# Patient Record
Sex: FEMALE | Employment: UNEMPLOYED | URBAN - METROPOLITAN AREA
[De-identification: names, ages, dates, MRNs, and addresses within clinical notes are randomized per-mention and may not be internally consistent; named-entity substitution may affect disease eponyms.]

---

## 2018-11-30 ENCOUNTER — APPOINTMENT (OUTPATIENT)
Dept: GENERAL RADIOLOGY | Age: 63
DRG: 871 | End: 2018-11-30
Attending: INTERNAL MEDICINE
Payer: COMMERCIAL

## 2018-11-30 ENCOUNTER — APPOINTMENT (OUTPATIENT)
Dept: CT IMAGING | Age: 63
DRG: 871 | End: 2018-11-30
Attending: STUDENT IN AN ORGANIZED HEALTH CARE EDUCATION/TRAINING PROGRAM
Payer: COMMERCIAL

## 2018-11-30 ENCOUNTER — HOSPITAL ENCOUNTER (INPATIENT)
Age: 63
LOS: 17 days | Discharge: NURSING, REHAB OR LONG-TERM CARE | DRG: 871 | End: 2018-12-17
Attending: STUDENT IN AN ORGANIZED HEALTH CARE EDUCATION/TRAINING PROGRAM | Admitting: INTERNAL MEDICINE
Payer: COMMERCIAL

## 2018-11-30 ENCOUNTER — APPOINTMENT (OUTPATIENT)
Dept: GENERAL RADIOLOGY | Age: 63
DRG: 871 | End: 2018-11-30
Attending: STUDENT IN AN ORGANIZED HEALTH CARE EDUCATION/TRAINING PROGRAM
Payer: COMMERCIAL

## 2018-11-30 DIAGNOSIS — R41.82 ALTERED MENTAL STATUS, UNSPECIFIED ALTERED MENTAL STATUS TYPE: ICD-10-CM

## 2018-11-30 DIAGNOSIS — E87.29 HIGH ANION GAP METABOLIC ACIDOSIS: Primary | ICD-10-CM

## 2018-11-30 DIAGNOSIS — N17.9 AKI (ACUTE KIDNEY INJURY) (HCC): ICD-10-CM

## 2018-11-30 DIAGNOSIS — M25.521 RIGHT ELBOW PAIN: ICD-10-CM

## 2018-11-30 DIAGNOSIS — M21.372 FOOT DROP, BILATERAL: ICD-10-CM

## 2018-11-30 DIAGNOSIS — R29.898 UPPER LIMB WEAKNESS: ICD-10-CM

## 2018-11-30 DIAGNOSIS — G93.40 ENCEPHALOPATHY: ICD-10-CM

## 2018-11-30 DIAGNOSIS — M21.371 FOOT DROP, BILATERAL: ICD-10-CM

## 2018-11-30 PROBLEM — E87.20 METABOLIC ACIDOSIS: Status: ACTIVE | Noted: 2018-11-30

## 2018-11-30 PROBLEM — D72.829 LEUKOCYTOSIS: Status: ACTIVE | Noted: 2018-11-30

## 2018-11-30 PROBLEM — I95.9 HYPOTENSION: Status: ACTIVE | Noted: 2018-11-30

## 2018-11-30 LAB
ALBUMIN SERPL-MCNC: 1 G/DL (ref 3.5–5)
ALBUMIN SERPL-MCNC: 1.2 G/DL (ref 3.5–5)
ALBUMIN/GLOB SERPL: 0.2 {RATIO} (ref 1.1–2.2)
ALBUMIN/GLOB SERPL: 0.3 {RATIO} (ref 1.1–2.2)
ALP SERPL-CCNC: 110 U/L (ref 45–117)
ALP SERPL-CCNC: 92 U/L (ref 45–117)
ALT SERPL-CCNC: 23 U/L (ref 12–78)
ALT SERPL-CCNC: 24 U/L (ref 12–78)
AMPHET UR QL SCN: NEGATIVE
ANION GAP SERPL CALC-SCNC: 21 MMOL/L (ref 5–15)
ANION GAP SERPL CALC-SCNC: 22 MMOL/L (ref 5–15)
APPEARANCE UR: ABNORMAL
ARTERIAL PATENCY WRIST A: YES
AST SERPL-CCNC: 53 U/L (ref 15–37)
AST SERPL-CCNC: 64 U/L (ref 15–37)
ATRIAL RATE: 88 BPM
B PERT DNA SPEC QL NAA+PROBE: NOT DETECTED
BACTERIA URNS QL MICRO: NEGATIVE /HPF
BARBITURATES UR QL SCN: NEGATIVE
BASE DEFICIT BLDA-SCNC: 9.8 MMOL/L
BASOPHILS # BLD: 0 K/UL (ref 0–0.1)
BASOPHILS NFR BLD: 0 % (ref 0–1)
BDY SITE: ABNORMAL
BENZODIAZ UR QL: NEGATIVE
BILIRUB SERPL-MCNC: 0.4 MG/DL (ref 0.2–1)
BILIRUB SERPL-MCNC: 0.4 MG/DL (ref 0.2–1)
BILIRUB UR QL CFM: NEGATIVE
BUN SERPL-MCNC: 63 MG/DL (ref 6–20)
BUN SERPL-MCNC: 72 MG/DL (ref 6–20)
BUN/CREAT SERPL: 22 (ref 12–20)
BUN/CREAT SERPL: 23 (ref 12–20)
C PNEUM DNA SPEC QL NAA+PROBE: NOT DETECTED
CALCIUM SERPL-MCNC: 6.6 MG/DL (ref 8.5–10.1)
CALCIUM SERPL-MCNC: 8.8 MG/DL (ref 8.5–10.1)
CALCULATED P AXIS, ECG09: 48 DEGREES
CALCULATED R AXIS, ECG10: 28 DEGREES
CALCULATED T AXIS, ECG11: 34 DEGREES
CANNABINOIDS UR QL SCN: NEGATIVE
CHLORIDE SERPL-SCNC: 103 MMOL/L (ref 97–108)
CHLORIDE SERPL-SCNC: 106 MMOL/L (ref 97–108)
CK SERPL-CCNC: 617 U/L (ref 26–192)
CO2 SERPL-SCNC: 18 MMOL/L (ref 21–32)
CO2 SERPL-SCNC: 21 MMOL/L (ref 21–32)
COCAINE UR QL SCN: NEGATIVE
COLOR UR: ABNORMAL
COMMENT, HOLDF: NORMAL
CREAT SERPL-MCNC: 2.85 MG/DL (ref 0.55–1.02)
CREAT SERPL-MCNC: 3.13 MG/DL (ref 0.55–1.02)
CREAT UR-MCNC: 187.11 MG/DL
DIAGNOSIS, 93000: NORMAL
DIFFERENTIAL METHOD BLD: ABNORMAL
DRUG SCRN COMMENT,DRGCM: ABNORMAL
EOSINOPHIL # BLD: 0 K/UL (ref 0–0.4)
EOSINOPHIL #/AREA URNS HPF: NEGATIVE /[HPF]
EOSINOPHIL NFR BLD: 0 % (ref 0–7)
EPITH CASTS URNS QL MICRO: ABNORMAL /LPF
ERYTHROCYTE [DISTWIDTH] IN BLOOD BY AUTOMATED COUNT: 14.6 % (ref 11.5–14.5)
EST. AVERAGE GLUCOSE BLD GHB EST-MCNC: 160 MG/DL
FLUAV H1 2009 PAND RNA SPEC QL NAA+PROBE: NOT DETECTED
FLUAV H1 RNA SPEC QL NAA+PROBE: NOT DETECTED
FLUAV H3 RNA SPEC QL NAA+PROBE: NOT DETECTED
FLUAV SUBTYP SPEC NAA+PROBE: NOT DETECTED
FLUBV RNA SPEC QL NAA+PROBE: NOT DETECTED
GAS FLOW.O2 O2 DELIVERY SYS: 2 L/MIN
GLOBULIN SER CALC-MCNC: 3.5 G/DL (ref 2–4)
GLOBULIN SER CALC-MCNC: 5.7 G/DL (ref 2–4)
GLUCOSE BLD STRIP.AUTO-MCNC: 117 MG/DL (ref 65–100)
GLUCOSE BLD STRIP.AUTO-MCNC: 142 MG/DL (ref 65–100)
GLUCOSE BLD STRIP.AUTO-MCNC: 177 MG/DL (ref 65–100)
GLUCOSE BLD STRIP.AUTO-MCNC: 66 MG/DL (ref 65–100)
GLUCOSE BLD STRIP.AUTO-MCNC: 92 MG/DL (ref 65–100)
GLUCOSE SERPL-MCNC: 136 MG/DL (ref 65–100)
GLUCOSE SERPL-MCNC: 60 MG/DL (ref 65–100)
GLUCOSE UR STRIP.AUTO-MCNC: NEGATIVE MG/DL
HADV DNA SPEC QL NAA+PROBE: NOT DETECTED
HBA1C MFR BLD: 7.2 % (ref 4.2–6.3)
HCO3 BLDA-SCNC: 16 MMOL/L (ref 22–26)
HCOV 229E RNA SPEC QL NAA+PROBE: NOT DETECTED
HCOV HKU1 RNA SPEC QL NAA+PROBE: NOT DETECTED
HCOV NL63 RNA SPEC QL NAA+PROBE: NOT DETECTED
HCOV OC43 RNA SPEC QL NAA+PROBE: NOT DETECTED
HCT VFR BLD AUTO: 32.6 % (ref 35–47)
HGB BLD-MCNC: 10 G/DL (ref 11.5–16)
HGB UR QL STRIP: NEGATIVE
HMPV RNA SPEC QL NAA+PROBE: NOT DETECTED
HPIV1 RNA SPEC QL NAA+PROBE: NOT DETECTED
HPIV2 RNA SPEC QL NAA+PROBE: NOT DETECTED
HPIV3 RNA SPEC QL NAA+PROBE: NOT DETECTED
HPIV4 RNA SPEC QL NAA+PROBE: NOT DETECTED
IMM GRANULOCYTES # BLD: 0.6 K/UL (ref 0–0.04)
IMM GRANULOCYTES NFR BLD AUTO: 2 % (ref 0–0.5)
KETONES UR QL STRIP.AUTO: NEGATIVE MG/DL
LACTATE BLD-SCNC: 1.33 MMOL/L (ref 0.4–2)
LEUKOCYTE ESTERASE UR QL STRIP.AUTO: NEGATIVE
LYMPHOCYTES # BLD: 0.6 K/UL (ref 0.8–3.5)
LYMPHOCYTES NFR BLD: 2 % (ref 12–49)
M PNEUMO DNA SPEC QL NAA+PROBE: NOT DETECTED
MCH RBC QN AUTO: 28.4 PG (ref 26–34)
MCHC RBC AUTO-ENTMCNC: 30.7 G/DL (ref 30–36.5)
MCV RBC AUTO: 92.6 FL (ref 80–99)
METHADONE UR QL: NEGATIVE
MONOCYTES # BLD: 0.6 K/UL (ref 0–1)
MONOCYTES NFR BLD: 2 % (ref 5–13)
NEUTS SEG # BLD: 25.8 K/UL (ref 1.8–8)
NEUTS SEG NFR BLD: 94 % (ref 32–75)
NITRITE UR QL STRIP.AUTO: NEGATIVE
NRBC # BLD: 0 K/UL (ref 0–0.01)
NRBC BLD-RTO: 0 PER 100 WBC
OPIATES UR QL: POSITIVE
OSMOLALITY SERPL: 312 MOSM/KG H2O
P-R INTERVAL, ECG05: 158 MS
PCO2 BLDA: 35 MMHG (ref 35–45)
PCP UR QL: NEGATIVE
PH BLDA: 7.28 [PH] (ref 7.35–7.45)
PH UR STRIP: 5 [PH] (ref 5–8)
PHOSPHATE SERPL-MCNC: 7.1 MG/DL (ref 2.6–4.7)
PLATELET # BLD AUTO: 502 K/UL (ref 150–400)
PMV BLD AUTO: 9.9 FL (ref 8.9–12.9)
PO2 BLDA: 69 MMHG (ref 80–100)
POTASSIUM SERPL-SCNC: 3.6 MMOL/L (ref 3.5–5.1)
POTASSIUM SERPL-SCNC: 4.5 MMOL/L (ref 3.5–5.1)
PROT SERPL-MCNC: 4.5 G/DL (ref 6.4–8.2)
PROT SERPL-MCNC: 6.9 G/DL (ref 6.4–8.2)
PROT UR STRIP-MCNC: 100 MG/DL
Q-T INTERVAL, ECG07: 382 MS
QRS DURATION, ECG06: 98 MS
QTC CALCULATION (BEZET), ECG08: 462 MS
RBC # BLD AUTO: 3.52 M/UL (ref 3.8–5.2)
RBC #/AREA URNS HPF: ABNORMAL /HPF (ref 0–5)
RBC MORPH BLD: ABNORMAL
RSV RNA SPEC QL NAA+PROBE: NOT DETECTED
RV+EV RNA SPEC QL NAA+PROBE: NOT DETECTED
SAMPLES BEING HELD,HOLD: NORMAL
SAO2 % BLD: 92 % (ref 92–97)
SAO2% DEVICE SAO2% SENSOR NAME: ABNORMAL
SERVICE CMNT-IMP: ABNORMAL
SERVICE CMNT-IMP: NORMAL
SERVICE CMNT-IMP: NORMAL
SODIUM SERPL-SCNC: 143 MMOL/L (ref 136–145)
SODIUM SERPL-SCNC: 148 MMOL/L (ref 136–145)
SODIUM UR-SCNC: 29 MMOL/L
SP GR UR REFRACTOMETRY: 1.02 (ref 1–1.03)
SPECIMEN SITE: ABNORMAL
UR CULT HOLD, URHOLD: NORMAL
UROBILINOGEN UR QL STRIP.AUTO: 1 EU/DL (ref 0.2–1)
VENTRICULAR RATE, ECG03: 88 BPM
WBC # BLD AUTO: 27.6 K/UL (ref 3.6–11)
WBC URNS QL MICRO: ABNORMAL /HPF (ref 0–4)

## 2018-11-30 PROCEDURE — 77030005514 HC CATH URETH FOL14 BARD -A

## 2018-11-30 PROCEDURE — 71045 X-RAY EXAM CHEST 1 VIEW: CPT

## 2018-11-30 PROCEDURE — 82803 BLOOD GASES ANY COMBINATION: CPT

## 2018-11-30 PROCEDURE — 83036 HEMOGLOBIN GLYCOSYLATED A1C: CPT

## 2018-11-30 PROCEDURE — 82962 GLUCOSE BLOOD TEST: CPT

## 2018-11-30 PROCEDURE — 74011250637 HC RX REV CODE- 250/637: Performed by: INTERNAL MEDICINE

## 2018-11-30 PROCEDURE — 74011000258 HC RX REV CODE- 258: Performed by: INTERNAL MEDICINE

## 2018-11-30 PROCEDURE — 77030011943

## 2018-11-30 PROCEDURE — 87205 SMEAR GRAM STAIN: CPT

## 2018-11-30 PROCEDURE — 87147 CULTURE TYPE IMMUNOLOGIC: CPT

## 2018-11-30 PROCEDURE — 74011250636 HC RX REV CODE- 250/636: Performed by: INTERNAL MEDICINE

## 2018-11-30 PROCEDURE — 87798 DETECT AGENT NOS DNA AMP: CPT

## 2018-11-30 PROCEDURE — 36600 WITHDRAWAL OF ARTERIAL BLOOD: CPT

## 2018-11-30 PROCEDURE — 36415 COLL VENOUS BLD VENIPUNCTURE: CPT

## 2018-11-30 PROCEDURE — 96360 HYDRATION IV INFUSION INIT: CPT

## 2018-11-30 PROCEDURE — 99285 EMERGENCY DEPT VISIT HI MDM: CPT

## 2018-11-30 PROCEDURE — 84100 ASSAY OF PHOSPHORUS: CPT

## 2018-11-30 PROCEDURE — 74011000250 HC RX REV CODE- 250: Performed by: INTERNAL MEDICINE

## 2018-11-30 PROCEDURE — 80320 DRUG SCREEN QUANTALCOHOLS: CPT

## 2018-11-30 PROCEDURE — 51702 INSERT TEMP BLADDER CATH: CPT

## 2018-11-30 PROCEDURE — 83930 ASSAY OF BLOOD OSMOLALITY: CPT

## 2018-11-30 PROCEDURE — 96361 HYDRATE IV INFUSION ADD-ON: CPT

## 2018-11-30 PROCEDURE — 87040 BLOOD CULTURE FOR BACTERIA: CPT

## 2018-11-30 PROCEDURE — 84300 ASSAY OF URINE SODIUM: CPT

## 2018-11-30 PROCEDURE — 80053 COMPREHEN METABOLIC PANEL: CPT

## 2018-11-30 PROCEDURE — 83605 ASSAY OF LACTIC ACID: CPT

## 2018-11-30 PROCEDURE — 74176 CT ABD & PELVIS W/O CONTRAST: CPT

## 2018-11-30 PROCEDURE — 85025 COMPLETE CBC W/AUTO DIFF WBC: CPT

## 2018-11-30 PROCEDURE — 80307 DRUG TEST PRSMV CHEM ANLYZR: CPT

## 2018-11-30 PROCEDURE — 93005 ELECTROCARDIOGRAM TRACING: CPT

## 2018-11-30 PROCEDURE — 65660000000 HC RM CCU STEPDOWN

## 2018-11-30 PROCEDURE — 87186 SC STD MICRODIL/AGAR DIL: CPT

## 2018-11-30 PROCEDURE — 82550 ASSAY OF CK (CPK): CPT

## 2018-11-30 PROCEDURE — 73080 X-RAY EXAM OF ELBOW: CPT

## 2018-11-30 PROCEDURE — 81001 URINALYSIS AUTO W/SCOPE: CPT

## 2018-11-30 PROCEDURE — 82570 ASSAY OF URINE CREATININE: CPT

## 2018-11-30 PROCEDURE — 74011250636 HC RX REV CODE- 250/636: Performed by: STUDENT IN AN ORGANIZED HEALTH CARE EDUCATION/TRAINING PROGRAM

## 2018-11-30 RX ORDER — OXYCODONE HYDROCHLORIDE 5 MG/1
5 TABLET ORAL
Status: ON HOLD | COMMUNITY
End: 2018-12-17 | Stop reason: SDUPTHER

## 2018-11-30 RX ORDER — TRIAMCINOLONE ACETONIDE 1 MG/G
CREAM TOPICAL 2 TIMES DAILY
COMMUNITY
End: 2018-12-17

## 2018-11-30 RX ORDER — NIACIN 500 MG/1
500 TABLET, EXTENDED RELEASE ORAL
COMMUNITY
End: 2018-12-17

## 2018-11-30 RX ORDER — HEPARIN SODIUM 5000 [USP'U]/ML
5000 INJECTION, SOLUTION INTRAVENOUS; SUBCUTANEOUS EVERY 8 HOURS
Status: DISCONTINUED | OUTPATIENT
Start: 2018-11-30 | End: 2018-12-07

## 2018-11-30 RX ORDER — PREDNISONE 20 MG/1
20 TABLET ORAL
COMMUNITY
Start: 2018-11-27 | End: 2018-12-17

## 2018-11-30 RX ORDER — ASPIRIN 81 MG/1
81 TABLET ORAL 2 TIMES DAILY
Status: DISCONTINUED | OUTPATIENT
Start: 2018-11-30 | End: 2018-12-07

## 2018-11-30 RX ORDER — NALOXONE HYDROCHLORIDE 1 MG/ML
1 INJECTION INTRAMUSCULAR; INTRAVENOUS; SUBCUTANEOUS
Status: ACTIVE | OUTPATIENT
Start: 2018-11-30 | End: 2018-12-01

## 2018-11-30 RX ORDER — SIMVASTATIN 40 MG/1
40 TABLET, FILM COATED ORAL
COMMUNITY
End: 2019-01-10

## 2018-11-30 RX ORDER — SODIUM CHLORIDE 0.9 % (FLUSH) 0.9 %
5-10 SYRINGE (ML) INJECTION EVERY 8 HOURS
Status: DISCONTINUED | OUTPATIENT
Start: 2018-11-30 | End: 2018-12-17 | Stop reason: HOSPADM

## 2018-11-30 RX ORDER — SERTRALINE HYDROCHLORIDE 50 MG/1
150 TABLET, FILM COATED ORAL
Status: DISCONTINUED | OUTPATIENT
Start: 2018-11-30 | End: 2018-12-10

## 2018-11-30 RX ORDER — SODIUM CHLORIDE 0.9 % (FLUSH) 0.9 %
5-10 SYRINGE (ML) INJECTION AS NEEDED
Status: DISCONTINUED | OUTPATIENT
Start: 2018-11-30 | End: 2018-12-17 | Stop reason: HOSPADM

## 2018-11-30 RX ORDER — DEXTROSE 50 % IN WATER (D50W) INTRAVENOUS SYRINGE
25-50 AS NEEDED
Status: DISCONTINUED | OUTPATIENT
Start: 2018-11-30 | End: 2018-12-17 | Stop reason: HOSPADM

## 2018-11-30 RX ORDER — METFORMIN HYDROCHLORIDE 500 MG/1
500 TABLET ORAL 2 TIMES DAILY WITH MEALS
COMMUNITY
End: 2018-12-17

## 2018-11-30 RX ORDER — ACETAMINOPHEN 325 MG/1
650 TABLET ORAL
Status: DISCONTINUED | OUTPATIENT
Start: 2018-11-30 | End: 2018-12-17 | Stop reason: HOSPADM

## 2018-11-30 RX ORDER — CYCLOBENZAPRINE HCL 10 MG
10 TABLET ORAL
COMMUNITY
End: 2018-12-17

## 2018-11-30 RX ORDER — INSULIN GLARGINE 100 [IU]/ML
75 INJECTION, SOLUTION SUBCUTANEOUS
COMMUNITY
End: 2018-12-17

## 2018-11-30 RX ORDER — MAGNESIUM SULFATE 100 %
4 CRYSTALS MISCELLANEOUS AS NEEDED
Status: DISCONTINUED | OUTPATIENT
Start: 2018-11-30 | End: 2018-12-17 | Stop reason: HOSPADM

## 2018-11-30 RX ORDER — FENOFIBRATE 160 MG/1
160 TABLET ORAL
COMMUNITY
End: 2018-12-17

## 2018-11-30 RX ORDER — GLUCOSAM/CHONDRO/HERB 149/HYAL 750-100 MG
2 TABLET ORAL 2 TIMES DAILY
COMMUNITY
End: 2018-12-17

## 2018-11-30 RX ORDER — SODIUM CHLORIDE 0.9 % (FLUSH) 0.9 %
5-10 SYRINGE (ML) INJECTION AS NEEDED
Status: DISCONTINUED | OUTPATIENT
Start: 2018-11-30 | End: 2018-12-12

## 2018-11-30 RX ORDER — LISINOPRIL AND HYDROCHLOROTHIAZIDE 12.5; 2 MG/1; MG/1
2 TABLET ORAL DAILY
COMMUNITY
End: 2018-12-17

## 2018-11-30 RX ORDER — ONDANSETRON 2 MG/ML
4 INJECTION INTRAMUSCULAR; INTRAVENOUS
Status: DISCONTINUED | OUTPATIENT
Start: 2018-11-30 | End: 2018-12-17 | Stop reason: HOSPADM

## 2018-11-30 RX ORDER — GABAPENTIN 300 MG/1
300 CAPSULE ORAL 3 TIMES DAILY
COMMUNITY
End: 2018-12-17

## 2018-11-30 RX ORDER — ATENOLOL 25 MG/1
25 TABLET ORAL DAILY
COMMUNITY
End: 2018-12-17

## 2018-11-30 RX ORDER — LEVOTHYROXINE SODIUM 125 UG/1
125 TABLET ORAL
Status: DISCONTINUED | OUTPATIENT
Start: 2018-12-01 | End: 2018-12-10

## 2018-11-30 RX ORDER — LEVOTHYROXINE SODIUM 125 UG/1
125 TABLET ORAL
COMMUNITY

## 2018-11-30 RX ORDER — IBUPROFEN 200 MG
400 TABLET ORAL 2 TIMES DAILY
COMMUNITY
End: 2018-12-17

## 2018-11-30 RX ORDER — ASPIRIN 81 MG/1
81 TABLET ORAL 2 TIMES DAILY
COMMUNITY
End: 2019-04-29

## 2018-11-30 RX ORDER — SERTRALINE HYDROCHLORIDE 100 MG/1
100 TABLET, FILM COATED ORAL DAILY
Status: ON HOLD | COMMUNITY
End: 2019-05-29 | Stop reason: SDUPTHER

## 2018-11-30 RX ORDER — SODIUM CHLORIDE 9 MG/ML
150 INJECTION, SOLUTION INTRAVENOUS CONTINUOUS
Status: DISCONTINUED | OUTPATIENT
Start: 2018-11-30 | End: 2018-11-30

## 2018-11-30 RX ORDER — INSULIN LISPRO 100 [IU]/ML
INJECTION, SOLUTION INTRAVENOUS; SUBCUTANEOUS
Status: DISCONTINUED | OUTPATIENT
Start: 2018-11-30 | End: 2018-12-07

## 2018-11-30 RX ADMIN — SODIUM CHLORIDE 1000 ML: 900 INJECTION, SOLUTION INTRAVENOUS at 12:18

## 2018-11-30 RX ADMIN — SODIUM CHLORIDE 3 G: 900 INJECTION, SOLUTION INTRAVENOUS at 16:16

## 2018-11-30 RX ADMIN — VANCOMYCIN HYDROCHLORIDE 1500 MG: 10 INJECTION, POWDER, LYOPHILIZED, FOR SOLUTION INTRAVENOUS at 16:29

## 2018-11-30 RX ADMIN — SERTRALINE HYDROCHLORIDE 150 MG: 50 TABLET ORAL at 21:31

## 2018-11-30 RX ADMIN — Medication 10 ML: at 14:29

## 2018-11-30 RX ADMIN — ASPIRIN 81 MG: 81 TABLET, COATED ORAL at 20:19

## 2018-11-30 RX ADMIN — SODIUM CHLORIDE 448 ML: 900 INJECTION, SOLUTION INTRAVENOUS at 13:01

## 2018-11-30 RX ADMIN — SODIUM CHLORIDE: 450 INJECTION, SOLUTION INTRAVENOUS at 18:28

## 2018-11-30 RX ADMIN — Medication 10 ML: at 21:32

## 2018-11-30 RX ADMIN — HEPARIN SODIUM 5000 UNITS: 5000 INJECTION INTRAVENOUS; SUBCUTANEOUS at 21:31

## 2018-11-30 RX ADMIN — WATER: 1000 INJECTION, SOLUTION INTRAVENOUS at 16:23

## 2018-11-30 RX ADMIN — HEPARIN SODIUM 5000 UNITS: 5000 INJECTION INTRAVENOUS; SUBCUTANEOUS at 16:41

## 2018-11-30 RX ADMIN — DEXTROSE MONOHYDRATE 25 G: 500 INJECTION PARENTERAL at 20:16

## 2018-11-30 RX ADMIN — SODIUM CHLORIDE 1000 ML: 900 INJECTION, SOLUTION INTRAVENOUS at 14:00

## 2018-11-30 NOTE — PROGRESS NOTES
[facility]: MED RECONCILIATION Comments/Recommendations:  
? Interview conducted with the patient and family member at the bedside. ? The patient reports she has taken aspirin 81 mg twice daily for the last 10 years at the direction of her PCP ? The patient admits to not checking her blood glucose at home for a few months ? The patient has not eaten  in the past three days due to not feeling hungry. Last night she took a half dose of her insulin glargine ? The patient reports a rash which started  one week ago on her back and progressed up over her shoulders, The patient was seen at [facility] and given prednisone for 5 days and a steroid cream. The patient reports no new medications correlate with the onset of the rash. ? Pharmacy does not stock [place] [facility] ? Drug Drug interaction simvastatin and niacin - Myopathy and rhabdomyolysis (muscle aches, tenderness, and weakness) have been associated with concomitant administration of HMG-CoA reductase inhibitors and niacin ? Patient's crcl is 17.8 ml/min ? Gabapentin is renally cleared consider reducing the patient's dose to a total dose of 700 mg per day or less Allergies: Patient has no known allergies. Prior to Admission Medications:  
Prior to Admission Medications Prescriptions Last Dose Informant Patient Reported? Taking? OTHER,NON-FORMULARY, 11/30/2018 at am Self Yes Yes Sig: Take 1 Tab by mouth two (2) times a day. Osteo Biflex  
aspirin delayed-release 81 mg tablet 11/30/2018 at am Self Yes Yes Sig: Take 81 mg by mouth two (2) times a day. atenolol (TENORMIN) 25 mg tablet 11/30/2018 at Unknown time Self Yes Yes Sig: Take 25 mg by mouth daily. cyclobenzaprine (FLEXERIL) 10 mg tablet  Self Yes Yes Sig: Take 10 mg by mouth daily as needed for Muscle Spasm(s). fenofibrate (LOFIBRA) 160 mg tablet 11/29/2018 at Unknown time Self Yes Yes Sig: Take 160 mg by mouth nightly.  
gabapentin (NEURONTIN) 300 mg capsule 11/30/2018 at am Self Yes Yes Sig: Take 300 mg by mouth three (3) times daily. ibuprofen (MOTRIN) 200 mg tablet 2018 at am Self Yes Yes Sig: Take 400 mg by mouth two (2) times a day. insulin glargine (LANTUS,BASAGLAR) 100 unit/mL (3 mL) inpn 2018 at Unknown time Self Yes Yes Si Units by SubCUTAneous route nightly. levothyroxine (SYNTHROID) 125 mcg tablet 2018 at Unknown time Self Yes Yes Sig: Take 125 mcg by mouth Daily (before breakfast). lisinopril-hydroCHLOROthiazide (PRINZIDE, ZESTORETIC) 20-12.5 mg per tablet 2018 at Unknown time Self Yes Yes Sig: Take 2 Tabs by mouth daily. metFORMIN (GLUCOPHAGE) 500 mg tablet 2018 at am Self Yes Yes Sig: Take 500 mg by mouth two (2) times daily (with meals). multivit-min-FA-lycopen-lutein (CENTRUM SILVER) 0.4-300-250 mg-mcg-mcg tab 2018 at am Self Yes Yes Sig: Take 1 Tab by mouth daily. niacin ER (NIASPAN) 500 mg tablet 2018 at Unknown time Self Yes Yes Sig: Take 500 mg by mouth nightly. omega 3-DHA-EPA-fish oil 1,000 mg (120 mg-180 mg) capsule 2018 at am Self Yes Yes Sig: Take 2 Caps by mouth two (2) times a day. oxyCODONE ER (XTAMPZA ER) 18 mg ER capsule 2018 at am Self Yes Yes Sig: Take 18 mg by mouth every twelve (12) hours. oxyCODONE IR (ROXICODONE) 5 mg immediate release tablet  Self Yes Yes Sig: Take 5 mg by mouth daily as needed (breakthrough pain). predniSONE (DELTASONE) 20 mg tablet 2018 at Unknown time Self Yes Yes Sig: Take 20 mg by mouth nightly. 5 day course started  for skin rash  
sertraline (ZOLOFT) 100 mg tablet 2018 at Unknown time Self Yes Yes Sig: Take 150 mg by mouth nightly. simvastatin (ZOCOR) 40 mg tablet 2018 at Unknown time Self Yes Yes Sig: Take 40 mg by mouth nightly. triamcinolone acetonide (KENALOG) 0.1 % topical cream 2018 at Unknown time Self Yes Yes Sig: Apply  to affected area two (2) times a day. use thin layer ubidecarenone (COENZYME Q10, BULK,) 11/30/2018 at am Self Yes Yes Sig: Take 1 Tab by mouth daily. Facility-Administered Medications: None Thank you, Alia Willingham, PharmD, BCPS

## 2018-11-30 NOTE — PROGRESS NOTES
[address] Dosing Services: Antimicrobial Stewardship Progress Note Consult for antibiotic dosing of Vancomycin plus Unasyn by Dr. Varsha Lott Pharmacist reviewed antibiotic appropriateness for 61year old , female  for indication of Possible septic elbow, leukocytosis Day of Therapy 1 Plan: 
Vancomycin therapy: 
Start Vancomycin therapy: Will give vancomycin 1500 mg x 1 dose. Patient in BAYLEE, will not order a maintenance dose at this time. Dose calculated to approximate a therapeutic trough of 15-20 mcg/mL. Last trough level / Plan for level: Vancomycin 24 hour random level is ordered for tomorrow afternoon. Pharmacy to follow in the AM and plan to empirically order a maintenance dose if renal function significantly improves overnight. Daily Scr ordered per protocol. Pharmacy to follow daily and will make changes to dose and/or frequency based on clinical status. Date Dose & Interval Measured (mcg/mL) Extrapolated (mcg/mL) ? ? ? ?  
? ? ? ?  
? ? ? ? Non-Kinetic Antimicrobial Dosing:  
Current Regimen: Unasyn- Pharmacy to dose Recommendation: Will order ampicillin/sulbactam 3 grams every 12 hours as appropriate for CrCl 15-30 ml/min. Starting at the higher initial dose of 3 grams due to significant leukocytosis and possible septic joint. Other Antimicrobial 
(not dosed by pharmacist) None Cultures 11/30 Blood: Prelim Serum Creatinine Lab Results Component Value Date/Time Creatinine 3.13 (H) 11/30/2018 12:10 PM  
   
Creatinine Clearance Estimated Creatinine Clearance: 17.8 mL/min (A) (based on SCr of 3.13 mg/dL (H)). Temp  
97.4 °F (36.3 °C) WBC Lab Results Component Value Date/Time WBC 27.6 (H) 11/30/2018 12:10 PM  
   
H/H Lab Results Component Value Date/Time HGB 10.0 (L) 11/30/2018 12:10 PM  
  
 
Platelets Lab Results Component Value Date/Time  PLATELET 674 (H) 71/08/1935 12:10 PM  
  
 
 Pharmacist: Signed Aroldo Abrams [place] Contact information: [phone]

## 2018-11-30 NOTE — ED PROVIDER NOTES
61 y.o. female with past medical history significant for DM who presents from EMS with chief complaint of generalized body aches. Pt reports generalized body aches today. Pt states \"everything hurts\". Per spouse, Pt also c/o back pain and decreased appetite. Pt's spouse reports increased fatigue and weakness. Pt's spouse states the Pt has been lying in bed for the past year. Pt reports she is unable to sit up. Per EMS, Pt's BG was 47. EMS notes Pt was given d10,which increased BG to 97. Pt is hypotensive. Per spouse, Pt c/o chest congestion 2 weeks ago but refused to be evaluated. Per spouse, Pt had an infected bursa of the left arm in the past. Pt is taking insulin. NKDA. There are no other acute medical concerns at this time. PCP: Rob Angelo MD 
 
Note written by Mulugeta Jha, as dictated by Willy Castillo MD 11:55 AM 
 
 
 
The history is provided by the spouse, the EMS personnel and the patient. History reviewed. No pertinent past medical history. History reviewed. No pertinent surgical history. History reviewed. No pertinent family history. Social History Socioeconomic History  Marital status:  Spouse name: Not on file  Number of children: Not on file  Years of education: Not on file  Highest education level: Not on file Social Needs  Financial resource strain: Not on file  Food insecurity - worry: Not on file  Food insecurity - inability: Not on file  Transportation needs - medical: Not on file  Transportation needs - non-medical: Not on file Occupational History  Not on file Tobacco Use  Smoking status: Not on file Substance and Sexual Activity  Alcohol use: Not on file  Drug use: Not on file  Sexual activity: Not on file Other Topics Concern  Not on file Social History Narrative  Not on file ALLERGIES: Patient has no known allergies. Review of Systems Constitutional: Positive for appetite change and fatigue. Negative for activity change, diaphoresis and fever. HENT: Negative for congestion and sore throat. Eyes: Negative for photophobia and visual disturbance. Respiratory: Negative for chest tightness and shortness of breath. Cardiovascular: Negative for chest pain, palpitations and leg swelling. Gastrointestinal: Negative for abdominal pain, blood in stool, constipation, diarrhea, nausea and vomiting. Genitourinary: Negative for difficulty urinating, dysuria, flank pain, frequency and hematuria. Musculoskeletal: Positive for back pain and myalgias. Neurological: Positive for weakness. Negative for dizziness, syncope, numbness and headaches. All other systems reviewed and are negative. Vitals:  
 11/30/18 1118 11/30/18 1200 11/30/18 1222 BP: 106/48 100/45 (!) 83/36 Pulse: 89 84 84 Resp: 17 Temp: 97.4 °F (36.3 °C) SpO2: 94% 95% 90% Weight: 81.6 kg (180 lb) Height: 5' 1\" (1.549 m) Physical Exam  
Constitutional: She is oriented to person, place, and time. She appears well-developed and well-nourished. She appears lethargic. She appears ill. No distress. Chronically ill appearing. Debilitated. Lethargic. HENT:  
Head: Normocephalic and atraumatic. Nose: Nose normal.  
Mouth/Throat: Oropharynx is clear and moist. Mucous membranes are dry. No oropharyngeal exudate. Dry mucous membranes. Eyes: Conjunctivae and EOM are normal. Right eye exhibits no discharge. Left eye exhibits no discharge. No scleral icterus. Neck: Normal range of motion. Neck supple. No JVD present. No tracheal deviation present. No thyromegaly present. Cardiovascular: Normal rate, regular rhythm, normal heart sounds and intact distal pulses. Exam reveals no gallop and no friction rub. No murmur heard. Pulmonary/Chest: Effort normal and breath sounds normal. No stridor.  No respiratory distress. She has no wheezes. She has no rales. She exhibits no tenderness. Abdominal: Bowel sounds are normal. She exhibits distension. She exhibits no mass. There is tenderness. There is no rebound. Globally tender throughout. Abdomen distended. Musculoskeletal: Normal range of motion. She exhibits no edema or tenderness. Lymphadenopathy:  
  She has no cervical adenopathy. Neurological: She is oriented to person, place, and time. She appears lethargic. No cranial nerve deficit. Coordination normal.  
Skin: Skin is warm and dry. No rash noted. She is not diaphoretic. No erythema. No pallor. Psychiatric: She has a normal mood and affect. Her behavior is normal. Judgment and thought content normal.  
Nursing note and vitals reviewed. Note written by Mulugeta Carvajal, as dictated by Hamilton Egan MD 11:55 AM 
 
 
MDM Number of Diagnoses or Management Options Diagnosis management comments: A/P:  Severe dehydration, sepsis, DKA, opioid abuse. 60 y/o female who appears severely dehydrated concern for opioid abuse per  as he states \"she likes her pain pills\" as well as concern for severe deconditioning as pt per  has not gotten out of bed for over 1 yr. Sepsis bundle, IV fluids for severe dehydration. Reassessment:  Pt with normal lactate afebrile, does not meet Sepsis criteria. Pt with leukocytosis unclear etiology as CXR negative, will obtain CT abd w/out contrast due to BAYLEE. Pt will require admisssion. Amount and/or Complexity of Data Reviewed Clinical lab tests: ordered and reviewed Tests in the radiology section of CPT®: reviewed and ordered Review and summarize past medical records: yes Discuss the patient with other providers: yes Independent visualization of images, tracings, or specimens: yes Risk of Complications, Morbidity, and/or Mortality Presenting problems: moderate Diagnostic procedures: moderate Management options: moderate Critical Care Total time providing critical care: 30-74 minutes (Total critical care time spent exclusive of procedures:  50 min.) Patient Progress Patient progress: improved Procedures ED EKG interpretation: 
Rhythm: normal sinus rhythm; and regular . Rate (approx.): 68 BPM; ST/T wave: No ST/T wave abnormality; Note written by Mulugeta Casper, as dictated by Vidya Peterson MD 11:32 AM 
 
CONSULT NOTE: 
1:57 PM Vidya Peterson MD spoke with Dr. Marco A Ordonez, Consult for Hospitalist.  Discussed available diagnostic tests and clinical findings. Dr. Marco A Ordonez will admit Pt.  
 
 
8:55 AM 
Patient is being admitted to the hospital.  The results of their tests and reasons for their admission have been discussed with them and/or available family. They convey agreement and understanding for the need to be admitted and for their admission diagnosis. Consultation has been made with the inpatient physician specialist for hospitalization. LABORATORY TESTS: 
Recent Results (from the past 12 hour(s)) CBC WITH AUTOMATED DIFF Collection Time: 12/01/18  2:24 AM  
Result Value Ref Range WBC 22.0 (H) 3.6 - 11.0 K/uL  
 RBC 3.33 (L) 3.80 - 5.20 M/uL HGB 9.4 (L) 11.5 - 16.0 g/dL HCT 30.2 (L) 35.0 - 47.0 % MCV 90.7 80.0 - 99.0 FL  
 MCH 28.2 26.0 - 34.0 PG  
 MCHC 31.1 30.0 - 36.5 g/dL  
 RDW 14.6 (H) 11.5 - 14.5 % PLATELET 298 (H) 741 - 400 K/uL MPV 9.4 8.9 - 12.9 FL  
 NRBC 0.0 0  WBC ABSOLUTE NRBC 0.00 0.00 - 0.01 K/uL NEUTROPHILS 93 (H) 32 - 75 % LYMPHOCYTES 3 (L) 12 - 49 % MONOCYTES 3 (L) 5 - 13 % EOSINOPHILS 0 0 - 7 % BASOPHILS 0 0 - 1 % IMMATURE GRANULOCYTES 1 (H) 0.0 - 0.5 % ABS. NEUTROPHILS 20.4 (H) 1.8 - 8.0 K/UL  
 ABS. LYMPHOCYTES 0.7 (L) 0.8 - 3.5 K/UL  
 ABS. MONOCYTES 0.7 0.0 - 1.0 K/UL  
 ABS. EOSINOPHILS 0.0 0.0 - 0.4 K/UL  
 ABS. BASOPHILS 0.0 0.0 - 0.1 K/UL  
 ABS. IMM.  GRANS. 0.2 (H) 0.00 - 0.04 K/UL  
 DF SMEAR SCANNED    
 RBC COMMENTS NORMOCYTIC, NORMOCHROMIC MAGNESIUM Collection Time: 12/01/18  2:24 AM  
Result Value Ref Range Magnesium 2.1 1.6 - 2.4 mg/dL METABOLIC PANEL, COMPREHENSIVE Collection Time: 12/01/18  2:24 AM  
Result Value Ref Range Sodium 143 136 - 145 mmol/L Potassium 3.5 3.5 - 5.1 mmol/L Chloride 105 97 - 108 mmol/L  
 CO2 20 (L) 21 - 32 mmol/L Anion gap 18 (H) 5 - 15 mmol/L Glucose 122 (H) 65 - 100 mg/dL BUN 72 (H) 6 - 20 MG/DL Creatinine 3.24 (H) 0.55 - 1.02 MG/DL  
 BUN/Creatinine ratio 22 (H) 12 - 20 GFR est AA 17 (L) >60 ml/min/1.73m2 GFR est non-AA 14 (L) >60 ml/min/1.73m2 Calcium 8.1 (L) 8.5 - 10.1 MG/DL Bilirubin, total 0.3 0.2 - 1.0 MG/DL  
 ALT (SGPT) 28 12 - 78 U/L  
 AST (SGOT) 66 (H) 15 - 37 U/L Alk. phosphatase 95 45 - 117 U/L Protein, total 6.3 (L) 6.4 - 8.2 g/dL Albumin 1.1 (L) 3.5 - 5.0 g/dL Globulin 5.2 (H) 2.0 - 4.0 g/dL A-G Ratio 0.2 (L) 1.1 - 2.2 TSH 3RD GENERATION Collection Time: 12/01/18  2:24 AM  
Result Value Ref Range TSH 0.94 0.36 - 3.74 uIU/mL LACTIC ACID Collection Time: 12/01/18  2:24 AM  
Result Value Ref Range Lactic acid 0.9 0.4 - 2.0 MMOL/L  
GLUCOSE, POC Collection Time: 12/01/18  7:21 AM  
Result Value Ref Range Glucose (POC) 107 (H) 65 - 100 mg/dL Performed by Leonel Martin IMAGING RESULTS: 
CT ABD PELV WO CONT Final Result XR ELBOW RT MIN 3 V Final Result XR CHEST PORT Final Result Xr Elbow Rt Min 3 V Result Date: 11/30/2018 EXAM:  XR ELBOW RT MIN 3 V INDICATION:   right elbow pain and edema. COMPARISON: None. FINDINGS: Three views of the right elbow. Lateral is rotated. Multiple attempt remain however this is difficult given patient's underlying confusion. Given positioning alignment is normal. There are degenerative changes. Evaluation for effusion is not possible. No acute fracture IMPRESSION: 1. Limited films with DJD. Fracture is not identified. 2. There is soft tissue swelling on the dorsal margin of the elbow. Ct Abd Pelv Wo Cont Result Date: 11/30/2018 EXAM:  CT ABD PELV WO CONT INDICATION: eval for cause of sepsis COMPARISON: None CONTRAST:  None. TECHNIQUE: Thin axial images were obtained through the abdomen and pelvis. Coronal and sagittal reconstructions were generated. Oral contrast was not administered. CT dose reduction was achieved through use of a standardized protocol tailored for this examination and automatic exposure control for dose modulation. The absence of intravenous contrast material reduces the sensitivity for evaluation of the solid parenchymal organs of the abdomen. FINDINGS: LUNG BASES: Clear. INCIDENTALLY IMAGED HEART AND MEDIASTINUM: Unremarkable. LIVER: No mass or biliary dilatation. GALLBLADDER: Bladder is distended but not inflamed. No calcified stones SPLEEN: No mass. PANCREAS: No mass or ductal dilatation. ADRENALS: Unremarkable. KIDNEYS/URETERS: Punctate nonobstructing bilateral renal stones STOMACH: Unremarkable. SMALL BOWEL: No dilatation or wall thickening. COLON: No dilatation or wall thickening. APPENDIX: Unremarkable. PERITONEUM: No ascites or pneumoperitoneum. RETROPERITONEUM: No lymphadenopathy or aortic aneurysm. REPRODUCTIVE ORGANS: Probable small fibroid left uterine fundus URINARY BLADDER: Decompressed BONES: No destructive bone lesion. ADDITIONAL COMMENTS: N/A IMPRESSION: 1. No acute abnormality of the abdomen and pelvis 2. Gallbladder is distended but no inflammatory stranding is noted 3. Nonobstructing renal stones Xr Chest [facility] Result Date: 11/30/2018 EXAM:  XR CHEST PORT INDICATION:  meets SIRS criteria, low blood sugar, hypotensive COMPARISON:  None. FINDINGS: A portable AP radiograph of the chest was obtained at 1255 hours. The patient is on a cardiac monitor.  The heart size is at the upper limits of normal. Lungs are clear of an acute process. There is elevation right hemidiaphragm compared the left. Etiology of this is uncertain. There are degenerative changes of both shoulders. IMPRESSION: 1. Lungs are clear acute processes 2. There is elevation right hemidiaphragm etiology is uncertain. This could be related to paralysis. MEDICATIONS GIVEN: 
Medications  
sodium chloride (NS) flush 5-10 mL (not administered)  
naloxone [facility]) injection 1 mg (0 mg IntraVENous Held 11/30/18 1218) sodium chloride (NS) flush 5-10 mL (10 mL IntraVENous Given 12/1/18 0601)  
sodium chloride (NS) flush 5-10 mL (not administered)  
acetaminophen (TYLENOL) tablet 650 mg (not administered)  
heparin (porcine) injection 5,000 Units (5,000 Units SubCUTAneous Given 12/1/18 0728) ondansetron (ZOFRAN) injection 4 mg (not administered)  
insulin lispro (HUMALOG) injection (0 Units SubCUTAneous Held 12/1/18 0730) glucose chewable tablet 16 g (not administered) dextrose (D50W) injection syrg 12.5-25 g (25 g IntraVENous Given 11/30/18 2016) glucagon (GLUCAGEN) injection 1 mg (not administered) Vancomycin level 12/1/18 @ 1600 (not administered) aspirin delayed-release tablet 81 mg (81 mg Oral Given 12/1/18 0728) levothyroxine (SYNTHROID) tablet 125 mcg (125 mcg Oral Given 12/1/18 0727)  
sertraline (ZOLOFT) tablet 150 mg (150 mg Oral Given 11/30/18 2131) Vancomycin- Pharmacy dosing by levels (not administered)  
sodium bicarbonate (8.4%) 50 mEq in 0.45% sodium chloride 1,000 mL infusion ( IntraVENous Rate Verify 12/1/18 0850) cefepime (MAXIPIME) 2 g in 0.9% sodium chloride (MBP/ADV) 100 mL (not administered)  
sodium chloride 0.9 % bolus infusion 1,000 mL (0 mL IntraVENous IV Completed 11/30/18 1330) Followed by  
sodium chloride 0.9 % bolus infusion 1,000 mL (0 mL IntraVENous IV Completed 11/30/18 1610)   Followed by  
 sodium chloride 0.9 % bolus infusion 448 mL (0 mL IntraVENous IV Completed 11/30/18 1524) vancomycin (VANCOCIN) 1,500 mg in 0.9% sodium chloride 500 mL IVPB (0 mg IntraVENous IV Completed 11/30/18 1843) sodium chloride 0.9 % bolus infusion 500 mL (0 mL IntraVENous IV Completed 12/1/18 0118) IMPRESSION: 
No diagnosis found. PLAN: 
1. Admit to Hospitalist 
 
Total critical care time spent exclusive of procedures:  50 minutes Vern Wheeler MD

## 2018-11-30 NOTE — ED TRIAGE NOTES
Pt was found with bg 52 given d10 and then was 97. EMS called by  for unresponsives. Pt was hypotensive.

## 2018-11-30 NOTE — PROGRESS NOTES
11/30/2018 
1:37 PM 
Case management note Met with patient and spouse to discuss discharge planning. Confirmed demographics. Patient lives in 2 story home with spouse with a ramp. Until recently patient had been able to care for herself Now she is unable to walk. She is followed by a spine MD Dr. Abbie López for pain management. Patient is very sleepy and unable to answer questions appropriately. [facility] for RX 
Dr. Serena Dunaway for medical management. No NN Patient does not have advance directive Care Management Interventions PCP Verified by CM: Yes(dr. trina kolb no nn) Mode of Transport at Discharge: Self Transition of Care Consult (CM Consult): Discharge Planning Current Support Network: Lives with Spouse Confirm Follow Up Transport: Family Discharge Location Discharge Placement: Unable to determine at this time Sallie Oliveros, Curt N Deric Arizmendi

## 2018-11-30 NOTE — CONSULTS
[facility] [place] [address]                                                       Renal Physician Consult Note     Patient: Phoebe Briseno MRN: [id number]  Fiona Cuevas MD   YOB: 1955  Age: 61 y.o. Sex: female      ADMITTED:  11/30/2018 to No att. providers found by Kimberly Escobar MD for Metabolic acidosis [P34.6]  ADMIT DATE:11/30/2018    CONSULTATION DATE:11/30/2018     REASON FOR CONSULTATION: I have been asked to see this patient   by Dr. Itz Dior for Advise/Opinion for Renal Failure. DISCUSSION:   Acute kidney injury due to sepsis, shock and CEI use. May have ATN but I am hopeful this is hemodynamic AKF  · CEI adds to insult by impairing renal compensation to reduced perfusion. · Source of sepsis unclear. · Needs urine diagnostic indices. · Responding poorly to volume thus far. · UO is appropriately low. ·   ASSESSMENT:   · Severe AKF  · AGMA  · Shock   · Sepsis  · Unclear source. · Concomitant CEI use. PLAN:   · Would give Cl- and HCO3- containing IV fluids. · Monitor labs closely. · Urine for Na, creat. · If UO low and urine Na high, would move to CRRT sooner. · If UO low and urine Na also low, would be patient with volume. · Abs  · Follow up [facility]  · CRRT for high K, refractory acidemia, volume overload or uremia. Subjective:   HPI: Phoebe Briseno is a 61 y.o. female with past medical history significant for DM who presents from EMS with chief complaint of generalized body aches. ·  Pt reports generalized body aches today,  back pain and decreased appetite. Also increased fatigue and weakness. ·  Pt had an infected bursa of the left arm in the past. Pt is taking insulin. · In ED she was found to have presumed AKF, met acidosis with increased AG, high lactate level. · She has remained hypotensive despite sepsis protocol. · She is currently on a HCO3 drip. · BP ~  mmHg systolic.   · For ICU admit for sepsis, shock and AKF.    Patient has no history of exposure to intravenous iodinated contrast.   Patient has no history of significant NSAID use. Patient indicates no previous knowledge of renal failure. She is followed closely by Dr. Ava Collins  The patient no a previous history of hematuria; there is no history of proteinuria. The patient has no history of nephrolithiasis and no knowledge of pyelonephritis. Patient has  no history of hepatitis, jaundice. Patient has  no history of phospho soda use. Patient has  no history of herbal medication usage. Review of Systems: Total of 11 systems reviewed they are negative except per HPI. No Known Allergies    PMHx:  has a past medical history of Chronic back pain, Diabetes mellitus, type 2 ([address] and HTN (hypertension). PSurgHx:  has no past surgical history on file. PSocHx:  reports that  has never smoked. She does not have any smokeless tobacco history on file. She reports that she does not drink alcohol. Family History   Problem Relation Age of Onset    Diabetes Father     Heart Disease Father           Objective:    Vitals:    Vitals:    11/30/18 1222 11/30/18 1230 11/30/18 1330 11/30/18 1520   BP: (!) 83/36 (!) 105/30 108/55    Pulse: 84 85 81    Resp:       Temp:    99.9 °F (37.7 °C)   SpO2: 90% 90% 91%    Weight:       Height:         I&O's:  No intake/output data recorded. Physical Exam:  General: Lethargic; moderate distress. Obese. Eyes:No scleral icterus, No conjunctival pallor  Neck:Supple,no mass palpable,no thyromegaly  Lungs:Clears to auscultation bilaterally, normal respiratory effort; no rhonchi or wheezing  CVS:RRR, S1 S2 normal,  No rub, no LE edema  Abdomen:Soft, Non tender, No hepatosplenomegaly  Extremities:No cyanosis, No clubbing, can't access gait at present. Olecranon bursa not tender. Skin: + rash; no lesions. Erythema over face. Psych: AAO X 3, clouded sensorium. : none  NEURO: CN II-XII intact, M/S intact.   DTRs 2+/=    Care Plan discussed with:  Dr. Jf Osorio,  and pt. Chart reviewed. Total time spent with patient:    ECG[de-identified] Rev:no  Xray/CT/US/MRI REV:yes  Lab Data Personally Reviewed: (see below)  Recent Labs     11/30/18  1210   WBC 27.6*   HGB 10.0*   HCT 32.6*   *     Recent Labs     11/30/18  1210      K 4.5      CO2 18*   BUN 72*   CREA 3.13*   *   CA 8.8     Lab Results   Component Value Date/Time    Color DARK YELLOW 11/30/2018 12:41 PM    Appearance CLOUDY (A) 11/30/2018 12:41 PM    Specific gravity 1.020 11/30/2018 12:41 PM    pH (UA) 5.0 11/30/2018 12:41 PM    Protein 100 (A) 11/30/2018 12:41 PM    Glucose NEGATIVE  11/30/2018 12:41 PM    Ketone NEGATIVE  11/30/2018 12:41 PM    Urobilinogen 1.0 11/30/2018 12:41 PM    Nitrites NEGATIVE  11/30/2018 12:41 PM    Leukocyte Esterase NEGATIVE  11/30/2018 12:41 PM    Epithelial cells FEW 11/30/2018 12:41 PM    Bacteria NEGATIVE  11/30/2018 12:41 PM    WBC 0-4 11/30/2018 12:41 PM    RBC 0-5 11/30/2018 12:41 PM     No results found for: SDES  No results found for: CULT  Prior to Admission Medications   Prescriptions Last Dose Informant Patient Reported? Taking? OTHER,NON-FORMULARY, 11/30/2018 at am Self Yes Yes   Sig: Take 1 Tab by mouth two (2) times a day. Osteo Biflex   aspirin delayed-release 81 mg tablet 11/30/2018 at am Self Yes Yes   Sig: Take 81 mg by mouth two (2) times a day. atenolol (TENORMIN) 25 mg tablet 11/30/2018 at Unknown time Self Yes Yes   Sig: Take 25 mg by mouth daily. cyclobenzaprine (FLEXERIL) 10 mg tablet  Self Yes Yes   Sig: Take 10 mg by mouth daily as needed for Muscle Spasm(s). fenofibrate (LOFIBRA) 160 mg tablet 11/29/2018 at Unknown time Self Yes Yes   Sig: Take 160 mg by mouth nightly.   gabapentin (NEURONTIN) 300 mg capsule 11/30/2018 at am Self Yes Yes   Sig: Take 300 mg by mouth three (3) times daily. ibuprofen (MOTRIN) 200 mg tablet 11/30/2018 at am Self Yes Yes   Sig: Take 400 mg by mouth two (2) times a day. insulin glargine (LANTUS,BASAGLAR) 100 unit/mL (3 mL) inpn 2018 at Unknown time Self Yes Yes   Si Units by SubCUTAneous route nightly. levothyroxine (SYNTHROID) 125 mcg tablet 2018 at Unknown time Self Yes Yes   Sig: Take 125 mcg by mouth Daily (before breakfast). lisinopril-hydroCHLOROthiazide (PRINZIDE, ZESTORETIC) 20-12.5 mg per tablet 2018 at Unknown time Self Yes Yes   Sig: Take 2 Tabs by mouth daily. metFORMIN (GLUCOPHAGE) 500 mg tablet 2018 at am Self Yes Yes   Sig: Take 500 mg by mouth two (2) times daily (with meals). multivit-min-FA-lycopen-lutein (CENTRUM SILVER) 0.4-300-250 mg-mcg-mcg tab 2018 at am Self Yes Yes   Sig: Take 1 Tab by mouth daily. niacin ER (NIASPAN) 500 mg tablet 2018 at Unknown time Self Yes Yes   Sig: Take 500 mg by mouth nightly. omega 3-DHA-EPA-fish oil 1,000 mg (120 mg-180 mg) capsule 2018 at am Self Yes Yes   Sig: Take 2 Caps by mouth two (2) times a day. oxyCODONE ER (XTAMPZA ER) 18 mg ER capsule 2018 at am Self Yes Yes   Sig: Take 18 mg by mouth every twelve (12) hours. oxyCODONE IR (ROXICODONE) 5 mg immediate release tablet  Self Yes Yes   Sig: Take 5 mg by mouth daily as needed (breakthrough pain). predniSONE (DELTASONE) 20 mg tablet 2018 at Unknown time Self Yes Yes   Sig: Take 20 mg by mouth nightly. 5 day course started  for skin rash   sertraline (ZOLOFT) 100 mg tablet 2018 at Unknown time Self Yes Yes   Sig: Take 150 mg by mouth nightly. simvastatin (ZOCOR) 40 mg tablet 2018 at Unknown time Self Yes Yes   Sig: Take 40 mg by mouth nightly. triamcinolone acetonide (KENALOG) 0.1 % topical cream 2018 at Unknown time Self Yes Yes   Sig: Apply  to affected area two (2) times a day. use thin layer   ubidecarenone (COENZYME Q10, BULK,) 2018 at am Self Yes Yes   Sig: Take 1 Tab by mouth daily.       Facility-Administered Medications: None     Current Medications list Personally Reviewed   [x]     Yes        []           No      Thank you for allowing us to participate in the care this patient. We will follow patient with you. Signed By: Yuliana Birch MD                         11/30/2018    [facility]  [address]  [place], [address] [place] [address]  Phone - [phone]  Fax - [phone]  [email or web address]

## 2018-11-30 NOTE — ED NOTES
Straight cath performed with [facility] PCT assisting. Pt tolerated well. Urine dark and concentrated.

## 2018-11-30 NOTE — CONSULTS
ORTHO CONSULT    Subjective:     Date of Consultation:  November 30, 2018    Referring Physician:  Dr. Monique Farah is a 61 y.o. female we are consulted to see for Leukocytosis, R elbow pain and swelling and lower back pain. Pt. Is chronic lower back pain pt., seen by Dr. Fortunato Luciano, pain management, denies injury or fall, denies change in typical back pain. Denies numbness or loss of bowel or bladder, no urinary retention per pt. Denies trauma to R elbow, states has pain with movement. States pain radiates down her arm. Patient Active Problem List    Diagnosis Date Noted    Metabolic acidosis 50/62/5840    [facility]E (acute [facility]) ([facility] [facility] 11/30/2018    Leukocytosis 11/30/2018    Right elbow pain 11/30/2018    Hypotension 11/30/2018    HTN (hypertension)     Chronic back pain     [facility]      Family History   Problem Relation Age of Onset    Diabetes Father     Heart Disease Father       Social History     Tobacco Use    Smoking status: Never Smoker   Substance Use Topics    Alcohol use: No     Frequency: Never     Past Medical History:   Diagnosis Date    Chronic back pain     [facility] mellitus, type 2 [facility]     HTN (hypertension)       History reviewed. No pertinent surgical history.    Prior to Admission medications    Not on File     Current Facility-Administered Medications   Medication Dose Route Frequency    sodium chloride (NS) flush 5-10 mL  5-10 mL IntraVENous PRN    sodium chloride 0.9 % bolus infusion 1,000 mL  1,000 mL IntraVENous ONCE    Followed by    sodium chloride 0.9 % bolus infusion 448 mL  448 mL IntraVENous ONCE    naloxone (NARCAN) injection 1 mg  1 mg IntraVENous NOW    sodium chloride (NS) flush 5-10 mL  5-10 mL IntraVENous Q8H    sodium chloride (NS) flush 5-10 mL  5-10 mL IntraVENous PRN    acetaminophen (TYLENOL) tablet 650 mg  650 mg Oral Q4H PRN    heparin (porcine) injection 5,000 Units  5,000 Units SubCUTAneous Q8H    ondansetron [facility]) injection 4 mg  4 mg IntraVENous Q4H PRN    insulin lispro (HUMALOG) injection   SubCUTAneous AC&HS    glucose chewable tablet 16 g  4 Tab Oral PRN    dextrose (D50W) injection syrg 12.5-25 g  25-50 mL IntraVENous PRN    glucagon (GLUCAGEN) injection 1 mg  1 mg IntraMUSCular PRN    sodium bicarbonate (8.4%) 150 mEq in sterile water 1,000 mL infusion   IntraVENous CONTINUOUS     No current outpatient medications on file. No Known Allergies     Review of Systems:  A comprehensive review of systems was negative except for that written in the HPI. Objective:     Visit Vitals  /55   Pulse 81   Temp 97.4 °F (36.3 °C)   Resp 17   Ht 5' 1\" (1.549 m)   Wt 81.6 kg (180 lb)   SpO2 91%   BMI 34.01 kg/m²       EXAM: I examined pt's R elbow, erythema noted to tricept region proximal to olecranon, no fluid noted over the olecranon bursa. Minimal pain with ROM of the elbow, crepitus noted. Radial pulses = BUE's. Cap refill <2secs all fingers. I examined pt's L spine. No Spinous process pain. Bilateral L2-6 paraspinous process pain on exam. Strength 5/5 BLE's, DTR 1+ BLE's, +Dorsi/plantar flexion BLE's. NVI distally BLE's. Cap. Refill <2secs all toes. No clonus or long track signs, neg. Hoffmans sign. Rash noted to L flank, L elbow and L knee without associated pain. XR Results (most recent):  Results from Hospital Encounter encounter on 11/30/18   XR ELBOW RT MIN 3 V    Narrative EXAM:  XR ELBOW RT MIN 3 V    INDICATION:   right elbow pain and edema. COMPARISON: None. FINDINGS: Three views of the right elbow. Lateral is rotated. Multiple attempt  remain however this is difficult given patient's underlying confusion. Given positioning alignment is normal. There are degenerative changes. Evaluation for effusion is not possible. No acute fracture      Impression IMPRESSION:   1. Limited films with DJD. Fracture is not identified.   2. There is soft tissue swelling on the dorsal margin of the elbow. Assessment/Plan:     Questionable cellulitis Right Upper Arm    No evidence of olecranon bursitis or epidural abscess/discitis on physical exam. If increased back pain, neuro deficits without source of infection then recommend MRI L spine w and wo contrast.    Cheese block to immobilize R elbow tonight. IV abx and eval in AM to see if cellulitis resolves. Dr. Leopoldo Douglas agrees with plan. Thank you for allowing us to take part in this patients care.       MARY Fisher-C    Orthopaedic Surgery PA

## 2018-11-30 NOTE — H&P
Admission History and Physical 
 
 
NAME:  Kadie Mathis :   1955 MRN:  [id number] PCP:  Jitendra Meyers MD  
 
Date/Time:  2018 Assessment/Plan:   
  
BA[facility]E (acute kidney injury) ([facility] [address] (2018): Suspect this is due to severe dehydration and IVVD. Clinically volume depleted. -- continue hydration with IVF 
-- check urine lytes -- image kidneys with CT ordered in ED 
-- hold ACE and NSAIDs 
-- further work up if not improved with IVF Metabolic acidosis (): May be due to BAYLEE. No hx of alcohol or other ingestion. Lactate WNL. Glucose not sig elevated. -- check ABG 
-- check serum osm, methanol, and ethylene glycol -- bicarb IVF Acute metabolic encephalopathy:  Suspect this is due to BAYLEE and polypharmacy affected by decreased renal clearance. -- hold opiates and other sedating medications -- neuro checks Diabetes mellitus, type 2 (HCC) (): 
-- hold metformin and insulin 
-- add SSI Leukocytosis (2018): Unclear etiology. Possibly from steroids. -- await blood cultures Right elbow pain (2018): Has edema around elbow with pain. Had rash that seems to be improved. With leukocytosis, concern for infection. -- orthopedic consult -- empiric abx with unasyn and vanc Hypotension (2018): Seems to be improving with IVF. -- hold antihypertensive Chronic back pain ():  Followed by pain, Dr. Amber Almanzar. 
-- hold opiates for now Obesity: 
-- eventual nutrition evaluation when more alert Hypothyroidism: 
-- check TSH 
-- continue LT4 Rash:  Per , this has improved. Was diffuse. No just has mild macular rash over left knee and some of back and patchy over right arm. Has improved with prednisone. -- monitor **6:36 PM 
Has slight increase in macular erythema over left arm, but unchanged over knee and right arm. Will continue to monitor. Subjective: CHIEF COMPLAINT:  Inability to get OOB HISTORY OF PRESENT ILLNESS:    
Ms. Gisselle Padilla is a 61 y.o.  female who is admitted with Metabolic acidosis. Ms. Gisselle Padilla presented to the Emergency Department with her  who provides most of the hx due to encephalopathy. For the past three weeks, patient has been less mobile. At baseline, able to get OOB to go to the store. Has been less mobile and over the past three days has not been able to get OOB at all. Patient has had decreased PO intake. No n/v. Has continued to take her medications. Developed right elbow pain and redness with rash from shoulders to arms. Seen at [facility] ED earlier this week and given \"something for the rash. \"  No blood tests or other work up done at that time. Since home, has continued to have poor po intake and inability to get OOB. Rash is better.  also notes patient to be increasingly lethargic and confused. Had low glucose this AM to 40's. Past Medical History:  
Diagnosis Date  Chronic back pain  Diabetes mellitus, type 2 ([address]  HTN (hypertension) History reviewed. No pertinent surgical history. Social History Tobacco Use  Smoking status: Never Smoker Substance Use Topics  Alcohol use: No  
  Frequency: Never Family History Problem Relation Age of Onset  Diabetes Father  Heart Disease Father No Known Allergies Prior to Admission medications Not on File Review of Systems: 
(bold if positive, if negative) Gen:  fatigueEyes:  ENT:  CVS:  edemaPulm:  GI:   
:   
MS:  PainswellingSkin:  erythemaEndo:   
Hem:  Renal:  Edema Neuro:    
 
 
  
Objective: VITALS:   
Vital signs reviewed; most recent are: 
 
Visit Vitals /55 Pulse 81 Temp 97.4 °F (36.3 °C) Resp 17 Ht 5' 1\" (1.549 m) Wt 81.6 kg (180 lb) SpO2 91% BMI 34.01 kg/m² SpO2 Readings from Last 6 Encounters:  
11/30/18 91% No intake or output data in the 24 hours ending 11/30/18 1416 Exam:  
 
Physical Exam: 
 
Gen:  obese, in no acute distress HEENT:  Pink conjunctivae, PERRL, hearing intact to voice, dry mucous membranes Neck:  Supple Resp:  No accessory muscle use, clear breath sounds without wheezes rales or rhonchi 
Card:  No murmurs, normal S1, S2 without thrills or peripheral edema, DP pulses 2+ b/l Abd:  Soft, non-tender, non-distended, normoactive bowel sounds are present Musc:  No cyanosis, cap refill < 2 sec, mild lumbar spinal ttp, has ttp over right elbow and pain with flexion Skin:  skin turgor is decreased, erythema of sacrum and perirectally, mild erythema to right elbow with edema Neuro:  Cranial nerves 3-12 are grossly intact,  strength is 5/5 bilaterally, dorsi / plantarflexion strength is 5/5 bilaterally, follows commands appropriately Psych:  Lethargic, poor insight. Labs: 
 
Recent Labs 11/30/18 200 WBC 27.6* HGB 10.0* HCT 32.6*  
* Recent Labs 11/30/18 200   
K 4.5  
 CO2 18* * BUN 72* CREA 3.13* CA 8.8 ALB 1.2* TBILI 0.4 SGOT 64* ALT 24 Lab Results Component Value Date/Time Glucose (POC) 142 (H) 11/30/2018 12:09 PM  
 Glucose (POC) 117 (H) 11/30/2018 11:22 AM  
 
No results for input(s): PH, PCO2, PO2, HCO3, FIO2 in the last 72 hours. No results for input(s): INR in the last 72 hours. No lab exists for component: INREXT Chest Xray:  Elevated right hemidiaphragm. EKG reviewed:   Initial EKG: No acute changes suggestive of ischemia Medical records reviewed in preparation for this admission: Old medical records. Surrogate decision maker:  spouse Total time spent in care of this patient: 72 Minutes Care Plan discussed with: Patient and Family Discussed:  Care Plan Prophylaxis:  Hep SQ Probable Disposition:  SNF/LTC 
        
___________________________________________________ Attending Physician: Sierra Haskins MD

## 2018-12-01 ENCOUNTER — APPOINTMENT (OUTPATIENT)
Dept: MRI IMAGING | Age: 63
DRG: 871 | End: 2018-12-01
Attending: PHYSICIAN ASSISTANT
Payer: COMMERCIAL

## 2018-12-01 LAB
ALBUMIN SERPL-MCNC: 1.1 G/DL (ref 3.5–5)
ALBUMIN SERPL-MCNC: 1.1 G/DL (ref 3.5–5)
ALBUMIN/GLOB SERPL: 0.2 {RATIO} (ref 1.1–2.2)
ALP SERPL-CCNC: 95 U/L (ref 45–117)
ALT SERPL-CCNC: 28 U/L (ref 12–78)
ANION GAP SERPL CALC-SCNC: 16 MMOL/L (ref 5–15)
ANION GAP SERPL CALC-SCNC: 18 MMOL/L (ref 5–15)
AST SERPL-CCNC: 66 U/L (ref 15–37)
BASOPHILS # BLD: 0 K/UL (ref 0–0.1)
BASOPHILS NFR BLD: 0 % (ref 0–1)
BILIRUB SERPL-MCNC: 0.3 MG/DL (ref 0.2–1)
BUN SERPL-MCNC: 71 MG/DL (ref 6–20)
BUN SERPL-MCNC: 72 MG/DL (ref 6–20)
BUN/CREAT SERPL: 22 (ref 12–20)
BUN/CREAT SERPL: 22 (ref 12–20)
CALCIUM SERPL-MCNC: 8.1 MG/DL (ref 8.5–10.1)
CALCIUM SERPL-MCNC: 8.2 MG/DL (ref 8.5–10.1)
CHLORIDE SERPL-SCNC: 105 MMOL/L (ref 97–108)
CHLORIDE SERPL-SCNC: 105 MMOL/L (ref 97–108)
CO2 SERPL-SCNC: 20 MMOL/L (ref 21–32)
CO2 SERPL-SCNC: 20 MMOL/L (ref 21–32)
COMMENT, HOLDF: NORMAL
CREAT SERPL-MCNC: 3.23 MG/DL (ref 0.55–1.02)
CREAT SERPL-MCNC: 3.24 MG/DL (ref 0.55–1.02)
DIFFERENTIAL METHOD BLD: ABNORMAL
EOSINOPHIL # BLD: 0 K/UL (ref 0–0.4)
EOSINOPHIL NFR BLD: 0 % (ref 0–7)
ERYTHROCYTE [DISTWIDTH] IN BLOOD BY AUTOMATED COUNT: 14.6 % (ref 11.5–14.5)
GLOBULIN SER CALC-MCNC: 5.2 G/DL (ref 2–4)
GLUCOSE BLD STRIP.AUTO-MCNC: 106 MG/DL (ref 65–100)
GLUCOSE BLD STRIP.AUTO-MCNC: 107 MG/DL (ref 65–100)
GLUCOSE BLD STRIP.AUTO-MCNC: 268 MG/DL (ref 65–100)
GLUCOSE BLD STRIP.AUTO-MCNC: 275 MG/DL (ref 65–100)
GLUCOSE BLD STRIP.AUTO-MCNC: 279 MG/DL (ref 65–100)
GLUCOSE SERPL-MCNC: 115 MG/DL (ref 65–100)
GLUCOSE SERPL-MCNC: 122 MG/DL (ref 65–100)
HCT VFR BLD AUTO: 30.2 % (ref 35–47)
HGB BLD-MCNC: 9.4 G/DL (ref 11.5–16)
IMM GRANULOCYTES # BLD: 0.2 K/UL (ref 0–0.04)
IMM GRANULOCYTES NFR BLD AUTO: 1 % (ref 0–0.5)
LACTATE SERPL-SCNC: 0.9 MMOL/L (ref 0.4–2)
LYMPHOCYTES # BLD: 0.7 K/UL (ref 0.8–3.5)
LYMPHOCYTES NFR BLD: 3 % (ref 12–49)
MAGNESIUM SERPL-MCNC: 2.1 MG/DL (ref 1.6–2.4)
MCH RBC QN AUTO: 28.2 PG (ref 26–34)
MCHC RBC AUTO-ENTMCNC: 31.1 G/DL (ref 30–36.5)
MCV RBC AUTO: 90.7 FL (ref 80–99)
MONOCYTES # BLD: 0.7 K/UL (ref 0–1)
MONOCYTES NFR BLD: 3 % (ref 5–13)
NEUTS SEG # BLD: 20.4 K/UL (ref 1.8–8)
NEUTS SEG NFR BLD: 93 % (ref 32–75)
NRBC # BLD: 0 K/UL (ref 0–0.01)
NRBC BLD-RTO: 0 PER 100 WBC
PHOSPHATE SERPL-MCNC: 6.6 MG/DL (ref 2.6–4.7)
PLATELET # BLD AUTO: 453 K/UL (ref 150–400)
PMV BLD AUTO: 9.4 FL (ref 8.9–12.9)
POTASSIUM SERPL-SCNC: 3.4 MMOL/L (ref 3.5–5.1)
POTASSIUM SERPL-SCNC: 3.5 MMOL/L (ref 3.5–5.1)
PROT SERPL-MCNC: 6.3 G/DL (ref 6.4–8.2)
RBC # BLD AUTO: 3.33 M/UL (ref 3.8–5.2)
RBC MORPH BLD: ABNORMAL
SAMPLES BEING HELD,HOLD: NORMAL
SERVICE CMNT-IMP: ABNORMAL
SODIUM SERPL-SCNC: 141 MMOL/L (ref 136–145)
SODIUM SERPL-SCNC: 143 MMOL/L (ref 136–145)
T4 FREE SERPL-MCNC: 0.9 NG/DL (ref 0.8–1.5)
TSH SERPL DL<=0.05 MIU/L-ACNC: 0.94 UIU/ML (ref 0.36–3.74)
WBC # BLD AUTO: 22 K/UL (ref 3.6–11)

## 2018-12-01 PROCEDURE — 74011636637 HC RX REV CODE- 636/637: Performed by: INTERNAL MEDICINE

## 2018-12-01 PROCEDURE — 83735 ASSAY OF MAGNESIUM: CPT

## 2018-12-01 PROCEDURE — 84443 ASSAY THYROID STIM HORMONE: CPT

## 2018-12-01 PROCEDURE — 80053 COMPREHEN METABOLIC PANEL: CPT

## 2018-12-01 PROCEDURE — 74011250636 HC RX REV CODE- 250/636: Performed by: INTERNAL MEDICINE

## 2018-12-01 PROCEDURE — 87040 BLOOD CULTURE FOR BACTERIA: CPT

## 2018-12-01 PROCEDURE — 74011000258 HC RX REV CODE- 258: Performed by: INTERNAL MEDICINE

## 2018-12-01 PROCEDURE — 51798 US URINE CAPACITY MEASURE: CPT

## 2018-12-01 PROCEDURE — 85025 COMPLETE CBC W/AUTO DIFF WBC: CPT

## 2018-12-01 PROCEDURE — 80069 RENAL FUNCTION PANEL: CPT

## 2018-12-01 PROCEDURE — 82962 GLUCOSE BLOOD TEST: CPT

## 2018-12-01 PROCEDURE — 36415 COLL VENOUS BLD VENIPUNCTURE: CPT

## 2018-12-01 PROCEDURE — 83605 ASSAY OF LACTIC ACID: CPT

## 2018-12-01 PROCEDURE — 74011250636 HC RX REV CODE- 250/636: Performed by: PHYSICIAN ASSISTANT

## 2018-12-01 PROCEDURE — 65660000000 HC RM CCU STEPDOWN

## 2018-12-01 PROCEDURE — 74011000250 HC RX REV CODE- 250: Performed by: INTERNAL MEDICINE

## 2018-12-01 PROCEDURE — 93306 TTE W/DOPPLER COMPLETE: CPT

## 2018-12-01 PROCEDURE — 84439 ASSAY OF FREE THYROXINE: CPT

## 2018-12-01 PROCEDURE — 74011250637 HC RX REV CODE- 250/637: Performed by: INTERNAL MEDICINE

## 2018-12-01 RX ORDER — HYDROMORPHONE HYDROCHLORIDE 2 MG/ML
1 INJECTION, SOLUTION INTRAMUSCULAR; INTRAVENOUS; SUBCUTANEOUS
Status: DISCONTINUED | OUTPATIENT
Start: 2018-12-01 | End: 2018-12-02

## 2018-12-01 RX ORDER — HYDROMORPHONE HYDROCHLORIDE 2 MG/ML
0.5 INJECTION, SOLUTION INTRAMUSCULAR; INTRAVENOUS; SUBCUTANEOUS
Status: DISCONTINUED | OUTPATIENT
Start: 2018-12-01 | End: 2018-12-01

## 2018-12-01 RX ORDER — LORAZEPAM 2 MG/ML
1 INJECTION INTRAMUSCULAR
Status: COMPLETED | OUTPATIENT
Start: 2018-12-01 | End: 2018-12-01

## 2018-12-01 RX ADMIN — HYDROMORPHONE HYDROCHLORIDE 1 MG: 2 INJECTION, SOLUTION INTRAMUSCULAR; INTRAVENOUS; SUBCUTANEOUS at 22:46

## 2018-12-01 RX ADMIN — LEVOTHYROXINE SODIUM 125 MCG: 25 TABLET ORAL at 07:27

## 2018-12-01 RX ADMIN — HYDROMORPHONE HYDROCHLORIDE 0.5 MG: 2 INJECTION, SOLUTION INTRAMUSCULAR; INTRAVENOUS; SUBCUTANEOUS at 19:45

## 2018-12-01 RX ADMIN — INSULIN LISPRO 2 UNITS: 100 INJECTION, SOLUTION INTRAVENOUS; SUBCUTANEOUS at 19:07

## 2018-12-01 RX ADMIN — CEFEPIME HYDROCHLORIDE 2 G: 2 INJECTION, POWDER, FOR SOLUTION INTRAVENOUS at 13:41

## 2018-12-01 RX ADMIN — Medication 10 ML: at 06:01

## 2018-12-01 RX ADMIN — DEXTROSE MONOHYDRATE AND SODIUM CHLORIDE: 5; .45 INJECTION, SOLUTION INTRAVENOUS at 13:14

## 2018-12-01 RX ADMIN — Medication 10 ML: at 19:09

## 2018-12-01 RX ADMIN — SODIUM CHLORIDE 500 ML: 900 INJECTION, SOLUTION INTRAVENOUS at 00:18

## 2018-12-01 RX ADMIN — ASPIRIN 81 MG: 81 TABLET, COATED ORAL at 19:08

## 2018-12-01 RX ADMIN — HEPARIN SODIUM 5000 UNITS: 5000 INJECTION INTRAVENOUS; SUBCUTANEOUS at 07:28

## 2018-12-01 RX ADMIN — ASPIRIN 81 MG: 81 TABLET, COATED ORAL at 07:28

## 2018-12-01 RX ADMIN — LORAZEPAM 1 MG: 2 INJECTION INTRAMUSCULAR; INTRAVENOUS at 19:08

## 2018-12-01 RX ADMIN — SODIUM CHLORIDE 3 G: 900 INJECTION, SOLUTION INTRAVENOUS at 03:55

## 2018-12-01 RX ADMIN — HYDROMORPHONE HYDROCHLORIDE 0.5 MG: 2 INJECTION, SOLUTION INTRAMUSCULAR; INTRAVENOUS; SUBCUTANEOUS at 13:41

## 2018-12-01 RX ADMIN — HEPARIN SODIUM 5000 UNITS: 5000 INJECTION INTRAVENOUS; SUBCUTANEOUS at 22:34

## 2018-12-01 RX ADMIN — Medication 10 ML: at 22:41

## 2018-12-01 NOTE — ED NOTES
Bedside shift change report given to [facility] (oncoming nurse) by Yves Noe RN (offgoing nurse). Report included the following information SBAR, Kardex, ED Summary, Procedure Summary, Intake/Output, MAR, Recent Results and Cardiac Rhythm NSR.

## 2018-12-01 NOTE — PROGRESS NOTES
Blanco Mcpherson [facility] [place] [address] [place]  YOB: 1955 Assessment & Plan:  
ARF due to IVVD/sepsis · Non oliguric · No acute indication HD · Continue IVF and supportive care UTO · S/p joyce, good output Acidosis · On IV bcb Sepsis/hypotension Subjective:  
CC: f/u ARF 
HPI: Creat stable. Good uop. Got joyce for urinary retention last night. On bcb for acidosis. On Cefepime for sepsis/cellulitis ROS: No sob/n/v Current Facility-Administered Medications Medication Dose Route Frequency  cefepime (MAXIPIME) 2 g in 0.9% sodium chloride (MBP/ADV) 100 mL  2 g IntraVENous Q24H  
 sodium chloride (NS) flush 5-10 mL  5-10 mL IntraVENous PRN  
 sodium chloride (NS) flush 5-10 mL  5-10 mL IntraVENous Q8H  
 sodium chloride (NS) flush 5-10 mL  5-10 mL IntraVENous PRN  
 acetaminophen (TYLENOL) tablet 650 mg  650 mg Oral Q4H PRN  
 heparin (porcine) injection 5,000 Units  5,000 Units SubCUTAneous Q8H  
 ondansetron (ZOFRAN) injection 4 mg  4 mg IntraVENous Q4H PRN  
 insulin lispro (HUMALOG) injection   SubCUTAneous AC&HS  
 glucose chewable tablet 16 g  4 Tab Oral PRN  
 dextrose (D50W) injection syrg 12.5-25 g  25-50 mL IntraVENous PRN  
 glucagon (GLUCAGEN) injection 1 mg  1 mg IntraMUSCular PRN  Vancomycin level 12/1/18 @ 1600   Other ONCE  
 aspirin delayed-release tablet 81 mg  81 mg Oral BID  levothyroxine (SYNTHROID) tablet 125 mcg  125 mcg Oral ACB  sertraline (ZOLOFT) tablet 150 mg  150 mg Oral QHS  Vancomycin- Pharmacy dosing by levels   Other Rx Dosing/Monitoring  sodium bicarbonate (8.4%) 50 mEq in 0.45% sodium chloride 1,000 mL infusion   IntraVENous CONTINUOUS Current Outpatient Medications Medication Sig  
 atenolol (TENORMIN) 25 mg tablet Take 25 mg by mouth daily.  cyclobenzaprine (FLEXERIL) 10 mg tablet Take 10 mg by mouth daily as needed for Muscle Spasm(s).  fenofibrate (LOFIBRA) 160 mg tablet Take 160 mg by mouth nightly.  gabapentin (NEURONTIN) 300 mg capsule Take 300 mg by mouth three (3) times daily.  insulin glargine (LANTUS,BASAGLAR) 100 unit/mL (3 mL) inpn 75 Units by SubCUTAneous route nightly.  levothyroxine (SYNTHROID) 125 mcg tablet Take 125 mcg by mouth Daily (before breakfast).  lisinopril-hydroCHLOROthiazide (PRINZIDE, ZESTORETIC) 20-12.5 mg per tablet Take 2 Tabs by mouth daily.  metFORMIN (GLUCOPHAGE) 500 mg tablet Take 500 mg by mouth two (2) times daily (with meals).  niacin ER (NIASPAN) 500 mg tablet Take 500 mg by mouth nightly.  omega 3-DHA-EPA-fish oil 1,000 mg (120 mg-180 mg) capsule Take 2 Caps by mouth two (2) times a day.  oxyCODONE IR (ROXICODONE) 5 mg immediate release tablet Take 5 mg by mouth daily as needed (breakthrough pain).  oxyCODONE ER (XTAMPZA ER) 18 mg ER capsule Take 18 mg by mouth every twelve (12) hours.  predniSONE (DELTASONE) 20 mg tablet Take 20 mg by mouth nightly. 5 day course started 11/27 for skin rash  sertraline (ZOLOFT) 100 mg tablet Take 150 mg by mouth nightly.  simvastatin (ZOCOR) 40 mg tablet Take 40 mg by mouth nightly.  triamcinolone acetonide (KENALOG) 0.1 % topical cream Apply  to affected area two (2) times a day. use thin layer  multivit-min-FA-lycopen-lutein (CENTRUM SILVER) 0.4-300-250 mg-mcg-mcg tab Take 1 Tab by mouth daily.  OTHER,NON-FORMULARY, Take 1 Tab by mouth two (2) times a day. Osteo Biflex  aspirin delayed-release 81 mg tablet Take 81 mg by mouth two (2) times a day.  ibuprofen (MOTRIN) 200 mg tablet Take 400 mg by mouth two (2) times a day.  ubidecarenone (COENZYME Q10, BULK,) Take 1 Tab by mouth daily. Objective:  
 
Vitals: 
Blood pressure 110/61, pulse 88, temperature 99.8 °F (37.7 °C), resp. rate 21, height 5' 1\" (1.549 m), weight 81.6 kg (180 lb), SpO2 91 %. Temp (24hrs), Av.9 °F (37.2 °C), Min:97.4 °F (36.3 °C), Max:99.9 °F (37.7 °C) Intake and Output: 
701 - 1900 In: 48 [P.O.:50] Out: 150 [Urine:150] 1901 -  0700 In: 1100 [I.V.:1100] Out: 450 [Urine:450] Physical Exam:              
GENERAL ASSESSMENT: NAD 
CHEST: CTA HEART: S1S2 ABDOMEN: Soft,NT 
: +joyce +urine EXTREMITY: no EDEMA 
 
 
   
ECG/rhythm: 
 
Data Review No results for input(s): TNIPOC in the last 72 hours. No lab exists for component: ITNL Recent Labs 18 [address] [place] [address] * Recent Labs 18 
0224 18 [phone] 18 [address] [place] [address]  148* 143  
K 3.5 3.6 4.5  
 106 103 CO2 20* 21 18* BUN 72* 63* 72* CREA 3.24* 2.85* 3.13* * 60* 136* PHOS  --  7.1*  --   
MG 2.1  --   --   
CA 8.1* 6.6* 8.8 ALB 1.1* 1.0* 1.2* WBC 22.0*  --  27.6* HGB 9.4*  --  10.0* HCT 30.2*  --  32.6* *  --  502* No results for input(s): INR, PTP, APTT in the last 72 hours. No lab exists for component: INREXT Needs: urine analysis, urine sodium, protein and creatinine Lab Results Component Value Date/Time Sodium,urine random 29 2018 06:10 PM  
 Creatinine, urine 187.11 2018 06:10 PM  
 
 
 
 
: Willard Newsoem MD 
2018 [facility]: 
[email or web address] [email or web address] Shah office: 
[address] [place] [address], [address] [place] [address] Phone: [phone] Fax :     [phone] [place] office: 
[address] [facility], [facility] [address] [facility] Phone - [phone] Fax - [phone]

## 2018-12-01 NOTE — PROGRESS NOTES
Spoke with [facility], prior to entering room. He does not wish any Rehab intervention at this time; remains severely septic. Will hold today & check back as appropriate. Thank you, Belle Pretty, OTR/L

## 2018-12-01 NOTE — PROGRESS NOTES
[address] Dosing Services: Antimicrobial Stewardship Progress Note Consult for antibiotic dosing of cefepime by Dr. Didier Avila Pharmacist reviewed antibiotic appropriateness for 61year old , female  for indication of possible septic elbow Day of Therapy 1 Plan: 
 
Non-Kinetic Antimicrobial Dosing:  
Recommendation: Cefepime 2g IV every 24 hours for CrCl 10-29mL/min Other Antimicrobial 
(not dosed by pharmacist) Vancomycin: dosing by levels Cultures 11/30 Blood: 2/2 bottles flagged positive by instrument (prelim) 
11/30 Resp panel: negative (final) Serum Creatinine Lab Results Component Value Date/Time Creatinine 3.24 (H) 12/01/2018 02:24 AM  
   
Creatinine Clearance Estimated Creatinine Clearance: 17.2 mL/min (A) (based on SCr of 3.24 mg/dL (H)). Temp 99.9 °F (37.7 °C) WBC Lab Results Component Value Date/Time WBC 22.0 (H) 12/01/2018 02:24 AM  
   
H/H Lab Results Component Value Date/Time HGB 9.4 (L) 12/01/2018 02:24 AM  
  
 
Platelets Lab Results Component Value Date/Time PLATELET 216 (H) 03/38/3236 02:24 AM  
  
 
 
Pharmacist: Signed Abdon Newton Contact information: [phone]

## 2018-12-01 NOTE — PROGRESS NOTES
Bedside and Verbal shift change report given to Aman Wiley (oncoming nurse) by Jermaine Koehler RN (offgoing nurse). Report included the following information SBAR, Kardex, ED Summary and Recent Results. 2010 - Notified Dr. Francisca Mary in regards to STAT labs and urine output. Also advise of Dr. Jamie Caruso note referring to patient being ICU status. Per Dr. Francisca Mary, Patient is to remain SD status until further notice. No other orders received. 2015 - Patient lethargic. Blood glucose 66. D50 given. 2030 - Blood glucose 177. HYPOGLYCEMIC EPISODE DOCUMENTATION Patient with hypoglycemic episode(s) at 2015(time) on 11/30/18(date). BG value(s) pre-treatment 66 Was patient symptomatic? [x] yes, [] no 
Patient was treated with the following rescue medications/treatments: [x] D50 [] Glucose tablets 
              [] Glucagon 
              [] 4oz juice 
              [] 6oz reg soda 
              [] 8oz low fat milk BG value post-treatment: 177 Once BG treated and value greater than 80mg/dl, pt was provided with the following: 
[] snack 
[] meal 
**Orange Juice given** Primary Nurse James Foreman, JOHNATHAN and Campbell Smith RN performed a dual skin assessment on this patient No impairment noted Nikolay score is 18 
 
0000 - Reassessment complete. No changes noted. 0008 - Spoke to Dr. Oskar Marie in regards to patient MAP consistently below 65. Patient asymptomatic. Orders for 500mL bolus. 1525 - Notified Dr. Oskar Marie of bladder scan 420mL. Patient has been unable to void on her own. Orders received for joyce catheter.

## 2018-12-01 NOTE — ED NOTES
Attempted to call MRI to enquire about when pt will be scheduled. Nurse Docs are complete. Ortho wanted tests to be done today prior to admit to floor if possible. No answer in dept.

## 2018-12-01 NOTE — PROGRESS NOTES
[address], [id number] [place] [address] 
[phone] Medical Progress Note NAME: Dori Matos :  1955 MRM:  [id number] Date/Time: 2018 Subjective: Chief Complaint:  Patient was seen and examined by me. Chart reviewed. Confusion slightly better. Still hypoglycemic Objective:  
 
 
Vitals:  
 
 
Last 24hrs VS reviewed since prior progress note. Most recent are: 
 
Visit Vitals /61 Pulse 88 Temp 99.8 °F (37.7 °C) Resp 20 Ht 5' 1\" (1.549 m) Wt 81.6 kg (180 lb) SpO2 92% BMI 34.01 kg/m² SpO2 Readings from Last 6 Encounters:  
18 92% O2 Flow Rate (L/min): 3 l/min Intake/Output Summary (Last 24 hours) at 2018 1210 Last data filed at 2018 3398 Gross per 24 hour Intake 1150 ml Output 600 ml Net 550 ml Exam:  
 
Physical Exam: 
 
Gen:  Elderly, disheveled, ill-appearing, morbidly obese, mod distress HEENT:  Pink conjunctivae, PERRL, hearing intact to voice, moist mucous membranes Neck:  Supple, without masses, thyroid non-tender Resp:  No accessory muscle use, poor BS at bases Card:  No murmurs, normal S1, S2 without thrills, bruits or peripheral edema Abd:  Soft, non-tender, non-distended, normoactive bowel sounds are present Musc:  No cyanosis or clubbing, pulses 2+, cap refills <2sec Skin:  Some erythema on both arms, around elbow Neuro: Moves all ext, follows commands Psych:  poor insight, oriented to person Medications Reviewed: (see below) Lab Data Reviewed: (see below) 
 
______________________________________________________________________ Medications:  
 
Current Facility-Administered Medications Medication Dose Route Frequency  cefepime (MAXIPIME) 2 g in 0.9% sodium chloride (MBP/ADV) 100 mL  2 g IntraVENous Q24H  
 sodium chloride (NS) flush 5-10 mL  5-10 mL IntraVENous PRN  
  sodium chloride (NS) flush 5-10 mL  5-10 mL IntraVENous Q8H  
 sodium chloride (NS) flush 5-10 mL  5-10 mL IntraVENous PRN  
 acetaminophen (TYLENOL) tablet 650 mg  650 mg Oral Q4H PRN  
 heparin (porcine) injection 5,000 Units  5,000 Units SubCUTAneous Q8H  
 ondansetron (ZOFRAN) injection 4 mg  4 mg IntraVENous Q4H PRN  
 insulin lispro (HUMALOG) injection   SubCUTAneous AC&HS  
 glucose chewable tablet 16 g  4 Tab Oral PRN  
 dextrose (D50W) injection syrg 12.5-25 g  25-50 mL IntraVENous PRN  
 glucagon (GLUCAGEN) injection 1 mg  1 mg IntraMUSCular PRN  Vancomycin level 12/1/18 @ 1600   Other ONCE  
 aspirin delayed-release tablet 81 mg  81 mg Oral BID  levothyroxine (SYNTHROID) tablet 125 mcg  125 mcg Oral ACB  sertraline (ZOLOFT) tablet 150 mg  150 mg Oral QHS  Vancomycin- Pharmacy dosing by levels   Other Rx Dosing/Monitoring  sodium bicarbonate (8.4%) 50 mEq in 0.45% sodium chloride 1,000 mL infusion   IntraVENous CONTINUOUS Current Outpatient Medications Medication Sig  
 atenolol (TENORMIN) 25 mg tablet Take 25 mg by mouth daily.  cyclobenzaprine (FLEXERIL) 10 mg tablet Take 10 mg by mouth daily as needed for Muscle Spasm(s).  fenofibrate (LOFIBRA) 160 mg tablet Take 160 mg by mouth nightly.  gabapentin (NEURONTIN) 300 mg capsule Take 300 mg by mouth three (3) times daily.  insulin glargine (LANTUS,BASAGLAR) 100 unit/mL (3 mL) inpn 75 Units by SubCUTAneous route nightly.  levothyroxine (SYNTHROID) 125 mcg tablet Take 125 mcg by mouth Daily (before breakfast).  lisinopril-hydroCHLOROthiazide (PRINZIDE, ZESTORETIC) 20-12.5 mg per tablet Take 2 Tabs by mouth daily.  metFORMIN (GLUCOPHAGE) 500 mg tablet Take 500 mg by mouth two (2) times daily (with meals).  niacin ER (NIASPAN) 500 mg tablet Take 500 mg by mouth nightly.  omega 3-DHA-EPA-fish oil 1,000 mg (120 mg-180 mg) capsule Take 2 Caps by mouth two (2) times a day.  oxyCODONE IR (ROXICODONE) 5 mg immediate release tablet Take 5 mg by mouth daily as needed (breakthrough pain).  oxyCODONE ER (XTAMPZA ER) 18 mg ER capsule Take 18 mg by mouth every twelve (12) hours.  predniSONE (DELTASONE) 20 mg tablet Take 20 mg by mouth nightly. 5 day course started 11/27 for skin rash  sertraline (ZOLOFT) 100 mg tablet Take 150 mg by mouth nightly.  simvastatin (ZOCOR) 40 mg tablet Take 40 mg by mouth nightly.  triamcinolone acetonide (KENALOG) 0.1 % topical cream Apply  to affected area two (2) times a day. use thin layer  multivit-min-FA-lycopen-lutein (CENTRUM SILVER) 0.4-300-250 mg-mcg-mcg tab Take 1 Tab by mouth daily.  OTHER,NON-FORMULARY, Take 1 Tab by mouth two (2) times a day. Osteo Biflex  aspirin delayed-release 81 mg tablet Take 81 mg by mouth two (2) times a day.  ibuprofen (MOTRIN) 200 mg tablet Take 400 mg by mouth two (2) times a day.  ubidecarenone (COENZYME Q10, BULK,) Take 1 Tab by mouth daily. Lab Review:  
 
Recent Labs 12/01/18 0224 11/30/18 [address] [place] [address] WBC 22.0* 27.6* HGB 9.4* 10.0* HCT 30.2* 32.6* * 502* Recent Labs 12/01/18 0224 11/30/18 [phone] 11/30/18 [address] [place] [address]  148* 143  
K 3.5 3.6 4.5  
 106 103 CO2 20* 21 18* * 60* 136* BUN 72* 63* 72* CREA 3.24* 2.85* 3.13* CA 8.1* 6.6* 8.8 MG 2.1  --   --   
PHOS  --  7.1*  --   
ALB 1.1* 1.0* 1.2* TBILI 0.3 0.4 0.4 SGOT 66* 53* 64* ALT 28 23 24 Lab Results Component Value Date/Time Glucose (POC) 106 (H) 12/01/2018 12:07 PM  
 Glucose (POC) 107 (H) 12/01/2018 07:21 AM  
 Glucose (POC) 177 (H) 11/30/2018 08:28 PM  
 Glucose (POC) 66 11/30/2018 08:13 PM  
 Glucose (POC) 92 11/30/2018 04:32 PM  
 
 
  
Assessment / Plan:  
 
Principal Problem: 
 
60 yo hx of HTN, DM, chronic pain, presented w/ AMS, severe sepsis, bacteremia, ARF, AG acidosis, hypoglycemia 1) Severe sepsis/bacteremia: unclear source.   Could be from arm cellulitis, but family stated that the patient had a rash that started on the back, then spread to arms. Blood Cx + 4/4 bottles with gram pos. Will repeat blood Cx, echo (to r/o endocarditis). Start IV Vanc, Cefepime. Consult ID 2) Acute metabolic encephalopathy: due to above. Consider lumbar puncture if worse. Monitor closely 3) ARF: likely from ATN/sepsis, hypovolemia. Hold NSAIDS, ACEi. Cont IVF, monitor BMP. Renal following 4) AG metabolic acidosis: due to sepsis. Lactate normal.  Cont bicarb gtt, monitor BMP. No need for dialysis at this time 5) DM type 2 w/ hypoglycemia: A1C 7.2%. Hypoglycemia from poor PO intake, sepsis. Hold metformin. Cont dextrose gtt. Cont SSI 6) HTN: hold BP meds due to hypotension 7) R elbow pain: could be from cellulitis. No evidence of septic arthritis. Ortho following Total time spent with patient: 45 Minutes (critical care) Care Plan discussed with: Patient, family, nursing Discussed:  Care Plan Prophylaxis:  Hep SQ Disposition:  SNF/LTC 
        
___________________________________________________ Attending Physician: Gabriella Duque MD

## 2018-12-01 NOTE — PROGRESS NOTES
Ortho Progress Note S:  WBC improved slightly. Pt. Answers questions slowly but accurately. Denies increased pain in back. C/o L elbow increased pain. O:  VSS. Septic. Rash on LUE, Back, L knee improved. ERythema on R elbow worsening, spreading from triceps area to forearm. R elbow pain moderate with motion. No effusion noted in the elbow. Radial pulses = BUE's. I examined pt's L spine. No Spinous process pain. No paraspinous process pain on exam. Strength 4/5 BLE's, DTR 1+ BLE's, +Dorsi/plantar flexion BLE's. NVI distally BLE's. Cap. Refill <2secs all toes. No long track signs. no clonus. A:  Cellulitis R elbow P:  Concern that bacteremia could seed in joint. MRI R elbow, L and T spine w/o contrast (Cr elevated) to eval for effusion. Dr. Cabrales Party agrees with plan. Thank you for allowing us to take part in this patients care. Evette Cogan, PA-C Orthopaedic Surgery PA

## 2018-12-01 NOTE — PROGRESS NOTES
Chart reviewed. After speaking to nursing, patient currently getting blood and in pain. Will follow up as able appropriate later this afternoon, otherwise will follow up tomorrow. Thank you, Dian Holcomb, OTR/L

## 2018-12-01 NOTE — PROGRESS NOTES
Physical Therapy Spoke with [facility], prior to entering room. He does not wish any Rehab intervention at this time; remains severely septic. Will hold today & check back as appropriate.    
Seth Palacios PT

## 2018-12-01 NOTE — PROGRESS NOTES
Shift Summary 0700 Bedside and Verbal shift change report given to Clement Conde RN (oncoming nurse) by Marty Garrett  (offgoing nurse). Report included the following information SBAR, Kardex, MAR, Recent Results and Cardiac Rhythm  . Patient in bed unable to complete sentences without falling asleep and very lethargic. BS WNL. VS WNL Morning medications given. Urine output 150. Kidney function worsening. Patient upgraded to ICU status. 5085 Verbal shift change report given to Samson Melendez RN (oncoming nurse) by Clement Conde RN (offgoing nurse). Report included the following information SBAR, Kardex, MAR, Recent Results and Cardiac Rhythm  .

## 2018-12-02 LAB
ALBUMIN SERPL-MCNC: 1 G/DL (ref 3.5–5)
ALBUMIN/GLOB SERPL: 0.2 {RATIO} (ref 1.1–2.2)
ALP SERPL-CCNC: 97 U/L (ref 45–117)
ALT SERPL-CCNC: 34 U/L (ref 12–78)
ANION GAP SERPL CALC-SCNC: 15 MMOL/L (ref 5–15)
AST SERPL-CCNC: 70 U/L (ref 15–37)
BILIRUB SERPL-MCNC: 0.3 MG/DL (ref 0.2–1)
BUN SERPL-MCNC: 69 MG/DL (ref 6–20)
BUN/CREAT SERPL: 25 (ref 12–20)
CALCIUM SERPL-MCNC: 8 MG/DL (ref 8.5–10.1)
CHLORIDE SERPL-SCNC: 106 MMOL/L (ref 97–108)
CO2 SERPL-SCNC: 21 MMOL/L (ref 21–32)
CREAT SERPL-MCNC: 2.71 MG/DL (ref 0.55–1.02)
ERYTHROCYTE [DISTWIDTH] IN BLOOD BY AUTOMATED COUNT: 14.7 % (ref 11.5–14.5)
GLOBULIN SER CALC-MCNC: 4.9 G/DL (ref 2–4)
GLUCOSE BLD STRIP.AUTO-MCNC: 184 MG/DL (ref 65–100)
GLUCOSE BLD STRIP.AUTO-MCNC: 206 MG/DL (ref 65–100)
GLUCOSE BLD STRIP.AUTO-MCNC: 217 MG/DL (ref 65–100)
GLUCOSE BLD STRIP.AUTO-MCNC: 222 MG/DL (ref 65–100)
GLUCOSE SERPL-MCNC: 278 MG/DL (ref 65–100)
HCT VFR BLD AUTO: 29.1 % (ref 35–47)
HGB BLD-MCNC: 9 G/DL (ref 11.5–16)
MAGNESIUM SERPL-MCNC: 2.2 MG/DL (ref 1.6–2.4)
MCH RBC QN AUTO: 28.5 PG (ref 26–34)
MCHC RBC AUTO-ENTMCNC: 30.9 G/DL (ref 30–36.5)
MCV RBC AUTO: 92.1 FL (ref 80–99)
NRBC # BLD: 0 K/UL (ref 0–0.01)
NRBC BLD-RTO: 0 PER 100 WBC
PHOSPHATE SERPL-MCNC: 6.3 MG/DL (ref 2.6–4.7)
PLATELET # BLD AUTO: 399 K/UL (ref 150–400)
PMV BLD AUTO: 9.5 FL (ref 8.9–12.9)
POTASSIUM SERPL-SCNC: 3.4 MMOL/L (ref 3.5–5.1)
PROT SERPL-MCNC: 5.9 G/DL (ref 6.4–8.2)
RBC # BLD AUTO: 3.16 M/UL (ref 3.8–5.2)
SERVICE CMNT-IMP: ABNORMAL
SODIUM SERPL-SCNC: 142 MMOL/L (ref 136–145)
VANCOMYCIN SERPL-MCNC: 10.9 UG/ML
WBC # BLD AUTO: 17.9 K/UL (ref 3.6–11)

## 2018-12-02 PROCEDURE — 83735 ASSAY OF MAGNESIUM: CPT

## 2018-12-02 PROCEDURE — 74011250637 HC RX REV CODE- 250/637: Performed by: INTERNAL MEDICINE

## 2018-12-02 PROCEDURE — 74011250636 HC RX REV CODE- 250/636: Performed by: INTERNAL MEDICINE

## 2018-12-02 PROCEDURE — 74011000258 HC RX REV CODE- 258: Performed by: INTERNAL MEDICINE

## 2018-12-02 PROCEDURE — 80202 ASSAY OF VANCOMYCIN: CPT

## 2018-12-02 PROCEDURE — 80053 COMPREHEN METABOLIC PANEL: CPT

## 2018-12-02 PROCEDURE — 65660000000 HC RM CCU STEPDOWN

## 2018-12-02 PROCEDURE — 77010033678 HC OXYGEN DAILY

## 2018-12-02 PROCEDURE — 74011000250 HC RX REV CODE- 250: Performed by: INTERNAL MEDICINE

## 2018-12-02 PROCEDURE — 74011636637 HC RX REV CODE- 636/637: Performed by: INTERNAL MEDICINE

## 2018-12-02 PROCEDURE — 85027 COMPLETE CBC AUTOMATED: CPT

## 2018-12-02 PROCEDURE — 36569 INSJ PICC 5 YR+ W/O IMAGING: CPT | Performed by: INTERNAL MEDICINE

## 2018-12-02 PROCEDURE — 84100 ASSAY OF PHOSPHORUS: CPT

## 2018-12-02 PROCEDURE — 36415 COLL VENOUS BLD VENIPUNCTURE: CPT

## 2018-12-02 PROCEDURE — 82962 GLUCOSE BLOOD TEST: CPT

## 2018-12-02 RX ORDER — ATENOLOL 25 MG/1
25 TABLET ORAL DAILY
Status: DISCONTINUED | OUTPATIENT
Start: 2018-12-02 | End: 2018-12-04

## 2018-12-02 RX ORDER — INSULIN GLARGINE 100 [IU]/ML
50 INJECTION, SOLUTION SUBCUTANEOUS DAILY
Status: DISCONTINUED | OUTPATIENT
Start: 2018-12-03 | End: 2018-12-02

## 2018-12-02 RX ORDER — INSULIN GLARGINE 100 [IU]/ML
75 INJECTION, SOLUTION SUBCUTANEOUS DAILY
Status: DISCONTINUED | OUTPATIENT
Start: 2018-12-02 | End: 2018-12-02

## 2018-12-02 RX ORDER — ATENOLOL 25 MG/1
25 TABLET ORAL DAILY
Status: DISCONTINUED | OUTPATIENT
Start: 2018-12-02 | End: 2018-12-02

## 2018-12-02 RX ORDER — INSULIN GLARGINE 100 [IU]/ML
50 INJECTION, SOLUTION SUBCUTANEOUS DAILY
Status: DISCONTINUED | OUTPATIENT
Start: 2018-12-02 | End: 2018-12-04

## 2018-12-02 RX ORDER — ATENOLOL 25 MG/1
25 TABLET ORAL DAILY
Status: DISCONTINUED | OUTPATIENT
Start: 2018-12-03 | End: 2018-12-02

## 2018-12-02 RX ORDER — HYDROMORPHONE HYDROCHLORIDE 2 MG/ML
2 INJECTION, SOLUTION INTRAMUSCULAR; INTRAVENOUS; SUBCUTANEOUS
Status: DISCONTINUED | OUTPATIENT
Start: 2018-12-02 | End: 2018-12-03

## 2018-12-02 RX ADMIN — HYDROMORPHONE HYDROCHLORIDE 1 MG: 2 INJECTION, SOLUTION INTRAMUSCULAR; INTRAVENOUS; SUBCUTANEOUS at 09:43

## 2018-12-02 RX ADMIN — VANCOMYCIN HYDROCHLORIDE 1500 MG: 10 INJECTION, POWDER, LYOPHILIZED, FOR SOLUTION INTRAVENOUS at 11:27

## 2018-12-02 RX ADMIN — INSULIN GLARGINE 50 UNITS: 100 INJECTION, SOLUTION SUBCUTANEOUS at 10:29

## 2018-12-02 RX ADMIN — SERTRALINE HYDROCHLORIDE 150 MG: 50 TABLET ORAL at 21:38

## 2018-12-02 RX ADMIN — INSULIN LISPRO 1 UNITS: 100 INJECTION, SOLUTION INTRAVENOUS; SUBCUTANEOUS at 23:21

## 2018-12-02 RX ADMIN — DEXTROSE MONOHYDRATE AND SODIUM CHLORIDE: 5; .45 INJECTION, SOLUTION INTRAVENOUS at 09:42

## 2018-12-02 RX ADMIN — ATENOLOL 25 MG: 25 TABLET ORAL at 15:46

## 2018-12-02 RX ADMIN — INSULIN LISPRO 2 UNITS: 100 INJECTION, SOLUTION INTRAVENOUS; SUBCUTANEOUS at 12:14

## 2018-12-02 RX ADMIN — LEVOTHYROXINE SODIUM 125 MCG: 25 TABLET ORAL at 08:08

## 2018-12-02 RX ADMIN — CEFTRIAXONE SODIUM 2 G: 2 INJECTION, POWDER, FOR SOLUTION INTRAMUSCULAR; INTRAVENOUS at 09:41

## 2018-12-02 RX ADMIN — DEXTROSE MONOHYDRATE AND SODIUM CHLORIDE: 5; .45 INJECTION, SOLUTION INTRAVENOUS at 02:10

## 2018-12-02 RX ADMIN — HEPARIN SODIUM 5000 UNITS: 5000 INJECTION INTRAVENOUS; SUBCUTANEOUS at 23:21

## 2018-12-02 RX ADMIN — HYDROMORPHONE HYDROCHLORIDE 2 MG: 2 INJECTION, SOLUTION INTRAMUSCULAR; INTRAVENOUS; SUBCUTANEOUS at 21:34

## 2018-12-02 RX ADMIN — HYDROMORPHONE HYDROCHLORIDE 1 MG: 2 INJECTION, SOLUTION INTRAMUSCULAR; INTRAVENOUS; SUBCUTANEOUS at 08:07

## 2018-12-02 RX ADMIN — ASPIRIN 81 MG: 81 TABLET, COATED ORAL at 21:38

## 2018-12-02 RX ADMIN — ASPIRIN 81 MG: 81 TABLET, COATED ORAL at 08:08

## 2018-12-02 RX ADMIN — HYDROMORPHONE HYDROCHLORIDE 1 MG: 2 INJECTION, SOLUTION INTRAMUSCULAR; INTRAVENOUS; SUBCUTANEOUS at 03:05

## 2018-12-02 RX ADMIN — HEPARIN SODIUM 5000 UNITS: 5000 INJECTION INTRAVENOUS; SUBCUTANEOUS at 08:13

## 2018-12-02 RX ADMIN — HEPARIN SODIUM 5000 UNITS: 5000 INJECTION INTRAVENOUS; SUBCUTANEOUS at 15:52

## 2018-12-02 RX ADMIN — Medication 10 ML: at 21:38

## 2018-12-02 RX ADMIN — HYDROMORPHONE HYDROCHLORIDE 2 MG: 2 INJECTION, SOLUTION INTRAMUSCULAR; INTRAVENOUS; SUBCUTANEOUS at 16:07

## 2018-12-02 RX ADMIN — Medication 10 ML: at 16:11

## 2018-12-02 NOTE — PROGRESS NOTES
Patient unable to tolerate any of her MRI's. Pt was medicated in ER prior to First attempt, the nurse also came over to dept to medicate again after failed attempt. After being medicated 2 separate times, pt would not cooperate and calm down enough to get diagnostic images. Er notified and Patient taken upstairs to their room.

## 2018-12-02 NOTE — PROGRESS NOTES
Problem: Falls - Risk of 
Goal: *Absence of Falls Document Sammie Soto Fall Risk and appropriate interventions in the flowsheet. Outcome: Progressing Towards Goal 
Fall Risk Interventions: 
  
 
Mentation Interventions: Adequate sleep, hydration, pain control, Bed/chair exit alarm, Door open when patient unattended, Evaluate medications/consider consulting pharmacy, More frequent rounding, Reorient patient, Room close to nurse's station, Toileting rounds, Update white board Medication Interventions: Bed/chair exit alarm, Evaluate medications/consider consulting pharmacy, Patient to call before getting OOB Elimination Interventions: Bed/chair exit alarm, Call light in reach, Patient to call for help with toileting needs, Toileting schedule/hourly rounds Problem: Pressure Injury - Risk of 
Goal: *Prevention of pressure injury Document Nikolay Scale and appropriate interventions in the flowsheet. Outcome: Progressing Towards Goal 
Pressure Injury Interventions: 
Sensory Interventions: Assess changes in LOC, Assess need for specialty bed, Check visual cues for pain, Discuss PT/OT consult with provider, Keep linens dry and wrinkle-free, Maintain/enhance activity level, Minimize linen layers, Monitor skin under medical devices, Pad between skin to skin, Pressure redistribution bed/mattress (bed type) Moisture Interventions: Absorbent underpads, Apply protective barrier, creams and emollients, Assess need for specialty bed, Check for incontinence Q2 hours and as needed, Internal/External urinary devices, Minimize layers, Moisture barrier, Offer toileting Q_hr Activity Interventions: Assess need for specialty bed, Pressure redistribution bed/mattress(bed type), PT/OT evaluation Mobility Interventions: Assess need for specialty bed, Float heels, HOB 30 degrees or less, Pressure redistribution bed/mattress (bed type), PT/OT evaluation Nutrition Interventions: Document food/fluid/supplement intake, Discuss nutritional consult with provider Friction and Shear Interventions: Apply protective barrier, creams and emollients, Feet elevated on foot rest, HOB 30 degrees or less, Lift sheet, Minimize layers, Transfer aides:transfer board/Jem lift/ceiling lift, Transferring/repositioning devices

## 2018-12-02 NOTE — ROUTINE PROCESS
TRANSFER - OUT REPORT: 
 
Verbal report given to Lan(name) on Afua Pearson  being transferred to [facility](unit) for routine progression of care Report consisted of patients Situation, Background, Assessment and  
Recommendations(SBAR). Information from the following report(s) SBAR, Kardex, ED Summary, Intake/Output, MAR and Recent Results was reviewed with the receiving nurse. Lines:  
Peripheral IV 11/30/18 Left Hand (Active) Site Assessment Clean, dry, & intact 12/1/2018  7:45 AM  
Phlebitis Assessment 0 12/1/2018  7:45 AM  
Infiltration Assessment 0 12/1/2018  7:45 AM  
Dressing Status Clean, dry, & intact 12/1/2018  7:45 AM  
Dressing Type Transparent 12/1/2018  7:45 AM  
Hub Color/Line Status Pink; Infusing 12/1/2018  7:45 AM  
Action Taken Open ports on tubing capped 12/1/2018  7:45 AM  
Alcohol Cap Used Yes 12/1/2018  7:45 AM  
  
 
Opportunity for questions and clarification was provided. Patient transported with: 
 O2 @ 4 liters Tech

## 2018-12-02 NOTE — PROGRESS NOTES
Dr. Sami Hassan placed new orders for Lantus (50 units) now and 1mg of additional Dilaudid now for pain control.  New order for Dilaudid has been entered for PRN Q4.

## 2018-12-02 NOTE — PROGRESS NOTES
TRANSFER - IN REPORT: 
 
Verbal report received from JOHNATHAN Fernandez on Willie Ho  being received from ED, via MRI for routine progression of care Report consisted of patients Situation, Background, Assessment and  
Recommendations(SBAR). Information from the following report(s) SBAR, Kardex, ED Summary, Procedure Summary, Intake/Output, MAR, Recent Results and Cardiac Rhythm Sinus Rythm was reviewed with the receiving nurse. Opportunity for questions and clarification was provided. Assessment completed upon patients arrival to unit and care assumed.

## 2018-12-02 NOTE — PROGRESS NOTES
Ortho Progress Note S:  Resting comfortably in bed. O:  ROM of the R elbow without pain. Erythema improved considerably today. WBC 17 
 
A:  Cellulitis RUE  
 
P:  Continue abx. Hold off on sedation MRI R elbow if continuing to improve. Dr. Gloria Fisher agrees with plan. Thank you for allowing us to take part in this patients care. Damon Calabrese PA-C Orthopaedic Surgery PA

## 2018-12-02 NOTE — PROGRESS NOTES
[address] [address] Pharmacy Dosing Services: Antimicrobial Stewardship Daily Doc Consult for antibiotic dosing of Vancomycin by Dr. Ana Slater Indication:bacteremia, cellulitis Day of Therapy 3 Vancomycin therapy: 
Current maintenance dose:by level. Goal trough of 15-20 mcg/mL. Last level 10.9 mcg/ml Plan :1500 mg x 1 dose - level in next 24-48 hours Other Antimicrobial  
(not dosed by pharmacist) Ceftriaxone Cultures 12/1: Blood x 2: NGsf 
11/30 Resp panel: neg 
11/30 Blood x 2: Strep prelim Serum Creatinine Lab Results Component Value Date/Time Creatinine 2.71 (H) 12/02/2018 12:19 AM  
  
  
Creatinine Clearance Estimated Creatinine Clearance: 21.4 mL/min (A) (based on SCr of 2.71 mg/dL (H)). Temp Temp: 97.6 °F (36.4 °C) WBC Lab Results Component Value Date/Time WBC 17.9 (H) 12/02/2018 12:19 AM  
 
  
H/H Lab Results Component Value Date/Time HGB 9.0 (L) 12/02/2018 12:19 AM  
 
  
Platelets Lab Results Component Value Date/Time PLATELET 039 69/18/5000 12:19 AM  
 
  
 
 
Pharmacist Moraima Connors Contact information: [phone]

## 2018-12-02 NOTE — PROGRESS NOTES
Physical Therapy: 10:08 Order received and acknowledged. Per chart review/OT note:  RN recommended to continue to hold therapy today as pt is in severe pain and remains medically complex. Will continue to follow and perform PT evaluation when appropriate. Thank you.  
 
Jacobo Balderas, PT, DPT, CLT

## 2018-12-02 NOTE — PROGRESS NOTES
Bedside and Verbal shift change report given to Shruti Kenney RN (oncoming nurse) by Autumn Patel RN (offgoing nurse). Report included the following information SBAR, Kardex, ED Summary, Intake/Output, Recent Results, Med Rec Status and Cardiac Rhythm NSR.  
 
2025: Pt arrived vie bed; accompanied by nurse. Pt lethargic, alert to self. Oriented pt to time, place and situation. 2100: repositioned pt in bed; pt c/o back pain, bilateral arm pain. Pt moaning, crying. Notified Dr. Lizeth Sena of pt status. Dr. Leward Burkitt to d/c 0.5 mg dilaudid prn; ordered 1 mg dilaudid every 4 hours prn. 
 
2124: Administered 1 mg dilaudid for pain. 2140: Pt sleeping; VS stable. Patient Vitals for the past 8 hrs: 
 Temp Pulse Resp BP SpO2  
12/02/18 0000 98.2 °F (36.8 °C) 97 16 132/74 94 % 12/01/18 2249  94     
12/01/18 2030  92 17 139/77 94 % 12/01/18 2025 98.3 °F (36.8 °C) 94 16 139/77 95 % 12/01/18 2018  89     
12/01/18 1945  88  133/78 95 % 0245: Pt receiving bedbath; pt turned and repositioned; tolerated poorly; pt crying, moaning in pain.  
 
0305: Administered 1 mg Dilaudid IV. 0325: Pt sleeping quietly; VS within normal limits. Patient Vitals for the past 4 hrs: 
 Temp Pulse Resp BP SpO2  
12/02/18 0454 98.9 °F (37.2 °C) 95 16 112/65 93 % Bedside and Verbal shift change report given to Autumn Patel RN (oncoming nurse) by Shruti Kenney RN (offgoing nurse). Report included the following information SBAR, Kardex, ED Summary, Intake/Output, Recent Results, Med Rec Status and Cardiac Rhythm NSR.

## 2018-12-02 NOTE — PROGRESS NOTES
[address], [id number] [place] [address] 
[phone] Medical Progress Note NAME: Anthony Le :  1955 MRM:  [id number] Date/Time: 2018 Subjective: Chief Complaint:  Patient was seen and examined by me. Chart reviewed. Still confused. C/o 9/10 right elbow pain Objective:  
 
 
Vitals:  
 
 
Last 24hrs VS reviewed since prior progress note. Most recent are: 
 
Visit Vitals BP (!) 176/92 (BP 1 Location: Left arm, BP Patient Position: At rest;Supine) Pulse 100 Temp 97.6 °F (36.4 °C) Resp 20 Ht 5' 1\" (1.549 m) Wt 88.1 kg (194 lb 3.2 oz) SpO2 92% BMI 36.69 kg/m² SpO2 Readings from Last 6 Encounters:  
18 92% O2 Flow Rate (L/min): 3 l/min Intake/Output Summary (Last 24 hours) at 2018 4685 Last data filed at 2018 2089 Gross per 24 hour Intake 446.67 ml Output 1250 ml Net -803.33 ml Exam:  
 
Physical Exam: 
 
Gen:  Elderly, disheveled, ill-appearing, morbidly obese, mod distress HEENT:  Pink conjunctivae, PERRL, hearing intact to voice, moist mucous membranes Neck:  Supple, without masses, thyroid non-tender Resp:  No accessory muscle use, poor BS at bases Card:  No murmurs, normal S1, S2 without thrills, bruits or peripheral edema Abd:  Soft, non-tender, non-distended, normoactive bowel sounds are present Musc:  No cyanosis or clubbing, pulses 2+, cap refills <2sec Skin:  Right elbow swelling, erythema Neuro: Moves all ext, follows commands Psych:  poor insight, oriented to person Medications Reviewed: (see below) Lab Data Reviewed: (see below) 
 
______________________________________________________________________ Medications:  
 
Current Facility-Administered Medications Medication Dose Route Frequency  insulin glargine (LANTUS) injection 75 Units  75 Units SubCUTAneous DAILY  cefTRIAXone (ROCEPHIN) 2 g in 0.9% sodium chloride (MBP/ADV) 50 mL  2 g IntraVENous Q24H  
 atenolol (TENORMIN) tablet 25 mg  25 mg Oral DAILY  sodium bicarbonate (8.4%) 50 mEq in dextrose 5 % - 0.45% NaCl 1,000 mL infusion   IntraVENous CONTINUOUS  
 HYDROmorphone (PF) (DILAUDID) injection 1 mg  1 mg IntraVENous Q4H PRN  
 sodium chloride (NS) flush 5-10 mL  5-10 mL IntraVENous PRN  
 sodium chloride (NS) flush 5-10 mL  5-10 mL IntraVENous Q8H  
 sodium chloride (NS) flush 5-10 mL  5-10 mL IntraVENous PRN  
 acetaminophen (TYLENOL) tablet 650 mg  650 mg Oral Q4H PRN  
 heparin (porcine) injection 5,000 Units  5,000 Units SubCUTAneous Q8H  
 ondansetron (ZOFRAN) injection 4 mg  4 mg IntraVENous Q4H PRN  
 insulin lispro (HUMALOG) injection   SubCUTAneous AC&HS  
 glucose chewable tablet 16 g  4 Tab Oral PRN  
 dextrose (D50W) injection syrg 12.5-25 g  25-50 mL IntraVENous PRN  
 glucagon (GLUCAGEN) injection 1 mg  1 mg IntraMUSCular PRN  
 aspirin delayed-release tablet 81 mg  81 mg Oral BID  levothyroxine (SYNTHROID) tablet 125 mcg  125 mcg Oral ACB  sertraline (ZOLOFT) tablet 150 mg  150 mg Oral QHS  Vancomycin- Pharmacy dosing by levels   Other Rx Dosing/Monitoring Lab Review:  
 
Recent Labs 12/02/18 
0019 12/01/18 0224 11/30/18 [address] [place] [address] WBC 17.9* 22.0* 27.6* HGB 9.0* 9.4* 10.0* HCT 29.1* 30.2* 32.6*  
 453* 502* Recent Labs 12/02/18 
0019 12/01/18 
1231 12/01/18 
0224 11/30/18 [facility]  141 143 148* K 3.4* 3.4* 3.5 3.6  105 105 106 CO2 21 20* 20* 21  
* 115* 122* 60* BUN 69* 71* 72* 63* CREA 2.71* 3.23* 3.24* 2.85* CA 8.0* 8.2* 8.1* 6.6*  
MG 2.2  --  2.1  --   
PHOS 6.3* 6.6*  --  7.1* ALB 1.0* 1.1* 1.1* 1.0*  
TBILI 0.3  --  0.3 0.4 SGOT 70*  --  66* 53* ALT 34  --  28 23 Lab Results Component Value Date/Time  Glucose (POC) 222 (H) 12/02/2018 06:30 AM  
 Glucose (POC) 279 (H) 12/01/2018 11:51 PM  
 Glucose (POC) 268 (H) 12/01/2018 11:49 PM  
 Glucose (POC) 275 (H) 12/01/2018 06:55 PM  
 Glucose (POC) 106 (H) 12/01/2018 12:07 PM  
 
 
  
Assessment / Plan:  
 
Principal Problem: 
 
62 yo hx of HTN, DM, chronic pain, presented w/ AMS, severe sepsis, bacteremia, ARF, AG acidosis, hypoglycemia 1) Severe sepsis/bacteremia: unclear source. Could be from R arm/elbow cellulitis, but family stated that the patient started with a rash on her back, then spread to arms. Blood Cx + 4/4 bottles with group B strep. Will repeat blood Cx, echo (to r/o endocarditis). Cont IV Vanc, change Cefepime to CTX. Consulted ID 2) R elbow cellulitis/bursitis/acute pain: likely source of bacteremia. Ortho is following. Cont IVF abx 3) Acute metabolic encephalopathy: due to above. Monitor closely 4) ARF: likely from ATN/sepsis, hypovolemia. Hold NSAIDS, ACEi. Cont IVF, monitor BMP. Renal following 5) AG metabolic acidosis: due to sepsis. Lactate normal.  Cont bicarb gtt, monitor BMP. No need for dialysis at this time 6) DM type 2 w/ hypoglycemia: A1C 7.2%. Hypoglycemia on admission from poor PO intake, sepsis but has resolved. Hold metformin. Cont dextrose gtt. Restart Lantus, cont SSI 7) HTN: restart atenolol 8) Anemia: check iron, ferritin, B12/folate Total time spent with patient: 45 Minutes Care Plan discussed with: Patient, nursing Discussed:  Care Plan Prophylaxis:  Hep SQ Disposition:  SNF/LTC 
        
___________________________________________________ Attending Physician: Samy Forbes MD

## 2018-12-02 NOTE — ED NOTES
Pt to MRI via hospital bed with 4L NC. Administered 1 mg ativan prior to procedure; pt lethargic but arousable. VSS prior to transport. Per dr. Cami Augustine, pt may go to MRI without an RN and not on monitor and may travel to floor after procedure without monitor. 1937: Abbreviated verbal report given to [facility] JERE CARNEY and [employer].  MRI to call ED when pt is finished with test.

## 2018-12-02 NOTE — PROGRESS NOTES
Blanco Coughlin [place] Reina Jones YOB: 1955 Assessment & Plan:  
ARF due to IVVD/sepsis · Cr better,making urine · No HD needed UTO · S/p joyce, good output Acidosis · On IV bcb,if cont to get better,may change to LR/NS Sepsis/hypotension Subjective:  
CC: f/u ARF 
HPI: Creat BETTER. Making good urine. . On bcb for acidosis. On Cefepime for sepsis/cellulitis ROS:ros not available due to AMS with pain med Current Facility-Administered Medications Medication Dose Route Frequency  cefTRIAXone (ROCEPHIN) 2 g in 0.9% sodium chloride (MBP/ADV) 50 mL  2 g IntraVENous Q24H  
 HYDROmorphone (PF) (DILAUDID) injection 2 mg  2 mg IntraVENous Q4H PRN  
 insulin glargine (LANTUS) injection 50 Units  50 Units SubCUTAneous DAILY  [START ON 12/3/2018] atenolol (TENORMIN) tablet 25 mg  25 mg Oral DAILY  sodium bicarbonate (8.4%) 50 mEq in dextrose 5 % - 0.45% NaCl 1,000 mL infusion   IntraVENous CONTINUOUS  
 sodium chloride (NS) flush 5-10 mL  5-10 mL IntraVENous PRN  
 sodium chloride (NS) flush 5-10 mL  5-10 mL IntraVENous Q8H  
 sodium chloride (NS) flush 5-10 mL  5-10 mL IntraVENous PRN  
 acetaminophen (TYLENOL) tablet 650 mg  650 mg Oral Q4H PRN  
 heparin (porcine) injection 5,000 Units  5,000 Units SubCUTAneous Q8H  
 ondansetron (ZOFRAN) injection 4 mg  4 mg IntraVENous Q4H PRN  
 insulin lispro (HUMALOG) injection   SubCUTAneous AC&HS  
 glucose chewable tablet 16 g  4 Tab Oral PRN  
 dextrose (D50W) injection syrg 12.5-25 g  25-50 mL IntraVENous PRN  
 glucagon (GLUCAGEN) injection 1 mg  1 mg IntraMUSCular PRN  
 aspirin delayed-release tablet 81 mg  81 mg Oral BID  levothyroxine (SYNTHROID) tablet 125 mcg  125 mcg Oral ACB  sertraline (ZOLOFT) tablet 150 mg  150 mg Oral QHS  Vancomycin- Pharmacy dosing by levels   Other Rx Dosing/Monitoring Objective:  
 
Vitals: Blood pressure 139/82, pulse 95, temperature 97.5 °F (36.4 °C), resp. rate 13, height 5' 1\" (1.549 m), weight 88.1 kg (194 lb 3.2 oz), SpO2 95 %. Temp (24hrs), Av.9 °F (36.6 °C), Min:97.5 °F (36.4 °C), Max:98.9 °F (37.2 °C) Intake and Output: 
701 - 1900 In: 340 [I.V.:340] Out: -  
1901 -  07 In: 496.7 [P.O.:50; I.V.:446.7] Out: 1850 [VRAQC:2508] Physical Exam:              
GENERAL ASSESSMENT: NAD 
CHEST: CTA HEART: S1S2 ABDOMEN: Soft,NT 
: +joyce +urine EXTREMITY: no EDEMA 
 
 
   
ECG/rhythm: 
 
Data Review No results for input(s): TNIPOC in the last 72 hours. No lab exists for component: ITNL Recent Labs 18 [address] [place] [address] * Recent Labs 18 
0019 18 
1231 18 
0224 18 [phone] 18 [address] [place] [address]  141 143 148* 143  
K 3.4* 3.4* 3.5 3.6 4.5  
 105 105 106 103 CO2 21 20* 20* 21 18* BUN 69* 71* 72* 63* 72* CREA 2.71* 3.23* 3.24* 2.85* 3.13* * 115* 122* 60* 136* PHOS 6.3* 6.6*  --  7.1*  --   
MG 2.2  --  2.1  --   --   
CA 8.0* 8.2* 8.1* 6.6* 8.8 ALB 1.0* 1.1* 1.1* 1.0* 1.2* WBC 17.9*  --  22.0*  --  27.6* HGB 9.0*  --  9.4*  --  10.0* HCT 29.1*  --  30.2*  --  32.6*  
  --  453*  --  502* No results for input(s): INR, PTP, APTT in the last 72 hours. No lab exists for component: INREXT, INREXT Needs: urine analysis, urine sodium, protein and creatinine Lab Results Component Value Date/Time Sodium,urine random 29 2018 06:10 PM  
 Creatinine, urine 187.11 2018 06:10 PM  
 
 
 
 
: Ai Cleveland MD 
2018 [facility]: 
[email or web address] [email or web address] [place] office: 
[address], [address] [place], [address] [place] [address] Phone: [phone] Fax :     [phone] [place] office: 
[address] [address] Jacey Carty Phone - [phone] Fax - [phone]

## 2018-12-02 NOTE — PROGRESS NOTES
Occupational therapy note:  Chart reviewed and spoke with RN. RN recommended to continue to hold therapy today as pt is in severe pain and remains medically complex. Will continue to follow as attempt OT eval as pt able to actively participate.  
Selene Lee OTR/L, CBIS

## 2018-12-03 ENCOUNTER — APPOINTMENT (OUTPATIENT)
Dept: GENERAL RADIOLOGY | Age: 63
DRG: 871 | End: 2018-12-03
Attending: INTERNAL MEDICINE
Payer: COMMERCIAL

## 2018-12-03 LAB
ACETONE,ACETX: NEGATIVE MG/L
ALBUMIN SERPL-MCNC: 1 G/DL (ref 3.5–5)
ALBUMIN/GLOB SERPL: 0.2 {RATIO} (ref 1.1–2.2)
ALP SERPL-CCNC: 98 U/L (ref 45–117)
ALT SERPL-CCNC: 29 U/L (ref 12–78)
ANION GAP SERPL CALC-SCNC: 12 MMOL/L (ref 5–15)
AST SERPL-CCNC: 59 U/L (ref 15–37)
BACTERIA SPEC CULT: ABNORMAL
BILIRUB SERPL-MCNC: 0.4 MG/DL (ref 0.2–1)
BUN SERPL-MCNC: 65 MG/DL (ref 6–20)
BUN/CREAT SERPL: 32 (ref 12–20)
CALCIUM SERPL-MCNC: 9 MG/DL (ref 8.5–10.1)
CHAIN OF CUSTODY,CHC: NO
CHLORIDE SERPL-SCNC: 108 MMOL/L (ref 97–108)
CO2 SERPL-SCNC: 26 MMOL/L (ref 21–32)
CREAT SERPL-MCNC: 2.06 MG/DL (ref 0.55–1.02)
ERYTHROCYTE [DISTWIDTH] IN BLOOD BY AUTOMATED COUNT: 14.8 % (ref 11.5–14.5)
ETHANOL,ETHX: NEGATIVE MG/L
ETHYLENE GLYCOL,XEGLYT: NORMAL MG/L
FERRITIN SERPL-MCNC: 397 NG/ML (ref 8–252)
FOLATE SERPL-MCNC: 26.1 NG/ML (ref 5–21)
GLOBULIN SER CALC-MCNC: 5.5 G/DL (ref 2–4)
GLUCOSE BLD STRIP.AUTO-MCNC: 172 MG/DL (ref 65–100)
GLUCOSE BLD STRIP.AUTO-MCNC: 172 MG/DL (ref 65–100)
GLUCOSE BLD STRIP.AUTO-MCNC: 186 MG/DL (ref 65–100)
GLUCOSE BLD STRIP.AUTO-MCNC: 237 MG/DL (ref 65–100)
GLUCOSE SERPL-MCNC: 227 MG/DL (ref 65–100)
HCT VFR BLD AUTO: 31.2 % (ref 35–47)
HGB BLD-MCNC: 9.4 G/DL (ref 11.5–16)
IRON SATN MFR SERPL: 17 % (ref 20–50)
IRON SERPL-MCNC: 32 UG/DL (ref 35–150)
ISOPROPANOL,ISOPX: NEGATIVE MG/L
MAGNESIUM SERPL-MCNC: 2.4 MG/DL (ref 1.6–2.4)
MCH RBC QN AUTO: 27.3 PG (ref 26–34)
MCHC RBC AUTO-ENTMCNC: 30.1 G/DL (ref 30–36.5)
MCV RBC AUTO: 90.7 FL (ref 80–99)
METHANOL,METHX: NEGATIVE MG/L
NRBC # BLD: 0 K/UL (ref 0–0.01)
NRBC BLD-RTO: 0 PER 100 WBC
PHOSPHATE SERPL-MCNC: 5.6 MG/DL (ref 2.6–4.7)
PLATELET # BLD AUTO: 481 K/UL (ref 150–400)
PMV BLD AUTO: 9.8 FL (ref 8.9–12.9)
POTASSIUM SERPL-SCNC: 3.8 MMOL/L (ref 3.5–5.1)
PROT SERPL-MCNC: 6.5 G/DL (ref 6.4–8.2)
RBC # BLD AUTO: 3.44 M/UL (ref 3.8–5.2)
REPORT STATUS,RSTSX: NORMAL
SERVICE CMNT-IMP: ABNORMAL
SODIUM SERPL-SCNC: 146 MMOL/L (ref 136–145)
SPECIMEN SOURCE: NORMAL
TIBC SERPL-MCNC: 189 UG/DL (ref 250–450)
VIT B12 SERPL-MCNC: >2000 PG/ML (ref 193–986)
WBC # BLD AUTO: 15.6 K/UL (ref 3.6–11)

## 2018-12-03 PROCEDURE — 74011250636 HC RX REV CODE- 250/636: Performed by: INTERNAL MEDICINE

## 2018-12-03 PROCEDURE — 74011000250 HC RX REV CODE- 250: Performed by: INTERNAL MEDICINE

## 2018-12-03 PROCEDURE — 83540 ASSAY OF IRON: CPT

## 2018-12-03 PROCEDURE — 77010033678 HC OXYGEN DAILY

## 2018-12-03 PROCEDURE — 74011636637 HC RX REV CODE- 636/637: Performed by: INTERNAL MEDICINE

## 2018-12-03 PROCEDURE — 74011250637 HC RX REV CODE- 250/637: Performed by: INTERNAL MEDICINE

## 2018-12-03 PROCEDURE — 85027 COMPLETE CBC AUTOMATED: CPT

## 2018-12-03 PROCEDURE — 77030018786 HC NDL GD F/USND BARD -B

## 2018-12-03 PROCEDURE — 74011000258 HC RX REV CODE- 258: Performed by: INTERNAL MEDICINE

## 2018-12-03 PROCEDURE — 82962 GLUCOSE BLOOD TEST: CPT

## 2018-12-03 PROCEDURE — 80053 COMPREHEN METABOLIC PANEL: CPT

## 2018-12-03 PROCEDURE — 82728 ASSAY OF FERRITIN: CPT

## 2018-12-03 PROCEDURE — 83735 ASSAY OF MAGNESIUM: CPT

## 2018-12-03 PROCEDURE — 82746 ASSAY OF FOLIC ACID SERUM: CPT

## 2018-12-03 PROCEDURE — 84100 ASSAY OF PHOSPHORUS: CPT

## 2018-12-03 PROCEDURE — 65660000000 HC RM CCU STEPDOWN

## 2018-12-03 PROCEDURE — 36415 COLL VENOUS BLD VENIPUNCTURE: CPT

## 2018-12-03 PROCEDURE — 77030018719 HC DRSG PTCH ANTIMIC J&J -A

## 2018-12-03 PROCEDURE — 76937 US GUIDE VASCULAR ACCESS: CPT

## 2018-12-03 PROCEDURE — C1751 CATH, INF, PER/CENT/MIDLINE: HCPCS

## 2018-12-03 PROCEDURE — 82607 VITAMIN B-12: CPT

## 2018-12-03 RX ORDER — SODIUM CHLORIDE 0.9 % (FLUSH) 0.9 %
10 SYRINGE (ML) INJECTION AS NEEDED
Status: DISCONTINUED | OUTPATIENT
Start: 2018-12-03 | End: 2018-12-12

## 2018-12-03 RX ORDER — SODIUM CHLORIDE 0.9 % (FLUSH) 0.9 %
10-30 SYRINGE (ML) INJECTION AS NEEDED
Status: DISCONTINUED | OUTPATIENT
Start: 2018-12-03 | End: 2018-12-12

## 2018-12-03 RX ORDER — HYDRALAZINE HYDROCHLORIDE 20 MG/ML
10 INJECTION INTRAMUSCULAR; INTRAVENOUS
Status: DISCONTINUED | OUTPATIENT
Start: 2018-12-03 | End: 2018-12-04

## 2018-12-03 RX ORDER — SODIUM CHLORIDE 0.9 % (FLUSH) 0.9 %
20 SYRINGE (ML) INJECTION EVERY 24 HOURS
Status: DISCONTINUED | OUTPATIENT
Start: 2018-12-03 | End: 2018-12-12

## 2018-12-03 RX ORDER — SODIUM CHLORIDE 0.9 % (FLUSH) 0.9 %
10 SYRINGE (ML) INJECTION EVERY 24 HOURS
Status: DISCONTINUED | OUTPATIENT
Start: 2018-12-03 | End: 2018-12-12

## 2018-12-03 RX ORDER — HYDROMORPHONE HYDROCHLORIDE 2 MG/ML
2 INJECTION, SOLUTION INTRAMUSCULAR; INTRAVENOUS; SUBCUTANEOUS
Status: DISCONTINUED | OUTPATIENT
Start: 2018-12-04 | End: 2018-12-10

## 2018-12-03 RX ORDER — SODIUM CHLORIDE 0.9 % (FLUSH) 0.9 %
10-40 SYRINGE (ML) INJECTION EVERY 8 HOURS
Status: DISCONTINUED | OUTPATIENT
Start: 2018-12-03 | End: 2018-12-12

## 2018-12-03 RX ADMIN — CEFTRIAXONE SODIUM 2 G: 2 INJECTION, POWDER, FOR SOLUTION INTRAMUSCULAR; INTRAVENOUS at 09:43

## 2018-12-03 RX ADMIN — Medication 10 ML: at 21:37

## 2018-12-03 RX ADMIN — SERTRALINE HYDROCHLORIDE 150 MG: 50 TABLET ORAL at 21:47

## 2018-12-03 RX ADMIN — INSULIN LISPRO 2 UNITS: 100 INJECTION, SOLUTION INTRAVENOUS; SUBCUTANEOUS at 07:54

## 2018-12-03 RX ADMIN — ASPIRIN 81 MG: 81 TABLET, COATED ORAL at 11:01

## 2018-12-03 RX ADMIN — CEFTRIAXONE SODIUM 2 G: 2 INJECTION, POWDER, FOR SOLUTION INTRAMUSCULAR; INTRAVENOUS at 21:35

## 2018-12-03 RX ADMIN — HEPARIN SODIUM 5000 UNITS: 5000 INJECTION INTRAVENOUS; SUBCUTANEOUS at 06:35

## 2018-12-03 RX ADMIN — ACETAMINOPHEN 650 MG: 325 TABLET, FILM COATED ORAL at 18:15

## 2018-12-03 RX ADMIN — INSULIN GLARGINE 50 UNITS: 100 INJECTION, SOLUTION SUBCUTANEOUS at 09:43

## 2018-12-03 RX ADMIN — HYDROMORPHONE HYDROCHLORIDE 2 MG: 2 INJECTION, SOLUTION INTRAMUSCULAR; INTRAVENOUS; SUBCUTANEOUS at 23:25

## 2018-12-03 RX ADMIN — Medication 10 ML: at 21:36

## 2018-12-03 RX ADMIN — LEVOTHYROXINE SODIUM 125 MCG: 25 TABLET ORAL at 06:35

## 2018-12-03 RX ADMIN — HYDROMORPHONE HYDROCHLORIDE 2 MG: 2 INJECTION, SOLUTION INTRAMUSCULAR; INTRAVENOUS; SUBCUTANEOUS at 06:35

## 2018-12-03 RX ADMIN — Medication 40 ML: at 15:36

## 2018-12-03 RX ADMIN — HYDROMORPHONE HYDROCHLORIDE 2 MG: 2 INJECTION, SOLUTION INTRAMUSCULAR; INTRAVENOUS; SUBCUTANEOUS at 19:20

## 2018-12-03 RX ADMIN — DEXTROSE MONOHYDRATE AND SODIUM CHLORIDE: 5; .45 INJECTION, SOLUTION INTRAVENOUS at 14:52

## 2018-12-03 RX ADMIN — HEPARIN SODIUM 5000 UNITS: 5000 INJECTION INTRAVENOUS; SUBCUTANEOUS at 21:36

## 2018-12-03 RX ADMIN — HEPARIN SODIUM 5000 UNITS: 5000 INJECTION INTRAVENOUS; SUBCUTANEOUS at 15:31

## 2018-12-03 RX ADMIN — HYDROMORPHONE HYDROCHLORIDE 2 MG: 2 INJECTION, SOLUTION INTRAMUSCULAR; INTRAVENOUS; SUBCUTANEOUS at 11:02

## 2018-12-03 RX ADMIN — HYDROMORPHONE HYDROCHLORIDE 2 MG: 2 INJECTION, SOLUTION INTRAMUSCULAR; INTRAVENOUS; SUBCUTANEOUS at 15:31

## 2018-12-03 RX ADMIN — ASPIRIN 81 MG: 81 TABLET, COATED ORAL at 18:15

## 2018-12-03 RX ADMIN — ATENOLOL 25 MG: 25 TABLET ORAL at 11:01

## 2018-12-03 RX ADMIN — DEXTROSE MONOHYDRATE AND SODIUM CHLORIDE: 5; .45 INJECTION, SOLUTION INTRAVENOUS at 01:52

## 2018-12-03 RX ADMIN — DEXTROSE MONOHYDRATE AND SODIUM CHLORIDE: 5; .45 INJECTION, SOLUTION INTRAVENOUS at 02:56

## 2018-12-03 RX ADMIN — HYDROMORPHONE HYDROCHLORIDE 2 MG: 2 INJECTION, SOLUTION INTRAMUSCULAR; INTRAVENOUS; SUBCUTANEOUS at 01:49

## 2018-12-03 NOTE — PROGRESS NOTES
Orthopaedic Progress Note December 3, 2018 10:23 AM  
 
Patient: Lisandra Flowers MRN: [id number]  SSN: [id number] YOB: 1955  Age: 61 y.o. Sex: female Admit date:  2018 Admitting Physician:  Phyllis Olguin MD  
Consulting Physician(s): Treatment Team: Attending Provider: Eddie Beasley MD; Care Manager: Vandana Molina; Consulting Provider: Melly Smith; Consulting Provider: Gissell Pérez MD; Consulting Provider: Dolan Pallas, DO; Utilization Review: Ruth Madison SUBJECTIVE: 
  
Lisandra Flowers is a 61 y.o. female is resting in bed. OBJECTIVE: 
 
  
Physical Exam: 
General: Patient is confused, but responds to painful stimuli. Respiratory: Respirations unlabored Musculoskeletal: Calves soft, supple, non-tender upon palpation. Right elbow in elevation pillow. Erythema improved around the elbow. Flexion to 110. Extension to -10 passively. No olecranon bursitis present. No lesion or rash. Difficult to assess effusion status of elbow. Vital Signs:  
  
 
Patient Vitals for the past 8 hrs: 
 BP Temp Pulse Resp SpO2 Weight 18 0700 136/69 97.8 °F (36.6 °C) 81 12 93 %   
18 0600      88.6 kg (195 lb 5.2 oz) 18 0500 145/76  88 14 96 %   
18 0400 146/73 97.7 °F (36.5 °C) 83 13 95 %   
18 0300 137/70  81 13 95 %  Temp (24hrs), Av °F (36.7 °C), Min:97.5 °F (36.4 °C), Max:98.6 °F (37 °C) Labs:  
  
 
Recent Labs 18 
0100 HCT 31.2* HGB 9.4* Lab Results Component Value Date/Time Sodium 146 (H) 2018 01:00 AM  
 Potassium 3.8 2018 01:00 AM  
 Chloride 108 2018 01:00 AM  
 CO2 26 2018 01:00 AM  
 Glucose 227 (H) 2018 01:00 AM  
 BUN 65 (H) 2018 01:00 AM  
 Creatinine 2.06 (H) 2018 01:00 AM  
 Calcium 9.0 2018 01:00 AM  
 
 
PT/OT:  
 
  
  
  
 
Patient mobility ASSESSMENT / PLAN:  
Principal Problem: 
  BAYLEE (acute kidney injury) ([facility] (11/30/2018) Active Problems: Metabolic acidosis (45/26/3427) Chronic back pain () Diabetes mellitus, type 2 ([facility] () Leukocytosis (11/30/2018) Right elbow pain (11/30/2018) Hypotension (11/30/2018) A: 1. Right arm cellulitis 2. Lumbar pain 3. Sepsis severe P: 1. Right forearm and arm cellulitis has improved with IV antibiotics. Patient remains confused. Source of sepsis is unknown, but likely from cellulitis. If worsening back pain or arm pain today/tonight would pursue MRI with/without contrast of lumbar/elbow. There are no surgical indications at this time and no definitive physical exam findings of septic arthritis. Continue IV antibiotics. Trend WBC and workup fevers as indicated. 2. Orthopedics to follow. Signed By: 
MARY Krishna [address] [facility]

## 2018-12-03 NOTE — PROGRESS NOTES
12-3-2018 CASE MANAGEMENT NOTE: 
I met with the pt's , Sarah Lam ([phone]), as the pt was non-conversant. He confirmed that he, his wife and their 2 adult sons live in a 2-story house with an entry ramp. He said the pt began having difficulty walking and speaking a little over a week ago but is normally alert and oriented, independent with her ADL's and drove. She has been on pain medications for approximately 2 years following back surgery. She has a wheelchair but only uses it for long distances. She does have prescription drug coverage and gets her medications from [facility] on [address] [place] [address]. CM will continue to follow and assist as indicated.  Elsa Menon BSW, CM

## 2018-12-03 NOTE — PROGRESS NOTES
Physical Therapy: 
Chart reviewed, and spoke with RN. Patient continues to have increased lethargy, not appropriate for PT at this time. We will continue to follow and re-attempt when medical stable. Thank you.  
Alex Hwang PT,DPT,NCS

## 2018-12-03 NOTE — PROGRESS NOTES
VAT on the floor and obtained phone consent from  Roselia Flowers for PICC line. Per Pt's  she has had a line before that was placed at [facility] and there was no problem with the line.

## 2018-12-03 NOTE — DIABETES MGMT
DTC Progress Note Recommendations/ Comments: Noted plan to continue with D5 IVF at this time. If appropriate, please consider increasing Lantus dose to 55 units daily. Current hospital DM medication:  
-Lantus 50 units daily  
-Humalog high sensitivity correction Chart reviewed on Camilla Connell. Patient is a 61 y.o. female with known history of Type 2 Diabetes on Lantus 75 units nightly, Metformin 500mg bid with meals at home. A1c:  
Lab Results Component Value Date/Time Hemoglobin A1c 7.2 (H) 11/30/2018 06:10 PM  
 
 
Recent Glucose Results:  
Lab Results Component Value Date/Time  (H) 12/03/2018 01:00 AM  
 GLUCPOC 186 (H) 12/03/2018 11:52 AM  
 GLUCPOC 237 (H) 12/03/2018 07:29 AM  
 GLUCPOC 206 (H) 12/02/2018 09:42 PM  
  
 
Lab Results Component Value Date/Time Creatinine 2.06 (H) 12/03/2018 01:00 AM  
 
Estimated Creatinine Clearance: 28.3 mL/min (A) (based on SCr of 2.06 mg/dL (H)). Active Orders Diet DIET NPO  
  
 
PO intake: No data found. Will continue to follow as needed. Thank you Teo Valladares RD, CDE [facility] Office: [phone] Pager: [phone] Time spent: 5

## 2018-12-03 NOTE — PROGRESS NOTES
Called re: access issues. Left wrist IV apparently infiltrated and infusing for unknown duration. Swelling throughout limb and cellulitis in right arm. Her case does not warrant the emergent placement of central access. She has received all of her antibiotics until 12/3 @ 9AM. She has bicarb infusion going but her CO2 is 21 and her creatinine is markedly improved. I have asked that attempts be made to obtain peripheral access above forearm in [facility] on left arm if possible. Otherwise, the patient is okay to remain without access until vascular access team can place midline

## 2018-12-03 NOTE — PROGRESS NOTES
PICC Placement Note PRE-PROCEDURE VERIFICATION Correct Procedure: yes Correct Site:  yes Temperature: Temp: 97.8 °F (36.6 °C), Temperature Source: Temp Source: Axillary Recent Labs 12/03/18 
0100 BUN 65* CREA 2.06* * WBC 15.6* Allergies: Patient has no known allergies. Education materials for PICC Care given: yes. See Patient Education activity for further details. PICC Booklet placed at bedside: yes Closed Ended PICC Catheters: 
Flush Lumens as Follows: 
Intermittent Medication:   Flush before and after each medication with 10 ml NS. Unused Ports:  Flush every 8 hours with 10 ml NS. 
TPN Ports:  Flush every 24 hours with 20 ml NS prior to hanging new bag. Blood Draws: Stop infusion, draw off and waste 10 ml of blood. Draw sample with 10cc syringe or greater. DO NOT USE VACUTAINER . Transfer with appropriate device to lab  tubes. Flush with 20 ml NS. Dressing Change:  Every 7 days, and PRN using sterile technique if integrity of dressing is compromised. Initial dressing change for central line 24-48 hours post insertion if gauze is used. Apply new dressing per policy. PROCEDURE DETAIL Consent was obtained and all questions were answered related to risks and benefits. A single lumen PICC line was inserted, as a sterile procedure using ultrasound and modified Seldinger technique for Home IV Therapy. The following documentation is in addition to the PICC properties in the lines/airways flowsheet : 
Lot #: [id number] Lidocaine 1% administered intradermally :yes Internal Catheter Total Length: 45 (cm) Vein Selection for PICC:left cephalic Central Line Bundle followed yes Complication Related to Insertion:no The placement was verified by EKG, MAX P WAVE @ 45 (cm). PER EKG PICC TIP @ C/A junction. Line is okay to use: yes Madan Up RN

## 2018-12-03 NOTE — PROGRESS NOTES
[address], [address] [place] [address] 
[phone] Medical Progress Note NAME: Lisandra Flowers :  1955 MRM:  [id number] Date/Time: 12/3/2018 Subjective: Chief Complaint:  Patient was seen and examined by me. Chart reviewed. Severe right elbow pain. Still confused Objective:  
 
 
Vitals:  
 
 
Last 24hrs VS reviewed since prior progress note. Most recent are: 
 
Visit Vitals BP (!) 165/91 (BP 1 Location: Left arm, BP Patient Position: At rest;Supine) Pulse 82 Temp 97.8 °F (36.6 °C) Resp 18 Ht 5' 1\" (1.549 m) Wt 88.6 kg (195 lb 5.2 oz) SpO2 94% BMI 36.91 kg/m² SpO2 Readings from Last 6 Encounters:  
18 94% O2 Flow Rate (L/min): 4 l/min Intake/Output Summary (Last 24 hours) at 12/3/2018 1327 Last data filed at 12/3/2018 6575 Gross per 24 hour Intake 2233.34 ml Output 1550 ml Net 683.34 ml Exam:  
 
Physical Exam: 
 
Gen:  Elderly, disheveled, ill-appearing, morbidly obese, mild distress HEENT:  Pink conjunctivae, PERRL, hearing intact to voice, moist mucous membranes Neck:  Supple, without masses, thyroid non-tender Resp:  No accessory muscle use, poor BS at bases Card:  No murmurs, normal S1, S2 without thrills, bruits or peripheral edema Abd:  Soft, non-tender, non-distended, normoactive bowel sounds are present Musc:  No cyanosis or clubbing Skin:  Right elbow swelling, erythema w/ slight improvement Neuro: Moves all ext, follows commands Psych:  poor insight, oriented to person Medications Reviewed: (see below) Lab Data Reviewed: (see below) 
 
______________________________________________________________________ Medications:  
 
Current Facility-Administered Medications Medication Dose Route Frequency  alteplase (CATHFLO) 1 mg in sterile water (preservative free) 1 mL injection  1 mg InterCATHeter PRN  
  sodium chloride (NS) flush 10-30 mL  10-30 mL InterCATHeter PRN  
 sodium chloride (NS) flush 10 mL  10 mL InterCATHeter Q24H  
 sodium chloride (NS) flush 10 mL  10 mL InterCATHeter PRN  
 sodium chloride (NS) flush 10-40 mL  10-40 mL InterCATHeter Q8H  
 sodium chloride (NS) flush 20 mL  20 mL InterCATHeter Q24H  cefTRIAXone (ROCEPHIN) 2 g in 0.9% sodium chloride (MBP/ADV) 50 mL  2 g IntraVENous Q12H  
 HYDROmorphone (PF) (DILAUDID) injection 2 mg  2 mg IntraVENous Q4H PRN  
 insulin glargine (LANTUS) injection 50 Units  50 Units SubCUTAneous DAILY  atenolol (TENORMIN) tablet 25 mg  25 mg Oral DAILY  sodium bicarbonate (8.4%) 50 mEq in dextrose 5 % - 0.45% NaCl 1,000 mL infusion   IntraVENous CONTINUOUS  
 sodium chloride (NS) flush 5-10 mL  5-10 mL IntraVENous PRN  
 sodium chloride (NS) flush 5-10 mL  5-10 mL IntraVENous Q8H  
 sodium chloride (NS) flush 5-10 mL  5-10 mL IntraVENous PRN  
 acetaminophen (TYLENOL) tablet 650 mg  650 mg Oral Q4H PRN  
 heparin (porcine) injection 5,000 Units  5,000 Units SubCUTAneous Q8H  
 ondansetron (ZOFRAN) injection 4 mg  4 mg IntraVENous Q4H PRN  
 insulin lispro (HUMALOG) injection   SubCUTAneous AC&HS  
 glucose chewable tablet 16 g  4 Tab Oral PRN  
 dextrose (D50W) injection syrg 12.5-25 g  25-50 mL IntraVENous PRN  
 glucagon (GLUCAGEN) injection 1 mg  1 mg IntraMUSCular PRN  
 aspirin delayed-release tablet 81 mg  81 mg Oral BID  levothyroxine (SYNTHROID) tablet 125 mcg  125 mcg Oral ACB  sertraline (ZOLOFT) tablet 150 mg  150 mg Oral QHS Lab Review:  
 
Recent Labs 12/03/18 
0100 12/02/18 
0019 12/01/18 
2719 WBC 15.6* 17.9* 22.0* HGB 9.4* 9.0* 9.4* HCT 31.2* 29.1* 30.2* * 399 453* Recent Labs 12/03/18 
0100 12/02/18 
0019 12/01/18 
1231 12/01/18 
6760 * 142 141 143  
K 3.8 3.4* 3.4* 3.5  106 105 105 CO2 26 21 20* 20* * 278* 115* 122* BUN 65* 69* 71* 72* CREA 2.06* 2.71* 3.23* 3.24* CA 9.0 8.0* 8.2* 8.1*  
MG 2.4 2.2  --  2.1 PHOS 5.6* 6.3* 6.6*  --   
ALB 1.0* 1.0* 1.1* 1.1* TBILI 0.4 0.3  --  0.3 SGOT 59* 70*  --  66* ALT 29 34  --  28 Lab Results Component Value Date/Time Glucose (POC) 186 (H) 12/03/2018 11:52 AM  
 Glucose (POC) 237 (H) 12/03/2018 07:29 AM  
 Glucose (POC) 206 (H) 12/02/2018 09:42 PM  
 Glucose (POC) 184 (H) 12/02/2018 04:18 PM  
 Glucose (POC) 217 (H) 12/02/2018 11:39 AM  
 
 
  
Assessment / Plan:  
 
Principal Problem: 
 
62 yo hx of HTN, DM, chronic pain, presented w/ AMS, severe sepsis, bacteremia, ARF, AG acidosis, hypoglycemia 1) Severe sepsis/bacteremia: Likely from R arm/elbow cellulitis. Blood Cx + 4/4 bottles with group B strep, repeat blood Cx neg. Echo pending. Stop IV vanc, cont IV CTX. ID and ortho following 2) R elbow cellulitis/bursitis/acute pain: likely source of bacteremia. Ortho is following. Will likely need MRI under sedation. Cont IVF abx 3) Acute metabolic encephalopathy: due to above. No improvement. Might need LP if not better 4) ARF: likely from ATN/sepsis, hypovolemia. Hold NSAIDS, ACEi. Cont IVF, monitor BMP. Renal following 5) AG metabolic acidosis: due to sepsis. Lactate normal.  Cont bicarb gtt, monitor BMP. No need for dialysis at this time 6) DM type 2 w/ hypoglycemia: A1C 7.2%. Hypoglycemia on admission from poor PO intake and sepsis but has resolved. Hold metformin. Cont dextrose gtt. Cont Lantus, cont SSI 7) HTN: cont atenolol 8) Anemia: Fe studies consistent with chronic dz Total time spent with patient: 45 Minutes Care Plan discussed with: Patient, nursing, ID Discussed:  Care Plan Prophylaxis:  Hep SQ Disposition:  SNF/LTC 
        
___________________________________________________ Attending Physician: Guliherme Murry MD

## 2018-12-03 NOTE — CONSULTS
[address]  MR#: [id number]  : 1955  ACCOUNT #: [de-identified]   DATE OF SERVICE: 2018    REQUESTING PHYSICIAN:  Dr. Peter Wheeler.    CHIEF COMPLAINT:  Weakness. HISTORY OF PRESENT ILLNESS:  Please note, the patient is a poor historian as she just received Dilaudid for pain. Patient was admitted to [place] [address] on  with complaints of weakness, inability to get out of bed and confusion. According to the HPI, the patient was less mobile over the last several days prior to admission, which progressed to the point where she was unable to get out of bed. There are also complaints of decreased oral intake. She developed right elbow pain and rash involving the shoulders and arm. She was seen at [facility] earlier on the week of admission, given an unknown medication for the rash. No blood work was reportedly done at that time. Since that time, confusion and weakness progressed and she was brought to [place] [address] for further evaluation and treatment. Workup has revealed severe right elbow cellulitis with group B streptococcal bacteremia. She remains confused, which is thought to be due to a combination of metabolic encephalopathy from infection and pain medications. Though she is currently on vancomycin and ceftriaxone and the infectious diseases service has been asked to assist with antibiotic management. PAST MEDICAL HISTORY:  Chronic back pain, diabetes mellitus and hypertension. PAST SURGICAL HISTORY:  None. SOCIAL HISTORY:  No alcohol, tobacco or illicit drug use. FAMILY HISTORY:  Heart disease, diabetes mellitus. ALLERGIES:  NO KNOWN DRUG ALLERGIES. OUTPATIENT MEDICATIONS:  Please see body of chart for details. She was not on antibiotics prior to admission. REVIEW OF SYSTEMS:  Unobtainable as the patient is a poor historian.     PHYSICAL EXAMINATION:  VITAL SIGNS:  Temperature 97.8 degrees Fahrenheit, maximum temperature is less than 100. Heart rate 82 beats per minute, blood pressure 165/91, respiratory rate 18, oxygen saturation 94% on 4 liters nasal cannula. GENERAL:   Lethargic, but arousable, does not follow commands. No acute distress. HEENT:  Normocephalic, atraumatic. Mucous membranes dry. NECK:  Supple. CARDIOVASCULAR:  Regular rate and rhythm. No murmurs, gallops or rubs. LUNGS:  Clear to auscultation anteriorly. ABDOMEN:  Obese, nontender, nondistended. EXTREMITIES:  There is erythema and edema involving the right wrist.  Exam is limited due to patient's lethargy and recent Dilaudid administration. No drainage or purulence noted. SKIN:  No rashes. NEUROLOGIC:  Unable to assess. PSYCHIATRIC:  Unable to assess. ASSESSMENT AND PLAN:  1. Right elbow cellulitis complicated by group B streptococcal bacteremia. 2.  Orthopedic surgery is following to rule out septic right elbow joint. Antibiotics will be narrowed to ceftriaxone. She will need further workup to rule out a septic joint. I am concerned about the patient's continued level of pain requiring high doses of Dilaudid despite several days of antibiotic therapy. The patient may need an MRI done under sedation. 3.  Coagulase negative Staphylococcus isolated from a single blood culture. This represents a contaminant. 4.  Acute kidney injury. This is improving. Vancomycin will be stopped. 5.  Encephalopathy. This is thought to be both due to infection and narcotics. Due to the patient's ongoing confusion, we will increase ceftriaxone dose to cover for possible meningitis, although the suspicion for this is low at this time. We will continue to monitor and consider lumbar puncture over the next several days depending on patient's course. 6.  Diabetes mellitus. Management per primary team.  7.  Reported rash prior to admission. This has resolved.     Thank you for allowing me to participate in the care of this patient.       DO MAG Imtiaz Law  D: 12/03/2018 12:11     T: 12/03/2018 12:29  JOB #: [id number]

## 2018-12-03 NOTE — PROGRESS NOTES
Occupational Therapy Note: 
Chart reviewed, and spoke with RN. Patient continues to have increased lethargy, not appropriate for OT at this time. We will continue to follow and re-attempt when medical stable. Thank you.  
Avila Muniz, OTR/L

## 2018-12-03 NOTE — PROGRESS NOTES
Bedside and Verbal shift change report given to Courtney Turner RN (oncoming nurse) by Kathy Cartwright RN (offgoing nurse). Report included the following information SBAR, Kardex, ED Summary, Intake/Output, Recent Results, Med Rec Status and Cardiac Rhythm NSR. 
 
2130: Pt alert, c/o severe back pain 10/10; pt crying out, wailing. Administered dilaudid 2 mg IV. IV infiltrated; noted left hand and forearm up to A/C swollen, pale, skin soft and warm to touch. Stopped IV fluids until new IV access can be established. 2215: Notified Dr. Rebeca Thompson of pt status; Dr. Lynch Area midline for tomorrow; ordered new IV placement in Left arm above wrist. Notified ED of need for IV; IV tech will come when she is able. 0130: ED nurse tech Inserted 22 G in lower anterior forearm; Morning scheduled labs drawn and sent. 0149: Pt c/o pain in back 10/10, administered 2 mg Dilaudid IV. 
 
0152:Scheduled IV fluids restarted. 9619: Transferred Pt to specialty bed; repositioned and turned to rt side. Bedside and Verbal shift change report given to, Kathy Cartwright, JOHNATHAN (oncoming nurse) by Courtney Turner RN (offgoing nurse). Report included the following information SBAR, Kardex, ED Summary, Intake/Output, Recent Results, Med Rec Status and Cardiac Rhythm NSR.

## 2018-12-04 ENCOUNTER — APPOINTMENT (OUTPATIENT)
Dept: GENERAL RADIOLOGY | Age: 63
DRG: 871 | End: 2018-12-04
Attending: NURSE PRACTITIONER
Payer: COMMERCIAL

## 2018-12-04 ENCOUNTER — APPOINTMENT (OUTPATIENT)
Dept: CT IMAGING | Age: 63
DRG: 871 | End: 2018-12-04
Attending: INTERNAL MEDICINE
Payer: COMMERCIAL

## 2018-12-04 LAB
ALBUMIN SERPL-MCNC: 1 G/DL (ref 3.5–5)
ALBUMIN/GLOB SERPL: 0.2 {RATIO} (ref 1.1–2.2)
ALP SERPL-CCNC: 88 U/L (ref 45–117)
ALT SERPL-CCNC: 24 U/L (ref 12–78)
ANION GAP SERPL CALC-SCNC: 10 MMOL/L (ref 5–15)
APPEARANCE CSF: ABNORMAL
APPEARANCE CSF: ABNORMAL
APPEARANCE CSF: COLORLESS
AST SERPL-CCNC: 32 U/L (ref 15–37)
BILIRUB SERPL-MCNC: 0.2 MG/DL (ref 0.2–1)
BUN SERPL-MCNC: 54 MG/DL (ref 6–20)
BUN/CREAT SERPL: 32 (ref 12–20)
CALCIUM SERPL-MCNC: 9.1 MG/DL (ref 8.5–10.1)
CHLORIDE SERPL-SCNC: 110 MMOL/L (ref 97–108)
CO2 SERPL-SCNC: 30 MMOL/L (ref 21–32)
COLOR CSF: ABNORMAL
COLOR SPUN CSF: ABNORMAL
COLOR SPUN CSF: COLORLESS
COLOR SPUN CSF: COLORLESS
CREAT SERPL-MCNC: 1.7 MG/DL (ref 0.55–1.02)
CRYPTOCOCCUS NEOFORMANS/GATTII, CRNEOG: NOT DETECTED
CYTOMEGALOVIRUS, CYMEG: NOT DETECTED
ENTEROVIRUS, ENTVIR: NOT DETECTED
EOSINOPHIL NFR CSF MANUAL: 1 %
EOSINOPHIL NFR CSF MANUAL: 3 %
EOSINOPHIL NFR CSF MANUAL: 3 %
ERYTHROCYTE [DISTWIDTH] IN BLOOD BY AUTOMATED COUNT: 14.8 % (ref 11.5–14.5)
ESCHERICHIA COLI K1, ECK1: NOT DETECTED
GLOBULIN SER CALC-MCNC: 5.1 G/DL (ref 2–4)
GLUCOSE BLD STRIP.AUTO-MCNC: 191 MG/DL (ref 65–100)
GLUCOSE BLD STRIP.AUTO-MCNC: 193 MG/DL (ref 65–100)
GLUCOSE BLD STRIP.AUTO-MCNC: 201 MG/DL (ref 65–100)
GLUCOSE BLD STRIP.AUTO-MCNC: 203 MG/DL (ref 65–100)
GLUCOSE BLD STRIP.AUTO-MCNC: 207 MG/DL (ref 65–100)
GLUCOSE CSF-MCNC: 122 MG/DL (ref 40–70)
GLUCOSE CSF-MCNC: 122 MG/DL (ref 40–70)
GLUCOSE SERPL-MCNC: 195 MG/DL (ref 65–100)
HAEMOPHILUS INFLUENZAE, HAEFLU: NOT DETECTED
HCT VFR BLD AUTO: 29.8 % (ref 35–47)
HERPES SIMPLEX VIRUS 2, HSIMV2: NOT DETECTED
HGB BLD-MCNC: 9.3 G/DL (ref 11.5–16)
HSV1 DNA CSF QL NAA+PROBE: NOT DETECTED
HUMAN HERPESVIRUS 6, HUHV6: NOT DETECTED
HUMAN PARECHOVIRUS, HUPARV: NOT DETECTED
LISTERIA MONOCYTOGENES, LISMON: NOT DETECTED
LYMPHOCYTES NFR CSF MANUAL: 12 % (ref 28–96)
LYMPHOCYTES NFR CSF MANUAL: 3 % (ref 28–96)
LYMPHOCYTES NFR CSF MANUAL: 9 % (ref 28–96)
MAGNESIUM SERPL-MCNC: 2.2 MG/DL (ref 1.6–2.4)
MCH RBC QN AUTO: 28.6 PG (ref 26–34)
MCHC RBC AUTO-ENTMCNC: 31.2 G/DL (ref 30–36.5)
MCV RBC AUTO: 91.7 FL (ref 80–99)
MONOCYTES NFR CSF MANUAL: 2 % (ref 16–56)
MONOCYTES NFR CSF MANUAL: 3 % (ref 16–56)
NEISSERIA MENINGITIDIS, NEIMEN: NOT DETECTED
NEUTROPHILS NFR CSF MANUAL: 85 % (ref 0–7)
NEUTROPHILS NFR CSF MANUAL: 87 % (ref 0–7)
NEUTROPHILS NFR CSF MANUAL: 92 % (ref 0–7)
NRBC # BLD: 0 K/UL (ref 0–0.01)
NRBC BLD-RTO: 0 PER 100 WBC
PHOSPHATE SERPL-MCNC: 4.2 MG/DL (ref 2.6–4.7)
PLATELET # BLD AUTO: 452 K/UL (ref 150–400)
PMV BLD AUTO: 9.1 FL (ref 8.9–12.9)
POTASSIUM SERPL-SCNC: 3.1 MMOL/L (ref 3.5–5.1)
PROT CSF-MCNC: 112 MG/DL (ref 15–45)
PROT SERPL-MCNC: 6.1 G/DL (ref 6.4–8.2)
RBC # BLD AUTO: 3.25 M/UL (ref 3.8–5.2)
RBC # CSF: ABNORMAL /CU MM
SERVICE CMNT-IMP: ABNORMAL
SODIUM SERPL-SCNC: 150 MMOL/L (ref 136–145)
STREPTOCOCCUS AGALACTIAE, SAGA: NOT DETECTED
STREPTOCOCCUS PNEUMONIAE, STRPNE: NOT DETECTED
TUBE # CSF: 1
TUBE # CSF: 1
TUBE # CSF: 2
TUBE # CSF: 3
TUBE # CSF: 3
TUBE # CSF: 4
VARICELLA ZOSTER VIRUS, VAZOVI: NOT DETECTED
WBC # BLD AUTO: 12.8 K/UL (ref 3.6–11)
WBC # CSF: 110 /CU MM (ref 0–5)
WBC # CSF: 43 /CU MM (ref 0–5)
WBC # CSF: 65 /CU MM (ref 0–5)

## 2018-12-04 PROCEDURE — 84100 ASSAY OF PHOSPHORUS: CPT

## 2018-12-04 PROCEDURE — 82962 GLUCOSE BLOOD TEST: CPT

## 2018-12-04 PROCEDURE — 74011000258 HC RX REV CODE- 258: Performed by: INTERNAL MEDICINE

## 2018-12-04 PROCEDURE — 73060999999 HC MISC LAB CHARGE

## 2018-12-04 PROCEDURE — 77003 FLUOROGUIDE FOR SPINE INJECT: CPT

## 2018-12-04 PROCEDURE — 36415 COLL VENOUS BLD VENIPUNCTURE: CPT

## 2018-12-04 PROCEDURE — 009U3ZX DRAINAGE OF SPINAL CANAL, PERCUTANEOUS APPROACH, DIAGNOSTIC: ICD-10-PCS | Performed by: PSYCHIATRY & NEUROLOGY

## 2018-12-04 PROCEDURE — 74011000250 HC RX REV CODE- 250: Performed by: INTERNAL MEDICINE

## 2018-12-04 PROCEDURE — 74011636637 HC RX REV CODE- 636/637: Performed by: INTERNAL MEDICINE

## 2018-12-04 PROCEDURE — 87483 CNS DNA AMP PROBE TYPE 12-25: CPT

## 2018-12-04 PROCEDURE — 70450 CT HEAD/BRAIN W/O DYE: CPT

## 2018-12-04 PROCEDURE — 86255 FLUORESCENT ANTIBODY SCREEN: CPT

## 2018-12-04 PROCEDURE — 80053 COMPREHEN METABOLIC PANEL: CPT

## 2018-12-04 PROCEDURE — 86695 HERPES SIMPLEX TYPE 1 TEST: CPT

## 2018-12-04 PROCEDURE — 87205 SMEAR GRAM STAIN: CPT

## 2018-12-04 PROCEDURE — 84157 ASSAY OF PROTEIN OTHER: CPT

## 2018-12-04 PROCEDURE — 74011250636 HC RX REV CODE- 250/636: Performed by: INTERNAL MEDICINE

## 2018-12-04 PROCEDURE — 77010033678 HC OXYGEN DAILY

## 2018-12-04 PROCEDURE — 86617 LYME DISEASE ANTIBODY: CPT

## 2018-12-04 PROCEDURE — 85027 COMPLETE CBC AUTOMATED: CPT

## 2018-12-04 PROCEDURE — 89050 BODY FLUID CELL COUNT: CPT

## 2018-12-04 PROCEDURE — 74011250637 HC RX REV CODE- 250/637: Performed by: INTERNAL MEDICINE

## 2018-12-04 PROCEDURE — 82945 GLUCOSE OTHER FLUID: CPT

## 2018-12-04 PROCEDURE — 02HV33Z INSERTION OF INFUSION DEVICE INTO SUPERIOR VENA CAVA, PERCUTANEOUS APPROACH: ICD-10-PCS | Performed by: INTERNAL MEDICINE

## 2018-12-04 PROCEDURE — 74011000258 HC RX REV CODE- 258: Performed by: NURSE PRACTITIONER

## 2018-12-04 PROCEDURE — 83735 ASSAY OF MAGNESIUM: CPT

## 2018-12-04 PROCEDURE — 65660000000 HC RM CCU STEPDOWN

## 2018-12-04 PROCEDURE — 74011250636 HC RX REV CODE- 250/636: Performed by: NURSE PRACTITIONER

## 2018-12-04 PROCEDURE — 77030019701 HC TY LUMBR PUNC SIMS -A

## 2018-12-04 PROCEDURE — 88108 CYTOPATH CONCENTRATE TECH: CPT

## 2018-12-04 RX ORDER — ACETAMINOPHEN 650 MG/1
650 SUPPOSITORY RECTAL
Status: DISCONTINUED | OUTPATIENT
Start: 2018-12-04 | End: 2018-12-12

## 2018-12-04 RX ORDER — LIDOCAINE HYDROCHLORIDE 10 MG/ML
10 INJECTION, SOLUTION EPIDURAL; INFILTRATION; INTRACAUDAL; PERINEURAL
Status: COMPLETED | OUTPATIENT
Start: 2018-12-04 | End: 2018-12-04

## 2018-12-04 RX ORDER — INSULIN GLARGINE 100 [IU]/ML
60 INJECTION, SOLUTION SUBCUTANEOUS DAILY
Status: DISCONTINUED | OUTPATIENT
Start: 2018-12-04 | End: 2018-12-07

## 2018-12-04 RX ORDER — HYDROMORPHONE HYDROCHLORIDE 2 MG/ML
3 INJECTION, SOLUTION INTRAMUSCULAR; INTRAVENOUS; SUBCUTANEOUS ONCE
Status: COMPLETED | OUTPATIENT
Start: 2018-12-04 | End: 2018-12-04

## 2018-12-04 RX ORDER — LIDOCAINE HYDROCHLORIDE 20 MG/ML
INJECTION, SOLUTION EPIDURAL; INFILTRATION; INTRACAUDAL; PERINEURAL
Status: DISPENSED
Start: 2018-12-04 | End: 2018-12-04

## 2018-12-04 RX ORDER — DEXTROSE AND POTASSIUM CHLORIDE 5; .15 G/100ML; G/100ML
SOLUTION INTRAVENOUS CONTINUOUS
Status: DISCONTINUED | OUTPATIENT
Start: 2018-12-04 | End: 2018-12-05

## 2018-12-04 RX ORDER — LABETALOL HYDROCHLORIDE 5 MG/ML
10 INJECTION, SOLUTION INTRAVENOUS ONCE
Status: COMPLETED | OUTPATIENT
Start: 2018-12-04 | End: 2018-12-04

## 2018-12-04 RX ORDER — HYDRALAZINE HYDROCHLORIDE 20 MG/ML
20 INJECTION INTRAMUSCULAR; INTRAVENOUS
Status: DISCONTINUED | OUTPATIENT
Start: 2018-12-04 | End: 2018-12-17 | Stop reason: HOSPADM

## 2018-12-04 RX ORDER — LABETALOL 200 MG/1
200 TABLET, FILM COATED ORAL EVERY 12 HOURS
Status: DISCONTINUED | OUTPATIENT
Start: 2018-12-04 | End: 2018-12-05

## 2018-12-04 RX ADMIN — LABETALOL HCL 200 MG: 200 TABLET, FILM COATED ORAL at 08:36

## 2018-12-04 RX ADMIN — HEPARIN SODIUM 5000 UNITS: 5000 INJECTION INTRAVENOUS; SUBCUTANEOUS at 05:58

## 2018-12-04 RX ADMIN — HYDRALAZINE HYDROCHLORIDE 20 MG: 20 INJECTION INTRAMUSCULAR; INTRAVENOUS at 17:14

## 2018-12-04 RX ADMIN — HYDROMORPHONE HYDROCHLORIDE 2 MG: 2 INJECTION, SOLUTION INTRAMUSCULAR; INTRAVENOUS; SUBCUTANEOUS at 20:13

## 2018-12-04 RX ADMIN — HYDRALAZINE HYDROCHLORIDE 10 MG: 20 INJECTION INTRAMUSCULAR; INTRAVENOUS at 06:02

## 2018-12-04 RX ADMIN — INSULIN LISPRO 3 UNITS: 100 INJECTION, SOLUTION INTRAVENOUS; SUBCUTANEOUS at 13:19

## 2018-12-04 RX ADMIN — ACYCLOVIR SODIUM 320 MG: 50 INJECTION, SOLUTION INTRAVENOUS at 13:17

## 2018-12-04 RX ADMIN — Medication 10 ML: at 15:26

## 2018-12-04 RX ADMIN — LIDOCAINE HYDROCHLORIDE 5 ML: 10 INJECTION, SOLUTION EPIDURAL; INFILTRATION; INTRACAUDAL; PERINEURAL at 14:40

## 2018-12-04 RX ADMIN — Medication 10 ML: at 15:25

## 2018-12-04 RX ADMIN — ASPIRIN 81 MG: 81 TABLET, COATED ORAL at 18:20

## 2018-12-04 RX ADMIN — INSULIN LISPRO 1 UNITS: 100 INJECTION, SOLUTION INTRAVENOUS; SUBCUTANEOUS at 21:46

## 2018-12-04 RX ADMIN — Medication 10 ML: at 21:46

## 2018-12-04 RX ADMIN — DEXTROSE MONOHYDRATE AND POTASSIUM CHLORIDE: 5; .149 INJECTION, SOLUTION INTRAVENOUS at 20:13

## 2018-12-04 RX ADMIN — Medication 10 ML: at 21:47

## 2018-12-04 RX ADMIN — INSULIN GLARGINE 60 UNITS: 100 INJECTION, SOLUTION SUBCUTANEOUS at 08:33

## 2018-12-04 RX ADMIN — CEFTRIAXONE SODIUM 2 G: 2 INJECTION, POWDER, FOR SOLUTION INTRAMUSCULAR; INTRAVENOUS at 09:27

## 2018-12-04 RX ADMIN — HYDROMORPHONE HYDROCHLORIDE 2 MG: 2 INJECTION, SOLUTION INTRAMUSCULAR; INTRAVENOUS; SUBCUTANEOUS at 06:42

## 2018-12-04 RX ADMIN — HYDROMORPHONE HYDROCHLORIDE 2 MG: 2 INJECTION, SOLUTION INTRAMUSCULAR; INTRAVENOUS; SUBCUTANEOUS at 03:13

## 2018-12-04 RX ADMIN — HYDROMORPHONE HYDROCHLORIDE 2 MG: 2 INJECTION, SOLUTION INTRAMUSCULAR; INTRAVENOUS; SUBCUTANEOUS at 15:21

## 2018-12-04 RX ADMIN — HEPARIN SODIUM 5000 UNITS: 5000 INJECTION INTRAVENOUS; SUBCUTANEOUS at 15:23

## 2018-12-04 RX ADMIN — HEPARIN SODIUM 5000 UNITS: 5000 INJECTION INTRAVENOUS; SUBCUTANEOUS at 21:38

## 2018-12-04 RX ADMIN — ASPIRIN 81 MG: 81 TABLET, COATED ORAL at 08:36

## 2018-12-04 RX ADMIN — CEFTRIAXONE SODIUM 2 G: 2 INJECTION, POWDER, FOR SOLUTION INTRAMUSCULAR; INTRAVENOUS at 21:38

## 2018-12-04 RX ADMIN — HYDRALAZINE HYDROCHLORIDE 20 MG: 20 INJECTION INTRAMUSCULAR; INTRAVENOUS at 22:52

## 2018-12-04 RX ADMIN — DEXTROSE MONOHYDRATE AND SODIUM CHLORIDE: 5; .45 INJECTION, SOLUTION INTRAVENOUS at 00:29

## 2018-12-04 RX ADMIN — DEXTROSE MONOHYDRATE AND POTASSIUM CHLORIDE: 5; .149 INJECTION, SOLUTION INTRAVENOUS at 08:33

## 2018-12-04 RX ADMIN — HYDROMORPHONE HYDROCHLORIDE 3 MG: 2 INJECTION, SOLUTION INTRAMUSCULAR; INTRAVENOUS; SUBCUTANEOUS at 11:46

## 2018-12-04 RX ADMIN — LABETALOL HYDROCHLORIDE 10 MG: 5 INJECTION INTRAVENOUS at 22:06

## 2018-12-04 RX ADMIN — LEVOTHYROXINE SODIUM 125 MCG: 25 TABLET ORAL at 08:36

## 2018-12-04 RX ADMIN — HYDROMORPHONE HYDROCHLORIDE 2 MG: 2 INJECTION, SOLUTION INTRAMUSCULAR; INTRAVENOUS; SUBCUTANEOUS at 09:27

## 2018-12-04 RX ADMIN — HYDROMORPHONE HYDROCHLORIDE 2 MG: 2 INJECTION, SOLUTION INTRAMUSCULAR; INTRAVENOUS; SUBCUTANEOUS at 23:16

## 2018-12-04 NOTE — PROGRESS NOTES
Orthopaedic Progress Note Post Op day: * No surgery found * December 4, 2018 12:11 PM  
 
Patient: Adriana Raymundo MRN: [id number]  SSN: [id number] YOB: 1955  Age: 61 y.o. Sex: female Admit date:  11/30/2018 Date of Surgery:  * No surgery found * Procedures:   
Admitting Physician:  Victorina Cordero MD  
Surgeon:  * Surgery not found * Consulting Physician(s): Treatment Team: Attending Provider: Mendez Ambrosio MD; Consulting Provider: Yolande Ontiveros; Consulting Provider: Ry Hess MD; Consulting Provider: Javid Michael DO; Utilization Review: Brigido Sargent; Utilization Review: Stalin Grey RN; Care Manager: Miguel Sifuentes; Consulting Provider: Crystal Boyd MD 
 
SUBJECTIVE: 
  
Adriana Raymundo is a 61 y.o. female resting in bed, moaning out in pain while lumbar puncture attempt In progress. Pt unable to communicate if elbow if better, worse or the same. OBJECTIVE: 
 
  
Physical Exam: 
General: Moaning out in discomfort while in side lying position. Respiratory: Respirations unlabored Neurological:  Neurovascular exam within normal limits. Motor: + DF/PF. Musculoskeletal: Right elbow with mild erythema. Still area of swelling/ no fluctuance. Some crepitus noted throughout skin. Able to passive extend elbow to zero, flex to 100. Dressing/Wound:  Clean, dry and intact. No significant erythema or swelling. Vital Signs:  
  
 
Patient Vitals for the past 8 hrs: 
 BP Temp Pulse Resp SpO2 Weight 12/04/18 1048     94 %   
12/04/18 1011  98.1 °F (36.7 °C)      
12/04/18 0834 181/81       
12/04/18 0803 185/84 (!) 100.7 °F (38.2 °C) 94 19 94 %   
12/04/18 0700   87     
12/04/18 0628 (!) 185/103  91 19 96 %   
12/04/18 0602 (!) 194/98  86     
12/04/18 0600 (!) 194/98  94 15 95 %   
12/04/18 0514      88.5 kg (195 lb 1.7 oz) Temp (24hrs), Av.6 °F (37.6 °C), Min:98.1 °F (36.7 °C), Max:100.7 °F (38.2 °C) Labs:  
  
 
Recent Labs 18 
0104 HCT 29.8* HGB 9.3* Lab Results Component Value Date/Time Sodium 150 (H) 2018 01:04 AM  
 Potassium 3.1 (L) 2018 01:04 AM  
 Chloride 110 (H) 2018 01:04 AM  
 CO2 30 2018 01:04 AM  
 Glucose 195 (H) 2018 01:04 AM  
 BUN 54 (H) 2018 01:04 AM  
 Creatinine 1.70 (H) 2018 01:04 AM  
 Calcium 9.1 2018 01:04 AM  
 
 
PT/OT:  
 
  
  
  
 
Patient mobility ASSESSMENT / PLAN:  
Principal Problem: 
  BAYLEE (acute kidney injury) ([facility] (2018) Active Problems: Metabolic acidosis () Chronic back pain () Diabetes mellitus, type 2 ([facility] () Leukocytosis (2018) Right elbow pain (2018) Hypotension (2018) Right forearm cellulitis: clinically improving with IV abx. WBC trending down. Pt still febrile last night. Attempt lumbar puncture at bedside, plan for IR to preform lumbar puncture. Continue to follow along. Signed By: 
Mayela Wu NP Orthopedic Trauma Nurse Practitioner [facility]

## 2018-12-04 NOTE — PROGRESS NOTES
Occupational Therapy Note: 
Chart reviewed and spoke with nursing. Patient is currently transporting off the floor testing (CT). Will continue to follow. Thank you.  
Sari Taylor OTR/L

## 2018-12-04 NOTE — PROGRESS NOTES
Problem: Falls - Risk of 
Goal: *Absence of Falls Document [facility] Fall Risk and appropriate interventions in the flowsheet. Outcome: Progressing Towards Goal 
Fall Risk Interventions: 
  
 
Mentation Interventions: Adequate sleep, hydration, pain control, Door open when patient unattended, Evaluate medications/consider consulting pharmacy, More frequent rounding Medication Interventions: Evaluate medications/consider consulting pharmacy, Bed/chair exit alarm Elimination Interventions: Bed/chair exit alarm, Call light in reach, Patient to call for help with toileting needs Problem: Pressure Injury - Risk of 
Goal: *Prevention of pressure injury Document Nikolay Scale and appropriate interventions in the flowsheet. Outcome: Progressing Towards Goal 
Pressure Injury Interventions: 
Sensory Interventions: Assess changes in LOC, Check visual cues for pain, Keep linens dry and wrinkle-free, Minimize linen layers Moisture Interventions: Absorbent underpads, Check for incontinence Q2 hours and as needed, Internal/External urinary devices Activity Interventions: Pressure redistribution bed/mattress(bed type) Mobility Interventions: Float heels, HOB 30 degrees or less, Pressure redistribution bed/mattress (bed type), Turn and reposition approx. every two hours(pillow and wedges) Nutrition Interventions: Document food/fluid/supplement intake, Offer support with meals,snacks and hydration Friction and Shear Interventions: HOB 30 degrees or less, Lift sheet, Lift team/patient mobility team

## 2018-12-04 NOTE — PROGRESS NOTES
[address] Dosing Services: Antimicrobial Stewardship Progress Note Acyclovir - automatic dose adjustment per P&T protocol Pharmacist reviewed appropriateness for 61year old , female  for indication of central nervous system infection. Day of Therapy: 1 Non-Kinetic Antimicrobial Dosing: 
Initial order by prescriber: Acyclovir 239 mg IV (5 mg/kg ideal body weight) every 8 hours Assessment/Plan: CrCl 34 mL/min. BMI 36.87; Adjusted body weight = 64.1 kg. Per protocol, adjusted body weight used for dosing in patient with BMI greater than 30. Frequency changed to every 12 hours per P&T protocol for patient with CrCl 25-50 mL/min. Begin Acyclovir 320 mg IV (5 mg/kg adjusted body weight) every 12 hours. Serum Creatinine Lab Results Component Value Date/Time Creatinine 1.70 (H) 12/04/2018 01:04 AM  
   
Creatinine Clearance Estimated Creatinine Clearance: 34.3 mL/min (A) (based on SCr of 1.7 mg/dL (H)). Temp 98.1 °F (36.7 °C) WBC Lab Results Component Value Date/Time WBC 12.8 (H) 12/04/2018 01:04 AM  
   
H/H Lab Results Component Value Date/Time HGB 9.3 (L) 12/04/2018 01:04 AM  
  
 
Platelets Lab Results Component Value Date/Time PLATELET 825 (H) 88/20/1681 01:04 AM  
  
 
Pharmacist: Adán Fox

## 2018-12-04 NOTE — PROGRESS NOTES
12/4/20184:19 PM 
PT unable to assess today due to pain and unable to perform commands. CM will continue to follow. Mark Vallejo  11:06 AM 
EMR reviewed, pt is continuing to require medical management for Rt elbow cellulitis w/ metabolic encephalopathy. Scheduled for PT/OT today CM will continue to follow for d/c needs. Mark Vallejo

## 2018-12-04 NOTE — PROGRESS NOTES
Blanco Mcpherson queenie [address] YOB: 1955 Assessment & Plan:  
ARF due to IVVD/sepsis · Creat improving · No need for rrt · Continue supportive care Hypernatremia · On D5W Hypokalemia · Replete Acidosis · Improved Sepsis/hypotension · On abx · Bacteremia likely from R arm source HTN 
· On labetalol Urinary retention · S/p joyce Subjective:  
CC: f/u ARF 
HPI: Renal function improving. ROS: Unable to obtain due to AMS Current Facility-Administered Medications Medication Dose Route Frequency  hydrALAZINE (APRESOLINE) 20 mg/mL injection 20 mg  20 mg IntraVENous Q6H PRN  
 labetalol (NORMODYNE) tablet 200 mg  200 mg Oral Q12H  
 dextrose 5% with KCl 20 mEq/L infusion   IntraVENous CONTINUOUS  
 insulin glargine (LANTUS) injection 60 Units  60 Units SubCUTAneous DAILY  alteplase (CATHFLO) 1 mg in sterile water (preservative free) 1 mL injection  1 mg InterCATHeter PRN  
 sodium chloride (NS) flush 10-30 mL  10-30 mL InterCATHeter PRN  
 sodium chloride (NS) flush 10 mL  10 mL InterCATHeter Q24H  
 sodium chloride (NS) flush 10 mL  10 mL InterCATHeter PRN  
 sodium chloride (NS) flush 10-40 mL  10-40 mL InterCATHeter Q8H  
 sodium chloride (NS) flush 20 mL  20 mL InterCATHeter Q24H  cefTRIAXone (ROCEPHIN) 2 g in 0.9% sodium chloride (MBP/ADV) 50 mL  2 g IntraVENous Q12H  
 HYDROmorphone (PF) (DILAUDID) injection 2 mg  2 mg IntraVENous Q3H PRN  
 sodium chloride (NS) flush 5-10 mL  5-10 mL IntraVENous PRN  
 sodium chloride (NS) flush 5-10 mL  5-10 mL IntraVENous Q8H  
 sodium chloride (NS) flush 5-10 mL  5-10 mL IntraVENous PRN  
 acetaminophen (TYLENOL) tablet 650 mg  650 mg Oral Q4H PRN  
 heparin (porcine) injection 5,000 Units  5,000 Units SubCUTAneous Q8H  
 ondansetron (ZOFRAN) injection 4 mg  4 mg IntraVENous Q4H PRN  
 insulin lispro (HUMALOG) injection   SubCUTAneous AC&HS  
  glucose chewable tablet 16 g  4 Tab Oral PRN  
 dextrose (D50W) injection syrg 12.5-25 g  25-50 mL IntraVENous PRN  
 glucagon (GLUCAGEN) injection 1 mg  1 mg IntraMUSCular PRN  
 aspirin delayed-release tablet 81 mg  81 mg Oral BID  levothyroxine (SYNTHROID) tablet 125 mcg  125 mcg Oral ACB  sertraline (ZOLOFT) tablet 150 mg  150 mg Oral QHS Objective:  
 
Vitals: 
Blood pressure 181/81, pulse 94, temperature 98.1 °F (36.7 °C), resp. rate 19, height 5' 1\" (1.549 m), weight 88.5 kg (195 lb 1.7 oz), SpO2 94 %. Temp (24hrs), Av.4 °F (37.4 °C), Min:97.8 °F (36.6 °C), Max:100.7 °F (38.2 °C) Intake and Output: 
No intake/output data recorded.  1901 -  0700 In: 2915 [P.O.:525; I.V.:1680] Out: 2750 [Urine:2750] Physical Exam:              
GENERAL ASSESSMENT: NAD 
CHEST: CTA HEART: S1S2 ABDOMEN: Soft,NT 
: +joyce +urine EXTREMITY: no EDEMA 
 
 
   
ECG/rhythm: 
 
Data Review No results for input(s): TNIPOC in the last 72 hours. No lab exists for component: ITNL No results for input(s): CPK, CKMB, TROIQ in the last 72 hours. Recent Labs 18 
0104 18 
0100 18 
0019 * 146* 142  
K 3.1* 3.8 3.4*  
* 108 106 CO2 30 26 21 BUN 54* 65* 69* CREA 1.70* 2.06* 2.71* * 227* 278* PHOS 4.2 5.6* 6.3*  
MG 2.2 2.4 2.2 CA 9.1 9.0 8.0* ALB 1.0* 1.0* 1.0* WBC 12.8* 15.6* 17.9* HGB 9.3* 9.4* 9.0*  
HCT 29.8* 31.2* 29.1*  
* 481* 399 No results for input(s): INR, PTP, APTT in the last 72 hours. No lab exists for component: INREXT, INREXT Needs: urine analysis, urine sodium, protein and creatinine Lab Results Component Value Date/Time Sodium,urine random 29 2018 06:10 PM  
 Creatinine, urine 187.11 2018 06:10 PM  
 
 
 
 
: Osiris Kohler MD 
2018 [facility]: 
[email or web address] Johnny Anosusy office: [address] [facility] [address] [place], [address] [facility] Phone: [phone] Fax :     [phone] [place] office: 
[address] [facility], [address] Phone - [phone] Fax - [phone]

## 2018-12-04 NOTE — PROGRESS NOTES
[address], [address] [place] [address] 
[phone] Medical Progress Note NAME: Afua Pearson :  1955 MRM:  [id number] Date/Time: 2018 Subjective: Chief Complaint:  Patient was seen and examined by me. Chart reviewed. Mentation seems worse this AM.  Still c/o pain in arm Objective:  
 
 
Vitals:  
 
 
Last 24hrs VS reviewed since prior progress note. Most recent are: 
 
Visit Vitals /81 Pulse 94 Temp (!) 100.7 °F (38.2 °C) Resp 19 Ht 5' 1\" (1.549 m) Wt 88.5 kg (195 lb 1.7 oz) SpO2 94% BMI 36.87 kg/m² SpO2 Readings from Last 6 Encounters:  
18 94% O2 Flow Rate (L/min): 3 l/min Intake/Output Summary (Last 24 hours) at 2018 1009 Last data filed at 2018 1928 Gross per 24 hour Intake 893.34 ml Output 1950 ml Net -1056.66 ml Exam:  
 
Physical Exam: 
 
Gen:  Elderly, disheveled, ill-appearing, morbidly obese, mild distress HEENT:  Pink conjunctivae, PERRL, hearing intact to voice, moist mucous membranes Neck:  Supple, without masses, thyroid non-tender Resp:  No accessory muscle use, poor BS at bases Card:  No murmurs, normal S1, S2 without thrills, bruits or peripheral edema Abd:  Soft, non-tender, non-distended, normoactive bowel sounds are present Musc:  No cyanosis or clubbing Skin:  Right elbow swelling, erythema w/ slight improvement Neuro: Moves all ext, did not follow commands Psych:  no insight, oriented to person Medications Reviewed: (see below) Lab Data Reviewed: (see below) 
 
______________________________________________________________________ Medications:  
 
Current Facility-Administered Medications Medication Dose Route Frequency  hydrALAZINE (APRESOLINE) 20 mg/mL injection 20 mg  20 mg IntraVENous Q6H PRN  
 labetalol (NORMODYNE) tablet 200 mg  200 mg Oral Q12H  dextrose 5% with KCl 20 mEq/L infusion   IntraVENous CONTINUOUS  
 insulin glargine (LANTUS) injection 60 Units  60 Units SubCUTAneous DAILY  alteplase (CATHFLO) 1 mg in sterile water (preservative free) 1 mL injection  1 mg InterCATHeter PRN  
 sodium chloride (NS) flush 10-30 mL  10-30 mL InterCATHeter PRN  
 sodium chloride (NS) flush 10 mL  10 mL InterCATHeter Q24H  
 sodium chloride (NS) flush 10 mL  10 mL InterCATHeter PRN  
 sodium chloride (NS) flush 10-40 mL  10-40 mL InterCATHeter Q8H  
 sodium chloride (NS) flush 20 mL  20 mL InterCATHeter Q24H  cefTRIAXone (ROCEPHIN) 2 g in 0.9% sodium chloride (MBP/ADV) 50 mL  2 g IntraVENous Q12H  
 HYDROmorphone (PF) (DILAUDID) injection 2 mg  2 mg IntraVENous Q3H PRN  
 sodium chloride (NS) flush 5-10 mL  5-10 mL IntraVENous PRN  
 sodium chloride (NS) flush 5-10 mL  5-10 mL IntraVENous Q8H  
 sodium chloride (NS) flush 5-10 mL  5-10 mL IntraVENous PRN  
 acetaminophen (TYLENOL) tablet 650 mg  650 mg Oral Q4H PRN  
 heparin (porcine) injection 5,000 Units  5,000 Units SubCUTAneous Q8H  
 ondansetron (ZOFRAN) injection 4 mg  4 mg IntraVENous Q4H PRN  
 insulin lispro (HUMALOG) injection   SubCUTAneous AC&HS  
 glucose chewable tablet 16 g  4 Tab Oral PRN  
 dextrose (D50W) injection syrg 12.5-25 g  25-50 mL IntraVENous PRN  
 glucagon (GLUCAGEN) injection 1 mg  1 mg IntraMUSCular PRN  
 aspirin delayed-release tablet 81 mg  81 mg Oral BID  levothyroxine (SYNTHROID) tablet 125 mcg  125 mcg Oral ACB  sertraline (ZOLOFT) tablet 150 mg  150 mg Oral QHS Lab Review:  
 
Recent Labs 12/04/18 
0104 12/03/18 
0100 12/02/18 
0019 WBC 12.8* 15.6* 17.9* HGB 9.3* 9.4* 9.0*  
HCT 29.8* 31.2* 29.1*  
* 481* 399 Recent Labs 12/04/18 
0104 12/03/18 
0100 12/02/18 
0019 * 146* 142  
K 3.1* 3.8 3.4*  
* 108 106 CO2 30 26 21 * 227* 278* BUN 54* 65* 69* CREA 1.70* 2.06* 2.71* CA 9.1 9.0 8.0*  
 MG 2.2 2.4 2.2 PHOS 4.2 5.6* 6.3* ALB 1.0* 1.0* 1.0*  
TBILI 0.2 0.4 0.3 SGOT 32 59* 70* ALT 24 29 34 Lab Results Component Value Date/Time Glucose (POC) 193 (H) 12/04/2018 08:18 AM  
 Glucose (POC) 207 (H) 12/04/2018 07:49 AM  
 Glucose (POC) 172 (H) 12/03/2018 08:40 PM  
 Glucose (POC) 172 (H) 12/03/2018 04:08 PM  
 Glucose (POC) 186 (H) 12/03/2018 11:52 AM  
 
 
  
Assessment / Plan:  
 
Principal Problem: 
 
60 yo hx of HTN, DM, chronic pain, presented w/ AMS, severe sepsis, bacteremia, ARF, AG acidosis, hypoglycemia 1) Severe sepsis/bacteremia: Likely from R arm/elbow cellulitis. Blood Cx + 4/4 bottles with group B strep on admission, repeat blood Cx neg. Echo pending. Cont IV CTX. ID and ortho following 2) R elbow cellulitis/bursitis/acute pain: likely source of bacteremia. Ortho is following. Defer imaging to Ortho and ID. Cont IVF abx 3) Acute metabolic encephalopathy: seems worse. Likely from sepsis. Given no improvement, will obtain head CT today. Consult Neurology. Might need lumbar puncture 4) ARF: slowly improving. Likely from ATN/sepsis, hypovolemia. Hold NSAIDS, ACEi. Cont IVF, monitor BMP. Renal following 5) AG metabolic acidosis: Improving. Due to sepsis. Lactate normal.  D/c bicarb 6) Hypernatremia: will change IVF to D5W, monitor BMP 7) DM type 2 w/ hypoglycemia: A1C 7.2%. Hypoglycemia on admission from poor PO intake and sepsis but has resolved. Hold metformin. Cont dextrose gtt. Cont Lantus, cont SSI 8) HTN: change atenolol to labetalol. Start IV hydralazine prn 
 
9) Anemia: Fe studies consistent with chronic dz Total time spent with patient: 45 Minutes Care Plan discussed with: Patient, nursing Discussed:  Care Plan Prophylaxis:  Hep SQ Disposition:  SNF/LTC 
        
___________________________________________________ Attending Physician: Katy Forrest MD

## 2018-12-04 NOTE — PROGRESS NOTES
[facility] Progress Note Phillip Varela DO 
  [phone] Office [phone]  Fax 
 
2018 Assessment & Plan: 1. Right elbow cellulitis complicated by GBS bacteremia. Repeat BC's NGSF. Orthopedic surgery is following to rule out septic right elbow joint. Continue ceftriaxone. The patient may need an MRI done under sedation. 2. CoNS isolated from a single blood culture. This represents a contaminant. 3. Acute kidney injury. This is improving. Vancomycin will be stopped. 4. Encephalopathy. This is thought to be both due to infection and narcotics. LP done today. Results pending. Ceftriaxone dose increased to cover for possible meningitis, although the suspicion for this is low at this time. 5. Diabetes mellitus. Management per primary team. 
6. Rash prior to admission. Resolved. Pictures reviewed with patients . Possible cellulitis? Rash not c/w HSV/VZV. Subjective:  
 
Lethargic Received sedation(?) for LP Objective:  
 
Vitals:  
Visit Vitals /88 Pulse (!) 101 Temp 100.1 °F (37.8 °C) Resp 15 Ht 5' 1\" (1.549 m) Wt 88.5 kg (195 lb 1.7 oz) SpO2 92% BMI 36.87 kg/m² Tmax:  Temp (24hrs), Av.5 °F (37.5 °C), Min:98.1 °F (36.7 °C), Max:100.7 °F (38.2 °C) Exam:  
Patient is intubated:  no 
 
Physical Examination:  
General:  Lethargic Head:  Normocephalic, atraumatic. Eyes:  Conjunctivae clear Neck: Supple Lungs:   No distress. Clear to auscultation bilaterally. Chest wall:    
Heart:  Regular rate and rhythm Abdomen:   Soft, non-tender, non-distended Extremities:   
Skin:  No rash Neurologic: Unable to assess Labs:     
 
No lab exists for component: ITNL No results for input(s): CPK, CKMB, TROIQ in the last 72 hours. Recent Labs 18 
0104 18 
0100 18 
0019 * 146* 142  
K 3.1* 3.8 3.4*  
* 108 106 CO2 30 26 21 BUN 54* 65* 69*  
 CREA 1.70* 2.06* 2.71* * 227* 278* PHOS 4.2 5.6* 6.3*  
MG 2.2 2.4 2.2 ALB 1.0* 1.0* 1.0* WBC 12.8* 15.6* 17.9* HGB 9.3* 9.4* 9.0*  
HCT 29.8* 31.2* 29.1*  
* 481* 399 No results for input(s): INR, PTP, APTT in the last 72 hours. No lab exists for component: INREXT Needs: urine analysis, urine sodium, protein and creatinine Lab Results Component Value Date/Time Sodium,urine random 29 11/30/2018 06:10 PM  
 Creatinine, urine 187.11 11/30/2018 06:10 PM  
 
 
 
Cultures: No results found for: SDES Lab Results Component Value Date/Time Culture result: NO GROWTH 3 DAYS 12/01/2018 12:31 PM  
 Culture result: NO GROWTH 3 DAYS 12/01/2018 12:31 PM  
 Culture result: (A) 11/30/2018 12:30 PM  
  STREPTOCOCCI, BETA HEMOLYTIC GROUP B GROWING IN BOTH BOTTLES DRAWN SITE=LT HAND PLEASE REFER TO [facility] [id number] FOR SENSITIVITIES Culture result: (A) 11/30/2018 12:30 PM  
  PRELIMINARY REPORT OF GRAM POSITIVE COCCI IN PAIRS AND CHAINS GROWING IN BOTH BOTTLES DRAWN CALLED TO AND READ BACK BY DR SUAZO ON 12/1/18 AT 1033. SH/AOW Radiology:  
 
Medications Current Facility-Administered Medications Medication Dose Route Frequency Last Dose  hydrALAZINE (APRESOLINE) 20 mg/mL injection 20 mg  20 mg IntraVENous Q6H PRN    
 labetalol (NORMODYNE) tablet 200 mg  200 mg Oral Q12H 200 mg at 12/04/18 0836  
 dextrose 5% with KCl 20 mEq/L infusion   IntraVENous CONTINUOUS    
 insulin glargine (LANTUS) injection 60 Units  60 Units SubCUTAneous DAILY 60 Units at 12/04/18 5900  lidocaine (PF) (XYLOCAINE) 20 mg/mL (2 %) injection  acyclovir (ZOVIRAX) 320 mg in 0.9% sodium chloride 100 mL IVPB  320 mg IntraVENous Q12H 320 mg at 12/04/18 1317  
 sodium bicarbonate (4%) (NEUT) injection 2 mL  2 mL SubCUTAneous RAD ONCE    
 alteplase (CATHFLO) 1 mg in sterile water (preservative free) 1 mL injection  1 mg InterCATHeter PRN    
  sodium chloride (NS) flush 10-30 mL  10-30 mL InterCATHeter PRN    
 sodium chloride (NS) flush 10 mL  10 mL InterCATHeter Q24H Stopped at 12/03/18 1100  
 sodium chloride (NS) flush 10 mL  10 mL InterCATHeter PRN    
 sodium chloride (NS) flush 10-40 mL  10-40 mL InterCATHeter Q8H 10 mL at 12/04/18 1525  sodium chloride (NS) flush 20 mL  20 mL InterCATHeter Q24H Stopped at 12/03/18 1100  cefTRIAXone (ROCEPHIN) 2 g in 0.9% sodium chloride (MBP/ADV) 50 mL  2 g IntraVENous Q12H 2 g at 12/04/18 0927  
 HYDROmorphone (PF) (DILAUDID) injection 2 mg  2 mg IntraVENous Q3H PRN 2 mg at 12/04/18 1521  
 sodium chloride (NS) flush 5-10 mL  5-10 mL IntraVENous PRN 10 mL at 12/01/18 1909  sodium chloride (NS) flush 5-10 mL  5-10 mL IntraVENous Q8H 10 mL at 12/04/18 1526  sodium chloride (NS) flush 5-10 mL  5-10 mL IntraVENous PRN    
 acetaminophen (TYLENOL) tablet 650 mg  650 mg Oral Q4H  mg at 12/03/18 1815  
 heparin (porcine) injection 5,000 Units  5,000 Units SubCUTAneous Q8H 5,000 Units at 12/04/18 1523  ondansetron (ZOFRAN) injection 4 mg  4 mg IntraVENous Q4H PRN    
 insulin lispro (HUMALOG) injection   SubCUTAneous AC&HS 3 Units at 12/04/18 1319  
 glucose chewable tablet 16 g  4 Tab Oral PRN    
 dextrose (D50W) injection syrg 12.5-25 g  25-50 mL IntraVENous PRN 25 g at 11/30/18 2016  
 glucagon (GLUCAGEN) injection 1 mg  1 mg IntraMUSCular PRN    
 aspirin delayed-release tablet 81 mg  81 mg Oral BID 81 mg at 12/04/18 0836  levothyroxine (SYNTHROID) tablet 125 mcg  125 mcg Oral  mcg at 12/04/18 1392  sertraline (ZOLOFT) tablet 150 mg  150 mg Oral  mg at 12/03/18 2143 Case discussed with: 
 
 
Seng Toscano,

## 2018-12-04 NOTE — PROGRESS NOTES
Physical Therapy - Orders noted and chart reviewed of this 62 yo female patient. Patient adm on 11/30 and our consult deferred 12/2 12/3 and again today due to her severity of her condition. Spoke with JOHNATHAN Manuel who stated patient was independent with ambulation just prior to admission. The comments under consult to mobility team indicating patient has been bed bound for the last year is not accurate information. However per chart patient continues with severe sepsis and medically complex and without improvement in pain or metabolic encephalopathy. RN reports patient in such severe pain throughout today and very confused,unable to follow commands. Patient has been tolerating turn team per JOHNATHAN Gary. Will defer today and follow up on patient's status and readiness to begin PT tomorrow.  
Thank you - José Ambrosio

## 2018-12-04 NOTE — CONSULTS
[facility]: [address] Neurology  [place] [address]  [phone]        Name:   Ivy Rendon   Medical record #: [id number]  Admission Date: 11/30/2018   Who Consulted:  Dr. Jayne Davidson  Reason for Consult:  Encephalopathy    HISTORY OF PRESENT ILLNESS:   This is a 61 y.o. female with  has a past medical history of Chronic back pain, Diabetes mellitus, type 2 ([address] History of vascular access device, and HTN (hypertension). who was admitted for generalized body aches and hypoglycemia pm 11/30/2018. The Neurology Service is asked to evaluate for AMS    Ms. Chuy Calhoun was admitted originally for body aches, back pain and decreased appetite. Upon arrival she was found to be hypoglycemic. Her  reported to the ED physician that \"she likes her pain pills\" and had not been out of bed for over a year. Once she was treated for her hypoglycemia she was oriented x 3 with poor insight. This morning she was found to have poor mentation. Over the last 24 hours she has been febrile to 100.7, and hypertensive to 194/100. Clinical Data:  Imaging review:     Current rhythm:  NSR    Assessment/Plan:   1. Altered Mental Status:    · Neurochecks:  Every 4 hours  · CMP shows NA of 150, up from 146 yesterday, Creatinine is improving from 2.06 to 1.7, her WBC's today are down to 12.8 from 15.6 yesterday  · Folate, B12, TSH all normal  · EEG to rule out subclinical seizures  · CT head in progress  · Consider LP    2. Mobility:   · Has been/not been OOB. · PT/OT to eval for rehab  4. Diet:    · Needs SLP     5. VTE Prophylaxes:   · Lovenox 40 mg, SQ daily     Thank you for allowing the Neurology Service the pleasure of participating in the care of your patient. This patient will be discussed with my collaborating care team physician Dr. Eneida Carter and he may have further recommendations regarding this patient's care.         Attending Attestation: ==============================================================    Attending Addendum    I have reviewed the documentation provided by the nurse practitioner, Marty Blake, discussed her findings, clinical impression, and the proposed management plans with regards to this patient's encounter. I have personally evaluated the patient, verify the history and confirm physical findings. Below are my additional findings:    HPI  This is a 60 y/o female who presented with AMS that has not improved despite antibiotic treatment. Neuro consulted to eval AMS and need for LP. She does have bacteremia but isn't improving as expected. She does have chronic pain and does also require pain medications. Spoke with  Nanci Bond via the phone for history. Patient was unable to provide meaningful history. CLINICAL DATA REVIEW  IMAGING: CT head: neg (I personally reviewed these images in PACS and this is my impression)      PHYSICAL EXAM (additional findings)  Patient Vitals for the past 8 hrs:   Temp Pulse Resp BP SpO2   12/04/18 1048     94 %   12/04/18 1011 98.1 °F (36.7 °C)       12/04/18 0834    181/81    12/04/18 0803 (!) 100.7 °F (38.2 °C) 94 19 185/84 94 %   12/04/18 0700  87      12/04/18 0628  91 19 (!) 185/103 96 %   12/04/18 0602  86  (!) 194/98    12/04/18 0600  94 15 (!) 194/98 95 %       Neurologic:  Gen: Attention alert             Language: moans in pain but otherwise non verbal             Memory: not tested  Cranial Nerves: PERRL, EOMI, face symmetric  Motor: normal bulk and tone, no tremor              Strength: moves all four ext equally  Sensory: withdraws to pain  Coordination/Gait: deferred         ADDITIONAL ASSESSMENT AND RECOMMENDATIONS:  This is a 60 y/o who presented with confusion and is on treatment for bacteremia. She has continued to have confusion. Will check EEG, LP, and do encephalopathy labs. Neuro exam is non focal.    1. CT head neg  2. cEEG ordered  3. Encephalopathy labs  4. LP attempted at bedside. Will send to IR. Studies ordered  5. Cont abx and acyclovir added    Will follow    Bharat Lubin MD  2018       Review of Systems: 10 point ROS was performed. Pertinent positives listed in HPI. Negative ROS is as follows. Pt denies: angina, palpitations, paresthesias, weakness, vision loss, slurred speech, aphasia, confusion, fever, chills, falls, headache, diplopia, back pain, neck pain, prior episodes of vertigo, hallucinations, new medications or dosage changes. PHYSICAL EXAM:     Visit Vitals  /81   Pulse 94   Temp 98.1 °F (36.7 °C)   Resp 19   Ht 5' 1\" (1.549 m)   Wt 88.5 kg (195 lb 1.7 oz)   SpO2 94%   BMI 36.87 kg/m²    O2 Flow Rate (L/min): 3 l/min O2 Device: Nasal cannula    Temp (24hrs), Av.4 °F (37.4 °C), Min:97.8 °F (36.6 °C), Max:100.7 °F (38.2 °C)    No intake/output data recorded.  1901 -  0700  In: 6791 [P.O.:525; I.V.:1680]  Out: 1 [Urine:2750]         Past Medical History:   Diagnosis Date    Chronic back pain     Diabetes mellitus, type 2 ([address]     History of vascular access device 2018    [facility] VAT - 4 FR single, L cephalic, 44 cm for LTA    HTN (hypertension)      History reviewed. No pertinent surgical history. Family History   Problem Relation Age of Onset    Diabetes Father     Heart Disease Father      Social History     Socioeconomic History    Marital status:      Spouse name: Not on file    Number of children: Not on file    Years of education: Not on file    Highest education level: Not on file   Social Needs    Financial resource strain: Not on file    Food insecurity - worry: Not on file    Food insecurity - inability: Not on file   [place] needs - medical: Not on file   [place] needs - non-medical: Not on file   Occupational History    Not on file   Tobacco Use    Smoking status: Never Smoker   Substance and Sexual Activity    Alcohol use:  No Frequency: Never    Drug use: Not on file    Sexual activity: Not on file   Other Topics Concern     Service Not Asked    Blood Transfusions Not Asked    Caffeine Concern Not Asked    Occupational Exposure Not Asked    Hobby Hazards Not Asked    Sleep Concern Not Asked    Stress Concern Not Asked    Weight Concern Not Asked    Special Diet Not Asked    Back Care Not Asked    Exercise Not Asked    Bike Helmet Not Asked   [address] Not Asked    Self-Exams Not Asked   Social History Narrative    Not on file       Allergies:   No Known Allergies    Outpatient Meds  No current facility-administered medications on file prior to encounter. Current Outpatient Medications on File Prior to Encounter   Medication Sig Dispense Refill    atenolol (TENORMIN) 25 mg tablet Take 25 mg by mouth daily.  cyclobenzaprine (FLEXERIL) 10 mg tablet Take 10 mg by mouth daily as needed for Muscle Spasm(s).  fenofibrate (LOFIBRA) 160 mg tablet Take 160 mg by mouth nightly.  gabapentin (NEURONTIN) 300 mg capsule Take 300 mg by mouth three (3) times daily.  insulin glargine (LANTUS,BASAGLAR) 100 unit/mL (3 mL) inpn 75 Units by SubCUTAneous route nightly.  levothyroxine (SYNTHROID) 125 mcg tablet Take 125 mcg by mouth Daily (before breakfast).  lisinopril-hydroCHLOROthiazide (PRINZIDE, ZESTORETIC) 20-12.5 mg per tablet Take 2 Tabs by mouth daily.  metFORMIN (GLUCOPHAGE) 500 mg tablet Take 500 mg by mouth two (2) times daily (with meals).  niacin ER (NIASPAN) 500 mg tablet Take 500 mg by mouth nightly.  omega 3-DHA-EPA-fish oil 1,000 mg (120 mg-180 mg) capsule Take 2 Caps by mouth two (2) times a day.  oxyCODONE IR (ROXICODONE) 5 mg immediate release tablet Take 5 mg by mouth daily as needed (breakthrough pain).  oxyCODONE ER (XTAMPZA ER) 18 mg ER capsule Take 18 mg by mouth every twelve (12) hours.       sertraline (ZOLOFT) 100 mg tablet Take 150 mg by mouth nightly.  simvastatin (ZOCOR) 40 mg tablet Take 40 mg by mouth nightly.  triamcinolone acetonide (KENALOG) 0.1 % topical cream Apply  to affected area two (2) times a day. use thin layer      multivit-min-FA-lycopen-lutein (CENTRUM SILVER) 0.4-300-250 mg-mcg-mcg tab Take 1 Tab by mouth daily.  OTHER,NON-FORMULARY, Take 1 Tab by mouth two (2) times a day. Osteo Biflex      aspirin delayed-release 81 mg tablet Take 81 mg by mouth two (2) times a day.  ibuprofen (MOTRIN) 200 mg tablet Take 400 mg by mouth two (2) times a day.  ubidecarenone (COENZYME Q10, BULK,) Take 1 Tab by mouth daily.          Inpatient Meds    Current Facility-Administered Medications:     hydrALAZINE (APRESOLINE) 20 mg/mL injection 20 mg, 20 mg, IntraVENous, Q6H PRN, Garland Zambrano MD    labetalol (NORMODYNE) tablet 200 mg, 200 mg, Oral, Q12H, Garland Zambrano MD, 200 mg at 12/04/18 0836    dextrose 5% with KCl 20 mEq/L infusion, , IntraVENous, CONTINUOUS, Garland Zambrano MD, Last Rate: 100 mL/hr at 12/04/18 0833    insulin glargine (LANTUS) injection 60 Units, 60 Units, SubCUTAneous, DAILY, Russell Zambrano MD, 60 Units at 12/04/18 0833    alteplase (CATHFLO) 1 mg in sterile water (preservative free) 1 mL injection, 1 mg, InterCATHeter, PRN, Garland Zambrano MD    sodium chloride (NS) flush 10-30 mL, 10-30 mL, InterCATHeter, PRN, Garland Zambrano MD    sodium chloride (NS) flush 10 mL, 10 mL, InterCATHeter, Q24H, Russell Zambrano MD, Stopped at 12/03/18 1100    sodium chloride (NS) flush 10 mL, 10 mL, InterCATHeter, PRN, Garland Zambrano MD    sodium chloride (NS) flush 10-40 mL, 10-40 mL, InterCATHeter, Q8H, Russell Zambrano MD, Stopped at 12/04/18 0600    sodium chloride (NS) flush 20 mL, 20 mL, InterCATHeter, Q24H, , Russell Fox MD, Stopped at 12/03/18 1100    cefTRIAXone (ROCEPHIN) 2 g in 0.9% sodium chloride (MBP/ADV) 50 mL, 2 g, IntraVENous, Q12H, Fernando Burnette DO, Last Rate: 100 mL/hr at 12/04/18 0927, 2 g at 12/04/18 0927    HYDROmorphone (PF) (DILAUDID) injection 2 mg, 2 mg, IntraVENous, Q3H PRN, Susy Argueta MD, 2 mg at 12/04/18 0176    sodium chloride (NS) flush 5-10 mL, 5-10 mL, IntraVENous, PRN, Harika Jon MD, 10 mL at 12/01/18 1909    sodium chloride (NS) flush 5-10 mL, 5-10 mL, IntraVENous, Q8H, Fannie Nava MD, Stopped at 12/04/18 0600    sodium chloride (NS) flush 5-10 mL, 5-10 mL, IntraVENous, PRN, Fannie Nava MD    acetaminophen (TYLENOL) tablet 650 mg, 650 mg, Oral, Q4H PRN, Fannie Nava MD, 650 mg at 12/03/18 1815    heparin (porcine) injection 5,000 Units, 5,000 Units, SubCUTAneous, Q8H, Fannie Nava MD, 5,000 Units at 12/04/18 0558    [facility]ron [facility]) injection 4 mg, 4 mg, IntraVENous, Q4H PRN, Fannie Nava MD    insulin lispro (HUMALOG) injection, , SubCUTAneous, AC&HS, Fannie Nava MD, Stopped at 12/03/18 1130    glucose chewable tablet 16 g, 4 Tab, Oral, PRN, Fannie Nava MD    dextrose (D50W) injection syrg 12.5-25 g, 25-50 mL, IntraVENous, PRN, Fannie Nava MD, 25 g at 11/30/18 2016    glucagon (GLUCAGEN) injection 1 mg, 1 mg, IntraMUSCular, PRN, Fannie Nava MD    aspirin delayed-release tablet 81 mg, 81 mg, Oral, BID, Fannie Nava MD, 81 mg at 12/04/18 1257    levothyroxine (SYNTHROID) tablet 125 mcg, 125 mcg, Oral, ACB, Fannie Nava MD, 125 mcg at 12/04/18 1281    sertraline (ZOLOFT) tablet 150 mg, 150 mg, Oral, QHS, Fannie Nava MD, 150 mg at 12/03/18 2147    Recent Results (from the past 24 hour(s))   GLUCOSE, POC    Collection Time: 12/03/18 11:52 AM   Result Value Ref Range    Glucose (POC) 186 (H) 65 - 100 mg/dL    Performed by Shan Dixon (PCT)    GLUCOSE, POC    Collection Time: 12/03/18  4:08 PM   Result Value Ref Range    Glucose (POC) 172 (H) 65 - 100 mg/dL    Performed by Carlos Manuel (PCT)    GLUCOSE, POC    Collection Time: 12/03/18  8:40 PM   Result Value Ref Range    Glucose (POC) 172 (H) 65 - 100 mg/dL    Performed by Carlos Manuel (PCT)    METABOLIC PANEL, COMPREHENSIVE    Collection Time: 12/04/18  1:04 AM   Result Value Ref Range    Sodium 150 (H) 136 - 145 mmol/L    Potassium 3.1 (L) 3.5 - 5.1 mmol/L    Chloride 110 (H) 97 - 108 mmol/L    CO2 30 21 - 32 mmol/L    Anion gap 10 5 - 15 mmol/L    Glucose 195 (H) 65 - 100 mg/dL    BUN 54 (H) 6 - 20 MG/DL    Creatinine 1.70 (H) 0.55 - 1.02 MG/DL    BUN/Creatinine ratio 32 (H) 12 - 20      GFR est AA 37 (L) >60 ml/min/1.73m2    GFR est non-AA 30 (L) >60 ml/min/1.73m2    Calcium 9.1 8.5 - 10.1 MG/DL    Bilirubin, total 0.2 0.2 - 1.0 MG/DL    ALT (SGPT) 24 12 - 78 U/L    AST (SGOT) 32 15 - 37 U/L    Alk. phosphatase 88 45 - 117 U/L    Protein, total 6.1 (L) 6.4 - 8.2 g/dL    Albumin 1.0 (L) 3.5 - 5.0 g/dL    Globulin 5.1 (H) 2.0 - 4.0 g/dL    A-G Ratio 0.2 (L) 1.1 - 2.2     PHOSPHORUS    Collection Time: 12/04/18  1:04 AM   Result Value Ref Range    Phosphorus 4.2 2.6 - 4.7 MG/DL   MAGNESIUM    Collection Time: 12/04/18  1:04 AM   Result Value Ref Range    Magnesium 2.2 1.6 - 2.4 mg/dL   CBC W/O DIFF    Collection Time: 12/04/18  1:04 AM   Result Value Ref Range    WBC 12.8 (H) 3.6 - 11.0 K/uL    RBC 3.25 (L) 3.80 - 5.20 M/uL    HGB 9.3 (L) 11.5 - 16.0 g/dL    HCT 29.8 (L) 35.0 - 47.0 %    MCV 91.7 80.0 - 99.0 FL    MCH 28.6 26.0 - 34.0 PG    MCHC 31.2 30.0 - 36.5 g/dL    RDW 14.8 (H) 11.5 - 14.5 %    PLATELET 304 (H) 582 - 400 K/uL    MPV 9.1 8.9 - 12.9 FL    NRBC 0.0 0  WBC    ABSOLUTE NRBC 0.00 0.00 - 0.01 K/uL   GLUCOSE, POC    Collection Time: 12/04/18  7:49 AM   Result Value Ref Range    Glucose (POC) 207 (H) 65 - 100 mg/dL    Performed by Washington Garcia, POC    Collection Time: 12/04/18  8:18 AM   Result Value Ref Range    Glucose (POC) 193 (H) 65 - 100 mg/dL    Performed by Valerie Landa (PCT)          Care Plan discussed with:  Patient x   Family    RN    Care Manager    Consultant/Specialist:         Sia Deras, LULYP-BC

## 2018-12-04 NOTE — DISCHARGE SUMMARY
This is an interim summary from 11/30 to 12/04    Admission date: 11/30  Admission diagnosis: sepsis, group B strep bacteremia, AMS, ARF  Consults: ID, Ortho, Renal, neuro    60 yo hx of HTN, DM, chronic pain, presented w/ AMS, severe sepsis, bacteremia, ARF, AG acidosis, hypoglycemia. Blood Cx grew group B strep, likely from right elbow cellulitis. Repeat Cx negative. Renal failure and metabolic acidosis improving. However, mentation has not improve. Might need to consider lumbar puncture.   Currently on IV CTX    Dispo: pending    Current meds:  Current Facility-Administered Medications   Medication Dose Route Frequency    hydrALAZINE (APRESOLINE) 20 mg/mL injection 20 mg  20 mg IntraVENous Q6H PRN    labetalol (NORMODYNE) tablet 200 mg  200 mg Oral Q12H    dextrose 5% with KCl 20 mEq/L infusion   IntraVENous CONTINUOUS    insulin glargine (LANTUS) injection 60 Units  60 Units SubCUTAneous DAILY    alteplase (CATHFLO) 1 mg in sterile water (preservative free) 1 mL injection  1 mg InterCATHeter PRN    sodium chloride (NS) flush 10-30 mL  10-30 mL InterCATHeter PRN    sodium chloride (NS) flush 10 mL  10 mL InterCATHeter Q24H    sodium chloride (NS) flush 10 mL  10 mL InterCATHeter PRN    sodium chloride (NS) flush 10-40 mL  10-40 mL InterCATHeter Q8H    sodium chloride (NS) flush 20 mL  20 mL InterCATHeter Q24H    cefTRIAXone (ROCEPHIN) 2 g in 0.9% sodium chloride (MBP/ADV) 50 mL  2 g IntraVENous Q12H    HYDROmorphone (PF) (DILAUDID) injection 2 mg  2 mg IntraVENous Q3H PRN    sodium chloride (NS) flush 5-10 mL  5-10 mL IntraVENous PRN    sodium chloride (NS) flush 5-10 mL  5-10 mL IntraVENous Q8H    sodium chloride (NS) flush 5-10 mL  5-10 mL IntraVENous PRN    acetaminophen (TYLENOL) tablet 650 mg  650 mg Oral Q4H PRN    heparin (porcine) injection 5,000 Units  5,000 Units SubCUTAneous Q8H    ondansetron (ZOFRAN) injection 4 mg  4 mg IntraVENous Q4H PRN    insulin lispro (HUMALOG) injection SubCUTAneous AC&HS    glucose chewable tablet 16 g  4 Tab Oral PRN    dextrose (D50W) injection syrg 12.5-25 g  25-50 mL IntraVENous PRN    glucagon (GLUCAGEN) injection 1 mg  1 mg IntraMUSCular PRN    aspirin delayed-release tablet 81 mg  81 mg Oral BID    levothyroxine (SYNTHROID) tablet 125 mcg  125 mcg Oral ACB    sertraline (ZOLOFT) tablet 150 mg  150 mg Oral QHS

## 2018-12-04 NOTE — PROGRESS NOTES
Late entry. S: Pt seen on rounds. Having pain and somewhat confused. Renal function improving. O: T 97.8 P 82 /72 Lungs clear : +joyce +urine Ext: no edema Labs: Cr 2.06, K 3.8 A/P: 
ARF on CKD Sepsis Hypokalemia UTO Will continue supportive care with joyce and IVF. Abx for sepsis. Watch serial labs. No need for RRT

## 2018-12-04 NOTE — PROGRESS NOTES
1900: Bedside and Verbal shift change report given to Guerda (oncoming nurse) by Sinan Heredia (offgoing nurse). Report included the following information SBAR, Intake/Output, MAR, Accordion and Cardiac Rhythm NSR.  
 
 
2335: Patient in constant pain, moaning and states pain is \"bad\" when asked about pain. BP elevated at 193/101 before Dilaudid administered, after administered /84. Called Dr. Yuliana Leger, who gave orders to change current Dilaudid order from Q4 prn to Q3 prn and 10 mg Hydralazine q6 prn if SBP >190. Added orders and will continue to monitor patient. 0600: Patients /98. Gave 10 mg Hydralazine. 0630: Reassessed patients /103. Patient loudly moaning and groaning, when asked if patient was in pain. Patient moaned and stated \"bad. \" Administered prn Dilaudid.   
 
0700: Bedside and Verbal shift change report given to Mar (oncoming nurse) by Guerda (offgoing nurse). Report included the following information SBAR, Intake/Output, MAR, Accordion and Cardiac Rhythm NSR.

## 2018-12-04 NOTE — PROCEDURES
Lumbar Puncture Procedure Note    Pre-operative Diagnosis:  AMS     Post-operative Diagnosis: same    Indications: Diagnostic    Procedure Details     Consent: Informed consent was obtained. Risks of the procedure were discussed including: infection, bleeding, pain and headache. Under sterile conditions the patient was positioned. Betadine solution and sterile drapes were utilized. A spinal needle 20 was inserted at the L4 - L5 interspace. Spinal fluid was obtained and sent to the laboratory.     Findings  Three attempts were made with no fluid obtained    Complications:  Unable to obtain fluid          Condition: stable    Plan  Send to IR for LP under fluro    Attending Attestation: I performed the procedure    Nish Adams MD  12/4/2018  12:02 PM

## 2018-12-04 NOTE — PROGRESS NOTES
Bedside and Verbal shift change report given to Mar (oncoming nurse) by Rhonda Diaz (offgoing nurse). Report included the following information SBAR, Kardex and Alarm Parameters  NSR, sinus tach. 0940 spoke to Dr. Bryant Perez about patient having temp of 100.7 and whether to give tylenol or see if fever spikes, at this time Dr. Bryant Perez would like to hold off on tylenol and see if fever spikes. If fever spikes, will most likely need new blood culture since last blood culture negative. 1010 temp rechecked orally, 98.1. 
 
1130 temp high again with axillary, rechecked with oral and 98.1. Asked PCT to please check all temps going forward orally as axillary seems to be false high. Spoke to Dr. Bryant Perez about this. 1150 patient off floor for CT of head scan, tele notified. 1210 patient on floor from CT scan, tele notified. 200 Dr. Krupa Hernández at bedside to do lumbar puncture, patient very difficult and Dr. Krupa Hernández unsuccessful at this time. 1330 RN aware of BP high, dilaudid given for pain but also to bring down BP as it seems to drop BP better than hydralazine. 1340 patient off floor for lumbar puncture procedure, tele notified. 1420 patient back on floor from lumbar puncture procedure, VSS, tele notified. 1615 BP high again, hydralazine given this time as Dilaudid not scheduled at this time. 1700 BP rechecked, systolic 365 now. 1800 Per Dr. Bryant Perez, OK to have patient on dysphagia diet. Dysphagia diet arrived and patient refuses to eat at this time. Bedside and Verbal shift change report given to Max Landis (oncoming nurse) by Cyril Biswas (offgoing nurse). Report included the following information SBAR, Kardex and Cardiac Rhythm sinus tach.

## 2018-12-05 ENCOUNTER — APPOINTMENT (OUTPATIENT)
Dept: MRI IMAGING | Age: 63
DRG: 871 | End: 2018-12-05
Attending: PHYSICIAN ASSISTANT
Payer: COMMERCIAL

## 2018-12-05 LAB
ALBUMIN SERPL-MCNC: 0.9 G/DL (ref 3.5–5)
ALBUMIN/GLOB SERPL: 0.2 {RATIO} (ref 1.1–2.2)
ALP SERPL-CCNC: 81 U/L (ref 45–117)
ALT SERPL-CCNC: 18 U/L (ref 12–78)
AMMONIA PLAS-SCNC: <10 UMOL/L
ANION GAP SERPL CALC-SCNC: 7 MMOL/L (ref 5–15)
AST SERPL-CCNC: 31 U/L (ref 15–37)
BILIRUB DIRECT SERPL-MCNC: 0.1 MG/DL (ref 0–0.2)
BILIRUB SERPL-MCNC: 0.2 MG/DL (ref 0.2–1)
BUN SERPL-MCNC: 43 MG/DL (ref 6–20)
BUN/CREAT SERPL: 30 (ref 12–20)
CALCIUM SERPL-MCNC: 9 MG/DL (ref 8.5–10.1)
CHLORIDE SERPL-SCNC: 108 MMOL/L (ref 97–108)
CO2 SERPL-SCNC: 30 MMOL/L (ref 21–32)
CREAT SERPL-MCNC: 1.42 MG/DL (ref 0.55–1.02)
ERYTHROCYTE [DISTWIDTH] IN BLOOD BY AUTOMATED COUNT: 14.8 % (ref 11.5–14.5)
GLOBULIN SER CALC-MCNC: 5.1 G/DL (ref 2–4)
GLUCOSE BLD STRIP.AUTO-MCNC: 111 MG/DL (ref 65–100)
GLUCOSE BLD STRIP.AUTO-MCNC: 118 MG/DL (ref 65–100)
GLUCOSE BLD STRIP.AUTO-MCNC: 132 MG/DL (ref 65–100)
GLUCOSE BLD STRIP.AUTO-MCNC: 202 MG/DL (ref 65–100)
GLUCOSE SERPL-MCNC: 208 MG/DL (ref 65–100)
HCT VFR BLD AUTO: 30.2 % (ref 35–47)
HERPES SIMPLEX VIRUS, CSF, UHSPT: NORMAL
HGB BLD-MCNC: 9.2 G/DL (ref 11.5–16)
MAGNESIUM SERPL-MCNC: 1.7 MG/DL (ref 1.6–2.4)
MCH RBC QN AUTO: 27.9 PG (ref 26–34)
MCHC RBC AUTO-ENTMCNC: 30.5 G/DL (ref 30–36.5)
MCV RBC AUTO: 91.5 FL (ref 80–99)
NRBC # BLD: 0 K/UL (ref 0–0.01)
NRBC BLD-RTO: 0 PER 100 WBC
PHOSPHATE SERPL-MCNC: 2.9 MG/DL (ref 2.6–4.7)
PLATELET # BLD AUTO: 447 K/UL (ref 150–400)
PMV BLD AUTO: 9.1 FL (ref 8.9–12.9)
POTASSIUM SERPL-SCNC: 3.1 MMOL/L (ref 3.5–5.1)
PROT SERPL-MCNC: 6 G/DL (ref 6.4–8.2)
RBC # BLD AUTO: 3.3 M/UL (ref 3.8–5.2)
SERVICE CMNT-IMP: ABNORMAL
SODIUM SERPL-SCNC: 145 MMOL/L (ref 136–145)
WBC # BLD AUTO: 11.8 K/UL (ref 3.6–11)

## 2018-12-05 PROCEDURE — 74011000258 HC RX REV CODE- 258: Performed by: INTERNAL MEDICINE

## 2018-12-05 PROCEDURE — 65660000000 HC RM CCU STEPDOWN

## 2018-12-05 PROCEDURE — 74011250636 HC RX REV CODE- 250/636: Performed by: INTERNAL MEDICINE

## 2018-12-05 PROCEDURE — 74011000258 HC RX REV CODE- 258: Performed by: NURSE PRACTITIONER

## 2018-12-05 PROCEDURE — 74011250636 HC RX REV CODE- 250/636: Performed by: NURSE PRACTITIONER

## 2018-12-05 PROCEDURE — 82140 ASSAY OF AMMONIA: CPT

## 2018-12-05 PROCEDURE — 94760 N-INVAS EAR/PLS OXIMETRY 1: CPT

## 2018-12-05 PROCEDURE — 80076 HEPATIC FUNCTION PANEL: CPT

## 2018-12-05 PROCEDURE — 82962 GLUCOSE BLOOD TEST: CPT

## 2018-12-05 PROCEDURE — 84100 ASSAY OF PHOSPHORUS: CPT

## 2018-12-05 PROCEDURE — 74011250637 HC RX REV CODE- 250/637: Performed by: INTERNAL MEDICINE

## 2018-12-05 PROCEDURE — 83735 ASSAY OF MAGNESIUM: CPT

## 2018-12-05 PROCEDURE — 80048 BASIC METABOLIC PNL TOTAL CA: CPT

## 2018-12-05 PROCEDURE — 74011000250 HC RX REV CODE- 250: Performed by: INTERNAL MEDICINE

## 2018-12-05 PROCEDURE — 74011636637 HC RX REV CODE- 636/637: Performed by: INTERNAL MEDICINE

## 2018-12-05 PROCEDURE — 73223 MRI JOINT UPR EXTR W/O&W/DYE: CPT

## 2018-12-05 PROCEDURE — 85027 COMPLETE CBC AUTOMATED: CPT

## 2018-12-05 PROCEDURE — 36415 COLL VENOUS BLD VENIPUNCTURE: CPT

## 2018-12-05 PROCEDURE — 74011250636 HC RX REV CODE- 250/636: Performed by: RADIOLOGY

## 2018-12-05 PROCEDURE — A9575 INJ GADOTERATE MEGLUMI 0.1ML: HCPCS | Performed by: RADIOLOGY

## 2018-12-05 RX ORDER — LABETALOL HYDROCHLORIDE 5 MG/ML
10 INJECTION, SOLUTION INTRAVENOUS ONCE
Status: COMPLETED | OUTPATIENT
Start: 2018-12-05 | End: 2018-12-05

## 2018-12-05 RX ORDER — CLONIDINE 0.2 MG/24H
1 PATCH, EXTENDED RELEASE TRANSDERMAL
Status: DISCONTINUED | OUTPATIENT
Start: 2018-12-05 | End: 2018-12-06

## 2018-12-05 RX ORDER — LABETALOL HYDROCHLORIDE 5 MG/ML
10 INJECTION, SOLUTION INTRAVENOUS
Status: DISCONTINUED | OUTPATIENT
Start: 2018-12-05 | End: 2018-12-17 | Stop reason: HOSPADM

## 2018-12-05 RX ORDER — POTASSIUM CHLORIDE AND SODIUM CHLORIDE 450; 150 MG/100ML; MG/100ML
INJECTION, SOLUTION INTRAVENOUS CONTINUOUS
Status: DISCONTINUED | OUTPATIENT
Start: 2018-12-05 | End: 2018-12-07

## 2018-12-05 RX ORDER — POTASSIUM CHLORIDE 7.45 MG/ML
10 INJECTION INTRAVENOUS ONCE
Status: COMPLETED | OUTPATIENT
Start: 2018-12-05 | End: 2018-12-05

## 2018-12-05 RX ORDER — POTASSIUM CHLORIDE 7.45 MG/ML
10 INJECTION INTRAVENOUS
Status: DISPENSED | OUTPATIENT
Start: 2018-12-05 | End: 2018-12-05

## 2018-12-05 RX ORDER — CLONIDINE 0.1 MG/24H
1 PATCH, EXTENDED RELEASE TRANSDERMAL
Status: DISCONTINUED | OUTPATIENT
Start: 2018-12-05 | End: 2018-12-05

## 2018-12-05 RX ORDER — GADOTERATE MEGLUMINE 376.9 MG/ML
15 INJECTION INTRAVENOUS
Status: COMPLETED | OUTPATIENT
Start: 2018-12-05 | End: 2018-12-05

## 2018-12-05 RX ADMIN — Medication 20 ML: at 12:23

## 2018-12-05 RX ADMIN — HYDRALAZINE HYDROCHLORIDE 20 MG: 20 INJECTION INTRAMUSCULAR; INTRAVENOUS at 10:13

## 2018-12-05 RX ADMIN — POTASSIUM CHLORIDE 10 MEQ: 7.46 INJECTION, SOLUTION INTRAVENOUS at 09:49

## 2018-12-05 RX ADMIN — HYDROMORPHONE HYDROCHLORIDE 2 MG: 2 INJECTION, SOLUTION INTRAMUSCULAR; INTRAVENOUS; SUBCUTANEOUS at 05:08

## 2018-12-05 RX ADMIN — LABETALOL HYDROCHLORIDE 10 MG: 5 INJECTION INTRAVENOUS at 00:46

## 2018-12-05 RX ADMIN — GADOTERATE MEGLUMINE 15 ML: 376.9 INJECTION INTRAVENOUS at 18:12

## 2018-12-05 RX ADMIN — Medication 10 ML: at 05:08

## 2018-12-05 RX ADMIN — Medication 10 ML: at 14:56

## 2018-12-05 RX ADMIN — CEFTRIAXONE SODIUM 2 G: 2 INJECTION, POWDER, FOR SOLUTION INTRAMUSCULAR; INTRAVENOUS at 13:40

## 2018-12-05 RX ADMIN — HEPARIN SODIUM 5000 UNITS: 5000 INJECTION INTRAVENOUS; SUBCUTANEOUS at 05:08

## 2018-12-05 RX ADMIN — Medication 10 ML: at 14:55

## 2018-12-05 RX ADMIN — HYDROMORPHONE HYDROCHLORIDE 2 MG: 2 INJECTION, SOLUTION INTRAMUSCULAR; INTRAVENOUS; SUBCUTANEOUS at 19:08

## 2018-12-05 RX ADMIN — ACYCLOVIR SODIUM 320 MG: 50 INJECTION, SOLUTION INTRAVENOUS at 00:47

## 2018-12-05 RX ADMIN — Medication 10 ML: at 21:48

## 2018-12-05 RX ADMIN — ACETAMINOPHEN 650 MG: 650 SUPPOSITORY RECTAL at 00:44

## 2018-12-05 RX ADMIN — LABETALOL HYDROCHLORIDE 10 MG: 5 INJECTION INTRAVENOUS at 14:48

## 2018-12-05 RX ADMIN — HYDROMORPHONE HYDROCHLORIDE 2 MG: 2 INJECTION, SOLUTION INTRAMUSCULAR; INTRAVENOUS; SUBCUTANEOUS at 23:25

## 2018-12-05 RX ADMIN — POTASSIUM CHLORIDE 10 MEQ: 7.46 INJECTION, SOLUTION INTRAVENOUS at 08:20

## 2018-12-05 RX ADMIN — HYDROMORPHONE HYDROCHLORIDE 2 MG: 2 INJECTION, SOLUTION INTRAMUSCULAR; INTRAVENOUS; SUBCUTANEOUS at 13:47

## 2018-12-05 RX ADMIN — HYDRALAZINE HYDROCHLORIDE 20 MG: 20 INJECTION INTRAMUSCULAR; INTRAVENOUS at 16:08

## 2018-12-05 RX ADMIN — HYDRALAZINE HYDROCHLORIDE 20 MG: 20 INJECTION INTRAMUSCULAR; INTRAVENOUS at 23:34

## 2018-12-05 RX ADMIN — HEPARIN SODIUM 5000 UNITS: 5000 INJECTION INTRAVENOUS; SUBCUTANEOUS at 13:40

## 2018-12-05 RX ADMIN — SODIUM CHLORIDE AND POTASSIUM CHLORIDE: 4.5; 1.49 INJECTION, SOLUTION INTRAVENOUS at 10:14

## 2018-12-05 RX ADMIN — CEFTRIAXONE SODIUM 2 G: 2 INJECTION, POWDER, FOR SOLUTION INTRAMUSCULAR; INTRAVENOUS at 21:47

## 2018-12-05 RX ADMIN — POTASSIUM CHLORIDE 10 MEQ: 7.46 INJECTION, SOLUTION INTRAVENOUS at 12:35

## 2018-12-05 RX ADMIN — HYDROMORPHONE HYDROCHLORIDE 2 MG: 2 INJECTION, SOLUTION INTRAMUSCULAR; INTRAVENOUS; SUBCUTANEOUS at 02:03

## 2018-12-05 RX ADMIN — HEPARIN SODIUM 5000 UNITS: 5000 INJECTION INTRAVENOUS; SUBCUTANEOUS at 21:48

## 2018-12-05 RX ADMIN — ASPIRIN 81 MG: 81 TABLET, COATED ORAL at 18:42

## 2018-12-05 RX ADMIN — HYDROMORPHONE HYDROCHLORIDE 2 MG: 2 INJECTION, SOLUTION INTRAMUSCULAR; INTRAVENOUS; SUBCUTANEOUS at 16:46

## 2018-12-05 RX ADMIN — INSULIN GLARGINE 60 UNITS: 100 INJECTION, SOLUTION SUBCUTANEOUS at 08:18

## 2018-12-05 RX ADMIN — POTASSIUM CHLORIDE 10 MEQ: 7.46 INJECTION, SOLUTION INTRAVENOUS at 11:03

## 2018-12-05 RX ADMIN — HYDROMORPHONE HYDROCHLORIDE 2 MG: 2 INJECTION, SOLUTION INTRAMUSCULAR; INTRAVENOUS; SUBCUTANEOUS at 10:13

## 2018-12-05 RX ADMIN — INSULIN LISPRO 2 UNITS: 100 INJECTION, SOLUTION INTRAVENOUS; SUBCUTANEOUS at 08:18

## 2018-12-05 RX ADMIN — Medication 10 ML: at 12:22

## 2018-12-05 NOTE — DIABETES MGMT
DTC Progress Note Recommendations/ Comments: If appropriate, please consider increasing Lantus to 65 units daily. Pt fasting BG remains >180mg/dL. Current hospital DM medication:  
-Lantus 60 units daily  
-Humalog high sensitivity correction Chart reviewed on Reina Jones. Patient is a 61 y.o. female with known history of Type 2 Diabetes on Lantus 75 units nightly, Metformin 500mg bid with meals at home. A1c:  
Lab Results Component Value Date/Time Hemoglobin A1c 7.2 (H) 11/30/2018 06:10 PM  
 
 
Recent Glucose Results:  
Lab Results Component Value Date/Time  (H) 12/05/2018 12:56 AM  
 GLUCPOC 132 (H) 12/05/2018 11:59 AM  
 GLUCPOC 202 (H) 12/05/2018 06:39 AM  
 GLUCPOC 201 (H) 12/04/2018 09:18 PM  
  
 
Lab Results Component Value Date/Time Creatinine 1.42 (H) 12/05/2018 12:56 AM  
 
Estimated Creatinine Clearance: 40.6 mL/min (A) (based on SCr of 1.42 mg/dL (H)). Active Orders Diet DIET DYSPHAGIA PUREED (NDD1) PO intake: No data found. Will continue to follow as needed. Thank you Pramod Womack RD, CDE [facility] Office: [phone] Pager: [phone] Time spent: 5

## 2018-12-05 NOTE — PROGRESS NOTES
Please complete MRI History and Safety Screening Form for this patient Using [employer] only under Orders Requiring a Screening Form: 
Example: 
Orders Requiring a Screening Form Procedure                    Order Status                      Form Status MRI EXAM                   Active                                In Progress Click on blue hyperlink as shown above to launch MRI screening form Answer all questions completely including Weight, Surgery, and Implant History. Document MUST be \"eSigned\" using a \"Signature Pad\" by the person completing form.  (patient and RN or MD completing form with the patient) Patient cannot be scanned until this form is completed and reviewed in MRI to ensure patient is SAFE and eligible for MRI. Call MRI when this has been successfully completed at [phone]. This must be done under [facility]. Do not use the Nursing Flowsheet.

## 2018-12-05 NOTE — PROGRESS NOTES
[place] [facility] 
[address] [facility], [address] [place] [address] 
[phone] Medical Progress Note NAME: Augustin Johnson :  1955 MRM:  [id number] Date/Time: 2018 Assessment / Plan:  
 
60 yo WF w/ hx of HTN, DM, chronic pain, presenting with AMS, found to have severe sepsis, bacteremia, and ARF,  
  
Severe sepsis/bacteremia: Likely from R arm/elbow cellulitis. Blood Cx + 4/4 bottles with GBS on admission, repeat blood Cx NGTD. ID following. No e/o valvular veg on TTE. Continue IV CTX (dosed at meningitis dose due to concerns of ongoing AMS), discussed with Dr. Cecilia Zimmerman 
  
R elbow cellulitis/bursitis/acute pain: likely source of infection. Discussed with ortho, planning MRI right arm/elbow today. Abx as above 
  
Acute metabolic encephalopathy: no better. Head CT showed no acute process. Underwent LP yesterday, traumatic tap, not entirely normal, but not consistent with bacterial meningitis. Gram stain negative. HSV PCR negative. cEEG neg for seizure. Neurology signed off. TSH, B12/folate okay. Will check RPR.  
  
ARF: slowly improving. Likely from ATN/sepsis, hypovolemia. ACE-I on hold. Cont IVF, monitor BMP. Nephrology following 
  
AG metabolic acidosis: Improving. Due to sepsis. Lactate normal. Off bicarb  
  
Hypernatremia: changed fluids to hypotonic. Follow BMP 
  
DM type 2 w/ hypoglycemia: A1C 7.2%. Hypoglycemia on admission from poor PO intake and sepsis but has resolved. Hold metformin. Cont Lantus, cont SSI 
  
HTN: changed atenolol to labetalol but pt not taking PO. Added clonidine. IV hydralazine and labetalol PRN 
  
Anemia: Fe studies consistent with chronic dz. Follow Hg 
  
 
 
Total time spent: 35 minutes Time spent in the care of this patient including reviewing records, discussing with nursing and/or other providers on the treatment team, obtaining history and examining the patient, and discussing treatment plans. Care Plan discussed with: Patient, Nursing Staff and >50% of time spent in counseling and coordination of care Discussed:  Care Plan Prophylaxis:  heparin Disposition:  TBD Subjective: Chief Complaint:  Follow up sepsis Chart/notes/labs/studies reviewed, patient examined at bedside. No meaningful history or ROS able to be obtained due to AMS Objective:  
 
 
Vitals:  
 
  
Last 24hrs VS reviewed since prior progress note. Most recent are: 
 
Visit Vitals /79 Pulse 96 Temp 98.8 °F (37.1 °C) Resp 19 Ht 5' 1\" (1.549 m) Wt 86.8 kg (191 lb 5.8 oz) SpO2 90% BMI 36.16 kg/m² SpO2 Readings from Last 6 Encounters:  
12/05/18 90% O2 Flow Rate (L/min): 1 l/min Intake/Output Summary (Last 24 hours) at 12/5/2018 1522 Last data filed at 12/5/2018 1150 Gross per 24 hour Intake 2393.34 ml Output 2150 ml Net 243.34 ml Exam:  
 
Physical Exam: 
 
Gen:  Elderly, chronically ill-appearing, morbidly obese, NAD HEENT:  Pink conjunctivae, PERRL, hearing intact to voice, moist mucous membranes Neck:  Supple, without masses Resp:  No accessory muscle use, diminished BS at bases otherwise no wheezing, rales, rhonchi 
Card:  No murmurs, normal S1, S2 without thrills, bruits or peripheral edema Abd:  Soft, non-tender, non-distended, normoactive bowel sounds are present Musc:  No cyanosis or clubbing Skin:  Right elbow swelling, erythema. Appeared TTP Neuro: Moves all ext. Not following commands. Psych:  poor insight, oriented to person Medications Reviewed: (see below) Lab Data Reviewed: (see below) 
 
______________________________________________________________________ Medications:  
 
Current Facility-Administered Medications Medication Dose Route Frequency  0.45% sodium chloride with KCl 20 mEq/L infusion   IntraVENous CONTINUOUS  
 labetalol (NORMODYNE) tablet 300 mg  300 mg Oral Q12H  influenza vaccine 2018-19 (6 mos+)(PF) (FLUARIX QUAD/FLULAVAL QUAD) injection 0.5 mL  0.5 mL IntraMUSCular PRIOR TO DISCHARGE  cloNIDine (CATAPRES) 0.2 mg/24 hr patch 1 Patch  1 Patch TransDERmal Q7D  
 labetalol (NORMODYNE;TRANDATE) injection 10 mg  10 mg IntraVENous Q4H PRN  
 hydrALAZINE (APRESOLINE) 20 mg/mL injection 20 mg  20 mg IntraVENous Q6H PRN  
 insulin glargine (LANTUS) injection 60 Units  60 Units SubCUTAneous DAILY  acetaminophen (TYLENOL) suppository 650 mg  650 mg Rectal Q4H PRN  
 alteplase (CATHFLO) 1 mg in sterile water (preservative free) 1 mL injection  1 mg InterCATHeter PRN  
 sodium chloride (NS) flush 10-30 mL  10-30 mL InterCATHeter PRN  
 sodium chloride (NS) flush 10 mL  10 mL InterCATHeter Q24H  
 sodium chloride (NS) flush 10 mL  10 mL InterCATHeter PRN  
 sodium chloride (NS) flush 10-40 mL  10-40 mL InterCATHeter Q8H  
 sodium chloride (NS) flush 20 mL  20 mL InterCATHeter Q24H  cefTRIAXone (ROCEPHIN) 2 g in 0.9% sodium chloride (MBP/ADV) 50 mL  2 g IntraVENous Q12H  
 HYDROmorphone (PF) (DILAUDID) injection 2 mg  2 mg IntraVENous Q3H PRN  
 sodium chloride (NS) flush 5-10 mL  5-10 mL IntraVENous PRN  
 sodium chloride (NS) flush 5-10 mL  5-10 mL IntraVENous Q8H  
 sodium chloride (NS) flush 5-10 mL  5-10 mL IntraVENous PRN  
 acetaminophen (TYLENOL) tablet 650 mg  650 mg Oral Q4H PRN  
 heparin (porcine) injection 5,000 Units  5,000 Units SubCUTAneous Q8H  
 ondansetron (ZOFRAN) injection 4 mg  4 mg IntraVENous Q4H PRN  
 insulin lispro (HUMALOG) injection   SubCUTAneous AC&HS  
 glucose chewable tablet 16 g  4 Tab Oral PRN  
 dextrose (D50W) injection syrg 12.5-25 g  25-50 mL IntraVENous PRN  
 glucagon (GLUCAGEN) injection 1 mg  1 mg IntraMUSCular PRN  
 aspirin delayed-release tablet 81 mg  81 mg Oral BID  levothyroxine (SYNTHROID) tablet 125 mcg  125 mcg Oral ACB  sertraline (ZOLOFT) tablet 150 mg  150 mg Oral QHS Lab Review: Recent Labs 12/05/18 
0056 12/04/18 
0104 12/03/18 
0100 WBC 11.8* 12.8* 15.6* HGB 9.2* 9.3* 9.4* HCT 30.2* 29.8* 31.2*  
* 452* 481* Recent Labs 12/05/18 
0056 12/04/18 
0104 12/03/18 
0100  150* 146*  
K 3.1* 3.1* 3.8  110* 108 CO2 30 30 26 * 195* 227* BUN 43* 54* 65* CREA 1.42* 1.70* 2.06* CA 9.0 9.1 9.0 MG 1.7 2.2 2.4 PHOS 2.9 4.2 5.6* ALB 0.9* 1.0* 1.0*  
SGOT 31 32 59* ALT 18 24 29 No components found for: [place] No results for input(s): PH, PCO2, PO2, HCO3, FIO2 in the last 72 hours. No results for input(s): INR in the last 72 hours. No lab exists for component: INREXT No results found for: SDES Lab Results Component Value Date/Time Culture result: Culture performed on Unspun Fluid 12/04/2018 02:30 PM  
 Culture result: NO GROWTH 1 DAY 12/04/2018 02:30 PM  
 Culture result: NO GROWTH 4 DAYS 12/01/2018 12:31 PM  
 Culture result: NO GROWTH 4 DAYS 12/01/2018 12:31 PM  
 
        
___________________________________________________ Attending Physician: Sun Mcpherson MD

## 2018-12-05 NOTE — PROGRESS NOTES
[place] [facility] [address] [phone] Name:   Martir Sheldon Medical record #: [id number] Admission Date: 11/30/2018 Reason for Consult:  Dr. Jose Rasmussen 
 
Subjective:  
Overnight events: LP completed in IR, negative for bacterial meningitis EEG with metabolic encephalopathy Objective: EEG: 
Assessment/Plan: 1. Altered Mental Status: · Neurochecks:  Every 4 hours · Labs normalizing · Folate, B12, TSH all normal 
· EEG to rule out subclinical seizures · CT head without acute process 
  
2. Mobility:  
· Has not been OOB. · PT/OT to [facility] for rehab 4. Diet:   
· Needs SLP  
  
5. VTE Prophylaxes: · Lovenox 40 mg, SQ daily  
  
Thank you for allowing the Neurology Service the pleasure of participating in the care of your patient. This patient will be discussed with my collaborating care team physician Dr. Jennifer Weber and he may have further recommendations regarding this patient's care. We will sign off at this time, please reconsult prn.  
  
 
Attending Attestation:  
============================================================== Attending Addendum I have reviewed the documentation provided by the nurse practitioner, Neeraj Vera, discussed her findings, clinical impression, and the proposed management plans with regards to this patient's encounter. I have personally evaluated the patient, verify the history and confirm physical findings. Below are my additional findings: 
  
 
 
Subjective: Pt went for LP yesterday. EEG running overnight. Today she reports being in pain all over. Objective:  
 
 
Imaging: CT head neg (I personally reviewed these images in PACS and this is my impression) EEG: neg for seizures Exam: 
Visit Vitals /86 (BP 1 Location: Left arm, BP Patient Position: At rest) Pulse 91 Temp 98.8 °F (37.1 °C) Resp 16 Ht 5' 1\" (1.549 m) Wt 86.8 kg (191 lb 5.8 oz) SpO2 94% BMI 36.16 kg/m² Gen: Well developed CV: RRR Lungs: non labored breathing Abd: non distending Neuro: Alert but not following much in terms of commands, answers one question with yes CN II-XII: PERRL, EOMI, face symmetric, tongue/palate midline Motor: strength: spontaneous movement of arms, wiggles toes to command Sensory: withdraws to pain x 4 Assessment/Plan:  
 
Patient Active Problem List  
Diagnosis Code  Metabolic acidosis V00.9  BAYLEE (acute kidney injury) ([facility] [address] N17.9  
 HTN (hypertension) I10  
 Chronic back pain M54.9, G89.29  
 Diabetes mellitus, type 2 ([facility] [address] E11.9  Leukocytosis D72.829  Right elbow pain M25.521  
 Hypotension I95.9 This is a 60 y/o who presented with confusion and is on treatment for bacteremia. She has continued to have confusion despite this. EEG was neg for seizures and more consistent with toxic/metabolic process. CT head was neg. LP neg for obvious infection but studies are pending. Neuro exam is non focal. 
  
1. CT head neg 2. cEEG neg for seizure 3. Encephalopathy labs were negative 4. LP with benign cell count but is on abx. Meningitis panel neg 5. Cont abx per ID. Will stop acyclovir. No further neuro recs at this time. Will sign off but please call with further questions. 
  
Signed: 
Mario Stewart MD 
12/5/2018 Physical Exam: 
 
General:  Anxious, moaning. Lungs:   Clear to auscultation bilaterally. No crackles/wheezes. Heart: 
Abdominal:  Regular rate and rhythm, No murmur, click, rub or gallop. Soft and nondistended Skin: Skin color, texture, turgor normal.   
NEUROLOGICAL EXAM: 
 
Appearance:  Well developed, well nourished,  and is in no acute distress. Mental Status: Moans, did say yes to pain. WIll not follow commands. Cranial Nerves:   Does not tract. PERRL, no nystagmus, no ptosis. Facial movement is symmetric. Palate is midline. Tongue is midline. Motor:  Does not follow commands, observed moving all ext Reflexes:   Deep tendon reflexes 2+/4 and symmetrical.  
Sensory:   JEF. Gait:  Not tested. Tremor:   JEF. Cerebellar:  JEF. Current Facility-Administered Medications Medication Dose Route Frequency Provider Last Rate Last Dose  cloNIDine (CATAPRES) 0.1 mg/24 hr patch 1 Patch  1 Patch TransDERmal Cherelle Alvarez MD   1 Patch at 12/05/18 0091  
 0.45% sodium chloride with KCl 20 mEq/L infusion   IntraVENous CONTINUOUS Delana Sandifer, MD      
 potassium chloride 10 mEq in 100 ml IVPB  10 mEq IntraVENous Q1H Delana Sandifer,  mL/hr at 12/05/18 0820 10 mEq at 12/05/18 0820  hydrALAZINE (APRESOLINE) 20 mg/mL injection 20 mg  20 mg IntraVENous Q6H PRN Garland Zambrano MD   20 mg at 12/04/18 2252  labetalol (NORMODYNE) tablet 200 mg  200 mg Oral Q12H Garland Zambrano MD   200 mg at 12/04/18 0836  
 insulin glargine (LANTUS) injection 60 Units  60 Units SubCUTAneous DAILY Garland Zambrano MD   60 Units at 12/05/18 0818  
 acyclovir (ZOVIRAX) 320 mg in 0.9% sodium chloride 100 mL IVPB  320 mg IntraVENous Q12H Sia Benavides NP   320 mg at 12/05/18 0047  acetaminophen (TYLENOL) suppository 650 mg  650 mg Rectal Q4H PRN Foreign Ruggiero MD   650 mg at 12/05/18 9934  alteplase (CATHFLO) 1 mg in sterile water (preservative free) 1 mL injection  1 mg InterCATHeter PRN Garland Zambrano MD      
 sodium chloride (NS) flush 10-30 mL  10-30 mL InterCATHeter PRN Garland Zambrano MD      
 sodium chloride (NS) flush 10 mL  10 mL InterCATHeter Q24H Garland Zambrano MD   Stopped at 12/03/18 1100  
 sodium chloride (NS) flush 10 mL  10 mL InterCATHeter PRN Garland Zambrano MD      
 sodium chloride (NS) flush 10-40 mL  10-40 mL InterCATHeter Q8H Garland Zambrano MD   10 mL at 12/05/18 6721  sodium chloride (NS) flush 20 mL  20 mL InterCATHeter Q24H Do Garland B, MD   Stopped at 12/03/18 1100  cefTRIAXone (ROCEPHIN) 2 g in 0.9% sodium chloride (MBP/ADV) 50 mL  2 g IntraVENous Q12H Candido Navarro,  100 mL/hr at 12/04/18 2138 2 g at 12/04/18 2138  
 HYDROmorphone (PF) (DILAUDID) injection 2 mg  2 mg IntraVENous Q3H PRN Davi Ceron MD   2 mg at 12/05/18 0954  sodium chloride (NS) flush 5-10 mL  5-10 mL IntraVENous PRN Shannan Villalobos MD   10 mL at 12/01/18 1909  sodium chloride (NS) flush 5-10 mL  5-10 mL IntraVENous Q8H Mellisa Webster MD   10 mL at 12/05/18 7808  sodium chloride (NS) flush 5-10 mL  5-10 mL IntraVENous PRN Mellisa Webster MD      
 acetaminophen (TYLENOL) tablet 650 mg  650 mg Oral Q4H PRN Mellisa Webster MD   650 mg at 12/03/18 1815  
 heparin (porcine) injection 5,000 Units  5,000 Units SubCUTAneous Q8H Mellisa Webster MD   5,000 Units at 12/05/18 4220  ondansetron (ZOFRAN) injection 4 mg  4 mg IntraVENous Q4H PRN Mellisa Webster MD      
 insulin lispro (HUMALOG) injection   SubCUTAneous AC&HS Mellisa Webster MD   2 Units at 12/05/18 0818  
 glucose chewable tablet 16 g  4 Tab Oral PRN Mellisa Webster MD      
 dextrose (D50W) injection syrg 12.5-25 g  25-50 mL IntraVENous PRN Mellisa Webster MD   25 g at 11/30/18 2016  
 glucagon (GLUCAGEN) injection 1 mg  1 mg IntraMUSCular PRN Mellisa Webster MD      
 aspirin delayed-release tablet 81 mg  81 mg Oral BID Mellisa Webster MD   81 mg at 12/04/18 1820  levothyroxine (SYNTHROID) tablet 125 mcg  125 mcg Oral ACB Mellisa Webster MD   125 mcg at 12/04/18 3140  sertraline (ZOLOFT) tablet 150 mg  150 mg Oral QHS Mellisa Webster MD   150 mg at 12/03/18 2147 Recent Results (from the past 24 hour(s)) GLUCOSE, POC Collection Time: 12/04/18  1:16 PM  
Result Value Ref Range Glucose (POC) 203 (H) 65 - 100 mg/dL Performed by Vanesa Gaspar CELL COUNT, CSF Collection Time: 12/04/18  2:26 PM  
Result Value Ref Range CSF TUBE NO. 2    
 CSF COLOR RED (A) COL    
 SPUN COLOR OPAQUE (A) COL    
 CSF APPEARANCE COLORLESS (A) CLEAR    
 CSF RBCS 15,000 (H) 0 /cu mm CSF WBCS 43 (H) 0 - 5 /cu mm  
 CSF Neutrophils 92 (H) 0 - 7 % CSF LYMPH 3 (L) 28 - 96 % CSF MONO 2 (L) 16 - 56 % CSF EOS 3 (H) 0 % CELL COUNT, CSF Collection Time: 12/04/18  2:27 PM  
Result Value Ref Range CSF TUBE NO. 4    
 CSF COLOR ALEYDA (A) COL    
 SPUN COLOR COLORLESS COL    
 CSF APPEARANCE OPAQUE (A) CLEAR    
 CSF RBCS 15,500 (H) 0 /cu mm  
 CSF WBCS 65 (H) 0 - 5 /cu mm  
 CSF Neutrophils 87 (H) 0 - 7 % CSF LYMPH 9 (L) 28 - 96 % CSF MONO 3 (L) 16 - 56 % CSF EOS 1 (H) 0 % PROTEIN, CSF Collection Time: 12/04/18  2:28 PM  
Result Value Ref Range Tube No. 1    
 Protein, (H) 15 - 45 MG/DL  
GLUCOSE, CSF Collection Time: 12/04/18  2:28 PM  
Result Value Ref Range Tube No. 1    
 Glucose, (H) 40 - 70 MG/DL  
CELL COUNT, CSF Collection Time: 12/04/18  2:29 PM  
Result Value Ref Range CSF TUBE NO. 3    
 CSF COLOR ALEYDA (A) COL    
 SPUN COLOR COLORLESS COL    
 CSF APPEARANCE OPAQUE (A) CLEAR    
 CSF RBCS 29,000 (H) 0 /cu mm  
 CSF WBCS 110 (H) 0 - 5 /cu mm  
 CSF Neutrophils 85 (H) 0 - 7 % CSF LYMPH 12 (L) 28 - 96 % CSF EOS 3 (H) 0 % CULTURE, CSF W GRAM STAIN Collection Time: 12/04/18  2:30 PM  
Result Value Ref Range Special Requests: NO SPECIAL REQUESTS    
 GRAM STAIN RARE WBCS SEEN    
 GRAM STAIN NO ORGANISMS SEEN Culture result: PENDING   
GLUCOSE, CSF Collection Time: 12/04/18  2:40 PM  
Result Value Ref Range Tube No. 3    
 Glucose, (H) 40 - 70 MG/DL MENINGITIS PATHOGENS PANEL, CSF (BY PCR) Collection Time: 12/04/18  2:42 PM  
Result Value Ref Range Escherichia coli K1 NOT DETECTED NOTD Haemophilus Influenzae NOT DETECTED NOTD Listeria Monocytogenes NOT DETECTED NOTD Neisseria Meningitidis NOT DETECTED NOTD Streptococcus Agalactiae NOT DETECTED NOTD Streptococcus Pneumoniae NOT DETECTED NOTD Cytomegalovirus NOT DETECTED NOTD Enterovirus NOT DETECTED NOTD Herpes Simplex Virus 1 NOT DETECTED NOTD Herpes Simplex Virus 2 NOT DETECTED NOTD Human Herpesvirus 6 NOT DETECTED NOTD Human Parechovirus NOT DETECTED NOTD Varicella Zoster Virus NOT DETECTED NOTD Crypto. neoformans/gattii NOT DETECTED NOTD    
GLUCOSE, POC Collection Time: 12/04/18  4:20 PM  
Result Value Ref Range Glucose (POC) 191 (H) 65 - 100 mg/dL Performed by [employer] (P[employer]) GLUCOSE, POC Collection Time: 12/04/18  9:18 PM  
Result Value Ref Range Glucose (POC) 201 (H) 65 - 100 mg/dL Performed by [employer] (PCT) CBC W/O DIFF Collection Time: 12/05/18 12:56 AM  
Result Value Ref Range WBC 11.8 (H) 3.6 - 11.0 K/uL  
 RBC 3.30 (L) 3.80 - 5.20 M/uL HGB 9.2 (L) 11.5 - 16.0 g/dL HCT 30.2 (L) 35.0 - 47.0 % MCV 91.5 80.0 - 99.0 FL  
 MCH 27.9 26.0 - 34.0 PG  
 MCHC 30.5 30.0 - 36.5 g/dL  
 RDW 14.8 (H) 11.5 - 14.5 % PLATELET 859 (H) 228 - 400 K/uL MPV 9.1 8.9 - 12.9 FL  
 NRBC 0.0 0  WBC ABSOLUTE NRBC 0.00 0.00 - 0.01 K/uL METABOLIC PANEL, BASIC Collection Time: 12/05/18 12:56 AM  
Result Value Ref Range Sodium 145 136 - 145 mmol/L Potassium 3.1 (L) 3.5 - 5.1 mmol/L Chloride 108 97 - 108 mmol/L  
 CO2 30 21 - 32 mmol/L Anion gap 7 5 - 15 mmol/L Glucose 208 (H) 65 - 100 mg/dL BUN 43 (H) 6 - 20 MG/DL Creatinine 1.42 (H) 0.55 - 1.02 MG/DL  
 BUN/Creatinine ratio 30 (H) 12 - 20 GFR est AA 45 (L) >60 ml/min/1.73m2 GFR est non-AA 37 (L) >60 ml/min/1.73m2 Calcium 9.0 8.5 - 10.1 MG/DL  
PHOSPHORUS Collection Time: 12/05/18 12:56 AM  
Result Value Ref Range Phosphorus 2.9 2.6 - 4.7 MG/DL MAGNESIUM Collection Time: 12/05/18 12:56 AM  
Result Value Ref Range Magnesium 1.7 1.6 - 2.4 mg/dL HEPATIC FUNCTION PANEL Collection Time: 12/05/18 12:56 AM  
Result Value Ref Range Protein, total 6.0 (L) 6.4 - 8.2 g/dL Albumin 0.9 (L) 3.5 - 5.0 g/dL Globulin 5.1 (H) 2.0 - 4.0 g/dL A-G Ratio 0.2 (L) 1.1 - 2.2 Bilirubin, total 0.2 0.2 - 1.0 MG/DL Bilirubin, direct 0.1 0.0 - 0.2 MG/DL Alk. phosphatase 81 45 - 117 U/L  
 AST (SGOT) 31 15 - 37 U/L  
 ALT (SGPT) 18 12 - 78 U/L  
AMMONIA Collection Time: 18  6:27 AM  
Result Value Ref Range Ammonia <10 <32 UMOL/L  
GLUCOSE, POC Collection Time: 18  6:39 AM  
Result Value Ref Range Glucose (POC) 202 (H) 65 - 100 mg/dL Performed by Debra Manrique Visit Vitals /86 (BP 1 Location: Left arm, BP Patient Position: At rest) Pulse 91 Temp 98.8 °F (37.1 °C) Resp 16 Ht 5' 1\" (1.549 m) Wt 86.8 kg (191 lb 5.8 oz) SpO2 94% BMI 36.16 kg/m² O2 Flow Rate (L/min): 1 l/min O2 Device: Nasal cannula Temp (24hrs), Av.3 °F (37.4 °C), Min:98.1 °F (36.7 °C), Max:100.2 °F (37.9 °C) No intake/output data recorded.  1901 -  0700 In: 2393.3 [I.V.:2393.3] Out: 2800 [Urine:2800] Care Plan discussed with: 
Patient x Family RN Care Manager Consultant/Specialist:    
 
 
Sia Camarillo, ACNP-BC

## 2018-12-05 NOTE — PROGRESS NOTES
Problem: Falls - Risk of 
Goal: *Absence of Falls Document Selene Haines Fall Risk and appropriate interventions in the flowsheet. Outcome: Progressing Towards Goal 
Fall Risk Interventions: 
  
 
Mentation Interventions: Adequate sleep, hydration, pain control, Bed/chair exit alarm, More frequent rounding, Room close to nurse's station, Reorient patient, Update white board Medication Interventions: Assess postural VS orthostatic hypotension, Bed/chair exit alarm Elimination Interventions: Toileting schedule/hourly rounds, Call light in reach Problem: Pressure Injury - Risk of 
Goal: *Prevention of pressure injury Document Nikolay Scale and appropriate interventions in the flowsheet. Outcome: Progressing Towards Goal 
Pressure Injury Interventions: 
Sensory Interventions: Assess changes in LOC, Assess need for specialty bed, Check visual cues for pain, Turn and reposition approx. every two hours (pillows and wedges if needed) Moisture Interventions: Assess need for specialty bed, Internal/External urinary devices Activity Interventions: Assess need for specialty bed, Pressure redistribution bed/mattress(bed type), PT/OT evaluation Mobility Interventions: Assess need for specialty bed, HOB 30 degrees or less, Pressure redistribution bed/mattress (bed type), PT/OT evaluation Nutrition Interventions: Document food/fluid/supplement intake, Discuss nutritional consult with provider Friction and Shear Interventions: Apply protective barrier, creams and emollients, HOB 30 degrees or less, Transferring/repositioning devices

## 2018-12-05 NOTE — PROGRESS NOTES
[facility] Progress Note Seng Toscano DO 
  [phone] Office [phone]  Fax 
 
2018 Assessment & Plan: 1. Right elbow cellulitis complicated by GBS bacteremia. Repeat BC's NGSF. Orthopedic surgery is following to rule out septic right elbow joint. Continue ceftriaxone. MRI planned per discussion with ortho 2. CoNS isolated from a single blood culture. This represents a contaminant. 3. Acute kidney injury. This is improving. 4. Encephalopathy. This is thought to be both due to infection and narcotics. LP results abnormal but not entirely c/w bacterial or viral infection. Meningitis pathogens panel negative. 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 5. Diabetes mellitus. Management per primary team. 
6. Rash prior to admission. Resolved. Pictures reviewed with patients . Possible cellulitis? Rash not c/w HSV/VZV. Subjective:  
 
Remains lethargic Objective:  
 
Vitals:  
Visit Vitals /86 (BP 1 Location: Left arm, BP Patient Position: At rest) Pulse 91 Temp 98.8 °F (37.1 °C) Resp 16 Ht 5' 1\" (1.549 m) Wt 86.8 kg (191 lb 5.8 oz) SpO2 94% BMI 36.16 kg/m² Tmax:  Temp (24hrs), Av.4 °F (37.4 °C), Min:98.1 °F (36.7 °C), Max:100.2 °F (37.9 °C) Exam:  
Patient is intubated:  no 
 
Physical Examination:  
General:  Lethargic Head:  Normocephalic, atraumatic. Eyes:  Conjunctivae clear Neck: Supple Lungs:   No distress. Clear to auscultation bilaterally. Chest wall:    
Heart:  Regular rate and rhythm Abdomen:   Soft, non-tender, non-distended Extremities:   
Skin:  No rash Neurologic: Unable to assess Labs:     
 
No lab exists for component: ITNL No results for input(s): CPK, CKMB, TROIQ in the last 72 hours. Recent Labs 18 
0056 18 
0104 18 
0100  150* 146*  
K 3.1* 3.1* 3.8  110* 108 CO2 30 30 26 BUN 43* 54* 65* CREA 1.42* 1.70* 2.06* * 195* 227* PHOS 2.9 4.2 5.6*  
MG 1.7 2.2 2.4 ALB 0.9* 1.0* 1.0* WBC 11.8* 12.8* 15.6* HGB 9.2* 9.3* 9.4* HCT 30.2* 29.8* 31.2*  
* 452* 481* No results for input(s): INR, PTP, APTT in the last 72 hours. No lab exists for component: INREXT, INREXT Needs: urine analysis, urine sodium, protein and creatinine Lab Results Component Value Date/Time Sodium,urine random 29 11/30/2018 06:10 PM  
 Creatinine, urine 187.11 11/30/2018 06:10 PM  
 
 
 
Cultures: No results found for: SDES Lab Results Component Value Date/Time Culture result: Culture performed on Unspun Fluid 12/04/2018 02:30 PM  
 Culture result: NO GROWTH 1 DAY 12/04/2018 02:30 PM  
 Culture result: NO GROWTH 4 DAYS 12/01/2018 12:31 PM  
 Culture result: NO GROWTH 4 DAYS 12/01/2018 12:31 PM  
 
 
Radiology:  
 
Medications Current Facility-Administered Medications Medication Dose Route Frequency Last Dose  cloNIDine (CATAPRES) 0.1 mg/24 hr patch 1 Patch  1 Patch TransDERmal Q7D 1 Patch at 12/05/18 0821  
 0.45% sodium chloride with KCl 20 mEq/L infusion   IntraVENous CONTINUOUS    
 potassium chloride 10 mEq in 100 ml IVPB  10 mEq IntraVENous Q1H 10 mEq at 12/05/18 4867  hydrALAZINE (APRESOLINE) 20 mg/mL injection 20 mg  20 mg IntraVENous Q6H PRN 20 mg at 12/05/18 1013  labetalol (NORMODYNE) tablet 200 mg  200 mg Oral Q12H 200 mg at 12/04/18 0836  
 insulin glargine (LANTUS) injection 60 Units  60 Units SubCUTAneous DAILY 60 Units at 12/05/18 [facility]  acetaminophen (TYLENOL) suppository 650 mg  650 mg Rectal Q4H  mg at 12/05/18 0044  alteplase (CATHFLO) 1 mg in sterile water (preservative free) 1 mL injection  1 mg InterCATHeter PRN    
 sodium chloride (NS) flush 10-30 mL  10-30 mL InterCATHeter PRN    
 sodium chloride (NS) flush 10 mL  10 mL InterCATHeter Q24H Stopped at 12/03/18 1100  sodium chloride (NS) flush 10 mL  10 mL InterCATHeter PRN    
 sodium chloride (NS) flush 10-40 mL  10-40 mL InterCATHeter Q8H 10 mL at 12/05/18 0508  sodium chloride (NS) flush 20 mL  20 mL InterCATHeter Q24H Stopped at 12/03/18 1100  cefTRIAXone (ROCEPHIN) 2 g in 0.9% sodium chloride (MBP/ADV) 50 mL  2 g IntraVENous Q12H 2 g at 12/04/18 2138  
 HYDROmorphone (PF) (DILAUDID) injection 2 mg  2 mg IntraVENous Q3H PRN 2 mg at 12/05/18 1013  sodium chloride (NS) flush 5-10 mL  5-10 mL IntraVENous PRN 10 mL at 12/01/18 1909  sodium chloride (NS) flush 5-10 mL  5-10 mL IntraVENous Q8H 10 mL at 12/05/18 8799  sodium chloride (NS) flush 5-10 mL  5-10 mL IntraVENous PRN    
 acetaminophen (TYLENOL) tablet 650 mg  650 mg Oral Q4H  mg at 12/03/18 1815  
 heparin (porcine) injection 5,000 Units  5,000 Units SubCUTAneous Q8H 5,000 Units at 12/05/18 5824  ondansetron (ZOFRAN) injection 4 mg  4 mg IntraVENous Q4H PRN    
 insulin lispro (HUMALOG) injection   SubCUTAneous AC&HS 2 Units at 12/05/18 0818  
 glucose chewable tablet 16 g  4 Tab Oral PRN    
 dextrose (D50W) injection syrg 12.5-25 g  25-50 mL IntraVENous PRN 25 g at 11/30/18 2016  
 glucagon (GLUCAGEN) injection 1 mg  1 mg IntraMUSCular PRN    
 aspirin delayed-release tablet 81 mg  81 mg Oral BID 81 mg at 12/04/18 1820  levothyroxine (SYNTHROID) tablet 125 mcg  125 mcg Oral  mcg at 12/04/18 3665  sertraline (ZOLOFT) tablet 150 mg  150 mg Oral  mg at 12/03/18 2147 Case discussed with: 
 
 
Brenda Singh DO

## 2018-12-05 NOTE — PROGRESS NOTES
Will proceed with MRI with contrast of right elbow today. Nephrology is on board and is ok with proceeding as kidney function has improved. Will follow once the MRI has resulted. Tomy Rdz PA-C Orthopaedic Surgery  [facility]

## 2018-12-05 NOTE — PROGRESS NOTES
1900 
Bedside and Verbal shift change report given to [facility] (oncoming nurse) by Judith Sarah (offgoing nurse). Report included the following information SBAR, Kardex, Procedure Summary, Intake/Output, MAR, Accordion and Recent Results. Patient on bed with family on the bedside. Initial assessment done. PRN pain med given. [address] Patient refused to take PO meds (labetalol and Zoloft) MD aware. Ordered to give Labetalol IV once. 2014 [facility] BP remains elevated. PRN hydralazine given. Rober 83 /68 and Temp of 100.2. MD aware. Ordered to give another 10mg of Labetalol once and Tylenol Suppository. Visit Vitals /87 Pulse 100 Temp 99.2 °F (37.3 °C) Resp 20 Ht 5' 1\" (1.549 m) Wt 88.5 kg (195 lb 1.7 oz) SpO2 92% BMI 36.87 kg/m²  
 
0700 Bedside and Verbal shift change report given to [employer] RN (oncoming nurse) by Nathanael Mckeon RN (offgoing nurse). Report included the following information SBAR, Kardex, Procedure Summary, Intake/Output, MAR, Accordion and Recent Results.

## 2018-12-05 NOTE — PROGRESS NOTES
Orthopaedic Progress Note 2018 12:03 PM  
 
Patient: Rashawn Lee MRN: [id number]  SSN: [id number] YOB: 1955  Age: 61 y.o. Sex: female Admit date:  2018 Admitting Physician:  Ren Campos MD  
Consulting Physician(s): Treatment Team: Attending Provider: Sharyle Prost, MD; Consulting Provider: Yong Louis; Consulting Provider: Erin Kevin MD; Consulting Provider: Shellie Gallegos DO; Utilization Review: Nicole Boston; Utilization Review: Jacy Laughlin, JOHNATHAN; Care Manager: Diana Forbes 
 
SUBJECTIVE: 
  
Rashawn Lee is a 61 y.o. female is in bed moaning this morning. She is alert to my presence, pain, and when I question her. She is unable to verbalize to me this morning. No complaints of nausea, vomiting, dizziness, lightheadedness, chest pain, or shortness of breath. OBJECTIVE: 
 
  
Physical Exam: 
General: Alert, cooperative, no distress. Respiratory: Respirations unlabored Neurological:  Neurovascular exam within normal limits. Motor: + DF/PF. Musculoskeletal: Calves soft, supple, non-tender upon palpation. Right elbow with mild erythema. Still area of swelling. Consolidation with olecranon bursitis now. Some crepitus noted throughout skin. Able to passive extend elbow to zero, flex to 100. Vital Signs:  
  
 
Patient Vitals for the past 8 hrs: 
 BP Temp Pulse Resp SpO2 Weight 18 1100 170/79 98.8 °F (37.1 °C) 96 19 90 %   
18 0752 178/86 98.8 °F (37.1 °C) 91 16 94 %   
18 0700   100     
18 0600 169/87  99 16 93 %   
18 0408      86.8 kg (191 lb 5.8 oz) Temp (24hrs), Av.3 °F (37.4 °C), Min:98.1 °F (36.7 °C), Max:100.2 °F (37.9 °C) Labs:  
  
 
Recent Labs 18 
0056 HCT 30.2* HGB 9.2* Lab Results Component Value Date/Time  Sodium 145 2018 12:56 AM  
 Potassium 3.1 (L) 12/05/2018 12:56 AM  
 Chloride 108 12/05/2018 12:56 AM  
 CO2 30 12/05/2018 12:56 AM  
 Glucose 208 (H) 12/05/2018 12:56 AM  
 BUN 43 (H) 12/05/2018 12:56 AM  
 Creatinine 1.42 (H) 12/05/2018 12:56 AM  
 Calcium 9.0 12/05/2018 12:56 AM  
 
 
PT/OT:  
 
  
  
  
 
Patient mobility ASSESSMENT / PLAN:  
Principal Problem: 
  BAYLEE (acute kidney injury) ([facility] [address] (11/30/2018) Active Problems: Metabolic acidosis (09/53/2756) Chronic back pain () Diabetes mellitus, type 2 ([facility] [address] () Leukocytosis (11/30/2018) Right elbow pain (11/30/2018) Hypotension (11/30/2018) A: 1. Right arm and forearm cellulitis 2. Right elbow olecranon bursitis 3. GBS bacteremia P: 1. WBC improved since yesterday, but persistent pain with ROM of elbow. MRI with contrast of right elbow today. Will need sedation likely due to pain and current medical status (unable to follow command due to AMS). Will follow once MRI is completed. Signed By: 
MARY Caldwell [address] [facility]

## 2018-12-05 NOTE — PROCEDURES
Name: Hayden Crockett  : 1955  Age: 61 y.o. Admit Date: 2018  MRN: [id number]  Date of EE2018  Patient Location: [facility]       CONTINUOUS ELECTROENCEPHALOGRAM REPORT    PROCEDURE: 24 HR Continuous telemetry EEG monitoring with simultaneous video    EEG PROCEDURE NUMBER: [id number]    RECORDING START TIME: 18 @ 1612    RECORDING END TIME: 18 @ 0950    TECHNICAL NOTES:  This recording was performed on equipment with 32 channel capability using scalp electrodes. EEG and video-audio apparatus specifications in accordance with ASCN guidelines. Additional EKG monitoring was utilized. Scalp electrodes were placed in accordance with the International 10-20 system. Additional inferior temporal electrodes were placed at F9, F10, T9, T10, P9, and P10. DESCRIPTION OF THE RECORDING: Continuous telemetry EEG monitoring with simultaneous video was requested in this 61 y.o. female   to appropriately capture and characterize the episodes of clinical interest for accurate diagnosis. INDICATION FOR cEEG:   subclinical seizures    ANTIEPILEPTIC DRUGS (AEDS): None    SEDATIVES:  None    INTUBATION:  No    PUSH BUTTON EVENTS: No    FINDINGS:     BACKGROUND:     Hemispheric Symmetric:  Theta at 6 Hz      Hemispheric Asymmetric: none    FOCAL SLOWING: no    AMPLITUDE:   Left: Low (most <20 uV in longitudinal biploar)  Right: Low (most <20 uV in longitudinal biploar)    VARIABILITY: Yes    CONTINUITY:  continuous    REACTIVITY: Yes    BREACH EFFECT: No    STAGE II SLEEP (K complex and spindles):  Present    EEG IMPRESSION:  abnormal    CLINICAL CORRELATION:    24hr video EEG with simulatnous video monitoring was abnormal. There was moderate diffuse cerebral dysfunction which is non specific for etiology but can be seen in toxic/metabolic states. No seizures. Clinical correlation recommended.         Margaret Hutson MD  2018  7:51 PM

## 2018-12-05 NOTE — PROGRESS NOTES
Nutrition Assessment: 
 
RECOMMENDATIONS/INTERVENTION(S):  
1. Recommend placing NGT for EN due to lack of PO intakes since admission (x 5 days) and likely PTA. EN rec's: Glucerna 1.2 at 30ml/hr, increase by 10ml q8h to goal  rate of 60ml/hr with 100ml h20 flush q4h. (Goal EN rate will provide 1728kcals, 86gm pro, 1759ml fluid which will meet 97% pt's calorie needs, 100% pt's protein and fluid needs) 2. If pt to remain on PO diet, assist pt with all meals and ensure pt alert to to swallow. 3. Will monitor plan of care for nutrition, BG, GI. ASSESSMENT:  
59yo female admitted for metabolic acidosis. RD assessment for LOS. PMHx: DM, HTN. Class II Obese per BMI. No weight hx from PTA to assess. Pt not responding to any questions and no family in room at time of visit today x 2.  Spoke with RN who reports pt has not had anything to eat today and has been mostly NPO since admission. Attempted to feed pt but food was held in mouth. RN states pt refusing to swallow pills today too. Pureed diet ordered. SLP not following pt.  informed RN this started about 3 weeks PTA, before that she was ambulatory and verbal, eating. BS's are noted as hypoactive and no BM since 11/27. Labs: K+ 3.2, , POC -[id number], HgbA1c 7.2. Meds: Lantus, Humalog, Kcl.  1/2NaCl with 20mEqKCl running at 100ml/hr. Diet Order: Puree 
% Eaten:  No data found. Pertinent Medications: [x] Reviewed Labs: [x] Reviewed Anthropometrics: Height: 5' 1\" (154.9 cm) Weight: 86.8 kg (191 lb 5.8 oz) IBW (%IBW):   ( ) UBW (%UBW):   (  %) BMI: Body mass index is 36.16 kg/m². This BMI is indicative of: 
 [] Underweight    [] Normal    [] Overweight    [x] Class II Obesity    []  Extreme Obesity (BMI>40) Estimated Nutrition Needs (Based on): 2203 Kcals/day(BMR 1360 x AF 1.3) , 69 g(ABW x 0.8-1gm/day (69-87gm)) Protein Carbohydrate: At Least 130 g/day  Fluids: 1768mL/day (1 ml/kcal) Last BM: 11/27  []Active     []Hyperactive  [x]Hypoactive       [] Absent   BS Skin:    [] Intact   [] Incision  [] Breakdown   [] DTI   [] Tears/Excoriation/Abrasion  []Edema [] Other: Wt Readings from Last 30 Encounters:  
12/05/18 86.8 kg (191 lb 5.8 oz) 12/01/18 81.6 kg (179 lb 14.3 oz) 12/01/18 81.6 kg (179 lb 14.3 oz) 12/01/18 81.6 kg (179 lb 14.3 oz) NUTRITION DIAGNOSES:  
Problem:  Inadequate energy intake Etiology: related to inability to consume sufficient energy secondary to mental state Signs/Symptoms: as evidenced by PO intakes < EEN's since admission NUTRITION INTERVENTIONS: 
Meals/Snacks: Modify diet/texture/consistency/nutrients Enteral/Parenteral Nutrition: Initiate enteral nutrition GOAL:  
Meet EEN's with EN/PO in next 1-3 days Cultural, Cheondoism, or Ethnic Dietary Needs: None EDUCATION & DISCHARGE NEEDS:  
 [x] None Identified 
 [] Identified and Education Provided/Documented 
 [] Identified and Pt declined/was not appropriate [x] Interdisciplinary Care Plan Reviewed/Documented  
 [x] Discharge Needs: Will need to determine if pt needs supplemental nutrition after discharge/EN [] No Nutrition Related Discharge Needs NUTRITION RISK:  
Pt Is At Nutrition Risk  [x] No Nutrition Risk Identified  [] PT SEEN FOR:  
 []  MD Consult: []Calorie Count []Diabetic Diet Education []Diet Education []Electrolyte Management []General Nutrition Management and Supplements []Management of Tube Feeding []TPN Recommendations []  RN Referral:  []MST score >=2 
   []Enteral/Parenteral Nutrition PTA []Pregnant: Gestational DM or Multigestation  
              [] Pressure Ulcer 
 
[]  Low BMI      [x]  Length of Stay       [] Dysphagia Diet         [] Ventilator 
[]  Follow-up Previous Recommendations: 
 [] Implemented          [] Not Implemented          [x] Not Applicable Previous Goal: [] Met              [] Progressing Towards Goal              [] Not Progressing Towards Goal   [x] Not Applicable Camille Thomas, DEYVI Pager [phone] Phone [phone]

## 2018-12-05 NOTE — PROGRESS NOTES
Physical Therapy:  
Patient continues to be with increased confusion and increased pain levels. She has not been able to participate with therapy x5 days. Due to her continued medical complexity, we will complete the order. Please re-consult when medically stable. Thank you.  
Evelin Linton PT,DPT,NCS

## 2018-12-05 NOTE — PROGRESS NOTES
12/5/2018 
1:37 PM 
EMR reviewed, pt is continuing to require medical management, MRI scheduled for today. CM will continue to follow. Cm Gupta

## 2018-12-05 NOTE — PROGRESS NOTES
[facility] [address] YOB: 1955 Assessment & Plan:  
ARF due to IVVD/sepsis · Creat better · No need for rrt · Continue supportive care Hypernatremia · Resolved Hypokalemia · Replete Sepsis/hypotension · On abx · Bacteremia likely from R arm source HTN 
· Increase labetalol Urinary retention · S/p joyce Subjective:  
CC: f/u ARF 
HPI: Renal function better. K low and being repleted. HTN is not optimally controlled. ROS: Unable to obtain due to AMS Current Facility-Administered Medications Medication Dose Route Frequency  cloNIDine (CATAPRES) 0.1 mg/24 hr patch 1 Patch  1 Patch TransDERmal Q7D  
 0.45% sodium chloride with KCl 20 mEq/L infusion   IntraVENous CONTINUOUS  
 potassium chloride 10 mEq in 100 ml IVPB  10 mEq IntraVENous Q1H  
 hydrALAZINE (APRESOLINE) 20 mg/mL injection 20 mg  20 mg IntraVENous Q6H PRN  
 labetalol (NORMODYNE) tablet 200 mg  200 mg Oral Q12H  
 insulin glargine (LANTUS) injection 60 Units  60 Units SubCUTAneous DAILY  acetaminophen (TYLENOL) suppository 650 mg  650 mg Rectal Q4H PRN  
 alteplase (CATHFLO) 1 mg in sterile water (preservative free) 1 mL injection  1 mg InterCATHeter PRN  
 sodium chloride (NS) flush 10-30 mL  10-30 mL InterCATHeter PRN  
 sodium chloride (NS) flush 10 mL  10 mL InterCATHeter Q24H  
 sodium chloride (NS) flush 10 mL  10 mL InterCATHeter PRN  
 sodium chloride (NS) flush 10-40 mL  10-40 mL InterCATHeter Q8H  
 sodium chloride (NS) flush 20 mL  20 mL InterCATHeter Q24H  cefTRIAXone (ROCEPHIN) 2 g in 0.9% sodium chloride (MBP/ADV) 50 mL  2 g IntraVENous Q12H  
 HYDROmorphone (PF) (DILAUDID) injection 2 mg  2 mg IntraVENous Q3H PRN  
 sodium chloride (NS) flush 5-10 mL  5-10 mL IntraVENous PRN  
 sodium chloride (NS) flush 5-10 mL  5-10 mL IntraVENous Q8H  
 sodium chloride (NS) flush 5-10 mL  5-10 mL IntraVENous PRN  
  acetaminophen (TYLENOL) tablet 650 mg  650 mg Oral Q4H PRN  
 heparin (porcine) injection 5,000 Units  5,000 Units SubCUTAneous Q8H  
 ondansetron (ZOFRAN) injection 4 mg  4 mg IntraVENous Q4H PRN  
 insulin lispro (HUMALOG) injection   SubCUTAneous AC&HS  
 glucose chewable tablet 16 g  4 Tab Oral PRN  
 dextrose (D50W) injection syrg 12.5-25 g  25-50 mL IntraVENous PRN  
 glucagon (GLUCAGEN) injection 1 mg  1 mg IntraMUSCular PRN  
 aspirin delayed-release tablet 81 mg  81 mg Oral BID  levothyroxine (SYNTHROID) tablet 125 mcg  125 mcg Oral ACB  sertraline (ZOLOFT) tablet 150 mg  150 mg Oral QHS Objective:  
 
Vitals: 
Blood pressure 170/79, pulse 96, temperature 98.8 °F (37.1 °C), resp. rate 19, height 5' 1\" (1.549 m), weight 86.8 kg (191 lb 5.8 oz), SpO2 90 %. Temp (24hrs), Av.4 °F (37.4 °C), Min:98.1 °F (36.7 °C), Max:100.2 °F (37.9 °C) Intake and Output: 
No intake/output data recorded.  1901 -  0700 In: 2393.3 [I.V.:2393.3] Out: 2800 [Urine:2800] Physical Exam:              
GENERAL ASSESSMENT: AMS 
CHEST: CTA HEART: S1S2 ABDOMEN: Soft,NT 
EXTREMITY: no EDEMA 
 
 
   
ECG/rhythm: 
 
Data Review No results for input(s): TNIPOC in the last 72 hours. No lab exists for component: ITNL No results for input(s): CPK, CKMB, TROIQ in the last 72 hours. Recent Labs 18 
0056 18 
0104 18 
0100  150* 146*  
K 3.1* 3.1* 3.8  110* 108 CO2 30 30 26 BUN 43* 54* 65* CREA 1.42* 1.70* 2.06* * 195* 227* PHOS 2.9 4.2 5.6*  
MG 1.7 2.2 2.4  
CA 9.0 9.1 9.0 ALB 0.9* 1.0* 1.0* WBC 11.8* 12.8* 15.6* HGB 9.2* 9.3* 9.4* HCT 30.2* 29.8* 31.2*  
* 452* 481* No results for input(s): INR, PTP, APTT in the last 72 hours. No lab exists for component: INREXT, INREXT Needs: urine analysis, urine sodium, protein and creatinine Lab Results Component Value Date/Time Sodium,urine random 29 11/30/2018 06:10 PM  
 Creatinine, urine 187.11 11/30/2018 06:10 PM  
 
 
 
 
: Magan Barraza MD 
12/5/2018 [facility]: 
[email or web address] Gabby Carrasco office: 
[address], [address] Phone: [phone] Fax :     [phone] [place] office: 
[address] Phone - [phone] Fax - [phone]

## 2018-12-05 NOTE — PROGRESS NOTES
Bedside and Verbal shift change report given to Catina RN  (oncoming nurse) by Delano Lemus RN (offgoing nurse). Report included the following information SBAR, Kardex, Intake/Output, MAR, Recent Results, Med Rec Status and Cardiac Rhythm ST.

## 2018-12-05 NOTE — PROGRESS NOTES
Occupational Therapy Note: 
Patient continues to be with increased confusion and increased pain levels. She has not been able to participate with therapy x5 days. Due to her continued medical complexity, we will complete the order. Please re-consult when medically stable. Thank you.  
Abiodun Kan OTR/DEJA

## 2018-12-06 ENCOUNTER — APPOINTMENT (OUTPATIENT)
Dept: ULTRASOUND IMAGING | Age: 63
DRG: 871 | End: 2018-12-06
Attending: PHYSICIAN ASSISTANT
Payer: COMMERCIAL

## 2018-12-06 ENCOUNTER — APPOINTMENT (OUTPATIENT)
Dept: GENERAL RADIOLOGY | Age: 63
DRG: 871 | End: 2018-12-06
Attending: PHYSICIAN ASSISTANT
Payer: COMMERCIAL

## 2018-12-06 ENCOUNTER — APPOINTMENT (OUTPATIENT)
Dept: MRI IMAGING | Age: 63
DRG: 871 | End: 2018-12-06
Attending: INTERNAL MEDICINE
Payer: COMMERCIAL

## 2018-12-06 LAB
ANION GAP SERPL CALC-SCNC: 9 MMOL/L (ref 5–15)
BODY FLD TYPE: NORMAL
BUN SERPL-MCNC: 43 MG/DL (ref 6–20)
BUN/CREAT SERPL: 37 (ref 12–20)
CALCIUM SERPL-MCNC: 9 MG/DL (ref 8.5–10.1)
CHLORIDE SERPL-SCNC: 111 MMOL/L (ref 97–108)
CO2 SERPL-SCNC: 28 MMOL/L (ref 21–32)
CREAT SERPL-MCNC: 1.17 MG/DL (ref 0.55–1.02)
CRYSTALS FLD MICRO: NORMAL
ERYTHROCYTE [DISTWIDTH] IN BLOOD BY AUTOMATED COUNT: 14.8 % (ref 11.5–14.5)
GLUCOSE BLD STRIP.AUTO-MCNC: 114 MG/DL (ref 65–100)
GLUCOSE BLD STRIP.AUTO-MCNC: 128 MG/DL (ref 65–100)
GLUCOSE BLD STRIP.AUTO-MCNC: 86 MG/DL (ref 65–100)
GLUCOSE BLD STRIP.AUTO-MCNC: 88 MG/DL (ref 65–100)
GLUCOSE SERPL-MCNC: 117 MG/DL (ref 65–100)
HCT VFR BLD AUTO: 31.3 % (ref 35–47)
HGB BLD-MCNC: 9.5 G/DL (ref 11.5–16)
MAGNESIUM SERPL-MCNC: 1.8 MG/DL (ref 1.6–2.4)
MCH RBC QN AUTO: 27.7 PG (ref 26–34)
MCHC RBC AUTO-ENTMCNC: 30.4 G/DL (ref 30–36.5)
MCV RBC AUTO: 91.3 FL (ref 80–99)
NRBC # BLD: 0.02 K/UL (ref 0–0.01)
NRBC BLD-RTO: 0.1 PER 100 WBC
PHOSPHATE SERPL-MCNC: 3.5 MG/DL (ref 2.6–4.7)
PLATELET # BLD AUTO: 474 K/UL (ref 150–400)
PMV BLD AUTO: 9.2 FL (ref 8.9–12.9)
POTASSIUM SERPL-SCNC: 3.7 MMOL/L (ref 3.5–5.1)
RBC # BLD AUTO: 3.43 M/UL (ref 3.8–5.2)
RPR SER QL: NONREACTIVE
SERVICE CMNT-IMP: ABNORMAL
SERVICE CMNT-IMP: ABNORMAL
SERVICE CMNT-IMP: NORMAL
SERVICE CMNT-IMP: NORMAL
SODIUM SERPL-SCNC: 148 MMOL/L (ref 136–145)
WBC # BLD AUTO: 14.4 K/UL (ref 3.6–11)

## 2018-12-06 PROCEDURE — 70551 MRI BRAIN STEM W/O DYE: CPT

## 2018-12-06 PROCEDURE — 65660000000 HC RM CCU STEPDOWN

## 2018-12-06 PROCEDURE — 93880 EXTRACRANIAL BILAT STUDY: CPT

## 2018-12-06 PROCEDURE — 74011250636 HC RX REV CODE- 250/636: Performed by: RADIOLOGY

## 2018-12-06 PROCEDURE — 85027 COMPLETE CBC AUTOMATED: CPT

## 2018-12-06 PROCEDURE — 80048 BASIC METABOLIC PNL TOTAL CA: CPT

## 2018-12-06 PROCEDURE — 74011250636 HC RX REV CODE- 250/636: Performed by: INTERNAL MEDICINE

## 2018-12-06 PROCEDURE — 87205 SMEAR GRAM STAIN: CPT

## 2018-12-06 PROCEDURE — 74011636637 HC RX REV CODE- 636/637: Performed by: INTERNAL MEDICINE

## 2018-12-06 PROCEDURE — 94760 N-INVAS EAR/PLS OXIMETRY 1: CPT

## 2018-12-06 PROCEDURE — 74011250637 HC RX REV CODE- 250/637: Performed by: INTERNAL MEDICINE

## 2018-12-06 PROCEDURE — 86592 SYPHILIS TEST NON-TREP QUAL: CPT

## 2018-12-06 PROCEDURE — 36415 COLL VENOUS BLD VENIPUNCTURE: CPT

## 2018-12-06 PROCEDURE — 83735 ASSAY OF MAGNESIUM: CPT

## 2018-12-06 PROCEDURE — 74011000258 HC RX REV CODE- 258: Performed by: INTERNAL MEDICINE

## 2018-12-06 PROCEDURE — 84100 ASSAY OF PHOSPHORUS: CPT

## 2018-12-06 PROCEDURE — 76942 ECHO GUIDE FOR BIOPSY: CPT

## 2018-12-06 PROCEDURE — 82962 GLUCOSE BLOOD TEST: CPT

## 2018-12-06 PROCEDURE — 89060 EXAM SYNOVIAL FLUID CRYSTALS: CPT

## 2018-12-06 PROCEDURE — 87102 FUNGUS ISOLATION CULTURE: CPT

## 2018-12-06 PROCEDURE — 74011000250 HC RX REV CODE- 250: Performed by: INTERNAL MEDICINE

## 2018-12-06 PROCEDURE — 87075 CULTR BACTERIA EXCEPT BLOOD: CPT

## 2018-12-06 RX ORDER — METOPROLOL TARTRATE 5 MG/5ML
2.5 INJECTION INTRAVENOUS EVERY 6 HOURS
Status: DISCONTINUED | OUTPATIENT
Start: 2018-12-06 | End: 2018-12-11

## 2018-12-06 RX ORDER — CLONIDINE 0.3 MG/24H
1 PATCH, EXTENDED RELEASE TRANSDERMAL
Status: DISCONTINUED | OUTPATIENT
Start: 2018-12-06 | End: 2018-12-11

## 2018-12-06 RX ORDER — LIDOCAINE HYDROCHLORIDE 10 MG/ML
10 INJECTION, SOLUTION EPIDURAL; INFILTRATION; INTRACAUDAL; PERINEURAL
Status: COMPLETED | OUTPATIENT
Start: 2018-12-06 | End: 2018-12-06

## 2018-12-06 RX ADMIN — CEFTRIAXONE SODIUM 2 G: 2 INJECTION, POWDER, FOR SOLUTION INTRAMUSCULAR; INTRAVENOUS at 10:38

## 2018-12-06 RX ADMIN — LIDOCAINE HYDROCHLORIDE 5 ML: 10 INJECTION, SOLUTION EPIDURAL; INFILTRATION; INTRACAUDAL; PERINEURAL at 15:21

## 2018-12-06 RX ADMIN — Medication 10 ML: at 10:38

## 2018-12-06 RX ADMIN — ONDANSETRON 4 MG: 2 INJECTION INTRAMUSCULAR; INTRAVENOUS at 08:18

## 2018-12-06 RX ADMIN — SODIUM CHLORIDE AND POTASSIUM CHLORIDE: 4.5; 1.49 INJECTION, SOLUTION INTRAVENOUS at 15:22

## 2018-12-06 RX ADMIN — HYDROMORPHONE HYDROCHLORIDE 2 MG: 2 INJECTION, SOLUTION INTRAMUSCULAR; INTRAVENOUS; SUBCUTANEOUS at 05:37

## 2018-12-06 RX ADMIN — HYDROMORPHONE HYDROCHLORIDE 2 MG: 2 INJECTION, SOLUTION INTRAMUSCULAR; INTRAVENOUS; SUBCUTANEOUS at 20:19

## 2018-12-06 RX ADMIN — Medication 10 ML: at 13:33

## 2018-12-06 RX ADMIN — SODIUM CHLORIDE AND POTASSIUM CHLORIDE: 4.5; 1.49 INJECTION, SOLUTION INTRAVENOUS at 02:17

## 2018-12-06 RX ADMIN — Medication 10 ML: at 13:34

## 2018-12-06 RX ADMIN — HYDRALAZINE HYDROCHLORIDE 20 MG: 20 INJECTION INTRAMUSCULAR; INTRAVENOUS at 16:41

## 2018-12-06 RX ADMIN — INSULIN GLARGINE 60 UNITS: 100 INJECTION, SOLUTION SUBCUTANEOUS at 08:17

## 2018-12-06 RX ADMIN — HEPARIN SODIUM 5000 UNITS: 5000 INJECTION INTRAVENOUS; SUBCUTANEOUS at 21:46

## 2018-12-06 RX ADMIN — HYDRALAZINE HYDROCHLORIDE 20 MG: 20 INJECTION INTRAMUSCULAR; INTRAVENOUS at 08:18

## 2018-12-06 RX ADMIN — HYDROMORPHONE HYDROCHLORIDE 2 MG: 2 INJECTION, SOLUTION INTRAMUSCULAR; INTRAVENOUS; SUBCUTANEOUS at 08:18

## 2018-12-06 RX ADMIN — HYDROMORPHONE HYDROCHLORIDE 2 MG: 2 INJECTION, SOLUTION INTRAMUSCULAR; INTRAVENOUS; SUBCUTANEOUS at 02:12

## 2018-12-06 RX ADMIN — LABETALOL HYDROCHLORIDE 10 MG: 5 INJECTION INTRAVENOUS at 13:28

## 2018-12-06 RX ADMIN — Medication 20 ML: at 10:38

## 2018-12-06 RX ADMIN — HEPARIN SODIUM 5000 UNITS: 5000 INJECTION INTRAVENOUS; SUBCUTANEOUS at 13:28

## 2018-12-06 RX ADMIN — Medication 10 ML: at 21:46

## 2018-12-06 RX ADMIN — HEPARIN SODIUM 5000 UNITS: 5000 INJECTION INTRAVENOUS; SUBCUTANEOUS at 05:37

## 2018-12-06 RX ADMIN — METOPROLOL TARTRATE 2.5 MG: 1 INJECTION, SOLUTION INTRAVENOUS at 20:19

## 2018-12-06 RX ADMIN — HYDROMORPHONE HYDROCHLORIDE 2 MG: 2 INJECTION, SOLUTION INTRAMUSCULAR; INTRAVENOUS; SUBCUTANEOUS at 16:40

## 2018-12-06 RX ADMIN — METOPROLOL TARTRATE 2.5 MG: 1 INJECTION, SOLUTION INTRAVENOUS at 23:34

## 2018-12-06 RX ADMIN — HYDROMORPHONE HYDROCHLORIDE 2 MG: 2 INJECTION, SOLUTION INTRAMUSCULAR; INTRAVENOUS; SUBCUTANEOUS at 23:32

## 2018-12-06 RX ADMIN — HYDROMORPHONE HYDROCHLORIDE 2 MG: 2 INJECTION, SOLUTION INTRAMUSCULAR; INTRAVENOUS; SUBCUTANEOUS at 13:28

## 2018-12-06 RX ADMIN — LABETALOL HYDROCHLORIDE 10 MG: 5 INJECTION INTRAVENOUS at 08:17

## 2018-12-06 RX ADMIN — CEFTRIAXONE SODIUM 2 G: 2 INJECTION, POWDER, FOR SOLUTION INTRAMUSCULAR; INTRAVENOUS at 21:46

## 2018-12-06 NOTE — PROGRESS NOTES
[facility]: [facility] Neurology [facility] [address] [place] [phone] Name:   Lisandra Flowers Medical record #: [id number] Admission Date: 11/30/2018 Reason for Consult:  Dr. Silverio Raphael:  
Overnight events: 
 
Refusing po meds. Very hypertensive. New left foot drop Objective: EEG: 
Assessment/Plan: 1. Altered Mental Status: · Neurochecks:  Every 4 hours · Becoming hypernatremic again at 148. · MRI brain pending Stroke work up · Last A1C:  7.2 · LDL:    
· Last TTE:  Systolic function was normal. Ejection fraction was 
estimated in the range of 55 % to 60 %. There were no regional wall motion 
abnormalities. · Follow up MRI: 
· Carotid vascular imaging: 
 
Risk factors for stroke include:  Obesity, DM, HTN, HLD, physical inactivity, ALEXANDRE · Discussed with patient:   
· Discussed signs/symptoms of stroke and when to call 911 
 
  
2. Mobility:  
· Has not been OOB. · Therapy has signed off due to confusion and pain · Order for nursing to get pt OOB placed. 4.  Diet:   
· Needs SLP  
  
5. VTE Prophylaxes: · Lovenox 40 mg, SQ daily  
  
Thank you for allowing the Neurology Service the pleasure of participating in the care of your patient. This patient will be discussed with my collaborating care team physician Dr. Laxmi Zuniga and he may have further recommendations regarding this patient's care.    
  
 
Attending Attestation:  
============================================================== Attending Addendum I have reviewed the documentation provided by the nurse practitioner, Kb Duque, discussed her findings, clinical impression, and the proposed management plans with regards to this patient's encounter. I have personally evaluated the patient, verify the history and confirm physical findings. Below are my additional findings: 
  
 
 
Subjective:  
Pt still only moaning in response to questions.  is at the bedside. He reports she has had a steady decline since being hospitalized. Hospitalist was concerned about foot drop so neuro was reconsulted. Objective:  
 
 
Imaging: As above Exam: 
Visit Vitals /70 Pulse (!) 101 Temp 97.5 °F (36.4 °C) Resp 16 Ht 5' 1\" (1.549 m) Wt 87.8 kg (193 lb 9 oz) SpO2 93% BMI 36.57 kg/m² Examined patient with NP and my exam is below Assessment/Plan:  
 
Patient Active Problem List  
Diagnosis Code  Metabolic acidosis J22.3  BAYLEE (acute kidney injury) ([facility] [address] N17.9  
 HTN (hypertension) I10  
 Chronic back pain M54.9, G89.29  
 Diabetes mellitus, type 2 ([facility] [address] E11.9  Leukocytosis D72.829  Right elbow pain M25.521  
 Hypotension I95.9 This is a 62 y/o who presented with confusion and is on treatment for bacteremia. She has continued to have confusion despite this. EEG was neg for seizures and more consistent with toxic/metabolic process. CT head was neg. LP neg for obvious infection. Will attempt to see if there is additional CSF we can use to send cytology and paraneoplastic panel. Neuro exam is non focal. She does have bilateral foot drop but I suspect this is due to deconditioning. Will get foot braces in place. Will also do MRI brain and C spine today as well. She has a history of lumbar stenosis.  
  
1. CT head neg. Will do MRI brain and C spine today 2. cEEG neg for seizure was neg 3. Encephalopathy labs were negative 4. LP with elevated RBC and WBC but very traumatic tap. Meningitis panel neg. HSV neg. Lyme pending. Attempting to add studies to CSF including cytology and paraneoplastic panel. 5. Cont abx per ID. She is on meningeal doses of this for concern for CSF seeding. Will stop acyclovir. 6. Foot braces ordered Will follow Signed: 
Tianna Crespo MD 
12/6/2018 Physical Exam: 
 
General:  Anxious, moaning. Lungs:   Clear to auscultation bilaterally. No crackles/wheezes. Heart: Abdominal:  Regular rate and rhythm, No murmur, click, rub or gallop. Soft and nondistended Skin: Skin color, texture, turgor normal.   
NEUROLOGICAL EXAM: 
 
Appearance:  Well developed, well nourished,  and is in no acute distress. Mental Status: Moans, did say yes to pain. WIll not follow commands. Cranial Nerves:   Does not tract. PERRL, no nystagmus, no ptosis. Facial movement is symmetric. Palate is midline. Tongue is midline. Motor:  Does not follow commands, withdrawls in left foot, both feet down Reflexes:   Deep tendon reflexes 2+/4 and symmetrical.  
Sensory:   JEF. Gait:  Not tested. Tremor:   JEF. Cerebellar:  JEF. Current Facility-Administered Medications Medication Dose Route Frequency Provider Last Rate Last Dose  0.45% sodium chloride with KCl 20 mEq/L infusion   IntraVENous CONTINUOUS Bailey Purcell  mL/hr at 12/06/18 2352  labetalol (NORMODYNE) tablet 300 mg  300 mg Oral Q12H Serge Guevara MD      
 influenza vaccine 3646-00 (6 mos+)(PF) (FLUARIX QUAD/FLULAVAL QUAD) injection 0.5 mL  0.5 mL IntraMUSCular PRIOR TO DISCHARGE Bailey Purcell MD      
 cloNIDine (CATAPRES) 0.2 mg/24 hr patch 1 Patch  1 Patch TransDERmal Anjana Romero MD   1 Patch at 12/05/18 1448  labetalol (NORMODYNE;TRANDATE) injection 10 mg  10 mg IntraVENous Q4H PRN Bailey Purcell MD   10 mg at 12/06/18 6724  hydrALAZINE (APRESOLINE) 20 mg/mL injection 20 mg  20 mg IntraVENous Q6H PRN Garland Zambrano MD   20 mg at 12/06/18 0017  insulin glargine (LANTUS) injection 60 Units  60 Units SubCUTAneous DAILY Coretta Zambrano MD   60 Units at 12/06/18 8716  acetaminophen (TYLENOL) suppository 650 mg  650 mg Rectal Q4H PRN Odell Guy MD   650 mg at 12/05/18 5164  alteplase (CATHFLO) 1 mg in sterile water (preservative free) 1 mL injection  1 mg InterCATHeter PRN Garland Zambrano MD      
 sodium chloride (NS) flush 10-30 mL  10-30 mL InterCATHeter PRN Coretta Zambrano MD      
  sodium chloride (NS) flush 10 mL  10 mL InterCATHeter Q24H Garland Zambrano MD   10 mL at 12/05/18 1222  sodium chloride (NS) flush 10 mL  10 mL InterCATHeter PRN Garland Zambrano MD      
 sodium chloride (NS) flush 10-40 mL  10-40 mL InterCATHeter Q8H Garland Zambrano MD   10 mL at 12/05/18 2148  sodium chloride (NS) flush 20 mL  20 mL InterCATHeter Q24H Garland Zambrano MD   20 mL at 12/05/18 1223  cefTRIAXone (ROCEPHIN) 2 g in 0.9% sodium chloride (MBP/ADV) 50 mL  2 g IntraVENous Q12H Gerardine An,  mL/hr at 12/05/18 2147 2 g at 12/05/18 2147  
 HYDROmorphone (PF) (DILAUDID) injection 2 mg  2 mg IntraVENous Q3H PRN Keisha Kiran MD   2 mg at 12/06/18 1491  sodium chloride (NS) flush 5-10 mL  5-10 mL IntraVENous PRN Yvette Crooks MD   10 mL at 12/01/18 1909  sodium chloride (NS) flush 5-10 mL  5-10 mL IntraVENous Q8H Zach Huber MD   10 mL at 12/05/18 2148  sodium chloride (NS) flush 5-10 mL  5-10 mL IntraVENous PRN Zach Huber MD      
 acetaminophen (TYLENOL) tablet 650 mg  650 mg Oral Q4H PRARRON Huber MD   650 mg at 12/03/18 1815  
 heparin (porcine) injection 5,000 Units  5,000 Units SubCUTAneous Q8H Zach Huber MD   5,000 Units at 12/06/18 0537  
 ondansetron (ZOFRAN) injection 4 mg  4 mg IntraVENous Q4H PRARRON Huber MD   4 mg at 12/06/18 0089  insulin lispro (HUMALOG) injection   SubCUTAneous AC&HS Zach Huber MD   Stopped at 12/05/18 1130  
 glucose chewable tablet 16 g  4 Tab Oral PRARRON Huber MD      
 dextrose (D50W) injection syrg 12.5-25 g  25-50 mL IntraVENous SHERRY Huber MD   25 g at 11/30/18 2016  
 glucagon (GLUCAGEN) injection 1 mg  1 mg IntraMUSCular PRARRON Huber MD      
 aspirin delayed-release tablet 81 mg  81 mg Oral BID Zach Huber MD   81 mg at 12/05/18 184  levothyroxine (SYNTHROID) tablet 125 mcg  125 mcg Oral ACB Zach Huber MD   125 mcg at 12/04/18 2713  sertraline (ZOLOFT) tablet 150 mg  150 mg Oral QHS Asuncion Quinn MD   150 mg at 12/03/18 2147 Recent Results (from the past 24 hour(s)) GLUCOSE, POC Collection Time: 12/05/18 11:59 AM  
Result Value Ref Range Glucose (POC) 132 (H) 65 - 100 mg/dL Performed by Cristino Frankel (PCT) GLUCOSE, POC Collection Time: 12/05/18  4:19 PM  
Result Value Ref Range Glucose (POC) 118 (H) 65 - 100 mg/dL Performed by Cristino Frankel (PCT) GLUCOSE, POC Collection Time: 12/05/18  9:05 PM  
Result Value Ref Range Glucose (POC) 111 (H) 65 - 100 mg/dL Performed by Selene Wang (PCT) CBC W/O DIFF Collection Time: 12/06/18  1:11 AM  
Result Value Ref Range WBC 14.4 (H) 3.6 - 11.0 K/uL  
 RBC 3.43 (L) 3.80 - 5.20 M/uL HGB 9.5 (L) 11.5 - 16.0 g/dL HCT 31.3 (L) 35.0 - 47.0 % MCV 91.3 80.0 - 99.0 FL  
 MCH 27.7 26.0 - 34.0 PG  
 MCHC 30.4 30.0 - 36.5 g/dL  
 RDW 14.8 (H) 11.5 - 14.5 % PLATELET 777 (H) 352 - 400 K/uL MPV 9.2 8.9 - 12.9 FL  
 NRBC 0.1 (H) 0  WBC ABSOLUTE NRBC 0.02 (H) 0.00 - 0.01 K/uL METABOLIC PANEL, BASIC Collection Time: 12/06/18  1:11 AM  
Result Value Ref Range Sodium 148 (H) 136 - 145 mmol/L Potassium 3.7 3.5 - 5.1 mmol/L Chloride 111 (H) 97 - 108 mmol/L  
 CO2 28 21 - 32 mmol/L Anion gap 9 5 - 15 mmol/L Glucose 117 (H) 65 - 100 mg/dL BUN 43 (H) 6 - 20 MG/DL Creatinine 1.17 (H) 0.55 - 1.02 MG/DL  
 BUN/Creatinine ratio 37 (H) 12 - 20 GFR est AA 57 (L) >60 ml/min/1.73m2 GFR est non-AA 47 (L) >60 ml/min/1.73m2 Calcium 9.0 8.5 - 10.1 MG/DL MAGNESIUM Collection Time: 12/06/18  1:11 AM  
Result Value Ref Range Magnesium 1.8 1.6 - 2.4 mg/dL PHOSPHORUS Collection Time: 12/06/18  1:11 AM  
Result Value Ref Range Phosphorus 3.5 2.6 - 4.7 MG/DL  
GLUCOSE, POC Collection Time: 12/06/18  7:30 AM  
Result Value Ref Range Glucose (POC) 114 (H) 65 - 100 mg/dL Performed by Cristino Frankel (PCT) Visit Vitals BP (!) 182/101 Pulse (!) 102 Temp 97.5 °F (36.4 °C) Resp 13 Ht 5' 1\" (1.549 m) Wt 87.8 kg (193 lb 9 oz) SpO2 94% BMI 36.57 kg/m² O2 Flow Rate (L/min): 1 l/min O2 Device: Nasal cannula Temp (24hrs), Av.4 °F (36.9 °C), Min:97.5 °F (36.4 °C), Max:99.2 °F (37.3 °C) No intake/output data recorded.  1901 -  0700 In: 3591.7 [I.V.:3591.7] Out: 2650 [Urine:2650] Care Plan discussed with: 
Patient x Family RN Care Manager Consultant/Specialist:    
 
 
Sia Chen Ohms, ACNP-BC

## 2018-12-06 NOTE — PROGRESS NOTES
[facility] [place] [address] 
[address] [facility], [address] [place] [address] 
[phone] Medical Progress Note NAME: Augustin Johnson :  1955 MRM:  [id number] Date/Time: 2018 Assessment / Plan:  
 
62 yo WF w/ hx of HTN, DM, chronic pain, presenting with AMS, found to have severe sepsis, bacteremia, and ARF,  
 Acute metabolic encephalopathy: no better. Unclear etiology. Lengthy discussion with neurology, appreciate their re-evaluation. Will check MRI brain and C-spine. Head CT showed no acute process. Underwent LP , traumatic tap, not entirely normal, but not consistent with bacterial meningitis. Gram stain negative. HSV PCR negative. add'l add on labs for CSF. cEEG neg for seizure. TSH, B12/folate okay. RPR pending.  
 Severe sepsis/bacteremia: Likely from R arm/elbow cellulitis. Blood Cx +  bottles with GBS on admission, repeat blood Cx NGTD. ID following. No e/o valvular veg on TTE. Continue IV CTX (dosed at meningitis dose due to concerns of ongoing AMS), discussed with Dr. Cecilia Zimmerman.  
  
R elbow cellulitis/bursitis/acute pain: likely source of infection. Discussed with ortho, MRI right arm/elbow showed complex joint effusion with thick enhancing synovitis. Abx as above 
  
ARF: slowly improving. Likely from ATN/sepsis, hypovolemia. ACE-I on hold. Cont IVF, monitor BMP. Nephrology following 
  
AG metabolic acidosis: resolved. Due to sepsis. Lactate normal. Off bicarb  
  
Hypernatremia: changed fluids to hypotonic. Follow BMP 
  
DM type 2 w/ hypoglycemia: A1C 7.2%. Hypoglycemia on admission from poor PO intake and sepsis but has resolved. Hold metformin. Cont Lantus, cont SSI 
  
HTN: changed atenolol to labetalol but pt not taking PO. Added clonidine patch, increase dose. IV hydralazine and labetalol PRN 
  
Anemia: Fe studies consistent with chronic dz. Follow Hg 
  
 
Total time spent: 40 minutes Time spent in the care of this patient including reviewing records, discussing with nursing and/or other providers on the treatment team, obtaining history and examining the patient, and discussing treatment plans. Care Plan discussed with: Patient, Nursing Staff and >50% of time spent in counseling and coordination of care Discussed:  Care Plan Prophylaxis:  heparin Disposition:  TBD Subjective: Chief Complaint:  Follow up sepsis Chart/notes/labs/studies reviewed, patient examined at bedside. No meaningful history or ROS able to be obtained due to AMS. Discussed with  at bedside Objective:  
 
 
Vitals:  
 
  
Last 24hrs VS reviewed since prior progress note. Most recent are: 
 
Visit Vitals BP (!) 178/98 Pulse (!) 111 Temp 98 °F (36.7 °C) Resp 17 Ht 5' 1\" (1.549 m) Wt 87.8 kg (193 lb 9 oz) SpO2 96% BMI 36.57 kg/m² SpO2 Readings from Last 6 Encounters:  
12/06/18 96% O2 Flow Rate (L/min): 2 l/min(received from night shift on 2 L) Intake/Output Summary (Last 24 hours) at 12/6/2018 1406 Last data filed at 12/6/2018 5432 Gross per 24 hour Intake 1198.33 ml Output 1200 ml Net -1.67 ml Exam:  
 
Physical Exam: 
 
Gen:  Elderly, chronically ill-appearing, morbidly obese, NAD HEENT:  Pink conjunctivae, PERRL, hearing intact to voice, moist mucous membranes Neck:  Supple, without masses Resp:  No accessory muscle use, diminished BS at bases otherwise no wheezing, rales, rhonchi 
Card:  tachycardic, No murmurs, normal S1, S2 without thrills, bruits or peripheral edema Abd:  Soft, non-tender, non-distended, normoactive bowel sounds are present Musc:  No cyanosis or clubbing. Notable LE muscular atrophy Skin:  Right elbow swelling, erythema. Appeared TTP Neuro:  not moving extremities. Not following commands. Left foot drop Psych:  poor insight, oriented to person Medications Reviewed: (see below) Lab Data Reviewed: (see below) 
 
______________________________________________________________________ Medications:  
 
Current Facility-Administered Medications Medication Dose Route Frequency  lidocaine (PF) (XYLOCAINE) 10 mg/mL (1 %) injection 10 mL  10 mL SubCUTAneous RAD ONCE  cloNIDine (CATAPRES) 0.3 mg/24 hr patch 1 Patch  1 Patch TransDERmal Q7D  
 0.45% sodium chloride with KCl 20 mEq/L infusion   IntraVENous CONTINUOUS  
 labetalol (NORMODYNE) tablet 300 mg  300 mg Oral Q12H  
 influenza vaccine 2018-19 (6 mos+)(PF) (FLUARIX QUAD/FLULAVAL QUAD) injection 0.5 mL  0.5 mL IntraMUSCular PRIOR TO DISCHARGE  labetalol (NORMODYNE;TRANDATE) injection 10 mg  10 mg IntraVENous Q4H PRN  
 hydrALAZINE (APRESOLINE) 20 mg/mL injection 20 mg  20 mg IntraVENous Q6H PRN  
 insulin glargine (LANTUS) injection 60 Units  60 Units SubCUTAneous DAILY  acetaminophen (TYLENOL) suppository 650 mg  650 mg Rectal Q4H PRN  
 alteplase (CATHFLO) 1 mg in sterile water (preservative free) 1 mL injection  1 mg InterCATHeter PRN  
 sodium chloride (NS) flush 10-30 mL  10-30 mL InterCATHeter PRN  
 sodium chloride (NS) flush 10 mL  10 mL InterCATHeter Q24H  
 sodium chloride (NS) flush 10 mL  10 mL InterCATHeter PRN  
 sodium chloride (NS) flush 10-40 mL  10-40 mL InterCATHeter Q8H  
 sodium chloride (NS) flush 20 mL  20 mL InterCATHeter Q24H  cefTRIAXone (ROCEPHIN) 2 g in 0.9% sodium chloride (MBP/ADV) 50 mL  2 g IntraVENous Q12H  
 HYDROmorphone (PF) (DILAUDID) injection 2 mg  2 mg IntraVENous Q3H PRN  
 sodium chloride (NS) flush 5-10 mL  5-10 mL IntraVENous PRN  
 sodium chloride (NS) flush 5-10 mL  5-10 mL IntraVENous Q8H  
 sodium chloride (NS) flush 5-10 mL  5-10 mL IntraVENous PRN  
 acetaminophen (TYLENOL) tablet 650 mg  650 mg Oral Q4H PRN  
 heparin (porcine) injection 5,000 Units  5,000 Units SubCUTAneous Q8H  
 ondansetron (ZOFRAN) injection 4 mg  4 mg IntraVENous Q4H PRN  
  insulin lispro (HUMALOG) injection   SubCUTAneous AC&HS  
 glucose chewable tablet 16 g  4 Tab Oral PRN  
 dextrose (D50W) injection syrg 12.5-25 g  25-50 mL IntraVENous PRN  
 glucagon (GLUCAGEN) injection 1 mg  1 mg IntraMUSCular PRN  
 aspirin delayed-release tablet 81 mg  81 mg Oral BID  levothyroxine (SYNTHROID) tablet 125 mcg  125 mcg Oral ACB  sertraline (ZOLOFT) tablet 150 mg  150 mg Oral QHS Lab Review:  
 
Recent Labs 12/06/18 
0111 12/05/18 0056 12/04/18 0104 WBC 14.4* 11.8* 12.8* HGB 9.5* 9.2* 9.3* HCT 31.3* 30.2* 29.8* * 447* 452* Recent Labs 12/06/18 0111 12/05/18 0056 12/04/18 0104 * 145 150*  
K 3.7 3.1* 3.1*  
* 108 110* CO2 28 30 30 * 208* 195* BUN 43* 43* 54* CREA 1.17* 1.42* 1.70* CA 9.0 9.0 9.1 MG 1.8 1.7 2.2 PHOS 3.5 2.9 4.2 ALB  --  0.9* 1.0*  
SGOT  --  31 32 ALT  --  18 24 No components found for: [place] No results for input(s): PH, PCO2, PO2, HCO3, FIO2 in the last 72 hours. No results for input(s): INR in the last 72 hours. No lab exists for component: INREXT, INREXT No results found for: SDES Lab Results Component Value Date/Time Culture result: Culture performed on Unspun Fluid 12/04/2018 02:30 PM  
 Culture result: NO GROWTH 2 DAYS 12/04/2018 02:30 PM  
 Culture result: NO GROWTH 5 DAYS 12/01/2018 12:31 PM  
 Culture result: NO GROWTH 5 DAYS 12/01/2018 12:31 PM  
 
        
___________________________________________________ Attending Physician: Marianne Carrillo MD

## 2018-12-06 NOTE — CONSULTS
[address] Gary Waller  MR#: [id number]  : 1955  ACCOUNT #: [de-identified]   DATE OF SERVICE: 2018    PRIMARY CARE PHYSICIAN:  Dr. Dolores Schmidt. REASON FOR CONSULTATION:  Elbow pain. HISTORY OF PRESENT ILLNESS:  The patient is a 70-year-old female whom I was asked to see by my partner, Dr. Kade Petty. She has been at [address] [place] [address] since , when she presented after having slow gradual onset of diffuse pain in her back and elbow as well as changing in her mental status slowly over several days. She ultimately came to the hospital.  She has been admitted to Medicine Service and has been receiving IV antibiotics. She had a significant leukocytosis, which is improved, but still persists. Her mentation has deteriorated over the last several days, continues to show signs of pain with range of motion of her elbow. Per her , who is present at the time of my encounter, the patient had significant swelling of the right elbow as well as a firm nodule over the dorsum of the elbow that has improved dramatically if not completely resolved despite having continued pain. PAST MEDICAL HISTORY:  Notable for diabetes, chronic back pain, hypertension. MEDICATIONS AND ALLERGIES:  Per admission medication reconciliation. She was an insulin-dependent diabetic. SOCIAL HISTORY:  She does not smoke. She is . She does not drink. REVIEW OF SYSTEMS:  Unable to obtain secondary to patient's mental status. PHYSICAL EXAMINATION:  VITAL SIGNS:  Temperature 97.5, T-max 99.2 over the last 24 hours, pulse 101, blood pressure 137/70, respirations 16, oxygenation 93% on room air. GENERAL:  She is awake and tracks movements with her eyes, but does not respond to questions or follow commands. LUNGS:  Breathing is unlabored. ABDOMEN:  Soft, nondistended. EXTREMITIES:  Examination of bilateral upper extremities  demonstrates no erythema, no induration. She is able to tolerate passive elbow range of motion without any withdrawing from pain, particularly when she is distracted. Despite no erythema or induration, there is some subcutaneous crepitation with palpation, which does not seem overtly painful. Her fingers are swollen. Other side unremarkable. LABORATORY DATA:  White blood cell count of 14.4, hemoglobin 9.5, hematocrit 31.3, platelets of 645. Sodium is 148, potassium 3.7, chloride 111, CO2 of 28, BUN 43, creatinine 1.17. 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  MRI from yesterday is available for review demonstrating significant subcutaneous swelling and possible effusion. There are no erosive changes. ASSESSMENT:  A 35-year-old female with acute elbow pain in the setting of encephalopathy. On exam today, she does not seem particularly painful and certainly despite the subcutaneous air on the MRI she does not have any clinical appearance of cellulitis. She does have an effusion, which may be attributable to underlying osteoarthritis, though given her unusual presentation, I think an aspiration would be warranted to rule out septic arthritis. However, given her ability to tolerate range of motion of her elbow and the lack of clinical signs of this other than the subcutaneous crepitation, I do not have a high level of suspicion for this. We will obtain aspiration with Interventional Radiology and follow closely. I discussed this in detail with her , who is present at the bedside.       MD KIRIT Leavitt /   D: 12/06/2018 10:55     T: 12/06/2018 11:11  JOB #: [id number]  CC: Kaci Delgado MD

## 2018-12-06 NOTE — PROGRESS NOTES
1900 
Bedside and Verbal shift change report given to [facility] (oncoming nurse) by Art Rosas (offgoing nurse). Report included the following information SBAR, Kardex, Procedure Summary, Intake/Output, MAR, Accordion and Recent Results. Patient on bed. Initial assessment done. No signs of pain. [address] [place] Patient refusing PO meds. MD aware. Ordered to continue monitoring the BP. 
 
2328 BP elevated. PRN hydralazine given. Visit Vitals /85 Pulse (!) 102 Temp 97.5 °F (36.4 °C) Resp 15 Ht 5' 1\" (1.549 m) Wt 86.8 kg (191 lb 5.8 oz) SpO2 94% BMI 36.16 kg/m² 4216 Patient moaning and teary eyed, score of 7 on adult non verbal pain scale. MD aware. Ordered to use K pad.  
 
0700 Bedside and Verbal shift change report given to April RN (oncoming nurse) by Rosalino Alfaro RN (offgoing nurse). Report included the following information SBAR, Kardex, Procedure Summary, Intake/Output, MAR, Accordion and Recent Results.

## 2018-12-06 NOTE — PROGRESS NOTES
Patient is status post aspirate of right elbow by IR. No karis pus on aspirate. No need for I and D at this point. Resume diet. Will follow cultures. Continue IV antibiotics and elevation of right arm in elevation pillow as tolerated. MARY Degroot-C Orthopaedic Surgery  [facility]

## 2018-12-06 NOTE — PROGRESS NOTES
Problem: Falls - Risk of 
Goal: *Absence of Falls Document Deshaun Levels Fall Risk and appropriate interventions in the flowsheet. Outcome: Progressing Towards Goal 
Fall Risk Interventions: 
Mobility Interventions: Assess mobility with egress test, Bed/chair exit alarm, PT Consult for mobility concerns Mentation Interventions: Adequate sleep, hydration, pain control, Bed/chair exit alarm, More frequent rounding, Reorient patient, Room close to nurse's station Medication Interventions: Assess postural VS orthostatic hypotension, Bed/chair exit alarm, Patient to call before getting OOB, Teach patient to arise slowly Elimination Interventions: Bed/chair exit alarm, Call light in reach, Patient to call for help with toileting needs, Toileting schedule/hourly rounds Problem: Pressure Injury - Risk of 
Goal: *Prevention of pressure injury Document Nikolay Scale and appropriate interventions in the flowsheet. Outcome: Progressing Towards Goal 
Pressure Injury Interventions: 
Sensory Interventions: Assess changes in LOC, Assess need for specialty bed, Discuss PT/OT consult with provider, Turn and reposition approx. every two hours (pillows and wedges if needed) Moisture Interventions: Absorbent underpads, Apply protective barrier, creams and emollients, Check for incontinence Q2 hours and as needed, Maintain skin hydration (lotion/cream) Activity Interventions: Assess need for specialty bed, Increase time out of bed, Pressure redistribution bed/mattress(bed type), PT/OT evaluation Mobility Interventions: Assess need for specialty bed, HOB 30 degrees or less, PT/OT evaluation, Pressure redistribution bed/mattress (bed type) Nutrition Interventions: Document food/fluid/supplement intake, Discuss nutritional consult with provider Friction and Shear Interventions: Apply protective barrier, creams and emollients, Feet elevated on foot rest, Lift team/patient mobility team, Transferring/repositioning devices

## 2018-12-06 NOTE — PROGRESS NOTES
[facility] Progress Note Jigar Walker DO 
  [phone] Office [phone]  Fax 
 
2018 Assessment & Plan: 1. Right elbow cellulitis complicated by GBS bacteremia. Repeat BC's NGSF. Orthopedic surgery is following. Joint aspiration planned for today. Continue ceftriaxone. 2. Encephalopathy. This is thought to be both due to infection and narcotics. LP results abnormal but not c/w bacterial or viral infection. Meningitis pathogens panel negative. HSV 1&@ PCR negative. 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 Neurology following and has ordered additional CSF tests. MRI brain planned 3. Acute kidney injury. This is improving 4. Diabetes mellitus. Management per primary team. 
5. Rash prior to admission. Resolved. Pictures reviewed with patients . Possible cellulitis? Rash not c/w HSV/VZV. CoNS isolated from a single blood culture. This represents a contaminant. Subjective:  
 
Remains lethargic Objective:  
 
Vitals:  
Visit Vitals BP (!) 153/93 (BP Patient Position: At rest) Pulse (!) 105 Temp 98 °F (36.7 °C) Resp 16 Ht 5' 1\" (1.549 m) Wt 87.8 kg (193 lb 9 oz) SpO2 95% BMI 36.57 kg/m² Tmax:  Temp (24hrs), Av.3 °F (36.8 °C), Min:97.5 °F (36.4 °C), Max:99.2 °F (37.3 °C) Exam:  
Patient is intubated:  no 
 
Physical Examination:  
General:  Lethargic Head:  Normocephalic, atraumatic. Eyes:  Conjunctivae clear Neck: Supple Lungs:   No distress. Clear to auscultation bilaterally. Chest wall:    
Heart:  Regular rate and rhythm Abdomen:   Soft, non-tender, non-distended Extremities:   
Skin:  No rash Neurologic: Unable to assess Labs:     
 
No lab exists for component: ITNL No results for input(s): CPK, CKMB, TROIQ in the last 72 hours. Recent Labs 18 
0111 18 
0056 18 
0104 * 145 150* K 3.7 3.1* 3.1*  
* 108 110* CO2 28 30 30 BUN 43* 43* 54* CREA 1.17* 1.42* 1.70* * 208* 195* PHOS 3.5 2.9 4.2 MG 1.8 1.7 2.2 ALB  --  0.9* 1.0* WBC 14.4* 11.8* 12.8* HGB 9.5* 9.2* 9.3* HCT 31.3* 30.2* 29.8* * 447* 452* No results for input(s): INR, PTP, APTT in the last 72 hours. No lab exists for component: INREXT, INREXT Needs: urine analysis, urine sodium, protein and creatinine Lab Results Component Value Date/Time Sodium,urine random 29 11/30/2018 06:10 PM  
 Creatinine, urine 187.11 11/30/2018 06:10 PM  
 
 
 
Cultures: No results found for: SDES Lab Results Component Value Date/Time Culture result: Culture performed on Unspun Fluid 12/04/2018 02:30 PM  
 Culture result: NO GROWTH 2 DAYS 12/04/2018 02:30 PM  
 Culture result: NO GROWTH 5 DAYS 12/01/2018 12:31 PM  
 Culture result: NO GROWTH 5 DAYS 12/01/2018 12:31 PM  
 
 
Radiology:  
 
Medications Current Facility-Administered Medications Medication Dose Route Frequency Last Dose  0.45% sodium chloride with KCl 20 mEq/L infusion   IntraVENous CONTINUOUS    
 labetalol (NORMODYNE) tablet 300 mg  300 mg Oral Q12H    
 influenza vaccine 2018-19 (6 mos+)(PF) (FLUARIX QUAD/FLULAVAL QUAD) injection 0.5 mL  0.5 mL IntraMUSCular PRIOR TO DISCHARGE  cloNIDine (CATAPRES) 0.2 mg/24 hr patch 1 Patch  1 Patch TransDERmal Q7D 1 Patch at 12/05/18 1448  labetalol (NORMODYNE;TRANDATE) injection 10 mg  10 mg IntraVENous Q4H PRN 10 mg at 12/06/18 8842  hydrALAZINE (APRESOLINE) 20 mg/mL injection 20 mg  20 mg IntraVENous Q6H PRN 20 mg at 12/06/18 0818  insulin glargine (LANTUS) injection 60 Units  60 Units SubCUTAneous DAILY 60 Units at 12/06/18 8056  acetaminophen (TYLENOL) suppository 650 mg  650 mg Rectal Q4H  mg at 12/05/18 0044  alteplase (CATHFLO) 1 mg in sterile water (preservative free) 1 mL injection  1 mg InterCATHeter PRN    
  sodium chloride (NS) flush 10-30 mL  10-30 mL InterCATHeter PRN    
 sodium chloride (NS) flush 10 mL  10 mL InterCATHeter Q24H 10 mL at 12/06/18 1038  sodium chloride (NS) flush 10 mL  10 mL InterCATHeter PRN    
 sodium chloride (NS) flush 10-40 mL  10-40 mL InterCATHeter Q8H 10 mL at 12/05/18 2148  sodium chloride (NS) flush 20 mL  20 mL InterCATHeter Q24H 20 mL at 12/06/18 1038  cefTRIAXone (ROCEPHIN) 2 g in 0.9% sodium chloride (MBP/ADV) 50 mL  2 g IntraVENous Q12H 2 g at 12/06/18 1038  
 HYDROmorphone (PF) (DILAUDID) injection 2 mg  2 mg IntraVENous Q3H PRN 2 mg at 12/06/18 0818  sodium chloride (NS) flush 5-10 mL  5-10 mL IntraVENous PRN 10 mL at 12/01/18 1909  sodium chloride (NS) flush 5-10 mL  5-10 mL IntraVENous Q8H 10 mL at 12/05/18 2148  sodium chloride (NS) flush 5-10 mL  5-10 mL IntraVENous PRN    
 acetaminophen (TYLENOL) tablet 650 mg  650 mg Oral Q4H  mg at 12/03/18 1815  
 heparin (porcine) injection 5,000 Units  5,000 Units SubCUTAneous Q8H 5,000 Units at 12/06/18 0537  
 ondansetron (ZOFRAN) injection 4 mg  4 mg IntraVENous Q4H PRN 4 mg at 12/06/18 0818  insulin lispro (HUMALOG) injection   SubCUTAneous AC&HS Stopped at 12/05/18 1130  
 glucose chewable tablet 16 g  4 Tab Oral PRN    
 dextrose (D50W) injection syrg 12.5-25 g  25-50 mL IntraVENous PRN 25 g at 11/30/18 2016  
 glucagon (GLUCAGEN) injection 1 mg  1 mg IntraMUSCular PRN    
 aspirin delayed-release tablet 81 mg  81 mg Oral BID 81 mg at 12/05/18 1842  levothyroxine (SYNTHROID) tablet 125 mcg  125 mcg Oral  mcg at 12/04/18 7533  sertraline (ZOLOFT) tablet 150 mg  150 mg Oral  mg at 12/03/18 2147 Case discussed with: IM and  Anastasiia Austin DO

## 2018-12-06 NOTE — PROGRESS NOTES
Spiritual Care Assessment/Progress Note [address] 
 
 
NAME: Dori Matos      MRN: [id number] AGE: 61 y.o. SEX: female Pentecostal Affiliation: No Buddhism Language: English  
 
12/6/2018     Total Time (in minutes): 17 Spiritual Assessment begun in [facility] 3 PRO CARE TELE 1 through conversation with: 
  
    []Patient        [x] Family    [] Friend(s) Reason for Consult: Initial/Spiritual assessment, patient floor Spiritual beliefs: (Please include comment if needed) 
   [] Identifies with a keerthi tradition:     
   [] Supported by a keerthi community:        
   [x] Claims no spiritual orientation:       
   [] Seeking spiritual identity:            
   [] Adheres to an individual form of spirituality:       
   [] Not able to assess:                   
 
    
Identified resources for coping:  
   [] Prayer                           
   [] Music                  [] Guided Imagery [x] Family/friends                 [] Pet visits [] Devotional reading                         [] Unknown 
   [] Other:                                        
 
 
Interventions offered during this visit: (See comments for more details) Family/Friend(s): Affirmation of emotions/emotional suffering, Catharsis/review of pertinent events in supportive environment, Coping skills reviewed/reinforced, Life review/legacy, Pentecostal beliefs/image of God discussed Plan of Care: 
 
 [] Support spiritual and/or cultural needs  
 [] Support AMD and/or advance care planning process    
 [] Support grieving process 
 [] Coordinate Rites and/or Rituals  
 [] Coordination with community clergy [] No spiritual needs identified at this time 
 [] Detailed Plan of Care below (See Comments)  [] Make referral to Music Therapy 
[] Make referral to Pet Therapy    
[] Make referral to Addiction services 
[] Make referral to [facility] 
[] Make referral to Spiritual Care Partner [] No future visits requested       
[x] Follow up visits as needed Comments:  is visiting for an initial spiritual assessment with pt in room 320. Pt appeared to be persistently moaning at the time. Pt's spouse was present by bedside who processed pt's medical concerns and issues. He presented as calm and supportive of pt. Spouse engaged in some brief life review sharing about their two sons, their rescue dogs, and the work they do with training various dogs. Pt and family do not identify as Yazdanism/spiritual. Spouse values  support and appreciates care provided. Chaplain Deejay Eugene M.Div, M.S, [address] Spiritual Care available at [phone])

## 2018-12-06 NOTE — PROGRESS NOTES
Please complete MRI History and Safety Screening Form for this patient Using [employer] only under Orders Requiring a Screening Form: 
Example: 
Orders Requiring a Screening Form Procedure                    Order Status                      Form Status MRI EXAM                   Active                                In Progress Click on blue hyperlink as shown above to launch MRI screening form Answer all questions completely including Weight, Surgery, and Implant History. Document MUST be \"eSigned\" using a \"Signature Pad\" by the person completing form.  (patient and RN or MD completing form with the patient) Patient cannot be scanned until this form is completed and reviewed in MRI to ensure patient is SAFE and eligible for MRI. Call MRI when this has been successfully completed at [phone]. This must be done under [facility]. Do not use the Nursing Flowsheet.

## 2018-12-06 NOTE — PROCEDURES
[address]  *** FINAL REPORT ***    Name: Lillie Carter  MRN: [id number]    Inpatient  : 10 Feb 1955  HIS Order #: [id number]  [id number] [facility] Visit #: [id number]  Date: 06 Dec 2018    TYPE OF TEST: Cerebrovascular Duplex    REASON FOR TEST  Transient ischemic attacks    Right Carotid:-             Proximal               Mid                 Distal  cm/s  Systolic  Diastolic  Systolic  Diastolic  Systolic  Diastolic  CCA:    250.0      22.8                            92.7      21.5  Bulb:  ECA:     59.9       7.5  ICA:     43.2      12.4       97.8      31.8       86.2      30.5  ICA/CCA:  0.5       0.6    ICA Stenosis: <50%    Right Vertebral:-  Finding: Antegrade  Sys:       46.0  Ramonita:       13.2    Right Subclavian:    Left Carotid:-            Proximal                Mid                 Distal  cm/s  Systolic  Diastolic  Systolic  Diastolic  Systolic  Diastolic  CCA:    785.0      24.1                            32.1      10.1  Bulb:  ECA:     75.3      12.3  ICA:     71.0      27.0       52.8      20.4       71.5      12.3  ICA/CCA:  2.2       2.7    ICA Stenosis: <50%    Left Vertebral:-  Finding: Antegrade  Sys:       77.9  Ramonita:       28.7    Left Subclavian:    INTERPRETATION/FINDINGS  PROCEDURE:  Evaluation of the extracranial cerebrovascular arteries  with ultrasound (B-mode imaging, pulsed Doppler, color Doppler). Includes the common carotid, internal carotid, external carotid, and  vertebral arteries. FINDINGS: There is no evidence of significant stenosis bilaterally. IMPRESSION: Technically difficult study due to patient movement. Findings are consistent with 0-49% stenosis of the right internal  carotid and 0-49% stenosis of the left internal carotid. Vertebrals  are patent with antegrade flow. ADDITIONAL COMMENTS    I have personally reviewed the data relevant to the interpretation of  this  study.     TECHNOLOGIST: Phyllis Meadows  Signed: 2018 06:21 PM    PHYSICIAN: Loly Arriaza. Corey Kan MD  Signed: 12/10/2018 08:04 AM

## 2018-12-07 ENCOUNTER — APPOINTMENT (OUTPATIENT)
Dept: GENERAL RADIOLOGY | Age: 63
DRG: 871 | End: 2018-12-07
Attending: INTERNAL MEDICINE
Payer: COMMERCIAL

## 2018-12-07 ENCOUNTER — APPOINTMENT (OUTPATIENT)
Dept: MRI IMAGING | Age: 63
DRG: 871 | End: 2018-12-07
Attending: INTERNAL MEDICINE
Payer: COMMERCIAL

## 2018-12-07 LAB
ALBUMIN SERPL-MCNC: 1.2 G/DL (ref 3.5–5)
ALBUMIN/GLOB SERPL: 0.3 {RATIO} (ref 1.1–2.2)
ALP SERPL-CCNC: 81 U/L (ref 45–117)
ALT SERPL-CCNC: 18 U/L (ref 12–78)
ANION GAP SERPL CALC-SCNC: 11 MMOL/L (ref 5–15)
ARTERIAL PATENCY WRIST A: YES
AST SERPL-CCNC: 36 U/L (ref 15–37)
BACTERIA SPEC CULT: NORMAL
BACTERIA SPEC CULT: NORMAL
BASE EXCESS BLDA CALC-SCNC: 4.3 MMOL/L
BASOPHILS # BLD: 0 K/UL (ref 0–0.1)
BASOPHILS NFR BLD: 0 % (ref 0–1)
BDY SITE: ABNORMAL
BILIRUB SERPL-MCNC: 0.1 MG/DL (ref 0.2–1)
BUN SERPL-MCNC: 45 MG/DL (ref 6–20)
BUN/CREAT SERPL: 38 (ref 12–20)
CALCIUM SERPL-MCNC: 8.9 MG/DL (ref 8.5–10.1)
CHLORIDE SERPL-SCNC: 112 MMOL/L (ref 97–108)
CHOLEST SERPL-MCNC: 112 MG/DL
CO2 SERPL-SCNC: 27 MMOL/L (ref 21–32)
CREAT SERPL-MCNC: 1.18 MG/DL (ref 0.55–1.02)
DIFFERENTIAL METHOD BLD: ABNORMAL
EOSINOPHIL # BLD: 0.7 K/UL (ref 0–0.4)
EOSINOPHIL NFR BLD: 5 % (ref 0–7)
ERYTHROCYTE [DISTWIDTH] IN BLOOD BY AUTOMATED COUNT: 14.9 % (ref 11.5–14.5)
FIO2 ON VENT: 100 %
GLOBULIN SER CALC-MCNC: 4.7 G/DL (ref 2–4)
GLUCOSE BLD STRIP.AUTO-MCNC: 114 MG/DL (ref 65–100)
GLUCOSE BLD STRIP.AUTO-MCNC: 115 MG/DL (ref 65–100)
GLUCOSE BLD STRIP.AUTO-MCNC: 134 MG/DL (ref 65–100)
GLUCOSE BLD STRIP.AUTO-MCNC: 38 MG/DL (ref 65–100)
GLUCOSE BLD STRIP.AUTO-MCNC: 39 MG/DL (ref 65–100)
GLUCOSE BLD STRIP.AUTO-MCNC: 83 MG/DL (ref 65–100)
GLUCOSE BLD STRIP.AUTO-MCNC: 95 MG/DL (ref 65–100)
GLUCOSE BLD STRIP.AUTO-MCNC: 99 MG/DL (ref 65–100)
GLUCOSE SERPL-MCNC: 63 MG/DL (ref 65–100)
HCO3 BLDA-SCNC: 28 MMOL/L (ref 22–26)
HCT VFR BLD AUTO: 30.8 % (ref 35–47)
HDLC SERPL-MCNC: 14 MG/DL
HDLC SERPL: 8 {RATIO} (ref 0–5)
HGB BLD-MCNC: 9.1 G/DL (ref 11.5–16)
IMM GRANULOCYTES # BLD: 0 K/UL
IMM GRANULOCYTES NFR BLD AUTO: 0 %
LDLC SERPL CALC-MCNC: 27.2 MG/DL (ref 0–100)
LIPID PROFILE,FLP: ABNORMAL
LYMPHOCYTES # BLD: 0.5 K/UL (ref 0.8–3.5)
LYMPHOCYTES NFR BLD: 4 % (ref 12–49)
MAGNESIUM SERPL-MCNC: 1.9 MG/DL (ref 1.6–2.4)
MCH RBC QN AUTO: 27.4 PG (ref 26–34)
MCHC RBC AUTO-ENTMCNC: 29.5 G/DL (ref 30–36.5)
MCV RBC AUTO: 92.8 FL (ref 80–99)
MONOCYTES # BLD: 0.7 K/UL (ref 0–1)
MONOCYTES NFR BLD: 5 % (ref 5–13)
NEUTS SEG # BLD: 11.4 K/UL (ref 1.8–8)
NEUTS SEG NFR BLD: 86 % (ref 32–75)
NRBC # BLD: 0 K/UL (ref 0–0.01)
NRBC BLD-RTO: 0 PER 100 WBC
PCO2 BLDA: 40 MMHG (ref 35–45)
PH BLDA: 7.47 [PH] (ref 7.35–7.45)
PHOSPHATE SERPL-MCNC: 4.3 MG/DL (ref 2.6–4.7)
PLATELET # BLD AUTO: 449 K/UL (ref 150–400)
PMV BLD AUTO: 9.1 FL (ref 8.9–12.9)
PO2 BLDA: 437 MMHG (ref 80–100)
POTASSIUM SERPL-SCNC: 4.1 MMOL/L (ref 3.5–5.1)
PROT SERPL-MCNC: 5.9 G/DL (ref 6.4–8.2)
RBC # BLD AUTO: 3.32 M/UL (ref 3.8–5.2)
RBC MORPH BLD: ABNORMAL
SAO2 % BLD: 100 % (ref 92–97)
SAO2% DEVICE SAO2% SENSOR NAME: ABNORMAL
SERVICE CMNT-IMP: ABNORMAL
SERVICE CMNT-IMP: NORMAL
SODIUM SERPL-SCNC: 150 MMOL/L (ref 136–145)
SPECIMEN SITE: ABNORMAL
TRIGL SERPL-MCNC: 354 MG/DL (ref ?–150)
VLDLC SERPL CALC-MCNC: 70.8 MG/DL
WBC # BLD AUTO: 13.3 K/UL (ref 3.6–11)

## 2018-12-07 PROCEDURE — 87106 FUNGI IDENTIFICATION YEAST: CPT

## 2018-12-07 PROCEDURE — 74011250636 HC RX REV CODE- 250/636: Performed by: INTERNAL MEDICINE

## 2018-12-07 PROCEDURE — 85025 COMPLETE CBC W/AUTO DIFF WBC: CPT

## 2018-12-07 PROCEDURE — 83520 IMMUNOASSAY QUANT NOS NONAB: CPT

## 2018-12-07 PROCEDURE — 36415 COLL VENOUS BLD VENIPUNCTURE: CPT

## 2018-12-07 PROCEDURE — 0B968ZZ DRAINAGE OF RIGHT LOWER LOBE BRONCHUS, VIA NATURAL OR ARTIFICIAL OPENING ENDOSCOPIC: ICD-10-PCS | Performed by: INTERNAL MEDICINE

## 2018-12-07 PROCEDURE — 87102 FUNGUS ISOLATION CULTURE: CPT

## 2018-12-07 PROCEDURE — 74011250637 HC RX REV CODE- 250/637: Performed by: INTERNAL MEDICINE

## 2018-12-07 PROCEDURE — 0R9L3ZZ DRAINAGE OF RIGHT ELBOW JOINT, PERCUTANEOUS APPROACH: ICD-10-PCS | Performed by: RADIOLOGY

## 2018-12-07 PROCEDURE — 74011000250 HC RX REV CODE- 250: Performed by: INTERNAL MEDICINE

## 2018-12-07 PROCEDURE — 74011000258 HC RX REV CODE- 258: Performed by: INTERNAL MEDICINE

## 2018-12-07 PROCEDURE — A9575 INJ GADOTERATE MEGLUMI 0.1ML: HCPCS | Performed by: RADIOLOGY

## 2018-12-07 PROCEDURE — 87116 MYCOBACTERIA CULTURE: CPT

## 2018-12-07 PROCEDURE — 82962 GLUCOSE BLOOD TEST: CPT

## 2018-12-07 PROCEDURE — 74018 RADEX ABDOMEN 1 VIEW: CPT

## 2018-12-07 PROCEDURE — 5A1945Z RESPIRATORY VENTILATION, 24-96 CONSECUTIVE HOURS: ICD-10-PCS | Performed by: INTERNAL MEDICINE

## 2018-12-07 PROCEDURE — 76040000019: Performed by: INTERNAL MEDICINE

## 2018-12-07 PROCEDURE — 82803 BLOOD GASES ANY COMBINATION: CPT

## 2018-12-07 PROCEDURE — 65610000006 HC RM INTENSIVE CARE

## 2018-12-07 PROCEDURE — 80053 COMPREHEN METABOLIC PANEL: CPT

## 2018-12-07 PROCEDURE — C9113 INJ PANTOPRAZOLE SODIUM, VIA: HCPCS | Performed by: INTERNAL MEDICINE

## 2018-12-07 PROCEDURE — 84100 ASSAY OF PHOSPHORUS: CPT

## 2018-12-07 PROCEDURE — 87070 CULTURE OTHR SPECIMN AEROBIC: CPT

## 2018-12-07 PROCEDURE — 0B9B8ZZ DRAINAGE OF LEFT LOWER LOBE BRONCHUS, VIA NATURAL OR ARTIFICIAL OPENING ENDOSCOPIC: ICD-10-PCS | Performed by: INTERNAL MEDICINE

## 2018-12-07 PROCEDURE — 86038 ANTINUCLEAR ANTIBODIES: CPT

## 2018-12-07 PROCEDURE — 0DH67UZ INSERTION OF FEEDING DEVICE INTO STOMACH, VIA NATURAL OR ARTIFICIAL OPENING: ICD-10-PCS | Performed by: INTERNAL MEDICINE

## 2018-12-07 PROCEDURE — 74011250636 HC RX REV CODE- 250/636: Performed by: RADIOLOGY

## 2018-12-07 PROCEDURE — 36592 COLLECT BLOOD FROM PICC: CPT

## 2018-12-07 PROCEDURE — 72156 MRI NECK SPINE W/O & W/DYE: CPT

## 2018-12-07 PROCEDURE — 77010033678 HC OXYGEN DAILY

## 2018-12-07 PROCEDURE — 31500 INSERT EMERGENCY AIRWAY: CPT

## 2018-12-07 PROCEDURE — 94760 N-INVAS EAR/PLS OXIMETRY 1: CPT

## 2018-12-07 PROCEDURE — 94002 VENT MGMT INPAT INIT DAY: CPT

## 2018-12-07 PROCEDURE — 71045 X-RAY EXAM CHEST 1 VIEW: CPT

## 2018-12-07 PROCEDURE — 0BH18EZ INSERTION OF ENDOTRACHEAL AIRWAY INTO TRACHEA, VIA NATURAL OR ARTIFICIAL OPENING ENDOSCOPIC: ICD-10-PCS | Performed by: INTERNAL MEDICINE

## 2018-12-07 PROCEDURE — 36600 WITHDRAWAL OF ARTERIAL BLOOD: CPT

## 2018-12-07 PROCEDURE — 87278 LEGION PNEUMOPHILIA AG IF: CPT

## 2018-12-07 PROCEDURE — 77030008771 HC TU NG SALEM SUMP -A

## 2018-12-07 PROCEDURE — 74011250637 HC RX REV CODE- 250/637: Performed by: OTOLARYNGOLOGY

## 2018-12-07 PROCEDURE — 83735 ASSAY OF MAGNESIUM: CPT

## 2018-12-07 PROCEDURE — 80061 LIPID PANEL: CPT

## 2018-12-07 PROCEDURE — 77030021566

## 2018-12-07 RX ORDER — SUCCINYLCHOLINE CHLORIDE 20 MG/ML
90 INJECTION INTRAMUSCULAR; INTRAVENOUS
Status: COMPLETED | OUTPATIENT
Start: 2018-12-07 | End: 2018-12-07

## 2018-12-07 RX ORDER — LIDOCAINE HYDROCHLORIDE 20 MG/ML
5 SOLUTION OROPHARYNGEAL ONCE
Status: DISCONTINUED | OUTPATIENT
Start: 2018-12-07 | End: 2018-12-07 | Stop reason: HOSPADM

## 2018-12-07 RX ORDER — LIDOCAINE HYDROCHLORIDE 20 MG/ML
2 INJECTION, SOLUTION INFILTRATION; PERINEURAL
Status: DISCONTINUED | OUTPATIENT
Start: 2018-12-07 | End: 2018-12-07 | Stop reason: HOSPADM

## 2018-12-07 RX ORDER — ETOMIDATE 2 MG/ML
20 INJECTION INTRAVENOUS ONCE
Status: COMPLETED | OUTPATIENT
Start: 2018-12-07 | End: 2018-12-07

## 2018-12-07 RX ORDER — SODIUM CHLORIDE 9 MG/ML
50 INJECTION, SOLUTION INTRAVENOUS CONTINUOUS
Status: DISPENSED | OUTPATIENT
Start: 2018-12-07 | End: 2018-12-07

## 2018-12-07 RX ORDER — CHLORHEXIDINE GLUCONATE 1.2 MG/ML
15 RINSE ORAL 2 TIMES DAILY
Status: DISCONTINUED | OUTPATIENT
Start: 2018-12-07 | End: 2018-12-11 | Stop reason: ALTCHOICE

## 2018-12-07 RX ORDER — FENTANYL CITRATE 50 UG/ML
25 INJECTION, SOLUTION INTRAMUSCULAR; INTRAVENOUS
Status: DISCONTINUED | OUTPATIENT
Start: 2018-12-07 | End: 2018-12-07 | Stop reason: HOSPADM

## 2018-12-07 RX ORDER — LORAZEPAM 2 MG/ML
1 INJECTION INTRAMUSCULAR
Status: DISCONTINUED | OUTPATIENT
Start: 2018-12-07 | End: 2018-12-07

## 2018-12-07 RX ORDER — INSULIN LISPRO 100 [IU]/ML
INJECTION, SOLUTION INTRAVENOUS; SUBCUTANEOUS EVERY 6 HOURS
Status: DISCONTINUED | OUTPATIENT
Start: 2018-12-07 | End: 2018-12-12

## 2018-12-07 RX ORDER — ACETYLCYSTEINE 200 MG/ML
4 SOLUTION ORAL; RESPIRATORY (INHALATION) ONCE
Status: DISCONTINUED | OUTPATIENT
Start: 2018-12-07 | End: 2018-12-07 | Stop reason: HOSPADM

## 2018-12-07 RX ORDER — OXYMETAZOLINE HCL 0.05 %
2 SPRAY, NON-AEROSOL (ML) NASAL ONCE
Status: COMPLETED | OUTPATIENT
Start: 2018-12-07 | End: 2018-12-07

## 2018-12-07 RX ORDER — LIDOCAINE HYDROCHLORIDE AND EPINEPHRINE 20; 10 MG/ML; UG/ML
2 INJECTION, SOLUTION INFILTRATION; PERINEURAL AS NEEDED
Status: DISCONTINUED | OUTPATIENT
Start: 2018-12-07 | End: 2018-12-07 | Stop reason: HOSPADM

## 2018-12-07 RX ORDER — GADOTERATE MEGLUMINE 376.9 MG/ML
17 INJECTION INTRAVENOUS
Status: COMPLETED | OUTPATIENT
Start: 2018-12-07 | End: 2018-12-07

## 2018-12-07 RX ORDER — DEXTROSE MONOHYDRATE 50 MG/ML
75 INJECTION, SOLUTION INTRAVENOUS CONTINUOUS
Status: DISCONTINUED | OUTPATIENT
Start: 2018-12-07 | End: 2018-12-09

## 2018-12-07 RX ORDER — PROPOFOL 10 MG/ML
5-50 VIAL (ML) INTRAVENOUS
Status: DISCONTINUED | OUTPATIENT
Start: 2018-12-07 | End: 2018-12-10

## 2018-12-07 RX ORDER — LIDOCAINE HYDROCHLORIDE 20 MG/ML
JELLY TOPICAL ONCE
Status: COMPLETED | OUTPATIENT
Start: 2018-12-07 | End: 2018-12-07

## 2018-12-07 RX ORDER — FACIAL-BODY WIPES
10 EACH TOPICAL DAILY
Status: DISCONTINUED | OUTPATIENT
Start: 2018-12-07 | End: 2018-12-17 | Stop reason: HOSPADM

## 2018-12-07 RX ADMIN — CHLORHEXIDINE GLUCONATE 15 ML: 1.2 RINSE ORAL at 18:27

## 2018-12-07 RX ADMIN — HEPARIN SODIUM 5000 UNITS: 5000 INJECTION INTRAVENOUS; SUBCUTANEOUS at 05:50

## 2018-12-07 RX ADMIN — ETOMIDATE 20 MG: 40 INJECTION, SOLUTION INTRAVENOUS at 11:56

## 2018-12-07 RX ADMIN — HYDROMORPHONE HYDROCHLORIDE 2 MG: 2 INJECTION, SOLUTION INTRAMUSCULAR; INTRAVENOUS; SUBCUTANEOUS at 09:09

## 2018-12-07 RX ADMIN — OXYMETAZOLINE HYDROCHLORIDE 2 SPRAY: 5 SPRAY NASAL at 22:11

## 2018-12-07 RX ADMIN — Medication 10 ML: at 05:51

## 2018-12-07 RX ADMIN — METOPROLOL TARTRATE 2.5 MG: 1 INJECTION, SOLUTION INTRAVENOUS at 05:51

## 2018-12-07 RX ADMIN — PROPOFOL 50 MCG/KG/MIN: 10 INJECTION, EMULSION INTRAVENOUS at 22:58

## 2018-12-07 RX ADMIN — PANTOPRAZOLE SODIUM 40 MG: 40 INJECTION, POWDER, FOR SOLUTION INTRAVENOUS at 18:21

## 2018-12-07 RX ADMIN — DEXTROSE MONOHYDRATE 75 ML/HR: 5 INJECTION, SOLUTION INTRAVENOUS at 22:08

## 2018-12-07 RX ADMIN — CEFTRIAXONE SODIUM 2 G: 2 INJECTION, POWDER, FOR SOLUTION INTRAMUSCULAR; INTRAVENOUS at 22:00

## 2018-12-07 RX ADMIN — PROPOFOL 50 MCG/KG/MIN: 10 INJECTION, EMULSION INTRAVENOUS at 15:12

## 2018-12-07 RX ADMIN — METOPROLOL TARTRATE 2.5 MG: 1 INJECTION, SOLUTION INTRAVENOUS at 18:21

## 2018-12-07 RX ADMIN — Medication 10 ML: at 15:06

## 2018-12-07 RX ADMIN — PROPOFOL 10 MCG/KG/MIN: 10 INJECTION, EMULSION INTRAVENOUS at 12:05

## 2018-12-07 RX ADMIN — CEFTRIAXONE SODIUM 2 G: 2 INJECTION, POWDER, FOR SOLUTION INTRAMUSCULAR; INTRAVENOUS at 10:59

## 2018-12-07 RX ADMIN — LIDOCAINE HYDROCHLORIDE 5 ML: 20 JELLY TOPICAL at 13:10

## 2018-12-07 RX ADMIN — PROPOFOL 50 MCG/KG/MIN: 10 INJECTION, EMULSION INTRAVENOUS at 18:50

## 2018-12-07 RX ADMIN — Medication 10 ML: at 18:21

## 2018-12-07 RX ADMIN — GADOTERATE MEGLUMINE 17 ML: 376.9 INJECTION INTRAVENOUS at 20:09

## 2018-12-07 RX ADMIN — Medication 10 MG: at 14:40

## 2018-12-07 RX ADMIN — DEXTROSE MONOHYDRATE 25 G: 500 INJECTION PARENTERAL at 06:53

## 2018-12-07 RX ADMIN — SODIUM CHLORIDE AND POTASSIUM CHLORIDE: 4.5; 1.49 INJECTION, SOLUTION INTRAVENOUS at 02:34

## 2018-12-07 RX ADMIN — METOPROLOL TARTRATE 2.5 MG: 1 INJECTION, SOLUTION INTRAVENOUS at 23:44

## 2018-12-07 RX ADMIN — HYDROMORPHONE HYDROCHLORIDE 2 MG: 2 INJECTION, SOLUTION INTRAMUSCULAR; INTRAVENOUS; SUBCUTANEOUS at 02:30

## 2018-12-07 RX ADMIN — HYDROMORPHONE HYDROCHLORIDE 2 MG: 2 INJECTION, SOLUTION INTRAMUSCULAR; INTRAVENOUS; SUBCUTANEOUS at 05:51

## 2018-12-07 RX ADMIN — DEXTROSE MONOHYDRATE 75 ML/HR: 5 INJECTION, SOLUTION INTRAVENOUS at 09:09

## 2018-12-07 RX ADMIN — SERTRALINE HYDROCHLORIDE 150 MG: 50 TABLET ORAL at 22:06

## 2018-12-07 RX ADMIN — LABETALOL HYDROCHLORIDE 300 MG: 100 TABLET, FILM COATED ORAL at 20:46

## 2018-12-07 RX ADMIN — SUCCINYLCHOLINE CHLORIDE 90 MG: 20 INJECTION, SOLUTION INTRAMUSCULAR; INTRAVENOUS; PARENTERAL at 11:56

## 2018-12-07 NOTE — PROCEDURES
[address]  [address]  [address] [address] [address]  [phone]    Soham Blanton  1955  [id number]      Date of Procedure: 12/7/2018    Preoperative diagnosis: Bronch    Procedure: Procedure(s):  BRONCHOSCOPY  BRONCHIAL WASHINGS FLEXIBLE    Indication: hemoptysis    :  Naomie Lewis MD    Assistant(s): Endoscopy Technician-1: [facility]  Endoscopy RN-1: Kimi Abdullahi RN    Anesthesia/Sedation:  General anesthesia      Procedure Details:  After infomed consent was obtained for the procedure, with all risks and benefits of procedure explained the patient was taken to the endoscopy suite and placed in the supine position. Following sequential administration of sedation as per above, the bronchoscope was inserted into the ETT and advanced under direct vision to the tracheobronchial tree      Findings:   Large amount of blood in the trachea, however past paula only scants amount of blood in RLL. Some thick yellow/white secretions in LLL and RLL. All lobes/subsegments inspected with no signs of bleeding. Of note, right red blood coming out of patient's right nostril once intubated. Therapies:   None    Specimens:   Bronchial wash sent for culture           Complications:   None noted; patient tolerated the procedure well. EBL:  Minimal           Impression: No active bleeding. Suspect upper airway bleed.         Recommendations:   Consulted ENT      Nara Rivera MD  12/7/2018  1:57 PM

## 2018-12-07 NOTE — PROGRESS NOTES
Blanco Mcpherson [facility] [address] Gusman YOB: 1955 Assessment & Plan:  
ARF due to IVVD/sepsis · Creat improved and stable · Avoid nephrotoxins Hypernatremia · On D5W and hypotonic IVF Hypokalemia · Improved Sepsis/hypotension · On abx · Bacteremia likely from R arm source HTN 
· Stable Urinary retention · S/p joyce Subjective:  
CC: f/u ARF 
HPI: Renal function stable. On D5W for hypernatremia. ROS: Unable to obtain due to AMS Current Facility-Administered Medications Medication Dose Route Frequency  dextrose 5% infusion  75 mL/hr IntraVENous CONTINUOUS  
 LORazepam (ATIVAN) injection 1 mg  1 mg IntraVENous ONCE PRN  
 bisacodyl (DULCOLAX) suppository 10 mg  10 mg Rectal DAILY  cloNIDine (CATAPRES) 0.3 mg/24 hr patch 1 Patch  1 Patch TransDERmal Q7D  
 metoprolol (LOPRESSOR) injection 2.5 mg  2.5 mg IntraVENous Q6H  
 labetalol (NORMODYNE) tablet 300 mg  300 mg Oral Q12H  
 influenza vaccine 2018-19 (6 mos+)(PF) (FLUARIX QUAD/FLULAVAL QUAD) injection 0.5 mL  0.5 mL IntraMUSCular PRIOR TO DISCHARGE  labetalol (NORMODYNE;TRANDATE) injection 10 mg  10 mg IntraVENous Q4H PRN  
 hydrALAZINE (APRESOLINE) 20 mg/mL injection 20 mg  20 mg IntraVENous Q6H PRN  
 acetaminophen (TYLENOL) suppository 650 mg  650 mg Rectal Q4H PRN  
 alteplase (CATHFLO) 1 mg in sterile water (preservative free) 1 mL injection  1 mg InterCATHeter PRN  
 sodium chloride (NS) flush 10-30 mL  10-30 mL InterCATHeter PRN  
 sodium chloride (NS) flush 10 mL  10 mL InterCATHeter Q24H  
 sodium chloride (NS) flush 10 mL  10 mL InterCATHeter PRN  
 sodium chloride (NS) flush 10-40 mL  10-40 mL InterCATHeter Q8H  
 sodium chloride (NS) flush 20 mL  20 mL InterCATHeter Q24H  cefTRIAXone (ROCEPHIN) 2 g in 0.9% sodium chloride (MBP/ADV) 50 mL  2 g IntraVENous Q12H  
 HYDROmorphone (PF) (DILAUDID) injection 2 mg  2 mg IntraVENous Q3H PRN  
  sodium chloride (NS) flush 5-10 mL  5-10 mL IntraVENous PRN  
 sodium chloride (NS) flush 5-10 mL  5-10 mL IntraVENous Q8H  
 sodium chloride (NS) flush 5-10 mL  5-10 mL IntraVENous PRN  
 acetaminophen (TYLENOL) tablet 650 mg  650 mg Oral Q4H PRN  
 heparin (porcine) injection 5,000 Units  5,000 Units SubCUTAneous Q8H  
 ondansetron (ZOFRAN) injection 4 mg  4 mg IntraVENous Q4H PRN  
 insulin lispro (HUMALOG) injection   SubCUTAneous AC&HS  
 glucose chewable tablet 16 g  4 Tab Oral PRN  
 dextrose (D50W) injection syrg 12.5-25 g  25-50 mL IntraVENous PRN  
 glucagon (GLUCAGEN) injection 1 mg  1 mg IntraMUSCular PRN  
 aspirin delayed-release tablet 81 mg  81 mg Oral BID  levothyroxine (SYNTHROID) tablet 125 mcg  125 mcg Oral ACB  sertraline (ZOLOFT) tablet 150 mg  150 mg Oral QHS Objective:  
 
Vitals: 
Blood pressure 150/65, pulse 96, temperature 97.3 °F (36.3 °C), resp. rate 18, height 5' 1\" (1.549 m), weight 88 kg (194 lb 0.1 oz), SpO2 94 %. Temp (24hrs), Av.2 °F (36.8 °C), Min:97.3 °F (36.3 °C), Max:99.4 °F (37.4 °C) Intake and Output: 
No intake/output data recorded.  1901 -  0700 In: 2346.7 [I.V.:2346.7] Out: 2450 [Urine:2450] Physical Exam:              
GENERAL ASSESSMENT: AMS 
CHEST: CTA HEART: S1S2 ABDOMEN: Soft,NT 
EXTREMITY: no EDEMA 
 
 
   
ECG/rhythm: 
 
Data Review No results for input(s): TNIPOC in the last 72 hours. No lab exists for component: ITNL No results for input(s): CPK, CKMB, TROIQ in the last 72 hours. Recent Labs 18 
0134 18 
0111 18 
0056 * 148* 145  
K 4.1 3.7 3.1*  
* 111* 108 CO2 27 28 30 BUN 45* 43* 43* CREA 1.18* 1.17* 1.42* GLU 63* 117* 208* PHOS 4.3 3.5 2.9 MG 1.9 1.8 1.7 CA 8.9 9.0 9.0 ALB 1.2*  --  0.9* WBC 13.3* 14.4* 11.8* HGB 9.1* 9.5* 9.2* HCT 30.8* 31.3* 30.2* * 474* 447* No results for input(s): INR, PTP, APTT in the last 72 hours. No lab exists for component: INREXT, INREXT Needs: urine analysis, urine sodium, protein and creatinine Lab Results Component Value Date/Time Sodium,urine random 29 11/30/2018 06:10 PM  
 Creatinine, urine 187.11 11/30/2018 06:10 PM  
 
 
 
 
: Mitzi Soulier, MD 
12/7/2018 [facility]: 
www.[email or web address]ology[facility]. [facility] Www.[facility] office: 
[address], [facility] [address] Phone: [phone] Fax :     [phone] [place] office: 
[address] [facility], [facility] Phone - [phone] Fax - [phone]

## 2018-12-07 NOTE — PERIOP NOTES
Patient intubated and sedated in ICU #15. History obtained form patient's chart and Dr Melissa Awan.

## 2018-12-07 NOTE — CONSULTS
[facility]     Name: Nadia Serrano MRN: [id number]   : 1955 Hospital: [address]   Date: 2018        Impression Plan   1. Acute respiratory failure  2. Acute onset hemoptysis  3. AMS  4. Betahemolytic strep bacteremia  5. Hypoxia  6. Leukocytosis  7. Hypernatremia  8.                · Transfer to unit  · Will intubated for airway protection and bronch subsequently  · Will need a chest CT once stabilized. Strep PNA? · Continue ceftriaxone for now  · Wean O2 as tolerated  · Brain MRI negative  · Hold AC other than SCDs  · Discussed findings and plan with  at bedside. Pt is critically ill. Critical care time spent with pt exclusive of procedures was 42 minutes. Pt at risk for further decline due to hemoptysis and respiratory failure    Radiology  ( personally reviewed) CXR reviewed: - no infiltrates   ABG No results for input(s): PHI, PO2I, PCO2I in the last 72 hours. Subjective     Cc: hemoptysis    62 yo who originally was admitted on  for AMS and was found to have strep bacteremia. She has undergone an extensive workup for her AMS and bacteremia, including an LP and elbow aspirate- these cultures so far negative. 20 min ago started to acutely cough of copious amounts of bright red blood. Pt altered, phonating but not coughing and has audible secretions/blood on vocal cords. Pt has been on proph ac only. Review of Systems:  Review of systems not obtained due to patient factors. Past Medical History:   Diagnosis Date    Chronic back pain     Diabetes mellitus, type 2 ([facility] [address]     History of vascular access device 2018    [facility] VAT - 4 FR single, L cephalic, 44 cm for LTA    HTN (hypertension)       History reviewed. No pertinent surgical history. Prior to Admission medications    Medication Sig Start Date End Date Taking? Authorizing Provider   atenolol (TENORMIN) 25 mg tablet Take 25 mg by mouth daily.    Yes Provider, Historical   cyclobenzaprine (FLEXERIL) 10 mg tablet Take 10 mg by mouth daily as needed for Muscle Spasm(s). Yes Provider, Historical   fenofibrate (LOFIBRA) 160 mg tablet Take 160 mg by mouth nightly. Yes Provider, Historical   gabapentin (NEURONTIN) 300 mg capsule Take 300 mg by mouth three (3) times daily. Yes Provider, Historical   insulin glargine (LANTUS,BASAGLAR) 100 unit/mL (3 mL) inpn 75 Units by SubCUTAneous route nightly. Yes Provider, Historical   levothyroxine (SYNTHROID) 125 mcg tablet Take 125 mcg by mouth Daily (before breakfast). Yes Provider, Historical   lisinopril-hydroCHLOROthiazide (PRINZIDE, ZESTORETIC) 20-12.5 mg per tablet Take 2 Tabs by mouth daily. Yes Provider, Historical   metFORMIN (GLUCOPHAGE) 500 mg tablet Take 500 mg by mouth two (2) times daily (with meals). Yes Provider, Historical   niacin ER (NIASPAN) 500 mg tablet Take 500 mg by mouth nightly. Yes Provider, Historical   omega 3-DHA-EPA-fish oil 1,000 mg (120 mg-180 mg) capsule Take 2 Caps by mouth two (2) times a day. Yes Provider, Historical   oxyCODONE IR (ROXICODONE) 5 mg immediate release tablet Take 5 mg by mouth daily as needed (breakthrough pain). Yes Provider, Historical   oxyCODONE ER (XTAMPZA ER) 18 mg ER capsule Take 18 mg by mouth every twelve (12) hours. Yes Provider, Historical   sertraline (ZOLOFT) 100 mg tablet Take 150 mg by mouth nightly. Yes Provider, Historical   simvastatin (ZOCOR) 40 mg tablet Take 40 mg by mouth nightly. Yes Provider, Historical   triamcinolone acetonide (KENALOG) 0.1 % topical cream Apply  to affected area two (2) times a day. use thin layer   Yes Provider, Historical   multivit-min-FA-lycopen-lutein (CENTRUM SILVER) 0.4-300-250 mg-mcg-mcg tab Take 1 Tab by mouth daily. Yes Provider, Historical   OTHER,NON-FORMULARY, Take 1 Tab by mouth two (2) times a day.  Osteo Biflex   Yes Provider, Historical   aspirin delayed-release 81 mg tablet Take 81 mg by mouth two (2) times a day. Yes Provider, Historical   ibuprofen (MOTRIN) 200 mg tablet Take 400 mg by mouth two (2) times a day. Yes Provider, Historical   ubidecarenone (COENZYME Q10, BULK,) Take 1 Tab by mouth daily. Yes Provider, Historical     Current Facility-Administered Medications   Medication Dose Route Frequency    dextrose 5% infusion  75 mL/hr IntraVENous CONTINUOUS    bisacodyl (DULCOLAX) suppository 10 mg  10 mg Rectal DAILY    succinylcholine (ANECTINE) injection 90 mg  90 mg IntraVENous NOW    etomidate (AMIDATE) 2 mg/mL injection 20 mg  20 mg IntraVENous ONCE    propofol (DIPRIVAN) infusion  5-50 mcg/kg/min IntraVENous TITRATE    cloNIDine (CATAPRES) 0.3 mg/24 hr patch 1 Patch  1 Patch TransDERmal Q7D    metoprolol (LOPRESSOR) injection 2.5 mg  2.5 mg IntraVENous Q6H    labetalol (NORMODYNE) tablet 300 mg  300 mg Oral Q12H    influenza vaccine 2018-19 (6 mos+)(PF) (FLUARIX QUAD/FLULAVAL QUAD) injection 0.5 mL  0.5 mL IntraMUSCular PRIOR TO DISCHARGE    sodium chloride (NS) flush 10 mL  10 mL InterCATHeter Q24H    sodium chloride (NS) flush 10-40 mL  10-40 mL InterCATHeter Q8H    sodium chloride (NS) flush 20 mL  20 mL InterCATHeter Q24H    cefTRIAXone (ROCEPHIN) 2 g in 0.9% sodium chloride (MBP/ADV) 50 mL  2 g IntraVENous Q12H    sodium chloride (NS) flush 5-10 mL  5-10 mL IntraVENous Q8H    insulin lispro (HUMALOG) injection   SubCUTAneous AC&HS    aspirin delayed-release tablet 81 mg  81 mg Oral BID    levothyroxine (SYNTHROID) tablet 125 mcg  125 mcg Oral ACB    sertraline (ZOLOFT) tablet 150 mg  150 mg Oral QHS     No Known Allergies   Social History     Tobacco Use    Smoking status: Never Smoker   Substance Use Topics    Alcohol use: No     Frequency: Never      Family History   Problem Relation Age of Onset    Diabetes Father     Heart Disease Father           Laboratory: I have personally reviewed the critical care flowsheet and labs.      Recent Labs     12/07/18  0845 12/06/18  0111 12/05/18  0056   WBC 13.3* 14.4* 11.8*   HGB 9.1* 9.5* 9.2*   HCT 30.8* 31.3* 30.2*   * 474* 447*     Recent Labs     12/07/18  0134 12/06/18  0111 12/05/18  0056   * 148* 145   K 4.1 3.7 3.1*   * 111* 108   CO2 27 28 30   GLU 63* 117* 208*   BUN 45* 43* 43*   CREA 1.18* 1.17* 1.42*   CA 8.9 9.0 9.0   MG 1.9 1.8 1.7   PHOS 4.3 3.5 2.9   ALB 1.2*  --  0.9*   SGOT 36  --  31   ALT 18  --  18       Objective:     Mode Rate Tidal Volume Pressure FiO2 PEEP                    Vital Signs:     TMAX(24)      Intake/Output:   Last shift:         Last 3 shifts: No intake/output data recorded. [id number]    Intake/Output Summary (Last 24 hours) at 12/7/2018 1140  Last data filed at 12/7/2018 0659  Gross per 24 hour   Intake 1148.33 ml   Output 950 ml   Net 198.33 ml     EXAM:   GENERAL: awake, not  Tracking with voice, moaning with audible gurgling, HEENT:  PERRL, EOMI, no alar flaring or epistaxis, visible bright red blood around the mouth and in the oropharynx, NECK:  no jugular vein distention, no retractions, no thyromegaly or masses, LUNGS: rhonci bilaterally, no wheezes HEART:  Regular rate and rhythm with no MGR; trace edema is present, ABDOMEN:  soft with no tenderness, bowel sounds present, EXTREMITIES:  warm with no cyanosis, SKIN: pale, no jaundice or ecchymosis and NEUROLOGIC:  AOx3, unable to say her name or cough.      Arsen Garrett MD  [facility]

## 2018-12-07 NOTE — PROGRESS NOTES
[facility]: [facility] Neurology [facility] [phone] Name:   Soham Blanton Medical record #: [id number] Admission Date: 11/30/2018 Reason for Consult:  Dr. Heather Duvall 
 
Subjective:  
Overnight events: none Objective: EEG: 
Assessment/Plan: 1. Altered Mental Status: · Neurochecks:  Every 4 hours · Becoming hypernatremic again at 148. · MRI brain negative for stroke Stroke work up · Last A1C:  7.2 · LDL:   27.2 · Last TTE:  Systolic function was normal. Ejection fraction was 
estimated in the range of 55 % to 60 %. There were no regional wall motion 
abnormalities. · Follow up MRI: 
· Carotid vascular imaging: 
 
Risk factors for stroke include:  Obesity, DM, HTN, physical inactivity, ALEXANDRE · Discussed with patient:   
· Discussed signs/symptoms of stroke and when to call 911 
 
  
2. Mobility:  
· Has not been OOB. · Therapy has signed off due to confusion and pain · Order for nursing to get pt OOB placed. 4.  Diet:   
· Needs SLP  
  
5. VTE Prophylaxes: · Lovenox 40 mg, SQ daily  
  
Thank you for allowing the Neurology Service the pleasure of participating in the care of your patient. This patient will be discussed with my collaborating care team physician Dr. Maegan Ramirez and he may have further recommendations regarding this patient's care.    
  
 
Attending Attestation:  
============================================================== Attending Addendum I have reviewed the documentation provided by the nurse practitioner, Linnette Zambrano, discussed her findings, clinical impression, and the proposed management plans with regards to this patient's encounter. I have personally evaluated the patient, verify the history and confirm physical findings. Below are my additional findings: 
  
 
 
Subjective:  
Patient resting comfortably when I entered the room.   Still nonverbal and no family at the bedside. No events overnight per nursing. Objective:  
 
 
Imaging: MRI brain: Negative (I personally reviewed these images in PACS and this is my impression) Unable to tolerate MRI cervical spine Exam: 
Visit Vitals /65 (BP 1 Location: Left arm, BP Patient Position: Supine) Pulse 96 Temp 97.3 °F (36.3 °C) Resp 18 Ht 5' 1\" (1.549 m) Wt 88 kg (194 lb 0.1 oz) SpO2 94% BMI 36.66 kg/m² Examined patient with NP and my exam is below Assessment/Plan:  
 
Patient Active Problem List  
Diagnosis Code  Metabolic acidosis M23.2  BAYLEE (acute kidney injury) ([facility] [address] N17.9  
 HTN (hypertension) I10  
 Chronic back pain M54.9, G89.29  
 Diabetes mellitus, type 2 ([facility] [address] E11.9  Leukocytosis D72.829  Right elbow pain M25.521  
 Hypotension I95.9 This is a 62 y/o who presented with confusion and is on treatment for bacteremia. She has continued to have confusion despite this. EEG was neg for seizures and more consistent with toxic/metabolic process. CT head was neg. LP neg for obvious infection. Will attempt to see if there is additional CSF we can use to send cytology and paraneoplastic panel. Neuro exam is non focal. She does have bilateral foot drop but I suspect this is due to deconditioning. Will get foot braces in placeordered yesterday. Will also do MRI brain was essentially negative. Will reattempt MRI of cervical spine today. 
  
1. CT head neg. MRI brain negative and will reattempt C spine today 2. cEEG neg for seizure was neg 3. Encephalopathy labs were negative 4. LP with elevated RBC and WBC but very traumatic tap. Meningitis panel neg. HSV neg. Lyme pending. CSF cytology was negative. Paraneoplastic panel is pending. 5. Cont abx per ID. She is on meningeal doses of this for concern for CSF seeding. Will stop acyclovir. 6. Foot braces ordered 7.   Discussed with Dr. Júnior Toro and may need to consider transfer to academic center given that she is not making progress despite our treatments Will follow Signed: 
Bettie Foster MD 
 
12/7/2018 Physical Exam: 
 
General:   Resting but opens eyes Lungs:   Clear to auscultation bilaterally. No crackles/wheezes. Heart: 
Abdominal:  Regular rate and rhythm, No murmur, click, rub or gallop. Soft and nondistended Skin: Skin color, texture, turgor normal.   
NEUROLOGICAL EXAM: 
 
Appearance:  Well developed, well nourished,  and is in no acute distress. Mental Status: Moans, did say yes to pain. WIll not follow commands. Cranial Nerves:   Does not tract. PERRL, no nystagmus, no ptosis. Facial movement is symmetric. Palate is midline. Tongue is midline. Motor:  Does not follow commands, wiggles toes in both feet Reflexes:   Deep tendon reflexes 2+/4 and symmetrical.  
Sensory:   JEF. Gait:  Not tested. Tremor:   JEF. Cerebellar:  JEF. Current Facility-Administered Medications Medication Dose Route Frequency Provider Last Rate Last Dose  dextrose 5% infusion  75 mL/hr IntraVENous CONTINUOUS Denis Carreon MD 75 mL/hr at 12/07/18 0909 75 mL/hr at 12/07/18 9115  cloNIDine (CATAPRES) 0.3 mg/24 hr patch 1 Patch  1 Patch TransDERmal Fernando Lemons MD   1 Patch at 12/06/18 1522  
 metoprolol (LOPRESSOR) injection 2.5 mg  2.5 mg IntraVENous Q6H Denis Carreon MD   2.5 mg at 12/07/18 0391  labetalol (NORMODYNE) tablet 300 mg  300 mg Oral Q12H Albaro Turner MD      
 influenza vaccine 6159-11 (6 mos+)(PF) (FLUARIX QUAD/FLULAVAL QUAD) injection 0.5 mL  0.5 mL IntraMUSCular PRIOR TO DISCHARGE Denis Carreon MD      
 labetalol (NORMODYNE;TRANDATE) injection 10 mg  10 mg IntraVENous Q4H PRN Denis Carreon MD   10 mg at 12/06/18 1328  hydrALAZINE (APRESOLINE) 20 mg/mL injection 20 mg  20 mg IntraVENous Q6H PRN Garland Zambrano MD   20 mg at 12/06/18 1641  acetaminophen (TYLENOL) suppository 650 mg  650 mg Rectal Q4H PRN Darshana Medina MD   650 mg at 12/05/18 7877  alteplase (CATHFLO) 1 mg in sterile water (preservative free) 1 mL injection  1 mg InterCATHeter PRN Garland Zambrano MD      
 sodium chloride (NS) flush 10-30 mL  10-30 mL InterCATHeter PRN Garland Zambrano MD      
 sodium chloride (NS) flush 10 mL  10 mL InterCATHeter Q24H Garland Zambrano MD   10 mL at 12/06/18 1038  sodium chloride (NS) flush 10 mL  10 mL InterCATHeter PRN Garland Zambrano MD      
 sodium chloride (NS) flush 10-40 mL  10-40 mL InterCATHeter Q8H Garland Zambrano MD   10 mL at 12/07/18 8274  sodium chloride (NS) flush 20 mL  20 mL InterCATHeter Q24H Garland Zambrano MD   20 mL at 12/06/18 1038  cefTRIAXone (ROCEPHIN) 2 g in 0.9% sodium chloride (MBP/ADV) 50 mL  2 g IntraVENous Q12H [place]  mL/hr at 12/06/18 2146 2 g at 12/06/18 2146  
 HYDROmorphone (PF) (DILAUDID) injection 2 mg  2 mg IntraVENous Q3H PRN Darshana Medina MD   2 mg at 12/07/18 8703  sodium chloride (NS) flush 5-10 mL  5-10 mL IntraVENous PRN aJsen Harden MD   10 mL at 12/01/18 1909  sodium chloride (NS) flush 5-10 mL  5-10 mL IntraVENous Q8H Cindy Alonzo MD   10 mL at 12/07/18 4185  sodium chloride (NS) flush 5-10 mL  5-10 mL IntraVENous PRN Cindy Alonzo MD      
 acetaminophen (TYLENOL) tablet 650 mg  650 mg Oral Q4H PRN Cindy Alonzo MD   650 mg at 12/03/18 1815  
 heparin (porcine) injection 5,000 Units  5,000 Units SubCUTAneous Q8H Cindy Alonzo MD   5,000 Units at 12/07/18 8882  ondansetron (ZOFRAN) injection 4 mg  4 mg IntraVENous Q4H PRN Cindy Alonzo MD   4 mg at 12/06/18 6043  insulin lispro (HUMALOG) injection   SubCUTAneous AC&HS Cindy Alonzo MD   Stopped at 12/05/18 1130  
 glucose chewable tablet 16 g  4 Tab Oral PRN Cindy Alonzo MD      
 dextrose (D50W) injection syrg 12.5-25 g  25-50 mL IntraVENous PRN Cindy Alonzo MD   25 g at 12/07/18 5333  glucagon (GLUCAGEN) injection 1 mg  1 mg IntraMUSCular PRN General Paul MD ASHER      
 aspirin delayed-release tablet 81 mg  81 mg Oral BID Edwin Arndt MD   Stopped at 12/06/18 1800  levothyroxine (SYNTHROID) tablet 125 mcg  125 mcg Oral ACB Edwin Arndt MD   125 mcg at 12/04/18 5628  sertraline (ZOLOFT) tablet 150 mg  150 mg Oral QHS Edwin Arndt MD   150 mg at 12/03/18 2147 Recent Results (from the past 24 hour(s)) GLUCOSE, POC Collection Time: 12/06/18 11:40 AM  
Result Value Ref Range Glucose (POC) 128 (H) 65 - 100 mg/dL Performed by Soto Hooks (PCT) CRYSTALS, SYNOVIAL FLUID Collection Time: 12/06/18  3:09 PM  
Result Value Ref Range FLUID TYPE(7) ELBOW Crystals, body fluid NO CRYSTALS SEEN WITH POLARIZED LIGHT    
CULTURE, ANAEROBIC Collection Time: 12/06/18  3:09 PM  
Result Value Ref Range Special Requests: NO SPECIAL REQUESTS Culture result: PENDING   
CULTURE, BODY FLUID W GRAM STAIN Collection Time: 12/06/18  3:09 PM  
Result Value Ref Range Special Requests: RT ELBOW ASPIRATE   
 GRAM STAIN RARE WBCS SEEN    
 GRAM STAIN NO ORGANISMS SEEN Culture result: PENDING   
GLUCOSE, POC Collection Time: 12/06/18  4:01 PM  
Result Value Ref Range Glucose (POC) 88 65 - 100 mg/dL Performed by Soto Hooks (PCT) GLUCOSE, POC Collection Time: 12/06/18  9:48 PM  
Result Value Ref Range Glucose (POC) 86 65 - 100 mg/dL Performed by Karlie Mac (PCT) LIPID PANEL Collection Time: 12/07/18  1:34 AM  
Result Value Ref Range LIPID PROFILE Cholesterol, total 112 <200 MG/DL Triglyceride 354 (H) <150 MG/DL  
 HDL Cholesterol 14 MG/DL  
 LDL, calculated 27.2 0 - 100 MG/DL VLDL, calculated 70.8 MG/DL  
 CHOL/HDL Ratio 8.0 (H) 0 - 5.0    
CBC WITH AUTOMATED DIFF Collection Time: 12/07/18  1:34 AM  
Result Value Ref Range WBC 13.3 (H) 3.6 - 11.0 K/uL  
 RBC 3.32 (L) 3.80 - 5.20 M/uL HGB 9.1 (L) 11.5 - 16.0 g/dL HCT 30.8 (L) 35.0 - 47.0 %  MCV 92.8 80.0 - 99.0 FL  
 MCH 27.4 26.0 - 34.0 PG  
 MCHC 29.5 (L) 30.0 - 36.5 g/dL  
 RDW 14.9 (H) 11.5 - 14.5 % PLATELET 925 (H) 932 - 400 K/uL MPV 9.1 8.9 - 12.9 FL  
 NRBC 0.0 0  WBC ABSOLUTE NRBC 0.00 0.00 - 0.01 K/uL NEUTROPHILS 86 (H) 32 - 75 % LYMPHOCYTES 4 (L) 12 - 49 % MONOCYTES 5 5 - 13 % EOSINOPHILS 5 0 - 7 % BASOPHILS 0 0 - 1 % IMMATURE GRANULOCYTES 0 %  
 ABS. NEUTROPHILS 11.4 (H) 1.8 - 8.0 K/UL  
 ABS. LYMPHOCYTES 0.5 (L) 0.8 - 3.5 K/UL  
 ABS. MONOCYTES 0.7 0.0 - 1.0 K/UL  
 ABS. EOSINOPHILS 0.7 (H) 0.0 - 0.4 K/UL  
 ABS. BASOPHILS 0.0 0.0 - 0.1 K/UL  
 ABS. IMM. GRANS. 0.0 K/UL  
 DF MANUAL    
 RBC COMMENTS STOMATOCYTES PRESENT 
    
METABOLIC PANEL, COMPREHENSIVE Collection Time: 12/07/18  1:34 AM  
Result Value Ref Range Sodium 150 (H) 136 - 145 mmol/L Potassium 4.1 3.5 - 5.1 mmol/L Chloride 112 (H) 97 - 108 mmol/L  
 CO2 27 21 - 32 mmol/L Anion gap 11 5 - 15 mmol/L Glucose 63 (L) 65 - 100 mg/dL BUN 45 (H) 6 - 20 MG/DL Creatinine 1.18 (H) 0.55 - 1.02 MG/DL  
 BUN/Creatinine ratio 38 (H) 12 - 20 GFR est AA 56 (L) >60 ml/min/1.73m2 GFR est non-AA 46 (L) >60 ml/min/1.73m2 Calcium 8.9 8.5 - 10.1 MG/DL Bilirubin, total 0.1 (L) 0.2 - 1.0 MG/DL  
 ALT (SGPT) 18 12 - 78 U/L  
 AST (SGOT) 36 15 - 37 U/L Alk. phosphatase 81 45 - 117 U/L Protein, total 5.9 (L) 6.4 - 8.2 g/dL Albumin 1.2 (L) 3.5 - 5.0 g/dL Globulin 4.7 (H) 2.0 - 4.0 g/dL A-G Ratio 0.3 (L) 1.1 - 2.2 MAGNESIUM Collection Time: 12/07/18  1:34 AM  
Result Value Ref Range Magnesium 1.9 1.6 - 2.4 mg/dL PHOSPHORUS Collection Time: 12/07/18  1:34 AM  
Result Value Ref Range Phosphorus 4.3 2.6 - 4.7 MG/DL  
GLUCOSE, POC Collection Time: 12/07/18  6:48 AM  
Result Value Ref Range Glucose (POC) 39 (LL) 65 - 100 mg/dL Performed by Anette Brower (PCT) GLUCOSE, POC Collection Time: 12/07/18  6:50 AM  
Result Value Ref Range Glucose (POC) 38 (LL) 65 - 100 mg/dL Performed by Ezequiel Mays (PCT) GLUCOSE, POC Collection Time: 18  7:08 AM  
Result Value Ref Range Glucose (POC) 134 (H) 65 - 100 mg/dL Performed by Ezequiel Mays (PCT) Visit Vitals /65 (BP 1 Location: Left arm, BP Patient Position: Supine) Pulse 96 Temp 97.3 °F (36.3 °C) Resp 18 Ht 5' 1\" (1.549 m) Wt 88 kg (194 lb 0.1 oz) SpO2 94% BMI 36.66 kg/m² O2 Flow Rate (L/min): 2 l/min O2 Device: Nasal cannula Temp (24hrs), Av.2 °F (36.8 °C), Min:97.3 °F (36.3 °C), Max:99.4 °F (37.4 °C) No intake/output data recorded.  1901 -  0700 In: 2346.7 [I.V.:2346.7] Out: 2450 [Urine:2450] Care Plan discussed with: 
Patient x Family RN Care Manager Consultant/Specialist:    
 
 
Sia Esteves, [facility]

## 2018-12-07 NOTE — PROCEDURES
[address]  [address]  [address] [facility] [address] [address]  [phone]    Intubation Note    Name: Willie Awan MRN: [id number]   : 1955 Hospital: [address] [place] [address]   Date: 2018            Procedure: elective/emergent intubation    Indication: respiratory insufficiency    Anesthesia- Rapid sequence: Etomidate 20 mg,  Paralysis: Succinylcholine 90 mg(1mg/kg)    After assessing the airway, the patient underwent preoxygenation with 100% FiO2 for 5 min. tomidate and succnylcholine was given sequentially in rapid IV push. The Sellick maneuver was performed throughout the entire sequence. After adequate sedation and paralisys emergent intubation was performed. A 4. 0MAC laryngoscope was used to visualize the epiglottis and vocal chords. After positive identification of the vocal chords, an 8.0 ET tube was placed into the trachea with direct visualization. The tube was seen passing through the vocal chords without difficulty. CO2 colorimetry was employed immediately to verify tube in airway with /without appropriate color change indicating detection of CO2. Water vapor was seen within the ET tube, and auscultation of the abdomen revealed no bubbling souds. Auscultation  and inspection of the chest after intubation showed symmetric chest excursion and symmetric air entry bilaterally. Chest X-ray has been ordered and is pending. The patient has been placed on a mechanical ventilator. There were no complications.     Roberta Pham MD

## 2018-12-07 NOTE — PROGRESS NOTES
Problem: Falls - Risk of 
Goal: *Absence of Falls Document Donny Morris Fall Risk and appropriate interventions in the flowsheet. Outcome: Progressing Towards Goal 
Fall Risk Interventions: 
Mobility Interventions: Bed/chair exit alarm, Communicate number of staff needed for ambulation/transfer, Patient to call before getting OOB, Strengthening exercises (ROM-active/passive) Mentation Interventions: Adequate sleep, hydration, pain control, Bed/chair exit alarm, Evaluate medications/consider consulting pharmacy, Room close to nurse's station Medication Interventions: Assess postural VS orthostatic hypotension, Bed/chair exit alarm, Evaluate medications/consider consulting pharmacy Elimination Interventions: Bed/chair exit alarm, Call light in reach, Patient to call for help with toileting needs Problem: Pressure Injury - Risk of 
Goal: *Prevention of pressure injury Document Nikolay Scale and appropriate interventions in the flowsheet. Outcome: Progressing Towards Goal 
Pressure Injury Interventions: 
Sensory Interventions: Assess changes in LOC, Assess need for specialty bed, Check visual cues for pain, Discuss PT/OT consult with provider, Monitor skin under medical devices, Turn and reposition approx. every two hours (pillows and wedges if needed) Moisture Interventions: Absorbent underpads, Apply protective barrier, creams and emollients Activity Interventions: Assess need for specialty bed, Increase time out of bed, Pressure redistribution bed/mattress(bed type), PT/OT evaluation Mobility Interventions: Assess need for specialty bed, PT/OT evaluation, Turn and reposition approx. every two hours(pillow and wedges) Nutrition Interventions: Document food/fluid/supplement intake, Discuss nutritional consult with provider Friction and Shear Interventions: Lift team/patient mobility team, Apply protective barrier, creams and emollients, Transferring/repositioning devices

## 2018-12-07 NOTE — PROGRESS NOTES
1135-TRANSFER - IN REPORT: 
 
Verbal report received from Anusha Kaufman RN(name) on Willie Ho  being received from [facility](unit) for urgent transfer  Report consisted of patients Situation, Background, Assessment and Recommendations(SBAR). Information from the following report(s) SBAR, Kardex, Procedure Summary, Intake/Output, MAR, Recent Results and Cardiac Rhythm SR to ST was reviewed with the receiving nurse. Opportunity for questions and clarification was provided. Anticipate patient arrival needing urgent bronch/intubation due to new hemoptysis. Pulmonologist/intensivist has evaluated the patient and is aware. Will prepare. Janette MANN 
1145-Received patient to ICU and patient prepared for rapid intubation.  is just outside the unit and is aware. Janette MANN 
1200-Pt intubated without delay by Dr. Misty Ricketts. Maintained 100% O2 sats throughout. Janette MANN 
1205-Pt waking post sedation/paralyzation for intubation. Propofol drip started per orders. Endoscopy team will be at the bedside around 1230 for bronchoscopy. Blood now noted coming from patient's nose; Dr. Misty Ricketts informed. Janette MANN 
1230-Consent obtained from  by endoscopy team after he was informed by Dr. Misty Ricketts regarding procedure. XR at bedside to perform portable studies for tube placement, then endo team will set up at bedside for bronch. Janette MANN 
1315-Bronch completed; no apparent bleed from lungs. Stat ENT consult called to [facility] for likely nose bleed; blood continues to flow from mouth and R nare. Janette MANN 
1500-ETT verified in satisfactory position by CXR. Pt turned and attempted to place suppository at this time. Upon suppository insertion, a large amount of hard stool was felt in the rectum. Digital disimpaction performed and a large amount of hard stool removed. Janette MANN 
1900-Bedside and Verbal shift change report given to AME Porras (oncoming nurse) by NISH Olson RN (offgoing nurse).  Report included the following information SBAR, Kardex, Procedure Summary, Intake/Output, MAR, Recent Results and Cardiac Rhythm SR to ST. Janette MANN

## 2018-12-07 NOTE — PROGRESS NOTES
Called and requested additional pumps and pump channels from [employer]. No equipment available to start D5% drip as ordered.

## 2018-12-07 NOTE — PROGRESS NOTES
Pt was transferred to ICU and emergently intubated. (hemoptysis) Case management will reevaluate pt for discharge needs on Monday unless needs occur over the weetashia=finn Reyes

## 2018-12-07 NOTE — PROGRESS NOTES
Sudden onset of karis hemoptysis with clots. Discussed with pulmonology, appreciate their urgent response. Planning to intubate and bronch. Send JACQUELINE, ANCA.

## 2018-12-07 NOTE — PROGRESS NOTES
Client has been refusing/withdrawing from offers of water, and turns her head away with pills/medication is order. This is a change from last week when this nurse was taking care of client. Dr. Adebayo Diaz is aware of this situation. Dr. Adebayo Diaz is also aware that client was hypoglycemic overnight with AM labs at 0134, and that D50 was provided and blood glucose was 134 at 0708. He has requested a repeat blood glucose test at this time.

## 2018-12-07 NOTE — PERIOP NOTES
TRANSFER - OUT REPORT: 
 
Verbal report given to Kayce RN on Khadar Christie 89 #15 s/p bronchoscopy. Report consisted of patients Situation, Background, Assessment and  
Recommendations(SBAR). Information from the following report(s) Procedure Summary, Intake/Output and MAR was reviewed with the receiving nurse. Lines: PICC Single Lumen 64/79/53 Left;Cephalic (Active) Central Line Being Utilized Yes 12/6/2018  8:00 PM  
Criteria for Appropriate Use Long term IV/antibiotic administration 12/6/2018  8:00 PM  
Site Assessment Clean, dry, & intact 12/6/2018  8:00 PM  
Phlebitis Assessment 0 12/6/2018  8:00 PM  
Infiltration Assessment 0 12/6/2018  8:00 PM  
Date of Last Dressing Change 12/03/18 12/5/2018  4:43 PM  
Dressing Status Clean, dry, & intact 12/6/2018  8:00 PM  
External Catheter Length (cm) 0 centimeters 12/4/2018  8:03 AM  
Dressing Type Disk with Chlorhexadine gluconate (CHG) 12/6/2018  8:00 PM  
Hub Color/Line Status Infusing 12/6/2018  8:00 PM  
Action Taken Open ports on tubing capped 12/6/2018  8:00 PM  
Positive Blood Return (Site #1) Yes 12/6/2018  8:00 PM  
Alcohol Cap Used Yes 12/6/2018  8:00 PM  
  
 
Opportunity for questions and clarification was provided.

## 2018-12-07 NOTE — PROGRESS NOTES
Blanco Mcpherson [facility] 
[address] [facility], [place], [address] [place] [address] 
[phone] Medical Progress Note NAME: Martir Sheldon :  1955 MRM:  [id number] Date/Time: 2018 Assessment / Plan:  
 
60 yo WF w/ hx of HTN, DM, chronic pain, presenting with AMS and weakness, found to have severe sepsis, bacteremia, and ARF ? Hemoptysis: sudden onset today, with bright red blood and clots suctioned from upper airway. Intubated for airway protection, also noting epistaxis. On bronchoscopy noted large amount of blood in the trachea, however past paula only scants amount of blood in RLL. Some thick yellow/white secretions in LLL and RLL. All lobes/subsegments inspected with no signs of bleeding. Of note, right red blood coming out of patient's right nostril once intubated. Appreciate pulmonology evaluation and prompt response. Holding SQ heparin. Sent JACQUELINE, ANCA. ENT consulted 
 Acute metabolic encephalopathy: no better. Unclear etiology. Lengthy discussion with neurology, appreciate their re-evaluation. Discussed with Dr. Jennifer Weber again today. MRI brain showed no acute process. Did not tolerate MRI C-spine yesterday; will try again today. Underwent LP , traumatic tap, not entirely normal, but not consistent with bacterial meningitis. Gram stain negative. HSV PCR negative. add'l add on labs for CSF including paraneoplastic labs. Lyme CSF pending. Checking West Nile virus (low suspicion). CSF fluid cytology unremarkable. cEEG neg for seizure. TSH, B12/folate okay. RPR negative. If pt continues to show no improvement, will consider transferring to tertiary center tomorrow 
 Severe sepsis/bacteremia: Likely from R arm/elbow cellulitis. Blood Cx + 4/4 bottles with GBS on admission, repeat blood Cx NGTD. ID following. No e/o valvular veg on TTE.  Continue IV CTX (dosed at meningitis dose due to concerns of ongoing AMS), discussed with ID Dr. Ron Miller.  
  
 R elbow cellulitis/bursitis/acute pain: may be source of infection. Discussed with ortho, MRI right arm/elbow showed complex joint effusion with thick enhancing synovitis. Underwent aspiration by IR. Abx as above 
  
ARF: slowly improving. Likely from ATN/sepsis, hypovolemia. ACE-I on hold. Cont IVF, monitor BMP. Nephrology following 
  
AG metabolic acidosis: resolved. Due to sepsis. Lactate normal. Off bicarb  
  
Hypernatremia: changed fluids to hypotonic. Follow BMP 
  
DM type 2 w/ hypoglycemia: A1C 7.2%. Hypoglycemia on admission from poor PO intake and sepsis but has resolved. Hold metformin. Cont Lantus, cont SSI 
  
HTN: changed atenolol to labetalol but pt not taking PO. Added clonidine patch, increased dose. Added scheduled IV metoprolol. IV hydralazine and labetalol PRN 
  
Anemia: Fe studies consistent with chronic dz. Follow Hg 
  
 
Total time spent: 45 minutes Time spent in the care of this patient including reviewing records, discussing with nursing and/or other providers on the treatment team, obtaining history and examining the patient, and discussing treatment plans. Care Plan discussed with: Patient, Nursing Staff and >50% of time spent in counseling and coordination of care Discussed:  Care Plan Prophylaxis:  heparin Disposition:  TBD Subjective: Chief Complaint:  Follow up sepsis Chart/notes/labs/studies reviewed, patient examined at bedside. No meaningful history or ROS able to be obtained due to AMS. Discussed with  at bedside. Had ?hemoptysis/blood clots coughing earlier today. Intubated for airway protection Objective:  
 
 
Vitals:  
 
  
Last 24hrs VS reviewed since prior progress note. Most recent are: 
 
Visit Vitals /85 Pulse 93 Temp 97.6 °F (36.4 °C) Resp 16 Ht 5' 1\" (1.549 m) Wt 88 kg (194 lb 0.1 oz) SpO2 100% BMI 36.66 kg/m² SpO2 Readings from Last 6 Encounters:  
12/07/18 100% O2 Flow Rate (L/min): 2 l/min Intake/Output Summary (Last 24 hours) at 12/7/2018 1558 Last data filed at 12/7/2018 1500 Gross per 24 hour Intake 1655.44 ml Output 1455 ml Net 200.44 ml Exam:  
 
Physical Exam: 
 
Gen:  Elderly, chronically ill-appearing, morbidly obese, NAD HEENT:  Pink conjunctivae, PERRL, hearing intact to voice, bloody in mouth, suctioning produced clots Neck:  Supple, without masses Resp:  No accessory muscle use, increased WOB. Diminished BS at bases with few coarse breath sounds Card:  tachycardic, No murmurs, normal S1, S2 without thrills, bruits or peripheral edema Abd:  Soft, non-tender, non-distended but obese, normoactive bowel sounds are present Musc:  No cyanosis or clubbing. Notable LE muscular atrophy Skin:  Right elbow swelling, erythema. TTP Neuro:  not moving extremities well. Not following commands. Left foot drop Psych:  poor insight, not oriented Medications Reviewed: (see below) Lab Data Reviewed: (see below) 
 
______________________________________________________________________ Medications:  
 
Current Facility-Administered Medications Medication Dose Route Frequency  dextrose 5% infusion  75 mL/hr IntraVENous CONTINUOUS  
 LORazepam (ATIVAN) injection 1 mg  1 mg IntraVENous ONCE PRN  
 bisacodyl (DULCOLAX) suppository 10 mg  10 mg Rectal DAILY  propofol (DIPRIVAN) infusion  5-50 mcg/kg/min IntraVENous TITRATE  chlorhexidine (PERIDEX) 0.12 % mouthwash 15 mL  15 mL Oral BID  pantoprazole (PROTONIX) 40 mg in sodium chloride 0.9% 10 mL injection  40 mg IntraVENous Q12H  
 insulin lispro (HUMALOG) injection   SubCUTAneous Q6H  
 cloNIDine (CATAPRES) 0.3 mg/24 hr patch 1 Patch  1 Patch TransDERmal Q7D  
 metoprolol (LOPRESSOR) injection 2.5 mg  2.5 mg IntraVENous Q6H  
 labetalol (NORMODYNE) tablet 300 mg  300 mg Oral Q12H  
 influenza vaccine 2018-19 (6 mos+)(PF) (FLUARIX QUAD/FLULAVAL QUAD) injection 0.5 mL  0.5 mL IntraMUSCular PRIOR TO DISCHARGE  labetalol (NORMODYNE;TRANDATE) injection 10 mg  10 mg IntraVENous Q4H PRN  
 hydrALAZINE (APRESOLINE) 20 mg/mL injection 20 mg  20 mg IntraVENous Q6H PRN  
 acetaminophen (TYLENOL) suppository 650 mg  650 mg Rectal Q4H PRN  
 alteplase (CATHFLO) 1 mg in sterile water (preservative free) 1 mL injection  1 mg InterCATHeter PRN  
 sodium chloride (NS) flush 10-30 mL  10-30 mL InterCATHeter PRN  
 sodium chloride (NS) flush 10 mL  10 mL InterCATHeter Q24H  
 sodium chloride (NS) flush 10 mL  10 mL InterCATHeter PRN  
 sodium chloride (NS) flush 10-40 mL  10-40 mL InterCATHeter Q8H  
 sodium chloride (NS) flush 20 mL  20 mL InterCATHeter Q24H  cefTRIAXone (ROCEPHIN) 2 g in 0.9% sodium chloride (MBP/ADV) 50 mL  2 g IntraVENous Q12H  
 HYDROmorphone (PF) (DILAUDID) injection 2 mg  2 mg IntraVENous Q3H PRN  
 sodium chloride (NS) flush 5-10 mL  5-10 mL IntraVENous PRN  
 sodium chloride (NS) flush 5-10 mL  5-10 mL IntraVENous Q8H  
 sodium chloride (NS) flush 5-10 mL  5-10 mL IntraVENous PRN  
 acetaminophen (TYLENOL) tablet 650 mg  650 mg Oral Q4H PRN  
 ondansetron (ZOFRAN) injection 4 mg  4 mg IntraVENous Q4H PRN  
 glucose chewable tablet 16 g  4 Tab Oral PRN  
 dextrose (D50W) injection syrg 12.5-25 g  25-50 mL IntraVENous PRN  
 glucagon (GLUCAGEN) injection 1 mg  1 mg IntraMUSCular PRN  
 levothyroxine (SYNTHROID) tablet 125 mcg  125 mcg Oral ACB  sertraline (ZOLOFT) tablet 150 mg  150 mg Oral QHS Lab Review:  
 
Recent Labs 12/07/18 
0134 12/06/18 
0111 12/05/18 
0056 WBC 13.3* 14.4* 11.8* HGB 9.1* 9.5* 9.2* HCT 30.8* 31.3* 30.2* * 474* 447* Recent Labs 12/07/18 
0134 12/06/18 
0111 12/05/18 
0056 * 148* 145  
K 4.1 3.7 3.1*  
* 111* 108 CO2 27 28 30 GLU 63* 117* 208* BUN 45* 43* 43* CREA 1.18* 1.17* 1.42* CA 8.9 9.0 9.0 MG 1.9 1.8 1.7 PHOS 4.3 3.5 2.9 ALB 1.2*  --  0.9* SGOT 36  --  31 ALT 18  --  18 No components found for: [place] Recent Labs 12/07/18 [address] PH 7.47* PCO2 40 PO2 437* HCO3 28* FIO2 100 No results for input(s): INR in the last 72 hours. No lab exists for component: INREXT, INREXT No results found for: SDES Lab Results Component Value Date/Time Culture result: PENDING 12/06/2018 03:09 PM  
 Culture result: Culture performed on Fluid swab specimen 12/06/2018 03:09 PM  
 Culture result: NO GROWTH AFTER 16 HOURS 12/06/2018 03:09 PM  
 
        
___________________________________________________ Attending Physician: Filiberto Her MD

## 2018-12-07 NOTE — PERIOP NOTES
Spoke with patient's spouse Kvng Lincoln, after speaking with Dr Carlos Nassar signed consent for bronchoscopy.

## 2018-12-07 NOTE — PROGRESS NOTES
[facility] Progress Note Melissa Camarillo DO 
  [phone] Office [phone]  Fax 
 
2018 Assessment & Plan: 1. Right elbow cellulitis complicated by GBS bacteremia. Repeat BC's NGSF. Orthopedic surgery is following. S/p joint aspiration by IR . Cultures NGSF. Continue ceftriaxone. 2. Encephalopathy. Etiology unclear. Traumatic tap with LP results abnormal but not c/w bacterial or viral infection. Meningitis pathogens panel negative. CSF culture sterile at 72 hours. Lyme CSF pending(low suspicion) Check WNV(also low suspicion) CSF fluid cytology unremarkable. HSV 1&2 PCR negative. MRI brain negative. 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 Neurology following and has ordered additional CSF tests. 3. Acute kidney injury. This is improving 4. Diabetes mellitus. Management per primary team. 
5. Rash prior to admission. Resolved. Pictures reviewed with patients . Possible cellulitis? Rash not c/w HSV/VZV. 6.   CoNS isolated from a single blood culture. This represents a contaminant. 7. Hemoptysis. ENT to see Subjective: Intubated s/p bronch. Moved to ICU for hemoptysis Objective:  
 
Vitals:  
Visit Vitals /83 Pulse 90 Temp 97.3 °F (36.3 °C) Resp 17 Ht 5' 1\" (1.549 m) Wt 88 kg (194 lb 0.1 oz) SpO2 100% BMI 36.66 kg/m² Tmax:  Temp (24hrs), Av °F (36.7 °C), Min:97.3 °F (36.3 °C), Max:99.4 °F (37.4 °C) Exam:  
Patient is intubated:  yes Physical Examination:  
General:  Intubated and sedated Head:  Normocephalic, atraumatic. Eyes:  Conjunctivae clear Neck: Supple Lungs:   Clear to auscultation bilaterally. Chest wall:    
Heart:  Regular rate and rhythm Abdomen:   Soft, non-tender, non-distended Extremities:   
Skin:  No rash Neurologic: Unable to assess Labs:     
 
No lab exists for component: ITNL No results for input(s): CPK, CKMB, TROIQ in the last 72 hours. Recent Labs 12/07/18 
0134 12/06/18 
0111 12/05/18 
0056 * 148* 145  
K 4.1 3.7 3.1*  
* 111* 108 CO2 27 28 30 BUN 45* 43* 43* CREA 1.18* 1.17* 1.42* GLU 63* 117* 208* PHOS 4.3 3.5 2.9 MG 1.9 1.8 1.7 ALB 1.2*  --  0.9* WBC 13.3* 14.4* 11.8* HGB 9.1* 9.5* 9.2* HCT 30.8* 31.3* 30.2* * 474* 447* No results for input(s): INR, PTP, APTT in the last 72 hours. No lab exists for component: INREXT, INREXT Needs: urine analysis, urine sodium, protein and creatinine Lab Results Component Value Date/Time Sodium,urine random 29 11/30/2018 06:10 PM  
 Creatinine, urine 187.11 11/30/2018 06:10 PM  
 
 
 
Cultures: No results found for: SDES Lab Results Component Value Date/Time Culture result: PENDING 12/06/2018 03:09 PM  
 Culture result: Culture performed on Fluid swab specimen 12/06/2018 03:09 PM  
 Culture result: NO GROWTH AFTER 16 HOURS 12/06/2018 03:09 PM  
 
 
Radiology:  
 
Medications Current Facility-Administered Medications Medication Dose Route Frequency Last Dose  dextrose 5% infusion  75 mL/hr IntraVENous CONTINUOUS 75 mL/hr at 12/07/18 1147  LORazepam (ATIVAN) injection 1 mg  1 mg IntraVENous ONCE PRN    
 bisacodyl (DULCOLAX) suppository 10 mg  10 mg Rectal DAILY 10 mg at 12/07/18 1440  propofol (DIPRIVAN) infusion  5-50 mcg/kg/min IntraVENous TITRATE 50 mcg/kg/min at 12/07/18 1512  chlorhexidine (PERIDEX) 0.12 % mouthwash 15 mL  15 mL Oral BID  pantoprazole (PROTONIX) 40 mg in sodium chloride 0.9% 10 mL injection  40 mg IntraVENous Q12H    
 insulin lispro (HUMALOG) injection   SubCUTAneous Q6H    
 cloNIDine (CATAPRES) 0.3 mg/24 hr patch 1 Patch  1 Patch TransDERmal Q7D 1 Patch at 12/06/18 1522  
 metoprolol (LOPRESSOR) injection 2.5 mg  2.5 mg IntraVENous Q6H 2.5 mg at 12/07/18 0551  labetalol (NORMODYNE) tablet 300 mg  300 mg Oral Q12H  influenza vaccine 2018-19 (6 mos+)(PF) (FLUARIX QUAD/FLULAVAL QUAD) injection 0.5 mL  0.5 mL IntraMUSCular PRIOR TO DISCHARGE  labetalol (NORMODYNE;TRANDATE) injection 10 mg  10 mg IntraVENous Q4H PRN 10 mg at 12/06/18 1328  hydrALAZINE (APRESOLINE) 20 mg/mL injection 20 mg  20 mg IntraVENous Q6H PRN 20 mg at 12/06/18 1641  acetaminophen (TYLENOL) suppository 650 mg  650 mg Rectal Q4H  mg at 12/05/18 0044  alteplase (CATHFLO) 1 mg in sterile water (preservative free) 1 mL injection  1 mg InterCATHeter PRN    
 sodium chloride (NS) flush 10-30 mL  10-30 mL InterCATHeter PRN    
 sodium chloride (NS) flush 10 mL  10 mL InterCATHeter Q24H 10 mL at 12/06/18 1038  sodium chloride (NS) flush 10 mL  10 mL InterCATHeter PRN    
 sodium chloride (NS) flush 10-40 mL  10-40 mL InterCATHeter Q8H 10 mL at 12/07/18 1506  sodium chloride (NS) flush 20 mL  20 mL InterCATHeter Q24H 20 mL at 12/06/18 1038  cefTRIAXone (ROCEPHIN) 2 g in 0.9% sodium chloride (MBP/ADV) 50 mL  2 g IntraVENous Q12H 2 g at 12/07/18 1059  
 HYDROmorphone (PF) (DILAUDID) injection 2 mg  2 mg IntraVENous Q3H PRN 2 mg at 12/07/18 0909  sodium chloride (NS) flush 5-10 mL  5-10 mL IntraVENous PRN 10 mL at 12/01/18 1909  sodium chloride (NS) flush 5-10 mL  5-10 mL IntraVENous Q8H 10 mL at 12/07/18 1506  sodium chloride (NS) flush 5-10 mL  5-10 mL IntraVENous PRN    
 acetaminophen (TYLENOL) tablet 650 mg  650 mg Oral Q4H  mg at 12/03/18 1815  ondansetron (ZOFRAN) injection 4 mg  4 mg IntraVENous Q4H PRN 4 mg at 12/06/18 0818  
 glucose chewable tablet 16 g  4 Tab Oral PRN    
 dextrose (D50W) injection syrg 12.5-25 g  25-50 mL IntraVENous PRN 25 g at 12/07/18 5473  glucagon (GLUCAGEN) injection 1 mg  1 mg IntraMUSCular PRN    
 levothyroxine (SYNTHROID) tablet 125 mcg  125 mcg Oral  mcg at 12/04/18 5757  sertraline (ZOLOFT) tablet 150 mg  150 mg Oral  mg at 12/03/18 1626 Case discussed with: IM and  Shazia Mackenzie, DO

## 2018-12-08 ENCOUNTER — APPOINTMENT (OUTPATIENT)
Dept: GENERAL RADIOLOGY | Age: 63
DRG: 871 | End: 2018-12-08
Attending: INTERNAL MEDICINE
Payer: COMMERCIAL

## 2018-12-08 LAB
ALBUMIN SERPL-MCNC: 1.1 G/DL (ref 3.5–5)
ALBUMIN/GLOB SERPL: 0.3 {RATIO} (ref 1.1–2.2)
ALP SERPL-CCNC: 68 U/L (ref 45–117)
ALT SERPL-CCNC: 15 U/L (ref 12–78)
ANION GAP SERPL CALC-SCNC: 8 MMOL/L (ref 5–15)
APTT PPP: 31.3 SEC (ref 22.1–32)
ARTERIAL PATENCY WRIST A: ABNORMAL
AST SERPL-CCNC: 31 U/L (ref 15–37)
BASE EXCESS BLDA CALC-SCNC: 3.1 MMOL/L
BASOPHILS # BLD: 0 K/UL (ref 0–0.1)
BASOPHILS NFR BLD: 0 % (ref 0–1)
BDY SITE: ABNORMAL
BILIRUB SERPL-MCNC: 0.2 MG/DL (ref 0.2–1)
BUN SERPL-MCNC: 43 MG/DL (ref 6–20)
BUN/CREAT SERPL: 31 (ref 12–20)
CALCIUM SERPL-MCNC: 8.3 MG/DL (ref 8.5–10.1)
CHLORIDE SERPL-SCNC: 107 MMOL/L (ref 97–108)
CO2 SERPL-SCNC: 28 MMOL/L (ref 21–32)
COMMENT, HOLDF: NORMAL
CREAT SERPL-MCNC: 1.39 MG/DL (ref 0.55–1.02)
DIFFERENTIAL METHOD BLD: ABNORMAL
EOSINOPHIL # BLD: 0.2 K/UL (ref 0–0.4)
EOSINOPHIL NFR BLD: 2 % (ref 0–7)
EPAP/CPAP/PEEP, PAPEEP: 5
ERYTHROCYTE [DISTWIDTH] IN BLOOD BY AUTOMATED COUNT: 15 % (ref 11.5–14.5)
FIO2 ON VENT: 50 %
GAS FLOW.O2 SETTING OXYMISER: 12 L/MIN
GLOBULIN SER CALC-MCNC: 4 G/DL (ref 2–4)
GLUCOSE BLD STRIP.AUTO-MCNC: 156 MG/DL (ref 65–100)
GLUCOSE BLD STRIP.AUTO-MCNC: 160 MG/DL (ref 65–100)
GLUCOSE BLD STRIP.AUTO-MCNC: 184 MG/DL (ref 65–100)
GLUCOSE BLD STRIP.AUTO-MCNC: 213 MG/DL (ref 65–100)
GLUCOSE SERPL-MCNC: 158 MG/DL (ref 65–100)
HCO3 BLDA-SCNC: 26 MMOL/L (ref 22–26)
HCT VFR BLD AUTO: 25.5 % (ref 35–47)
HGB BLD-MCNC: 7.7 G/DL (ref 11.5–16)
HIV 1+2 AB+HIV1 P24 AG SERPL QL IA: NONREACTIVE
HIV12 RESULT COMMENT, HHIVC: NORMAL
IMM GRANULOCYTES # BLD: 0.4 K/UL (ref 0–0.04)
IMM GRANULOCYTES NFR BLD AUTO: 4 % (ref 0–0.5)
INR PPP: 1.4 (ref 0.9–1.1)
LYMPHOCYTES # BLD: 1 K/UL (ref 0.8–3.5)
LYMPHOCYTES NFR BLD: 9 % (ref 12–49)
MAGNESIUM SERPL-MCNC: 1.8 MG/DL (ref 1.6–2.4)
MCH RBC QN AUTO: 27.9 PG (ref 26–34)
MCHC RBC AUTO-ENTMCNC: 30.2 G/DL (ref 30–36.5)
MCV RBC AUTO: 92.4 FL (ref 80–99)
MONOCYTES # BLD: 0.6 K/UL (ref 0–1)
MONOCYTES NFR BLD: 5 % (ref 5–13)
NEUTS SEG # BLD: 9 K/UL (ref 1.8–8)
NEUTS SEG NFR BLD: 80 % (ref 32–75)
NRBC # BLD: 0 K/UL (ref 0–0.01)
NRBC BLD-RTO: 0 PER 100 WBC
PCO2 BLDA: 34 MMHG (ref 35–45)
PH BLDA: 7.5 [PH] (ref 7.35–7.45)
PHOSPHATE SERPL-MCNC: 4.4 MG/DL (ref 2.6–4.7)
PLATELET # BLD AUTO: 382 K/UL (ref 150–400)
PMV BLD AUTO: 9.6 FL (ref 8.9–12.9)
PO2 BLDA: 109 MMHG (ref 80–100)
POTASSIUM SERPL-SCNC: 3.6 MMOL/L (ref 3.5–5.1)
PROT SERPL-MCNC: 5.1 G/DL (ref 6.4–8.2)
PROTHROMBIN TIME: 14.4 SEC (ref 9–11.1)
RBC # BLD AUTO: 2.76 M/UL (ref 3.8–5.2)
RBC MORPH BLD: ABNORMAL
SAMPLES BEING HELD,HOLD: NORMAL
SAO2 % BLD: 98 % (ref 92–97)
SAO2% DEVICE SAO2% SENSOR NAME: ABNORMAL
SERVICE CMNT-IMP: ABNORMAL
SODIUM SERPL-SCNC: 143 MMOL/L (ref 136–145)
SPECIMEN SITE: ABNORMAL
THERAPEUTIC RANGE,PTTT: NORMAL SECS (ref 58–77)
VENTILATION MODE VENT: ABNORMAL
VT SETTING VENT: 450 ML
WBC # BLD AUTO: 11.2 K/UL (ref 3.6–11)

## 2018-12-08 PROCEDURE — 87389 HIV-1 AG W/HIV-1&-2 AB AG IA: CPT

## 2018-12-08 PROCEDURE — 94003 VENT MGMT INPAT SUBQ DAY: CPT

## 2018-12-08 PROCEDURE — 83735 ASSAY OF MAGNESIUM: CPT

## 2018-12-08 PROCEDURE — 74011000250 HC RX REV CODE- 250: Performed by: INTERNAL MEDICINE

## 2018-12-08 PROCEDURE — 85730 THROMBOPLASTIN TIME PARTIAL: CPT

## 2018-12-08 PROCEDURE — 65610000006 HC RM INTENSIVE CARE

## 2018-12-08 PROCEDURE — 85610 PROTHROMBIN TIME: CPT

## 2018-12-08 PROCEDURE — 71045 X-RAY EXAM CHEST 1 VIEW: CPT

## 2018-12-08 PROCEDURE — 82803 BLOOD GASES ANY COMBINATION: CPT

## 2018-12-08 PROCEDURE — 74011250636 HC RX REV CODE- 250/636: Performed by: INTERNAL MEDICINE

## 2018-12-08 PROCEDURE — 86788 WEST NILE VIRUS AB IGM: CPT

## 2018-12-08 PROCEDURE — 85025 COMPLETE CBC W/AUTO DIFF WBC: CPT

## 2018-12-08 PROCEDURE — 82962 GLUCOSE BLOOD TEST: CPT

## 2018-12-08 PROCEDURE — 80053 COMPREHEN METABOLIC PANEL: CPT

## 2018-12-08 PROCEDURE — 36600 WITHDRAWAL OF ARTERIAL BLOOD: CPT

## 2018-12-08 PROCEDURE — 74011250637 HC RX REV CODE- 250/637: Performed by: INTERNAL MEDICINE

## 2018-12-08 PROCEDURE — 74018 RADEX ABDOMEN 1 VIEW: CPT

## 2018-12-08 PROCEDURE — C9113 INJ PANTOPRAZOLE SODIUM, VIA: HCPCS | Performed by: INTERNAL MEDICINE

## 2018-12-08 PROCEDURE — 74011636637 HC RX REV CODE- 636/637: Performed by: INTERNAL MEDICINE

## 2018-12-08 PROCEDURE — 74011000258 HC RX REV CODE- 258: Performed by: INTERNAL MEDICINE

## 2018-12-08 PROCEDURE — 84100 ASSAY OF PHOSPHORUS: CPT

## 2018-12-08 PROCEDURE — 74011250637 HC RX REV CODE- 250/637: Performed by: OTOLARYNGOLOGY

## 2018-12-08 PROCEDURE — 36415 COLL VENOUS BLD VENIPUNCTURE: CPT

## 2018-12-08 RX ORDER — OXYMETAZOLINE HCL 0.05 %
3-4 SPRAY, NON-AEROSOL (ML) NASAL 2 TIMES DAILY
Status: DISCONTINUED | OUTPATIENT
Start: 2018-12-08 | End: 2018-12-08

## 2018-12-08 RX ORDER — OXYMETAZOLINE HCL 0.05 %
4 SPRAY, NON-AEROSOL (ML) NASAL EVERY 6 HOURS
Status: COMPLETED | OUTPATIENT
Start: 2018-12-08 | End: 2018-12-10

## 2018-12-08 RX ORDER — OXYMETAZOLINE HCL 0.05 %
3-4 SPRAY, NON-AEROSOL (ML) NASAL EVERY 6 HOURS
Status: DISCONTINUED | OUTPATIENT
Start: 2018-12-08 | End: 2018-12-08

## 2018-12-08 RX ADMIN — LABETALOL HYDROCHLORIDE 10 MG: 5 INJECTION INTRAVENOUS at 16:37

## 2018-12-08 RX ADMIN — PROPOFOL 10 MCG/KG/MIN: 10 INJECTION, EMULSION INTRAVENOUS at 10:38

## 2018-12-08 RX ADMIN — PANTOPRAZOLE SODIUM 40 MG: 40 INJECTION, POWDER, FOR SOLUTION INTRAVENOUS at 09:50

## 2018-12-08 RX ADMIN — METOPROLOL TARTRATE 2.5 MG: 1 INJECTION, SOLUTION INTRAVENOUS at 23:15

## 2018-12-08 RX ADMIN — Medication 10 ML: at 15:14

## 2018-12-08 RX ADMIN — PROPOFOL 45 MCG/KG/MIN: 10 INJECTION, EMULSION INTRAVENOUS at 05:55

## 2018-12-08 RX ADMIN — METOPROLOL TARTRATE 2.5 MG: 1 INJECTION, SOLUTION INTRAVENOUS at 11:12

## 2018-12-08 RX ADMIN — OXYMETAZOLINE HYDROCHLORIDE 4 SPRAY: 5 SPRAY NASAL at 15:14

## 2018-12-08 RX ADMIN — Medication 20 ML: at 21:00

## 2018-12-08 RX ADMIN — Medication 25 MCG/HR: at 11:44

## 2018-12-08 RX ADMIN — PROPOFOL 35 MCG/KG/MIN: 10 INJECTION, EMULSION INTRAVENOUS at 21:00

## 2018-12-08 RX ADMIN — Medication 10 ML: at 10:17

## 2018-12-08 RX ADMIN — HYDRALAZINE HYDROCHLORIDE 20 MG: 20 INJECTION INTRAMUSCULAR; INTRAVENOUS at 17:37

## 2018-12-08 RX ADMIN — PROPOFOL 35 MCG/KG/MIN: 10 INJECTION, EMULSION INTRAVENOUS at 15:14

## 2018-12-08 RX ADMIN — VANCOMYCIN HYDROCHLORIDE 2250 MG: 10 INJECTION, POWDER, LYOPHILIZED, FOR SOLUTION INTRAVENOUS at 01:11

## 2018-12-08 RX ADMIN — PROPOFOL 50 MCG/KG/MIN: 10 INJECTION, EMULSION INTRAVENOUS at 02:33

## 2018-12-08 RX ADMIN — MEROPENEM 2 G: 1 INJECTION, POWDER, FOR SOLUTION INTRAVENOUS at 01:11

## 2018-12-08 RX ADMIN — METOPROLOL TARTRATE 2.5 MG: 1 INJECTION, SOLUTION INTRAVENOUS at 17:37

## 2018-12-08 RX ADMIN — MEROPENEM 2 G: 1 INJECTION, POWDER, FOR SOLUTION INTRAVENOUS at 13:54

## 2018-12-08 RX ADMIN — INSULIN LISPRO 2 UNITS: 100 INJECTION, SOLUTION INTRAVENOUS; SUBCUTANEOUS at 19:51

## 2018-12-08 RX ADMIN — OXYMETAZOLINE HYDROCHLORIDE 4 SPRAY: 5 SPRAY NASAL at 23:09

## 2018-12-08 RX ADMIN — METOPROLOL TARTRATE 2.5 MG: 1 INJECTION, SOLUTION INTRAVENOUS at 05:56

## 2018-12-08 RX ADMIN — CHLORHEXIDINE GLUCONATE 15 ML: 1.2 RINSE ORAL at 09:48

## 2018-12-08 RX ADMIN — HYDRALAZINE HYDROCHLORIDE 20 MG: 20 INJECTION INTRAMUSCULAR; INTRAVENOUS at 11:44

## 2018-12-08 RX ADMIN — LABETALOL HYDROCHLORIDE 10 MG: 5 INJECTION INTRAVENOUS at 21:36

## 2018-12-08 RX ADMIN — CHLORHEXIDINE GLUCONATE 15 ML: 1.2 RINSE ORAL at 17:41

## 2018-12-08 RX ADMIN — DEXTROSE MONOHYDRATE 75 ML/HR: 5 INJECTION, SOLUTION INTRAVENOUS at 11:49

## 2018-12-08 RX ADMIN — Medication 20 ML: at 10:17

## 2018-12-08 RX ADMIN — PANTOPRAZOLE SODIUM 40 MG: 40 INJECTION, POWDER, FOR SOLUTION INTRAVENOUS at 20:58

## 2018-12-08 RX ADMIN — Medication 4 SPRAY: at 09:45

## 2018-12-08 RX ADMIN — LABETALOL HYDROCHLORIDE 10 MG: 5 INJECTION INTRAVENOUS at 10:06

## 2018-12-08 RX ADMIN — Medication 10 MG: at 09:00

## 2018-12-08 NOTE — CONSULTS
[facility]     Name: Tanisha Hernandez MRN: [id number]   : 1955 Hospital: [address]   Date: 2018        Impression Plan   1. Acute respiratory failure  2. Acute onset hemoptysis  3. AMS  4. Betahemolytic strep bacteremia  5. Hypoxia  6. Leukocytosis  7. Hypernatremia  8.                · SBT currently. Still a question of amount of airway protection, especially in the setting of still having slow bleeding from right nostril. · ENT called again this AM  · Bronch with no signs of bleeding  · Continue ceftriaxone  · Brain MRI negative  · Hold proph AC  · SCDs  · Peridex  · PPI         Pt is critically ill. Critical care time spent with pt exclusive of procedures was 33 minutes. Pt at risk for further decline due to hemoptysis and respiratory failure    Radiology  ( personally reviewed) CXR reviewed: - no infiltrates   ABG No results for input(s): PHI, PO2I, PCO2I in the last 72 hours. Subjective     Overnight events:  No bleeding overnight, but trickeling out of right nostril this weekend  Hgb dropped 2 gms  Meeting eye contact and shakes her head, but will not follow commands. Past Medical History:   Diagnosis Date    Chronic back pain     Diabetes mellitus, type 2 ([facility] [address]     History of vascular access device 2018    [facility] VAT - 4 FR single, L cephalic, 44 cm for LTA    HTN (hypertension)       History reviewed. No pertinent surgical history. Prior to Admission medications    Medication Sig Start Date End Date Taking? Authorizing Provider   atenolol (TENORMIN) 25 mg tablet Take 25 mg by mouth daily. Yes Provider, Historical   cyclobenzaprine (FLEXERIL) 10 mg tablet Take 10 mg by mouth daily as needed for Muscle Spasm(s). Yes Provider, Historical   fenofibrate (LOFIBRA) 160 mg tablet Take 160 mg by mouth nightly. Yes Provider, Historical   gabapentin (NEURONTIN) 300 mg capsule Take 300 mg by mouth three (3) times daily.    Yes Provider, Historical   insulin glargine (LANTUS,BASAGLAR) 100 unit/mL (3 mL) inpn 75 Units by SubCUTAneous route nightly. Yes Provider, Historical   levothyroxine (SYNTHROID) 125 mcg tablet Take 125 mcg by mouth Daily (before breakfast). Yes Provider, Historical   lisinopril-hydroCHLOROthiazide (PRINZIDE, ZESTORETIC) 20-12.5 mg per tablet Take 2 Tabs by mouth daily. Yes Provider, Historical   metFORMIN (GLUCOPHAGE) 500 mg tablet Take 500 mg by mouth two (2) times daily (with meals). Yes Provider, Historical   niacin ER (NIASPAN) 500 mg tablet Take 500 mg by mouth nightly. Yes Provider, Historical   omega 3-DHA-EPA-fish oil 1,000 mg (120 mg-180 mg) capsule Take 2 Caps by mouth two (2) times a day. Yes Provider, Historical   oxyCODONE IR (ROXICODONE) 5 mg immediate release tablet Take 5 mg by mouth daily as needed (breakthrough pain). Yes Provider, Historical   oxyCODONE ER (XTAMPZA ER) 18 mg ER capsule Take 18 mg by mouth every twelve (12) hours. Yes Provider, Historical   sertraline (ZOLOFT) 100 mg tablet Take 150 mg by mouth nightly. Yes Provider, Historical   simvastatin (ZOCOR) 40 mg tablet Take 40 mg by mouth nightly. Yes Provider, Historical   triamcinolone acetonide (KENALOG) 0.1 % topical cream Apply  to affected area two (2) times a day. use thin layer   Yes Provider, Historical   multivit-min-FA-lycopen-lutein (CENTRUM SILVER) 0.4-300-250 mg-mcg-mcg tab Take 1 Tab by mouth daily. Yes Provider, Historical   OTHER,NON-FORMULARY, Take 1 Tab by mouth two (2) times a day. Osteo Biflex   Yes Provider, Historical   aspirin delayed-release 81 mg tablet Take 81 mg by mouth two (2) times a day. Yes Provider, Historical   ibuprofen (MOTRIN) 200 mg tablet Take 400 mg by mouth two (2) times a day. Yes Provider, Historical   ubidecarenone (COENZYME Q10, BULK,) Take 1 Tab by mouth daily.    Yes Provider, Historical     Current Facility-Administered Medications   Medication Dose Route Frequency    [START ON 12/9/2018] vancomycin (VANCOCIN) 1,250 mg in 0.9% sodium chloride 250 mL IVPB  1,250 mg IntraVENous Q24H    dextrose 5% infusion  75 mL/hr IntraVENous CONTINUOUS    bisacodyl (DULCOLAX) suppository 10 mg  10 mg Rectal DAILY    propofol (DIPRIVAN) infusion  5-50 mcg/kg/min IntraVENous TITRATE    chlorhexidine (PERIDEX) 0.12 % mouthwash 15 mL  15 mL Oral BID    pantoprazole (PROTONIX) 40 mg in sodium chloride 0.9% 10 mL injection  40 mg IntraVENous Q12H    insulin lispro (HUMALOG) injection   SubCUTAneous Q6H    meropenem (MERREM) 2 g in 0.9% sodium chloride 100 mL IVPB  2 g IntraVENous Q12H    cloNIDine (CATAPRES) 0.3 mg/24 hr patch 1 Patch  1 Patch TransDERmal Q7D    metoprolol (LOPRESSOR) injection 2.5 mg  2.5 mg IntraVENous Q6H    labetalol (NORMODYNE) tablet 300 mg  300 mg Oral Q12H    influenza vaccine 2018-19 (6 mos+)(PF) (FLUARIX QUAD/FLULAVAL QUAD) injection 0.5 mL  0.5 mL IntraMUSCular PRIOR TO DISCHARGE    sodium chloride (NS) flush 10 mL  10 mL InterCATHeter Q24H    sodium chloride (NS) flush 10-40 mL  10-40 mL InterCATHeter Q8H    sodium chloride (NS) flush 20 mL  20 mL InterCATHeter Q24H    sodium chloride (NS) flush 5-10 mL  5-10 mL IntraVENous Q8H    levothyroxine (SYNTHROID) tablet 125 mcg  125 mcg Oral ACB    sertraline (ZOLOFT) tablet 150 mg  150 mg Oral QHS     No Known Allergies   Social History     Tobacco Use    Smoking status: Never Smoker    Smokeless tobacco: Never Used   Substance Use Topics    Alcohol use: No     Frequency: Never      Family History   Problem Relation Age of Onset    Diabetes Father     Heart Disease Father           Laboratory: I have personally reviewed the critical care flowsheet and labs.      Recent Labs     12/08/18 0446 12/07/18  0134 12/06/18  0111   WBC 11.2* 13.3* 14.4*   HGB 7.7* 9.1* 9.5*   HCT 25.5* 30.8* 31.3*    449* 474*     Recent Labs     12/08/18  0446 12/07/18  0134 12/06/18  0111    150* 148*   K 3.6 4.1 3.7    112* 111*   CO2 28 27 28   * 63* 117*   BUN 43* 45* 43*   CREA 1.39* 1.18* 1.17*   CA 8.3* 8.9 9.0   MG 1.8 1.9 1.8   PHOS 4.4 4.3 3.5   ALB 1.1* 1.2*  --    SGOT 31 36  --    ALT 15 18  --        Objective:     Mode Rate Tidal Volume Pressure FiO2 PEEP   Spontaneous   450 ml  5 cm H2O 40 % 5 cm H20     Vital Signs:    Ventilator Pressures  Pressure Support (cm H2O): 5 cm H2O  PIP Observed (cm H2O): 11 cm H2O  Plateau Pressure (cm H2O): 18 cm H2O  MAP (cm H2O): 7.4  PEEP/VENT (cm H2O): 5 cm [id number](24)Ventilator Pressures  Pressure Support (cm H2O): 5 cm H2O  PIP Observed (cm H2O): 11 cm H2O  Plateau Pressure (cm H2O): 18 cm H2O  MAP (cm H2O): 7.4  PEEP/VENT (cm H2O): 5 cm H20  Safety & Alarms  Circuit Temperature: 98.6 °F (37 °C)  Backup Mode Checked/Apnea: Yes  Pressure Max: 40 cm H2O  Ve Min: 2  Ve Max: 20  Vt Min: 200 ml  Vt Max: 1000 ml  RR Min: 12  RR Max: 40  Intake/Output:   Last shift:      Ventilator Volumes  Vt Set (ml): 450 ml  Vt Exhaled (Machine Breath) (ml): 352 ml  Ve Observed (l/min): 8.22 l/min  Last 3 shifts: 12/08 0701 - 12/08 1900  In: 1661.4 [I.V.:1661.4]  Out: 60 [Urine:60]RRIOLAST3    Intake/Output Summary (Last 24 hours) at 12/8/2018 6508  Last data filed at 12/8/2018 0753  Gross per 24 hour   Intake 2168.5 ml   Output 1355 ml   Net 813.5 ml     EXAM:   GENERAL: Intubated, awake, slightly agitated, HEENT:  PERRL, EOMI, no alar flaring or epistaxis, bright red blood trickling from right nostril, NECK:  no jugular vein distention, no retractions, no thyromegaly or masses, LUNGS:CTA, no w/r/r HEART:  Regular rate and rhythm with no MGR; trace edema is present, ABDOMEN:  soft with no tenderness, bowel sounds present, EXTREMITIES:  warm with no cyanosis, SKIN: pale, no jaundice or ecchymosis and NEUROLOGIC: RASS 0    Carline Wright MD  [facility]

## 2018-12-08 NOTE — PROGRESS NOTES
Attempted to have patient come down multiple times this afternoon. Unable to get patient down during hours that we staffed. Nurse asked if anyone was here later, told her we have a Call Tech for STAT exams. S/W MARY Mojica about Patient, will call floor first thing in the morning to try and coordinate a time to get her down early.

## 2018-12-08 NOTE — PROGRESS NOTES
Noted prelim MRI C-spine results: There is evidence of discitis at C5-C6 and C6-C7, with abnormal enhancement of 
the vertebral bodies. There is enhancing phlegmon in the epidural space. No 
drainable abscess. Await final report. Discussed with ortho. 
  
Will change IV CTX to vanc and meropenem.

## 2018-12-08 NOTE — PROGRESS NOTES
Physical Therapy Note: 
 
Orders acknowledged, chart reviewed. Per chart pt intubated and not appropriate for PT evaluation. Will continue to follow and complete PT evaluation when pt stable and appropriate.

## 2018-12-08 NOTE — PROGRESS NOTES
1930 Bedside and Verbal shift change report given to Sima Liu RN (oncoming nurse) by Pam Allen RN (offgoing nurse). Report included the following information SBAR, Kardex, ED Summary, OR Summary, Procedure Summary, Intake/Output, MAR and Recent Results. Primary Nurse Dianna Dale RN and Zoe Olson RN,  performed a dual skin assessment on this patient No impairment noted Nikolay score is 13 
 
2000 Pt off the floor to MRI with nurse tech and respiratory therapy. 2330 Pt BP elevated 's administered scheduled Metoprolol. Will continue to monitor.

## 2018-12-08 NOTE — PROGRESS NOTES
Problem: Falls - Risk of 
Goal: *Absence of Falls Document Paul Virgen Fall Risk and appropriate interventions in the flowsheet. Fall Risk Interventions: 
Mobility Interventions: Bed/chair exit alarm Mentation Interventions: Adequate sleep, hydration, pain control Medication Interventions: Evaluate medications/consider consulting pharmacy Elimination Interventions: Call light in reach, Toileting schedule/hourly rounds

## 2018-12-08 NOTE — PROGRESS NOTES
[address] Dosing Services: Antimicrobial Stewardship Progress Note Consult for antibiotic dosing of Vancomycin by Dr. Adebayo Diaz Pharmacist reviewed antibiotic appropriateness for 61year old , female  for indication of discitis, epidural abscess Day of Therapy 1 Plan: 
Vancomycin therapy: 
Start Vancomycin therapy, with loading dose of 2250 (mg) at [facility] 12/8/18. Follow with maintenance dose of 1250(mg) at 0100 12/10/18, every 24 hours. Dose calculated to approximate a therapeutic trough of 15-20 mcg/mL. Last trough level / Plan for level:  
Pharmacy to follow daily and will make changes to dose and/or frequency based on clinical status. Date Dose & Interval Measured (mcg/mL) Extrapolated (mcg/mL) ? ? ? ?  
? ? ? ?  
? ? ? ? Non-Kinetic Antimicrobial Dosing:  
Current Regimen:   
Recommendation:  
Other Antimicrobial 
(not dosed by pharmacist) Meropenem 2 grams q12H Cultures Serum Creatinine Lab Results Component Value Date/Time Creatinine 1.18 (H) 12/07/2018 01:34 AM  
   
Creatinine Clearance Estimated Creatinine Clearance: 49.2 mL/min (A) (based on SCr of 1.18 mg/dL (H)). Temp 99.5 °F (37.5 °C) WBC Lab Results Component Value Date/Time WBC 13.3 (H) 12/07/2018 01:34 AM  
   
H/H Lab Results Component Value Date/Time HGB 9.1 (L) 12/07/2018 01:34 AM  
  
 
Platelets Lab Results Component Value Date/Time PLATELET 782 (H) 19/22/3736 01:34 AM  
  
 
 
Pharmacist: Tonio information: [phone]

## 2018-12-08 NOTE — PROGRESS NOTES
Ortho update: On arrival to see patient, she was being intubated due to sudden onset of hemoptysis. Patient is status post aspirate of right elbow by IR. No karis pus on aspirate. No need for I and D at this point. Will follow cultures. No growth thus far. German Chapman No change in appearance to right elbow. No erythema.   Continue IV antibiotics and elevation of right arm in elevation pillow as tolerated.     
  
 
 
Jono Calderon, NP

## 2018-12-08 NOTE — PROGRESS NOTES
Orthopaedic Progress Note Post Op day: 1 Day Post-Op December 8, 2018 10:22 AM  
 
Patient: Adriana Raymundo MRN: [id number]  SSN: [id number] YOB: 1955  Age: 61 y.o. Sex: female Admit date:  11/30/2018 Date of Surgery:  12/7/2018 Procedures:  Procedure(s): Melum 50 Admitting Physician:  Victorina Cordero MD  
Surgeon:  Matilda Ortiz) and Role: Anish Dahl MD - Primary Consulting Physician(s): Treatment Team: Attending Provider: Kirti Ceron MD; Consulting Provider: Yolande Ontiveros; Consulting Provider: Ry Hess MD; Consulting Provider: Javid Michael DO; Utilization Review: Brigido Sargent; Utilization Review: Stalin Grey RN; Consulting Provider: León Clements MD; Consulting Provider: Ana Lilia Aguirre MD; Consulting Provider: Princess Indiana MD; Care Manager: Roddy Michaels; Consulting Provider: Db Mayorga MD; Consulting Provider: Juan Ramon Wiley NP 
 
SUBJECTIVE: 
  
Adriana Raymundo is a 61 y.o. female is 1 Day Post-Op s/p Procedure(s): BRONCHOSCOPY 
BRONCHIAL WASHINGS FLEXIBLE with an appropriate level of post-operative pain. No complaints of nausea, vomiting, dizziness, lightheadedness, chest pain, or shortness of breath. OBJECTIVE: 
 
  
Physical Exam: 
General: Alert, cooperative, no distress. Respiratory: Respirations unlabored Neurological:  Neurovascular exam within normal limits. Motor: + DF/PF. Musculoskeletal: Calves soft, supple, non-tender upon palpation. Some crepitus noted throughout skin. Able to passive extend elbow to zero, flex to 100.  She is still intubated. She responds to command. She is able to DF and PF her feet, but with poor effort. She has 3+ DF BL, but again limited due to confusion. Left arm remains flaccid, but again concerned for inability to understand my command.    
 
 
IMAGING: 
 There is evidence of discitis at C5-C6 and C6-C7, with abnormal enhancement of 
the vertebral bodies. There is enhancing phlegmon in the epidural space. No 
drainable abscess. Vital Signs:  
  
 
Patient Vitals for the past 8 hrs: 
 BP Temp Pulse Resp SpO2 Weight 18 1000 173/90  81 21 97 %   
18 0805   87 15 97 %   
18 0800   86 20 96 %   
18 0753     97 %   
18 0744   86 12 97 %   
18 0703   87     
18 0700 131/65 100.2 °F (37.9 °C) 87 13 98 %   
18 0600 117/59  87 14 98 %   
18 0544      90.1 kg (198 lb 10.2 oz) 18 0500 132/65  88 15 98 %   
18 0457   90 17 98 %   
18 0400 133/66 99.4 °F (37.4 °C) 87 14 97 %   
18 0355      89.8 kg (197 lb 15.6 oz) 18 0300 137/67  84 16 97 %  Temp (24hrs), Av.7 °F (37.1 °C), Min:97.3 °F (36.3 °C), Max:100.2 °F (37.9 °C) Labs:  
  
 
Recent Labs 18 
8558 HCT 25.5* HGB 7.7* Lab Results Component Value Date/Time Sodium 143 2018 04:46 AM  
 Potassium 3.6 2018 04:46 AM  
 Chloride 107 2018 04:46 AM  
 CO2 28 2018 04:46 AM  
 Glucose 158 (H) 2018 04:46 AM  
 BUN 43 (H) 2018 04:46 AM  
 Creatinine 1.39 (H) 2018 04:46 AM  
 Calcium 8.3 (L) 2018 04:46 AM  
 
 
PT/OT:  
 
  
  
  
 
Patient mobility ASSESSMENT / PLAN:  
Principal Problem: 
  BAYLEE (acute kidney injury) ([facility] (2018) Active Problems: Metabolic acidosis () Chronic back pain () Diabetes mellitus, type 2 ([facility] () Leukocytosis (2018) Right elbow pain (2018) Hypotension (2018) A: 1. C5-C6 and C6-C7 discitis with possible osteomyelitis 2. Right elbow cellulitis and synovitis - no growth of aspirate 3. GBS bacteremia complicated by encephalopathy P: 1. I spoke with Dr. Minoo Clemente who had Dr. Hank Barrientos (spine surgery) review the MRI of the cervical spine. There is no cord compression on the MRI. There is no epidural abscess as well. Patient will need broad spectrum antibiotic coverage which Dr. Clayton Ku has added. We also need MRI of the thoracic and lumbar spine with contrast as well. Patient is intubated currently and will coordinate to get this done today. We will continue to follow. If patient could tolerate a soft collar this could be beneficial from a pain standpoint, but being intubated, neck size, and confusion I do not think this is an option. Signed By: 
MARY Rome [address] [facility]

## 2018-12-08 NOTE — PROGRESS NOTES
0700 
Bedside and Verbal shift change report given to Marjorie Naranjo  (oncoming nurse) by Monik Mcneill  (offgoing nurse). Report included the following information SBAR, MAR and Recent Results. 2290 Propofol stopped. 7723 Pt placed on SBT by [facility], [address]  [facility] [place].  
 
0570 Pt has bleeding from her nose and mouth.  
 
0825 ENT paged. 8898 Orders received from Dr. Rafia Mendoza to give afrin and draw pt, ptt, & INR.  
  
0945 Afrin given per order from Dr. Flo Dugan. [address] Flo Dugan updated. Scant bleeding from ET tube/oral suction. Merocel at bedside for Dr. Flo Dugan when she rounds on the patient. [address] Pt placed back on a rate. Propofol resumed. 80 RN spoke with Dr. Flo Dugan regarding pts plan. Orders received to continue Afrin q6h for 48h. RN to notify Dr. Flo Dugan if bleeding begins again. Kiera 1923 MRI contacted to make plans for scan. MRI will contact RN with a time later this afternoon. [address] [place], [place] notified of MRI being postponed for 24h due to last MRI last night. He will contact the radiologist to clarify need. [address] [facility] PRN hydralazine given. [address] [facility] PRN labetalol given. [address] PRN hydralazine given. 200 Bedside and Verbal shift change report given to Kirsten MANN  (oncoming nurse) by Nico Bhtat RN  (offgoing nurse). Report included the following information SBAR, MAR and Recent Results.

## 2018-12-08 NOTE — PROGRESS NOTES
Discussed with Dr Nely Gallo Chart and images reviewed C spine MRI with Discitis On broad ABX covering CNS disease MRI thoracic and lumbar pnd Had epistaxis earlier Sedated Will follow up tomorrow Kay Segura MD

## 2018-12-08 NOTE — CONSULTS
Called regarding epistaxis - patient bleeding yesterday - hemoptysis - was intubated and then bronchoscopy performed. No source found on bronchoscopy - ENT contacted yesterday -   Bleeding slowed without management -   Then bleeding again today - and right nare appears to be source  Sprayed afrin and bleeding appeared to cease.   Packs are at bedside in case restarts    Plan:  Afrin 3-4 sprays each nostril BID for 48 hours  Blood pressure control needed to decrease risk of bleeding  Coags being checked  Will pack if rebleeds -     Rafi Person MD

## 2018-12-08 NOTE — PROGRESS NOTES
Blanco Mcpherson [facility] 79 
380 [facility], [address] [place] [address] 
[phone] Medical Progress Note NAME: Zahida Price :  1955 MRM:  [id number] Date/Time: 2018 Assessment / Plan:  
 
62 yo WF w/ hx of HTN, DM, chronic pain, presenting with AMS and weakness, found to have severe sepsis, bacteremia, and ARF 
 
C-spine discitis: prelim MRI showed evidence of discitis at C5-C6 and C6-C7, with abnormal enhancement of 
the vertebral bodies. There is enhancing phlegmon in the epidural space. No drainable abscess. Discussed with ortho, recommending MRI T/L spine. No surgical intervention for now. Antibiotics broadened to vanc and meropenem (meningitis dosing), discussed with ID 
 Acute metabolic encephalopathy: unclear etiology, had not shown improvement, and now intubated on  for airway protection from hemoptysis. Question if related to infection involving CNS as above. Lengthy discussion with neurology, appreciate their re-evaluation. MRI brain showed no acute process. Underwent LP , traumatic tap, not entirely normal, but not consistent with bacterial meningitis. Gram stain negative. HSV PCR negative. Add'l add on labs for CSF including paraneoplastic labs. Lyme CSF pending. Checking West Nile virus (low suspicion). CSF fluid cytology unremarkable. cEEG neg for seizure. TSH, B12/folate okay. RPR negative. HIV negative. Will await additional MRI studies as above Hemoptysis vs epistaxis: sudden onset . With bright red blood and clots suctioned from upper airway. Intubated for airway protection. Of note, right red blood coming out of patient's right nostril once intubated. Underwent bronchoscopy which showed large amount of blood in the trachea, however past paula only scants amount of blood in RLL. Some thick yellow/white secretions in LLL and RLL. All lobes/subsegments inspected with no signs of bleeding. Holding SQ heparin.  Sent JACQUELINE, ANCA (pending). ENT following 
 Severe sepsis/bacteremia: Blood Cx + 4/4 bottles with GBS on admission, repeat blood Cx NGTD. With R arm/elbow cellulitis and now evidence of discitis. No e/o valvular veg on TTE. IV CTX broadened to vanc/meropenem 12/7. ID following.  
  
R elbow cellulitis/bursitis/acute pain: Discussed with ortho, MRI right arm/elbow showed complex joint effusion with thick enhancing synovitis. Underwent aspiration by IR. Abx as above 
  
ARF: slowly improving. Likely from ATN/sepsis, hypovolemia. ACE-I on hold. Cont IVF, monitor BMP. Nephrology following, monitor closely after gadolinium administration 
  
AG metabolic acidosis: resolved. Due to sepsis. Lactate normal. Off bicarb  
  
Hypernatremia: changed fluids to hypotonic. Follow BMP 
  
DM type 2 w/ hypoglycemia: A1C 7.2%. Intermittent hypoglycemic episodes. Lantus on hold. SSI alone for now 
  
HTN: On clonidine patch, scheduled IV metoprolol. IV hydralazine and labetalol PRN 
  
Anemia: Fe studies consistent with chronic dz. Follow Hg 
  
 
Total time spent: 40 minutes Time spent in the care of this patient including reviewing records, discussing with nursing and/or other providers on the treatment team, obtaining history and examining the patient, and discussing treatment plans. Care Plan discussed with: Patient, Nursing Staff and >50% of time spent in counseling and coordination of care Discussed:  Care Plan Prophylaxis:  Heparin on hold Disposition:  TBD Subjective: Chief Complaint:  Follow up sepsis Chart/notes/labs/studies reviewed, patient examined at bedside. No meaningful history or ROS able to be obtained. No acute events Objective:  
 
 
Vitals:  
 
  
Last 24hrs VS reviewed since prior progress note. Most recent are: 
 
Visit Vitals /71 Pulse 80 Temp 98.2 °F (36.8 °C) Resp 10 Ht 5' 1\" (1.549 m) Wt 90.1 kg (198 lb 10.2 oz) SpO2 98% BMI 37.53 kg/m² SpO2 Readings from Last 6 Encounters:  
12/08/18 98% O2 Flow Rate (L/min): 2 l/min Intake/Output Summary (Last 24 hours) at 12/8/2018 1437 Last data filed at 12/8/2018 1348 Gross per 24 hour Intake 1762.79 ml Output 1180 ml Net 582.79 ml Exam:  
 
Physical Exam: 
 
Gen:  Elderly, chronically ill-appearing, morbidly obese, NAD HEENT:  Pink conjunctivae, PERRL, ETT in place. No bleeding Neck:  Supple, without masses Resp:  No accessory muscle use, Diminished BS at bases with few coarse breath sounds. Ventilated breath sounds Card:  RRR, no murmurs, normal S1, S2 without thrills, bruits or peripheral edema Abd:  Soft, non-tender, non-distended but obese, normoactive bowel sounds are present Musc:  No cyanosis or clubbing. Notable BLE muscular atrophy Skin:  Right elbow swelling, erythema. TTP Neuro:  sedated and intubated. L foot drop. Psych: intubated Medications Reviewed: (see below) Lab Data Reviewed: (see below) 
 
______________________________________________________________________ Medications:  
 
Current Facility-Administered Medications Medication Dose Route Frequency  [START ON 12/9/2018] vancomycin (VANCOCIN) 1,250 mg in 0.9% sodium chloride 250 mL IVPB  1,250 mg IntraVENous Q24H  
 fentaNYL (PF) 900 mcg/30 ml infusion soln  0-50 mcg/hr IntraVENous TITRATE  oxymetazoline (AFRIN) 0.05 % nasal spray 4 Spray  4 Spray Both Nostrils Q6H  
 dextrose 5% infusion  75 mL/hr IntraVENous CONTINUOUS  
 bisacodyl (DULCOLAX) suppository 10 mg  10 mg Rectal DAILY  propofol (DIPRIVAN) infusion  5-50 mcg/kg/min IntraVENous TITRATE  chlorhexidine (PERIDEX) 0.12 % mouthwash 15 mL  15 mL Oral BID  pantoprazole (PROTONIX) 40 mg in sodium chloride 0.9% 10 mL injection  40 mg IntraVENous Q12H  
 insulin lispro (HUMALOG) injection   SubCUTAneous Q6H  
 meropenem (MERREM) 2 g in 0.9% sodium chloride 100 mL IVPB  2 g IntraVENous Q12H  cloNIDine (CATAPRES) 0.3 mg/24 hr patch 1 Patch  1 Patch TransDERmal Q7D  
 metoprolol (LOPRESSOR) injection 2.5 mg  2.5 mg IntraVENous Q6H  
 labetalol (NORMODYNE) tablet 300 mg  300 mg Oral Q12H  
 influenza vaccine 2018-19 (6 mos+)(PF) (FLUARIX QUAD/FLULAVAL QUAD) injection 0.5 mL  0.5 mL IntraMUSCular PRIOR TO DISCHARGE  labetalol (NORMODYNE;TRANDATE) injection 10 mg  10 mg IntraVENous Q4H PRN  
 hydrALAZINE (APRESOLINE) 20 mg/mL injection 20 mg  20 mg IntraVENous Q6H PRN  
 acetaminophen (TYLENOL) suppository 650 mg  650 mg Rectal Q4H PRN  
 alteplase (CATHFLO) 1 mg in sterile water (preservative free) 1 mL injection  1 mg InterCATHeter PRN  
 sodium chloride (NS) flush 10-30 mL  10-30 mL InterCATHeter PRN  
 sodium chloride (NS) flush 10 mL  10 mL InterCATHeter Q24H  
 sodium chloride (NS) flush 10 mL  10 mL InterCATHeter PRN  
 sodium chloride (NS) flush 10-40 mL  10-40 mL InterCATHeter Q8H  
 sodium chloride (NS) flush 20 mL  20 mL InterCATHeter Q24H  
 HYDROmorphone (PF) (DILAUDID) injection 2 mg  2 mg IntraVENous Q3H PRN  
 sodium chloride (NS) flush 5-10 mL  5-10 mL IntraVENous PRN  
 sodium chloride (NS) flush 5-10 mL  5-10 mL IntraVENous Q8H  
 sodium chloride (NS) flush 5-10 mL  5-10 mL IntraVENous PRN  
 acetaminophen (TYLENOL) tablet 650 mg  650 mg Oral Q4H PRN  
 ondansetron (ZOFRAN) injection 4 mg  4 mg IntraVENous Q4H PRN  
 glucose chewable tablet 16 g  4 Tab Oral PRN  
 dextrose (D50W) injection syrg 12.5-25 g  25-50 mL IntraVENous PRN  
 glucagon (GLUCAGEN) injection 1 mg  1 mg IntraMUSCular PRN  
 levothyroxine (SYNTHROID) tablet 125 mcg  125 mcg Oral ACB  sertraline (ZOLOFT) tablet 150 mg  150 mg Oral QHS Lab Review:  
 
Recent Labs 12/08/18 
0446 12/07/18 
0134 12/06/18 
0111 WBC 11.2* 13.3* 14.4* HGB 7.7* 9.1* 9.5* HCT 25.5* 30.8* 31.3*  
 449* 474* Recent Labs 12/08/18 
0939 12/08/18 
0446 12/07/18 
0134 12/06/18 
0111 NA  --  143 150* 148* K  --  3.6 4.1 3.7 CL  --  107 112* 111* CO2  --  28 27 28 GLU  --  158* 63* 117* BUN  --  43* 45* 43* CREA  --  1.39* 1.18* 1.17* CA  --  8.3* 8.9 9.0 MG  --  1.8 1.9 1.8 PHOS  --  4.4 4.3 3.5 ALB  --  1.1* 1.2*  --   
SGOT  --  31 36  --   
ALT  --  15 18  --   
INR 1.4*  --   --   -- No components found for: [place] Recent Labs 12/08/18 
9725 12/07/18 [address] PH 7.50* 7.47* PCO2 34* 40  
PO2 109* 437* HCO3 26 28* FIO2 50 100 Recent Labs 12/08/18 
8335 INR 1.4* No results found for: [facility] Lab Results Component Value Date/Time Culture result: SCANT NORMAL RESPIRATORY TRIXIE SO FAR 12/07/2018 01:30 PM  
 Culture result: MODERATE YEAST (A) 12/07/2018 01:30 PM  
 Culture result: No growth thus far, holding 14 days. 12/06/2018 03:09 PM  
 Culture result: Culture performed on Fluid swab specimen 12/06/2018 03:09 PM  
 Culture result: No growth thus far, holding 14 days. 12/06/2018 03:09 PM  
 
        
___________________________________________________ Attending Physician: Araceli Daniels MD

## 2018-12-09 ENCOUNTER — APPOINTMENT (OUTPATIENT)
Dept: MRI IMAGING | Age: 63
DRG: 871 | End: 2018-12-09
Attending: PHYSICIAN ASSISTANT
Payer: COMMERCIAL

## 2018-12-09 ENCOUNTER — APPOINTMENT (OUTPATIENT)
Dept: GENERAL RADIOLOGY | Age: 63
DRG: 871 | End: 2018-12-09
Attending: INTERNAL MEDICINE
Payer: COMMERCIAL

## 2018-12-09 LAB
ALBUMIN SERPL-MCNC: 1.1 G/DL (ref 3.5–5)
ALBUMIN/GLOB SERPL: 0.3 {RATIO} (ref 1.1–2.2)
ALP SERPL-CCNC: 75 U/L (ref 45–117)
ALT SERPL-CCNC: 15 U/L (ref 12–78)
ANA SER QL: NEGATIVE
ANION GAP SERPL CALC-SCNC: 10 MMOL/L (ref 5–15)
ARTERIAL PATENCY WRIST A: YES
AST SERPL-CCNC: 35 U/L (ref 15–37)
BACTERIA SPEC CULT: ABNORMAL
BACTERIA SPEC CULT: ABNORMAL
BASE EXCESS BLDA CALC-SCNC: 2 MMOL/L
BASOPHILS # BLD: 0 K/UL (ref 0–0.1)
BASOPHILS NFR BLD: 0 % (ref 0–1)
BDY SITE: ABNORMAL
BILIRUB SERPL-MCNC: 0.3 MG/DL (ref 0.2–1)
BUN SERPL-MCNC: 37 MG/DL (ref 6–20)
BUN/CREAT SERPL: 32 (ref 12–20)
CALCIUM SERPL-MCNC: 8.4 MG/DL (ref 8.5–10.1)
CHLORIDE SERPL-SCNC: 104 MMOL/L (ref 97–108)
CO2 SERPL-SCNC: 25 MMOL/L (ref 21–32)
CREAT SERPL-MCNC: 1.16 MG/DL (ref 0.55–1.02)
DIFFERENTIAL METHOD BLD: ABNORMAL
EOSINOPHIL # BLD: 0.2 K/UL (ref 0–0.4)
EOSINOPHIL NFR BLD: 2 % (ref 0–7)
EPAP/CPAP/PEEP, PAPEEP: 5
ERYTHROCYTE [DISTWIDTH] IN BLOOD BY AUTOMATED COUNT: 14.7 % (ref 11.5–14.5)
FIO2 ON VENT: 40 %
GAS FLOW.O2 SETTING OXYMISER: 12 L/MIN
GLOBULIN SER CALC-MCNC: 4.1 G/DL (ref 2–4)
GLUCOSE BLD STRIP.AUTO-MCNC: 127 MG/DL (ref 65–100)
GLUCOSE BLD STRIP.AUTO-MCNC: 149 MG/DL (ref 65–100)
GLUCOSE BLD STRIP.AUTO-MCNC: 152 MG/DL (ref 65–100)
GLUCOSE BLD STRIP.AUTO-MCNC: 175 MG/DL (ref 65–100)
GLUCOSE SERPL-MCNC: 203 MG/DL (ref 65–100)
GRAM STN SPEC: ABNORMAL
HCO3 BLDA-SCNC: 24 MMOL/L (ref 22–26)
HCT VFR BLD AUTO: 27.1 % (ref 35–47)
HGB BLD-MCNC: 8.2 G/DL (ref 11.5–16)
IMM GRANULOCYTES # BLD: 0.2 K/UL (ref 0–0.04)
IMM GRANULOCYTES NFR BLD AUTO: 2 % (ref 0–0.5)
INR PPP: 1.3 (ref 0.9–1.1)
LYMPHOCYTES # BLD: 0.3 K/UL (ref 0.8–3.5)
LYMPHOCYTES NFR BLD: 3 % (ref 12–49)
MAGNESIUM SERPL-MCNC: 1.7 MG/DL (ref 1.6–2.4)
MCH RBC QN AUTO: 27.3 PG (ref 26–34)
MCHC RBC AUTO-ENTMCNC: 30.3 G/DL (ref 30–36.5)
MCV RBC AUTO: 90.3 FL (ref 80–99)
MONOCYTES # BLD: 0.4 K/UL (ref 0–1)
MONOCYTES NFR BLD: 4 % (ref 5–13)
NEUTS SEG # BLD: 10.1 K/UL (ref 1.8–8)
NEUTS SEG NFR BLD: 89 % (ref 32–75)
NRBC # BLD: 0 K/UL (ref 0–0.01)
NRBC BLD-RTO: 0 PER 100 WBC
PCO2 BLDA: 32 MMHG (ref 35–45)
PH BLDA: 7.51 [PH] (ref 7.35–7.45)
PHOSPHATE SERPL-MCNC: 4.5 MG/DL (ref 2.6–4.7)
PLATELET # BLD AUTO: 353 K/UL (ref 150–400)
PMV BLD AUTO: 9.9 FL (ref 8.9–12.9)
PO2 BLDA: 82 MMHG (ref 80–100)
POTASSIUM SERPL-SCNC: 3.1 MMOL/L (ref 3.5–5.1)
PROT SERPL-MCNC: 5.2 G/DL (ref 6.4–8.2)
PROTHROMBIN TIME: 13 SEC (ref 9–11.1)
RBC # BLD AUTO: 3 M/UL (ref 3.8–5.2)
RBC MORPH BLD: ABNORMAL
SAO2 % BLD: 97 % (ref 92–97)
SAO2% DEVICE SAO2% SENSOR NAME: ABNORMAL
SERVICE CMNT-IMP: ABNORMAL
SODIUM SERPL-SCNC: 139 MMOL/L (ref 136–145)
SPECIMEN SITE: ABNORMAL
VANCOMYCIN SERPL-MCNC: 29.1 UG/ML
VENTILATION MODE VENT: ABNORMAL
VT SETTING VENT: 450 ML
WBC # BLD AUTO: 11.2 K/UL (ref 3.6–11)

## 2018-12-09 PROCEDURE — 71045 X-RAY EXAM CHEST 1 VIEW: CPT

## 2018-12-09 PROCEDURE — 72158 MRI LUMBAR SPINE W/O & W/DYE: CPT

## 2018-12-09 PROCEDURE — C9113 INJ PANTOPRAZOLE SODIUM, VIA: HCPCS | Performed by: INTERNAL MEDICINE

## 2018-12-09 PROCEDURE — A9575 INJ GADOTERATE MEGLUMI 0.1ML: HCPCS | Performed by: RADIOLOGY

## 2018-12-09 PROCEDURE — 74011000250 HC RX REV CODE- 250: Performed by: INTERNAL MEDICINE

## 2018-12-09 PROCEDURE — 65610000006 HC RM INTENSIVE CARE

## 2018-12-09 PROCEDURE — 80053 COMPREHEN METABOLIC PANEL: CPT

## 2018-12-09 PROCEDURE — 77010033678 HC OXYGEN DAILY

## 2018-12-09 PROCEDURE — 74011636637 HC RX REV CODE- 636/637: Performed by: INTERNAL MEDICINE

## 2018-12-09 PROCEDURE — 36600 WITHDRAWAL OF ARTERIAL BLOOD: CPT

## 2018-12-09 PROCEDURE — 82962 GLUCOSE BLOOD TEST: CPT

## 2018-12-09 PROCEDURE — 94003 VENT MGMT INPAT SUBQ DAY: CPT

## 2018-12-09 PROCEDURE — 83735 ASSAY OF MAGNESIUM: CPT

## 2018-12-09 PROCEDURE — 84100 ASSAY OF PHOSPHORUS: CPT

## 2018-12-09 PROCEDURE — 85610 PROTHROMBIN TIME: CPT

## 2018-12-09 PROCEDURE — 82803 BLOOD GASES ANY COMBINATION: CPT

## 2018-12-09 PROCEDURE — 74011250636 HC RX REV CODE- 250/636: Performed by: INTERNAL MEDICINE

## 2018-12-09 PROCEDURE — 72157 MRI CHEST SPINE W/O & W/DYE: CPT

## 2018-12-09 PROCEDURE — 74011250636 HC RX REV CODE- 250/636: Performed by: RADIOLOGY

## 2018-12-09 PROCEDURE — 74011000258 HC RX REV CODE- 258: Performed by: INTERNAL MEDICINE

## 2018-12-09 PROCEDURE — 80202 ASSAY OF VANCOMYCIN: CPT

## 2018-12-09 PROCEDURE — 85025 COMPLETE CBC W/AUTO DIFF WBC: CPT

## 2018-12-09 PROCEDURE — 36415 COLL VENOUS BLD VENIPUNCTURE: CPT

## 2018-12-09 RX ORDER — INSULIN GLARGINE 100 [IU]/ML
10 INJECTION, SOLUTION SUBCUTANEOUS DAILY
Status: DISCONTINUED | OUTPATIENT
Start: 2018-12-09 | End: 2018-12-17 | Stop reason: HOSPADM

## 2018-12-09 RX ORDER — POTASSIUM CHLORIDE 7.45 MG/ML
10 INJECTION INTRAVENOUS
Status: COMPLETED | OUTPATIENT
Start: 2018-12-09 | End: 2018-12-09

## 2018-12-09 RX ORDER — DEXTROSE, SODIUM CHLORIDE, AND POTASSIUM CHLORIDE 5; .45; .3 G/100ML; G/100ML; G/100ML
INJECTION INTRAVENOUS CONTINUOUS
Status: DISCONTINUED | OUTPATIENT
Start: 2018-12-09 | End: 2018-12-12

## 2018-12-09 RX ORDER — GADOTERATE MEGLUMINE 376.9 MG/ML
17 INJECTION INTRAVENOUS
Status: COMPLETED | OUTPATIENT
Start: 2018-12-09 | End: 2018-12-09

## 2018-12-09 RX ADMIN — OXYMETAZOLINE HYDROCHLORIDE 4 SPRAY: 5 SPRAY NASAL at 05:24

## 2018-12-09 RX ADMIN — Medication 20 ML: at 12:31

## 2018-12-09 RX ADMIN — Medication 30 ML: at 21:11

## 2018-12-09 RX ADMIN — MEROPENEM 2 G: 1 INJECTION, POWDER, FOR SOLUTION INTRAVENOUS at 13:44

## 2018-12-09 RX ADMIN — Medication 10 ML: at 06:11

## 2018-12-09 RX ADMIN — METOPROLOL TARTRATE 2.5 MG: 1 INJECTION, SOLUTION INTRAVENOUS at 18:02

## 2018-12-09 RX ADMIN — METOPROLOL TARTRATE 2.5 MG: 1 INJECTION, SOLUTION INTRAVENOUS at 12:44

## 2018-12-09 RX ADMIN — Medication 10 ML: at 16:52

## 2018-12-09 RX ADMIN — Medication 30 ML: at 05:25

## 2018-12-09 RX ADMIN — PROPOFOL 25 MCG/KG/MIN: 10 INJECTION, EMULSION INTRAVENOUS at 21:48

## 2018-12-09 RX ADMIN — INSULIN GLARGINE 10 UNITS: 100 INJECTION, SOLUTION SUBCUTANEOUS at 08:27

## 2018-12-09 RX ADMIN — DEXTROSE MONOHYDRATE 75 ML/HR: 5 INJECTION, SOLUTION INTRAVENOUS at 01:52

## 2018-12-09 RX ADMIN — DEXTROSE MONOHYDRATE, SODIUM CHLORIDE, AND POTASSIUM CHLORIDE: 50; 4.5; 2.98 INJECTION, SOLUTION INTRAVENOUS at 08:30

## 2018-12-09 RX ADMIN — PROPOFOL 35 MCG/KG/MIN: 10 INJECTION, EMULSION INTRAVENOUS at 02:05

## 2018-12-09 RX ADMIN — CHLORHEXIDINE GLUCONATE 15 ML: 1.2 RINSE ORAL at 18:03

## 2018-12-09 RX ADMIN — PANTOPRAZOLE SODIUM 40 MG: 40 INJECTION, POWDER, FOR SOLUTION INTRAVENOUS at 21:10

## 2018-12-09 RX ADMIN — PROPOFOL 25 MCG/KG/MIN: 10 INJECTION, EMULSION INTRAVENOUS at 14:32

## 2018-12-09 RX ADMIN — PROPOFOL 40 MCG/KG/MIN: 10 INJECTION, EMULSION INTRAVENOUS at 06:04

## 2018-12-09 RX ADMIN — Medication 10 ML: at 16:53

## 2018-12-09 RX ADMIN — Medication 10 ML: at 21:11

## 2018-12-09 RX ADMIN — PANTOPRAZOLE SODIUM 40 MG: 40 INJECTION, POWDER, FOR SOLUTION INTRAVENOUS at 08:27

## 2018-12-09 RX ADMIN — PROPOFOL 25 MCG/KG/MIN: 10 INJECTION, EMULSION INTRAVENOUS at 09:30

## 2018-12-09 RX ADMIN — PROPOFOL 50 MCG/KG/MIN: 10 INJECTION, EMULSION INTRAVENOUS at 11:19

## 2018-12-09 RX ADMIN — OXYMETAZOLINE HYDROCHLORIDE 4 SPRAY: 5 SPRAY NASAL at 12:31

## 2018-12-09 RX ADMIN — LABETALOL HYDROCHLORIDE 10 MG: 5 INJECTION INTRAVENOUS at 06:11

## 2018-12-09 RX ADMIN — OXYMETAZOLINE HYDROCHLORIDE 4 SPRAY: 5 SPRAY NASAL at 23:38

## 2018-12-09 RX ADMIN — Medication 10 ML: at 12:31

## 2018-12-09 RX ADMIN — METOPROLOL TARTRATE 2.5 MG: 1 INJECTION, SOLUTION INTRAVENOUS at 06:10

## 2018-12-09 RX ADMIN — OXYMETAZOLINE HYDROCHLORIDE 4 SPRAY: 5 SPRAY NASAL at 18:03

## 2018-12-09 RX ADMIN — POTASSIUM CHLORIDE 10 MEQ: 7.46 INJECTION, SOLUTION INTRAVENOUS at 16:52

## 2018-12-09 RX ADMIN — MEROPENEM 2 G: 1 INJECTION, POWDER, FOR SOLUTION INTRAVENOUS at 00:43

## 2018-12-09 RX ADMIN — HYDRALAZINE HYDROCHLORIDE 20 MG: 20 INJECTION INTRAMUSCULAR; INTRAVENOUS at 02:56

## 2018-12-09 RX ADMIN — GADOTERATE MEGLUMINE 17 ML: 376.9 INJECTION INTRAVENOUS at 11:56

## 2018-12-09 RX ADMIN — METOPROLOL TARTRATE 2.5 MG: 1 INJECTION, SOLUTION INTRAVENOUS at 23:38

## 2018-12-09 RX ADMIN — POTASSIUM CHLORIDE 10 MEQ: 7.46 INJECTION, SOLUTION INTRAVENOUS at 13:45

## 2018-12-09 RX ADMIN — POTASSIUM CHLORIDE 10 MEQ: 7.46 INJECTION, SOLUTION INTRAVENOUS at 12:44

## 2018-12-09 RX ADMIN — VANCOMYCIN HYDROCHLORIDE 1250 MG: 10 INJECTION, POWDER, LYOPHILIZED, FOR SOLUTION INTRAVENOUS at 01:53

## 2018-12-09 RX ADMIN — POTASSIUM CHLORIDE 10 MEQ: 7.46 INJECTION, SOLUTION INTRAVENOUS at 15:41

## 2018-12-09 RX ADMIN — CHLORHEXIDINE GLUCONATE 15 ML: 1.2 RINSE ORAL at 09:00

## 2018-12-09 NOTE — PROGRESS NOTES
Problem: Falls - Risk of 
Goal: *Absence of Falls Document Flaco Eleanor Fall Risk and appropriate interventions in the flowsheet. Outcome: Progressing Towards Goal 
Fall Risk Interventions: 
Mobility Interventions: Communicate number of staff needed for ambulation/transfer Mentation Interventions: Adequate sleep, hydration, pain control, Door open when patient unattended, Evaluate medications/consider consulting pharmacy, More frequent rounding, Reorient patient, Room close to nurse's station, Toileting rounds, Update white board Medication Interventions: Evaluate medications/consider consulting pharmacy Elimination Interventions: Call light in reach, Toileting schedule/hourly rounds Problem: Pressure Injury - Risk of 
Goal: *Prevention of pressure injury Document Nikolay Scale and appropriate interventions in the flowsheet. Outcome: Progressing Towards Goal 
Pressure Injury Interventions: 
Sensory Interventions: Assess changes in LOC, Check visual cues for pain, Float heels, Keep linens dry and wrinkle-free, Minimize linen layers, Monitor skin under medical devices, Turn and reposition approx. every two hours (pillows and wedges if needed) Moisture Interventions: Absorbent underpads, Apply protective barrier, creams and emollients, Check for incontinence Q2 hours and as needed, Internal/External urinary devices, Maintain skin hydration (lotion/cream), Minimize layers, Moisture barrier Activity Interventions: Pressure redistribution bed/mattress(bed type) Mobility Interventions: Float heels, HOB 30 degrees or less, Pressure redistribution bed/mattress (bed type), Turn and reposition approx. every two hours(pillow and wedges) Nutrition Interventions: Document food/fluid/supplement intake Friction and Shear Interventions: Apply protective barrier, creams and emollients, HOB 30 degrees or less, Lift sheet, Lift team/patient mobility team, Minimize layers

## 2018-12-09 NOTE — PROGRESS NOTES
Blanco Mcpherson [facility] 79 
380 [facility], [address] [place] [address] 
[phone] Medical Progress Note NAME: Camilla Connell :  1955 MRM:  [id number] Date/Time: 2018 Assessment / Plan:  
 
62 yo WF w/ hx of HTN, DM, chronic pain, presenting with AMS and weakness, found to have severe sepsis, bacteremia, and ARF 
 
C-spine discitis: prelim MRI showed evidence of discitis at C5-C6 and C6-C7, with abnormal enhancement of the vertebral bodies. There is enhancing phlegmon in the epidural space. No drainable abscess. Discussed with ortho, recommending MRI T/L spine, completed, report pending. No surgical intervention for now. Antibiotics broadened to vanc and meropenem (meningitis dosing), discussed with ID 
 Acute metabolic encephalopathy: unclear etiology, had not shown improvement, and now intubated on  for airway protection from hemoptysis/epistaxis. Question if related to infection involving CNS as above. Lengthy discussion with neurology, appreciate their re-evaluation. MRI brain showed no acute process. Underwent LP , traumatic tap, not entirely normal, but not consistent with bacterial meningitis. Gram stain negative. HSV PCR negative. Add'l add on labs for CSF including paraneoplastic labs. Lyme CSF pending. Checking West Nile virus (low suspicion). CSF fluid cytology unremarkable. cEEG neg for seizure. TSH, B12/folate okay. RPR negative. HIV negative. Awaiting additional MRI studies as above Hemoptysis vs epistaxis: sudden onset . With bright red blood and clots suctioned from upper airway. Intubated for airway protection. Of note, right red blood coming out of patient's right nostril once intubated. Underwent bronchoscopy which showed large amount of blood in the trachea, however past paula only scants amount of blood in RLL. Some thick yellow/white secretions in LLL and RLL.  All lobes/subsegments inspected with no signs of bleeding. SQ heparin is on hold, but would consider resuming tomorrow if no further bleeding. Sent JACQUELINE, ANCA (pending). ENT following 
 Severe sepsis/bacteremia: Blood Cx + 4/4 bottles with GBS on admission, repeat blood Cx NGTD. With R arm/elbow cellulitis and now evidence of discitis. No e/o valvular veg on TTE. IV CTX broadened to vanc/meropenem 12/7. ID following.  
  
R elbow cellulitis/bursitis/acute pain: Discussed with ortho, MRI right arm/elbow showed complex joint effusion with thick enhancing synovitis. Underwent aspiration by IR. Abx as above 
  
ARF: slowly improving/stable. Likely from ATN/sepsis, hypovolemia. ACE-I on hold. On MIVF. Monitor BMP. Nephrology following, monitor closely after gadolinium administration 
  
AG metabolic acidosis: resolved. Due to sepsis. Lactate normal. Off bicarb  
  
Hypernatremia: changed fluids to hypotonic. Follow BMP 
  
DM type 2 w/ hypoglycemia: A1C 7.2%. Intermittent hypoglycemic episodes. Lantus on hold. SSI alone for now 
  
HTN: On clonidine patch, scheduled IV metoprolol. IV hydralazine and labetalol PRN 
  
Anemia: Fe studies consistent with chronic dz. Follow Hg 
  
 
Total time spent: 35 minutes Time spent in the care of this patient including reviewing records, discussing with nursing and/or other providers on the treatment team, obtaining history and examining the patient, and discussing treatment plans. Care Plan discussed with: Patient, Nursing Staff and >50% of time spent in counseling and coordination of care Discussed:  Care Plan Prophylaxis:  Heparin on hold Disposition:  TBD Subjective: Chief Complaint:  Follow up sepsis Chart/notes/labs/studies reviewed, patient examined at bedside. No meaningful history or ROS able to be obtained. No acute events. Discussed with  at bedside who thought pt responded to him earlier today Objective:  
 
 
Vitals: Last 24hrs VS reviewed since prior progress note. Most recent are: 
 
Visit Vitals /57 Pulse 82 Temp 97.8 °F (36.6 °C) Resp 14 Ht 5' 1\" (1.549 m) Wt 89.8 kg (197 lb 15.6 oz) SpO2 96% BMI 37.41 kg/m² SpO2 Readings from Last 6 Encounters:  
12/09/18 96% O2 Flow Rate (L/min): 2 l/min Intake/Output Summary (Last 24 hours) at 12/9/2018 1327 Last data filed at 12/9/2018 0700 Gross per 24 hour Intake 2274.24 ml Output 1095 ml Net 1179.24 ml Exam:  
 
Physical Exam: 
 
Gen:  Elderly, chronically ill-appearing, morbidly obese, NAD HEENT:  Pink conjunctivae, PERRL, ETT in place. No bleeding Neck:  Supple, without masses Resp:  No accessory muscle use, Diminished BS at bases with few coarse breath sounds. Ventilated breath sounds Card:  RRR, no murmurs, normal S1, S2 without thrills, bruits or peripheral edema Abd:  Soft, non-tender, non-distended but obese, normoactive bowel sounds are present Musc:  No cyanosis or clubbing. Notable BLE muscular atrophy Skin:  Right elbow swelling, erythema. TTP Neuro:  sedated and intubated. L foot drop. Moving LUE, BLEs Psych: sedated and intubated Medications Reviewed: (see below) Lab Data Reviewed: (see below) 
 
______________________________________________________________________ Medications:  
 
Current Facility-Administered Medications Medication Dose Route Frequency  insulin glargine (LANTUS) injection 10 Units  10 Units SubCUTAneous DAILY  dextrose 5% - 0.45% NaCl with KCl 40 mEq/L infusion   IntraVENous CONTINUOUS  Vancomycin RANDOM level @2pm today   Other ONCE  potassium chloride 10 mEq in 100 ml IVPB  10 mEq IntraVENous Q1H  
 vancomycin (VANCOCIN) 1,250 mg in 0.9% sodium chloride 250 mL IVPB  1,250 mg IntraVENous Q24H  
 fentaNYL (PF) 900 mcg/30 ml infusion soln  0-50 mcg/hr IntraVENous TITRATE  oxymetazoline (AFRIN) 0.05 % nasal spray 4 Spray  4 Spray Both Nostrils Q6H  
  bisacodyl (DULCOLAX) suppository 10 mg  10 mg Rectal DAILY  propofol (DIPRIVAN) infusion  5-50 mcg/kg/min IntraVENous TITRATE  chlorhexidine (PERIDEX) 0.12 % mouthwash 15 mL  15 mL Oral BID  pantoprazole (PROTONIX) 40 mg in sodium chloride 0.9% 10 mL injection  40 mg IntraVENous Q12H  
 insulin lispro (HUMALOG) injection   SubCUTAneous Q6H  
 meropenem (MERREM) 2 g in 0.9% sodium chloride 100 mL IVPB  2 g IntraVENous Q12H  cloNIDine (CATAPRES) 0.3 mg/24 hr patch 1 Patch  1 Patch TransDERmal Q7D  
 metoprolol (LOPRESSOR) injection 2.5 mg  2.5 mg IntraVENous Q6H  
 labetalol (NORMODYNE) tablet 300 mg  300 mg Oral Q12H  
 influenza vaccine 2018-19 (6 mos+)(PF) (FLUARIX QUAD/FLULAVAL QUAD) injection 0.5 mL  0.5 mL IntraMUSCular PRIOR TO DISCHARGE  labetalol (NORMODYNE;TRANDATE) injection 10 mg  10 mg IntraVENous Q4H PRN  
 hydrALAZINE (APRESOLINE) 20 mg/mL injection 20 mg  20 mg IntraVENous Q6H PRN  
 acetaminophen (TYLENOL) suppository 650 mg  650 mg Rectal Q4H PRN  
 alteplase (CATHFLO) 1 mg in sterile water (preservative free) 1 mL injection  1 mg InterCATHeter PRN  
 sodium chloride (NS) flush 10-30 mL  10-30 mL InterCATHeter PRN  
 sodium chloride (NS) flush 10 mL  10 mL InterCATHeter Q24H  
 sodium chloride (NS) flush 10 mL  10 mL InterCATHeter PRN  
 sodium chloride (NS) flush 10-40 mL  10-40 mL InterCATHeter Q8H  
 sodium chloride (NS) flush 20 mL  20 mL InterCATHeter Q24H  
 HYDROmorphone (PF) (DILAUDID) injection 2 mg  2 mg IntraVENous Q3H PRN  
 sodium chloride (NS) flush 5-10 mL  5-10 mL IntraVENous PRN  
 sodium chloride (NS) flush 5-10 mL  5-10 mL IntraVENous Q8H  
 sodium chloride (NS) flush 5-10 mL  5-10 mL IntraVENous PRN  
 acetaminophen (TYLENOL) tablet 650 mg  650 mg Oral Q4H PRN  
 ondansetron (ZOFRAN) injection 4 mg  4 mg IntraVENous Q4H PRN  
 glucose chewable tablet 16 g  4 Tab Oral PRN  
 dextrose (D50W) injection syrg 12.5-25 g  25-50 mL IntraVENous PRN  
  glucagon (GLUCAGEN) injection 1 mg  1 mg IntraMUSCular PRN  
 levothyroxine (SYNTHROID) tablet 125 mcg  125 mcg Oral ACB  sertraline (ZOLOFT) tablet 150 mg  150 mg Oral QHS Lab Review:  
 
Recent Labs 12/09/18 
8059 12/08/18 
0446 12/07/18 
0134 WBC 11.2* 11.2* 13.3* HGB 8.2* 7.7* 9.1*  
HCT 27.1* 25.5* 30.8*  382 449* Recent Labs 12/09/18 
9915 12/08/18 
1281 12/08/18 
0446 12/07/18 
0134   --  143 150*  
K 3.1*  --  3.6 4.1   --  107 112* CO2 25  --  28 27 *  --  158* 63*  
BUN 37*  --  43* 45* CREA 1.16*  --  1.39* 1.18* CA 8.4*  --  8.3* 8.9 MG 1.7  --  1.8 1.9 PHOS 4.5  --  4.4 4.3 ALB 1.1*  --  1.1* 1.2* SGOT 35  --  31 36 ALT 15  --  15 18 INR 1.3* 1.4*  --   -- No components found for: [place] Recent Labs 12/09/18 
4019 12/08/18 
6415 12/07/18 [address] PH 7.51* 7.50* 7.47* PCO2 32* 34* 40  
PO2 82 109* 437* HCO3 24 26 28* FIO2 40 50 100 Recent Labs 12/09/18 
079 0070 2298 12/08/18 
0060 INR 1.3* 1.4* No results found for: [facility] Lab Results Component Value Date/Time Culture result: SCANT NORMAL RESPIRATORY TRIXIE 12/07/2018 01:30 PM  
 Culture result: MODERATE YEAST (A) 12/07/2018 01:30 PM  
 Culture result: No growth thus far, holding 14 days. 12/06/2018 03:09 PM  
 Culture result: Culture performed on Fluid swab specimen 12/06/2018 03:09 PM  
 Culture result: No growth thus far, holding 14 days. 12/06/2018 03:09 PM  
 
        
___________________________________________________ Attending Physician: Araceli Daniels MD

## 2018-12-09 NOTE — PROGRESS NOTES
1906 Received report. 2136 /77,  Labetalol 10 mg iv given. 0256 /71 ,  hydralazine 20 mg iv given. 0611 /68 , labetalol 10 mg iv given. 7743 Bedside shift change report given to [facility] .  Report included the following information SBAR, Kardex, ED Summary, Intake/Output, MAR, Accordion, Recent Results, Med Rec Status and Cardiac Rhythm SR.

## 2018-12-09 NOTE — PROGRESS NOTES
Will attempt MRI of T and L spine tomorrow w/wo contrast.  Orthopedics to continue to follow the patient. Kit Vanessa PA-C Orthopaedic Surgery  [facility]

## 2018-12-09 NOTE — PROGRESS NOTES
8262 Received report. 0035 /81 , hydralazine 20 mg iv given. 3068 Bedside shift change report given to [facility] .  Report included the following information SBAR, Kardex, ED Summary, Intake/Output, MAR, Accordion, Recent Results, Med Rec Status and Cardiac Rhythm SR.

## 2018-12-09 NOTE — DISCHARGE SUMMARY
Physician Interim Discharge Summary     Patient ID:  Celia Hernandez  [id number]  61 y.o.  1955    Admit date: 11/30/2018    Discharge date and time: TBD    Hospital Course: This is an interim summary and covers the hospitalization from 12/5 to 12/9/2018. For any preceding portion of the patient's hospitalization, please see my partner's notes. Ms. Yamilex Farah is a 62 yo WF w/ hx of HTN, DM, chronic pain, presenting with AMS and weakness, found to have severe sepsis, bacteremia, and ARF     C-spine discitis: prelim MRI showed evidence of discitis at C5-C6 and C6-C7, with abnormal enhancement of the vertebral bodies. There is enhancing phlegmon in the epidural space. No drainable abscess. MRI T/L spine, completed, pending. No surgical intervention for now. Antibiotics broadened to vanc and meropenem (meningitis dosing)     Acute metabolic encephalopathy: unclear etiology, had not shown improvement, and now intubated on 12/7 for airway protection from hemoptysis/epistaxis. Question if related to infection involving CNS as above. Lengthy discussion with neurology, appreciate their re-evaluation. MRI brain showed no acute process. Underwent LP 12/4, traumatic tap, not entirely normal, but not consistent with bacterial meningitis. Gram stain negative. HSV PCR negative. Add'l add on labs for CSF including paraneoplastic labs. Lyme CSF pending. Checking West Nile virus (low suspicion). CSF fluid cytology unremarkable. cEEG neg for seizure. TSH, B12/folate okay. RPR negative. HIV negative. Awaiting additional MRI studies as above     Hemoptysis vs epistaxis: sudden onset 12/7. With bright red blood and clots suctioned from upper airway. Intubated for airway protection. Of note, right red blood coming out of patient's right nostril once intubated. Underwent bronchoscopy which showed large amount of blood in the trachea, however past paula only scants amount of blood in RLL.  Some thick yellow/white secretions in LLL and RLL. All lobes/subsegments inspected with no signs of bleeding. SQ heparin is on hold, but would consider resuming tomorrow if no further bleeding. Sent JACQUELINE, ANCA (pending). ENT following     Severe sepsis/bacteremia: Blood Cx + 4/4 bottles with GBS on admission, repeat blood Cx NGTD. With R arm/elbow cellulitis and now evidence of discitis. No e/o valvular veg on TTE. IV CTX broadened to vanc/meropenem 12/7. ID following. See daily note for details. Final discharge summary will be done by the discharging physician.        Signed:  Giulia Weaver MD  12/9/2018  4:20 PM

## 2018-12-09 NOTE — PROGRESS NOTES
0700 
Bedside and Verbal shift change report given to Marjorie Naranjo  (oncoming nurse) by Romana Merino RN (offgoing nurse). Report included the following information SBAR, MAR and Recent Results. [address] [facility] Pt down to MRI with RN, RT, and PCT. 56 Pt increasing restless in MRI, propofol increased per order. [address] Pt agitated in MRI, propofol increased. [address] Pt returned from MRI.  
 
1900 Bedside and Verbal shift change report given to Kirsten MANN  (oncoming nurse) by Sammi Fisher RN  (offgoing nurse). Report included the following information SBAR, MAR and Recent Results.

## 2018-12-09 NOTE — PROGRESS NOTES
Vancomycin Pharmacy Consult 12/09/18Vancomycin RANDOM = 29.1 mcg/ml (drawn 12.76hrs post dose), extrapolates to a trough of 20.74 mcg/ml. Level is supratherapeutic Goal Trough: 15-20 mcg/ml Plan: Will change Vancomycin to 1000 mg IV q24hr and recheck level soon Thank you Brock Antonio, PharmD 
[phone]

## 2018-12-09 NOTE — CONSULTS
[facility]     Name: Doreen Luevano MRN: [id number]   : 1955 Hospital: [facility]   Date: 2018        Impression Plan   1. Acute respiratory failure  2. Acute onset hemoptysis  3. AMS  4. Betahemolytic strep bacteremia  5. Infected elbow- secondary infection? 6. Hypoxia  7. Leukocytosis  8. Hypernatremia  9.                · SBT again today. Mental status likely will become a barrier to extubation. Will not extubate until MRI of spine done  · Afrin for nose bleed per Dr. Cabrales Party  · Continue Meropenem/Vanc  · Brain MRI negative, MRI cervical neck C5-C7 diskitis. Ongoing bacteremia/spinal abcess causing altered mental status? · Hold proph AC  · SCDs  · Peridex  · PPI         Pt is critically ill. Critical care time spent with pt exclusive of procedures was 36 minutes. Pt at risk for further decline due to hemoptysis and respiratory failure    Radiology  ( personally reviewed) CXR reviewed: - no infiltrates  CXR - no infiltrates, right hemidiaphragm elevation   ABG No results for input(s): PHI, PO2I, PCO2I in the last 72 hours. Subjective     Overnight events:  No nasal bleeding after Afrin  On Propofol 40- per nursing agitated overnight  Not meeting eye contact or following commands. Past Medical History:   Diagnosis Date    Chronic back pain     Diabetes mellitus, type 2 ([address]     History of vascular access device 2018    [facility] VAT - 4 FR single, L cephalic, 44 cm for LTA    HTN (hypertension)       History reviewed. No pertinent surgical history. Prior to Admission medications    Medication Sig Start Date End Date Taking? Authorizing Provider   atenolol (TENORMIN) 25 mg tablet Take 25 mg by mouth daily. Yes Provider, Historical   cyclobenzaprine (FLEXERIL) 10 mg tablet Take 10 mg by mouth daily as needed for Muscle Spasm(s). Yes Provider, Historical   fenofibrate (LOFIBRA) 160 mg tablet Take 160 mg by mouth nightly.    Yes Provider, Historical   gabapentin (NEURONTIN) 300 mg capsule Take 300 mg by mouth three (3) times daily. Yes Provider, Historical   insulin glargine (LANTUS,BASAGLAR) 100 unit/mL (3 mL) inpn 75 Units by SubCUTAneous route nightly. Yes Provider, Historical   levothyroxine (SYNTHROID) 125 mcg tablet Take 125 mcg by mouth Daily (before breakfast). Yes Provider, Historical   lisinopril-hydroCHLOROthiazide (PRINZIDE, ZESTORETIC) 20-12.5 mg per tablet Take 2 Tabs by mouth daily. Yes Provider, Historical   metFORMIN (GLUCOPHAGE) 500 mg tablet Take 500 mg by mouth two (2) times daily (with meals). Yes Provider, Historical   niacin ER (NIASPAN) 500 mg tablet Take 500 mg by mouth nightly. Yes Provider, Historical   omega 3-DHA-EPA-fish oil 1,000 mg (120 mg-180 mg) capsule Take 2 Caps by mouth two (2) times a day. Yes Provider, Historical   oxyCODONE IR (ROXICODONE) 5 mg immediate release tablet Take 5 mg by mouth daily as needed (breakthrough pain). Yes Provider, Historical   oxyCODONE ER (XTAMPZA ER) 18 mg ER capsule Take 18 mg by mouth every twelve (12) hours. Yes Provider, Historical   sertraline (ZOLOFT) 100 mg tablet Take 150 mg by mouth nightly. Yes Provider, Historical   simvastatin (ZOCOR) 40 mg tablet Take 40 mg by mouth nightly. Yes Provider, Historical   triamcinolone acetonide (KENALOG) 0.1 % topical cream Apply  to affected area two (2) times a day. use thin layer   Yes Provider, Historical   multivit-min-FA-lycopen-lutein (CENTRUM SILVER) 0.4-300-250 mg-mcg-mcg tab Take 1 Tab by mouth daily. Yes Provider, Historical   OTHER,NON-FORMULARY, Take 1 Tab by mouth two (2) times a day. Osteo Biflex   Yes Provider, Historical   aspirin delayed-release 81 mg tablet Take 81 mg by mouth two (2) times a day. Yes Provider, Historical   ibuprofen (MOTRIN) 200 mg tablet Take 400 mg by mouth two (2) times a day. Yes Provider, Historical   ubidecarenone (COENZYME Q10, BULK,) Take 1 Tab by mouth daily.    Yes Provider, Historical     Current Facility-Administered Medications   Medication Dose Route Frequency    insulin glargine (LANTUS) injection 10 Units  10 Units SubCUTAneous DAILY    dextrose 5% - 0.45% NaCl with KCl 40 mEq/L infusion   IntraVENous CONTINUOUS    vancomycin (VANCOCIN) 1,250 mg in 0.9% sodium chloride 250 mL IVPB  1,250 mg IntraVENous Q24H    fentaNYL (PF) 900 mcg/30 ml infusion soln  0-50 mcg/hr IntraVENous TITRATE    oxymetazoline (AFRIN) 0.05 % nasal spray 4 Spray  4 Spray Both Nostrils Q6H    bisacodyl (DULCOLAX) suppository 10 mg  10 mg Rectal DAILY    propofol (DIPRIVAN) infusion  5-50 mcg/kg/min IntraVENous TITRATE    chlorhexidine (PERIDEX) 0.12 % mouthwash 15 mL  15 mL Oral BID    pantoprazole (PROTONIX) 40 mg in sodium chloride 0.9% 10 mL injection  40 mg IntraVENous Q12H    insulin lispro (HUMALOG) injection   SubCUTAneous Q6H    meropenem (MERREM) 2 g in 0.9% sodium chloride 100 mL IVPB  2 g IntraVENous Q12H    cloNIDine (CATAPRES) 0.3 mg/24 hr patch 1 Patch  1 Patch TransDERmal Q7D    metoprolol (LOPRESSOR) injection 2.5 mg  2.5 mg IntraVENous Q6H    labetalol (NORMODYNE) tablet 300 mg  300 mg Oral Q12H    influenza vaccine 2018-19 (6 mos+)(PF) (FLUARIX QUAD/FLULAVAL QUAD) injection 0.5 mL  0.5 mL IntraMUSCular PRIOR TO DISCHARGE    sodium chloride (NS) flush 10 mL  10 mL InterCATHeter Q24H    sodium chloride (NS) flush 10-40 mL  10-40 mL InterCATHeter Q8H    sodium chloride (NS) flush 20 mL  20 mL InterCATHeter Q24H    sodium chloride (NS) flush 5-10 mL  5-10 mL IntraVENous Q8H    levothyroxine (SYNTHROID) tablet 125 mcg  125 mcg Oral ACB    sertraline (ZOLOFT) tablet 150 mg  150 mg Oral QHS     No Known Allergies   Social History     Tobacco Use    Smoking status: Never Smoker    Smokeless tobacco: Never Used   Substance Use Topics    Alcohol use: No     Frequency: Never      Family History   Problem Relation Age of Onset    Diabetes Father     Heart Disease Father           Laboratory: I have personally reviewed the critical care flowsheet and labs. Recent Labs     12/09/18  0459 12/08/18  0446 12/07/18  0134   WBC 11.2* 11.2* 13.3*   HGB 8.2* 7.7* 9.1*   HCT 27.1* 25.5* 30.8*    382 449*     Recent Labs     12/09/18  0459 12/08/18  0939 12/08/18 0446 12/07/18  0134     --  143 150*   K 3.1*  --  3.6 4.1     --  107 112*   CO2 25  --  28 27   *  --  158* 63*   BUN 37*  --  43* 45*   CREA 1.16*  --  1.39* 1.18*   CA 8.4*  --  8.3* 8.9   MG 1.7  --  1.8 1.9   PHOS 4.5  --  4.4 4.3   ALB 1.1*  --  1.1* 1.2*   SGOT 35  --  31 36   ALT 15  --  15 18   INR 1.3* 1.4*  --   --        Objective:     Mode Rate Tidal Volume Pressure FiO2 PEEP   Spontaneous   450 ml  5 cm H2O 40 % 5 cm H20     Vital Signs:    Ventilator Pressures  Pressure Support (cm H2O): 5 cm H2O  PIP Observed (cm H2O): 18 cm H2O  Plateau Pressure (cm H2O): 13 cm H2O  MAP (cm H2O): 7.1  PEEP/VENT (cm H2O): 5 cm H20  Auto PEEP Observed (cm H2O): 0 cm F2CVFXX(24)Ventilator Pressures  Pressure Support (cm H2O): 5 cm H2O  PIP Observed (cm H2O): 18 cm H2O  Plateau Pressure (cm H2O): 13 cm H2O  MAP (cm H2O): 7.1  PEEP/VENT (cm H2O): 5 cm H20  Auto PEEP Observed (cm H2O): 0 cm H2O  Safety & Alarms  Circuit Temperature: 98.6 °F (37 °C)  Backup Mode Checked/Apnea: Yes  Pressure Max: 40 cm H2O  Ve Min: 2  Ve Max: 20  Vt Min: 200 ml  Vt Max: 800 ml  RR Min: 12  RR Max: 40  Intake/Output:   Last shift:      Ventilator Volumes  Vt Set (ml): 450 ml  Vt Exhaled (Machine Breath) (ml): 455 ml  Vt Spont (ml): 358 ml  Ve Observed (l/min): 8.1 l/min  Last 3 shifts: No intake/output data recorded. [id number]    Intake/Output Summary (Last 24 hours) at 12/9/2018 0906  Last data filed at 12/9/2018 0700  Gross per 24 hour   Intake 2574.24 ml   Output 1170 ml   Net 1404.24 ml     EXAM:   GENERAL: Intubated, RASS -2 HEENT:  PERRL, EOMI, no alar flaring or epistaxis, bright red blood trickling from right nostril, NECK:  no jugular vein distention, no retractions, no thyromegaly or masses, LUNGS:CTA, no w/r/r HEART:  Regular rate and rhythm with no MGR; trace edema is present, ABDOMEN:  soft with no tenderness, bowel sounds present, EXTREMITIES:  warm with no cyanosis, SKIN: pale, no jaundice or ecchymosis and NEUROLOGIC: RASS -2,     Caitlyn Wood MD  [facility] [place]

## 2018-12-10 ENCOUNTER — APPOINTMENT (OUTPATIENT)
Dept: GENERAL RADIOLOGY | Age: 63
DRG: 871 | End: 2018-12-10
Attending: INTERNAL MEDICINE
Payer: COMMERCIAL

## 2018-12-10 LAB
ALBUMIN SERPL-MCNC: 1.2 G/DL (ref 3.5–5)
ALBUMIN/GLOB SERPL: 0.3 {RATIO} (ref 1.1–2.2)
ALP SERPL-CCNC: 85 U/L (ref 45–117)
ALT SERPL-CCNC: 20 U/L (ref 12–78)
ANION GAP SERPL CALC-SCNC: 12 MMOL/L (ref 5–15)
ARTERIAL PATENCY WRIST A: NO
AST SERPL-CCNC: 41 U/L (ref 15–37)
BASE DEFICIT BLDA-SCNC: 0.2 MMOL/L
BASOPHILS # BLD: 0 K/UL (ref 0–0.1)
BASOPHILS NFR BLD: 0 % (ref 0–1)
BDY SITE: ABNORMAL
BILIRUB SERPL-MCNC: 0.3 MG/DL (ref 0.2–1)
BUN SERPL-MCNC: 34 MG/DL (ref 6–20)
BUN/CREAT SERPL: 29 (ref 12–20)
CALCIUM SERPL-MCNC: 8.1 MG/DL (ref 8.5–10.1)
CHLORIDE SERPL-SCNC: 107 MMOL/L (ref 97–108)
CO2 SERPL-SCNC: 23 MMOL/L (ref 21–32)
CREAT SERPL-MCNC: 1.17 MG/DL (ref 0.55–1.02)
DIFFERENTIAL METHOD BLD: ABNORMAL
EOSINOPHIL # BLD: 0.4 K/UL (ref 0–0.4)
EOSINOPHIL NFR BLD: 3 % (ref 0–7)
EPAP/CPAP/PEEP, PAPEEP: 5
ERYTHROCYTE [DISTWIDTH] IN BLOOD BY AUTOMATED COUNT: 14.6 % (ref 11.5–14.5)
FIO2 ON VENT: 40 %
GAS FLOW.O2 SETTING OXYMISER: 12 L/MIN
GLOBULIN SER CALC-MCNC: 4 G/DL (ref 2–4)
GLUCOSE BLD STRIP.AUTO-MCNC: 177 MG/DL (ref 65–100)
GLUCOSE BLD STRIP.AUTO-MCNC: 185 MG/DL (ref 65–100)
GLUCOSE SERPL-MCNC: 178 MG/DL (ref 65–100)
HCO3 BLDA-SCNC: 22 MMOL/L (ref 22–26)
HCT VFR BLD AUTO: 27.5 % (ref 35–47)
HGB BLD-MCNC: 8.5 G/DL (ref 11.5–16)
IMM GRANULOCYTES # BLD: 0.2 K/UL (ref 0–0.04)
IMM GRANULOCYTES NFR BLD AUTO: 2 % (ref 0–0.5)
LYMPHOCYTES # BLD: 0.5 K/UL (ref 0.8–3.5)
LYMPHOCYTES NFR BLD: 4 % (ref 12–49)
MAGNESIUM SERPL-MCNC: 1.8 MG/DL (ref 1.6–2.4)
MCH RBC QN AUTO: 27.8 PG (ref 26–34)
MCHC RBC AUTO-ENTMCNC: 30.9 G/DL (ref 30–36.5)
MCV RBC AUTO: 89.9 FL (ref 80–99)
MONOCYTES # BLD: 0.5 K/UL (ref 0–1)
MONOCYTES NFR BLD: 5 % (ref 5–13)
NEUTS SEG # BLD: 9.4 K/UL (ref 1.8–8)
NEUTS SEG NFR BLD: 86 % (ref 32–75)
NRBC # BLD: 0 K/UL (ref 0–0.01)
NRBC BLD-RTO: 0 PER 100 WBC
PCO2 BLDA: 31 MMHG (ref 35–45)
PH BLDA: 7.48 [PH] (ref 7.35–7.45)
PHOSPHATE SERPL-MCNC: 4.1 MG/DL (ref 2.6–4.7)
PLATELET # BLD AUTO: 355 K/UL (ref 150–400)
PMV BLD AUTO: 10.1 FL (ref 8.9–12.9)
PO2 BLDA: 105 MMHG (ref 80–100)
POTASSIUM SERPL-SCNC: 3.8 MMOL/L (ref 3.5–5.1)
PRESSURE SUPPORT SETTING VENT: ABNORMAL CM[H2O]
PROT SERPL-MCNC: 5.2 G/DL (ref 6.4–8.2)
RBC # BLD AUTO: 3.06 M/UL (ref 3.8–5.2)
SAO2 % BLD: 98 % (ref 92–97)
SAO2% DEVICE SAO2% SENSOR NAME: ABNORMAL
SERVICE CMNT-IMP: ABNORMAL
SERVICE CMNT-IMP: ABNORMAL
SODIUM SERPL-SCNC: 142 MMOL/L (ref 136–145)
SPECIMEN SITE: ABNORMAL
VENTILATION MODE VENT: ABNORMAL
VT SETTING VENT: 450 ML
WBC # BLD AUTO: 11 K/UL (ref 3.6–11)
WNV IGG SER QL IA: NEGATIVE
WNV IGM SER QL IA: NEGATIVE

## 2018-12-10 PROCEDURE — 74011000250 HC RX REV CODE- 250: Performed by: INTERNAL MEDICINE

## 2018-12-10 PROCEDURE — 71045 X-RAY EXAM CHEST 1 VIEW: CPT

## 2018-12-10 PROCEDURE — 74011250636 HC RX REV CODE- 250/636: Performed by: INTERNAL MEDICINE

## 2018-12-10 PROCEDURE — 77030038269 HC DRN EXT URIN PURWCK BARD -A

## 2018-12-10 PROCEDURE — 83735 ASSAY OF MAGNESIUM: CPT

## 2018-12-10 PROCEDURE — 85025 COMPLETE CBC W/AUTO DIFF WBC: CPT

## 2018-12-10 PROCEDURE — 74011636637 HC RX REV CODE- 636/637: Performed by: INTERNAL MEDICINE

## 2018-12-10 PROCEDURE — 65610000006 HC RM INTENSIVE CARE

## 2018-12-10 PROCEDURE — 82803 BLOOD GASES ANY COMBINATION: CPT

## 2018-12-10 PROCEDURE — 77030013140 HC MSK NEB VYRM -A

## 2018-12-10 PROCEDURE — 80053 COMPREHEN METABOLIC PANEL: CPT

## 2018-12-10 PROCEDURE — C9113 INJ PANTOPRAZOLE SODIUM, VIA: HCPCS | Performed by: INTERNAL MEDICINE

## 2018-12-10 PROCEDURE — 84100 ASSAY OF PHOSPHORUS: CPT

## 2018-12-10 PROCEDURE — 36600 WITHDRAWAL OF ARTERIAL BLOOD: CPT

## 2018-12-10 PROCEDURE — 74011000258 HC RX REV CODE- 258: Performed by: INTERNAL MEDICINE

## 2018-12-10 PROCEDURE — 36415 COLL VENOUS BLD VENIPUNCTURE: CPT

## 2018-12-10 PROCEDURE — 94640 AIRWAY INHALATION TREATMENT: CPT

## 2018-12-10 PROCEDURE — 94003 VENT MGMT INPAT SUBQ DAY: CPT

## 2018-12-10 PROCEDURE — 82962 GLUCOSE BLOOD TEST: CPT

## 2018-12-10 PROCEDURE — 77010033678 HC OXYGEN DAILY

## 2018-12-10 PROCEDURE — L4396 STATIC OR DYNAMI AFO PRE CST: HCPCS

## 2018-12-10 RX ORDER — HYDROMORPHONE HYDROCHLORIDE 2 MG/ML
0.5 INJECTION, SOLUTION INTRAMUSCULAR; INTRAVENOUS; SUBCUTANEOUS
Status: DISCONTINUED | OUTPATIENT
Start: 2018-12-10 | End: 2018-12-17 | Stop reason: HOSPADM

## 2018-12-10 RX ORDER — ALBUTEROL SULFATE 0.83 MG/ML
2.5 SOLUTION RESPIRATORY (INHALATION)
Status: DISCONTINUED | OUTPATIENT
Start: 2018-12-10 | End: 2018-12-17 | Stop reason: HOSPADM

## 2018-12-10 RX ADMIN — DEXTROSE MONOHYDRATE, SODIUM CHLORIDE, AND POTASSIUM CHLORIDE: 50; 4.5; 2.98 INJECTION, SOLUTION INTRAVENOUS at 06:01

## 2018-12-10 RX ADMIN — HYDROMORPHONE HYDROCHLORIDE 0.5 MG: 2 INJECTION, SOLUTION INTRAMUSCULAR; INTRAVENOUS; SUBCUTANEOUS at 12:15

## 2018-12-10 RX ADMIN — HYDROMORPHONE HYDROCHLORIDE 0.5 MG: 2 INJECTION, SOLUTION INTRAMUSCULAR; INTRAVENOUS; SUBCUTANEOUS at 20:25

## 2018-12-10 RX ADMIN — ALBUTEROL SULFATE 2.5 MG: 2.5 SOLUTION RESPIRATORY (INHALATION) at 15:39

## 2018-12-10 RX ADMIN — Medication 10 ML: at 17:11

## 2018-12-10 RX ADMIN — INSULIN GLARGINE 10 UNITS: 100 INJECTION, SOLUTION SUBCUTANEOUS at 08:37

## 2018-12-10 RX ADMIN — MEROPENEM 2 G: 1 INJECTION, POWDER, FOR SOLUTION INTRAVENOUS at 00:03

## 2018-12-10 RX ADMIN — HYDRALAZINE HYDROCHLORIDE 20 MG: 20 INJECTION INTRAMUSCULAR; INTRAVENOUS at 00:35

## 2018-12-10 RX ADMIN — HYDRALAZINE HYDROCHLORIDE 20 MG: 20 INJECTION INTRAMUSCULAR; INTRAVENOUS at 08:52

## 2018-12-10 RX ADMIN — OXYMETAZOLINE HYDROCHLORIDE 4 SPRAY: 5 SPRAY NASAL at 12:34

## 2018-12-10 RX ADMIN — METOPROLOL TARTRATE 2.5 MG: 1 INJECTION, SOLUTION INTRAVENOUS at 05:11

## 2018-12-10 RX ADMIN — Medication 25 MCG/HR: at 06:21

## 2018-12-10 RX ADMIN — Medication 10 ML: at 22:04

## 2018-12-10 RX ADMIN — Medication 10 ML: at 17:10

## 2018-12-10 RX ADMIN — Medication 20 ML: at 22:05

## 2018-12-10 RX ADMIN — METOPROLOL TARTRATE 2.5 MG: 1 INJECTION, SOLUTION INTRAVENOUS at 12:34

## 2018-12-10 RX ADMIN — MEROPENEM 2 G: 1 INJECTION, POWDER, FOR SOLUTION INTRAVENOUS at 14:09

## 2018-12-10 RX ADMIN — HYDRALAZINE HYDROCHLORIDE 20 MG: 20 INJECTION INTRAMUSCULAR; INTRAVENOUS at 22:04

## 2018-12-10 RX ADMIN — CHLORHEXIDINE GLUCONATE 15 ML: 1.2 RINSE ORAL at 08:38

## 2018-12-10 RX ADMIN — PANTOPRAZOLE SODIUM 40 MG: 40 INJECTION, POWDER, FOR SOLUTION INTRAVENOUS at 08:37

## 2018-12-10 RX ADMIN — HYDROMORPHONE HYDROCHLORIDE 0.5 MG: 2 INJECTION, SOLUTION INTRAMUSCULAR; INTRAVENOUS; SUBCUTANEOUS at 17:10

## 2018-12-10 RX ADMIN — OXYMETAZOLINE HYDROCHLORIDE 4 SPRAY: 5 SPRAY NASAL at 05:11

## 2018-12-10 RX ADMIN — LEVOTHYROXINE SODIUM ANHYDROUS 94 MCG: 100 INJECTION, POWDER, LYOPHILIZED, FOR SOLUTION INTRAVENOUS at 18:30

## 2018-12-10 RX ADMIN — PROPOFOL 45 MCG/KG/MIN: 10 INJECTION, EMULSION INTRAVENOUS at 03:35

## 2018-12-10 RX ADMIN — LABETALOL HYDROCHLORIDE 10 MG: 5 INJECTION INTRAVENOUS at 09:36

## 2018-12-10 RX ADMIN — Medication 10 ML: at 20:26

## 2018-12-10 RX ADMIN — Medication 10 ML: at 12:35

## 2018-12-10 RX ADMIN — Medication 10 ML: at 05:11

## 2018-12-10 RX ADMIN — Medication 30 ML: at 05:11

## 2018-12-10 RX ADMIN — Medication 10 ML: at 00:35

## 2018-12-10 RX ADMIN — METOPROLOL TARTRATE 2.5 MG: 1 INJECTION, SOLUTION INTRAVENOUS at 17:10

## 2018-12-10 RX ADMIN — VANCOMYCIN HYDROCHLORIDE 1000 MG: 1 INJECTION, POWDER, LYOPHILIZED, FOR SOLUTION INTRAVENOUS at 04:38

## 2018-12-10 RX ADMIN — Medication 20 ML: at 12:35

## 2018-12-10 RX ADMIN — DEXTROSE MONOHYDRATE, SODIUM CHLORIDE, AND POTASSIUM CHLORIDE: 50; 4.5; 2.98 INJECTION, SOLUTION INTRAVENOUS at 20:25

## 2018-12-10 NOTE — PROGRESS NOTES
Orthopaedic Progress Note December 10, 2018 11:08 AM  
 
Patient: Myke Yancey MRN: [id number]  SSN: [id number] YOB: 1955  Age: 61 y.o. Sex: female Admit date:  11/30/2018 Date of Surgery:  12/7/2018 Procedures:  Procedure(s): Melum 50 Admitting Physician:  Jewel Stephens MD  
Surgeon:  Kira Pham) and Role: Black Kincaid MD - Primary Consulting Physician(s): Treatment Team: Attending Provider: Micah Boyle MD; Consulting Provider: Radha Meadows; Consulting Provider: eNgrita Arthur MD; Consulting Provider: Courtney Nova DO; Utilization Review: Jackson Lagos; Utilization Review: Kenya Iraheta RN; Consulting Provider: Magi Caballero MD; Consulting Provider: Dede Mathis MD; Consulting Provider: Scooter Baig MD; Care Manager: Lalo Roach; Consulting Provider: Neno Harper MD; Consulting Provider: Domenico Hickey NP; Consulting Provider: Didier Masters MD 
 
SUBJECTIVE: 
  
Myke Yancey is a 61 y.o. female remains intubated. Sedation has been weaned. She is able to track in the room. She responds to command today. No complaints of nausea, vomiting, dizziness, lightheadedness, chest pain, or shortness of breath. OBJECTIVE: 
 
  
Physical Exam: 
General: Alert Respiratory: Ventilated mechanically Neurological:  Neurovascular exam within normal limits. Motor: + DF/PF. Musculoskeletal: Calves soft, supple, non-tender upon palpation. Right elbow with mild erythema. Still area of swelling proximal to elbow in soft tissue without induration/fluctuance. There is soft tissue epmphysema improved from previous examination. Consolidation with olecranon bursitis now improved. Some crepitus noted throughout skin. Able to passive extend elbow to zero, flex to 100. Able to grasp with left hand this morning and gave a partial thumbs up sign. Vital Signs: Patient Vitals for the past 8 hrs: 
 BP Temp Pulse Resp SpO2 Weight 12/10/18 1000 170/66  90 21 99 %   
12/10/18 0930 177/61  86 17 100 %   
12/10/18 0900 171/69  80 20 100 %   
12/10/18 0852 177/82  80     
12/10/18 0800 173/75  75 20 100 %   
12/10/18 0730   77 18 99 %   
12/10/18 0700 156/69 97.8 °F (36.6 °C) 78 15 100 %   
12/10/18 0650      91.8 kg (202 lb 6.1 oz) 12/10/18 0642   81 17 100 %   
12/10/18 0630 161/66  79 17 100 %   
12/10/18 0601   78 17 99 %   
12/10/18 0600 155/63       
12/10/18 0530 169/69  70 17 99 %   
12/10/18 0500 165/69  74 18 99 %   
12/10/18 0452   80 17 98 %   
12/10/18 0430 145/59  80 16 99 %   
12/10/18 0400 150/57 98.5 °F (36.9 °C) 85 20 99 %   
12/10/18 0330 158/68  87 13 99 %  Temp (24hrs), Av.5 °F (36.9 °C), Min:97.8 °F (36.6 °C), Max:98.8 °F (37.1 °C) Labs:  
  
 
Recent Labs 12/10/18 
462 [id number] 18 
1520 HCT 27.5* 27.1* HGB 8.5* 8.2* INR  --  1.3* Lab Results Component Value Date/Time Sodium 142 12/10/2018 03:52 AM  
 Potassium 3.8 12/10/2018 03:52 AM  
 Chloride 107 12/10/2018 03:52 AM  
 CO2 23 12/10/2018 03:52 AM  
 Glucose 178 (H) 12/10/2018 03:52 AM  
 BUN 34 (H) 12/10/2018 03:52 AM  
 Creatinine 1.17 (H) 12/10/2018 03:52 AM  
 Calcium 8.1 (L) 12/10/2018 03:52 AM  
 
 
PT/OT:  
 
  
  
  
 
Patient mobility ASSESSMENT / PLAN:  
Principal Problem: 
  BAYLEE (acute kidney injury) ([address] (2018) Active Problems: Metabolic acidosis () Chronic back pain () Diabetes mellitus, type 2 ([facility] () Leukocytosis (2018) Right elbow pain (2018) Hypotension (2018) A: 1. Discitis C5-C7 2. Right elbow cellulitis 3. Right elbow synovitis 4. Acute encephalopathy complicated by respiratory failure in setting of GBS bacteremia P: 1. Function of left arm has improved slightly since initiation of broad spectrum dual therapy abx. Discitis to be treated with IV antibiotics. Highly unlikely to obtain satisfactory cultures of disc material due to length of antibiotic coverage at this point. Continuation of treatment for GBS bacteremia with added meningitis coverage. Unknown reason other than GBS bacteremia for encephalopathy. No clinical indication for debridement and fusion of C5-C7 at this time as there is no cord compression, denis destruction from osteomyelitis, and clinical examination has been limited due to mechanical ventilation. Above case has been discussed with surgical spine team including Dr. Gerhard Laguerre. Orthopedics to follow. Signed By: 
MAYR Gordillo [id number] [facility]

## 2018-12-10 NOTE — PROGRESS NOTES
[facility] Progress Note Ranulfo Núñez DO 
  [phone] Office [phone]  Fax 
 
12/10/2018 Assessment & Plan: 1. Right elbow cellulitis complicated by GBS bacteremia. Repeat BC's NGSF. Orthopedic surgery is following. S/p joint aspiration by IR . Cultures NGSF. Continue meropenem. Stop vancomycin. 2. Encephalopathy. 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 Traumatic tap with LP results abnormal. Meningitis pathogens panel negative. CSF culture NGSF. Lyme CSF pending(low suspicion) Checking WNV(also low suspicion) CSF fluid cytology unremarkable. HSV 1&2 PCR negative. MRI brain negative. Will treat empirically for GBS meningitis 3. Cervical spine diskitis. Abx as above. No MRSA isolated. Stop vancomycin 4. Acute kidney injury. This is improving 5. Diabetes mellitus. Management per primary team. 
6. Rash prior to admission. Resolved. Pictures reviewed with patients . Possible cellulitis? Rash not c/w HSV/VZV. 7.   CoNS isolated from a single blood culture. This represents a contaminant. 8. Hemoptysis. Resolved. ENT following Subjective:  
 
Lethargic after pain meds but reportedly was talkative and appropriate today Objective:  
 
Vitals:  
Visit Vitals /57 Pulse 82 Temp 98.2 °F (36.8 °C) Resp 16 Ht 5' 1\" (1.549 m) Wt 91.8 kg (202 lb 6.1 oz) SpO2 99% BMI 38.24 kg/m² Tmax:  Temp (24hrs), Av.5 °F (36.9 °C), Min:97.8 °F (36.6 °C), Max:98.8 °F (37.1 °C) Exam:  
Patient is intubated:  yes Physical Examination:  
General:  Awake NAD Head:  Normocephalic, atraumatic. Eyes:  Conjunctivae clear Neck: Supple Lungs:   Clear to auscultation bilaterally. Chest wall:    
Heart:  Regular rate and rhythm Abdomen:   Soft, non-tender, non-distended Extremities:   
Skin:  No rash Neurologic: Unable to assess Labs:     
 
No lab exists for component: ITNL No results for input(s): CPK, CKMB, TROIQ in the last 72 hours. Recent Labs 12/10/18 
4430 12/09/18 
0459 12/08/18 
4360  139 143  
K 3.8 3.1* 3.6  104 107 CO2 23 25 28 BUN 34* 37* 43* CREA 1.17* 1.16* 1.39* * 203* 158* PHOS 4.1 4.5 4.4 MG 1.8 1.7 1.8 ALB 1.2* 1.1* 1.1* WBC 11.0 11.2* 11.2* HGB 8.5* 8.2* 7.7* HCT 27.5* 27.1* 25.5*  
 353 382 Recent Labs 12/09/18 
079 0070 2298 12/08/18 
8491 INR 1.3* 1.4* PTP 13.0* 14.4* APTT  --  31.3 Needs: urine analysis, urine sodium, protein and creatinine Lab Results Component Value Date/Time Sodium,urine random 29 11/30/2018 06:10 PM  
 Creatinine, urine 187.11 11/30/2018 06:10 PM  
 
 
 
Cultures: No results found for: SDES Lab Results Component Value Date/Time Culture result: SCANT NORMAL RESPIRATORY TRIXIE 12/07/2018 01:30 PM  
 Culture result: MODERATE YEAST (A) 12/07/2018 01:30 PM  
 Culture result: HEAVY YEAST IDENTIFICATION TO FOLLOW (A) 12/07/2018 01:30 PM  
 
 
Radiology:  
 
Medications Current Facility-Administered Medications Medication Dose Route Frequency Last Dose  
 HYDROmorphone (PF) (DILAUDID) injection 0.5 mg  0.5 mg IntraVENous Q3H PRN 0.5 mg at 12/10/18 1215  [START ON 12/11/2018] pantoprazole (PROTONIX) 40 mg in sodium chloride 0.9% 10 mL injection  40 mg IntraVENous DAILY  insulin glargine (LANTUS) injection 10 Units  10 Units SubCUTAneous DAILY 10 Units at 12/10/18 0837  
 dextrose 5% - 0.45% NaCl with KCl 40 mEq/L infusion   IntraVENous CONTINUOUS    
 vancomycin (VANCOCIN) 1,000 mg in 0.9% sodium chloride (MBP/ADV) 250 mL  1,000 mg IntraVENous Q24H 1,000 mg at 12/10/18 6828  bisacodyl (DULCOLAX) suppository 10 mg  10 mg Rectal DAILY Stopped at 12/09/18 0900  chlorhexidine (PERIDEX) 0.12 % mouthwash 15 mL  15 mL Oral BID 15 mL at 12/10/18 3343  insulin lispro (HUMALOG) injection   SubCUTAneous Q6H Stopped at 12/09/18 0000  meropenem (MERREM) 2 g in 0.9% sodium chloride 100 mL IVPB  2 g IntraVENous Q12H 2 g at 12/10/18 0003  cloNIDine (CATAPRES) 0.3 mg/24 hr patch 1 Patch  1 Patch TransDERmal Q7D 1 Patch at 12/06/18 1522  
 metoprolol (LOPRESSOR) injection 2.5 mg  2.5 mg IntraVENous Q6H 2.5 mg at 12/10/18 1234  
 labetalol (NORMODYNE) tablet 300 mg  300 mg Oral Q12H Stopped at 12/08/18 0900  
 influenza vaccine 2018-19 (6 mos+)(PF) (FLUARIX QUAD/FLULAVAL QUAD) injection 0.5 mL  0.5 mL IntraMUSCular PRIOR TO DISCHARGE  labetalol (NORMODYNE;TRANDATE) injection 10 mg  10 mg IntraVENous Q4H PRN 10 mg at 12/10/18 5871  hydrALAZINE (APRESOLINE) 20 mg/mL injection 20 mg  20 mg IntraVENous Q6H PRN 20 mg at 12/10/18 1048  acetaminophen (TYLENOL) suppository 650 mg  650 mg Rectal Q4H  mg at 12/05/18 0044  alteplase (CATHFLO) 1 mg in sterile water (preservative free) 1 mL injection  1 mg InterCATHeter PRN    
 sodium chloride (NS) flush 10-30 mL  10-30 mL InterCATHeter PRN    
 sodium chloride (NS) flush 10 mL  10 mL InterCATHeter Q24H 10 mL at 12/10/18 1235  sodium chloride (NS) flush 10 mL  10 mL InterCATHeter PRN 10 mL at 12/10/18 0035  sodium chloride (NS) flush 10-40 mL  10-40 mL InterCATHeter Q8H 30 mL at 12/10/18 0511  
 sodium chloride (NS) flush 20 mL  20 mL InterCATHeter Q24H 20 mL at 12/10/18 1235  sodium chloride (NS) flush 5-10 mL  5-10 mL IntraVENous PRN 10 mL at 12/01/18 1909  sodium chloride (NS) flush 5-10 mL  5-10 mL IntraVENous Q8H 10 mL at 12/10/18 0511  
 sodium chloride (NS) flush 5-10 mL  5-10 mL IntraVENous PRN    
 acetaminophen (TYLENOL) tablet 650 mg  650 mg Oral Q4H  mg at 12/03/18 1815  ondansetron (ZOFRAN) injection 4 mg  4 mg IntraVENous Q4H PRN 4 mg at 12/06/18 0818  
 glucose chewable tablet 16 g  4 Tab Oral PRN    
 dextrose (D50W) injection syrg 12.5-25 g  25-50 mL IntraVENous PRN 25 g at 12/07/18 7389  glucagon (GLUCAGEN) injection 1 mg  1 mg IntraMUSCular PRN    
 levothyroxine (SYNTHROID) tablet 125 mcg  125 mcg Oral ACB Stopped at 12/08/18 0730  sertraline (ZOLOFT) tablet 150 mg  150 mg Oral QHS Stopped at 12/08/18 2200 Case discussed with: CC and  Fede Hanna DO

## 2018-12-10 NOTE — PROGRESS NOTES
Blanco Mcpherson queenie [place] 79 
566 [facility], [address] [place] [address] 
[phone] Medical Progress Note NAME: Phoebe Briseno :  1955 MRM:  [id number] Date/Time: 12/10/2018 Subjective: Chief Complaint:  Pt extubated Pt seen and examined. Now extubated. Currently not talking. Was able to mouth the word \"yes\" to me and very sporadically follows commands (see exam below) Objective:  
 
 
Vitals:  
 
  
Last 24hrs VS reviewed since prior progress note. Most recent are: 
 
Visit Vitals /58 Pulse 81 Temp 98.2 °F (36.8 °C) Resp 16 Ht 5' 1\" (1.549 m) Wt 91.8 kg (202 lb 6.1 oz) SpO2 99% BMI 38.24 kg/m² SpO2 Readings from Last 6 Encounters:  
12/10/18 99% O2 Flow Rate (L/min): 4 l/min Intake/Output Summary (Last 24 hours) at 12/10/2018 1517 Last data filed at 12/10/2018 1200 Gross per 24 hour Intake 2253.39 ml Output 1028 ml Net 1225.39 ml Exam:  
 
Physical Exam: 
 
Gen:  Elderly, chronically ill-appearing, morbidly obese, NAD HEENT:  Pink conjunctivae Neck:  Supple, without masses Resp:  No accessory muscle use, Diminished BS at bases with few coarse breath sounds. Ventilated breath sounds Card:  RRR, no murmurs, normal S1, S2 without thrills, bruits Abd:  Soft, non-tender, non-distended but obese, normoactive bowel sounds are present Musc:  No cyanosis or clubbing. Notable BLE muscular atrophy Skin:  Right elbow swelling, erythema Neuro: PERRL. Non verbal. Able to track to the left on command. Able to squeeze my hand on the R. Poor effort on the left. Per nursing has been tracking as she moves around the room. Psych: Poor insight Diffuse anasarca Medications Reviewed: (see below) Lab Data Reviewed: (see below) 
 
______________________________________________________________________ Medications:  
 
Current Facility-Administered Medications Medication Dose Route Frequency  HYDROmorphone (PF) (DILAUDID) injection 0.5 mg  0.5 mg IntraVENous Q3H PRN  
 [START ON 12/11/2018] pantoprazole (PROTONIX) 40 mg in sodium chloride 0.9% 10 mL injection  40 mg IntraVENous DAILY  insulin glargine (LANTUS) injection 10 Units  10 Units SubCUTAneous DAILY  dextrose 5% - 0.45% NaCl with KCl 40 mEq/L infusion   IntraVENous CONTINUOUS  
 bisacodyl (DULCOLAX) suppository 10 mg  10 mg Rectal DAILY  chlorhexidine (PERIDEX) 0.12 % mouthwash 15 mL  15 mL Oral BID  insulin lispro (HUMALOG) injection   SubCUTAneous Q6H  
 meropenem (MERREM) 2 g in 0.9% sodium chloride 100 mL IVPB  2 g IntraVENous Q12H  cloNIDine (CATAPRES) 0.3 mg/24 hr patch 1 Patch  1 Patch TransDERmal Q7D  
 metoprolol (LOPRESSOR) injection 2.5 mg  2.5 mg IntraVENous Q6H  
 labetalol (NORMODYNE) tablet 300 mg  300 mg Oral Q12H  
 influenza vaccine 2018-19 (6 mos+)(PF) (FLUARIX QUAD/FLULAVAL QUAD) injection 0.5 mL  0.5 mL IntraMUSCular PRIOR TO DISCHARGE  labetalol (NORMODYNE;TRANDATE) injection 10 mg  10 mg IntraVENous Q4H PRN  
 hydrALAZINE (APRESOLINE) 20 mg/mL injection 20 mg  20 mg IntraVENous Q6H PRN  
 acetaminophen (TYLENOL) suppository 650 mg  650 mg Rectal Q4H PRN  
 alteplase (CATHFLO) 1 mg in sterile water (preservative free) 1 mL injection  1 mg InterCATHeter PRN  
 sodium chloride (NS) flush 10-30 mL  10-30 mL InterCATHeter PRN  
 sodium chloride (NS) flush 10 mL  10 mL InterCATHeter Q24H  
 sodium chloride (NS) flush 10 mL  10 mL InterCATHeter PRN  
 sodium chloride (NS) flush 10-40 mL  10-40 mL InterCATHeter Q8H  
 sodium chloride (NS) flush 20 mL  20 mL InterCATHeter Q24H  
 sodium chloride (NS) flush 5-10 mL  5-10 mL IntraVENous PRN  
 sodium chloride (NS) flush 5-10 mL  5-10 mL IntraVENous Q8H  
 sodium chloride (NS) flush 5-10 mL  5-10 mL IntraVENous PRN  
 acetaminophen (TYLENOL) tablet 650 mg  650 mg Oral Q4H PRN  
 ondansetron (ZOFRAN) injection 4 mg  4 mg IntraVENous Q4H PRN  
  glucose chewable tablet 16 g  4 Tab Oral PRN  
 dextrose (D50W) injection syrg 12.5-25 g  25-50 mL IntraVENous PRN  
 glucagon (GLUCAGEN) injection 1 mg  1 mg IntraMUSCular PRN  
 levothyroxine (SYNTHROID) tablet 125 mcg  125 mcg Oral ACB  sertraline (ZOLOFT) tablet 150 mg  150 mg Oral QHS Lab Review:  
 
Recent Labs 12/10/18 
6802 12/09/18 
0459 12/08/18 
7802 WBC 11.0 11.2* 11.2* HGB 8.5* 8.2* 7.7* HCT 27.5* 27.1* 25.5*  
 353 382 Recent Labs 12/10/18 
9387 12/09/18 
0459 12/08/18 
8846 12/08/18 
3709  139  --  143  
K 3.8 3.1*  --  3.6  104  --  107 CO2 23 25  --  28  
* 203*  --  158* BUN 34* 37*  --  43* CREA 1.17* 1.16*  --  1.39* CA 8.1* 8.4*  --  8.3*  
MG 1.8 1.7  --  1.8 PHOS 4.1 4.5  --  4.4 ALB 1.2* 1.1*  --  1.1*  
SGOT 41* 35  --  31 ALT 20 15  --  15 INR  --  1.3* 1.4*  -- No components found for: [place] Recent Labs 12/10/18 
0603 12/09/18 
5165 12/08/18 
3100 PH 7.48* 7.51* 7.50* PCO2 31* 32* 34* PO2 105* 82 109* HCO3 22 24 26 FIO2 40 40 50 Recent Labs 12/09/18 
079 0070 2298 12/08/18 
6765 INR 1.3* 1.4* No results found for: SDES Lab Results Component Value Date/Time Culture result: SCANT NORMAL RESPIRATORY TRIXIE 12/07/2018 01:30 PM  
 Culture result: MODERATE YEAST (A) 12/07/2018 01:30 PM  
 Culture result: HEAVY YEAST IDENTIFICATION TO FOLLOW (A) 12/07/2018 01:30 PM  
 
        
___ Assessment / Plan:  
62 yo WF w/ hx of HTN, DM, chronic pain, presenting with AMS and weakness, found to have severe sepsis, bacteremia, and ARF 
 
C-spine discitis: prelim MRI showed evidence of discitis at C5-C6 and C6-C7, with abnormal enhancement of the vertebral bodies. There is enhancing phlegmon in the epidural space. No drainable abscess 
-s/p MRI c/l/t spine completed  
-ortho on board -appreciate ID eval; on meropenem 
 Acute metabolic encephalopathy: unclear etiology.  ?medications and relation to the diskitis  
-head CT, MRI brain without acute process  
-TSH, B12/folate, ammonia WNL  
-s/p LP; not c/w bacterial meningitis. HSV neg. -EEG negative for seizure activity  
-RPR negative, West Nile negative, HIV negative  
-Lyme pending Hemoptysis vs epistaxis: sudden onset 12/7. With bright red blood and clots suctioned from upper airway. Intubated for airway protection. Underwent bronchoscopy which showed large amount of blood in the trachea, however past paula only scants amount of blood in RLL. Some thick yellow/white secretions in LLL and RLL. All lobes/subsegments inspected with no signs of bleeding -ENT on board  
-JACQUELINE negative -ANCA pending  
 Severe sepsis/bacteremia: Blood Cx + 4/4 bottles with GBS on admission, repeat blood Cx NGTD. With R arm/elbow cellulitis and now evidence of discitis. No e/o valvular veg on TTE 
-on meropenem as per ID  
  
R elbow cellulitis/bursitis/acute pain: s/p joint aspiration by IR on 12/6 
-cultures negative to date -on meropenem as above  
  
ARF: likely from ATN/sepsis, hypovolemia 
-monitor  
  
AG metabolic acidosis: resolved. Due to sepsis 
  
Hypernatremia: resolved  
  
DM type 2 w/ hypoglycemia: A1C 7.2%. ISS  
  
HTN: continue clonidine and metoprolol. Change to PO when able to tolerate  
  
Anemia: Fe studies consistent with chronic dz. Monitor Total time spent: 35 minutes critical care time Care Plan discussed with: Patient, Nursing Staff and >50% of time spent in counseling and coordination of care Discussed:  Care Plan Prophylaxis:  SCD's Disposition:  TBD 
________________________________________________ Attending Physician: Alonso Rangel MD

## 2018-12-10 NOTE — PROGRESS NOTES
0630 
Bedside and Verbal shift change report given to Marjorie Naranjo  (oncoming nurse) by Dontae Doran RN  (offgoing nurse). Report included the following information SBAR, MAR and Recent Results. 1488 Propofol decreased in preparation for SBT. 1069 Propofol and fentanyl stopped. Dov Mic Pt placed on SBT by Meño, 2011 [facility] Pt extubated by RT  per order from Dr. Matt Malhotra. [address] Bedside and Verbal shift change report given to Kirsten MANN  (oncoming nurse) by Delfina Gordon RN  (offgoing nurse). Report included the following information SBAR, MAR and Recent Results.

## 2018-12-10 NOTE — PROGRESS NOTES
Occupational Therapy Note: 
Chart reviewed and spoke with team at [address] [place] [address]. MD recommending holding therapy today as patient was just extubated, not following commands, and still confused. Will continue to follow.  
Desmond Car, OTR/L

## 2018-12-10 NOTE — PROGRESS NOTES
Physical Therapy Note: 
Chart reviewed and spoke with team at [address] [place] [address]. MD recommending holding therapy today as patient was just extubated, not following commands, and still confused. Will continue to follow. Thank you

## 2018-12-10 NOTE — PROGRESS NOTES
Ortho update: MRI of T and L spine completed. No signs of discitis/ epidural abscess. Discitis of cervical spine to be treated with IV abx broadened to vanc and meropenum (meningitis dosing). No surgical intervention at this time. Will continue to follow along.  
 
Shanna Oviedo, NP

## 2018-12-10 NOTE — PROGRESS NOTES
Problem: Falls - Risk of 
Goal: *Absence of Falls Document Debria Check Fall Risk and appropriate interventions in the flowsheet. Outcome: Progressing Towards Goal 
Fall Risk Interventions: 
Mobility Interventions: Communicate number of staff needed for ambulation/transfer Mentation Interventions: Adequate sleep, hydration, pain control, More frequent rounding, Reorient patient, Room close to nurse's station, Toileting rounds, Update white board Medication Interventions: Evaluate medications/consider consulting pharmacy Elimination Interventions: Call light in reach, Toileting schedule/hourly rounds Problem: Pressure Injury - Risk of 
Goal: *Prevention of pressure injury Document Nikolay Scale and appropriate interventions in the flowsheet. Outcome: Progressing Towards Goal 
Pressure Injury Interventions: 
Sensory Interventions: Assess changes in LOC, Check visual cues for pain, Float heels, Keep linens dry and wrinkle-free, Minimize linen layers, Monitor skin under medical devices, Pressure redistribution bed/mattress (bed type), Turn and reposition approx. every two hours (pillows and wedges if needed) Moisture Interventions: Absorbent underpads, Apply protective barrier, creams and emollients, Check for incontinence Q2 hours and as needed, Internal/External urinary devices, Maintain skin hydration (lotion/cream), Minimize layers, Moisture barrier Activity Interventions: Pressure redistribution bed/mattress(bed type) Mobility Interventions: Float heels, HOB 30 degrees or less, Pressure redistribution bed/mattress (bed type), Turn and reposition approx. every two hours(pillow and wedges) Nutrition Interventions: Document food/fluid/supplement intake Friction and Shear Interventions: Apply protective barrier, creams and emollients, HOB 30 degrees or less, Lift sheet, Lift team/patient mobility team, Minimize layers

## 2018-12-10 NOTE — PROGRESS NOTES
Nutrition Assessment: 
 
RECOMMENDATIONS/INTERVENTION(S):  
1. Diet texture per SLP, would recommend consistent CHO. 2. If unable to advance diet, please start enteral nutrition support in the next 1-2 days. TF recommendations, if needed. -Glucerna 1.2, start @ 20 mL/hr and advance 10 mL q 8hr to goal of 60mL/hr continuous. -FWF: 50mL q 4 hr, increase to 100mL q 4hr to meet full hydration needs. Glucerna 1.2 @ 60 mL/hr provides: 1728kcal, 86g protein, 165g CHO, 23g fiber,  1,159 mL free water. meets 98% kcal and 100% protein needs. 2. Discontinue/modify D5 fluids with start of tube feedings. 3. Monitor lytes with start and advancement of feeds, aspiration precautions. 4. Will monitor plan of care for Diet advancement vs need to start TF, intake, wt,  BG, GI. ASSESSMENT:  
12/10: Pt passed SBT and extubated this morning to nasal cannula. Currently at 4 L/min. Epistaxis/hemoptysis, which led to intubation, improving per ENT. MRI of brain, T and L spine negative. Discitis of C spine being treating with antibiotics. Does not appear that tubefeeds were started over the weekend. Na+, mag, Phos and K+ now all WNL. Creat 1.17. BG [id number]. On high sensitivity sliding scale, No insulin coverage needed in the last 24 hrs On D5 IVF @ 75 mL/hr providing 90g dextrose and 306 kcal/day. Propofol off.   
 
12/7: Pt required emergent intubation today, was transferred to ICU. Unable to change EEN's using [employer] equation due to vent measurements not charted yet. Propofol ordered for sedation, currently at 26.4ml/hr (696kcals). Other meds: Dulcolax, Humalog. D5 running at 75ml/hr. Still no BM noted since 11/27, BS's noted as hypoactive. Labs: Na+ 150, BG 63, HgbA1c 7.2, .  
 
12/5: 59yo female admitted for metabolic acidosis. RD assessment for LOS. PMHx: DM, HTN. Class II Obese per BMI. No weight hx from PTA to assess. Pt not responding to any questions and no family in room at time of visit today x 2.  Spoke with RN who reports pt has not had anything to eat today and has been mostly NPO since admission. Attempted to feed pt but food was held in mouth. RN states pt refusing to swallow pills today too. Pureed diet ordered. SLP not following pt.  informed RN this started about 3 weeks PTA, before that she was ambulatory and verbal, eating. BS's are noted as hypoactive and no BM since 11/27. Labs: K+ 3.2, , POC -[phone], HgbA1c 7.2. Meds: Lantus, Humalog, Kcl.  1/2NaCl with 20mEqKCl running at 100ml/hr. Diet Order: NPO 
% Eaten:  No data found. Pertinent Medications: [x] Reviewed Labs: [x] Reviewed Anthropometrics: Height: 5' 1\" (154.9 cm) Weight: 91.8 kg (202 lb 6.1 oz) IBW (%IBW):   ( ) UBW (%UBW):   (  %) BMI: Body mass index is 38.24 kg/m². This BMI is indicative of: 
 [] Underweight    [] Normal    [] Overweight    [x] Class II Obesity    []  Extreme Obesity (BMI>40) Estimated Nutrition Needs (Based on): 5374 Kcals/day(BMR 1360 x AF 1.3) , 69 g(ABW x 0.8-1gm/day (69-87gm)) Protein Carbohydrate: At Least 130 g/day  Fluids: 1768mL/day (1 ml/kcal) Last BM: 12/9, formed  []Active     []Hyperactive  [x]Hypoactive       [] Absent   BS Skin:    [] Intact   [] Incision  [] Breakdown   [] DTI   [] Tears/Excoriation/Abrasion  [x]Edema - 2+ BLEs and LUE, 3+ RUE [] Other: Wt Readings from Last 30 Encounters:  
12/10/18 91.8 kg (202 lb 6.1 oz) 12/01/18 81.6 kg (179 lb 14.3 oz) 12/01/18 81.6 kg (179 lb 14.3 oz) 12/01/18 81.6 kg (179 lb 14.3 oz) NUTRITION DIAGNOSES:  
Problem:  Inadequate energy intake Etiology: related to inability to consume sufficient energy secondary to mechanical ventilation Signs/Symptoms: as evidenced by NPO x 8 days meeting < EEN's    
 
12/10: Nutrition dx not improving, no nutrition started over the weekend. NUTRITION INTERVENTIONS: 
Meals/Snacks: Modify diet/texture/consistency/nutrients Enteral/Parenteral Nutrition: Initiate enteral nutrition GOAL:  
Meet EEN's with EN or advance diet past NPO in next 1-3 days Cultural, Christianity, or Ethnic Dietary Needs: None EDUCATION & DISCHARGE NEEDS:  
 [x] None Identified 
 [] Identified and Education Provided/Documented 
 [] Identified and Pt declined/was not appropriate [x] Interdisciplinary Care Plan Reviewed/Documented  
 [x] Discharge Needs: Will need to determine if pt needs supplemental nutrition after discharge/EN [] No Nutrition Related Discharge Needs NUTRITION RISK:  
Pt Is At Nutrition Risk  [x] No Nutrition Risk Identified  [] PT SEEN FOR:  
 []  MD Consult: []Calorie Count []Diabetic Diet Education []Diet Education []Electrolyte Management []General Nutrition Management and Supplements []Management of Tube Feeding []TPN Recommendations []  RN Referral:  []MST score >=2 
   []Enteral/Parenteral Nutrition PTA []Pregnant: Gestational DM or Multigestation  
              [] Pressure Ulcer 
 
[]  Low BMI      []  Length of Stay       [] Dysphagia Diet         [] Ventilator [x]  Follow-up Previous Recommendations: 
 [] Implemented          [x] Not Implemented          [] Not Applicable Previous Goal: 
 [] Met              [] Progressing Towards Goal              [x] Not Progressing Towards Goal   [] Not Applicable Dick Edgar RD Pager [phone] Office [phone]

## 2018-12-10 NOTE — PROGRESS NOTES
Blanco Mcpherson [facility] [address] YOB: 1955 Assessment & Plan:  
ARF due to IVVD/sepsis · Creat improved and stable:1.17 mg · Avoid nephrotoxins Hypernatremia · On D5W and hypotonic IVF · better Hypokalemia · Improved Sepsis/hypotension · On abx · Bacteremia likely from R arm source HTN 
· Stable Urinary retention · S/p joyce Subjective:  
CC: f/u ARF 
HPI: Renal function 1.17 mg cr. Hypernatremia is better, on D5 0.45. On Afrin for nose bleeds. ROS: Unable to obtain due to AMS Current Facility-Administered Medications Medication Dose Route Frequency  insulin glargine (LANTUS) injection 10 Units  10 Units SubCUTAneous DAILY  dextrose 5% - 0.45% NaCl with KCl 40 mEq/L infusion   IntraVENous CONTINUOUS  
 vancomycin (VANCOCIN) 1,000 mg in 0.9% sodium chloride (MBP/ADV) 250 mL  1,000 mg IntraVENous Q24H  
 fentaNYL (PF) 900 mcg/30 ml infusion soln  0-50 mcg/hr IntraVENous TITRATE  oxymetazoline (AFRIN) 0.05 % nasal spray 4 Spray  4 Spray Both Nostrils Q6H  
 bisacodyl (DULCOLAX) suppository 10 mg  10 mg Rectal DAILY  propofol (DIPRIVAN) infusion  5-50 mcg/kg/min IntraVENous TITRATE  chlorhexidine (PERIDEX) 0.12 % mouthwash 15 mL  15 mL Oral BID  pantoprazole (PROTONIX) 40 mg in sodium chloride 0.9% 10 mL injection  40 mg IntraVENous Q12H  
 insulin lispro (HUMALOG) injection   SubCUTAneous Q6H  
 meropenem (MERREM) 2 g in 0.9% sodium chloride 100 mL IVPB  2 g IntraVENous Q12H  cloNIDine (CATAPRES) 0.3 mg/24 hr patch 1 Patch  1 Patch TransDERmal Q7D  
 metoprolol (LOPRESSOR) injection 2.5 mg  2.5 mg IntraVENous Q6H  
 labetalol (NORMODYNE) tablet 300 mg  300 mg Oral Q12H  
 influenza vaccine 2018-19 (6 mos+)(PF) (FLUARIX QUAD/FLULAVAL QUAD) injection 0.5 mL  0.5 mL IntraMUSCular PRIOR TO DISCHARGE  labetalol (NORMODYNE;TRANDATE) injection 10 mg  10 mg IntraVENous Q4H PRN  
  hydrALAZINE (APRESOLINE) 20 mg/mL injection 20 mg  20 mg IntraVENous Q6H PRN  
 acetaminophen (TYLENOL) suppository 650 mg  650 mg Rectal Q4H PRN  
 alteplase (CATHFLO) 1 mg in sterile water (preservative free) 1 mL injection  1 mg InterCATHeter PRN  
 sodium chloride (NS) flush 10-30 mL  10-30 mL InterCATHeter PRN  
 sodium chloride (NS) flush 10 mL  10 mL InterCATHeter Q24H  
 sodium chloride (NS) flush 10 mL  10 mL InterCATHeter PRN  
 sodium chloride (NS) flush 10-40 mL  10-40 mL InterCATHeter Q8H  
 sodium chloride (NS) flush 20 mL  20 mL InterCATHeter Q24H  
 HYDROmorphone (PF) (DILAUDID) injection 2 mg  2 mg IntraVENous Q3H PRN  
 sodium chloride (NS) flush 5-10 mL  5-10 mL IntraVENous PRN  
 sodium chloride (NS) flush 5-10 mL  5-10 mL IntraVENous Q8H  
 sodium chloride (NS) flush 5-10 mL  5-10 mL IntraVENous PRN  
 acetaminophen (TYLENOL) tablet 650 mg  650 mg Oral Q4H PRN  
 ondansetron (ZOFRAN) injection 4 mg  4 mg IntraVENous Q4H PRN  
 glucose chewable tablet 16 g  4 Tab Oral PRN  
 dextrose (D50W) injection syrg 12.5-25 g  25-50 mL IntraVENous PRN  
 glucagon (GLUCAGEN) injection 1 mg  1 mg IntraMUSCular PRN  
 levothyroxine (SYNTHROID) tablet 125 mcg  125 mcg Oral ACB  sertraline (ZOLOFT) tablet 150 mg  150 mg Oral QHS Objective:  
 
Vitals: 
Blood pressure 171/69, pulse 80, temperature 97.8 °F (36.6 °C), resp. rate 20, height 5' 1\" (1.549 m), weight 91.8 kg (202 lb 6.1 oz), SpO2 100 %. Temp (24hrs), Av.5 °F (36.9 °C), Min:97.8 °F (36.6 °C), Max:98.8 °F (37.1 °C) Intake and Output: 
No intake/output data recorded.  1901 - 12/10 0700 In: 4104.9 [I.V.:4104.9] Out: 4306 [HYFEB:2539] Physical Exam:              
GENERAL ASSESSMENT: AMS 
CHEST: CTA HEART: S1S2 ABDOMEN: Soft,NT 
EXTREMITY: no EDEMA 
 
 
   
ECG/rhythm: 
 
Data Review No results for input(s): TNIPOC in the last 72 hours. No lab exists for component: ITNL No results for input(s): CPK, CKMB, TROIQ in the last 72 hours. Recent Labs 12/10/18 
4657 12/09/18 
0459 12/08/18 
4435  139 143  
K 3.8 3.1* 3.6  104 107 CO2 23 25 28 BUN 34* 37* 43* CREA 1.17* 1.16* 1.39* * 203* 158* PHOS 4.1 4.5 4.4 MG 1.8 1.7 1.8  
CA 8.1* 8.4* 8.3* ALB 1.2* 1.1* 1.1* WBC 11.0 11.2* 11.2* HGB 8.5* 8.2* 7.7* HCT 27.5* 27.1* 25.5*  
 353 382 Recent Labs 12/09/18 
079 0070 2298 12/08/18 
2064 INR 1.3* 1.4* PTP 13.0* 14.4* APTT  --  31.3 Needs: urine analysis, urine sodium, protein and creatinine Lab Results Component Value Date/Time Sodium,urine random 29 11/30/2018 06:10 PM  
 Creatinine, urine 187.11 11/30/2018 06:10 PM  
 
 
 
 
: Sumaya Stevenson MD 
12/10/2018 [facility]: 
[email or web address] office: 
[address] [place] [address], [address] [place], [address] [place] [address] Phone: [phone] Fax :     [phone] [place] office: 
[address] [facility], [address] [facility] Phone - [phone] Fax - [phone]

## 2018-12-11 LAB
ANION GAP SERPL CALC-SCNC: 9 MMOL/L (ref 5–15)
B BURGDOR IGG PATRN CSF IB-IMP: NEGATIVE
B BURGDOR IGM PATRN CSF IB-IMP: NEGATIVE
B BURGDOR18KD IGG CSF QL IB: ABNORMAL
B BURGDOR23KD IGG CSF QL IB: ABNORMAL
B BURGDOR23KD IGM CSF QL IB: ABNORMAL
B BURGDOR28KD IGG CSF QL IB: PRESENT
B BURGDOR30KD IGG CSF QL IB: ABNORMAL
B BURGDOR39KD IGG CSF QL IB: ABNORMAL
B BURGDOR39KD IGM CSF QL IB: ABNORMAL
B BURGDOR41KD IGG CSF QL IB: PRESENT
B BURGDOR41KD IGM CSF QL IB: ABNORMAL
B BURGDOR45KD IGG CSF QL IB: ABNORMAL
B BURGDOR58KD IGG CSF QL IB: ABNORMAL
B BURGDOR66KD IGG CSF QL IB: ABNORMAL
B BURGDOR93KD IGG CSF QL IB: ABNORMAL
BACTERIA SPEC CULT: NORMAL
BASOPHILS # BLD: 0 K/UL (ref 0–0.1)
BASOPHILS NFR BLD: 0 % (ref 0–1)
BUN SERPL-MCNC: 28 MG/DL (ref 6–20)
BUN/CREAT SERPL: 30 (ref 12–20)
C-ANCA TITR SER IF: NORMAL TITER
CALCIUM SERPL-MCNC: 8.5 MG/DL (ref 8.5–10.1)
CHLORIDE SERPL-SCNC: 109 MMOL/L (ref 97–108)
CO2 SERPL-SCNC: 24 MMOL/L (ref 21–32)
CREAT SERPL-MCNC: 0.93 MG/DL (ref 0.55–1.02)
DIFFERENTIAL METHOD BLD: ABNORMAL
EOSINOPHIL # BLD: 0.6 K/UL (ref 0–0.4)
EOSINOPHIL NFR BLD: 6 % (ref 0–7)
ERYTHROCYTE [DISTWIDTH] IN BLOOD BY AUTOMATED COUNT: 14.6 % (ref 11.5–14.5)
GLUCOSE BLD STRIP.AUTO-MCNC: 144 MG/DL (ref 65–100)
GLUCOSE BLD STRIP.AUTO-MCNC: 149 MG/DL (ref 65–100)
GLUCOSE BLD STRIP.AUTO-MCNC: 157 MG/DL (ref 65–100)
GLUCOSE BLD STRIP.AUTO-MCNC: 159 MG/DL (ref 65–100)
GLUCOSE BLD STRIP.AUTO-MCNC: 168 MG/DL (ref 65–100)
GLUCOSE SERPL-MCNC: 176 MG/DL (ref 65–100)
GRAM STN SPEC: NORMAL
GRAM STN SPEC: NORMAL
HCT VFR BLD AUTO: 25 % (ref 35–47)
HGB BLD-MCNC: 7.6 G/DL (ref 11.5–16)
IMM GRANULOCYTES # BLD: 0.2 K/UL (ref 0–0.04)
IMM GRANULOCYTES NFR BLD AUTO: 2 % (ref 0–0.5)
L PNEUMO AG SPEC QL IF: NEGATIVE
LYMPHOCYTES # BLD: 0.5 K/UL (ref 0.8–3.5)
LYMPHOCYTES NFR BLD: 5 % (ref 12–49)
MCH RBC QN AUTO: 27.7 PG (ref 26–34)
MCHC RBC AUTO-ENTMCNC: 30.4 G/DL (ref 30–36.5)
MCV RBC AUTO: 91.2 FL (ref 80–99)
MONOCYTES # BLD: 0.8 K/UL (ref 0–1)
MONOCYTES NFR BLD: 8 % (ref 5–13)
MYELOPEROXIDASE AB SER IA-ACNC: <9 U/ML (ref 0–9)
NEUTS SEG # BLD: 7.5 K/UL (ref 1.8–8)
NEUTS SEG NFR BLD: 79 % (ref 32–75)
NRBC # BLD: 0 K/UL (ref 0–0.01)
NRBC BLD-RTO: 0 PER 100 WBC
P-ANCA ATYPICAL TITR SER IF: NORMAL TITER
P-ANCA TITR SER IF: NORMAL TITER
PLATELET # BLD AUTO: 365 K/UL (ref 150–400)
PMV BLD AUTO: 9.9 FL (ref 8.9–12.9)
POTASSIUM SERPL-SCNC: 4.5 MMOL/L (ref 3.5–5.1)
PROTEINASE3 AB SER IA-ACNC: <3.5 U/ML (ref 0–3.5)
RBC # BLD AUTO: 2.74 M/UL (ref 3.8–5.2)
RBC MORPH BLD: ABNORMAL
SERVICE CMNT-IMP: ABNORMAL
SERVICE CMNT-IMP: NORMAL
SODIUM SERPL-SCNC: 142 MMOL/L (ref 136–145)
SPECIMEN SOURCE: NORMAL
WBC # BLD AUTO: 9.6 K/UL (ref 3.6–11)

## 2018-12-11 PROCEDURE — 77030034848

## 2018-12-11 PROCEDURE — 97530 THERAPEUTIC ACTIVITIES: CPT

## 2018-12-11 PROCEDURE — 74011000250 HC RX REV CODE- 250: Performed by: INTERNAL MEDICINE

## 2018-12-11 PROCEDURE — 77030038269 HC DRN EXT URIN PURWCK BARD -A

## 2018-12-11 PROCEDURE — 74011250637 HC RX REV CODE- 250/637: Performed by: INTERNAL MEDICINE

## 2018-12-11 PROCEDURE — 65610000006 HC RM INTENSIVE CARE

## 2018-12-11 PROCEDURE — 74011250636 HC RX REV CODE- 250/636: Performed by: INTERNAL MEDICINE

## 2018-12-11 PROCEDURE — 77010033678 HC OXYGEN DAILY

## 2018-12-11 PROCEDURE — 92610 EVALUATE SWALLOWING FUNCTION: CPT

## 2018-12-11 PROCEDURE — 36415 COLL VENOUS BLD VENIPUNCTURE: CPT

## 2018-12-11 PROCEDURE — 80048 BASIC METABOLIC PNL TOTAL CA: CPT

## 2018-12-11 PROCEDURE — 97165 OT EVAL LOW COMPLEX 30 MIN: CPT

## 2018-12-11 PROCEDURE — 74011000258 HC RX REV CODE- 258: Performed by: INTERNAL MEDICINE

## 2018-12-11 PROCEDURE — C1751 CATH, INF, PER/CENT/MIDLINE: HCPCS

## 2018-12-11 PROCEDURE — 85025 COMPLETE CBC W/AUTO DIFF WBC: CPT

## 2018-12-11 PROCEDURE — 82962 GLUCOSE BLOOD TEST: CPT

## 2018-12-11 PROCEDURE — 74011636637 HC RX REV CODE- 636/637: Performed by: INTERNAL MEDICINE

## 2018-12-11 PROCEDURE — C9113 INJ PANTOPRAZOLE SODIUM, VIA: HCPCS | Performed by: INTERNAL MEDICINE

## 2018-12-11 PROCEDURE — 51798 US URINE CAPACITY MEASURE: CPT

## 2018-12-11 PROCEDURE — 97161 PT EVAL LOW COMPLEX 20 MIN: CPT

## 2018-12-11 RX ORDER — CLONIDINE 0.1 MG/24H
1 PATCH, EXTENDED RELEASE TRANSDERMAL
Status: DISCONTINUED | OUTPATIENT
Start: 2018-12-11 | End: 2018-12-12

## 2018-12-11 RX ORDER — LABETALOL HYDROCHLORIDE 5 MG/ML
10 INJECTION, SOLUTION INTRAVENOUS
Status: COMPLETED | OUTPATIENT
Start: 2018-12-11 | End: 2018-12-11

## 2018-12-11 RX ORDER — METOPROLOL TARTRATE 5 MG/5ML
5 INJECTION INTRAVENOUS EVERY 6 HOURS
Status: DISCONTINUED | OUTPATIENT
Start: 2018-12-11 | End: 2018-12-12

## 2018-12-11 RX ADMIN — HYDROMORPHONE HYDROCHLORIDE 0.5 MG: 2 INJECTION, SOLUTION INTRAMUSCULAR; INTRAVENOUS; SUBCUTANEOUS at 08:55

## 2018-12-11 RX ADMIN — METOPROLOL TARTRATE 5 MG: 5 INJECTION, SOLUTION INTRAVENOUS at 23:00

## 2018-12-11 RX ADMIN — METOPROLOL TARTRATE 5 MG: 5 INJECTION, SOLUTION INTRAVENOUS at 12:11

## 2018-12-11 RX ADMIN — LABETALOL HYDROCHLORIDE 10 MG: 5 INJECTION INTRAVENOUS at 10:29

## 2018-12-11 RX ADMIN — HYDRALAZINE HYDROCHLORIDE 20 MG: 20 INJECTION INTRAMUSCULAR; INTRAVENOUS at 04:05

## 2018-12-11 RX ADMIN — Medication 10 ML: at 14:41

## 2018-12-11 RX ADMIN — HYDRALAZINE HYDROCHLORIDE 20 MG: 20 INJECTION INTRAMUSCULAR; INTRAVENOUS at 08:55

## 2018-12-11 RX ADMIN — MEROPENEM 2 G: 1 INJECTION, POWDER, FOR SOLUTION INTRAVENOUS at 00:31

## 2018-12-11 RX ADMIN — SODIUM CHLORIDE 40 MG: 9 INJECTION INTRAMUSCULAR; INTRAVENOUS; SUBCUTANEOUS at 08:54

## 2018-12-11 RX ADMIN — METOPROLOL TARTRATE 5 MG: 5 INJECTION, SOLUTION INTRAVENOUS at 18:02

## 2018-12-11 RX ADMIN — Medication 10 ML: at 10:21

## 2018-12-11 RX ADMIN — LABETALOL HYDROCHLORIDE 10 MG: 5 INJECTION INTRAVENOUS at 01:03

## 2018-12-11 RX ADMIN — HYDROMORPHONE HYDROCHLORIDE 0.5 MG: 2 INJECTION, SOLUTION INTRAMUSCULAR; INTRAVENOUS; SUBCUTANEOUS at 04:43

## 2018-12-11 RX ADMIN — Medication 10 MG: at 09:00

## 2018-12-11 RX ADMIN — Medication 20 ML: at 10:21

## 2018-12-11 RX ADMIN — Medication 10 ML: at 21:05

## 2018-12-11 RX ADMIN — LABETALOL HYDROCHLORIDE 10 MG: 5 INJECTION INTRAVENOUS at 14:38

## 2018-12-11 RX ADMIN — METOPROLOL TARTRATE 2.5 MG: 1 INJECTION, SOLUTION INTRAVENOUS at 05:40

## 2018-12-11 RX ADMIN — HYDRALAZINE HYDROCHLORIDE 20 MG: 20 INJECTION INTRAMUSCULAR; INTRAVENOUS at 16:04

## 2018-12-11 RX ADMIN — LABETALOL HYDROCHLORIDE 10 MG: 5 INJECTION INTRAVENOUS at 04:43

## 2018-12-11 RX ADMIN — Medication 10 ML: at 05:40

## 2018-12-11 RX ADMIN — HYDROMORPHONE HYDROCHLORIDE 0.5 MG: 2 INJECTION, SOLUTION INTRAMUSCULAR; INTRAVENOUS; SUBCUTANEOUS at 16:04

## 2018-12-11 RX ADMIN — INSULIN GLARGINE 10 UNITS: 100 INJECTION, SOLUTION SUBCUTANEOUS at 08:55

## 2018-12-11 RX ADMIN — METOPROLOL TARTRATE 2.5 MG: 1 INJECTION, SOLUTION INTRAVENOUS at 00:14

## 2018-12-11 RX ADMIN — DEXTROSE MONOHYDRATE, SODIUM CHLORIDE, AND POTASSIUM CHLORIDE: 50; 4.5; 2.98 INJECTION, SOLUTION INTRAVENOUS at 22:46

## 2018-12-11 RX ADMIN — MEROPENEM 2 G: 1 INJECTION, POWDER, FOR SOLUTION INTRAVENOUS at 12:01

## 2018-12-11 RX ADMIN — HYDROMORPHONE HYDROCHLORIDE 0.5 MG: 2 INJECTION, SOLUTION INTRAMUSCULAR; INTRAVENOUS; SUBCUTANEOUS at 21:04

## 2018-12-11 RX ADMIN — LABETALOL HYDROCHLORIDE 10 MG: 5 INJECTION INTRAVENOUS at 21:04

## 2018-12-11 RX ADMIN — DEXTROSE MONOHYDRATE, SODIUM CHLORIDE, AND POTASSIUM CHLORIDE: 50; 4.5; 2.98 INJECTION, SOLUTION INTRAVENOUS at 10:02

## 2018-12-11 RX ADMIN — LEVOTHYROXINE SODIUM ANHYDROUS 94 MCG: 100 INJECTION, POWDER, LYOPHILIZED, FOR SOLUTION INTRAVENOUS at 18:02

## 2018-12-11 NOTE — PROGRESS NOTES
1904 Received 2025 Dilaudid 0.5 mg iv given for pain. 2204 /65, hydralazine 20 mg iv given. 0103 /70 , labetalol 10 mg iv given. 0405 /81 , hydralazine 20 mg iv given. 0443 /70 , informed Dr Edinson Varela , labetalol 10 mg iv given. 0600 Patient not voided , purewick in place. 0725 Bedside shift change report given to [facility] AMY .  Report included the following information SBAR, Kardex, ED Summary, Intake/Output, MAR, Accordion, Recent Results, Med Rec Status and Cardiac Rhythm SR.

## 2018-12-11 NOTE — PROGRESS NOTES
Problem: Self Care Deficits Care Plan (Adult)  Goal: *Acute Goals and Plan of Care (Insert Text)  Occupational Therapy Goals  Initiated 12/11/2018  1. Patient will perform self feeding with moderate assistance from supported sitting position within 7 day(s). 2.  Patient will perform grooming with moderate assistance from supported sitting position within 7 day(s). 3.  Patient will perform upper body dressing and bathing with maximal assistance within 7 day(s). 4.  Patient will tolerate sitting EOB with maximum assistance while completing functional activities in preparation for ADL transfers within 7 days. 5.  Patient will participate in upper extremity therapeutic exercise/activities with minimal assistance for 10 minutes within 7 day(s). 6.  Patient will utilize energy conservation techniques during functional activities with verbal cues within 7 day(s). Occupational Therapy EVALUATION  Patient: Divine Koenig (57 y.o. female)  Date: 12/11/2018  Primary Diagnosis: Metabolic acidosis [T97.7]  Procedure(s) (LRB):  BRONCHOSCOPY (N/A)  BRONCHIAL WASHINGS FLEXIBLE (Bilateral) 4 Days Post-Op   Precautions: fall       ASSESSMENT :  Based on the objective data described below, the patient presents with significantly decreased activity tolerance following admission on 11/30 for AMS, weakness, and severe spesis. Patient with complex, prolonged hospital course including intubation on 12/7, extubation on 12/10, brain MRI negative for acute process, cervical MRI revealing C5-C7 diskitis, and R UE/elbow cellulitis that required joint aspiration by IR on 12/6. Patient was alert and responded appropriately to name (\"Samira\") but otherwise disoriented. She is slow to process both with verbal and physical response. Patient's  was present, supportive, and providing all background information. Per , patient was independent with all care and driving PTA.   He does state she has a history of back pain that did limit her distance and with further questioning states she was sedentary for several days leading up to admission where she presented with significant weakness. Today, patient required max A x2 for bed mobility. Bed placed in chair position (total A) to encouraged upright posture/positioning. Patient tolerated full elbow ROM (flexion/extension) of L UE and partial for R UE. Elbow pain appears significantly improved per chart review and  report. Patient is also able to achieve partial planter/dorsi flexion B ankles this date (initially reported as foot drop bilaterally). She is following ~75% of commands. Patient currently requires max to total A for all ADLs. Patient would benefit form continued skilled OT to progress towards goals and improve overall independence. Patient will benefit from skilled intervention to address the above impairments.   Patients rehabilitation potential is considered to be Good  Factors which may influence rehabilitation potential include:   []             None noted  [x]             Mental ability/status  [x]             Medical condition  []             Home/family situation and support systems  []             Safety awareness  []             Pain tolerance/management  []             Other:      PLAN :  Recommendations and Planned Interventions:  [x]               Self Care Training                  [x]        Therapeutic Activities  [x]               Functional Mobility Training    [x]        Cognitive Retraining  [x]               Therapeutic Exercises           [x]        Endurance Activities  [x]               Balance Training                   [x]        Neuromuscular Re-Education  []               Visual/Perceptual Training     [x]   Home Safety Training  [x]               Patient Education                 [x]        Family Training/Education  []               Other (comment):    Frequency/Duration: Patient will be followed by occupational therapy 5 times a week to address goals. Discharge Recommendations: Rehab  Further Equipment Recommendations for Discharge: none at this time     SUBJECTIVE:   Patient stated When can I go home?     OBJECTIVE DATA SUMMARY:   HISTORY:   Past Medical History:   Diagnosis Date    Chronic back pain     Diabetes mellitus, type 2 ([address]     History of vascular access device 12/03/2018    [facility] VAT - 4 FR single, L cephalic, 44 cm for LTA    HTN (hypertension)    History reviewed. No pertinent surgical history. Prior Level of Function/Environment/Context: Patient lives with her . She is a poor historian.  provided background information- see assessment section for information. Home Situation  Home Environment: Private residence  # Steps to Enter: 0(ramp)  Wheelchair Ramp: Yes  One/Two Story Residence: Two story  # of Interior Steps: 13  Living Alone: No  Support Systems: Spouse/Significant Other/Partner  Patient Expects to be Discharged to[de-identified] Private residence  Current DME Used/Available at Home: Wheelchair  Tub or Shower Type: Tub/Shower combination    Hand dominance: Right    EXAMINATION OF PERFORMANCE DEFICITS:  Cognitive/Behavioral Status:  Neurologic State: Alert  Orientation Level: Oriented to person;Disoriented to situation;Disoriented to time;Disoriented to place  Cognition: Decreased command following;Decreased attention/concentration;Poor safety awareness  Perception: Appears intact  Perseveration: No perseveration noted  Safety/Judgement: Decreased awareness of environment    Skin: Intact in the uppers    Edema: None noted in the uppers    Hearing:   Auditory  Auditory Impairment: None    Vision/Perceptual:    Tracking: Able to track stimulus in all quadrants w/o difficulty    Diplopia: No      Range of Motion:  Decreased but functional in the uppers    Strength:  Grossly decreased minimally functional in the uppers    Coordination:   Both fine and gross motor coordination impaired bilaterally     Tone & Sensation:  Tone: normal  Sensation: patient unable to consistently respond to attempts to test deep and light touch bilateral uppers    Balance:  Sitting: Impaired; With support  Sitting - Static: Poor (constant support)  Sitting - Dynamic: Poor (constant support)    Functional Mobility and Transfers for ADLs:  Bed Mobility:  Rolling: Maximum assistance;Assist x2  Supine to Sit: Total assistance(bed placed in chair position)    Transfers:       ADL Assessment:  Feeding: Maximum assistance    Oral Facial Hygiene/Grooming: Total assistance    Bathing: Total assistance    Upper Body Dressing: Total assistance    Lower Body Dressing: Total assistance    Toileting: Total assistance     Cognitive Retraining  Safety/Judgement: Decreased awareness of environment    Functional Measure:  Barthel Index:    Bathin  Bladder: 0  Bowels: 0  Groomin  Dressin  Feedin  Mobility: 0  Stairs: 0  Toilet Use: 0  Transfer (Bed to Chair and Back): 0  Total: 0       Barthel and G-code impairment scale:  Percentage of impairment CH  0% CI  1-19% CJ  20-39% CK  40-59% CL  60-79% CM  80-99% CN  100%   Barthel Score 0-100 100 99-80 79-60 59-40 20-39 1-19   0   Barthel Score 0-20 20 17-19 13-16 9-12 5-8 1-4 0      The Barthel ADL Index: Guidelines  1. The index should be used as a record of what a patient does, not as a record of what a patient could do. 2. The main aim is to establish degree of independence from any help, physical or verbal, however minor and for whatever reason. 3. The need for supervision renders the patient not independent. 4. A patient's performance should be established using the best available evidence. Asking the patient, friends/relatives and nurses are the usual sources, but direct observation and common sense are also important. However direct testing is not needed. 5. Usually the patient's performance over the preceding 24-48 hours is important, but occasionally longer periods will be relevant.   6. Middle categories imply that the patient supplies over 50 per cent of the effort. 7. Use of aids to be independent is allowed. Mercedes Bunn., Barthel, D.W. (7213). Functional evaluation: the Barthel Index. [facility] (14)2. MATHEW Landaverde, Mary Alice Taylor., Lo Clark., [place], [address] (1999). Measuring the change indisability after inpatient rehabilitation; comparison of the responsiveness of the Barthel Index and Functional [place] Measure. Journal of Neurology, Neurosurgery, and Psychiatry, 66(4), 412-957. ABY MiguelA, ROXANA Alegria, & Arnol Magallon M.A. (2004.) Assessment of post-stroke quality of life in cost-effectiveness studies: The usefulness of the Barthel Index and the EuroQoL-5D. Quality of Life Research, 13, 038-67       G codes: In compliance with CMSs Claims Based Outcome Reporting, the following G-code set was chosen for this patient based on their primary functional limitation being treated: The outcome measure chosen to determine the severity of the functional limitation was the Barthel Index with a score of 0/100 which was correlated with the impairment scale. ?  Self Care:     - CURRENT STATUS: CN - 100% impaired, limited or restricted    - GOAL STATUS: CM - 80%-99% impaired, limited or restricted    - D/C STATUS:  ---------------To be determined---------------     Occupational Therapy Evaluation Charge Determination   History Examination Decision-Making   LOW Complexity : Brief history review  LOW Complexity : 1-3 performance deficits relating to physical, cognitive , or psychosocial skils that result in activity limitations and / or participation restrictions  LOW Complexity : No comorbidities that affect functional and no verbal or physical assistance needed to complete eval tasks       Based on the above components, the patient evaluation is determined to be of the following complexity level: LOW   Activity Tolerance:   Patient with fair tolerance. Please refer to the flowsheet for vital signs taken during this treatment. After treatment:   [] Patient left in no apparent distress sitting up in chair  [x] Patient left in no apparent distress in bed in chair position  [x] Call bell left within reach  [x] Nursing notified  [x] Caregiver present-  [x] Bed alarm activated    COMMUNICATION/EDUCATION:   The patients plan of care was discussed with: Physical Therapist, Registered Nurse and patient. .  [x] Home safety education was provided and the patient/caregiver indicated understanding. [x] Patient/family have participated as able in goal setting and plan of care. [x] Patient/family agree to work toward stated goals and plan of care. [] Patient understands intent and goals of therapy, but is neutral about his/her participation. [] Patient is unable to participate in goal setting and plan of care. This patients plan of care is appropriate for delegation to [facility].     Thank you for this referral.  Ayleen Age, OTR/L  Time Calculation: 29 mins

## 2018-12-11 NOTE — PROGRESS NOTES
Problem: Dysphagia (Adult)  Goal: *Acute Goals and Plan of Care (Insert Text)  Swallowing goals initiated 12-11-18 (weekly re-eval due 12-18-18)  1) tolerate full liquids without s/s aspiration by 12-13-18  2) SLP will re-eval for solids by 12-14-18  Speech LAnguage Pathology bedside swallow evaluation  Patient: Camilla Connell (2 y.o. female)  Date: 12/11/2018  Primary Diagnosis: Metabolic acidosis [L38.9]  Procedure(s) (LRB):  BRONCHOSCOPY (N/A)  BRONCHIAL WASHINGS FLEXIBLE (Bilateral) 4 Days Post-Op   Precautions: aspiration       ASSESSMENT :  Based on the objective data described below, the patient presents with surprisingly fair to good swallowing, despite her overall weakness and 3 days of intubation. She is ready for small amounts of PO with careful monitoring for s/s aspiration. Patient was admitted 11-30-18 unresponsive, hyponatremic, BAYLEE, metabolic encephalopathy. DX: sepsis from strep, RUE cellulitis, HTN. She had minimal PO due to LOC, and was intubated 12-7-18 to 12-10-18. Also noted to have discitis of C5-6, C6-7. Ortho Recommended c-collar and IV anti-biotics. .Noted to have blood in trachea on bronch, epistaxis. -on AFrin. PMH: DM, chronic back pain, obesity, hypothyroid. Patient will benefit from skilled intervention to address the above impairments. Patients rehabilitation potential is considered to be Fair  Factors which may influence rehabilitation potential include:   []            None noted  [x]            Mental ability/status  [x]            Medical condition  []            Home/family situation and support systems  []            Safety awareness  []            Pain tolerance/management  []            Other:      PLAN :  Recommendations and Planned Interventions:  Full liquids. Watch closely for s/s aspiration. Frequency/Duration: Patient will be followed by speech-language pathology 5 times a week to address goals. Discharge Recommendations:  To Be Determined SUBJECTIVE:   Patient stated Well, I like alcohol.-when asked what she liked to drink    OBJECTIVE:     Past Medical History:   Diagnosis Date    Chronic back pain     Diabetes mellitus, type 2 ([address]     History of vascular access device 12/03/2018    [facility] VAT - 4 FR single, L cephalic, 44 cm for LTA    HTN (hypertension)    History reviewed. No pertinent surgical history. Prior Level of Function/Home Situation:   Home Situation  Home Environment: Private residence  # Steps to Enter: 0(ramp)  Wheelchair Ramp: Yes  One/Two Story Residence: Two story  # of Interior Steps: 13  Living Alone: No  Support Systems: Spouse/Significant Other/Partner  Patient Expects to be Discharged to[de-identified] Private residence  Current DME Used/Available at Home: Wheelchair  Tub or Shower Type: Tub/Shower combination  Diet prior to admission: regular, thins  Current Diet:  NPO , just extubated yesterday  Cognitive and Communication Status:  Neurologic State: Alert, Confused  Orientation Level: Oriented to person  Cognition: Follows commands(slow processing, slow word-retrieval and thought organization)  Perception: Appears intact     Safety/Judgement: Decreased awareness of need for safety, Decreased insight into deficits  Oral Assessment:  Oral Assessment  Labial: No impairment  Dentition: Limited;Natural  Oral Hygiene: WFL  Lingual: No impairment  Mandible: No impairment  P.O. Trials:  Patient Position: upright in bed  Vocal quality prior to P.O.: (mildly strained quality. loud)  Consistency Presented: Ice chips; Thin liquid;Puree  How Presented: SLP-fed/presented;Spoon;Straw   ORAL PHASE:   Bolus Acceptance: No impairment(did not try solids )  Bolus Formation/Control: No impairment     Propulsion: No impairment  Oral Residue: None   PHARYNGEAL PHASE:   Initiation of Swallow: (difficult to palpate.  ?mild delay)  Laryngeal Elevation: (difficult to palpate due to body habitus)  Aspiration Signs/Symptoms: (she has risks for silent aspiration due to 3 days of intubation 4 days ago. )      her voice remained clear and strong  Coughing x1 s/p applesauce, but then ok. Will need to watch closely for s/s aspiration. CXR:  (B) opacities and pleural effusions R>L                    NOMS:   The NOMS functional outcome measure was used to quantify this patient's level of swallowing impairment. Based on the NOMS, the patient was determined to be at level 5 for swallow function     G Codes: In compliance with CMSs Claims Based Outcome Reporting, the following G-code set was chosen for this patient based the use of the NOMS functional outcome to quantify this patient's level of swallowing impairment. Using the NOMS, the patient was determined to be at level 4 for swallow function which correlates with the CJ= 20-39% level of severity. Based on the objective assessment provided within this note, the current, goal, and discharge g-codes are as follows:    Swallow  Swallowing:   Swallow Current Status CJ= 20-39%   Swallow Goal Status CJ= 20-39%      NOMS Swallowing Levels:  Level 1 (CN): NPO  Level 2 (CM): NPO but takes consistency in therapy  Level 3 (CL): Takes less than 50% of nutrition p.o. and continues with nonoral feedings; and/or safe with mod cues; and/or max diet restriction  Level 4 (CK): Safe swallow but needs mod cues; and/or mod diet restriction; and/or still requires some nonoral feeding/supplements  Level 5 (CJ): Safe swallow with min diet restriction; and/or needs min cues  Level 6 (CI): Independent with p.o.; rare cues; usually self cues; may need to avoid some foods or needs extra time  Level 7 ([address] [place] [address]): Independent for all p.o.  OTF. (2003). National Outcomes Measurement System (NOMS): Adult Speech-Language Pathology User's Guide.        Pain:  Pain Scale 1: Numeric (0 - 10)  Pain Intensity 1: 8  Pain Location 1: Back  After treatment:   []            Patient left in no apparent distress sitting up in chair  [] Patient left in no apparent distress in bed  []            Call bell left within reach  []            Nursing notified  []            Caregiver present  []            Bed alarm activated    COMMUNICATION/EDUCATION:   The patients plan of care including recommendations, planned interventions, and recommended diet changes were discussed with: Physical Therapist, Occupational Therapist and Certified Nursing Assistant/Patient Care Technician. RN  Patient was educated regarding Her deficit(s) of potential dysphagia  as this relates to Her diagnosis of recent intubation. .  She demonstrated Guarded understanding as evidenced by confusion, LOC.  understood. .  []            Posted safety precautions in patient's room. [x]            Patient/family have participated as able in goal setting and plan of care. [x]            Patient/family agree to work toward stated goals and plan of care. []            Patient understands intent and goals of therapy, but is neutral about his/her participation. []            Patient is unable to participate in goal setting and plan of care.     Thank you for this referral.  Alfreda Dale SLP  Time Calculation: 15 mins

## 2018-12-11 NOTE — PROGRESS NOTES
[facility] [address] YOB: 1955 Assessment & Plan:  
ARF due to IVVD/sepsis · Creat stable,?new baseline Hypernatremia · On D5W and hypotonic IVF · better Hypokalemia · Improved Sepsis/hypotension · On abx:Meropenem · Bacteremia likely from R arm source HTN 
· Worse · Needs Speech · Add Clodnidine patch Urinary retention · S/p joyce Subjective:  
CC: f/u ARF 
HPI:BAYLEE is stable, cr 1.17 mg. K,Na are better. HTN: IV Hydralazine,BB. Her Clonidine Patch fallen off NPO 
ROS: Unable to obtain due to AMS Current Facility-Administered Medications Medication Dose Route Frequency  HYDROmorphone (PF) (DILAUDID) injection 0.5 mg  0.5 mg IntraVENous Q3H PRN  pantoprazole (PROTONIX) 40 mg in sodium chloride 0.9% 10 mL injection  40 mg IntraVENous DAILY  albuterol (PROVENTIL VENTOLIN) nebulizer solution 2.5 mg  2.5 mg Nebulization Q4H PRN  
 levothyroxine (SYNTHROID) injection 94 mcg  94 mcg IntraVENous Q24H  
 insulin glargine (LANTUS) injection 10 Units  10 Units SubCUTAneous DAILY  dextrose 5% - 0.45% NaCl with KCl 40 mEq/L infusion   IntraVENous CONTINUOUS  
 bisacodyl (DULCOLAX) suppository 10 mg  10 mg Rectal DAILY  insulin lispro (HUMALOG) injection   SubCUTAneous Q6H  
 meropenem (MERREM) 2 g in 0.9% sodium chloride 100 mL IVPB  2 g IntraVENous Q12H  cloNIDine (CATAPRES) 0.3 mg/24 hr patch 1 Patch  1 Patch TransDERmal Q7D  
 metoprolol (LOPRESSOR) injection 2.5 mg  2.5 mg IntraVENous Q6H  
 influenza vaccine 2018-19 (6 mos+)(PF) (FLUARIX QUAD/FLULAVAL QUAD) injection 0.5 mL  0.5 mL IntraMUSCular PRIOR TO DISCHARGE  labetalol (NORMODYNE;TRANDATE) injection 10 mg  10 mg IntraVENous Q4H PRN  
 hydrALAZINE (APRESOLINE) 20 mg/mL injection 20 mg  20 mg IntraVENous Q6H PRN  
 acetaminophen (TYLENOL) suppository 650 mg  650 mg Rectal Q4H PRN  
  alteplase (CATHFLO) 1 mg in sterile water (preservative free) 1 mL injection  1 mg InterCATHeter PRN  
 sodium chloride (NS) flush 10-30 mL  10-30 mL InterCATHeter PRN  
 sodium chloride (NS) flush 10 mL  10 mL InterCATHeter Q24H  
 sodium chloride (NS) flush 10 mL  10 mL InterCATHeter PRN  
 sodium chloride (NS) flush 10-40 mL  10-40 mL InterCATHeter Q8H  
 sodium chloride (NS) flush 20 mL  20 mL InterCATHeter Q24H  
 sodium chloride (NS) flush 5-10 mL  5-10 mL IntraVENous PRN  
 sodium chloride (NS) flush 5-10 mL  5-10 mL IntraVENous Q8H  
 sodium chloride (NS) flush 5-10 mL  5-10 mL IntraVENous PRN  
 acetaminophen (TYLENOL) tablet 650 mg  650 mg Oral Q4H PRN  
 ondansetron (ZOFRAN) injection 4 mg  4 mg IntraVENous Q4H PRN  
 glucose chewable tablet 16 g  4 Tab Oral PRN  
 dextrose (D50W) injection syrg 12.5-25 g  25-50 mL IntraVENous PRN  
 glucagon (GLUCAGEN) injection 1 mg  1 mg IntraMUSCular PRN Objective:  
 
Vitals: 
Blood pressure 195/78, pulse 91, temperature 97.5 °F (36.4 °C), resp. rate 26, height 5' 1\" (1.549 m), weight 93.8 kg (206 lb 12.7 oz), SpO2 94 %. Temp (24hrs), Av.1 °F (36.7 °C), Min:97.5 °F (36.4 °C), Max:98.8 °F (37.1 °C) Intake and Output: 
No intake/output data recorded.  1901 -  0700 In: 3130.1 [I.V.:3130.1] Out: 943 [QPCYR:460] Physical Exam:              
GENERAL ASSESSMENT: AMS 
CHEST: CTA HEART: S1S2 ABDOMEN: Soft,NT 
EXTREMITY: no EDEMA 
 
 
   
ECG/rhythm: 
 
Data Review No results for input(s): TNIPOC in the last 72 hours. No lab exists for component: ITNL No results for input(s): CPK, CKMB, TROIQ in the last 72 hours. Recent Labs 12/10/18 
[id number] 18 
1498  139  
K 3.8 3.1*  
 104 CO2 23 25 BUN 34* 37* CREA 1.17* 1.16* * 203* PHOS 4.1 4.5 MG 1.8 1.7 CA 8.1* 8.4* ALB 1.2* 1.1* WBC 11.0 11.2* HGB 8.5* 8.2* HCT 27.5* 27.1*  
 353 Recent Labs 18 8821 12/08/18 
2308 INR 1.3* 1.4* PTP 13.0* 14.4* APTT  --  31.3 Needs: urine analysis, urine sodium, protein and creatinine Lab Results Component Value Date/Time Sodium,urine random 29 11/30/2018 06:10 PM  
 Creatinine, urine 187.11 11/30/2018 06:10 PM  
 
 
 
 
: Duke Barnard MD 
12/11/2018 [facility]: 
[email or web address] [place] office: 
[place] [address] [place] [address], [facility] [address] [place], [address] [place] [address] Phone: [phone] Fax :     [phone] [place] office: 
[address] [place] [address], [address] [place] [address] Phone - [phone] Fax - [phone]

## 2018-12-11 NOTE — PROGRESS NOTES
[facility] Progress Note           Joanne Worthington DO    [phone] Office  [phone]  Fax    2018      Assessment & Plan:   1. Right elbow cellulitis complicated by GBS bacteremia. Repeat BC's NGSF. Orthopedic surgery is following. S/p joint aspiration by IR . Cultures NGSF. Continue meropenem. 2. Encephalopathy. MS improving. 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 Traumatic tap with LP results abnormal. Meningitis pathogens panel negative. CSF culture NGSF. Lyme CSF and WNV negative. CSF fluid cytology unremarkable. HSV 1&2 PCR negative. MRI brain negative. 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 Cervical spine diskitis. Abx as above. Needs 6 weeks IV abx  4. Acute kidney injury. This is improving    5. Diabetes mellitus. Management per primary team.  6. Rash prior to admission. Resolved. Pictures reviewed with patients . Possible cellulitis? Rash not c/w HSV/VZV. 7.   CoNS isolated from a single blood culture. This represents a contaminant. 8. Hemoptysis. Resolved. ENT following          Subjective:     Mental status much better today    Objective:     Vitals:   Visit Vitals  /67   Pulse 89   Temp 97.5 °F (36.4 °C)   Resp 18   Ht 5' 1\" (1.549 m)   Wt 93.8 kg (206 lb 12.7 oz)   SpO2 95%   BMI 39.07 kg/m²        Tmax:  Temp (24hrs), Av °F (36.7 °C), Min:97.5 °F (36.4 °C), Max:98.8 °F (37.1 °C)      Exam:   Patient is intubated:      Physical Examination:   General:  Awake NAD Follows commands appropriately   Head:  Normocephalic, atraumatic. Eyes:  Conjunctivae clear   Neck: Supple       Lungs:   Clear to auscultation bilaterally. Chest wall:     Heart:  Regular rate and rhythm   Abdomen:   Soft, non-tender, non-distended   Extremities:    Skin:  No rash   Neurologic: CN II-XII GI     Labs:        No lab exists for component: ITNL   No results for input(s): CPK, CKMB, TROIQ in the last 72 hours.   Recent Labs 12/10/18  0352 12/09/18  0459    139   K 3.8 3.1*    104   CO2 23 25   BUN 34* 37*   CREA 1.17* 1.16*   * 203*   PHOS 4.1 4.5   MG 1.8 1.7   ALB 1.2* 1.1*   WBC 11.0 11.2*   HGB 8.5* 8.2*   HCT 27.5* 27.1*    353     Recent Labs     12/09/18 0459   INR 1.3*   PTP 13.0*     Needs: urine analysis, urine sodium, protein and creatinine  Lab Results   Component Value Date/Time    Sodium,urine random 29 11/30/2018 06:10 PM    Creatinine, urine 187.11 11/30/2018 06:10 PM         Cultures:     No results found for: SDES  Lab Results   Component Value Date/Time    Culture result: SCANT NORMAL RESPIRATORY TRIXIE 12/07/2018 01:30 PM    Culture result: MODERATE YEAST (A) 12/07/2018 01:30 PM    Culture result: HEAVY YEAST IDENTIFICATION TO FOLLOW (A) 12/07/2018 01:30 PM       Radiology:     Medications       Current Facility-Administered Medications   Medication Dose Route Frequency Last Dose    metoprolol (LOPRESSOR) injection 5 mg  5 mg IntraVENous Q6H 5 mg at 12/11/18 1211    cloNIDine (CATAPRES) 0.1 mg/24 hr patch 1 Patch  1 Patch TransDERmal Q7D 1 Patch at 12/11/18 1431    HYDROmorphone (PF) (DILAUDID) injection 0.5 mg  0.5 mg IntraVENous Q3H PRN 0.5 mg at 12/11/18 0855    pantoprazole (PROTONIX) 40 mg in sodium chloride 0.9% 10 mL injection  40 mg IntraVENous DAILY 40 mg at 12/11/18 0854    albuterol (PROVENTIL VENTOLIN) nebulizer solution 2.5 mg  2.5 mg Nebulization Q4H PRN 2.5 mg at 12/10/18 1539    levothyroxine (SYNTHROID) injection 94 mcg  94 mcg IntraVENous Q24H 94 mcg at 12/10/18 1830    insulin glargine (LANTUS) injection 10 Units  10 Units SubCUTAneous DAILY 10 Units at 12/11/18 0855    dextrose 5% - 0.45% NaCl with KCl 40 mEq/L infusion   IntraVENous CONTINUOUS      bisacodyl (DULCOLAX) suppository 10 mg  10 mg Rectal DAILY 10 mg at 12/11/18 0900    insulin lispro (HUMALOG) injection   SubCUTAneous Q6H Stopped at 12/09/18 0000    meropenem (MERREM) 2 g in 0.9% sodium chloride 100 mL IVPB  2 g IntraVENous Q12H 2 g at 12/11/18 1201    influenza vaccine 2018-19 (6 mos+)(PF) (FLUARIX QUAD/FLULAVAL QUAD) injection 0.5 mL  0.5 mL IntraMUSCular PRIOR TO DISCHARGE      labetalol (NORMODYNE;TRANDATE) injection 10 mg  10 mg IntraVENous Q4H PRN 10 mg at 12/11/18 1438    hydrALAZINE (APRESOLINE) 20 mg/mL injection 20 mg  20 mg IntraVENous Q6H PRN 20 mg at 12/11/18 0855    acetaminophen (TYLENOL) suppository 650 mg  650 mg Rectal Q4H  mg at 12/05/18 0044    alteplase (CATHFLO) 1 mg in sterile water (preservative free) 1 mL injection  1 mg InterCATHeter PRN      sodium chloride (NS) flush 10-30 mL  10-30 mL InterCATHeter PRN      sodium chloride (NS) flush 10 mL  10 mL InterCATHeter Q24H 10 mL at 12/11/18 1021    sodium chloride (NS) flush 10 mL  10 mL InterCATHeter PRN 10 mL at 12/10/18 2026    sodium chloride (NS) flush 10-40 mL  10-40 mL InterCATHeter Q8H 10 mL at 12/11/18 0540    sodium chloride (NS) flush 20 mL  20 mL InterCATHeter Q24H 20 mL at 12/11/18 1021    sodium chloride (NS) flush 5-10 mL  5-10 mL IntraVENous PRN 10 mL at 12/01/18 1909    sodium chloride (NS) flush 5-10 mL  5-10 mL IntraVENous Q8H 10 mL at 12/11/18 0540    sodium chloride (NS) flush 5-10 mL  5-10 mL IntraVENous PRN      acetaminophen (TYLENOL) tablet 650 mg  650 mg Oral Q4H  mg at 12/03/18 1815    ondansetron (ZOFRAN) injection 4 mg  4 mg IntraVENous Q4H PRN 4 mg at 12/06/18 0818    glucose chewable tablet 16 g  4 Tab Oral PRN      dextrose (D50W) injection syrg 12.5-25 g  25-50 mL IntraVENous PRN 25 g at 12/07/18 0653    glucagon (GLUCAGEN) injection 1 mg  1 mg IntraMUSCular PRN             Case discussed with: CC and       Armond Childers, DO

## 2018-12-11 NOTE — PROGRESS NOTES
[facility]  [address] [facility], [place], [address] [place] [address]  [phone]      Medical Progress Note      NAME: Kadie Mathis   :  1955  MRM:  [id number]    Date/Time: 2018          Subjective:     Chief Complaint:  Pt non-verbal    Pt seen and examined. Remains confused. Objective:       Vitals:        Last 24hrs VS reviewed since prior progress note. Most recent are:    Visit Vitals  BP (!) 195/91 (BP 1 Location: Left leg)   Pulse 89   Temp 97.5 °F (36.4 °C)   Resp 20   Ht 5' 1\" (1.549 m)   Wt 93.8 kg (206 lb 12.7 oz)   SpO2 96%   BMI 39.07 kg/m²     SpO2 Readings from Last 6 Encounters:   18 96%    O2 Flow Rate (L/min): 4 l/min       Intake/Output Summary (Last 24 hours) at 2018 1349  Last data filed at 2018 1201  Gross per 24 hour   Intake 2021.25 ml   Output 100 ml   Net 1921.25 ml          Exam:     Physical Exam:    Gen:  Elderly, chronically ill-appearing, morbidly obese, NAD  HEENT:  Pink conjunctivae  Neck:  Supple, without masses  Resp:  No accessory muscle use, Diminished BS at bases with few coarse breath sounds. Ventilated breath sounds  Card:  RRR, no murmurs, normal S1, S2 without thrills, bruits   Abd:  Soft, non-tender, non-distended but obese, normoactive bowel sounds are present  Musc:  No cyanosis or clubbing. Notable BLE muscular atrophy  Skin:  Right elbow swelling, erythema  Neuro: PERRL. Non verbal. Able to move all extremities.    Psych: Poor insight   Diffuse anasarca     Medications Reviewed: (see below)    Lab Data Reviewed: (see below)    ______________________________________________________________________    Medications:     Current Facility-Administered Medications   Medication Dose Route Frequency    metoprolol (LOPRESSOR) injection 5 mg  5 mg IntraVENous Q6H    cloNIDine (CATAPRES) 0.1 mg/24 hr patch 1 Patch  1 Patch TransDERmal Q7D    HYDROmorphone (PF) (DILAUDID) injection 0.5 mg  0.5 mg IntraVENous Q3H PRN  pantoprazole (PROTONIX) 40 mg in sodium chloride 0.9% 10 mL injection  40 mg IntraVENous DAILY    albuterol (PROVENTIL VENTOLIN) nebulizer solution 2.5 mg  2.5 mg Nebulization Q4H PRN    levothyroxine (SYNTHROID) injection 94 mcg  94 mcg IntraVENous Q24H    insulin glargine (LANTUS) injection 10 Units  10 Units SubCUTAneous DAILY    dextrose 5% - 0.45% NaCl with KCl 40 mEq/L infusion   IntraVENous CONTINUOUS    bisacodyl (DULCOLAX) suppository 10 mg  10 mg Rectal DAILY    insulin lispro (HUMALOG) injection   SubCUTAneous Q6H    meropenem (MERREM) 2 g in 0.9% sodium chloride 100 mL IVPB  2 g IntraVENous Q12H    influenza vaccine 2018-19 (6 mos+)(PF) (FLUARIX QUAD/FLULAVAL QUAD) injection 0.5 mL  0.5 mL IntraMUSCular PRIOR TO DISCHARGE    labetalol (NORMODYNE;TRANDATE) injection 10 mg  10 mg IntraVENous Q4H PRN    hydrALAZINE (APRESOLINE) 20 mg/mL injection 20 mg  20 mg IntraVENous Q6H PRN    acetaminophen (TYLENOL) suppository 650 mg  650 mg Rectal Q4H PRN    alteplase (CATHFLO) 1 mg in sterile water (preservative free) 1 mL injection  1 mg InterCATHeter PRN    sodium chloride (NS) flush 10-30 mL  10-30 mL InterCATHeter PRN    sodium chloride (NS) flush 10 mL  10 mL InterCATHeter Q24H    sodium chloride (NS) flush 10 mL  10 mL InterCATHeter PRN    sodium chloride (NS) flush 10-40 mL  10-40 mL InterCATHeter Q8H    sodium chloride (NS) flush 20 mL  20 mL InterCATHeter Q24H    sodium chloride (NS) flush 5-10 mL  5-10 mL IntraVENous PRN    sodium chloride (NS) flush 5-10 mL  5-10 mL IntraVENous Q8H    sodium chloride (NS) flush 5-10 mL  5-10 mL IntraVENous PRN    acetaminophen (TYLENOL) tablet 650 mg  650 mg Oral Q4H PRN    ondansetron (ZOFRAN) injection 4 mg  4 mg IntraVENous Q4H PRN    glucose chewable tablet 16 g  4 Tab Oral PRN    dextrose (D50W) injection syrg 12.5-25 g  25-50 mL IntraVENous PRN    glucagon (GLUCAGEN) injection 1 mg  1 mg IntraMUSCular PRN            Lab Review: Recent Labs     12/10/18  0352 12/09/18  0459   WBC 11.0 11.2*   HGB 8.5* 8.2*   HCT 27.5* 27.1*    353     Recent Labs     12/10/18  0352 12/09/18  0459    139   K 3.8 3.1*    104   CO2 23 25   * 203*   BUN 34* 37*   CREA 1.17* 1.16*   CA 8.1* 8.4*   MG 1.8 1.7   PHOS 4.1 4.5   ALB 1.2* 1.1*   SGOT 41* 35   ALT 20 15   INR  --  1.3*     No components found for: [place]  Recent Labs     12/10/18  0603 12/09/18  0613   PH 7.48* 7.51*   PCO2 31* 32*   PO2 105* 82   HCO3 22 24   FIO2 40 40     Recent Labs     12/09/18 0459   INR 1.3*     No results found for: SDES  Lab Results   Component Value Date/Time    Culture result: SCANT NORMAL RESPIRATORY TRIXIE 12/07/2018 01:30 PM    Culture result: MODERATE YEAST (A) 12/07/2018 01:30 PM    Culture result: HEAVY YEAST IDENTIFICATION TO FOLLOW (A) 12/07/2018 01:30 PM              ___     Assessment / Plan:   62 yo WF w/ hx of HTN, DM, chronic pain, presenting with AMS and weakness, found to have severe sepsis, bacteremia, and ARF    C-spine discitis: prelim MRI showed evidence of discitis at C5-C6 and C6-C7, with abnormal enhancement of the vertebral bodies. There is enhancing phlegmon in the epidural space. No drainable abscess  -s/p MRI c/l/t spine completed   -ortho on board   -appreciate ID eval; on meropenem     Acute metabolic encephalopathy: unclear etiology. ?medications and relation to the diskitis   -head CT, MRI brain without acute process   -TSH, B12/folate, ammonia WNL   -s/p LP; not c/w bacterial meningitis. HSV neg. -EEG negative for seizure activity   -RPR negative, West Nile negative, HIV negative   -Lyme IgG positive but IgM negative; will defer to ID whether doxy needs to be started     Hemoptysis vs epistaxis: sudden onset 12/7. With bright red blood and clots suctioned from upper airway. Intubated for airway protection.   Underwent bronchoscopy which showed large amount of blood in the trachea, however past paula only scants amount of blood in RLL. Some thick yellow/white secretions in LLL and RLL. All lobes/subsegments inspected with no signs of bleeding  -ENT on board   -JACQUELINE negative   -ANCA pending      Severe sepsis/bacteremia: Blood Cx + 4/4 bottles with GBS on admission, repeat blood Cx NGTD. With R arm/elbow cellulitis and now evidence of discitis. No e/o valvular veg on TTE  -on meropenem as per ID      R elbow cellulitis/bursitis/acute pain: s/p joint aspiration by IR on 12/6  -cultures negative to date   -on meropenem as above      ARF: likely from ATN/sepsis, hypovolemia  -monitor      AG metabolic acidosis: resolved. Due to sepsis     Hypernatremia: resolved      DM type 2 w/ hypoglycemia: A1C 7.2%. ISS      HTN: continue clonidine and metoprolol. Change to PO when able to tolerate      Anemia: Fe studies consistent with chronic dz.  Monitor     Total time spent: 35 minutes                  Care Plan discussed with: Patient, Nursing Staff and >50% of time spent in counseling and coordination of care    Discussed:  Care Plan    Prophylaxis:  SCD's     Disposition:  TBD  ________________________________________________    Attending Physician: Ruth Simmons MD

## 2018-12-11 NOTE — PROGRESS NOTES
Problem: Falls - Risk of 
Goal: *Absence of Falls Document Zi Anderson Fall Risk and appropriate interventions in the flowsheet. Outcome: Progressing Towards Goal 
Fall Risk Interventions: 
Mobility Interventions: Communicate number of staff needed for ambulation/transfer Mentation Interventions: Adequate sleep, hydration, pain control, Door open when patient unattended, Evaluate medications/consider consulting pharmacy, More frequent rounding, Reorient patient, Room close to nurse's station, Toileting rounds, Update white board Medication Interventions: Evaluate medications/consider consulting pharmacy Elimination Interventions: Call light in reach, Patient to call for help with toileting needs, Toileting schedule/hourly rounds Problem: Pressure Injury - Risk of 
Goal: *Prevention of pressure injury Document Nikolay Scale and appropriate interventions in the flowsheet. Outcome: Progressing Towards Goal 
Pressure Injury Interventions: 
Sensory Interventions: Assess changes in LOC, Check visual cues for pain, Float heels, Keep linens dry and wrinkle-free, Minimize linen layers, Pad between skin to skin, Pressure redistribution bed/mattress (bed type), Turn and reposition approx. every two hours (pillows and wedges if needed) Moisture Interventions: Absorbent underpads, Apply protective barrier, creams and emollients, Check for incontinence Q2 hours and as needed, Internal/External urinary devices, Maintain skin hydration (lotion/cream), Minimize layers, Moisture barrier Activity Interventions: Pressure redistribution bed/mattress(bed type) Mobility Interventions: Float heels, HOB 30 degrees or less, Pressure redistribution bed/mattress (bed type), Turn and reposition approx. every two hours(pillow and wedges) Nutrition Interventions: Document food/fluid/supplement intake Friction and Shear Interventions: Apply protective barrier, creams and emollients, HOB 30 degrees or less, Lift sheet, Lift team/patient mobility team, Minimize layers

## 2018-12-11 NOTE — PROGRESS NOTES
0710: Bedside shift change report given to Angeline Fish RN (oncoming nurse) by Cleopatra Martinez RN (offgoing nurse). Report included the following information SBAR, Intake/Output, MAR, Accordion, Recent Results, Med Rec Status and Cardiac Rhythm NSR. Chart, orders, labs reviewed. ITRACE complete. 0800: Assessment complete see flowsheet. 0855: /77, medicated patient with dilaudid 0.5mg for c/o \"back pain. \" Medicated with PRN hydralazine see MAR. 
1025: Patient has not voided this shift. Bladder scanned patient, 427ml noted. Notified Dr. Timur Barajas. Will re scan patient in [address] and place joyce if patient has not voided and has greater than or equal to 427ml on scan. Medicated patient with PRN labetalol for bp 170/67.  
1130: BP remains elevated. Notified Dr. Timur Barajas and Dr. Silvino Zuniga. Order for clonidine patch received, order to increase IV metoprolol received. 1200: Re assessment complete see flowsheet. 1400: SBP remains 160's to 170's. Notified Dr. Timur Barajas. Plan is to give clonidine patch more time to become effective. 1600: Re assessment complete see flowsheet. 1900: Bedside shift change report given to Yvone Sandifer, RN (oncoming nurse) by Angeline Fish RN (offgoing nurse). Report included the following information SBAR, Procedure Summary, Intake/Output, MAR, Accordion, Recent Results, Med Rec Status and Cardiac Rhythm NSR.

## 2018-12-11 NOTE — PROGRESS NOTES
Problem: Mobility Impaired (Adult and Pediatric)  Goal: *Acute Goals and Plan of Care (Insert Text)  Physical Therapy Goals  Initiated 12/11/2018  1. Patient will move from supine to sit and sit to supine , scoot up and down and roll side to side in bed with moderate assistance  within 7 day(s). 2.  Patient will transfer from bed to chair and chair to bed with moderate assistance  using the least restrictive device within 7 day(s). 3.  Patient will perform sit to stand with moderate assistance  within 7 day(s). 4.  Patient will ambulate with maximal assistance for 20 feet with the least restrictive device within 7 day(s). physical Therapy EVALUATION  Patient: Anthony Le (49 y.o. female)  Date: 12/11/2018  Primary Diagnosis: Metabolic acidosis [E13.7]  Procedure(s) (LRB):  BRONCHOSCOPY (N/A)  BRONCHIAL WASHINGS FLEXIBLE (Bilateral) 4 Days Post-Op   Precautions: fall       ASSESSMENT :  Based on the objective data described below, the patient presents with significantly decreased activity tolerance following admission on 11/30 for AMS, weakness, and severe spesis. Patient with complex, prolonged hospital course including intubation on 12/7, extubation on 12/10, brain MRI negative for acute process, cervical MRI revealing C5-C7 diskitis, and R UE/elbow cellulitis that required joint aspiration by IR on 12/6. She is slow to process both with verbal and physical response. Patient's  was present, supportive, and providing all background information. Per , patient was independent with all care and driving PTA. He does state she has a history of back pain that did limit her distance and with further questioning states she was sedentary for several days leading up to admission where she presented with significant weakness. Place bed to chair position performed some active range of motion exercise on both LE. Patient able to dorsi flex and plantar flex both feet and wiggle toes.  No foot drop noted today. Patient remained on bed to chair position after therapy. Nurse aware and monitoring patient. Patient will benefit from skilled intervention to address the above impairments. Patients rehabilitation potential is considered to be Excellent  Factors which may influence rehabilitation potential include:   []         None noted  [x]         Mental ability/status  [x]         Medical condition  []         Home/family situation and support systems  []         Safety awareness  [x]         Pain tolerance/management  []         Other:      PLAN :  Recommendations and Planned Interventions:  [x]           Bed Mobility Training             []    Neuromuscular Re-Education  [x]           Transfer Training                   []    Orthotic/Prosthetic Training  [x]           Gait Training                         []    Modalities  [x]           Therapeutic Exercises           []    Edema Management/Control  [x]           Therapeutic Activities            []    Patient and Family Training/Education  []           Other (comment):    Frequency/Duration: Patient will be followed by physical therapy  5 times a week to address goals. Discharge Recommendations: Rehab  Further Equipment Recommendations for Discharge: TBD     SUBJECTIVE:   Patient stated Max Ramal.     OBJECTIVE DATA SUMMARY:   HISTORY:    Past Medical History:   Diagnosis Date    Chronic back pain     Diabetes mellitus, type 2 ([address]     History of vascular access device 12/03/2018    [facility] VAT - 4 FR single, L cephalic, 44 cm for LTA    HTN (hypertension)    History reviewed. No pertinent surgical history. Prior Level of Function/Home Situation: Independent community ambulator without assistive device.   Personal factors and/or comorbidities impacting plan of care:     Home Situation  Home Environment: Private residence  # Steps to Enter: 0(ramp)  Wheelchair Ramp: Yes  One/Two Story Residence: Two story  # of Interior Steps: 13  Living Alone: No  Support Systems: Spouse/Significant Other/Partner  Patient Expects to be Discharged to[de-identified] Private residence  Current DME Used/Available at Home: Wheelchair  Tub or Shower Type: Tub/Shower combination    EXAMINATION/PRESENTATION/DECISION MAKING:   Critical Behavior:  Neurologic State: Alert  Orientation Level: Oriented to person, Disoriented to situation, Disoriented to time, Disoriented to place  Cognition: Decreased command following, Decreased attention/concentration, Poor safety awareness  Safety/Judgement: Decreased awareness of environment  Hearing: Auditory  Auditory Impairment: None    Range Of Motion:  AROM: Generally decreased, functional           PROM: Generally decreased, functional           Strength:    Strength: Generally decreased, functional                    Tone & Sensation:                                  Coordination:  Coordination: Generally decreased, functional  Vision:   Tracking: Able to track stimulus in all quadrants w/o difficulty  Diplopia: No  Functional Mobility:  Bed Mobility:  Rolling: Maximum assistance;Assist x2  Supine to Sit: Total assistance(bed placed in chair position)        Transfers:                             Balance:   Sitting: Impaired; With support  Sitting - Static: Poor (constant support)  Sitting - Dynamic: Poor (constant support)  Ambulation/Gait Training:             Therapeutic Exercises:    Instructed patient to continue active range of motion exercise on both legs while up on bed. Functional Measure:  Barthel Index:    Bathin  Bladder: 0  Bowels: 0  Groomin  Dressin  Feedin  Mobility: 0  Stairs: 0  Toilet Use: 0  Transfer (Bed to Chair and Back): 0  Total: 0       Barthel and G-code impairment scale:  Percentage of impairment CH  0% CI  1-19% CJ  20-39% CK  40-59% CL  60-79% CM  80-99% CN  100%   Barthel Score 0-100 100 99-80 79-60 59-40 20-39 1-19   0   Barthel Score 0-20 20 17-19 13-16 9-12 5-8 1-4 0      The Barthel ADL Index: Guidelines  1. The index should be used as a record of what a patient does, not as a record of what a patient could do. 2. The main aim is to establish degree of independence from any help, physical or verbal, however minor and for whatever reason. 3. The need for supervision renders the patient not independent. 4. A patient's performance should be established using the best available evidence. Asking the patient, friends/relatives and nurses are the usual sources, but direct observation and common sense are also important. However direct testing is not needed. 5. Usually the patient's performance over the preceding 24-48 hours is important, but occasionally longer periods will be relevant. 6. Middle categories imply that the patient supplies over 50 per cent of the effort. 7. Use of aids to be independent is allowed. Collin Perez., Barthel, D.W. (5925). Functional evaluation: the Barthel Index. [address] [facility] (14)2. SELENA BarkerF, Maddy Kerr., Derek Arredondo., Laurence [facility], [address] [place] [address] (1999). Measuring the change indisability after inpatient rehabilitation; comparison of the responsiveness of the Barthel Index and Functional [place] Measure. Journal of Neurology, Neurosurgery, and Psychiatry, 66(4), 531-794. Rod Garza, N.J.A, BRYANNA Alegria.J.M, & Ambrocio Daniels M.A. (2004.) Assessment of post-stroke quality of life in cost-effectiveness studies: The usefulness of the Barthel Index and the EuroQoL-5D. Quality of Life Research, 13, 082-03       G codes: In compliance with CMSs Claims Based Outcome Reporting, the following G-code set was chosen for this patient based on their primary functional limitation being treated: The outcome measure chosen to determine the severity of the functional limitation was the barthel with a score of 0/100 which was correlated with the impairment scale.     ? Mobility - Walking and Moving Around:     - CURRENT STATUS: CN - 100% impaired, limited or restricted    - GOAL STATUS: CM - 80%-99% impaired, limited or restricted    - D/C STATUS:  ---------------To be determined---------------      Physical Therapy Evaluation Charge Determination   History Examination Presentation Decision-Making   LOW Complexity : Zero comorbidities / personal factors that will impact the outcome / POC HIGH Complexity : 4+ Standardized tests and measures addressing body structure, function, activity limitation and / or participation in recreation  HIGH Complexity : Unstable and unpredictable characteristics  Other outcome measures barthel  HIGH       Based on the above components, the patient evaluation is determined to be of the following complexity level: HIGH     Pain:  Pain Scale 1: Numeric (0 - 10)  Pain Intensity 1: 0  Pain Location 1: Back     Pain Description 1: Aching  Pain Intervention(s) 1: Medication (see MAR)  Activity Tolerance:   Fair. Please refer to the flowsheet for vital signs taken during this treatment. After treatment:   []         Patient left in no apparent distress sitting up in chair  [x]         Patient left in no apparent distress in bed  [x]         Call bell left within reach  [x]         Nursing notified  [x]         Caregiver present  [x]         Bed alarm activated    COMMUNICATION/EDUCATION:   The patients plan of care was discussed with: Occupational Therapist, Registered Nurse and patinet. [x]         Fall prevention education was provided and the patient/caregiver indicated understanding. [x]         Patient/family have participated as able in goal setting and plan of care. [x]         Patient/family agree to work toward stated goals and plan of care. []         Patient understands intent and goals of therapy, but is neutral about his/her participation. []         Patient is unable to participate in goal setting and plan of care. Thank you for this referral.  David Bryan, PT,WCC.    Time Calculation: 27 mins

## 2018-12-11 NOTE — CONSULTS
[facility]     Name: Cesar Vogt MRN: [id number]   : 1955 Hospital: [address]   Date: 2018        Impression Plan   1. Acute respiratory failure  2. Acute onset hemoptysis  3. AMS  4. Betahemolytic strep bacteremia  5. Infected elbow- secondary infection? 6. Hypoxia  7. Leukocytosis  8. Hypernatremia  9.                · Wean O2 for goal sats >90  · Afrin for nose bleed per Dr. Solis Childress  · Continue Meropenem, ID following  · Brain MRI negative, MRI cervical neck C5-C7 diskitis, no evidence of abscess or thoracic discitis/osteo  · Will increase scheduled beta blocker  · PT/OT  · Hold proph AC  · SCDs  · Peridex  · PPI  · Speech has evaluated, full liquid diet    Stable for transfer out of the ICU       Pt is critically ill. Critical care time spent with pt exclusive of procedures was 36 minutes. Pt at risk for further decline due to hemoptysis and respiratory failure    Radiology  ( personally reviewed) CXR reviewed: - no infiltrates  CXR - no infiltrates, right hemidiaphragm elevation   ABG No results for input(s): PHI, PO2I, PCO2I in the last 72 hours. Subjective     Overnight events:  More interactive today, can tell me her name that she is at [facility]. Having urinary rentention, straight cathed overnight. Past Medical History:   Diagnosis Date    Chronic back pain     Diabetes mellitus, type 2 ([facility] [address]     History of vascular access device 2018    [facility] VAT - 4 FR single, L cephalic, 44 cm for LTA    HTN (hypertension)       History reviewed. No pertinent surgical history. Prior to Admission medications    Medication Sig Start Date End Date Taking? Authorizing Provider   atenolol (TENORMIN) 25 mg tablet Take 25 mg by mouth daily. Yes Provider, Historical   cyclobenzaprine (FLEXERIL) 10 mg tablet Take 10 mg by mouth daily as needed for Muscle Spasm(s).    Yes Provider, Historical   fenofibrate (LOFIBRA) 160 mg tablet Take 160 mg by mouth nightly. Yes Provider, Historical   gabapentin (NEURONTIN) 300 mg capsule Take 300 mg by mouth three (3) times daily. Yes Provider, Historical   insulin glargine (LANTUS,BASAGLAR) 100 unit/mL (3 mL) inpn 75 Units by SubCUTAneous route nightly. Yes Provider, Historical   levothyroxine (SYNTHROID) 125 mcg tablet Take 125 mcg by mouth Daily (before breakfast). Yes Provider, Historical   lisinopril-hydroCHLOROthiazide (PRINZIDE, ZESTORETIC) 20-12.5 mg per tablet Take 2 Tabs by mouth daily. Yes Provider, Historical   metFORMIN (GLUCOPHAGE) 500 mg tablet Take 500 mg by mouth two (2) times daily (with meals). Yes Provider, Historical   niacin ER (NIASPAN) 500 mg tablet Take 500 mg by mouth nightly. Yes Provider, Historical   omega 3-DHA-EPA-fish oil 1,000 mg (120 mg-180 mg) capsule Take 2 Caps by mouth two (2) times a day. Yes Provider, Historical   oxyCODONE IR (ROXICODONE) 5 mg immediate release tablet Take 5 mg by mouth daily as needed (breakthrough pain). Yes Provider, Historical   oxyCODONE ER (XTAMPZA ER) 18 mg ER capsule Take 18 mg by mouth every twelve (12) hours. Yes Provider, Historical   sertraline (ZOLOFT) 100 mg tablet Take 150 mg by mouth nightly. Yes Provider, Historical   simvastatin (ZOCOR) 40 mg tablet Take 40 mg by mouth nightly. Yes Provider, Historical   triamcinolone acetonide (KENALOG) 0.1 % topical cream Apply  to affected area two (2) times a day. use thin layer   Yes Provider, Historical   multivit-min-FA-lycopen-lutein (CENTRUM SILVER) 0.4-300-250 mg-mcg-mcg tab Take 1 Tab by mouth daily. Yes Provider, Historical   OTHER,NON-FORMULARY, Take 1 Tab by mouth two (2) times a day. Osteo Biflex   Yes Provider, Historical   aspirin delayed-release 81 mg tablet Take 81 mg by mouth two (2) times a day. Yes Provider, Historical   ibuprofen (MOTRIN) 200 mg tablet Take 400 mg by mouth two (2) times a day.    Yes Provider, Historical   ubidecarenone (COENZYME Q10, BULK,) Take 1 Tab by mouth daily. Yes Provider, Historical     Current Facility-Administered Medications   Medication Dose Route Frequency    metoprolol (LOPRESSOR) injection 5 mg  5 mg IntraVENous Q6H    cloNIDine (CATAPRES) 0.1 mg/24 hr patch 1 Patch  1 Patch TransDERmal Q7D    pantoprazole (PROTONIX) 40 mg in sodium chloride 0.9% 10 mL injection  40 mg IntraVENous DAILY    levothyroxine (SYNTHROID) injection 94 mcg  94 mcg IntraVENous Q24H    insulin glargine (LANTUS) injection 10 Units  10 Units SubCUTAneous DAILY    dextrose 5% - 0.45% NaCl with KCl 40 mEq/L infusion   IntraVENous CONTINUOUS    bisacodyl (DULCOLAX) suppository 10 mg  10 mg Rectal DAILY    insulin lispro (HUMALOG) injection   SubCUTAneous Q6H    meropenem (MERREM) 2 g in 0.9% sodium chloride 100 mL IVPB  2 g IntraVENous Q12H    influenza vaccine 2018-19 (6 mos+)(PF) (FLUARIX QUAD/FLULAVAL QUAD) injection 0.5 mL  0.5 mL IntraMUSCular PRIOR TO DISCHARGE    sodium chloride (NS) flush 10 mL  10 mL InterCATHeter Q24H    sodium chloride (NS) flush 10-40 mL  10-40 mL InterCATHeter Q8H    sodium chloride (NS) flush 20 mL  20 mL InterCATHeter Q24H    sodium chloride (NS) flush 5-10 mL  5-10 mL IntraVENous Q8H     No Known Allergies   Social History     Tobacco Use    Smoking status: Never Smoker    Smokeless tobacco: Never Used   Substance Use Topics    Alcohol use: No     Frequency: Never      Family History   Problem Relation Age of Onset    Diabetes Father     Heart Disease Father           Laboratory: I have personally reviewed the critical care flowsheet and labs.      Recent Labs     12/10/18  0352 12/09/18 0459   WBC 11.0 11.2*   HGB 8.5* 8.2*   HCT 27.5* 27.1*    353     Recent Labs     12/10/18  0352 12/09/18  0459    139   K 3.8 3.1*    104   CO2 23 25   * 203*   BUN 34* 37*   CREA 1.17* 1.16*   CA 8.1* 8.4*   MG 1.8 1.7   PHOS 4.1 4.5   ALB 1.2* 1.1*   SGOT 41* 35   ALT 20 15   INR  --  1.3*       Objective: Mode Rate Tidal Volume Pressure FiO2 PEEP   Spontaneous   450 ml  5 cm H2O 40 % 5 cm H20     Vital Signs:    Ventilator Pressures  Pressure Support (cm H2O): 5 cm H2O  PIP Observed (cm H2O): 10 cm H2O  Plateau Pressure (cm H2O): 17 cm H2O  MAP (cm H2O): 7.4  PEEP/VENT (cm H2O): 5 cm H20  Auto PEEP Observed (cm H2O): 0 cm [id number](24)Ventilator Pressures  Pressure Support (cm H2O): 5 cm H2O  PIP Observed (cm H2O): 10 cm H2O  Plateau Pressure (cm H2O): 17 cm H2O  MAP (cm H2O): 7.4  PEEP/VENT (cm H2O): 5 cm H20  Auto PEEP Observed (cm H2O): 0 cm H2O  Safety & Alarms  Circuit Temperature: 97.9 °F (36.6 °C)  Backup Mode Checked/Apnea: Yes  Pressure Max: 40 cm H2O  Pressure Min: 12 cm H2O  Ve Min: 2  Ve Max: 20  Vt Min: 200 ml  Vt Max: 1000 ml  RR Min: 12  RR Max: 40  Intake/Output:   Last shift:      Ventilator Volumes  Vt Set (ml): 450 ml  Vt Exhaled (Machine Breath) (ml): 478 ml  Vt Spont (ml): 424 ml  Ve Observed (l/min): 7.8 l/min  Last 3 shifts: 12/11 0701 - 12/11 1900  In: 325 [I.V.:325]  Out: 0 RRIOLAST3    Intake/Output Summary (Last 24 hours) at 12/11/2018 1406  Last data filed at 12/11/2018 1201  Gross per 24 hour   Intake 2021.25 ml   Output 100 ml   Net 1921.25 ml     EXAM:   GENERAL: Intubated, RASS -2 HEENT:  PERRL, EOMI, no alar flaring or epistaxis, bright red blood trickling from right nostril, NECK:  no jugular vein distention, no retractions, no thyromegaly or masses, LUNGS:CTA, no w/r/r HEART:  Regular rate and rhythm with no MGR; trace edema is present, ABDOMEN:  soft with no tenderness, bowel sounds present, EXTREMITIES:  warm with no cyanosis, SKIN: pale, no jaundice or ecchymosis and NEUROLOGIC: opens eyes to voice, tracks, does not follow commands    Estefany Rubin MD  [facility]

## 2018-12-12 LAB
ANION GAP SERPL CALC-SCNC: 11 MMOL/L (ref 5–15)
BACTERIA SPEC CULT: ABNORMAL
BACTERIA SPEC CULT: ABNORMAL
BASOPHILS # BLD: 0.1 K/UL (ref 0–0.1)
BASOPHILS NFR BLD: 1 % (ref 0–1)
BNP SERPL-MCNC: 2159 PG/ML (ref 0–125)
BUN SERPL-MCNC: 24 MG/DL (ref 6–20)
BUN/CREAT SERPL: 27 (ref 12–20)
CALCIUM SERPL-MCNC: 8.5 MG/DL (ref 8.5–10.1)
CHLORIDE SERPL-SCNC: 110 MMOL/L (ref 97–108)
CO2 SERPL-SCNC: 22 MMOL/L (ref 21–32)
CREAT SERPL-MCNC: 0.89 MG/DL (ref 0.55–1.02)
DIFFERENTIAL METHOD BLD: ABNORMAL
EOSINOPHIL # BLD: 0.7 K/UL (ref 0–0.4)
EOSINOPHIL NFR BLD: 8 % (ref 0–7)
ERYTHROCYTE [DISTWIDTH] IN BLOOD BY AUTOMATED COUNT: 14.5 % (ref 11.5–14.5)
GLUCOSE BLD STRIP.AUTO-MCNC: 119 MG/DL (ref 65–100)
GLUCOSE BLD STRIP.AUTO-MCNC: 126 MG/DL (ref 65–100)
GLUCOSE BLD STRIP.AUTO-MCNC: 141 MG/DL (ref 65–100)
GLUCOSE BLD STRIP.AUTO-MCNC: 187 MG/DL (ref 65–100)
GLUCOSE SERPL-MCNC: 143 MG/DL (ref 65–100)
HCT VFR BLD AUTO: 25.5 % (ref 35–47)
HGB BLD-MCNC: 7.6 G/DL (ref 11.5–16)
IMM GRANULOCYTES # BLD: 0.2 K/UL (ref 0–0.04)
IMM GRANULOCYTES NFR BLD AUTO: 2 % (ref 0–0.5)
LYMPHOCYTES # BLD: 0.7 K/UL (ref 0.8–3.5)
LYMPHOCYTES NFR BLD: 7 % (ref 12–49)
MAGNESIUM SERPL-MCNC: 1.8 MG/DL (ref 1.6–2.4)
MCH RBC QN AUTO: 27.2 PG (ref 26–34)
MCHC RBC AUTO-ENTMCNC: 29.8 G/DL (ref 30–36.5)
MCV RBC AUTO: 91.4 FL (ref 80–99)
MONOCYTES # BLD: 0.8 K/UL (ref 0–1)
MONOCYTES NFR BLD: 9 % (ref 5–13)
NEUTS SEG # BLD: 6.9 K/UL (ref 1.8–8)
NEUTS SEG NFR BLD: 74 % (ref 32–75)
NRBC # BLD: 0 K/UL (ref 0–0.01)
NRBC BLD-RTO: 0 PER 100 WBC
PLATELET # BLD AUTO: 389 K/UL (ref 150–400)
PMV BLD AUTO: 9.9 FL (ref 8.9–12.9)
POTASSIUM SERPL-SCNC: 4.6 MMOL/L (ref 3.5–5.1)
RBC # BLD AUTO: 2.79 M/UL (ref 3.8–5.2)
SERVICE CMNT-IMP: ABNORMAL
SODIUM SERPL-SCNC: 143 MMOL/L (ref 136–145)
WBC # BLD AUTO: 9.2 K/UL (ref 3.6–11)

## 2018-12-12 PROCEDURE — 92526 ORAL FUNCTION THERAPY: CPT

## 2018-12-12 PROCEDURE — 85025 COMPLETE CBC W/AUTO DIFF WBC: CPT

## 2018-12-12 PROCEDURE — 74011000258 HC RX REV CODE- 258: Performed by: INTERNAL MEDICINE

## 2018-12-12 PROCEDURE — C9113 INJ PANTOPRAZOLE SODIUM, VIA: HCPCS | Performed by: INTERNAL MEDICINE

## 2018-12-12 PROCEDURE — 74011250636 HC RX REV CODE- 250/636: Performed by: INTERNAL MEDICINE

## 2018-12-12 PROCEDURE — 82530 CORTISOL FREE: CPT

## 2018-12-12 PROCEDURE — 80048 BASIC METABOLIC PNL TOTAL CA: CPT

## 2018-12-12 PROCEDURE — 74011250637 HC RX REV CODE- 250/637: Performed by: INTERNAL MEDICINE

## 2018-12-12 PROCEDURE — 82962 GLUCOSE BLOOD TEST: CPT

## 2018-12-12 PROCEDURE — 82088 ASSAY OF ALDOSTERONE: CPT

## 2018-12-12 PROCEDURE — 83735 ASSAY OF MAGNESIUM: CPT

## 2018-12-12 PROCEDURE — 74011636637 HC RX REV CODE- 636/637: Performed by: INTERNAL MEDICINE

## 2018-12-12 PROCEDURE — 83880 ASSAY OF NATRIURETIC PEPTIDE: CPT

## 2018-12-12 PROCEDURE — 77010033678 HC OXYGEN DAILY

## 2018-12-12 PROCEDURE — 74011000250 HC RX REV CODE- 250: Performed by: INTERNAL MEDICINE

## 2018-12-12 PROCEDURE — 65660000000 HC RM CCU STEPDOWN

## 2018-12-12 PROCEDURE — 82024 ASSAY OF ACTH: CPT

## 2018-12-12 PROCEDURE — 36415 COLL VENOUS BLD VENIPUNCTURE: CPT

## 2018-12-12 RX ORDER — OXYCODONE HYDROCHLORIDE 5 MG/1
5 TABLET ORAL
Status: DISCONTINUED | OUTPATIENT
Start: 2018-12-12 | End: 2018-12-13

## 2018-12-12 RX ORDER — INSULIN LISPRO 100 [IU]/ML
INJECTION, SOLUTION INTRAVENOUS; SUBCUTANEOUS
Status: DISCONTINUED | OUTPATIENT
Start: 2018-12-12 | End: 2018-12-17 | Stop reason: HOSPADM

## 2018-12-12 RX ORDER — CLONIDINE 0.3 MG/24H
1 PATCH, EXTENDED RELEASE TRANSDERMAL
Status: DISCONTINUED | OUTPATIENT
Start: 2018-12-12 | End: 2018-12-14

## 2018-12-12 RX ORDER — LABETALOL HYDROCHLORIDE 5 MG/ML
10 INJECTION, SOLUTION INTRAVENOUS
Status: COMPLETED | OUTPATIENT
Start: 2018-12-12 | End: 2018-12-12

## 2018-12-12 RX ORDER — ATENOLOL 50 MG/1
25 TABLET ORAL DAILY
Status: DISCONTINUED | OUTPATIENT
Start: 2018-12-12 | End: 2018-12-14

## 2018-12-12 RX ORDER — LEVOTHYROXINE SODIUM 125 UG/1
125 TABLET ORAL
Status: DISCONTINUED | OUTPATIENT
Start: 2018-12-13 | End: 2018-12-17 | Stop reason: HOSPADM

## 2018-12-12 RX ORDER — CLONIDINE 0.2 MG/24H
1 PATCH, EXTENDED RELEASE TRANSDERMAL
Status: DISCONTINUED | OUTPATIENT
Start: 2018-12-12 | End: 2018-12-12

## 2018-12-12 RX ORDER — LISINOPRIL 20 MG/1
40 TABLET ORAL DAILY
Status: DISCONTINUED | OUTPATIENT
Start: 2018-12-12 | End: 2018-12-17 | Stop reason: HOSPADM

## 2018-12-12 RX ORDER — METOPROLOL TARTRATE 5 MG/5ML
7.5 INJECTION INTRAVENOUS EVERY 6 HOURS
Status: DISCONTINUED | OUTPATIENT
Start: 2018-12-12 | End: 2018-12-12

## 2018-12-12 RX ADMIN — Medication 10 ML: at 17:44

## 2018-12-12 RX ADMIN — Medication 10 MG: at 10:18

## 2018-12-12 RX ADMIN — HYDRALAZINE HYDROCHLORIDE 20 MG: 20 INJECTION INTRAMUSCULAR; INTRAVENOUS at 08:10

## 2018-12-12 RX ADMIN — MEROPENEM 2 G: 1 INJECTION, POWDER, FOR SOLUTION INTRAVENOUS at 12:55

## 2018-12-12 RX ADMIN — HYDROMORPHONE HYDROCHLORIDE 0.5 MG: 2 INJECTION, SOLUTION INTRAMUSCULAR; INTRAVENOUS; SUBCUTANEOUS at 01:05

## 2018-12-12 RX ADMIN — Medication 10 ML: at 05:01

## 2018-12-12 RX ADMIN — LABETALOL HYDROCHLORIDE 10 MG: 5 INJECTION INTRAVENOUS at 01:06

## 2018-12-12 RX ADMIN — METOPROLOL TARTRATE 5 MG: 5 INJECTION, SOLUTION INTRAVENOUS at 05:01

## 2018-12-12 RX ADMIN — SODIUM CHLORIDE 40 MG: 9 INJECTION INTRAMUSCULAR; INTRAVENOUS; SUBCUTANEOUS at 10:18

## 2018-12-12 RX ADMIN — LABETALOL HYDROCHLORIDE 10 MG: 5 INJECTION INTRAVENOUS at 05:01

## 2018-12-12 RX ADMIN — MEROPENEM 2 G: 1 INJECTION, POWDER, FOR SOLUTION INTRAVENOUS at 01:05

## 2018-12-12 RX ADMIN — HYDRALAZINE HYDROCHLORIDE 20 MG: 20 INJECTION INTRAMUSCULAR; INTRAVENOUS at 00:37

## 2018-12-12 RX ADMIN — OXYCODONE HYDROCHLORIDE 5 MG: 5 TABLET ORAL at 12:55

## 2018-12-12 RX ADMIN — INSULIN GLARGINE 10 UNITS: 100 INJECTION, SOLUTION SUBCUTANEOUS at 10:18

## 2018-12-12 RX ADMIN — ATENOLOL 25 MG: 50 TABLET ORAL at 12:55

## 2018-12-12 RX ADMIN — HYDROMORPHONE HYDROCHLORIDE 0.5 MG: 2 INJECTION, SOLUTION INTRAMUSCULAR; INTRAVENOUS; SUBCUTANEOUS at 10:22

## 2018-12-12 RX ADMIN — LISINOPRIL 40 MG: 20 TABLET ORAL at 12:55

## 2018-12-12 RX ADMIN — Medication 10 ML: at 05:02

## 2018-12-12 RX ADMIN — Medication 10 ML: at 22:25

## 2018-12-12 NOTE — PROGRESS NOTES
[facility] [address]  [address] [facility], [place], [address] [facility]  [phone]      Medical Progress Note      NAME: Phoebe Briseno   :  1955  MRM:  [id number]    Date/Time: 2018          Subjective:     Chief Complaint: \"I'm okay\"    Pt now verbalizing well. Does c/o back pain. More readily following commands. Per , before this all happened pt was independent (ambulating without assistance, driving)       Objective:       Vitals:        Last 24hrs VS reviewed since prior progress note. Most recent are:    Visit Vitals  /57   Pulse (!) 105   Temp 98.4 °F (36.9 °C)   Resp 28   Ht 5' 1\" (1.549 m)   Wt 93.4 kg (205 lb 14.6 oz)   SpO2 97%   BMI 38.91 kg/m²     SpO2 Readings from Last 6 Encounters:   18 97%    O2 Flow Rate (L/min): 2 l/min       Intake/Output Summary (Last 24 hours) at 2018 1239  Last data filed at 2018 8611  Gross per 24 hour   Intake 765 ml   Output 1665 ml   Net -900 ml          Exam:     Physical Exam:    Gen:  Elderly, chronically ill-appearing, morbidly obese, NAD  HEENT:  Pink conjunctivae  Neck:  Supple, without masses  Resp:  No accessory muscle use, Diminished BS at bases with few coarse breath sounds. Ventilated breath sounds  Card:  RRR, no murmurs, normal S1, S2 without thrills, bruits   Abd:  Soft, non-tender, non-distended but obese, normoactive bowel sounds are present  Musc:  No cyanosis or clubbing. Notable BLE muscular atrophy  Skin:  Right elbow swelling, erythema  Neuro: PERRL. Non verbal. Able to move all extremities.    Psych: Poor insight   Diffuse anasarca  Able to verbalize today      Medications Reviewed: (see below)    Lab Data Reviewed: (see below)    ______________________________________________________________________    Medications:     Current Facility-Administered Medications   Medication Dose Route Frequency    cloNIDine (CATAPRES) 0.3 mg/24 hr patch 1 Patch  1 Patch TransDERmal Q7D    atenolol (TENORMIN) tablet 25 mg  25 mg Oral DAILY    lisinopril (PRINIVIL, ZESTRIL) tablet 40 mg  40 mg Oral DAILY    [START ON 12/13/2018] levothyroxine (SYNTHROID) tablet 125 mcg  125 mcg Oral ACB    oxyCODONE IR (ROXICODONE) tablet 5 mg  5 mg Oral Q6H PRN    HYDROmorphone (PF) (DILAUDID) injection 0.5 mg  0.5 mg IntraVENous Q3H PRN    pantoprazole (PROTONIX) 40 mg in sodium chloride 0.9% 10 mL injection  40 mg IntraVENous DAILY    albuterol (PROVENTIL VENTOLIN) nebulizer solution 2.5 mg  2.5 mg Nebulization Q4H PRN    insulin glargine (LANTUS) injection 10 Units  10 Units SubCUTAneous DAILY    bisacodyl (DULCOLAX) suppository 10 mg  10 mg Rectal DAILY    insulin lispro (HUMALOG) injection   SubCUTAneous Q6H    meropenem (MERREM) 2 g in 0.9% sodium chloride 100 mL IVPB  2 g IntraVENous Q12H    influenza vaccine 2018-19 (6 mos+)(PF) (FLUARIX QUAD/FLULAVAL QUAD) injection 0.5 mL  0.5 mL IntraMUSCular PRIOR TO DISCHARGE    labetalol (NORMODYNE;TRANDATE) injection 10 mg  10 mg IntraVENous Q4H PRN    hydrALAZINE (APRESOLINE) 20 mg/mL injection 20 mg  20 mg IntraVENous Q6H PRN    acetaminophen (TYLENOL) suppository 650 mg  650 mg Rectal Q4H PRN    alteplase (CATHFLO) 1 mg in sterile water (preservative free) 1 mL injection  1 mg InterCATHeter PRN    sodium chloride (NS) flush 10-30 mL  10-30 mL InterCATHeter PRN    sodium chloride (NS) flush 10 mL  10 mL InterCATHeter Q24H    sodium chloride (NS) flush 10 mL  10 mL InterCATHeter PRN    sodium chloride (NS) flush 10-40 mL  10-40 mL InterCATHeter Q8H    sodium chloride (NS) flush 20 mL  20 mL InterCATHeter Q24H    sodium chloride (NS) flush 5-10 mL  5-10 mL IntraVENous PRN    sodium chloride (NS) flush 5-10 mL  5-10 mL IntraVENous Q8H    sodium chloride (NS) flush 5-10 mL  5-10 mL IntraVENous PRN    acetaminophen (TYLENOL) tablet 650 mg  650 mg Oral Q4H PRN    ondansetron (ZOFRAN) injection 4 mg  4 mg IntraVENous Q4H PRN    glucose chewable tablet 16 g 4 Tab Oral PRN    dextrose (D50W) injection syrg 12.5-25 g  25-50 mL IntraVENous PRN    glucagon (GLUCAGEN) injection 1 mg  1 mg IntraMUSCular PRN            Lab Review:     Recent Labs     12/12/18 0338 12/11/18  1612 12/10/18  0352   WBC 9.2 9.6 11.0   HGB 7.6* 7.6* 8.5*   HCT 25.5* 25.0* 27.5*    365 355     Recent Labs     12/12/18 0338 12/11/18  1612 12/10/18  0352    142 142   K 4.6 4.5 3.8   * 109* 107   CO2 22 24 23   * 176* 178*   BUN 24* 28* 34*   CREA 0.89 0.93 1.17*   CA 8.5 8.5 8.1*   MG 1.8  --  1.8   PHOS  --   --  4.1   ALB  --   --  1.2*   SGOT  --   --  41*   ALT  --   --  20     No components found for: GLPOC  Recent Labs     12/10/18  0603   PH 7.48*   PCO2 31*   PO2 105*   HCO3 22   FIO2 40     No results for input(s): INR in the last 72 hours. No lab exists for component: INREXT, INREXT  No results found for: SDES  Lab Results   Component Value Date/Time    Culture result: SCANT NORMAL RESPIRATORY TRIXIE 12/07/2018 01:30 PM    Culture result: MODERATE YEAST (A) 12/07/2018 01:30 PM    Culture result: HEAVY CANDIDA PARAPSILOSIS (A) 12/07/2018 01:30 PM    Culture result:  12/07/2018 01:30 PM     CULTURE WILL BE HELD FOR 4 WEEKS. IF THERE IS ADDITIONAL FUNGAL GROWTH, A NEW REPORT WILL FOLLOW.              ___     Assessment / Plan:   60 yo WF w/ hx of HTN, DM, chronic pain, presenting with AMS and weakness, found to have severe sepsis, bacteremia, and ARF    C-spine discitis: prelim MRI showed evidence of discitis at C5-C6 and C6-C7, with abnormal enhancement of the vertebral bodies. There is enhancing phlegmon in the epidural space. No drainable abscess  -s/p MRI c/l/t spine completed   -ortho on board   -appreciate ID eval; on meropenem     Acute metabolic encephalopathy: unclear etiology. ?medications and relation to the diskitis   -head CT, MRI brain without acute process   -TSH, B12/folate, ammonia WNL   -s/p LP; not c/w bacterial meningitis. HSV neg.    -EEG negative for seizure activity   -RPR negative, West Nile negative, HIV negative   -Lyme IgG positive but IgM negative     Hemoptysis vs epistaxis: sudden onset 12/7. With bright red blood and clots suctioned from upper airway. Intubated for airway protection. Underwent bronchoscopy which showed large amount of blood in the trachea, however past paula only scants amount of blood in RLL. Some thick yellow/white secretions in LLL and RLL. All lobes/subsegments inspected with no signs of bleeding  -ENT on board   -JACQUELINE negative   -ANCA negative     Severe sepsis/bacteremia: Blood Cx + 4/4 bottles with GBS on admission, repeat blood Cx NGTD. With R arm/elbow cellulitis and now evidence of discitis. No e/o valvular veg on TTE  -on meropenem as per ID      R elbow cellulitis/bursitis/acute pain: s/p joint aspiration by IR on 12/6  -cultures negative to date   -on meropenem as above      ARF: likely from ATN/sepsis, hypovolemia  -monitor      AG metabolic acidosis: resolved. Due to sepsis     Hypernatremia: resolved      DM type 2 w/ hypoglycemia: A1C 7.2%  -ISS   -BG checks AC TID and qHS   -on Lantus; uptitrate PRN      HTN:  -on atenolol, lisinopril      Anemia: Fe studies consistent with chronic dz.  Monitor     Total time spent: 35 minutes                  Care Plan discussed with: Patient, Nursing Staff and >50% of time spent in counseling and coordination of care    Discussed:  Care Plan    Prophylaxis:  SCD's     Disposition:  TBD  ________________________________________________    Attending Physician: Shruthi Brewster MD

## 2018-12-12 NOTE — PROGRESS NOTES
[address]  YOB: 1955          Assessment & Plan:   HTN  · Worse overnight  · Clodnidine patch,IV Lopressor:140/80 now  · Due to facial hair and severe HTN: Get ACTH,Cortisol,renin,brenda,    ARF due to IVVD/sepsis  · Creat better    Hypernatremia  · resolved    Hypokalemia  · Improved    Sepsis/hypotension  · On abx:Meropenem  · Bacteremia likely from R arm source    ·     Urinary retention  · S/p joyce       Subjective:   CC: f/u ARF  HPI:BAYLEE resolved  HTN: IV Lopressor  Her Clonidine Patch fallen off and now back on  NPO  ROS: Unable to obtain due to AMS  Current Facility-Administered Medications   Medication Dose Route Frequency    cloNIDine (CATAPRES) 0.3 mg/24 hr patch 1 Patch  1 Patch TransDERmal Q7D    metoprolol (LOPRESSOR) injection 7.5 mg  7.5 mg IntraVENous Q6H    HYDROmorphone (PF) (DILAUDID) injection 0.5 mg  0.5 mg IntraVENous Q3H PRN    pantoprazole (PROTONIX) 40 mg in sodium chloride 0.9% 10 mL injection  40 mg IntraVENous DAILY    albuterol (PROVENTIL VENTOLIN) nebulizer solution 2.5 mg  2.5 mg Nebulization Q4H PRN    levothyroxine (SYNTHROID) injection 94 mcg  94 mcg IntraVENous Q24H    insulin glargine (LANTUS) injection 10 Units  10 Units SubCUTAneous DAILY    bisacodyl (DULCOLAX) suppository 10 mg  10 mg Rectal DAILY    insulin lispro (HUMALOG) injection   SubCUTAneous Q6H    meropenem (MERREM) 2 g in 0.9% sodium chloride 100 mL IVPB  2 g IntraVENous Q12H    influenza vaccine 2018-19 (6 mos+)(PF) (FLUARIX QUAD/FLULAVAL QUAD) injection 0.5 mL  0.5 mL IntraMUSCular PRIOR TO DISCHARGE    labetalol (NORMODYNE;TRANDATE) injection 10 mg  10 mg IntraVENous Q4H PRN    hydrALAZINE (APRESOLINE) 20 mg/mL injection 20 mg  20 mg IntraVENous Q6H PRN    acetaminophen (TYLENOL) suppository 650 mg  650 mg Rectal Q4H PRN    alteplase (CATHFLO) 1 mg in sterile water (preservative free) 1 mL injection  1 mg InterCATHeter PRN    sodium chloride (NS) flush 10-30 mL  10-30 mL InterCATHeter PRN    sodium chloride (NS) flush 10 mL  10 mL InterCATHeter Q24H    sodium chloride (NS) flush 10 mL  10 mL InterCATHeter PRN    sodium chloride (NS) flush 10-40 mL  10-40 mL InterCATHeter Q8H    sodium chloride (NS) flush 20 mL  20 mL InterCATHeter Q24H    sodium chloride (NS) flush 5-10 mL  5-10 mL IntraVENous PRN    sodium chloride (NS) flush 5-10 mL  5-10 mL IntraVENous Q8H    sodium chloride (NS) flush 5-10 mL  5-10 mL IntraVENous PRN    acetaminophen (TYLENOL) tablet 650 mg  650 mg Oral Q4H PRN    ondansetron (ZOFRAN) injection 4 mg  4 mg IntraVENous Q4H PRN    glucose chewable tablet 16 g  4 Tab Oral PRN    dextrose (D50W) injection syrg 12.5-25 g  25-50 mL IntraVENous PRN    glucagon (GLUCAGEN) injection 1 mg  1 mg IntraMUSCular PRN          Objective:     Vitals:  Blood pressure (!) 217/83, pulse (!) 103, temperature 98.4 °F (36.9 °C), resp. rate 22, height 5' 1\" (1.549 m), weight 93.4 kg (205 lb 14.6 oz), SpO2 96 %. Temp (24hrs), Av.3 °F (36.8 °C), Min:97.5 °F (36.4 °C), Max:99 °F (37.2 °C)      Intake and Output:  No intake/output data recorded. 12/10 1901 -  0700  In: 3047 [P.O.:240; I.V.:2000]  Out: 1765 [Urine:1765]    Physical Exam:               GENERAL ASSESSMENT: AMS  CHEST: CTA  HEART: S1S2  ABDOMEN: Soft,NT  EXTREMITY: no EDEMA          ECG/rhythm:    Data Review      No results for input(s): TNIPOC in the last 72 hours. No lab exists for component: ITNL   No results for input(s): CPK, CKMB, TROIQ in the last 72 hours.   Recent Labs     18  0338 18  1612 12/10/18  0352    142 142   K 4.6 4.5 3.8   * 109* 107   CO2 22 24 23   BUN 24* 28* 34*   CREA 0.89 0.93 1.17*   * 176* 178*   PHOS  --   --  4.1   MG 1.8  --  1.8   CA 8.5 8.5 8.1*   ALB  --   --  1.2*   WBC 9.2 9.6 11.0   HGB 7.6* 7.6* 8.5*   HCT 25.5* 25.0* 27.5*    365 355      No results for input(s): INR, PTP, APTT in the last 72 hours. No lab exists for component: INREXT, INREXT  Needs: urine analysis, urine sodium, protein and creatinine  Lab Results   Component Value Date/Time    Sodium,urine random 29 11/30/2018 06:10 PM    Creatinine, urine 187.11 11/30/2018 06:10 PM           : Ryan Carbajal MD  12/12/2018        [facility]:  [email or web address]  [facility] office:  [address], [address] [place] [address],[address]  [place], [address] [place] [address]  Phone: [phone]  Fax :     [phone]    [place] office:  [address] [facility]  Reji Benitez  Phone - [phone]  Fax - [phone]

## 2018-12-12 NOTE — PROGRESS NOTES
Bedside and Verbal shift change report given to Cole Hogan RN (oncoming nurse) by Delilah Schwab, RN (offgoing nurse). Report included the following information SBAR, Kardex, MAR and Cardiac Rhythm NSR.     0830 - SBP > 200. Hydralazine 20 mg administered per orders. 1030 - Patient c/o  > 5 out of 10 pain to back. Patient stated \"more than 5\". 1630 - Patient attempting to get out of bed. Encouraged patient to call for assistance.  at bedside. TRANSFER - OUT REPORT:    Verbal report given to Bob Asher RN(name) on Willie Ho  being transferred to [facility] (unit) for routine progression of care       Report consisted of patients Situation, Background, Assessment and   Recommendations(SBAR). Information from the following report(s) SBAR, Kardex, [facility] and Cardiac Rhythm NSR/Sinus Tach was reviewed with the receiving nurse. Lines:   PICC Single Lumen 78/37/88 Left;Cephalic (Active)   Central Line Being Utilized Yes 12/12/2018  4:00 AM   Criteria for Appropriate Use Hemodynamically unstable, requiring monitoring lines, vasopressors, or volume resuscitation 12/12/2018  4:00 AM   Site Assessment Clean, dry, & intact 12/12/2018  4:00 AM   Phlebitis Assessment 0 12/12/2018  4:00 AM   Infiltration Assessment 0 12/12/2018  4:00 AM   Date of Last Dressing Change 12/11/18 12/11/2018  4:00 PM   Dressing Status Clean, dry, & intact 12/12/2018  4:00 AM   External Catheter Length (cm) 0 centimeters 12/4/2018  8:03 AM   Dressing Type Disk with Chlorhexadine gluconate (CHG) 12/12/2018  4:00 AM   Hub Color/Line Status Purple;Flushed 12/12/2018  4:00 AM   Action Taken Open ports on tubing capped 12/11/2018  4:00 PM   Positive Blood Return (Site #1) Yes 12/11/2018  4:00 PM   Alcohol Cap Used Yes 12/11/2018 12:00 PM       Peripheral IV 12/08/18 Anterior; Inferior;Left;Lower Arm (Active)   Site Assessment Clean, dry, & intact 12/12/2018  4:00 AM   Phlebitis Assessment 0 12/12/2018  4:00 AM   Infiltration Assessment 0 12/12/2018  4:00 AM   Dressing Status Clean, dry, & intact 12/12/2018  4:00 AM   Dressing Type Transparent 12/12/2018  4:00 AM   Hub Color/Line Status Pink;Flushed 12/12/2018  4:00 AM   Action Taken Open ports on tubing capped 12/11/2018  4:00 PM   Alcohol Cap Used Yes 12/11/2018  4:00 PM       Peripheral IV 12/08/18 Anterior; Inferior; Lower;Right Arm (Active)   Site Assessment Clean, dry, & intact 12/12/2018  4:00 AM   Phlebitis Assessment 0 12/12/2018  4:00 AM   Infiltration Assessment 0 12/12/2018  4:00 AM   Dressing Status Clean, dry, & intact 12/12/2018  4:00 AM   Dressing Type Transparent 12/12/2018  4:00 AM   Hub Color/Line Status Pink;Flushed 12/12/2018  4:00 AM   Action Taken Open ports on tubing capped 12/11/2018  4:00 PM   Alcohol Cap Used Yes 12/11/2018  4:00 PM        Opportunity for questions and clarification was provided.       Patient transported with:   Monitor  O2 @ 2 liters  Tech

## 2018-12-12 NOTE — PROGRESS NOTES
Problem: Dysphagia (Adult)  Goal: *Acute Goals and Plan of Care (Insert Text)  Swallowing goals initiated 12-11-18 (weekly re-eval due 12-18-18)  1) tolerate full liquids without s/s aspiration by 12-13-18-met  2) SLP will re-eval for solids by 12-14-18 -met  3) tolerate mech soft, thins without s/s aspiration by 12-14-18  Speech language pathology dysphagia treatment  Patient: Dia Rodrigues (76 y.o. female)  Date: 12/12/2018  Diagnosis: Metabolic acidosis [E91.6] BAYLEE (acute kidney injury) ([facility] [address]  Procedure(s) (LRB):  BRONCHOSCOPY (N/A)  BRONCHIAL WASHINGS FLEXIBLE (Bilateral) 5 Days Post-Op  Precautions: aspiration      ASSESSMENT:  Patient still with slow processing and verbal responses. She is tolerating full liquids and now able to demonstrate good chew, so ready for diet upgrade to solids. Progression toward goals:  [x]         Improving appropriately and progressing toward goals  []         Improving slowly and progressing toward goals  []         Not making progress toward goals and plan of care will be adjusted     PLAN:  Recommendations and Planned Interventions:  Ok for mech soft, thins. Patient continues to benefit from skilled intervention to address the above impairments. Continue treatment per established plan of care. Discharge Recommendations:  Inpatient Rehab     SUBJECTIVE:   Patient constantly trying to move herself, but unable. .    OBJECTIVE:   Cognitive and Communication Status:  Neurologic State: Alert  Orientation Level: Oriented to person, Disoriented to place, Disoriented to situation, Disoriented to time  Cognition: (still slow processing, word-retrieval and thought organization. unable to communicate needs )  Perception: Appears intact  Perseveration: (perseverating on repositioning)  Safety/Judgement: Decreased awareness of environment  Dysphagia Treatment:  Oral Assessment:     P.O.  Trials:  Patient Position: upright in bed  Vocal quality prior to P.O.: No impairment  Consistency Presented: Thin liquid; Solid  How Presented: SLP-fed/presented;Spoon;Straw   ORAL PHASE:   Bolus Acceptance: No impairment  Bolus Formation/Control: (mildly slow chew)     Propulsion: No impairment  Oral Residue: None   PHARYNGEAL PHASE:   Initiation of Swallow: No impairment  Laryngeal Elevation: Functional  Aspiration Signs/Symptoms: None  Pharyngeal Phase Characteristics: No impairment, issues, or problems                    Exercises:  Laryngeal Exercises:                                                                                                                                   Pain:  Pain Scale 1: Adult Nonverbal Pain Scale        After treatment:   []              Patient left in no apparent distress sitting up in chair  [x]              Patient left in no apparent distress in bed  []              Call bell left within reach  [x]              Nursing notified  []              Caregiver present  []              Bed alarm activated    COMMUNICATION/EDUCATION:   Patient was educated regarding Her deficit(s) of DYSPHAGIA  as this relates to Her diagnosis of intubation. She demonstrated Good understanding as evidenced by discussion. .    The patients plan of care including recommendations, planned interventions, and recommended diet changes were discussed with: Registered Nurse. []              Posted safety precautions in patient's room.     KRIS Sagastume  Time Calculation: 15 mins

## 2018-12-12 NOTE — PROGRESS NOTES
Occupational Therapy  Note:  Chart reviewed and spoke with team at [address] [place] [address]. She is cleared for therapy. Patient was received in bed with  present.  reports patient was doing well this morning however has been \"frustrated\" for the last hour following bowel movement. Patient with increased difficulty expressing self, unable to verbalize/complete full sentences or express needs, RR ranging from 33-41, and HR ranging from 122-129 bpm at rest.  Patient complaining of back pain and requesting to the bed be completely flat. Assisted with repositioning the patient in bed but she still had c/o discomfort. Patient not able to tolerate and refusing any activity at this time. Will continue to follow per POC. Thank you.   Willie Anne OTR/L

## 2018-12-12 NOTE — PROGRESS NOTES
Problem: Falls - Risk of  Goal: *Absence of Falls  Document Handy Fall Risk and appropriate interventions in the flowsheet. Outcome: Progressing Towards Goal  Fall Risk Interventions:  Mobility Interventions: Bed/chair exit alarm, Patient to call before getting OOB, Communicate number of staff needed for ambulation/transfer    Mentation Interventions: Door open when patient unattended, Room close to nurse's station    Medication Interventions: Bed/chair exit alarm    Elimination Interventions: Patient to call for help with toileting needs, Toileting schedule/hourly rounds             Problem: Pressure Injury - Risk of  Goal: *Prevention of pressure injury  Document Nikolay Scale and appropriate interventions in the flowsheet. Outcome: Progressing Towards Goal  Pressure Injury Interventions:  Sensory Interventions: Assess changes in LOC, Check visual cues for pain, Turn and reposition approx.  every two hours (pillows and wedges if needed)    Moisture Interventions: Check for incontinence Q2 hours and as needed, Internal/External urinary devices, Absorbent underpads    Activity Interventions: Pressure redistribution bed/mattress(bed type)    Mobility Interventions: HOB 30 degrees or less    Nutrition Interventions: Document food/fluid/supplement intake    Friction and Shear Interventions: Lift team/patient mobility team

## 2018-12-12 NOTE — PROGRESS NOTES
TRANSFER - IN REPORT:    Verbal report received from [facility] (name) on Hayden Crockett  being received from ICU (unit) for routine progression of care      Report consisted of patients Situation, Background, Assessment and   Recommendations(SBAR). Information from the following report(s) SBAR, Intake/Output, MAR, Recent Results and Cardiac Rhythm NSR/Sinus Tach was reviewed with the receiving nurse. Opportunity for questions and clarification was provided. Assessment completed upon patients arrival to unit and care assumed at 1800.      1820 Shankar emptied to begin time on 24-hr urine collection begun. No other significant changes noted on my shift. 1900 Bedside and Verbal shift change report given to Zack Lama (oncoming nurse) by Black Kilpatrick (offgoing nurse). Report included the following information SBAR, Intake/Output, MAR, Recent Results and Cardiac Rhythm NSR.

## 2018-12-12 NOTE — PROGRESS NOTES
Physical Therapy  Note:  Chart reviewed and spoke with team at [address] [place] [address]. She is cleared for therapy. Patient was received in bed with  present.  reports patient was doing well this morning however has been \"frustrated\" for the last hour following bowel movement. Patient with increased difficulty expressing self, unable to verbalize/complete full sentences or express needs, RR ranging from 33-41, and HR ranging from 122-129 bpm at rest.  Patient complaining of back pain and requesting to the bed be completely flat. Assisted with repositioning the patient in bed but she still had c/o discomfort. Patient not able to tolerate and refusing any activity at this time. Will continue to follow per POC. Thank you.

## 2018-12-12 NOTE — CONSULTS
[facility]     Name: Reina Jones MRN: [id number]   : 1955 Hospital: [address]   Date: 2018        Impression Plan   1. Acute respiratory failure  2. Acute onset hemoptysis  3. AMS  4. Betahemolytic strep bacteremia  5. Infected elbow- secondary infection? 6. Hypoxia  7. Leukocytosis  8. Hypernatremia  9.                · Wean O2 for goal sats >90  · Afrin for nose bleed per Dr. Mitchell Grad  · Continue Meropenem, ID following  · Brain MRI negative, MRI cervical neck C5-C7 diskitis, no evidence of abscess or thoracic discitis/osteo  · Metoprolol, clonidine  · Dr. Cyndy Duncan has sent ACTH, cortisol, renin, aldol  · PT/OT  · Hold proph AC  · SCDs  · Peridex  · PPI  · Speech has evaluated, full liquid diet    Stable for transfer out of the ICU         Radiology  ( personally reviewed) CXR reviewed: - no infiltrates  CXR - no infiltrates, right hemidiaphragm elevation   ABG No results for input(s): PHI, PO2I, PCO2I in the last 72 hours. Subjective     Overnight events:  Persistently hypertensive, metoprolol further increased. She knows she is at [address] [place] [address] but mentation still slowed and confused, but better. Past Medical History:   Diagnosis Date    Chronic back pain     Diabetes mellitus, type 2 ([facility] [address]     History of vascular access device 2018    [facility] VAT - 4 FR single, L cephalic, 44 cm for LTA    HTN (hypertension)       History reviewed. No pertinent surgical history. Prior to Admission medications    Medication Sig Start Date End Date Taking? Authorizing Provider   atenolol (TENORMIN) 25 mg tablet Take 25 mg by mouth daily. Yes Provider, Historical   cyclobenzaprine (FLEXERIL) 10 mg tablet Take 10 mg by mouth daily as needed for Muscle Spasm(s). Yes Provider, Historical   fenofibrate (LOFIBRA) 160 mg tablet Take 160 mg by mouth nightly.    Yes Provider, Historical   gabapentin (NEURONTIN) 300 mg capsule Take 300 mg by mouth three (3) times daily. Yes Provider, Historical   insulin glargine (LANTUS,BASAGLAR) 100 unit/mL (3 mL) inpn 75 Units by SubCUTAneous route nightly. Yes Provider, Historical   levothyroxine (SYNTHROID) 125 mcg tablet Take 125 mcg by mouth Daily (before breakfast). Yes Provider, Historical   lisinopril-hydroCHLOROthiazide (PRINZIDE, ZESTORETIC) 20-12.5 mg per tablet Take 2 Tabs by mouth daily. Yes Provider, Historical   metFORMIN (GLUCOPHAGE) 500 mg tablet Take 500 mg by mouth two (2) times daily (with meals). Yes Provider, Historical   niacin ER (NIASPAN) 500 mg tablet Take 500 mg by mouth nightly. Yes Provider, Historical   omega 3-DHA-EPA-fish oil 1,000 mg (120 mg-180 mg) capsule Take 2 Caps by mouth two (2) times a day. Yes Provider, Historical   oxyCODONE IR (ROXICODONE) 5 mg immediate release tablet Take 5 mg by mouth daily as needed (breakthrough pain). Yes Provider, Historical   oxyCODONE ER (XTAMPZA ER) 18 mg ER capsule Take 18 mg by mouth every twelve (12) hours. Yes Provider, Historical   sertraline (ZOLOFT) 100 mg tablet Take 150 mg by mouth nightly. Yes Provider, Historical   simvastatin (ZOCOR) 40 mg tablet Take 40 mg by mouth nightly. Yes Provider, Historical   triamcinolone acetonide (KENALOG) 0.1 % topical cream Apply  to affected area two (2) times a day. use thin layer   Yes Provider, Historical   multivit-min-FA-lycopen-lutein (CENTRUM SILVER) 0.4-300-250 mg-mcg-mcg tab Take 1 Tab by mouth daily. Yes Provider, Historical   OTHER,NON-FORMULARY, Take 1 Tab by mouth two (2) times a day. Osteo Biflex   Yes Provider, Historical   aspirin delayed-release 81 mg tablet Take 81 mg by mouth two (2) times a day. Yes Provider, Historical   ibuprofen (MOTRIN) 200 mg tablet Take 400 mg by mouth two (2) times a day. Yes Provider, Historical   ubidecarenone (COENZYME Q10, BULK,) Take 1 Tab by mouth daily.    Yes Provider, Historical     Current Facility-Administered Medications   Medication Dose Route Frequency    cloNIDine (CATAPRES) 0.3 mg/24 hr patch 1 Patch  1 Patch TransDERmal Q7D    metoprolol (LOPRESSOR) injection 7.5 mg  7.5 mg IntraVENous Q6H    pantoprazole (PROTONIX) 40 mg in sodium chloride 0.9% 10 mL injection  40 mg IntraVENous DAILY    levothyroxine (SYNTHROID) injection 94 mcg  94 mcg IntraVENous Q24H    insulin glargine (LANTUS) injection 10 Units  10 Units SubCUTAneous DAILY    bisacodyl (DULCOLAX) suppository 10 mg  10 mg Rectal DAILY    insulin lispro (HUMALOG) injection   SubCUTAneous Q6H    meropenem (MERREM) 2 g in 0.9% sodium chloride 100 mL IVPB  2 g IntraVENous Q12H    influenza vaccine 2018-19 (6 mos+)(PF) (FLUARIX QUAD/FLULAVAL QUAD) injection 0.5 mL  0.5 mL IntraMUSCular PRIOR TO DISCHARGE    sodium chloride (NS) flush 10 mL  10 mL InterCATHeter Q24H    sodium chloride (NS) flush 10-40 mL  10-40 mL InterCATHeter Q8H    sodium chloride (NS) flush 20 mL  20 mL InterCATHeter Q24H    sodium chloride (NS) flush 5-10 mL  5-10 mL IntraVENous Q8H     No Known Allergies   Social History     Tobacco Use    Smoking status: Never Smoker    Smokeless tobacco: Never Used   Substance Use Topics    Alcohol use: No     Frequency: Never      Family History   Problem Relation Age of Onset    Diabetes Father     Heart Disease Father           Laboratory: I have personally reviewed the critical care flowsheet and labs.      Recent Labs     12/12/18  0338 12/11/18  1612 12/10/18  0352   WBC 9.2 9.6 11.0   HGB 7.6* 7.6* 8.5*   HCT 25.5* 25.0* 27.5*    365 355     Recent Labs     12/12/18  0338 12/11/18  1612 12/10/18  0352    142 142   K 4.6 4.5 3.8   * 109* 107   CO2 22 24 23   * 176* 178*   BUN 24* 28* 34*   CREA 0.89 0.93 1.17*   CA 8.5 8.5 8.1*   MG 1.8  --  1.8   PHOS  --   --  4.1   ALB  --   --  1.2*   SGOT  --   --  41*   ALT  --   --  20       Objective:     Mode Rate Tidal Volume Pressure FiO2 PEEP   Spontaneous   450 ml  5 cm H2O 40 % 5 cm H20     Vital Signs: Ventilator Pressures  Pressure Support (cm H2O): 5 cm H2O  PIP Observed (cm H2O): 10 cm H2O  Plateau Pressure (cm H2O): 17 cm H2O  MAP (cm H2O): 7.4  PEEP/VENT (cm H2O): 5 cm H20  Auto PEEP Observed (cm H2O): 0 cm [id number](24)Ventilator Pressures  Pressure Support (cm H2O): 5 cm H2O  PIP Observed (cm H2O): 10 cm H2O  Plateau Pressure (cm H2O): 17 cm H2O  MAP (cm H2O): 7.4  PEEP/VENT (cm H2O): 5 cm H20  Auto PEEP Observed (cm H2O): 0 cm H2O  Safety & Alarms  Circuit Temperature: 97.9 °F (36.6 °C)  Backup Mode Checked/Apnea: Yes  Pressure Max: 40 cm H2O  Pressure Min: 12 cm H2O  Ve Min: 2  Ve Max: 20  Vt Min: 200 ml  Vt Max: 1000 ml  RR Min: 12  RR Max: 40  Intake/Output:   Last shift:      Ventilator Volumes  Vt Set (ml): 450 ml  Vt Exhaled (Machine Breath) (ml): 478 ml  Vt Spont (ml): 424 ml  Ve Observed (l/min): 7.8 l/min  Last 3 shifts: No intake/output data recorded. [id number]    Intake/Output Summary (Last 24 hours) at 12/12/2018 1032  Last data filed at 12/12/2018 7106  Gross per 24 hour   Intake 1015 ml   Output 1665 ml   Net -650 ml     EXAM:   GENERAL: Intubated, RASS -2 HEENT:  PERRL, EOMI, no alar flaring or epistaxis, bright red blood trickling from right nostril, NECK:  no jugular vein distention, no retractions, no thyromegaly or masses, LUNGS:CTA, no w/r/r HEART:  Regular rate and rhythm with no MGR; trace edema is present, ABDOMEN:  soft with no tenderness, bowel sounds present, EXTREMITIES:  warm with no cyanosis, SKIN: pale, no jaundice or ecchymosis and NEUROLOGIC: opens eyes to voice, tracks, does not follow commands    Andi Camargo MD  [facility]

## 2018-12-12 NOTE — PROGRESS NOTES
[facility] Progress Note           Oly Briceño DO    [phone] Office  [phone]  Fax    2018      Assessment & Plan:   1. Right elbow cellulitis complicated by GBS bacteremia. Repeat BC's NGSF. Orthopedic surgery is following. S/p joint aspiration by IR . Cultures NGSF. Continue meropenem. 2. Encephalopathy. MS improving. 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 Traumatic tap with LP results abnormal. Meningitis pathogens panel negative. CSF culture NGSF. Lyme CSF and WNV negative. CSF fluid cytology unremarkable. HSV 1&2 PCR negative. MRI brain negative. 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 Cervical spine diskitis. Abx as above. Needs 6 weeks IV abx  4. Acute kidney injury. Resolved    5. Diabetes mellitus. Management per primary team.  6. Rash prior to admission. Resolved. Pictures reviewed with patients . Possible cellulitis? Rash not c/w HSV/VZV. 7.   CoNS isolated from a single blood culture. This represents a contaminant. 8. Hemoptysis. Resolved. ENT following          Subjective:     Mental status continues to improve    Objective:     Vitals:   Visit Vitals  /57   Pulse (!) 105   Temp 98.4 °F (36.9 °C)   Resp 28   Ht 5' 1\" (1.549 m)   Wt 93.4 kg (205 lb 14.6 oz)   SpO2 97%   BMI 38.91 kg/m²        Tmax:  Temp (24hrs), Av.5 °F (36.9 °C), Min:98.1 °F (36.7 °C), Max:99 °F (37.2 °C)      Exam:   Patient is intubated:      Physical Examination:   General:  Awake NAD    Head:  Normocephalic, atraumatic. Eyes:  Conjunctivae clear   Neck: Supple       Lungs:   Clear to auscultation bilaterally. Chest wall:     Heart:  Regular rate and rhythm   Abdomen:      Extremities:    Skin:  No rash   Neurologic: CN II-XII GI     Labs:        No lab exists for component: ITNL   No results for input(s): CPK, CKMB, TROIQ in the last 72 hours.   Recent Labs     18  0338 18  1612 12/10/18  0352    142 142 K 4.6 4.5 3.8   * 109* 107   CO2 22 24 23   BUN 24* 28* 34*   CREA 0.89 0.93 1.17*   * 176* 178*   PHOS  --   --  4.1   MG 1.8  --  1.8   ALB  --   --  1.2*   WBC 9.2 9.6 11.0   HGB 7.6* 7.6* 8.5*   HCT 25.5* 25.0* 27.5*    365 355     No results for input(s): INR, PTP, APTT in the last 72 hours. No lab exists for component: INREXT, INREXT  Needs: urine analysis, urine sodium, protein and creatinine  Lab Results   Component Value Date/Time    Sodium,urine random 29 11/30/2018 06:10 PM    Creatinine, urine 187.11 11/30/2018 06:10 PM         Cultures:     No results found for: SDES  Lab Results   Component Value Date/Time    Culture result: SCANT NORMAL RESPIRATORY TRIXIE 12/07/2018 01:30 PM    Culture result: MODERATE YEAST (A) 12/07/2018 01:30 PM    Culture result: HEAVY CANDIDA PARAPSILOSIS (A) 12/07/2018 01:30 PM    Culture result:  12/07/2018 01:30 PM     CULTURE WILL BE HELD FOR 4 WEEKS. IF THERE IS ADDITIONAL FUNGAL GROWTH, A NEW REPORT WILL FOLLOW.        Radiology:     Medications       Current Facility-Administered Medications   Medication Dose Route Frequency Last Dose    cloNIDine (CATAPRES) 0.3 mg/24 hr patch 1 Patch  1 Patch TransDERmal Q7D 1 Patch at 12/12/18 0802    atenolol (TENORMIN) tablet 25 mg  25 mg Oral DAILY      lisinopril (PRINIVIL, ZESTRIL) tablet 40 mg  40 mg Oral DAILY      HYDROmorphone (PF) (DILAUDID) injection 0.5 mg  0.5 mg IntraVENous Q3H PRN 0.5 mg at 12/12/18 1022    pantoprazole (PROTONIX) 40 mg in sodium chloride 0.9% 10 mL injection  40 mg IntraVENous DAILY 40 mg at 12/12/18 1018    albuterol (PROVENTIL VENTOLIN) nebulizer solution 2.5 mg  2.5 mg Nebulization Q4H PRN 2.5 mg at 12/10/18 1539    insulin glargine (LANTUS) injection 10 Units  10 Units SubCUTAneous DAILY 10 Units at 12/12/18 1018    bisacodyl (DULCOLAX) suppository 10 mg  10 mg Rectal DAILY 10 mg at 12/12/18 1018    insulin lispro (HUMALOG) injection   SubCUTAneous Q6H Stopped at 12/09/18 0000    meropenem (MERREM) 2 g in 0.9% sodium chloride 100 mL IVPB  2 g IntraVENous Q12H 2 g at 12/12/18 0105    influenza vaccine 2018-19 (6 mos+)(PF) (FLUARIX QUAD/FLULAVAL QUAD) injection 0.5 mL  0.5 mL IntraMUSCular PRIOR TO DISCHARGE      labetalol (NORMODYNE;TRANDATE) injection 10 mg  10 mg IntraVENous Q4H PRN 10 mg at 12/12/18 0501    hydrALAZINE (APRESOLINE) 20 mg/mL injection 20 mg  20 mg IntraVENous Q6H PRN 20 mg at 12/12/18 0810    acetaminophen (TYLENOL) suppository 650 mg  650 mg Rectal Q4H  mg at 12/05/18 0044    alteplase (CATHFLO) 1 mg in sterile water (preservative free) 1 mL injection  1 mg InterCATHeter PRN      sodium chloride (NS) flush 10-30 mL  10-30 mL InterCATHeter PRN      sodium chloride (NS) flush 10 mL  10 mL InterCATHeter Q24H 10 mL at 12/11/18 1021    sodium chloride (NS) flush 10 mL  10 mL InterCATHeter PRN 10 mL at 12/10/18 2026    sodium chloride (NS) flush 10-40 mL  10-40 mL InterCATHeter Q8H 10 mL at 12/12/18 0501    sodium chloride (NS) flush 20 mL  20 mL InterCATHeter Q24H 20 mL at 12/11/18 1021    sodium chloride (NS) flush 5-10 mL  5-10 mL IntraVENous PRN 10 mL at 12/01/18 1909    sodium chloride (NS) flush 5-10 mL  5-10 mL IntraVENous Q8H 10 mL at 12/12/18 0502    sodium chloride (NS) flush 5-10 mL  5-10 mL IntraVENous PRN      acetaminophen (TYLENOL) tablet 650 mg  650 mg Oral Q4H  mg at 12/03/18 1815    ondansetron (ZOFRAN) injection 4 mg  4 mg IntraVENous Q4H PRN 4 mg at 12/06/18 0818    glucose chewable tablet 16 g  4 Tab Oral PRN      dextrose (D50W) injection syrg 12.5-25 g  25-50 mL IntraVENous PRN 25 g at 12/07/18 0653    glucagon (GLUCAGEN) injection 1 mg  1 mg IntraMUSCular PRN             Case discussed with:      Cherry Ladd DO

## 2018-12-12 NOTE — PROGRESS NOTES
[facility] Dosing Services: IV to PO Conversion    The pharmacist has determined that this patient meets P & T approved criteria for conversion from IV to oral therapy for the following medication: Levothyroxine    The pharmacist has written the following order for the patient: Levothyroxine 125mcg by mouth daily before breakfast  The pharmacist will continue to monitor the patient's status and advise the physician if conversion back to IV therapy is recommended. Signed Cyrus Ley [facility] information:  [phone]

## 2018-12-12 NOTE — PROGRESS NOTES
Ortho update:    Pt resting in bed, extubated. Pt only spoke a few words to me. Unable to answer direct questions.  at bedside and stated she was talking earlier and moving both her left and right arm. Pt recently got Dilaudid IV for pain and now is much more somnolent    VSS  Pt resting with eyes open, little conversation. Pt will follow command and squeeze left and right hand. Bilateral elbows with less erythema/ no grimace with ROM of elbows bilaterally. A/P:  A: 1. Discitis C5-C7      2. Right elbow cellulitis      3. Right elbow synovitis       4. Acute encephalopathy complicated by respiratory failure in setting of GBS bacteremia    Improvement of left arm per  since initiation of broad spectrum dual therapy abx. Limited physical examination due to poor mentation. Discitis to be treated with IV abx for 6 weeks. ID on board. Will continue to follow.      Preethi Rodriguez, NP

## 2018-12-12 NOTE — PROGRESS NOTES
80 - Spoke to Dr. Ramirez Perez in regards to patient's blood pressure sustaining 190s-200s. Orders received to stop IV fluids and give 10mg IV labetalol    0400 - Reassessment complete. BP remains elevated. Bedside and Verbal shift change report given to Mamie Sandoval (oncoming nurse) by Ktae Luong RN (offgoing nurse). Report included the following information SBAR, Kardex and ED Summary.

## 2018-12-12 NOTE — PROGRESS NOTES
Nutrition Assessment:    RECOMMENDATIONS/INTERVENTION(S):   1. Continue mechanical soft, consistent CHO diet. 2. Encourage PO intake as able     3. Continue Glucerna BID, if intake continues to be poor, increase to TID. 4. Will monitor intake, wt, and diet advancement. ASSESSMENT:   12/11: Diet advanced to full liquids yesterday per SLP. I added Glucerna supplements at that time given limited protein options and history of poor PO intake. Check up on pt this am. She reported a decreased appetite but that it was improving. Also stated it has been a while since she ate last. Responses to question slow and a bit confused. Pt did not think she had tried the Glucerna yet. Discussed rational of oral nutrition supplements and encouraged pt to give it a try with current appetite. Pt not yet touched breakfast trays except for a few bites of antony crackers. After visiting with pt, diet was advanced to mechanical soft, consistent CHO by speech therapy. Wt trending up with 3+ generalized edema. BG  [id number]. Meds: lantus, lispro, protonix, and bowel regimen. 12/10: Pt passed SBT and extubated this morning to nasal cannula. Currently at 4 L/min. Epistaxis/hemoptysis, which led to intubation, improving per ENT. MRI of brain, T and L spine negative. Discitis of C spine being treating with antibiotics. Does not appear that tubefeeds were started over the weekend. Na+, mag, Phos and K+ now all WNL. Creat 1.17. BG [id number]. On high sensitivity sliding scale, No insulin coverage needed in the last 24 hrs On D5 IVF @ 75 mL/hr providing 90g dextrose and 306 kcal/day. Propofol off.      12/7: Pt required emergent intubation today, was transferred to ICU. Unable to change EEN's using [employer] equation due to vent measurements not charted yet. Propofol ordered for sedation, currently at 26.4ml/hr (696kcals). Other meds: Dulcolax, Humalog. D5 running at 75ml/hr.   Still no BM noted since 11/27, BS's noted as hypoactive. Labs: Na+ 150, BG 63, HgbA1c 7.2, .     12/5: 59yo female admitted for metabolic acidosis. RD assessment for LOS. PMHx: DM, HTN. Class II Obese per BMI. No weight hx from PTA to assess. Pt not responding to any questions and no family in room at time of visit today x 2.  Spoke with RN who reports pt has not had anything to eat today and has been mostly NPO since admission. Attempted to feed pt but food was held in mouth. RN states pt refusing to swallow pills today too. Pureed diet ordered. SLP not following pt.  informed RN this started about 3 weeks PTA, before that she was ambulatory and verbal, eating. BS's are noted as hypoactive and no BM since 11/27. Labs: K+ 3.2, , POC -[phone], HgbA1c 7.2. Meds: Lantus, Humalog, Kcl.  1/2NaCl with 20mEqKCl running at 100ml/hr. Diet Order: Mechanical soft, Consistent carb  % Eaten:  No data found. Pertinent Medications: [x] Reviewed    Labs: [x] Reviewed    Anthropometrics: Height: 5' 1\" (154.9 cm) Weight: 93.4 kg (205 lb 14.6 oz)    IBW (%IBW):   ( ) UBW (%UBW):   (  %)      BMI: Body mass index is 38.91 kg/m². This BMI is indicative of:   [] Underweight    [] Normal    [] Overweight    [x] Class II Obesity    []  Extreme Obesity (BMI>40)  Estimated Nutrition Needs (Based on): 1442 Kcals/day(BMR 1360 x AF 1.3) , 69 g(ABW x 0.8-1gm/day (69-87gm)) Protein  Carbohydrate: At Least 130 g/day  Fluids: 1768mL/day (1 ml/kcal)    Last BM: 12/12, loose  []Active     []Hyperactive  [x]Hypoactive       [] Absent   BS  Skin:    [] Intact   [] Incision  [] Breakdown   [] DTI   [] Tears/Excoriation/Abrasion  [x]Edema - 2+ RLE; 3+ BUEs and LLE [] Other:    Wt Readings from Last 30 Encounters:   12/12/18 93.4 kg (205 lb 14.6 oz)   12/01/18 81.6 kg (179 lb 14.3 oz)   12/01/18 81.6 kg (179 lb 14.3 oz)   12/01/18 81.6 kg (179 lb 14.3 oz)      NUTRITION DIAGNOSES:   Problem:  Inadequate energy intake     Etiology: related to inability to consume sufficient energy secondary to mechanical ventilation     Signs/Symptoms: as evidenced by NPO x 8 days meeting < EEN's       12/10: Nutrition dx not improving, no nutrition started over the weekend.   12/12: nutrition dx improving with diet advancement, but still not meeting EENs    NUTRITION INTERVENTIONS:  Meals/Snacks: Modify diet/texture/consistency/nutrients Enteral/Parenteral Nutrition: Initiate enteral nutrition                GOAL:   Pt to meet at least 90% of energy needs in the next 1-3 days    Cultural, Druze, or Ethnic Dietary Needs: None     EDUCATION & DISCHARGE NEEDS:    [x] None Identified   [] Identified and Education Provided/Documented   [] Identified and Pt declined/was not appropriate      [x] Interdisciplinary Care Plan Reviewed/Documented    [x] Discharge Needs: Continue oral nutrition supplements of Glucerna or similar product, at least 1-2 per day until appetite returns to baseline   [] No Nutrition Related Discharge Needs    NUTRITION RISK:   Pt Is At Nutrition Risk  [x]     No Nutrition Risk Identified  []       PT SEEN FOR:    []  MD Consult: []Calorie Count      []Diabetic Diet Education        []Diet Education     []Electrolyte Management     []General Nutrition Management and Supplements     []Management of Tube Feeding     []TPN Recommendations    []  RN Referral:  []MST score >=2     []Enteral/Parenteral Nutrition PTA     []Pregnant: Gestational DM or Multigestation                 [] Pressure Ulcer    []  Low BMI      []  Length of Stay       [] Dysphagia Diet         [] Ventilator  [x]  Follow-up     Previous Recommendations:   [x] Implemented          [] Not Implemented          [] Not Applicable    Previous Goal:   [x] Met              [] Progressing Towards Goal              [] Not Progressing Towards Goal   [] Not Applicable            Baylee Mccormick, [address]  Pager [phone]   Office [phone]

## 2018-12-13 LAB
ANION GAP SERPL CALC-SCNC: 12 MMOL/L (ref 5–15)
BASOPHILS # BLD: 0.1 K/UL (ref 0–0.1)
BASOPHILS NFR BLD: 1 % (ref 0–1)
BUN SERPL-MCNC: 20 MG/DL (ref 6–20)
BUN/CREAT SERPL: 24 (ref 12–20)
CALCIUM SERPL-MCNC: 9.2 MG/DL (ref 8.5–10.1)
CHLORIDE SERPL-SCNC: 109 MMOL/L (ref 97–108)
CO2 SERPL-SCNC: 23 MMOL/L (ref 21–32)
CORTIS SERPL-MCNC: 19.5 UG/DL
CREAT SERPL-MCNC: 0.84 MG/DL (ref 0.55–1.02)
DIFFERENTIAL METHOD BLD: ABNORMAL
EOSINOPHIL # BLD: 0.7 K/UL (ref 0–0.4)
EOSINOPHIL NFR BLD: 7 % (ref 0–7)
ERYTHROCYTE [DISTWIDTH] IN BLOOD BY AUTOMATED COUNT: 14.1 % (ref 11.5–14.5)
GLUCOSE BLD STRIP.AUTO-MCNC: 133 MG/DL (ref 65–100)
GLUCOSE BLD STRIP.AUTO-MCNC: 136 MG/DL (ref 65–100)
GLUCOSE BLD STRIP.AUTO-MCNC: 194 MG/DL (ref 65–100)
GLUCOSE BLD STRIP.AUTO-MCNC: 201 MG/DL (ref 65–100)
GLUCOSE SERPL-MCNC: 130 MG/DL (ref 65–100)
HCT VFR BLD AUTO: 28.3 % (ref 35–47)
HGB BLD-MCNC: 8.4 G/DL (ref 11.5–16)
IMM GRANULOCYTES # BLD: 0.2 K/UL (ref 0–0.04)
IMM GRANULOCYTES NFR BLD AUTO: 2 % (ref 0–0.5)
LYMPHOCYTES # BLD: 0.9 K/UL (ref 0.8–3.5)
LYMPHOCYTES NFR BLD: 9 % (ref 12–49)
MAGNESIUM SERPL-MCNC: 1.7 MG/DL (ref 1.6–2.4)
MCH RBC QN AUTO: 26.7 PG (ref 26–34)
MCHC RBC AUTO-ENTMCNC: 29.7 G/DL (ref 30–36.5)
MCV RBC AUTO: 89.8 FL (ref 80–99)
MONOCYTES # BLD: 1 K/UL (ref 0–1)
MONOCYTES NFR BLD: 9 % (ref 5–13)
NEUTS SEG # BLD: 7.4 K/UL (ref 1.8–8)
NEUTS SEG NFR BLD: 72 % (ref 32–75)
NRBC # BLD: 0 K/UL (ref 0–0.01)
NRBC BLD-RTO: 0 PER 100 WBC
PLATELET # BLD AUTO: 451 K/UL (ref 150–400)
PMV BLD AUTO: 9.9 FL (ref 8.9–12.9)
POTASSIUM SERPL-SCNC: 4 MMOL/L (ref 3.5–5.1)
RBC # BLD AUTO: 3.15 M/UL (ref 3.8–5.2)
SERVICE CMNT-IMP: ABNORMAL
SODIUM SERPL-SCNC: 144 MMOL/L (ref 136–145)
WBC # BLD AUTO: 10.3 K/UL (ref 3.6–11)

## 2018-12-13 PROCEDURE — 74011000258 HC RX REV CODE- 258: Performed by: INTERNAL MEDICINE

## 2018-12-13 PROCEDURE — 82533 TOTAL CORTISOL: CPT

## 2018-12-13 PROCEDURE — C9113 INJ PANTOPRAZOLE SODIUM, VIA: HCPCS | Performed by: INTERNAL MEDICINE

## 2018-12-13 PROCEDURE — 74011250637 HC RX REV CODE- 250/637: Performed by: INTERNAL MEDICINE

## 2018-12-13 PROCEDURE — 74011636637 HC RX REV CODE- 636/637: Performed by: INTERNAL MEDICINE

## 2018-12-13 PROCEDURE — 36415 COLL VENOUS BLD VENIPUNCTURE: CPT

## 2018-12-13 PROCEDURE — 82962 GLUCOSE BLOOD TEST: CPT

## 2018-12-13 PROCEDURE — 92526 ORAL FUNCTION THERAPY: CPT

## 2018-12-13 PROCEDURE — 85025 COMPLETE CBC W/AUTO DIFF WBC: CPT

## 2018-12-13 PROCEDURE — 65660000000 HC RM CCU STEPDOWN

## 2018-12-13 PROCEDURE — 94760 N-INVAS EAR/PLS OXIMETRY 1: CPT

## 2018-12-13 PROCEDURE — 74011250637 HC RX REV CODE- 250/637: Performed by: PHYSICIAN ASSISTANT

## 2018-12-13 PROCEDURE — 74011250636 HC RX REV CODE- 250/636: Performed by: INTERNAL MEDICINE

## 2018-12-13 PROCEDURE — 80048 BASIC METABOLIC PNL TOTAL CA: CPT

## 2018-12-13 PROCEDURE — 77010033678 HC OXYGEN DAILY

## 2018-12-13 PROCEDURE — 83735 ASSAY OF MAGNESIUM: CPT

## 2018-12-13 RX ORDER — OXYCODONE HYDROCHLORIDE 5 MG/1
5 TABLET ORAL
Status: DISCONTINUED | OUTPATIENT
Start: 2018-12-13 | End: 2018-12-17 | Stop reason: HOSPADM

## 2018-12-13 RX ADMIN — INSULIN GLARGINE 10 UNITS: 100 INJECTION, SOLUTION SUBCUTANEOUS at 08:08

## 2018-12-13 RX ADMIN — Medication 10 ML: at 06:55

## 2018-12-13 RX ADMIN — ACETAMINOPHEN 650 MG: 325 TABLET, FILM COATED ORAL at 17:19

## 2018-12-13 RX ADMIN — Medication 10 MG: at 08:24

## 2018-12-13 RX ADMIN — SODIUM CHLORIDE 40 MG: 9 INJECTION INTRAMUSCULAR; INTRAVENOUS; SUBCUTANEOUS at 08:07

## 2018-12-13 RX ADMIN — HYDRALAZINE HYDROCHLORIDE 20 MG: 20 INJECTION INTRAMUSCULAR; INTRAVENOUS at 01:32

## 2018-12-13 RX ADMIN — LISINOPRIL 40 MG: 20 TABLET ORAL at 08:08

## 2018-12-13 RX ADMIN — Medication 10 ML: at 16:40

## 2018-12-13 RX ADMIN — INSULIN LISPRO 1 UNITS: 100 INJECTION, SOLUTION INTRAVENOUS; SUBCUTANEOUS at 23:07

## 2018-12-13 RX ADMIN — MEROPENEM 2 G: 1 INJECTION, POWDER, FOR SOLUTION INTRAVENOUS at 01:29

## 2018-12-13 RX ADMIN — MEROPENEM 2 G: 1 INJECTION, POWDER, FOR SOLUTION INTRAVENOUS at 10:48

## 2018-12-13 RX ADMIN — Medication 10 ML: at 23:07

## 2018-12-13 RX ADMIN — MEROPENEM 2 G: 1 INJECTION, POWDER, FOR SOLUTION INTRAVENOUS at 17:00

## 2018-12-13 RX ADMIN — ATENOLOL 25 MG: 50 TABLET ORAL at 08:08

## 2018-12-13 RX ADMIN — LEVOTHYROXINE SODIUM 125 MCG: 125 TABLET ORAL at 06:54

## 2018-12-13 NOTE — PROGRESS NOTES
[facility] Progress Note           Melissa Camarillo DO    [phone] Office  [phone]  Fax    2018      Assessment & Plan:   1. Right elbow cellulitis complicated by GBS bacteremia. Repeat BC's NGSF. Orthopedic surgery is following. S/p joint aspiration by IR . Cultures NGSF. Continue meropenem. 2. Encephalopathy. MS improving. 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 Traumatic tap with  abnormal.LP results. Meningitis pathogens panel negative. CSF culture NGSF. Lyme CSF and WNV negative. CSF fluid cytology unremarkable. HSV 1&2 PCR negative. MRI brain negative. Treat empirically for GBS meningitis  3. Cervical spine diskitis. Abx as above. Needs 6 weeks IV abx  4. Thrombocytosis. Follow  5. Acute kidney injury. Resolved    6. Diabetes mellitus. Management per primary team.  7. Rash prior to admission. Resolved. Pictures reviewed with patients . Possible cellulitis? Rash not c/w HSV/VZV. 8.   CoNS isolated from a single blood culture. This represents a contaminant. 9. Hemoptysis. Resolved. ENT following          Subjective:     Mental status continues to improve    Objective:     Vitals:   Visit Vitals  BP (!) 133/100   Pulse 94   Temp 97.8 °F (36.6 °C)   Resp 23   Ht 5' 1\" (1.549 m)   Wt 90.3 kg (199 lb 1.2 oz)   SpO2 96%   BMI 37.62 kg/m²        Tmax:  Temp (24hrs), Av.2 °F (36.8 °C), Min:97.6 °F (36.4 °C), Max:99.6 °F (37.6 °C)      Exam:   Patient is intubated:      Physical Examination:   General:  Awake NAD    Head:  Normocephalic, atraumatic. Eyes:  Conjunctivae clear   Neck: Supple       Lungs:   Clear to auscultation bilaterally. Chest wall:     Heart:  Regular rate and rhythm   Abdomen:      Extremities: Edema   Skin:  No rash   Neurologic: CN II-XII GI     Labs:        No lab exists for component: ITNL   No results for input(s): CPK, CKMB, TROIQ in the last 72 hours.   Recent Labs     18  4359 18  0810 12/11/18  1612    143 142   K 4.0 4.6 4.5   * 110* 109*   CO2 23 22 24   BUN 20 24* 28*   CREA 0.84 0.89 0.93   * 143* 176*   MG 1.7 1.8  --    WBC 10.3 9.2 9.6   HGB 8.4* 7.6* 7.6*   HCT 28.3* 25.5* 25.0*   * 389 365     No results for input(s): INR, PTP, APTT in the last 72 hours. No lab exists for component: INREXT, INREXT  Needs: urine analysis, urine sodium, protein and creatinine  Lab Results   Component Value Date/Time    Sodium,urine random 29 11/30/2018 06:10 PM    Creatinine, urine 187.11 11/30/2018 06:10 PM         Cultures:     No results found for: SDES  Lab Results   Component Value Date/Time    Culture result: SCANT NORMAL RESPIRATORY TRIXIE 12/07/2018 01:30 PM    Culture result: MODERATE YEAST (A) 12/07/2018 01:30 PM    Culture result: HEAVY CANDIDA PARAPSILOSIS (A) 12/07/2018 01:30 PM    Culture result:  12/07/2018 01:30 PM     CULTURE WILL BE HELD FOR 4 WEEKS. IF THERE IS ADDITIONAL FUNGAL GROWTH, A NEW REPORT WILL FOLLOW.        Radiology:     Medications       Current Facility-Administered Medications   Medication Dose Route Frequency Last Dose    oxyCODONE IR (ROXICODONE) tablet 5 mg  5 mg Oral Q4H PRN      meropenem (MERREM) 2 g in 0.9% sodium chloride 100 mL IVPB  2 g IntraVENous Q8H 2 g at 12/13/18 1048    cloNIDine (CATAPRES) 0.3 mg/24 hr patch 1 Patch  1 Patch TransDERmal Q7D 1 Patch at 12/12/18 0802    atenolol (TENORMIN) tablet 25 mg  25 mg Oral DAILY 25 mg at 12/13/18 0808    lisinopril (PRINIVIL, ZESTRIL) tablet 40 mg  40 mg Oral DAILY 40 mg at 12/13/18 0808    levothyroxine (SYNTHROID) tablet 125 mcg  125 mcg Oral  mcg at 12/13/18 0654    insulin lispro (HUMALOG) injection   SubCUTAneous AC&HS Stopped at 12/12/18 1630    HYDROmorphone (PF) (DILAUDID) injection 0.5 mg  0.5 mg IntraVENous Q3H PRN 0.5 mg at 12/12/18 1022    pantoprazole (PROTONIX) 40 mg in sodium chloride 0.9% 10 mL injection  40 mg IntraVENous DAILY 40 mg at 12/13/18 3695    albuterol (PROVENTIL VENTOLIN) nebulizer solution 2.5 mg  2.5 mg Nebulization Q4H PRN 2.5 mg at 12/10/18 1539    insulin glargine (LANTUS) injection 10 Units  10 Units SubCUTAneous DAILY 10 Units at 12/13/18 7293    bisacodyl (DULCOLAX) suppository 10 mg  10 mg Rectal DAILY 10 mg at 12/13/18 0824    influenza vaccine 2018-19 (6 mos+)(PF) (FLUARIX QUAD/FLULAVAL QUAD) injection 0.5 mL  0.5 mL IntraMUSCular PRIOR TO DISCHARGE      labetalol (NORMODYNE;TRANDATE) injection 10 mg  10 mg IntraVENous Q4H PRN 10 mg at 12/12/18 0501    hydrALAZINE (APRESOLINE) 20 mg/mL injection 20 mg  20 mg IntraVENous Q6H PRN 20 mg at 12/13/18 0132    alteplase (CATHFLO) 1 mg in sterile water (preservative free) 1 mL injection  1 mg InterCATHeter PRN      sodium chloride (NS) flush 5-10 mL  5-10 mL IntraVENous Q8H 10 mL at 12/13/18 0655    sodium chloride (NS) flush 5-10 mL  5-10 mL IntraVENous PRN      acetaminophen (TYLENOL) tablet 650 mg  650 mg Oral Q4H  mg at 12/03/18 1815    ondansetron (ZOFRAN) injection 4 mg  4 mg IntraVENous Q4H PRN 4 mg at 12/06/18 0818    glucose chewable tablet 16 g  4 Tab Oral PRN      dextrose (D50W) injection syrg 12.5-25 g  25-50 mL IntraVENous PRN 25 g at 12/07/18 0653    glucagon (GLUCAGEN) injection 1 mg  1 mg IntraMUSCular PRN             Case discussed with:      eDan Poster, DO

## 2018-12-13 NOTE — PROGRESS NOTES
[address] [place] [address]  YOB: 1955          Assessment & Plan:   HTN  · Not the best  · Back on PO BB,ACE,Clonidine patch  · Cortisol ok  · Renin,brenda pending    ARF due to IVVD/sepsis  · Creat better    AMS  aNEMIA       Subjective:   CC: f/u ARF  HPI:BAYLEE resolved  HTN: LABIL,E ON PO MEDS  AMS persists  ROS: Unable to obtain due to AMS  Current Facility-Administered Medications   Medication Dose Route Frequency    oxyCODONE IR (ROXICODONE) tablet 5 mg  5 mg Oral Q4H PRN    meropenem (MERREM) 2 g in 0.9% sodium chloride 100 mL IVPB  2 g IntraVENous Q8H    cloNIDine (CATAPRES) 0.3 mg/24 hr patch 1 Patch  1 Patch TransDERmal Q7D    atenolol (TENORMIN) tablet 25 mg  25 mg Oral DAILY    lisinopril (PRINIVIL, ZESTRIL) tablet 40 mg  40 mg Oral DAILY    levothyroxine (SYNTHROID) tablet 125 mcg  125 mcg Oral ACB    insulin lispro (HUMALOG) injection   SubCUTAneous AC&HS    HYDROmorphone (PF) (DILAUDID) injection 0.5 mg  0.5 mg IntraVENous Q3H PRN    pantoprazole (PROTONIX) 40 mg in sodium chloride 0.9% 10 mL injection  40 mg IntraVENous DAILY    albuterol (PROVENTIL VENTOLIN) nebulizer solution 2.5 mg  2.5 mg Nebulization Q4H PRN    insulin glargine (LANTUS) injection 10 Units  10 Units SubCUTAneous DAILY    bisacodyl (DULCOLAX) suppository 10 mg  10 mg Rectal DAILY    influenza vaccine 2018-19 (6 mos+)(PF) (FLUARIX QUAD/FLULAVAL QUAD) injection 0.5 mL  0.5 mL IntraMUSCular PRIOR TO DISCHARGE    labetalol (NORMODYNE;TRANDATE) injection 10 mg  10 mg IntraVENous Q4H PRN    hydrALAZINE (APRESOLINE) 20 mg/mL injection 20 mg  20 mg IntraVENous Q6H PRN    alteplase (CATHFLO) 1 mg in sterile water (preservative free) 1 mL injection  1 mg InterCATHeter PRN    sodium chloride (NS) flush 5-10 mL  5-10 mL IntraVENous Q8H    sodium chloride (NS) flush 5-10 mL  5-10 mL IntraVENous PRN    acetaminophen (TYLENOL) tablet 650 mg  650 mg Oral Q4H PRN  ondansetron (ZOFRAN) injection 4 mg  4 mg IntraVENous Q4H PRN    glucose chewable tablet 16 g  4 Tab Oral PRN    dextrose (D50W) injection syrg 12.5-25 g  25-50 mL IntraVENous PRN    glucagon (GLUCAGEN) injection 1 mg  1 mg IntraMUSCular PRN          Objective:     Vitals:  Blood pressure (!) 133/100, pulse 94, temperature 97.8 °F (36.6 °C), resp. rate 23, height 5' 1\" (1.549 m), weight 90.3 kg (199 lb 1.2 oz), SpO2 96 %. Temp (24hrs), Av.2 °F (36.8 °C), Min:97.6 °F (36.4 °C), Max:99.6 °F (37.6 °C)      Intake and Output:  No intake/output data recorded.  1901 -  0700  In: 440 [P.O.:240; I.V.:200]  Out: 2370 [Urine:2370]    Physical Exam:               GENERAL ASSESSMENT: AMS  CHEST: CTA  HEART: S1S2  ABDOMEN: Soft,NT  EXTREMITY: no EDEMA          ECG/rhythm:    Data Review      No results for input(s): TNIPOC in the last 72 hours. No lab exists for component: ITNL   No results for input(s): CPK, CKMB, TROIQ in the last 72 hours. Recent Labs     18  0624 18  0338 18  1612    143 142   K 4.0 4.6 4.5   * 110* 109*   CO2 23 22 24   BUN 20 24* 28*   CREA 0.84 0.89 0.93   * 143* 176*   MG 1.7 1.8  --    CA 9.2 8.5 8.5   WBC 10.3 9.2 9.6   HGB 8.4* 7.6* 7.6*   HCT 28.3* 25.5* 25.0*   * 389 365      No results for input(s): INR, PTP, APTT in the last 72 hours. No lab exists for component: INREXT, INREXT  Needs: urine analysis, urine sodium, protein and creatinine  Lab Results   Component Value Date/Time    Sodium,urine random 29 2018 06:10 PM    Creatinine, urine 187.11 2018 06:10 PM           : Priyanka Cantu MD  2018        [facility]:  [email or web address]  [facility] office:  [address] [place] [address], [address] [place] [address],[address]  [place], [address] [facility]  Phone: [phone]  Fax :     [phone]    [place] office:  [address] [facility], [address] [place] [address]  Phone - [phone]  Fax - [phone]

## 2018-12-13 NOTE — PROGRESS NOTES
12- CASE MANAGEMENT NOTE:  I am familiar with this pt as I met with her  prior to her transfer to ICU. Per the , Orestes Qureshi ([phone]), the pt was independent with her ADL's and drove until several days prior to admission. We discussed the probable need for some in-pt rehab when her condition improves and he is in agreement. CM will continue to follow and assist as needed.  MADELAINE Mckeon, CM

## 2018-12-13 NOTE — PROGRESS NOTES
Blanco Mcpherson [facility] [address]  [address] [facility], [place], [address] [place] [address]  [phone]      Medical Progress Note      NAME: Adriana Raymundo   :  1955  MRM:  [id number]    Date/Time: 2018  8:13 AM       Assessment and Plan:   1.  C-spine discitis: MRI showed evidence of discitis at C5-C6 and C6-C7, with abnormal enhancement of the vertebral bodies. There is enhancing phlegmon in the epidural space. No drainable abscess. Evaluated by ortho and ID. Needs 6 weeks of IV ABx. Currently on meropenem     2. Acute metabolic encephalopathy: unclear etiology. ? GBS meningitis, ?medications and relation to the diskitis. Head CT, MRI brain without acute process. TSH, B12/folate, ammonia WNL s/p LP. HSV neg. EEG negative for seizure activity, RPR negative, West Nile negative, HIV negative, Lyme IgG positive but IgM negative. Monitor clinically      3. Hemoptysis vs epistaxis: sudden onset . Underwent bronchoscopy which showed large amount of blood in the trachea, however past paula only scants amount of blood in RLL. Some thick yellow/white secretions in LLL and RLL. All lobes/subsegments inspected with no signs of bleeding. Evaluated by ENT. Stable      4. Severe sepsis/bacteremia: Blood Cx + 4/4 bottles with GBS on admission, repeat blood Cx NGTD. With R arm/elbow cellulitis and now evidence of discitis. No e/o valvular veg on TTE  Continue meropenem as per ID      5.  R elbow cellulitis/bursitis/acute pain: s/p joint aspiration by IR on , cultures negative to date. On meropenem. 6.  ARF: likely from ATN/sepsis, hypovolemia. Resolved. 7.  AG metabolic acidosis: resolved. Due to sepsis     8. DM type 2 w/ hypoglycemia: A1C 7.2%. On lantus. 9.  HTN: on atenolol, lisinopril      10. Anemia: Fe studies consistent with chronic dz. Monitor               Subjective:     Chief Complaint:  Follow up of pt who was admitted with encephalopathy, cellulitis.  C/o back pain     ROS:  (bold if positive, if negative)      Tolerating PT  Tolerating Diet        Objective:     Last 24hrs VS reviewed since prior progress note.  Most recent are:    Visit Vitals  BP (!) 181/101 (BP 1 Location: Right arm, BP Patient Position: At rest)   Pulse (!) 104   Temp 99.6 °F (37.6 °C)   Resp 26   Ht 5' 1\" (1.549 m)   Wt 90.3 kg (199 lb 1.2 oz)   SpO2 97%   BMI 37.62 kg/m²     SpO2 Readings from Last 6 Encounters:   12/13/18 97%    O2 Flow Rate (L/min): 2 l/min       Intake/Output Summary (Last 24 hours) at 12/13/2018 0813  Last data filed at 12/13/2018 0400  Gross per 24 hour   Intake 440 ml   Output 1720 ml   Net -1280 ml        Physical Exam:    Gen:  Well-developed, well-nourished, in no acute distress  HEENT:  Pink conjunctivae, PERRL, hearing intact to voice, moist mucous membranes  Neck:  Supple, without masses, thyroid non-tender  Resp:  No accessory muscle use, clear breath sounds without wheezes rales or rhonchi  Card:  No murmurs, normal S1, S2 without thrills, bruits or peripheral edema  Abd:  Soft, non-tender, non-distended, normoactive bowel sounds are present, no palpable organomegaly and no detectable hernias  Lymph:  No cervical or inguinal adenopathy  Musc:  No cyanosis or clubbing  Skin:  No rashes or ulcers, skin turgor is good  Neuro:  Cranial nerves are grossly intact, no focal motor weakness, follows commands appropriately  Psych:  Poor insight,    __________________________________________________________________  Medications Reviewed: (see below)  Medications:     Current Facility-Administered Medications   Medication Dose Route Frequency    cloNIDine (CATAPRES) 0.3 mg/24 hr patch 1 Patch  1 Patch TransDERmal Q7D    atenolol (TENORMIN) tablet 25 mg  25 mg Oral DAILY    lisinopril (PRINIVIL, ZESTRIL) tablet 40 mg  40 mg Oral DAILY    levothyroxine (SYNTHROID) tablet 125 mcg  125 mcg Oral ACB    oxyCODONE IR (ROXICODONE) tablet 5 mg  5 mg Oral Q6H PRN    insulin lispro (HUMALOG) injection SubCUTAneous AC&HS    HYDROmorphone (PF) (DILAUDID) injection 0.5 mg  0.5 mg IntraVENous Q3H PRN    pantoprazole (PROTONIX) 40 mg in sodium chloride 0.9% 10 mL injection  40 mg IntraVENous DAILY    albuterol (PROVENTIL VENTOLIN) nebulizer solution 2.5 mg  2.5 mg Nebulization Q4H PRN    insulin glargine (LANTUS) injection 10 Units  10 Units SubCUTAneous DAILY    bisacodyl (DULCOLAX) suppository 10 mg  10 mg Rectal DAILY    meropenem (MERREM) 2 g in 0.9% sodium chloride 100 mL IVPB  2 g IntraVENous Q12H    influenza vaccine 2018-19 (6 mos+)(PF) (FLUARIX QUAD/FLULAVAL QUAD) injection 0.5 mL  0.5 mL IntraMUSCular PRIOR TO DISCHARGE    labetalol (NORMODYNE;TRANDATE) injection 10 mg  10 mg IntraVENous Q4H PRN    hydrALAZINE (APRESOLINE) 20 mg/mL injection 20 mg  20 mg IntraVENous Q6H PRN    alteplase (CATHFLO) 1 mg in sterile water (preservative free) 1 mL injection  1 mg InterCATHeter PRN    sodium chloride (NS) flush 5-10 mL  5-10 mL IntraVENous Q8H    sodium chloride (NS) flush 5-10 mL  5-10 mL IntraVENous PRN    acetaminophen (TYLENOL) tablet 650 mg  650 mg Oral Q4H PRN    ondansetron (ZOFRAN) injection 4 mg  4 mg IntraVENous Q4H PRN    glucose chewable tablet 16 g  4 Tab Oral PRN    dextrose (D50W) injection syrg 12.5-25 g  25-50 mL IntraVENous PRN    glucagon (GLUCAGEN) injection 1 mg  1 mg IntraMUSCular PRN        Lab Data Reviewed: (see below)  Lab Review:     Recent Labs     12/13/18  0624 12/12/18  0338 12/11/18  1612   WBC 10.3 9.2 9.6   HGB 8.4* 7.6* 7.6*   HCT 28.3* 25.5* 25.0*   * 389 365     Recent Labs     12/13/18  0624 12/12/18  0338 12/11/18  1612    143 142   K 4.0 4.6 4.5   * 110* 109*   CO2 23 22 24   * 143* 176*   BUN 20 24* 28*   CREA 0.84 0.89 0.93   CA 9.2 8.5 8.5   MG 1.7 1.8  --      Lab Results   Component Value Date/Time    Glucose (POC) 133 (H) 12/13/2018 06:36 AM    Glucose (POC) 126 (H) 12/12/2018 08:45 PM    Glucose (POC) 119 (H) 12/12/2018 05:43 PM    Glucose (POC) 187 (H) 12/12/2018 01:15 PM    Glucose (POC) 141 (H) 12/12/2018 04:58 AM     No results for input(s): PH, PCO2, PO2, HCO3, FIO2 in the last 72 hours. No results for input(s): INR in the last 72 hours. No lab exists for component: INREXT  All Micro Results     Procedure Component Value Units Date/Time    CULTURE, FUNGUS [[id number]]  (Abnormal) Collected:  12/07/18 1330    Order Status:  Completed Specimen:  Bronchial lavage Updated:  12/12/18 1129     Special Requests: NO SPECIAL REQUESTS        Culture result:       HEAVY CANDIDA PARAPSILOSIS                  CULTURE WILL BE HELD FOR 4 WEEKS. IF THERE IS ADDITIONAL FUNGAL GROWTH, A NEW REPORT WILL FOLLOW. CULTURE, CSF Shaan Valencia STAIN [[id number]] Collected:  12/04/18 1430    Order Status:  Completed Specimen:  Cerebrospinal Fluid Updated:  12/11/18 0919     Special Requests: NO SPECIAL REQUESTS        GRAM STAIN RARE WBCS SEEN         NO ORGANISMS SEEN        Culture result:       Culture performed on Unspun Fluid                  NO GROWTH ON SOLID MEDIA AT 4 DAYS                  NO GROWTH IN THIO BROTH AT 7 DAYS          CULTURE, FUNGUS [[id number]] Collected:  12/06/18 1509    Order Status:  Completed Specimen:   Body fld Updated:  12/10/18 0916     Special Requests: NO SPECIAL REQUESTS        Culture result: NO FUNGUS ISOLATED 4 DAYS       AFB CULTURE & SMEAR W/REFLEX ID [[id number]] Collected:  12/07/18 1330    Order Status:  Completed Specimen:  Miscellaneous sample Updated:  12/10/18 0635     Source BRONCHIAL LAVAGE        AFB Specimen processing Concentration     Acid Fast Smear NEGATIVE         Comment: (NOTE)  Performed At: [address] [id number]  Rei Ramey MD [id number]          Acid Fast Culture PENDING    CULTURE, SPUTUM/BRONCH/OTH [[id number]]  (Abnormal) Collected:  12/07/18 1330    Order Status:  Completed Specimen:  Sputum from Bronchial lavage Updated:  12/09/18 1015 Special Requests: NO SPECIAL REQUESTS        GRAM STAIN RARE WBCS SEEN               RARE EPITHELIAL CELLS SEEN            FEW BUDDING YEAST        Culture result:       SCANT NORMAL RESPIRATORY TRIXIE            MODERATE YEAST       CULTURE, ANAEROBIC [[id number]] Collected:  12/06/18 1509    Order Status:  Completed Specimen:  Aspirate Updated:  12/08/18 0917     Special Requests: NO SPECIAL REQUESTS        Culture result:       No growth thus far, holding 14 days. CULTURE, BODY FLUID Mandeep Gibson [[id number]] Collected:  12/06/18 1509    Order Status:  Completed Specimen:  Elbow Updated:  12/08/18 0917     Special Requests: RT ELBOW ASPIRATE     GRAM STAIN RARE WBCS SEEN         NO ORGANISMS SEEN        Culture result:       Culture performed on Fluid swab specimen                  No growth thus far, holding 14 days.           CULTURE LEGIONELLA [[id number]] Collected:  12/07/18 1330    Order Status:  Completed Specimen:  Other Updated:  12/07/18 1402    CULTURE, VIRAL [[id number]]     Order Status:  Canceled Specimen:  Other     CULTURE, BLOOD [[id number]] Collected:  12/01/18 1231    Order Status:  Completed Specimen:  Blood Updated:  12/07/18 0450     Special Requests: NO SPECIAL REQUESTS        Culture result: NO GROWTH 6 DAYS       CULTURE, BLOOD [[id number]] Collected:  12/01/18 1231    Order Status:  Completed Specimen:  Blood Updated:  12/07/18 0450     Special Requests: NO SPECIAL REQUESTS        Culture result: NO GROWTH 6 DAYS       CULTURE, Jeani Mallet STAIN [[id number]] Collected:  12/06/18 1200    Order Status:  Canceled Specimen:  Wound from Elbow     MENINGITIS PATHOGENS PANEL, CSF (BY PCR) [[id number]] Collected:  12/04/18 1442    Order Status:  Completed Specimen:  Cerebrospinal Fluid Updated:  12/04/18 2132     Escherichia coli K1 NOT DETECTED        Haemophilus Influenzae NOT DETECTED        Listeria Monocytogenes NOT DETECTED        Neisseria Meningitidis NOT DETECTED        Streptococcus Agalactiae NOT DETECTED        Streptococcus Pneumoniae NOT DETECTED        Cytomegalovirus NOT DETECTED        Enterovirus NOT DETECTED        Herpes Simplex Virus 1 NOT DETECTED        Herpes Simplex Virus 2 NOT DETECTED        Human Herpesvirus 6 NOT DETECTED        Human Parechovirus NOT DETECTED        Varicella Zoster Virus NOT DETECTED        Crypto. neoformans/gattii NOT DETECTED       CULTURE, CSF  TUBE 2 [[id number]] Collected:  12/04/18 1425    Order Status:  Canceled Specimen:  Cerebrospinal Fluid Updated:  12/04/18 1448    CULTURE, BLOOD [[id number]]  (Abnormal) Collected:  11/30/18 1230    Order Status:  Completed Specimen:  Blood Updated:  12/03/18 0927     Special Requests: NO SPECIAL REQUESTS        Culture result:       STREPTOCOCCI, BETA HEMOLYTIC GROUP B GROWING IN BOTH BOTTLES DRAWN SITE=LT HAND PLEASE REFER TO [facility] [id number] FOR SENSITIVITIES                  PRELIMINARY REPORT OF GRAM POSITIVE COCCI IN PAIRS AND CHAINS GROWING IN BOTH BOTTLES DRAWN CALLED TO AND READ BACK BY DR SUAZO ON 12/1/18 AT 1033. SH/AOW          CULTURE, BLOOD [[id number]]  (Abnormal)  (Susceptibility) Collected:  11/30/18 1210    Order Status:  Completed Specimen:  Blood Updated:  12/03/18 0926     Special Requests: NO SPECIAL REQUESTS        Culture result:       STREPTOCOCCI, BETA HEMOLYTIC GROUP B GROWING IN BOTH BOTTLES DRAWN SITE=LAC (SENSITIVITIES PERFORMED BY E-TEST)                  PRELIMINARY REPORT OF GRAM POSITIVE COCCI IN PAIRS AND CHAINS GROWING IN BOTH BOTTLES DRAWN CALLED TO AND READ BACK BY DR SUAZO ON 12/1/18 AT 1033. SH/AOW                  STAPHYLOCOCCUS SPECIES, COAGULASE NEGATIVE GROWING IN ONLY 1 OF THE ABOVE BOTTLES PLATES WILL BE HELD 3 DAYS. CALL [phone] IF SENSITIVITIES ARE NEEDED.           RESPIRATORY PANEL,PCR,NASOPHARYNGEAL [[id number]] Collected:  11/30/18 1810    Order Status:  Completed Specimen:  Nasopharyngeal Updated:  11/30/18 2146     Adenovirus NOT DETECTED        Coronavirus 229E NOT DETECTED        Coronavirus HKU1 NOT DETECTED        Coronavirus CVNL63 NOT DETECTED        Coronavirus OC43 NOT DETECTED        Metapneumovirus NOT DETECTED        Rhinovirus and Enterovirus NOT DETECTED        Influenza A NOT DETECTED        Influenza A, subtype H1 NOT DETECTED        Influenza A, subtype H3 NOT DETECTED        INFLUENZA A H1N1 PCR NOT DETECTED        Influenza B NOT DETECTED        Parainfluenza 1 NOT DETECTED        Parainfluenza 2 NOT DETECTED        Parainfluenza 3 NOT DETECTED        Parainfluenza virus 4 NOT DETECTED        RSV by PCR NOT DETECTED        Bordetella pertussis - PCR NOT DETECTED        Chlamydophila pneumoniae DNA, QL, PCR NOT DETECTED        Mycoplasma pneumoniae DNA, QL, PCR NOT DETECTED       URINE CULTURE HOLD SAMPLE [[id number]] Collected:  11/30/18 1241    Order Status:  Completed Specimen:  Serum Updated:  11/30/18 1257     Urine culture hold       URINE ON HOLD IN MICROBIOLOGY DEPT FOR 3 DAYS. IF UNPRESERVED URINE IS SUBMITTED, IT CANNOT BE USED FOR ADDITIONAL TESTING AFTER 24 HRS, RECOLLECTION WILL BE REQUIRED. I have reviewed notes of prior 24hr. Other pertinent lab:       Total time spent with patient: [place] [address] discussed with: Patient, Nursing Staff and >50% of time spent in counseling and coordination of care    Discussed:  Care Plan    Prophylaxis:  SCD's    Disposition:  SNF/LTC           ___________________________________________________    Attending Physician: Hazel Cruz MD

## 2018-12-13 NOTE — PROGRESS NOTES
Problem: Falls - Risk of  Goal: *Absence of Falls  Document Handy Fall Risk and appropriate interventions in the flowsheet. Outcome: Progressing Towards Goal  Fall Risk Interventions:  Mobility Interventions: Communicate number of staff needed for ambulation/transfer, OT consult for ADLs, Patient to call before getting OOB, PT Consult for mobility concerns, Utilize walker, cane, or other assistive device, Bed/chair exit alarm    Mentation Interventions: Bed/chair exit alarm, Adequate sleep, hydration, pain control, Door open when patient unattended, Increase mobility, More frequent rounding, Reorient patient, Room close to nurse's station, Update white board    Medication Interventions: Bed/chair exit alarm, Patient to call before getting OOB, Teach patient to arise slowly    Elimination Interventions: Call light in reach, Patient to call for help with toileting needs, Toilet paper/wipes in reach, Toileting schedule/hourly rounds, Bed/chair exit alarm. ...      1600- Bedside and Verbal shift change report given to Jaime Berumen RN (oncoming nurse) by Juma Cerrato (offgoing nurse). Report included the following information SBAR, Kardex and Recent Results. ..     1900- Bedside and Verbal shift change report given to Onur Chew  (oncoming nurse) by Betty Gutierrez RN  (offgoing nurse). Report included the following information SBAR, Kardex and Recent Results. ..

## 2018-12-13 NOTE — PROGRESS NOTES
Ortho update:     Pt resting in bed, Eyes open to tactile stimulation. Unable to answer direct questions.       VSS    Bilateral elbows with no erythema/ no grimace with ROM of elbows bilaterally.      A/P:  A: 1. Discitis C5-C7      2. Right elbow cellulitis      3. Right elbow synovitis       4. Acute encephalopathy complicated by respiratory failure in setting of GBS bacteremia     Improvement of left arm Limited physical examination due to poor mentation. Discitis to be treated with IV abx for 6 weeks. ID on board. Will continue to follow. Thank you for allowing us to take part in this patients care.       ANGÉLICA GarciaC  Orthopaedic Surgery PA

## 2018-12-13 NOTE — PROGRESS NOTES
Problem: Dysphagia (Adult)  Goal: *Acute Goals and Plan of Care (Insert Text)  Swallowing goals initiated 12-11-18 (weekly re-eval due 12-18-18)  1) tolerate full liquids without s/s aspiration by 12-13-18-met  2) SLP will re-eval for solids by 12-14-18 -met  3) tolerate mech soft, thins without s/s aspiration by 12-14-18   Speech language pathology dysphagia treatment  Patient: Dori Matos (65 y.o. female)  Date: 12/13/2018  Diagnosis: Metabolic acidosis [K64.4] BAYLEE (acute kidney injury) ([facility] [address]  Procedure(s) (LRB):  BRONCHOSCOPY (N/A)  BRONCHIAL WASHINGS FLEXIBLE (Bilateral) 6 Days Post-Op  Precautions: aspiration      ASSESSMENT:  Patient alert, but taking minimal PO besides drinks. Swallow functional.   Slow processing, slow word-finding, thought organization. Significant issues communicating wants and needs, especially under duress. Progression toward goals:  []         Improving appropriately and progressing toward goals  [x]         Improving slowly and progressing toward goals  []         Not making progress toward goals and plan of care will be adjusted     PLAN:  Recommendations and Planned Interventions:  Continue mech soft, thins  Patient continues to benefit from skilled intervention to address the above impairments. Continue treatment per established plan of care. Discharge Recommendations:  Inpatient Rehab     SUBJECTIVE:   Patient stated I'm thirsty. OBJECTIVE:   Cognitive and Communication Status:  Neurologic State: Alert  Orientation Level: Oriented to person, Oriented to place  Cognition: Impaired decision making(slow processing, slow verbal output and thought organization)  Perception: Appears intact  Perseveration: (perseverating on repositioning)  Safety/Judgement: Decreased awareness of environment  Dysphagia Treatment:  Oral Assessment:     P.O. Trials:  Patient Position: semit upright in bed  Vocal quality prior to P.O.: No impairment  Consistency Presented:  Thin liquid(\"I'm thirsty\")  How Presented: Self-fed/presented   ORAL PHASE:   Bolus Acceptance: No impairment  Bolus Formation/Control: No impairment     Propulsion: No impairment  Oral Residue: None   PHARYNGEAL PHASE:   Initiation of Swallow: No impairment  Laryngeal Elevation: Functional  Aspiration Signs/Symptoms: None         RN reports decreased PO. She takes pills well     SPEECH:   She can talk in full sentences, but slow processing and slow word-retrieval and thought organziation. Voice WFL. Exercises:  Laryngeal Exercises:                                                                                                                                   Pain:           After treatment:   []              Patient left in no apparent distress sitting up in chair  []              Patient left in no apparent distress in bed  []              Call bell left within reach  []              Nursing notified  []              Caregiver present  []              Bed alarm activated    COMMUNICATION/EDUCATION:   Patient was educated regarding Her deficit(s) of dysphagia  as this relates to Her diagnosis of BAYLEE. She demonstrated Fair understanding as evidenced by discussion. .    The patients plan of care including recommendations, planned interventions, and recommended diet changes were discussed with: Registered Nurse. []              Posted safety precautions in patient's room.     KRIS Mi  Time Calculation: 10 mins

## 2018-12-13 NOTE — PROGRESS NOTES
Nutrition Assessment:    RECOMMENDATIONS/INTERVENTION(S):   1. Continue mechanical soft, consistent CHO diet per SLP. 2. Encourage PO intake as able     3. Continue Glucerna BID, if intake continues to be poor, increase to TID. 4. Will monitor intake, wt, and diet advancement. ASSESSMENT:   12/12: Pt intakes ~25%. Continue ONS to support nutrition. Weight trending down - 6 lbs from yesterday. Pt was 191 lbs on admission, as high as 205 lbs, down to 199 lbs today. Pitting edema BLE, BUE 2+. Last BM 12/12. Bg 136, 133, 126 mg/dL. Hgb 8.4.     12/11: Diet advanced to full liquids yesterday per SLP. I added Glucerna supplements at that time given limited protein options and history of poor PO intake. Check up on pt this am. She reported a decreased appetite but that it was improving. Also stated it has been a while since she ate last. Responses to question slow and a bit confused. Pt did not think she had tried the Glucerna yet. Discussed rational of oral nutrition supplements and encouraged pt to give it a try with current appetite. Pt not yet touched breakfast trays except for a few bites of antony crackers. After visiting with pt, diet was advanced to mechanical soft, consistent CHO by speech therapy. Wt trending up with 3+ generalized edema. BG  [id number]. Meds: lantus, lispro, protonix, and bowel regimen. 12/10: Pt passed SBT and extubated this morning to nasal cannula. Currently at 4 L/min. Epistaxis/hemoptysis, which led to intubation, improving per ENT. MRI of brain, T and L spine negative. Discitis of C spine being treating with antibiotics. Does not appear that tubefeeds were started over the weekend. Na+, mag, Phos and K+ now all WNL. Creat 1.17. BG [id number]. On high sensitivity sliding scale, No insulin coverage needed in the last 24 hrs On D5 IVF @ 75 mL/hr providing 90g dextrose and 306 kcal/day. Propofol off.      12/7: Pt required emergent intubation today, was transferred to ICU. Unable to change EEN's using [employer] equation due to vent measurements not charted yet. Propofol ordered for sedation, currently at 26.4ml/hr (696kcals). Other meds: Dulcolax, Humalog. D5 running at 75ml/hr. Still no BM noted since 11/27, BS's noted as hypoactive. Labs: Na+ 150, BG 63, HgbA1c 7.2, .     12/5: 59yo female admitted for metabolic acidosis. RD assessment for LOS. PMHx: DM, HTN. Class II Obese per BMI. No weight hx from PTA to assess. Pt not responding to any questions and no family in room at time of visit today x 2.  Spoke with RN who reports pt has not had anything to eat today and has been mostly NPO since admission. Attempted to feed pt but food was held in mouth. RN states pt refusing to swallow pills today too. Pureed diet ordered. SLP not following pt.  informed RN this started about 3 weeks PTA, before that she was ambulatory and verbal, eating. BS's are noted as hypoactive and no BM since 11/27. Labs: K+ 3.2, , POC -[id number], HgbA1c 7.2. Meds: Lantus, Humalog, Kcl.  1/2NaCl with 20mEqKCl running at 100ml/hr. Diet Order: Mechanical soft, Consistent carb  % Eaten:    Patient Vitals for the past 72 hrs:   % Diet Eaten   12/12/18 1300 25 %   12/12/18 0930 25 %       Pertinent Medications: [x] Reviewed    Labs: [x] Reviewed    Anthropometrics: Height: 5' 1\" (154.9 cm) Weight: 90.3 kg (199 lb 1.2 oz)    IBW (%IBW):   ( ) UBW (%UBW):   (  %)      BMI: Body mass index is 37.62 kg/m². This BMI is indicative of:   [] Underweight    [] Normal    [] Overweight    [x] Class II Obesity    []  Extreme Obesity (BMI>40)  Estimated Nutrition Needs (Based on): 1660 Kcals/day(BMR 1360 x AF 1.3) , 69 g(ABW x 0.8-1gm/day (69-87gm)) Protein  Carbohydrate:  At Least 130 g/day  Fluids: 1768mL/day (1 ml/kcal)    Last BM: 12/12, loose  []Active     []Hyperactive  [x]Hypoactive       [] Absent   BS  Skin:    [] Intact   [] Incision  [] Breakdown   [] DTI   [] Tears/Excoriation/Abrasion  [x]Edema - 2+ RLE; 3+ BUEs and LLE [] Other: Wt Readings from Last 30 Encounters:   12/13/18 90.3 kg (199 lb 1.2 oz)   12/01/18 81.6 kg (179 lb 14.3 oz)   12/01/18 81.6 kg (179 lb 14.3 oz)   12/01/18 81.6 kg (179 lb 14.3 oz)      NUTRITION DIAGNOSES:   Problem:  Inadequate energy intake     Etiology: related to inability to consume sufficient energy secondary to mechanical ventilation     Signs/Symptoms: as evidenced by NPO x 8 days meeting < EEN's       12/10: Nutrition dx not improving, no nutrition started over the weekend.   12/12: nutrition dx improving with diet advancement, but still not meeting EENs    NUTRITION INTERVENTIONS:  Meals/Snacks: Modify diet/texture/consistency/nutrients Enteral/Parenteral Nutrition: Initiate enteral nutrition Supplements: Commercial supplement              GOAL:   Pt to meet at least 90% of energy needs in the next 1-3 days    Cultural, Restoration, or Ethnic Dietary Needs: None     EDUCATION & DISCHARGE NEEDS:    [x] None Identified   [] Identified and Education Provided/Documented   [] Identified and Pt declined/was not appropriate      [x] Interdisciplinary Care Plan Reviewed/Documented    [x] Discharge Needs: Continue oral nutrition supplements of Glucerna or similar product, at least 1-2 per day until appetite returns to baseline   [] No Nutrition Related Discharge Needs    NUTRITION RISK:   Pt Is At Nutrition Risk  [x]     No Nutrition Risk Identified  []       PT SEEN FOR:    []  MD Consult: []Calorie Count      []Diabetic Diet Education        []Diet Education     []Electrolyte Management     []General Nutrition Management and Supplements     []Management of Tube Feeding     []TPN Recommendations    []  RN Referral:  []MST score >=2     []Enteral/Parenteral Nutrition PTA     []Pregnant: Gestational DM or Multigestation                 [] Pressure Ulcer    []  Low BMI      []  Length of Stay       [] Dysphagia Diet         [] Ventilator  [x] Follow-up     Previous Recommendations:   [x] Implemented          [] Not Implemented          [] Not Applicable    Previous Goal:   [x] Met              [] Progressing Towards Goal              [] Not Progressing Towards Goal   [] Not Applicable            Kenya Red, [address] [place] [address]  Pager [phone]   Office [phone]

## 2018-12-13 NOTE — PROGRESS NOTES
[facility] Dosing Services: 12/13/18    Pharmacist Renal Dosing Progress Note for Meropenem   Physician: Dr. Salud Lanems    The following medication: Meropenem was automatically dose-adjusted per [facility] P[facility]T Committee Protocol, with respect to renal function. Consult provided for this   61 y.o. , female , for the indication of meningitis/epidural abscess. Pt Weight:   Wt Readings from Last 1 Encounters:   12/13/18 90.3 kg (199 lb 1.2 oz)     Dosage changed to:  Meropenem 2 gm IV Q8H for CrCl > 50 mL/min    Previous Regimen  Meropenem 2 gm IV Q12H   Serum Creatinine Lab Results   Component Value Date/Time    Creatinine 0.84 12/13/2018 06:24 AM       Creatinine Clearance Estimated Creatinine Clearance: 70.1 mL/min (based on SCr of 0.84 mg/dL). BUN Lab Results   Component Value Date/Time    BUN 20 12/13/2018 06:24 AM         Pharmacy to continue to monitor patient daily. Will make dosage adjustments based upon changing renal function. Thank you for the consult,  Giorgio De Los Santos

## 2018-12-13 NOTE — PROGRESS NOTES
Beginning of shift: Bedside and Verbal shift change report given to Kristi Win RN (oncoming nurse) by West Moore RN (offgoing nurse). Report included the following information SBAR, Kardex, Intake/Output, MAR, Accordion, Recent Results, Med Rec Status and Cardiac Rhythm NSR.     2000  Initial assessment completed. Introduced self as primary RN.  VSS.  Pt denies additional complaints at this time by shaking her head no. Plan for the evening discussed with pt and verbalized understanding. Bed locked and in low position with call bell within reach.  Instructed her to press call nowak when needed. White board updated with this RN's name. [id number]  Throughout the evening, pt had several incidents of apnea with sats dropping into the 50's. End of shift:  Bedside and Verbal shift change report given to West Moore RN (oncoming nurse) by Kristi Win RN (offgoing nurse). Report included the following information SBAR, Kardex, Intake/Output, MAR, Accordion, Recent Results, Med Rec Status and Cardiac Rhythm NSR.

## 2018-12-14 LAB
ACTH PLAS-MCNC: 32.5 PG/ML (ref 7.2–63.3)
GLUCOSE BLD STRIP.AUTO-MCNC: 128 MG/DL (ref 65–100)
GLUCOSE BLD STRIP.AUTO-MCNC: 165 MG/DL (ref 65–100)
GLUCOSE BLD STRIP.AUTO-MCNC: 170 MG/DL (ref 65–100)
GLUCOSE BLD STRIP.AUTO-MCNC: 227 MG/DL (ref 65–100)
SERVICE CMNT-IMP: ABNORMAL

## 2018-12-14 PROCEDURE — 77030038269 HC DRN EXT URIN PURWCK BARD -A

## 2018-12-14 PROCEDURE — 97530 THERAPEUTIC ACTIVITIES: CPT

## 2018-12-14 PROCEDURE — 77010033678 HC OXYGEN DAILY

## 2018-12-14 PROCEDURE — 74011250637 HC RX REV CODE- 250/637: Performed by: PHYSICIAN ASSISTANT

## 2018-12-14 PROCEDURE — 74011250637 HC RX REV CODE- 250/637: Performed by: INTERNAL MEDICINE

## 2018-12-14 PROCEDURE — 74011250636 HC RX REV CODE- 250/636: Performed by: INTERNAL MEDICINE

## 2018-12-14 PROCEDURE — C9113 INJ PANTOPRAZOLE SODIUM, VIA: HCPCS | Performed by: INTERNAL MEDICINE

## 2018-12-14 PROCEDURE — 51798 US URINE CAPACITY MEASURE: CPT

## 2018-12-14 PROCEDURE — 82962 GLUCOSE BLOOD TEST: CPT

## 2018-12-14 PROCEDURE — 65660000000 HC RM CCU STEPDOWN

## 2018-12-14 PROCEDURE — 74011636637 HC RX REV CODE- 636/637: Performed by: INTERNAL MEDICINE

## 2018-12-14 PROCEDURE — 97110 THERAPEUTIC EXERCISES: CPT

## 2018-12-14 PROCEDURE — 94760 N-INVAS EAR/PLS OXIMETRY 1: CPT

## 2018-12-14 PROCEDURE — 74011000258 HC RX REV CODE- 258: Performed by: INTERNAL MEDICINE

## 2018-12-14 PROCEDURE — 74011000250 HC RX REV CODE- 250: Performed by: INTERNAL MEDICINE

## 2018-12-14 RX ORDER — ATENOLOL 50 MG/1
50 TABLET ORAL DAILY
Status: DISCONTINUED | OUTPATIENT
Start: 2018-12-14 | End: 2018-12-17 | Stop reason: HOSPADM

## 2018-12-14 RX ORDER — CHLORTHALIDONE 25 MG/1
25 TABLET ORAL DAILY
Status: DISCONTINUED | OUTPATIENT
Start: 2018-12-14 | End: 2018-12-17 | Stop reason: HOSPADM

## 2018-12-14 RX ADMIN — Medication 10 MG: at 09:28

## 2018-12-14 RX ADMIN — Medication 10 ML: at 12:16

## 2018-12-14 RX ADMIN — Medication 10 ML: at 06:03

## 2018-12-14 RX ADMIN — ACETAMINOPHEN 650 MG: 325 TABLET, FILM COATED ORAL at 06:04

## 2018-12-14 RX ADMIN — ACETAMINOPHEN 650 MG: 325 TABLET, FILM COATED ORAL at 00:22

## 2018-12-14 RX ADMIN — OXYCODONE HYDROCHLORIDE 5 MG: 5 TABLET ORAL at 16:19

## 2018-12-14 RX ADMIN — MEROPENEM 2 G: 1 INJECTION, POWDER, FOR SOLUTION INTRAVENOUS at 17:20

## 2018-12-14 RX ADMIN — HYDRALAZINE HYDROCHLORIDE 20 MG: 20 INJECTION INTRAMUSCULAR; INTRAVENOUS at 00:16

## 2018-12-14 RX ADMIN — MEROPENEM 2 G: 1 INJECTION, POWDER, FOR SOLUTION INTRAVENOUS at 01:33

## 2018-12-14 RX ADMIN — OXYCODONE HYDROCHLORIDE 5 MG: 5 TABLET ORAL at 20:31

## 2018-12-14 RX ADMIN — SODIUM CHLORIDE 40 MG: 9 INJECTION INTRAMUSCULAR; INTRAVENOUS; SUBCUTANEOUS at 09:04

## 2018-12-14 RX ADMIN — INSULIN LISPRO 2 UNITS: 100 INJECTION, SOLUTION INTRAVENOUS; SUBCUTANEOUS at 11:30

## 2018-12-14 RX ADMIN — LEVOTHYROXINE SODIUM 125 MCG: 125 TABLET ORAL at 06:03

## 2018-12-14 RX ADMIN — ACETAMINOPHEN 650 MG: 325 TABLET, FILM COATED ORAL at 11:11

## 2018-12-14 RX ADMIN — LISINOPRIL 40 MG: 20 TABLET ORAL at 09:05

## 2018-12-14 RX ADMIN — ATENOLOL 50 MG: 50 TABLET ORAL at 09:28

## 2018-12-14 RX ADMIN — INSULIN GLARGINE 10 UNITS: 100 INJECTION, SOLUTION SUBCUTANEOUS at 09:04

## 2018-12-14 RX ADMIN — MEROPENEM 2 G: 1 INJECTION, POWDER, FOR SOLUTION INTRAVENOUS at 10:16

## 2018-12-14 RX ADMIN — CHLORTHALIDONE 25 MG: 25 TABLET ORAL at 11:15

## 2018-12-14 RX ADMIN — Medication 10 ML: at 21:16

## 2018-12-14 NOTE — PROGRESS NOTES
[facility] [place] Progress Note           Seng Toscano DO    [phone] Office  [phone]  Fax    2018      Assessment & Plan:   1. Right elbow cellulitis complicated by GBS bacteremia. Repeat BC's NGSF. Orthopedic surgery is following. S/p joint aspiration by IR . Cultures NGSF. Continue meropenem. 2. Encephalopathy. MS improving. 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 Traumatic tap with  abnormal.LP results. Meningitis pathogens panel negative. CSF culture NGSF. Lyme CSF and WNV negative. CSF fluid cytology unremarkable. HSV 1&2 PCR negative. MRI brain negative. Treat empirically for GBS meningitis  3. Cervical spine diskitis. Abx as above. Needs 6 weeks IV abx  4. Thrombocytosis. No CBC today. Follow  5. Acute kidney injury. Resolved    6. Diabetes mellitus. Management per primary team.  7. Rash prior to admission. Resolved. Pictures reviewed with patients . Possible cellulitis? Rash not c/w HSV/VZV. 8.   CoNS isolated from a single blood culture. This represents a contaminant. 9. Hemoptysis. Resolved. ENT following          Subjective:     Mental status continues to improve    Objective:     Vitals:   Visit Vitals  /78 (BP 1 Location: Left arm, BP Patient Position: At rest)   Pulse 100   Temp 98.2 °F (36.8 °C)   Resp 18   Ht 5' 1\" (1.549 m)   Wt 88.8 kg (195 lb 12.8 oz)   SpO2 95%   BMI 37.00 kg/m²        Tmax:  Temp (24hrs), Av.2 °F (36.8 °C), Min:97.4 °F (36.3 °C), Max:98.9 °F (37.2 °C)      Exam:   Patient is intubated:      Physical Examination:   General:  Awake NAD    Head:  Normocephalic, atraumatic. Eyes:  Conjunctivae clear   Neck: Supple       Lungs:   Clear to auscultation bilaterally.    Chest wall:     Heart:  Regular rate and rhythm   Abdomen:      Extremities: Edema   Skin:  No rash   Neurologic: CN II-XII GI     Labs:        No lab exists for component: ITNL   No results for input(s): CPK, CKMB, TROIQ in the last 72 hours. Recent Labs     12/13/18  0624 12/12/18  0338 12/11/18  1612    143 142   K 4.0 4.6 4.5   * 110* 109*   CO2 23 22 24   BUN 20 24* 28*   CREA 0.84 0.89 0.93   * 143* 176*   MG 1.7 1.8  --    WBC 10.3 9.2 9.6   HGB 8.4* 7.6* 7.6*   HCT 28.3* 25.5* 25.0*   * 389 365     No results for input(s): INR, PTP, APTT in the last 72 hours. No lab exists for component: INREXT, INREXT  Needs: urine analysis, urine sodium, protein and creatinine  Lab Results   Component Value Date/Time    Sodium,urine random 29 11/30/2018 06:10 PM    Creatinine, urine 187.11 11/30/2018 06:10 PM         Cultures:     No results found for: SDES  Lab Results   Component Value Date/Time    Culture result: SCANT NORMAL RESPIRATORY TRIXIE 12/07/2018 01:30 PM    Culture result: MODERATE YEAST (A) 12/07/2018 01:30 PM    Culture result: HEAVY CANDIDA PARAPSILOSIS (A) 12/07/2018 01:30 PM    Culture result:  12/07/2018 01:30 PM     CULTURE WILL BE HELD FOR 4 WEEKS. IF THERE IS ADDITIONAL FUNGAL GROWTH, A NEW REPORT WILL FOLLOW.        Radiology:     Medications       Current Facility-Administered Medications   Medication Dose Route Frequency Last Dose    atenolol (TENORMIN) tablet 50 mg  50 mg Oral DAILY 50 mg at 12/14/18 0928    chlorthalidone (HYGROTEN) tablet 25 mg  25 mg Oral DAILY 25 mg at 12/14/18 1115    oxyCODONE IR (ROXICODONE) tablet 5 mg  5 mg Oral Q4H PRN      meropenem (MERREM) 2 g in 0.9% sodium chloride 100 mL IVPB  2 g IntraVENous Q8H 2 g at 12/14/18 1116    lisinopril (PRINIVIL, ZESTRIL) tablet 40 mg  40 mg Oral DAILY 40 mg at 12/14/18 0905    levothyroxine (SYNTHROID) tablet 125 mcg  125 mcg Oral  mcg at 12/14/18 0603    insulin lispro (HUMALOG) injection   SubCUTAneous AC&HS 2 Units at 12/14/18 1130    HYDROmorphone (PF) (DILAUDID) injection 0.5 mg  0.5 mg IntraVENous Q3H PRN 0.5 mg at 12/12/18 1022    pantoprazole (PROTONIX) 40 mg in sodium chloride 0.9% 10 mL injection  40 mg IntraVENous DAILY 40 mg at 12/14/18 0904    albuterol (PROVENTIL VENTOLIN) nebulizer solution 2.5 mg  2.5 mg Nebulization Q4H PRN 2.5 mg at 12/10/18 1539    insulin glargine (LANTUS) injection 10 Units  10 Units SubCUTAneous DAILY 10 Units at 12/14/18 0904    bisacodyl (DULCOLAX) suppository 10 mg  10 mg Rectal DAILY 10 mg at 12/14/18 0928    influenza vaccine 2018-19 (6 mos+)(PF) (FLUARIX QUAD/FLULAVAL QUAD) injection 0.5 mL  0.5 mL IntraMUSCular PRIOR TO DISCHARGE      labetalol (NORMODYNE;TRANDATE) injection 10 mg  10 mg IntraVENous Q4H PRN 10 mg at 12/12/18 0501    hydrALAZINE (APRESOLINE) 20 mg/mL injection 20 mg  20 mg IntraVENous Q6H PRN 20 mg at 12/14/18 0016    alteplase (CATHFLO) 1 mg in sterile water (preservative free) 1 mL injection  1 mg InterCATHeter PRN      sodium chloride (NS) flush 5-10 mL  5-10 mL IntraVENous Q8H 10 mL at 12/14/18 1216    sodium chloride (NS) flush 5-10 mL  5-10 mL IntraVENous PRN      acetaminophen (TYLENOL) tablet 650 mg  650 mg Oral Q4H  mg at 12/14/18 1111    ondansetron (ZOFRAN) injection 4 mg  4 mg IntraVENous Q4H PRN 4 mg at 12/06/18 0818    glucose chewable tablet 16 g  4 Tab Oral PRN      dextrose (D50W) injection syrg 12.5-25 g  25-50 mL IntraVENous PRN 25 g at 12/07/18 0653    glucagon (GLUCAGEN) injection 1 mg  1 mg IntraMUSCular PRN             Case discussed with:      Kristy Wood DO

## 2018-12-14 NOTE — PROGRESS NOTES
RN reports patient taking min PO with max cues. She can swallow meds, but sometimes has behavior/cognitive issues with spitting meds.

## 2018-12-14 NOTE — DIABETES MGMT
DTC Progress Note    Recommendations/ Comments: Chart reviewed due to hyperglycemia. If appropriate, please consider increasing Lantus to 15 units. Current hospital DM medication:   -Lantus 10 units daily   -Humalog high sensitivity correction     Chart reviewed on [facility] . Patient is a 61 y.o. female with known history of Type 2 Diabetes on Lantus 75 units nightly, Metformin 500mg bid with meals at home. A1c:   Lab Results   Component Value Date/Time    Hemoglobin A1c 7.2 (H) 11/30/2018 06:10 PM       Recent Glucose Results:   Lab Results   Component Value Date/Time    GLUCPOC 170 (H) 12/14/2018 06:25 AM    GLUCPOC 201 (H) 12/13/2018 08:54 PM    GLUCPOC 194 (H) 12/13/2018 04:41 PM        Lab Results   Component Value Date/Time    Creatinine 0.84 12/13/2018 06:24 AM     Estimated Creatinine Clearance: 69.5 mL/min (based on SCr of 0.84 mg/dL). Active Orders   Diet    DIET DIABETIC CONSISTENT CARB Mechanical Soft        PO intake:   Patient Vitals for the past 72 hrs:   % Diet Eaten   12/12/18 1300 25 %   12/12/18 0930 25 %       Will continue to follow as needed.     Thank you    Ana Barnes, RD, CDE    Time spent: 5

## 2018-12-14 NOTE — PROGRESS NOTES
Problem: Falls - Risk of  Goal: *Absence of Falls  Document Handy Fall Risk and appropriate interventions in the flowsheet. Outcome: Progressing Towards Goal  Fall Risk Interventions:  Mobility Interventions: Communicate number of staff needed for ambulation/transfer    Mentation Interventions: Door open when patient unattended    Medication Interventions: Bed/chair exit alarm, Teach patient to arise slowly    Elimination Interventions: Call light in reach, Patient to call for help with toileting needs      0700- Bedside and Verbal shift change report given to Brandon Hatfield RN (oncoming nurse) by Thalia Mcdonald RN  (offgoing nurse). Report included the following information SBAR, Kardex and Recent Results. ..     1500- Order received from [facility] for Straight Cath,  And 300cc urine come  Out. .    1900- Bedside and Verbal shift change report given to Courtney Cortes RN (oncoming nurse) by Antone Nissen (offgoing nurse). Report included the following information SBAR, Kardex and Recent Results. ..

## 2018-12-14 NOTE — PROGRESS NOTES
1900: Bedside and Verbal shift change report given to Guerda (oncoming nurse) by Titus Nash (offgoing nurse). Report included the following information SBAR, Intake/Output, MAR, Accordion and Cardiac Rhythm NSR.          0015: Patients /91, gave Hydralazine. Pt in visible discomfort, when asked if she was in pain she stated yes, provided Tylenol. 0045: Rechecked BP, 127/66.       0545: Pt had orders for joyce to be removed. Removed joyce. 0700: Bedside and Verbal shift change report given to Kasey (oncoming nurse) by Guerda (offgoing nurse). Report included the following information SBAR, Intake/Output, MAR, Accordion and Cardiac Rhythm NSR.

## 2018-12-14 NOTE — PROGRESS NOTES
Shift Summary  12/13/2018  [id number]    0700 Pt asleep at time of bedside shift report received from [facility]. 0800 AM vitals, assessment, and meds complete without difficulty, AM rhythm strip shows NSR. Reviewed today's plan of care and goals with patient/family; plan of care today includes safety, monitor cardiac/respiratory/neuro status. 229 [facility] RN rounded on patient who is requesting pain medication. I attempted to give oxycodone tab to patient who spit it back at me. I asked if she wants her pain medication and she said, \"Not right now. \"     1500 Pt downgraded to telemetry status as telephone order from Dr. Stewart Reid. Pt moved to room 327. Bedside and Verbal shift change report given to Tevin Vazquez (oncoming nurse) by Akua Larson (offgoing nurse). Report included the following information SBAR, Intake/Output, MAR, Recent Results and Cardiac Rhythm NSR. Also communicated to oncyony RN that 24-hr urine is to be completed at 1820 this evening and joyce catheter to be removed afterwards.

## 2018-12-14 NOTE — PROGRESS NOTES
Blanco Mcpherson [facility] [address]  [address] [facility], [address] [place] [address]  [phone]      Medical Progress Note      NAME: Camilla Connell   :  1955  MRM:  [id number]    Date/Time: 2018  8:13 AM       Assessment and Plan:   1.  C-spine discitis: MRI showed evidence of discitis at C5-C6 and C6-C7, with abnormal enhancement of the vertebral bodies. There is enhancing phlegmon in the epidural space. No drainable abscess. Evaluated by ortho and ID. Needs 6 weeks of IV ABx. Currently on meropenem     2. Acute metabolic encephalopathy: unclear etiology. ? GBS meningitis, ?medications and relation to the diskitis. Head CT, MRI brain without acute process. TSH, B12/folate, ammonia WNL s/p LP. HSV neg. EEG negative for seizure activity, RPR negative, West Nile negative, HIV negative, Lyme IgG positive but IgM negative. Monitor clinically      3. Hemoptysis vs epistaxis: sudden onset . Underwent bronchoscopy which showed large amount of blood in the trachea, however past paula only scants amount of blood in RLL. Some thick yellow/white secretions in LLL and RLL. All lobes/subsegments inspected with no signs of bleeding. Evaluated by ENT. Stable      4. Severe sepsis/bacteremia: Blood Cx + 4/4 bottles with GBS on admission, repeat blood Cx NGTD. With R arm/elbow cellulitis and now evidence of discitis. No e/o valvular veg on TTE  Continue meropenem as per ID      5.  R elbow cellulitis/bursitis/acute pain: s/p joint aspiration by IR on , cultures negative to date. On meropenem. 6.  ARF: likely from ATN/sepsis, hypovolemia. Resolved. 7.  AG metabolic acidosis: resolved. Due to sepsis     8. DM type 2 w/ hypoglycemia: A1C 7.2%. On lantus. 9.  HTN: on atenolol, lisinopril      10. Anemia: Fe studies consistent with chronic dz. Monitor               Subjective:     Chief Complaint:  Follow up of pt who was admitted with encephalopathy, cellulitis. C/o back pain.  More awake today      ROS:  (bold if positive, if negative)      Tolerating PT  Tolerating Diet        Objective:     Last 24hrs VS reviewed since prior progress note.  Most recent are:    Visit Vitals  /74 (BP 1 Location: Left arm, BP Patient Position: At rest)   Pulse 96   Temp 97.4 °F (36.3 °C)   Resp 20   Ht 5' 1\" (1.549 m)   Wt 88.8 kg (195 lb 12.8 oz)   SpO2 98%   BMI 37.00 kg/m²     SpO2 Readings from Last 6 Encounters:   12/14/18 98%    O2 Flow Rate (L/min): 2 l/min       Intake/Output Summary (Last 24 hours) at 12/14/2018 0710  Last data filed at 12/14/2018 5306  Gross per 24 hour   Intake 120 ml   Output 251 ml   Net -131 ml        Physical Exam:    Gen:  Well-developed, well-nourished, in no acute distress  HEENT:  Pink conjunctivae, PERRL, hearing intact to voice, moist mucous membranes  Neck:  Supple, without masses, thyroid non-tender  Resp:  No accessory muscle use, clear breath sounds without wheezes rales or rhonchi  Card:  No murmurs, normal S1, S2 without thrills, bruits or peripheral edema  Abd:  Soft, non-tender, non-distended, normoactive bowel sounds are present, no palpable organomegaly and no detectable hernias  Lymph:  No cervical or inguinal adenopathy  Musc:  No cyanosis or clubbing  Skin:  No rashes or ulcers, skin turgor is good  Neuro:  Cranial nerves are grossly intact, no focal motor weakness, follows commands appropriately  Psych:  Poor insight,    __________________________________________________________________  Medications Reviewed: (see below)  Medications:     Current Facility-Administered Medications   Medication Dose Route Frequency    oxyCODONE IR (ROXICODONE) tablet 5 mg  5 mg Oral Q4H PRN    meropenem (MERREM) 2 g in 0.9% sodium chloride 100 mL IVPB  2 g IntraVENous Q8H    cloNIDine (CATAPRES) 0.3 mg/24 hr patch 1 Patch  1 Patch TransDERmal Q7D    atenolol (TENORMIN) tablet 25 mg  25 mg Oral DAILY    lisinopril (PRINIVIL, ZESTRIL) tablet 40 mg  40 mg Oral DAILY    levothyroxine (SYNTHROID) tablet 125 mcg  125 mcg Oral ACB    insulin lispro (HUMALOG) injection   SubCUTAneous AC&HS    HYDROmorphone (PF) (DILAUDID) injection 0.5 mg  0.5 mg IntraVENous Q3H PRN    pantoprazole (PROTONIX) 40 mg in sodium chloride 0.9% 10 mL injection  40 mg IntraVENous DAILY    albuterol (PROVENTIL VENTOLIN) nebulizer solution 2.5 mg  2.5 mg Nebulization Q4H PRN    insulin glargine (LANTUS) injection 10 Units  10 Units SubCUTAneous DAILY    bisacodyl (DULCOLAX) suppository 10 mg  10 mg Rectal DAILY    influenza vaccine 2018-19 (6 mos+)(PF) (FLUARIX QUAD/FLULAVAL QUAD) injection 0.5 mL  0.5 mL IntraMUSCular PRIOR TO DISCHARGE    labetalol (NORMODYNE;TRANDATE) injection 10 mg  10 mg IntraVENous Q4H PRN    hydrALAZINE (APRESOLINE) 20 mg/mL injection 20 mg  20 mg IntraVENous Q6H PRN    alteplase (CATHFLO) 1 mg in sterile water (preservative free) 1 mL injection  1 mg InterCATHeter PRN    sodium chloride (NS) flush 5-10 mL  5-10 mL IntraVENous Q8H    sodium chloride (NS) flush 5-10 mL  5-10 mL IntraVENous PRN    acetaminophen (TYLENOL) tablet 650 mg  650 mg Oral Q4H PRN    ondansetron (ZOFRAN) injection 4 mg  4 mg IntraVENous Q4H PRN    glucose chewable tablet 16 g  4 Tab Oral PRN    dextrose (D50W) injection syrg 12.5-25 g  25-50 mL IntraVENous PRN    glucagon (GLUCAGEN) injection 1 mg  1 mg IntraMUSCular PRN        Lab Data Reviewed: (see below)  Lab Review:     Recent Labs     12/13/18  0624 12/12/18  0338 12/11/18  1612   WBC 10.3 9.2 9.6   HGB 8.4* 7.6* 7.6*   HCT 28.3* 25.5* 25.0*   * 389 365     Recent Labs     12/13/18  0624 12/12/18  0338 12/11/18  1612    143 142   K 4.0 4.6 4.5   * 110* 109*   CO2 23 22 24   * 143* 176*   BUN 20 24* 28*   CREA 0.84 0.89 0.93   CA 9.2 8.5 8.5   MG 1.7 1.8  --      Lab Results   Component Value Date/Time    Glucose (POC) 170 (H) 12/14/2018 06:25 AM    Glucose (POC) 201 (H) 12/13/2018 08:54 PM    Glucose (POC) 194 (H) 12/13/2018 04:41 PM    Glucose (POC) 136 (H) 12/13/2018 11:45 AM    Glucose (POC) 133 (H) 12/13/2018 06:36 AM     No results for input(s): PH, PCO2, PO2, HCO3, FIO2 in the last 72 hours. No results for input(s): INR in the last 72 hours. No lab exists for component: INREXT, INREXT  All Micro Results     Procedure Component Value Units Date/Time    CULTURE, FUNGUS [[id number]]  (Abnormal) Collected:  12/07/18 1330    Order Status:  Completed Specimen:  Bronchial lavage Updated:  12/12/18 1129     Special Requests: NO SPECIAL REQUESTS        Culture result:       HEAVY CANDIDA PARAPSILOSIS                  CULTURE WILL BE HELD FOR 4 WEEKS. IF THERE IS ADDITIONAL FUNGAL GROWTH, A NEW REPORT WILL FOLLOW. CULTURE, CSF [place] STAIN [[id number]] Collected:  12/04/18 1430    Order Status:  Completed Specimen:  Cerebrospinal Fluid Updated:  12/11/18 0919     Special Requests: NO SPECIAL REQUESTS        GRAM STAIN RARE WBCS SEEN         NO ORGANISMS SEEN        Culture result:       Culture performed on Unspun Fluid                  NO GROWTH ON SOLID MEDIA AT 4 DAYS                  NO GROWTH IN THIO BROTH AT 7 DAYS          CULTURE, FUNGUS [[id number]] Collected:  12/06/18 1509    Order Status:  Completed Specimen:   Body fld Updated:  12/10/18 0916     Special Requests: NO SPECIAL REQUESTS        Culture result: NO FUNGUS ISOLATED 4 DAYS       AFB CULTURE & SMEAR W/REFLEX ID [[id number]] Collected:  12/07/18 1330    Order Status:  Completed Specimen:  Miscellaneous sample Updated:  12/10/18 0635     Source BRONCHIAL LAVAGE        AFB Specimen processing Concentration     Acid Fast Smear NEGATIVE         Comment: (NOTE)  Performed At: [address] [id number]  Elder Shi MD [id number]          Acid Fast Culture PENDING    CULTURE, SPUTUM/BRONCH/OTH [[id number]]  (Abnormal) Collected:  12/07/18 1330    Order Status:  Completed Specimen:  Sputum from Bronchial lavage Updated:  12/09/18 1015     Special Requests: NO SPECIAL REQUESTS        GRAM STAIN RARE WBCS SEEN               RARE EPITHELIAL CELLS SEEN            FEW BUDDING YEAST        Culture result:       SCANT NORMAL RESPIRATORY TRIXIE            MODERATE YEAST       CULTURE, ANAEROBIC [[id number]] Collected:  12/06/18 1509    Order Status:  Completed Specimen:  Aspirate Updated:  12/08/18 0917     Special Requests: NO SPECIAL REQUESTS        Culture result:       No growth thus far, holding 14 days. CULTURE, BODY FLUID Loel Manus [[id number]] Collected:  12/06/18 1509    Order Status:  Completed Specimen:  Elbow Updated:  12/08/18 0917     Special Requests: RT ELBOW ASPIRATE     GRAM STAIN RARE WBCS SEEN         NO ORGANISMS SEEN        Culture result:       Culture performed on Fluid swab specimen                  No growth thus far, holding 14 days.           CULTURE LEGIONELLA [[id number]] Collected:  12/07/18 1330    Order Status:  Completed Specimen:  Other Updated:  12/07/18 1402    CULTURE, VIRAL [[id number]]     Order Status:  Canceled Specimen:  Other     CULTURE, BLOOD [[id number]] Collected:  12/01/18 1231    Order Status:  Completed Specimen:  Blood Updated:  12/07/18 0450     Special Requests: NO SPECIAL REQUESTS        Culture result: NO GROWTH 6 DAYS       CULTURE, BLOOD [[id number]] Collected:  12/01/18 1231    Order Status:  Completed Specimen:  Blood Updated:  12/07/18 0450     Special Requests: NO SPECIAL REQUESTS        Culture result: NO GROWTH 6 DAYS       CULTURE, [place] STAIN [[id number]] Collected:  12/06/18 1200    Order Status:  Canceled Specimen:  Wound from Elbow     MENINGITIS PATHOGENS PANEL, CSF (BY PCR) [[id number]] Collected:  12/04/18 1442    Order Status:  Completed Specimen:  Cerebrospinal Fluid Updated:  12/04/18 2132     Escherichia coli K1 NOT DETECTED        Haemophilus Influenzae NOT DETECTED        Listeria Monocytogenes NOT DETECTED        Neisseria Meningitidis NOT DETECTED        Streptococcus Agalactiae NOT DETECTED        Streptococcus Pneumoniae NOT DETECTED        Cytomegalovirus NOT DETECTED        Enterovirus NOT DETECTED        Herpes Simplex Virus 1 NOT DETECTED        Herpes Simplex Virus 2 NOT DETECTED        Human Herpesvirus 6 NOT DETECTED        Human Parechovirus NOT DETECTED        Varicella Zoster Virus NOT DETECTED        Crypto. neoformans/gattii NOT DETECTED       CULTURE, CSF  TUBE 2 [[id number]] Collected:  12/04/18 1425    Order Status:  Canceled Specimen:  Cerebrospinal Fluid Updated:  12/04/18 1448    CULTURE, BLOOD [[id number]]  (Abnormal) Collected:  11/30/18 1230    Order Status:  Completed Specimen:  Blood Updated:  12/03/18 0927     Special Requests: NO SPECIAL REQUESTS        Culture result:       STREPTOCOCCI, BETA HEMOLYTIC GROUP B GROWING IN BOTH BOTTLES DRAWN SITE=LT HAND PLEASE REFER TO Ned Pitts [id number] FOR SENSITIVITIES                  PRELIMINARY REPORT OF GRAM POSITIVE COCCI IN PAIRS AND CHAINS GROWING IN BOTH BOTTLES DRAWN CALLED TO AND READ BACK BY DR SUAZO ON 12/1/18 AT 1033. SH/AOW          CULTURE, BLOOD [[id number]]  (Abnormal)  (Susceptibility) Collected:  11/30/18 1210    Order Status:  Completed Specimen:  Blood Updated:  12/03/18 0926     Special Requests: NO SPECIAL REQUESTS        Culture result:       STREPTOCOCCI, BETA HEMOLYTIC GROUP B GROWING IN BOTH BOTTLES DRAWN SITE=LAC (SENSITIVITIES PERFORMED BY E-TEST)                  PRELIMINARY REPORT OF GRAM POSITIVE COCCI IN PAIRS AND CHAINS GROWING IN BOTH BOTTLES DRAWN CALLED TO AND READ BACK BY DR SUAZO ON 12/1/18 AT 1033. SH/AOW                  STAPHYLOCOCCUS SPECIES, COAGULASE NEGATIVE GROWING IN ONLY 1 OF THE ABOVE BOTTLES PLATES WILL BE HELD 3 DAYS. CALL [phone] IF SENSITIVITIES ARE NEEDED.           RESPIRATORY PANEL,PCR,NASOPHARYNGEAL [[id number]] Collected:  11/30/18 1810    Order Status:  Completed Specimen:  Nasopharyngeal Updated:  11/30/18 2146     Adenovirus NOT DETECTED Coronavirus 229E NOT DETECTED        Coronavirus HKU1 NOT DETECTED        Coronavirus CVNL63 NOT DETECTED        Coronavirus OC43 NOT DETECTED        Metapneumovirus NOT DETECTED        Rhinovirus and Enterovirus NOT DETECTED        Influenza A NOT DETECTED        Influenza A, subtype H1 NOT DETECTED        Influenza A, subtype H3 NOT DETECTED        INFLUENZA A H1N1 PCR NOT DETECTED        Influenza B NOT DETECTED        Parainfluenza 1 NOT DETECTED        Parainfluenza 2 NOT DETECTED        Parainfluenza 3 NOT DETECTED        Parainfluenza virus 4 NOT DETECTED        RSV by PCR NOT DETECTED        Bordetella pertussis - PCR NOT DETECTED        Chlamydophila pneumoniae DNA, QL, PCR NOT DETECTED        Mycoplasma pneumoniae DNA, QL, PCR NOT DETECTED       URINE CULTURE HOLD SAMPLE [[id number]] Collected:  11/30/18 1241    Order Status:  Completed Specimen:  Serum Updated:  11/30/18 1257     Urine culture hold       URINE ON HOLD IN MICROBIOLOGY DEPT FOR 3 DAYS. IF UNPRESERVED URINE IS SUBMITTED, IT CANNOT BE USED FOR ADDITIONAL TESTING AFTER 24 HRS, RECOLLECTION WILL BE REQUIRED. I have reviewed notes of prior 24hr. Other pertinent lab:       Total time spent with patient: [address] discussed with: Patient, Nursing Staff and >50% of time spent in counseling and coordination of care    Discussed:  Care Plan    Prophylaxis:  SCD's    Disposition:  SNF/LTC           ___________________________________________________    Attending Physician: Kate Jones MD

## 2018-12-14 NOTE — PROGRESS NOTES
Problem: Mobility Impaired (Adult and Pediatric)  Goal: *Acute Goals and Plan of Care (Insert Text)  Physical Therapy Goals  Initiated 12/11/2018  1. Patient will move from supine to sit and sit to supine , scoot up and down and roll side to side in bed with moderate assistance  within 7 day(s). 2.  Patient will transfer from bed to chair and chair to bed with moderate assistance  using the least restrictive device within 7 day(s). 3.  Patient will perform sit to stand with moderate assistance  within 7 day(s). 4.  Patient will ambulate with maximal assistance for 20 feet with the least restrictive device within 7 day(s). physical Therapy TREATMENT  Patient: Chepe Ching (34 y.o. female)  Date: 12/14/2018  Diagnosis: Metabolic acidosis [H35.1] BAYLEE (acute kidney injury) ([facility] [address]  Procedure(s) (LRB):  BRONCHOSCOPY (N/A)  BRONCHIAL WASHINGS FLEXIBLE (Bilateral) 7 Days Post-Op  Precautions:    Chart, physical therapy assessment, plan of care and goals were reviewed. ASSESSMENT:  Based on the objective data described below, the patient presents with difficulty with sitting and standing. Attempted to dangle on the edge of bed total assist x 2, rolled on the side of bed total assist x 2, place bed to chair position and performed some active range of motion exercise on both LE all planes. Noted both UE still swollen elevated both UE on pillows. Educate patient and spouse to continue to do some active range of motion exercise on both LE all planes. Bed remain in chair position after therapy and notified nurse who agreed to monitor patient. Progression toward goals:  [x]    Improving appropriately and progressing toward goals  []    Improving slowly and progressing toward goals  []    Not making progress toward goals and plan of care will be adjusted     PLAN:  Patient continues to benefit from skilled intervention to address the above impairments. Continue treatment per established plan of care.   Discharge Recommendations:  Rehab  Further Equipment Recommendations for Discharge:  TBD     SUBJECTIVE:   Patient stated I want to go home.     OBJECTIVE DATA SUMMARY:   Critical Behavior:  Neurologic State: Alert  Orientation Level: Oriented to person, Oriented to place  Cognition: Impaired decision making  Safety/Judgement: Decreased awareness of environment  Functional Mobility Training:  Bed Mobility:  Rolling: Total assistance; Additional time;Assist x2  Supine to Sit: Total assistance; Additional time;Assist x2(bed placed in chair position)              Transfers:   total assist x 2                                Balance:   poor  Ambulation/Gait Training:   n/t        Therapeutic Exercises:    Instructed patient to continue active range of motion exercise on both legs while up on chair or on bed. Pain:  Pain Scale 1: Adult Nonverbal Pain Scale  Pain Intensity 1: 0           Pain Intervention(s) 1: Medication (see MAR)  Activity Tolerance:   fair  Please refer to the flowsheet for vital signs taken during this treatment. After treatment:   []    Patient left in no apparent distress sitting up in chair  [x]    Patient left in no apparent distress in bed to chair position  [x]    Call bell left within reach  [x]    Nursing notified  [x]    Caregiver present  [x]    Bed alarm activated    COMMUNICATION/COLLABORATION:   The patients plan of care was discussed with: Certified Occupational Therapy Assistant, Registered Nurse and patient    Aleah Vang PT,WCC.    Time Calculation: 24 mins

## 2018-12-14 NOTE — PROGRESS NOTES
Problem: Falls - Risk of  Goal: *Absence of Falls  Document Handy Fall Risk and appropriate interventions in the flowsheet. Outcome: Progressing Towards Goal  Fall Risk Interventions:  Mobility Interventions: Communicate number of staff needed for ambulation/transfer    Mentation Interventions: Door open when patient unattended, Adequate sleep, hydration, pain control    Medication Interventions: Bed/chair exit alarm, Teach patient to arise slowly    Elimination Interventions: Call light in reach, Patient to call for help with toileting needs             Problem: Pressure Injury - Risk of  Goal: *Prevention of pressure injury  Document Nikolay Scale and appropriate interventions in the flowsheet.   Outcome: Progressing Towards Goal  Pressure Injury Interventions:  Sensory Interventions: Discuss PT/OT consult with provider    Moisture Interventions: Absorbent underpads, Apply protective barrier, creams and emollients, Check for incontinence Q2 hours and as needed, Internal/External urinary devices    Activity Interventions: PT/OT evaluation    Mobility Interventions: PT/OT evaluation, Pressure redistribution bed/mattress (bed type)    Nutrition Interventions: Offer support with meals,snacks and hydration, Document food/fluid/supplement intake    Friction and Shear Interventions: HOB 30 degrees or less, Lift sheet, Lift team/patient mobility team

## 2018-12-14 NOTE — PROGRESS NOTES
[address] [facility]  YOB: 1955          Assessment & Plan:   HTN  · Not under control  · Back on PO BB,ACE,Clonidine patch  · Eval for 2 HTN underway  · Cortisol ok  · Renin,brenda pending  · Add Chlorthalidone: monitor lytes    ARF due to IVVD/sepsis  · Creat better    AMS  ANEMIA       Subjective:   CC: f/u ARF  HPI:BAYLEE resolved  HTN: Not under control  On Meropenem.   AMS persists  ROS: Unable to obtain due to AMS  Current Facility-Administered Medications   Medication Dose Route Frequency    atenolol (TENORMIN) tablet 50 mg  50 mg Oral DAILY    oxyCODONE IR (ROXICODONE) tablet 5 mg  5 mg Oral Q4H PRN    meropenem (MERREM) 2 g in 0.9% sodium chloride 100 mL IVPB  2 g IntraVENous Q8H    lisinopril (PRINIVIL, ZESTRIL) tablet 40 mg  40 mg Oral DAILY    levothyroxine (SYNTHROID) tablet 125 mcg  125 mcg Oral ACB    insulin lispro (HUMALOG) injection   SubCUTAneous AC&HS    HYDROmorphone (PF) (DILAUDID) injection 0.5 mg  0.5 mg IntraVENous Q3H PRN    pantoprazole (PROTONIX) 40 mg in sodium chloride 0.9% 10 mL injection  40 mg IntraVENous DAILY    albuterol (PROVENTIL VENTOLIN) nebulizer solution 2.5 mg  2.5 mg Nebulization Q4H PRN    insulin glargine (LANTUS) injection 10 Units  10 Units SubCUTAneous DAILY    bisacodyl (DULCOLAX) suppository 10 mg  10 mg Rectal DAILY    influenza vaccine 2018-19 (6 mos+)(PF) (FLUARIX QUAD/FLULAVAL QUAD) injection 0.5 mL  0.5 mL IntraMUSCular PRIOR TO DISCHARGE    labetalol (NORMODYNE;TRANDATE) injection 10 mg  10 mg IntraVENous Q4H PRN    hydrALAZINE (APRESOLINE) 20 mg/mL injection 20 mg  20 mg IntraVENous Q6H PRN    alteplase (CATHFLO) 1 mg in sterile water (preservative free) 1 mL injection  1 mg InterCATHeter PRN    sodium chloride (NS) flush 5-10 mL  5-10 mL IntraVENous Q8H    sodium chloride (NS) flush 5-10 mL  5-10 mL IntraVENous PRN    acetaminophen (TYLENOL) tablet 650 mg  650 mg Oral Q4H PRN    ondansetron (ZOFRAN) injection 4 mg  4 mg IntraVENous Q4H PRN    glucose chewable tablet 16 g  4 Tab Oral PRN    dextrose (D50W) injection syrg 12.5-25 g  25-50 mL IntraVENous PRN    glucagon (GLUCAGEN) injection 1 mg  1 mg IntraMUSCular PRN          Objective:     Vitals:  Blood pressure 171/74, pulse 96, temperature 97.4 °F (36.3 °C), resp. rate 20, height 5' 1\" (1.549 m), weight 88.8 kg (195 lb 12.8 oz), SpO2 98 %. Temp (24hrs), Av.5 °F (36.9 °C), Min:97.4 °F (36.3 °C), Max:99.6 °F (37.6 °C)      Intake and Output:  No intake/output data recorded.  1901 -  0700  In: 320 [P.O.:120; I.V.:200]  Out: 1221 [Urine:1220]    Physical Exam:               GENERAL ASSESSMENT: AMS  CHEST: CTA  HEART: S1S2  ABDOMEN: Soft,NT  EXTREMITY: no EDEMA          ECG/rhythm:    Data Review      No results for input(s): TNIPOC in the last 72 hours. No lab exists for component: ITNL   No results for input(s): CPK, CKMB, TROIQ in the last 72 hours. Recent Labs     18  0624 18  0338 18  1612    143 142   K 4.0 4.6 4.5   * 110* 109*   CO2 23 22 24   BUN 20 24* 28*   CREA 0.84 0.89 0.93   * 143* 176*   MG 1.7 1.8  --    CA 9.2 8.5 8.5   WBC 10.3 9.2 9.6   HGB 8.4* 7.6* 7.6*   HCT 28.3* 25.5* 25.0*   * 389 365      No results for input(s): INR, PTP, APTT in the last 72 hours. No lab exists for component: INREXT, INREXT  Needs: urine analysis, urine sodium, protein and creatinine  Lab Results   Component Value Date/Time    Sodium,urine random 29 2018 06:10 PM    Creatinine, urine 187.11 2018 06:10 PM           : Keturah Logan MD  2018        [facility]:  [email or web address]  Laura Navarro office:  [address], [address] [place] [address],[address]  [place], [address] [place] [address]  Phone: [phone]  Fax :     [phone]    [facility] office:  [address] [facility], [address] [place] [address]  Phone - [phone]  Fax - [phone]

## 2018-12-14 NOTE — PROGRESS NOTES
Problem: Self Care Deficits Care Plan (Adult)  Goal: *Acute Goals and Plan of Care (Insert Text)  Occupational Therapy Goals  Initiated 12/11/2018  1. Patient will perform self feeding with moderate assistance from supported sitting position within 7 day(s). 2.  Patient will perform grooming with moderate assistance from supported sitting position within 7 day(s). 3.  Patient will perform upper body dressing and bathing with maximal assistance within 7 day(s). 4.  Patient will tolerate sitting EOB with maximum assistance while completing functional activities in preparation for ADL transfers within 7 days. 5.  Patient will participate in upper extremity therapeutic exercise/activities with minimal assistance for 10 minutes within 7 day(s). 6.  Patient will utilize energy conservation techniques during functional activities with verbal cues within 7 day(s). Occupational Therapy TREATMENT  Patient: Amy Solo (46 y.o. female)  Date: 12/14/2018  Diagnosis: Metabolic acidosis [W86.7] BAYLEE (acute kidney injury) ([facility] [address]  Procedure(s) (LRB):  BRONCHOSCOPY (N/A)  BRONCHIAL WASHINGS FLEXIBLE (Bilateral) 7 Days Post-Op  Precautions:    Chart, occupational therapy assessment, plan of care, and goals were reviewed. ASSESSMENT:  Pts bed placed in chair position as she is preparing to receive joyce catheter. Pt stated her back hurt but encouraged her to be upright for procedure. Pt encouraged to perform finger flex/ext, difficulty to extend R fingers. Showed her and the pt how to use her L UE to raise R UE however pt total assist to do so. Pts  present and stated he does not think she is motivated to do so right now. She stated she wanted to get to the bathroom but explained to her that the tasks/exercises therapy is having her do are to increase her strength to be able to sit up. Recommend SNF.   Progression toward goals:  []       Improving appropriately and progressing toward goals  [x]       Improving slowly and progressing toward goals  []       Not making progress toward goals and plan of care will be adjusted     PLAN:  Patient continues to benefit from skilled intervention to address the above impairments. Continue treatment per established plan of care. Discharge Recommendations:  SNF  Further Equipment Recommendations for Discharge:  None     SUBJECTIVE:   Patient stated My back hurts.     OBJECTIVE DATA SUMMARY:   Cognitive/Behavioral Status:  Neurologic State: Alert  Orientation Level: Oriented to person;Oriented to place  Cognition: Impaired decision making             Functional Mobility and Transfers for ADLs:  Bed Mobility:  Supine to Sit: Total assistance(bed placed in chair position)    Transfers:      Not tested today       Balance: Impaired sitting balance. Therapeutic Exercises:   Pt flex/extend L fingers 10 x, active assist to extend R fingers. Total assist to flex/extension. Pain:  Pain Scale 1: Adult Nonverbal Pain Scale  Pain Intensity 1: 0           Pain Intervention(s) 1: Medication (see MAR)  Activity Tolerance:   Poor  Please refer to the flowsheet for vital signs taken during this treatment.   After treatment:   [] Patient left in no apparent distress sitting up in chair  [x] Patient left in no apparent distress in bed  [x] Call bell left within reach  [x] Nursing notified  [x] Caregiver present  [] Bed alarm activated    COMMUNICATION/COLLABORATION:   The patients plan of care was discussed with: Physical Therapist, Occupational Therapist and Registered Nurse    SHAUNNA Hamm  Time Calculation: 20 mins

## 2018-12-15 ENCOUNTER — APPOINTMENT (OUTPATIENT)
Dept: MRI IMAGING | Age: 63
DRG: 871 | End: 2018-12-15
Attending: PSYCHIATRY & NEUROLOGY
Payer: COMMERCIAL

## 2018-12-15 LAB
ANION GAP SERPL CALC-SCNC: 9 MMOL/L (ref 5–15)
APPEARANCE UR: CLEAR
BACTERIA URNS QL MICRO: NEGATIVE /HPF
BASOPHILS # BLD: 0.1 K/UL (ref 0–0.1)
BASOPHILS NFR BLD: 1 % (ref 0–1)
BILIRUB UR QL: NEGATIVE
BUN SERPL-MCNC: 22 MG/DL (ref 6–20)
BUN/CREAT SERPL: 26 (ref 12–20)
CALCIUM SERPL-MCNC: 8.9 MG/DL (ref 8.5–10.1)
CHLORIDE SERPL-SCNC: 108 MMOL/L (ref 97–108)
CO2 SERPL-SCNC: 26 MMOL/L (ref 21–32)
COLOR UR: ABNORMAL
CREAT SERPL-MCNC: 0.84 MG/DL (ref 0.55–1.02)
DIFFERENTIAL METHOD BLD: ABNORMAL
EOSINOPHIL # BLD: 1.2 K/UL (ref 0–0.4)
EOSINOPHIL NFR BLD: 11 % (ref 0–7)
EPITH CASTS URNS QL MICRO: ABNORMAL /LPF
ERYTHROCYTE [DISTWIDTH] IN BLOOD BY AUTOMATED COUNT: 14.1 % (ref 11.5–14.5)
GLUCOSE BLD STRIP.AUTO-MCNC: 123 MG/DL (ref 65–100)
GLUCOSE BLD STRIP.AUTO-MCNC: 131 MG/DL (ref 65–100)
GLUCOSE BLD STRIP.AUTO-MCNC: 150 MG/DL (ref 65–100)
GLUCOSE BLD STRIP.AUTO-MCNC: 256 MG/DL (ref 65–100)
GLUCOSE SERPL-MCNC: 153 MG/DL (ref 65–100)
GLUCOSE UR STRIP.AUTO-MCNC: 100 MG/DL
HCT VFR BLD AUTO: 26 % (ref 35–47)
HGB BLD-MCNC: 8.2 G/DL (ref 11.5–16)
HGB UR QL STRIP: NEGATIVE
HYALINE CASTS URNS QL MICRO: ABNORMAL /LPF (ref 0–5)
IMM GRANULOCYTES # BLD: 0.2 K/UL (ref 0–0.04)
IMM GRANULOCYTES NFR BLD AUTO: 2 % (ref 0–0.5)
KETONES UR QL STRIP.AUTO: NEGATIVE MG/DL
LEUKOCYTE ESTERASE UR QL STRIP.AUTO: NEGATIVE
LYMPHOCYTES # BLD: 1.2 K/UL (ref 0.8–3.5)
LYMPHOCYTES NFR BLD: 11 % (ref 12–49)
MCH RBC QN AUTO: 27.8 PG (ref 26–34)
MCHC RBC AUTO-ENTMCNC: 31.5 G/DL (ref 30–36.5)
MCV RBC AUTO: 88.1 FL (ref 80–99)
MONOCYTES # BLD: 1 K/UL (ref 0–1)
MONOCYTES NFR BLD: 9 % (ref 5–13)
NEUTS SEG # BLD: 7.4 K/UL (ref 1.8–8)
NEUTS SEG NFR BLD: 66 % (ref 32–75)
NITRITE UR QL STRIP.AUTO: NEGATIVE
NRBC # BLD: 0 K/UL (ref 0–0.01)
NRBC BLD-RTO: 0 PER 100 WBC
PH UR STRIP: 6 [PH] (ref 5–8)
PLATELET # BLD AUTO: 458 K/UL (ref 150–400)
PMV BLD AUTO: 9.6 FL (ref 8.9–12.9)
POTASSIUM SERPL-SCNC: 3.6 MMOL/L (ref 3.5–5.1)
PROT UR STRIP-MCNC: 100 MG/DL
RBC # BLD AUTO: 2.95 M/UL (ref 3.8–5.2)
RBC #/AREA URNS HPF: ABNORMAL /HPF (ref 0–5)
RBC MORPH BLD: ABNORMAL
SERVICE CMNT-IMP: ABNORMAL
SODIUM SERPL-SCNC: 143 MMOL/L (ref 136–145)
SP GR UR REFRACTOMETRY: 1.02 (ref 1–1.03)
UR CULT HOLD, URHOLD: NORMAL
UROBILINOGEN UR QL STRIP.AUTO: 0.2 EU/DL (ref 0.2–1)
WBC # BLD AUTO: 11.1 K/UL (ref 3.6–11)
WBC URNS QL MICRO: ABNORMAL /HPF (ref 0–4)

## 2018-12-15 PROCEDURE — 74011250636 HC RX REV CODE- 250/636: Performed by: INTERNAL MEDICINE

## 2018-12-15 PROCEDURE — 81001 URINALYSIS AUTO W/SCOPE: CPT

## 2018-12-15 PROCEDURE — 74011250637 HC RX REV CODE- 250/637: Performed by: INTERNAL MEDICINE

## 2018-12-15 PROCEDURE — 74011000250 HC RX REV CODE- 250: Performed by: INTERNAL MEDICINE

## 2018-12-15 PROCEDURE — 70551 MRI BRAIN STEM W/O DYE: CPT

## 2018-12-15 PROCEDURE — 74011636637 HC RX REV CODE- 636/637: Performed by: INTERNAL MEDICINE

## 2018-12-15 PROCEDURE — A9575 INJ GADOTERATE MEGLUMI 0.1ML: HCPCS | Performed by: RADIOLOGY

## 2018-12-15 PROCEDURE — 82962 GLUCOSE BLOOD TEST: CPT

## 2018-12-15 PROCEDURE — 77030038269 HC DRN EXT URIN PURWCK BARD -A

## 2018-12-15 PROCEDURE — 77010033678 HC OXYGEN DAILY

## 2018-12-15 PROCEDURE — 94760 N-INVAS EAR/PLS OXIMETRY 1: CPT

## 2018-12-15 PROCEDURE — 80048 BASIC METABOLIC PNL TOTAL CA: CPT

## 2018-12-15 PROCEDURE — 74011250636 HC RX REV CODE- 250/636: Performed by: RADIOLOGY

## 2018-12-15 PROCEDURE — 85025 COMPLETE CBC W/AUTO DIFF WBC: CPT

## 2018-12-15 PROCEDURE — 72156 MRI NECK SPINE W/O & W/DYE: CPT

## 2018-12-15 PROCEDURE — 74011250637 HC RX REV CODE- 250/637: Performed by: PHYSICIAN ASSISTANT

## 2018-12-15 PROCEDURE — 65660000000 HC RM CCU STEPDOWN

## 2018-12-15 PROCEDURE — C9113 INJ PANTOPRAZOLE SODIUM, VIA: HCPCS | Performed by: INTERNAL MEDICINE

## 2018-12-15 PROCEDURE — 74011000258 HC RX REV CODE- 258: Performed by: INTERNAL MEDICINE

## 2018-12-15 PROCEDURE — 77030034850

## 2018-12-15 PROCEDURE — 51798 US URINE CAPACITY MEASURE: CPT

## 2018-12-15 PROCEDURE — 36415 COLL VENOUS BLD VENIPUNCTURE: CPT

## 2018-12-15 RX ORDER — GADOTERATE MEGLUMINE 376.9 MG/ML
18 INJECTION INTRAVENOUS
Status: COMPLETED | OUTPATIENT
Start: 2018-12-15 | End: 2018-12-15

## 2018-12-15 RX ADMIN — HYDROMORPHONE HYDROCHLORIDE 0.5 MG: 2 INJECTION, SOLUTION INTRAMUSCULAR; INTRAVENOUS; SUBCUTANEOUS at 02:28

## 2018-12-15 RX ADMIN — Medication 10 ML: at 06:16

## 2018-12-15 RX ADMIN — MEROPENEM 2 G: 1 INJECTION, POWDER, FOR SOLUTION INTRAVENOUS at 10:00

## 2018-12-15 RX ADMIN — CHLORTHALIDONE 25 MG: 25 TABLET ORAL at 09:14

## 2018-12-15 RX ADMIN — Medication 10 ML: at 10:16

## 2018-12-15 RX ADMIN — LISINOPRIL 40 MG: 20 TABLET ORAL at 09:14

## 2018-12-15 RX ADMIN — Medication 10 MG: at 09:14

## 2018-12-15 RX ADMIN — Medication 10 ML: at 21:17

## 2018-12-15 RX ADMIN — ACETAMINOPHEN 650 MG: 325 TABLET, FILM COATED ORAL at 17:08

## 2018-12-15 RX ADMIN — SODIUM CHLORIDE 40 MG: 9 INJECTION INTRAMUSCULAR; INTRAVENOUS; SUBCUTANEOUS at 09:14

## 2018-12-15 RX ADMIN — HYDRALAZINE HYDROCHLORIDE 20 MG: 20 INJECTION INTRAMUSCULAR; INTRAVENOUS at 00:26

## 2018-12-15 RX ADMIN — GADOTERATE MEGLUMINE 18 ML: 376.9 INJECTION INTRAVENOUS at 19:19

## 2018-12-15 RX ADMIN — MEROPENEM 2 G: 1 INJECTION, POWDER, FOR SOLUTION INTRAVENOUS at 18:02

## 2018-12-15 RX ADMIN — MEROPENEM 2 G: 1 INJECTION, POWDER, FOR SOLUTION INTRAVENOUS at 01:11

## 2018-12-15 RX ADMIN — INSULIN LISPRO 3 UNITS: 100 INJECTION, SOLUTION INTRAVENOUS; SUBCUTANEOUS at 11:30

## 2018-12-15 RX ADMIN — OXYCODONE HYDROCHLORIDE 5 MG: 5 TABLET ORAL at 01:16

## 2018-12-15 RX ADMIN — OXYCODONE HYDROCHLORIDE 5 MG: 5 TABLET ORAL at 20:00

## 2018-12-15 RX ADMIN — LEVOTHYROXINE SODIUM 125 MCG: 125 TABLET ORAL at 06:16

## 2018-12-15 RX ADMIN — ATENOLOL 50 MG: 50 TABLET ORAL at 09:14

## 2018-12-15 RX ADMIN — INSULIN GLARGINE 10 UNITS: 100 INJECTION, SOLUTION SUBCUTANEOUS at 09:14

## 2018-12-15 NOTE — PROGRESS NOTES
Shift Summary   1930: Bedside and Verbal shift change report given to Son Caruso RN (oncoming nurse) by Prasanth Estes RN (offgoing nurse). Report included the following information SBAR, Kardex, Procedure Summary, Intake/Output, MAR, Accordion, Recent Results and Cardiac Rhythm NSR. Son at bedside. 2200: Patient has not yet voided since straight cath earlier. Pure wick in place, checked patient for incontinence. Patient denies feeling like she has to void, encouraged her to drink water. Son went home for night. 0240: Patient had bowel movement. Pre-medicated with prn dilaudid before cleaning up and giving bath and linen change. 0320: Patient has not voided since she was straight-cathed earlier. Bladder scan showed >725 retained. Called Dr. Tameka Zavala, hospitalist on call, and received order to reinsert joyce catheter. 0340: 16Fr Joyce inserted (2 attempts) following sterile technique, assisted by Carmen Lagos RN. 775cc urine drained from bladder immediately. Urinalysis sent to lab. While catheterizing, noted thick milky discharge coming from vagina. 0430: Had contracture boots off for about an hour to give skin a rest, examine for any breakdown/pressure spots. No signs of any breakdown/pressuer injury noted around medical device. Placed boots and SCD sleeves back on patient. 0730: Bedside and Verbal shift change report given to Prasanth Estes RN (oncoming nurse) by Son Caruso RN (offgoing nurse). Report included the following information SBAR, Kardex, Procedure Summary, Intake/Output, MAR, Accordion, Recent Results and Cardiac Rhythm NSR. Care Plan Summary  Problem: Falls - Risk of  Goal: *Absence of Falls  Document Handy Fall Risk and appropriate interventions in the flowsheet.   Outcome: Progressing Towards Goal  Fall Risk Interventions:  Mobility Interventions: Bed/chair exit alarm    Mentation Interventions: Bed/chair exit alarm, Toileting rounds, Door open when patient unattended, Adequate sleep, hydration, pain control, More frequent rounding    Medication Interventions: Bed/chair exit alarm, Evaluate medications/consider consulting pharmacy    Elimination Interventions: Bed/chair exit alarm, Call light in reach, Toileting schedule/hourly rounds             Problem: Pressure Injury - Risk of  Goal: *Prevention of pressure injury  Document Nikolay Scale and appropriate interventions in the flowsheet. Outcome: Progressing Towards Goal  Pressure Injury Interventions:  Sensory Interventions: Assess changes in LOC, Avoid rigorous massage over bony prominences, Float heels, Keep linens dry and wrinkle-free, Minimize linen layers, Turn and reposition approx. every two hours (pillows and wedges if needed)    Moisture Interventions: Apply protective barrier, creams and emollients, Absorbent underpads, Internal/External urinary devices, Limit adult briefs, Minimize layers    Activity Interventions: PT/OT evaluation    Mobility Interventions: PT/OT evaluation, HOB 30 degrees or less, Turn and reposition approx.  every two hours(pillow and wedges)    Nutrition Interventions: Document food/fluid/supplement intake, Offer support with meals,snacks and hydration    Friction and Shear Interventions: Minimize layers, Lift team/patient mobility team, Lift sheet, HOB 30 degrees or less

## 2018-12-15 NOTE — PROGRESS NOTES
Blanco Mcpherson [facility] [address], [place], [address] [facility]  [phone]      Medical Progress Note      NAME: Dia Rodrigues   :  1955  MRM:  [id number]    Date/Time: 12/15/2018  8:13 AM       Assessment and Plan:   1.  C-spine discitis: MRI showed evidence of discitis at C5-C6 and C6-C7, with abnormal enhancement of the vertebral bodies. There is enhancing phlegmon in the epidural space. No drainable abscess. Evaluated by ortho and ID. Needs 6 weeks of IV ABx. Currently on meropenem     2. Acute metabolic encephalopathy: unclear etiology. ? GBS meningitis, ?medications and relation to the diskitis. Head CT, MRI brain without acute process. TSH, B12/folate, ammonia WNL s/p LP. HSV neg. EEG negative for seizure activity, RPR negative, West Nile negative, HIV negative, Lyme IgG positive but IgM negative. Improving clinically      3. Hemoptysis vs epistaxis: sudden onset . Underwent bronchoscopy which showed large amount of blood in the trachea, however past paula only scants amount of blood in RLL. Some thick yellow/white secretions in LLL and RLL. All lobes/subsegments inspected with no signs of bleeding. Evaluated by ENT. Stable      4. Severe sepsis/bacteremia: Blood Cx + 4/4 bottles with GBS on admission, repeat blood Cx NGTD. With R arm/elbow cellulitis and now evidence of discitis. No e/o valvular veg on TTE  Continue meropenem as per ID      5.  R elbow cellulitis/bursitis/acute pain: s/p joint aspiration by IR on , cultures negative to date. On meropenem. 6.  ARF: likely from ATN/sepsis, hypovolemia. Resolved. 7.  AG metabolic acidosis: resolved. Due to sepsis     8. DM type 2 w/ hypoglycemia: A1C 7.2%. On lantus. 9.  HTN: on atenolol, lisinopril      10. Anemia: Fe studies consistent with chronic dz. Monitor     11. Urinary retention. Put back Shankar catheter. Will consult urology on Monday. 12.  RT arm weakness.  Pt unable to move RT arm and sensation is decreased. Consult neurology. Subjective:     Chief Complaint:  Follow up of pt who was admitted with encephalopathy, cellulitis. C/o back pain. More awake today      ROS:  (bold if positive, if negative)      Tolerating PT  Tolerating Diet        Objective:     Last 24hrs VS reviewed since prior progress note.  Most recent are:    Visit Vitals  /75 (BP 1 Location: Left arm, BP Patient Position: At rest)   Pulse 89   Temp 97.9 °F (36.6 °C)   Resp 16   Ht 5' 1\" (1.549 m)   Wt 90.7 kg (200 lb)   SpO2 94%   BMI 37.79 kg/m²     SpO2 Readings from Last 6 Encounters:   12/15/18 94%    O2 Flow Rate (L/min): 2 l/min       Intake/Output Summary (Last 24 hours) at 12/15/2018 0740  Last data filed at 12/15/2018 0501  Gross per 24 hour   Intake 2010 ml   Output 1075 ml   Net 935 ml        Physical Exam:    Gen:  Well-developed, well-nourished, in no acute distress  HEENT:  Pink conjunctivae, PERRL, hearing intact to voice, moist mucous membranes  Neck:  Supple, without masses, thyroid non-tender  Resp:  No accessory muscle use, clear breath sounds without wheezes rales or rhonchi  Card:  No murmurs, normal S1, S2 without thrills, bruits or peripheral edema  Abd:  Soft, non-tender, non-distended, normoactive bowel sounds are present, no palpable organomegaly and no detectable hernias  Lymph:  No cervical or inguinal adenopathy  Musc:  No cyanosis or clubbing  Skin:  No rashes or ulcers, skin turgor is good  Neuro:  Cranial nerves are grossly intact, Rt arm weakness  Psych:  Poor insight,    __________________________________________________________________  Medications Reviewed: (see below)  Medications:     Current Facility-Administered Medications   Medication Dose Route Frequency    atenolol (TENORMIN) tablet 50 mg  50 mg Oral DAILY    chlorthalidone (HYGROTEN) tablet 25 mg  25 mg Oral DAILY    oxyCODONE IR (ROXICODONE) tablet 5 mg  5 mg Oral Q4H PRN    meropenem (MERREM) 2 g in 0.9% sodium chloride 100 mL IVPB  2 g IntraVENous Q8H    lisinopril (PRINIVIL, ZESTRIL) tablet 40 mg  40 mg Oral DAILY    levothyroxine (SYNTHROID) tablet 125 mcg  125 mcg Oral ACB    insulin lispro (HUMALOG) injection   SubCUTAneous AC&HS    HYDROmorphone (PF) (DILAUDID) injection 0.5 mg  0.5 mg IntraVENous Q3H PRN    pantoprazole (PROTONIX) 40 mg in sodium chloride 0.9% 10 mL injection  40 mg IntraVENous DAILY    albuterol (PROVENTIL VENTOLIN) nebulizer solution 2.5 mg  2.5 mg Nebulization Q4H PRN    insulin glargine (LANTUS) injection 10 Units  10 Units SubCUTAneous DAILY    bisacodyl (DULCOLAX) suppository 10 mg  10 mg Rectal DAILY    influenza vaccine 2018-19 (6 mos+)(PF) (FLUARIX QUAD/FLULAVAL QUAD) injection 0.5 mL  0.5 mL IntraMUSCular PRIOR TO DISCHARGE    labetalol (NORMODYNE;TRANDATE) injection 10 mg  10 mg IntraVENous Q4H PRN    hydrALAZINE (APRESOLINE) 20 mg/mL injection 20 mg  20 mg IntraVENous Q6H PRN    alteplase (CATHFLO) 1 mg in sterile water (preservative free) 1 mL injection  1 mg InterCATHeter PRN    sodium chloride (NS) flush 5-10 mL  5-10 mL IntraVENous Q8H    sodium chloride (NS) flush 5-10 mL  5-10 mL IntraVENous PRN    acetaminophen (TYLENOL) tablet 650 mg  650 mg Oral Q4H PRN    ondansetron (ZOFRAN) injection 4 mg  4 mg IntraVENous Q4H PRN    glucose chewable tablet 16 g  4 Tab Oral PRN    dextrose (D50W) injection syrg 12.5-25 g  25-50 mL IntraVENous PRN    glucagon (GLUCAGEN) injection 1 mg  1 mg IntraMUSCular PRN        Lab Data Reviewed: (see below)  Lab Review:     Recent Labs     12/15/18  0109 12/13/18  0624   WBC 11.1* 10.3   HGB 8.2* 8.4*   HCT 26.0* 28.3*   * 451*     Recent Labs     12/15/18  0109 12/13/18  0624    144   K 3.6 4.0    109*   CO2 26 23   * 130*   BUN 22* 20   CREA 0.84 0.84   CA 8.9 9.2   MG  --  1.7     Lab Results   Component Value Date/Time    Glucose (POC) 150 (H) 12/15/2018 06:51 AM    Glucose (POC) 165 (H) 12/14/2018 09:10 PM Glucose (POC) 128 (H) 12/14/2018 04:03 PM    Glucose (POC) 227 (H) 12/14/2018 11:26 AM    Glucose (POC) 170 (H) 12/14/2018 06:25 AM     No results for input(s): PH, PCO2, PO2, HCO3, FIO2 in the last 72 hours. No results for input(s): INR in the last 72 hours. No lab exists for component: Arturo LUI  All Micro Results     Procedure Component Value Units Date/Time    URINE CULTURE HOLD SAMPLE [[id number]] Collected:  12/15/18 0400    Order Status:  Completed Specimen:  Urine from Serum Updated:  12/15/18 0408     Urine culture hold       URINE ON HOLD IN MICROBIOLOGY DEPT FOR 3 DAYS. IF UNPRESERVED URINE IS SUBMITTED, IT CANNOT BE USED FOR ADDITIONAL TESTING AFTER 24 HRS, RECOLLECTION WILL BE REQUIRED. CULTURE, FUNGUS [[id number]]  (Abnormal) Collected:  12/07/18 1330    Order Status:  Completed Specimen:  Bronchial lavage Updated:  12/12/18 1129     Special Requests: NO SPECIAL REQUESTS        Culture result:       HEAVY CANDIDA PARAPSILOSIS                  CULTURE WILL BE HELD FOR 4 WEEKS. IF THERE IS ADDITIONAL FUNGAL GROWTH, A NEW REPORT WILL FOLLOW. CULTURE, CSF [place] STAIN [[id number]] Collected:  12/04/18 1430    Order Status:  Completed Specimen:  Cerebrospinal Fluid Updated:  12/11/18 0919     Special Requests: NO SPECIAL REQUESTS        GRAM STAIN RARE WBCS SEEN         NO ORGANISMS SEEN        Culture result:       Culture performed on Unspun Fluid                  NO GROWTH ON SOLID MEDIA AT 4 DAYS                  NO GROWTH IN THIO BROTH AT 7 DAYS          CULTURE, FUNGUS [[id number]] Collected:  12/06/18 1509    Order Status:  Completed Specimen:   Body fld Updated:  12/10/18 0916     Special Requests: NO SPECIAL REQUESTS        Culture result: NO FUNGUS ISOLATED 4 DAYS       AFB CULTURE & SMEAR W/REFLEX ID [[id number]] Collected:  12/07/18 1330    Order Status:  Completed Specimen:  Miscellaneous sample Updated:  12/10/18 0635     Source BRONCHIAL LAVAGE        AFB Specimen processing Concentration     Acid Fast Smear NEGATIVE         Comment: (NOTE)  Performed At: [address] [id number]  Amber Torres MD UE:[id number]          Acid Fast Culture PENDING    CULTURE, SPUTUM/BRONCH/OTH [[id number]]  (Abnormal) Collected:  12/07/18 1330    Order Status:  Completed Specimen:  Sputum from Bronchial lavage Updated:  12/09/18 1015     Special Requests: NO SPECIAL REQUESTS        GRAM STAIN RARE WBCS SEEN               RARE EPITHELIAL CELLS SEEN            FEW BUDDING YEAST        Culture result:       SCANT NORMAL RESPIRATORY TRIXIE            MODERATE YEAST       CULTURE, ANAEROBIC [[id number]] Collected:  12/06/18 1509    Order Status:  Completed Specimen:  Aspirate Updated:  12/08/18 0917     Special Requests: NO SPECIAL REQUESTS        Culture result:       No growth thus far, holding 14 days. CULTURE, BODY FLUID [place] [[id number]] Collected:  12/06/18 1509    Order Status:  Completed Specimen:  Elbow Updated:  12/08/18 0917     Special Requests: RT ELBOW ASPIRATE     GRAM STAIN RARE WBCS SEEN         NO ORGANISMS SEEN        Culture result:       Culture performed on Fluid swab specimen                  No growth thus far, holding 14 days.           CULTURE LEGIONELLA [[id number]] Collected:  12/07/18 1330    Order Status:  Completed Specimen:  Other Updated:  12/07/18 1402    CULTURE, VIRAL [[id number]]     Order Status:  Canceled Specimen:  Other     CULTURE, BLOOD [[id number]] Collected:  12/01/18 1231    Order Status:  Completed Specimen:  Blood Updated:  12/07/18 0450     Special Requests: NO SPECIAL REQUESTS        Culture result: NO GROWTH 6 DAYS       CULTURE, BLOOD [[id number]] Collected:  12/01/18 1231    Order Status:  Completed Specimen:  Blood Updated:  12/07/18 0450     Special Requests: NO SPECIAL REQUESTS        Culture result: NO GROWTH 6 DAYS       CULTURE, Rogene Lade STAIN [[id number]] Collected:  12/06/18 1200    Order Status:  Canceled Specimen:  Wound from Elbow     MENINGITIS PATHOGENS PANEL, CSF (BY PCR) [[id number]] Collected:  12/04/18 1442    Order Status:  Completed Specimen:  Cerebrospinal Fluid Updated:  12/04/18 2132     Escherichia coli K1 NOT DETECTED        Haemophilus Influenzae NOT DETECTED        Listeria Monocytogenes NOT DETECTED        Neisseria Meningitidis NOT DETECTED        Streptococcus Agalactiae NOT DETECTED        Streptococcus Pneumoniae NOT DETECTED        Cytomegalovirus NOT DETECTED        Enterovirus NOT DETECTED        Herpes Simplex Virus 1 NOT DETECTED        Herpes Simplex Virus 2 NOT DETECTED        Human Herpesvirus 6 NOT DETECTED        Human Parechovirus NOT DETECTED        Varicella Zoster Virus NOT DETECTED        Crypto. neoformans/gattii NOT DETECTED       CULTURE, CSF  TUBE 2 [[id number]] Collected:  12/04/18 1425    Order Status:  Canceled Specimen:  Cerebrospinal Fluid Updated:  12/04/18 1448    CULTURE, BLOOD [[id number]]  (Abnormal) Collected:  11/30/18 1230    Order Status:  Completed Specimen:  Blood Updated:  12/03/18 0927     Special Requests: NO SPECIAL REQUESTS        Culture result:       STREPTOCOCCI, BETA HEMOLYTIC GROUP B GROWING IN BOTH BOTTLES DRAWN SITE=LT HAND PLEASE REFER TO Vandana Friday [id number] FOR SENSITIVITIES                  PRELIMINARY REPORT OF GRAM POSITIVE COCCI IN PAIRS AND CHAINS GROWING IN BOTH BOTTLES DRAWN CALLED TO AND READ BACK BY DR SUAZO ON 12/1/18 AT 1033. SH/AOW          CULTURE, BLOOD [[id number]]  (Abnormal)  (Susceptibility) Collected:  11/30/18 1210    Order Status:  Completed Specimen:  Blood Updated:  12/03/18 0926     Special Requests: NO SPECIAL REQUESTS        Culture result:       STREPTOCOCCI, BETA HEMOLYTIC GROUP B GROWING IN BOTH BOTTLES DRAWN SITE=LAC (SENSITIVITIES PERFORMED BY E-TEST)                  PRELIMINARY REPORT OF GRAM POSITIVE COCCI IN PAIRS AND CHAINS GROWING IN BOTH BOTTLES DRAWN CALLED TO AND READ BACK BY DR SUAZO ON 12/1/18 AT 1033./AOW                  STAPHYLOCOCCUS SPECIES, COAGULASE NEGATIVE GROWING IN ONLY 1 OF THE ABOVE BOTTLES PLATES WILL BE HELD 3 DAYS. CALL [phone] IF SENSITIVITIES ARE NEEDED. RESPIRATORY PANEL,PCR,NASOPHARYNGEAL [[id number]] Collected:  11/30/18 1810    Order Status:  Completed Specimen:  Nasopharyngeal Updated:  11/30/18 2146     Adenovirus NOT DETECTED        Coronavirus 229E NOT DETECTED        Coronavirus HKU1 NOT DETECTED        Coronavirus CVNL63 NOT DETECTED        Coronavirus OC43 NOT DETECTED        Metapneumovirus NOT DETECTED        Rhinovirus and Enterovirus NOT DETECTED        Influenza A NOT DETECTED        Influenza A, subtype H1 NOT DETECTED        Influenza A, subtype H3 NOT DETECTED        INFLUENZA A H1N1 PCR NOT DETECTED        Influenza B NOT DETECTED        Parainfluenza 1 NOT DETECTED        Parainfluenza 2 NOT DETECTED        Parainfluenza 3 NOT DETECTED        Parainfluenza virus 4 NOT DETECTED        RSV by PCR NOT DETECTED        Bordetella pertussis - PCR NOT DETECTED        Chlamydophila pneumoniae DNA, QL, PCR NOT DETECTED        Mycoplasma pneumoniae DNA, QL, PCR NOT DETECTED       URINE CULTURE HOLD SAMPLE [[id number]] Collected:  11/30/18 1241    Order Status:  Completed Specimen:  Serum Updated:  11/30/18 1257     Urine culture hold       URINE ON HOLD IN MICROBIOLOGY DEPT FOR 3 DAYS. IF UNPRESERVED URINE IS SUBMITTED, IT CANNOT BE USED FOR ADDITIONAL TESTING AFTER 24 HRS, RECOLLECTION WILL BE REQUIRED. I have reviewed notes of prior 24hr. Other pertinent lab:       Total time spent with patient: [place] discussed with: Patient, Nursing Staff and >50% of time spent in counseling and coordination of care    Discussed:  Care Plan    Prophylaxis:  SCD's    Disposition:  SNF/LTC           ___________________________________________________    Attending Physician: Lisa Felix MD

## 2018-12-15 NOTE — CONSULTS
NEUROLOGY IN-PATIENT NEW CONSULTATION      12/15/2018    RE: Divine Koenig         1955      REFERRED BY:  Reji Fisher MD      CHIEF COMPLAINT:  This is Divine Koenig is a 61 y.o. female right handed who neurology was called to evaluate for R UE weakness    HPI:     History is limited. Patient was admitted on 11/30/18 for AMS and found to have BAYLEE and sepsis requiring intubation. During admission, patient was found to have C5-C7 osteomyelitis and epdirual phelgmon with severe L C3C34 foraminal stenosis. Patient also found to have R elbow cellulitis/bursitis/acute pain: s/p joint aspiration by IR on 12/6, cultures negative to date    MRI brain (12/6/18) was unremarkable for stroke. Ortho and ID are involved. However,  patient was noted to have R UE weakness of unclear onset.  (+) numbness of the R UE,.    ROS   (+) fever  (-) rash  All other systems reviewed and are negative    Past Medical Hx  Past Medical History:   Diagnosis Date    Chronic back pain     Diabetes mellitus, type 2 ([facility] [place] [address]     History of vascular access device 12/03/2018    [facility] VAT - 4 FR single, L cephalic, 44 cm for LTA    HTN (hypertension)        Social Hx  Social History     Socioeconomic History    Marital status:      Spouse name: Not on file    Number of children: Not on file    Years of education: Not on file    Highest education level: Not on file   Tobacco Use    Smoking status: Never Smoker    Smokeless tobacco: Never Used   Substance and Sexual Activity    Alcohol use: No     Frequency: Never   Other Topics Concern       Family Hx  Family History   Problem Relation Age of Onset    Diabetes Father     Heart Disease Father        ALLERGIES  No Known Allergies    CURRENT MEDS  Current Facility-Administered Medications   Medication Dose Route Frequency Provider Last Rate Last Dose    atenolol (TENORMIN) tablet 50 mg  50 mg Oral DAILY Harish Kelly MD   50 mg at 12/15/18 0914    chlorthalidone (HYGROTEN) tablet 25 mg  25 mg Oral DAILY Joby SIFUENTES MD   25 mg at 12/15/18 0914    oxyCODONE IR (ROXICODONE) tablet 5 mg  5 mg Oral Q4H PRN Chico Silverman PA-C   5 mg at 12/15/18 0116    meropenem (MERREM) 2 g in 0.9% sodium chloride 100 mL IVPB  2 g IntraVENous Carlos Davila MD   2 g at 12/15/18 1000    lisinopril (PRINIVIL, ZESTRIL) tablet 40 mg  40 mg Oral DAILY Jodie Posey MD   40 mg at 12/15/18 0914    levothyroxine (SYNTHROID) tablet 125 mcg  125 mcg Oral ACB Anselm Parents, MD   125 mcg at 12/15/18 6412    insulin lispro (HUMALOG) injection   SubCUTAneous AC&HS Gabrielle Delgado MD   3 Units at 12/15/18 1130    HYDROmorphone (PF) (DILAUDID) injection 0.5 mg  0.5 mg IntraVENous Q3H PRN Jodie Posey MD   0.5 mg at 12/15/18 0228    pantoprazole (PROTONIX) 40 mg in sodium chloride 0.9% 10 mL injection  40 mg IntraVENous DAILY Jodie Posey MD   40 mg at 12/15/18 0914    albuterol (PROVENTIL VENTOLIN) nebulizer solution 2.5 mg  2.5 mg Nebulization Q4H PRN Jodie Posey MD   2.5 mg at 12/10/18 1539    insulin glargine (LANTUS) injection 10 Units  10 Units SubCUTAneous DAILY Yuliana Whitman MD   10 Units at 12/15/18 0914    bisacodyl (DULCOLAX) suppository 10 mg  10 mg Rectal DAILY Yuliana Whitman MD   10 mg at 12/15/18 0914    influenza vaccine 2018-19 (6 mos+)(PF) (FLUARIX QUAD/FLULAVAL QUAD) injection 0.5 mL  0.5 mL IntraMUSCular PRIOR TO DISCHARGE Yuliana Whitman MD        labetalol (NORMODYNE;TRANDATE) injection 10 mg  10 mg IntraVENous Q4H PRN Yuliana Whitman MD   10 mg at 12/12/18 0501    hydrALAZINE (APRESOLINE) 20 mg/mL injection 20 mg  20 mg IntraVENous Q6H PRN Garland Zambrano MD   20 mg at 12/15/18 0026    alteplase (CATHFLO) 1 mg in sterile water (preservative free) 1 mL injection  1 mg InterCATHeter PRN Garland Zambrano MD        sodium chloride (NS) flush 5-10 mL  5-10 mL IntraVENous Q8H Liliya Salguero MD   10 mL at 12/15/18 1016    sodium chloride (NS) flush 5-10 mL  5-10 mL IntraVENous PRN Joesph Posada MD        acetaminophen (TYLENOL) tablet 650 mg  650 mg Oral Q4H PRN Joesph Posada MD   650 mg at 12/14/18 1111    ondansetron (ZOFRAN) injection 4 mg  4 mg IntraVENous Q4H PRN Joesph Posada MD   4 mg at 12/06/18 0818    glucose chewable tablet 16 g  4 Tab Oral PRN Joesph Posada MD        dextrose (D50W) injection syrg 12.5-25 g  25-50 mL IntraVENous PRN Joesph Posada MD   25 g at 12/07/18 8388    glucagon (GLUCAGEN) injection 1 mg  1 mg IntraMUSCular PRN Joesph Posada MD               PREVIOUS WORKUP: (reviewed)  IMAGING:    CT Results (recent):  Results from [place] encounter on 11/30/18   CT HEAD WO CONT    Narrative EXAM:  CT HEAD WO CONT  Clinical history: Worsening confusion  INDICATION:   worsening confusion    COMPARISON: None. CONTRAST:  None. TECHNIQUE: Unenhanced CT of the head was performed using 5 mm images. Brain and  bone windows were generated. CT dose reduction was achieved through use of a  standardized protocol tailored for this examination and automatic exposure  control for dose modulation. FINDINGS:  Minimal sulcal and ventricular prominence. . There is no intracranial hemorrhage,  extra-axial collection, mass, mass effect or midline shift. The basilar  cisterns are open. No acute infarct is identified. The bone windows demonstrate  no abnormalities. The visualized portions of the paranasal sinuses and mastoid  air cells are clear. Impression IMPRESSION:     No acute intracranial process. MRI Results (recent):  Results from [place] encounter on 11/30/18   MRI LUMB SPINE W WO CONT    Narrative *PRELIMINARY REPORT*  Vertebral body hemangiomas throughout the imaged thoracal lumbar spine. Disc  bulges at L2-3, L3-4, and L4-5 with varying degrees of foraminal and canal  narrowing. Moderate subcutaneous edema. No evidence of discitis or epidural  abscess.   Preliminary report was provided by Dr. Jovanny Petty, the on-call radiologist, at 1:42  PM  Final report to follow. *END PRELIMINARY REPORT*    *FINAL REPORT BELOW  EXAM:  MRI LUMB SPINE W WO CONT  INDICATION:, Patient with beta hemolytic strep bacteremia, leukocytosis. History  C5-C7 discitis. Evaluate for other discitis. epidural  abscess/discitis/osteomyelitis  TECHNIQUE: Sagittal T1, T2, STIR and axial T1 and T2 weighted images of the  lumbar spine were obtained. Following 14 mL intravenous Gadavist repeat axial  and sagittal T1-weighted images were obtained. COMPARISON: MRI lumbar spine 4/6/15  FINDINGS:  Mild scoliosis. The lower 3 thoracic disc spaces are adequately maintained without significant  disc bulge or stenosis. L1-2: No significant disc bulge or stenosis. The L2-3, L3-4, L4-5 and L5-S1 disc spaces all have T2 hyperintensity which was  not apparent to the same degree on the prior MRI in 2015. This is likely an  atypical degenerative phenomenon rather than discitis, however in this patient  with known cervical discitis and sepsis minimal early disc space infection  cannot be entirely excluded. L2-3: Chronic loss of disc space height broad-based disc protrusion and endplate  osteophyte formation with facet arthrosis and mild/moderate spinal canal and  foramina stenosis. L3-4: Chronic broad-based posterior disc bulge/protrusion and endplate  hypertrophy and facet arthrosis with mild/moderate spinal canal and foramina  stenosis. L4-5: Chronic broad-based posterior disc bulge and endplate osteophyte formation  with advanced chronic facet arthrosis. Moderate spinal canal and foramina  stenosis greater on the right. L5-S1 chronic disc bulge and endplate hypertrophy. Mild/moderate facet arthrosis  with small effusion on the right. Mild central stenosis. Mild/moderate right  greater than left foramina stenosis.     At the top of the S1 level is a small focus of hypointensity on T2-weighted  images and hyperintensity on T1-weighted images within the thecal sac in a right  posterior parasagittal location. This was not demonstrated on the prior MRI in  2015. Patient has history of LPM is possible this represents a small amount of  residual intrathecal blood from the lumbar puncture. Additionally, mild prominence CSF spaces may represent minimal subdural hygroma. The distal spinal cord has normal contour and signal. There is no abnormal  intradural enhancement. Impression IMPRESSION:  1. Chronic degenerative changes L2-3, L3-4, L4-5 and L5-S1 with mild/moderate  spinal stenosis most prominent L4-5.  2. T2 hyperintensity in the disc is likely atypical degenerative response. Early  discitis cannot be entirely excluded and is felt to be less likely. No evidence  of epidural or paraspinal abscess. 3. Small focus of altered signal in the right posterior paracentral thecal sac  may be a tiny amount of blood related to prior lumbar puncture. 4. Possible small epidural hygroma. IR Results (recent):  No results found for this or any previous visit. VAS/US Results (recent):  Results from [place] encounter on 11/30/18   [facility] (reviewed)  Reviewed in [facility]      Physical Exam:     Visit Vitals  BP (!) 141/92 (BP 1 Location: Right arm, BP Patient Position: At rest;Supine)   Pulse 86   Temp 98.2 °F (36.8 °C)   Resp 18   Ht 5' 1\" (1.549 m)   Wt 90.7 kg (200 lb)   SpO2 98%   BMI 37.79 kg/m²     General:  Drowsy, in pain, morbidly obese   Head:  Normocephalic, without obvious abnormality, atraumatic. Eyes:  Conjunctivae/corneas clear. Lungs:  Heart:   Non labored breathing  Regular rate and rhythm, no carotid bruits   Abdomen:   Soft, non-distended   Extremities: Extremities normal, atraumatic, no cyanosis or edema. Pulses: 2+ and symmetric all extremities. Skin: Skin color, texture, turgor normal. No rashes or lesions.   Neurologic Exam     Gen: Attention normal Language: naming, repetition, fluency normal             Memory: intact recent and remote memory  Cranial Nerves:  I: smell Not tested   II: visual fields Full to confrontation   II: pupils Equal, round, reactive to light   II: optic disc No papilledema   III,VII: ptosis none   III,IV,VI: extraocular muscles  Full ROM   V: mastication normal   V: facial light touch sensation  normal   VII: facial muscle function   symmetric   VIII: hearing symmetric   IX: soft palate elevation  normal   XI: trapezius strength  5/5   XI: sternocleidomastoid strength 5/5   XI: neck flexion strength  5/5   XII: tongue  midline     Motor: (+) R wrist extensor 4/5 R finger extensor 0/5  Rest is 4/5 more likely due to effort  (+) pain on moving R UE  (+) R hand swelling   Sensory: Dec PP dorsum of R hand  Coordination: Good FTN and HTS  Gait: deferred           Impression:     Ki Mendoza is a complicated 61 y.o. female morbidly obese who  has a past medical history of Chronic back pain, Diabetes mellitus, type 2 ([address] History of vascular access device, and HTN (hypertension). who was admitted on 11/30/18 for AMS and found to have BAYLEE and sepsis requiring intubation. During admission, patient was found to have C5-C7 osteomyelitis and epdirual phelgmon with severe L C3C34 foraminal stenosis. Patient also found to have R elbow cellulitis/bursitis/acute pain: s/p joint aspiration by IR on 12/6, cultures negative to date. MRI brain (12/6/18) was unremarkable for stroke. Ortho and ID are involved. However,  patient was noted to have R UE weakness of unclear onset. (+) numbness of the R UE. History is limited. Considerations include R UE weakness due to stroke, interval worsening of neck pathology and a R radial nerve injury s/p elbow joint aspiration. RECOMMENDATIONS  1. Will repeat MRI brain to look for interval occurrence of stroke  2. Will repeat MRI cervical spine with MIRTHA to look for interval worsening  3.  If above is negative, patient will need EMG/NCS as out patient to look for a R radial neuropathy  4.  Physical therapy/ occupational therapy    Please call for questions        Thank you for the consultation      Carter Novak MD  Diplomate, American Board of Psychiatry and Neurology  Diplomate, Neuromuscular Medicine  Diplomate, American Board of Electrodiagnostic Medicine    Greater than 50% of time spent counseling patient        CC: Holly Jacobson MD  Fax: [phone]

## 2018-12-15 NOTE — PROGRESS NOTES
Shift Summary    0730: Bedside shift change report given to Vamshi Zavala RN (oncoming nurse) by Joe Angel RN (offgoing nurse). Report included the following information SBAR, Kardex, Procedure Summary, Intake/Output, MAR, Accordion, Recent Results and Cardiac Rhythm NSR. Care Plan Summary  Problem: Falls - Risk of  Goal: *Absence of Falls  Document Handy Fall Risk and appropriate interventions in the flowsheet. Outcome: Progressing Towards Goal  Fall Risk Interventions:  Mobility Interventions: Bed/chair exit alarm    Mentation Interventions: Bed/chair exit alarm, Door open when patient unattended, More frequent rounding    Medication Interventions: Bed/chair exit alarm, Evaluate medications/consider consulting pharmacy, Teach patient to arise slowly    Elimination Interventions: Bed/chair exit alarm, Call light in reach, Toileting schedule/hourly rounds       Problem: Pressure Injury - Risk of  Goal: *Prevention of pressure injury  Document Nikolay Scale and appropriate interventions in the flowsheet. Outcome: Progressing Towards Goal  Pressure Injury Interventions:  Sensory Interventions: Assess changes in LOC, Assess need for specialty bed, Discuss PT/OT consult with provider, Minimize linen layers, Maintain/enhance activity level, Turn and reposition approx. every two hours (pillows and wedges if needed)    Moisture Interventions: Absorbent underpads, Apply protective barrier, creams and emollients, Check for incontinence Q2 hours and as needed, Internal/External urinary devices, Limit adult briefs, Minimize layers    Activity Interventions: PT/OT evaluation, Increase time out of bed    Mobility Interventions: PT/OT evaluation, Trapeze to reposition, Turn and reposition approx.  every two hours(pillow and wedges)    Nutrition Interventions: Document food/fluid/supplement intake, Offer support with meals,snacks and hydration    Friction and Shear Interventions: Apply protective barrier, creams and emollients, Lift sheet, HOB 30 degrees or less, Lift team/patient mobility team, Minimize layers     1607- TO MRI DEPT via  BED.    1900- Bedside and Verbal shift change report given to Stacy Alicia RN (oncoming nurse) by Martha Hardin (offgoing nurse). Report included the following information SBAR, Kardex and Recent Results. ..

## 2018-12-16 LAB
GLUCOSE BLD STRIP.AUTO-MCNC: 150 MG/DL (ref 65–100)
GLUCOSE BLD STRIP.AUTO-MCNC: 190 MG/DL (ref 65–100)
GLUCOSE BLD STRIP.AUTO-MCNC: 211 MG/DL (ref 65–100)
GLUCOSE BLD STRIP.AUTO-MCNC: 221 MG/DL (ref 65–100)
SERVICE CMNT-IMP: ABNORMAL

## 2018-12-16 PROCEDURE — 74011250636 HC RX REV CODE- 250/636: Performed by: INTERNAL MEDICINE

## 2018-12-16 PROCEDURE — 74011250637 HC RX REV CODE- 250/637: Performed by: INTERNAL MEDICINE

## 2018-12-16 PROCEDURE — 82962 GLUCOSE BLOOD TEST: CPT

## 2018-12-16 PROCEDURE — 74011000258 HC RX REV CODE- 258: Performed by: INTERNAL MEDICINE

## 2018-12-16 PROCEDURE — 65660000000 HC RM CCU STEPDOWN

## 2018-12-16 PROCEDURE — 74011636637 HC RX REV CODE- 636/637: Performed by: INTERNAL MEDICINE

## 2018-12-16 PROCEDURE — 74011250637 HC RX REV CODE- 250/637: Performed by: PHYSICIAN ASSISTANT

## 2018-12-16 PROCEDURE — 77010033678 HC OXYGEN DAILY

## 2018-12-16 PROCEDURE — 74011000250 HC RX REV CODE- 250: Performed by: INTERNAL MEDICINE

## 2018-12-16 PROCEDURE — C9113 INJ PANTOPRAZOLE SODIUM, VIA: HCPCS | Performed by: INTERNAL MEDICINE

## 2018-12-16 RX ADMIN — Medication 10 MG: at 12:42

## 2018-12-16 RX ADMIN — CHLORTHALIDONE 25 MG: 25 TABLET ORAL at 12:41

## 2018-12-16 RX ADMIN — LEVOTHYROXINE SODIUM 125 MCG: 125 TABLET ORAL at 06:35

## 2018-12-16 RX ADMIN — Medication 10 ML: at 13:49

## 2018-12-16 RX ADMIN — Medication 10 ML: at 21:22

## 2018-12-16 RX ADMIN — MEROPENEM 2 G: 1 INJECTION, POWDER, FOR SOLUTION INTRAVENOUS at 02:12

## 2018-12-16 RX ADMIN — MEROPENEM 2 G: 1 INJECTION, POWDER, FOR SOLUTION INTRAVENOUS at 17:33

## 2018-12-16 RX ADMIN — Medication 10 ML: at 06:35

## 2018-12-16 RX ADMIN — INSULIN LISPRO 1 UNITS: 100 INJECTION, SOLUTION INTRAVENOUS; SUBCUTANEOUS at 21:21

## 2018-12-16 RX ADMIN — ATENOLOL 50 MG: 50 TABLET ORAL at 12:41

## 2018-12-16 RX ADMIN — OXYCODONE HYDROCHLORIDE 5 MG: 5 TABLET ORAL at 19:06

## 2018-12-16 RX ADMIN — LISINOPRIL 40 MG: 20 TABLET ORAL at 08:17

## 2018-12-16 RX ADMIN — INSULIN LISPRO 2 UNITS: 100 INJECTION, SOLUTION INTRAVENOUS; SUBCUTANEOUS at 12:48

## 2018-12-16 RX ADMIN — SODIUM CHLORIDE 40 MG: 9 INJECTION INTRAMUSCULAR; INTRAVENOUS; SUBCUTANEOUS at 08:16

## 2018-12-16 RX ADMIN — OXYCODONE HYDROCHLORIDE 5 MG: 5 TABLET ORAL at 02:12

## 2018-12-16 RX ADMIN — INSULIN GLARGINE 10 UNITS: 100 INJECTION, SOLUTION SUBCUTANEOUS at 08:15

## 2018-12-16 RX ADMIN — ACETAMINOPHEN 650 MG: 325 TABLET, FILM COATED ORAL at 16:33

## 2018-12-16 RX ADMIN — MEROPENEM 2 G: 1 INJECTION, POWDER, FOR SOLUTION INTRAVENOUS at 10:10

## 2018-12-16 RX ADMIN — ACETAMINOPHEN 650 MG: 325 TABLET, FILM COATED ORAL at 08:17

## 2018-12-16 NOTE — PROGRESS NOTES
Shift Summary  1930: Bedside and Verbal shift change report given to Veronica Tellez RN (oncoming nurse) by Jak Lennon RN (offgoing nurse). Report included the following information SBAR, Kardex, Procedure Summary, Intake/Output, MAR, Accordion, Recent Results and Cardiac Rhythm NSR.  in room, patient off floor in MRI    1945: Patient returned to floor from MRI.  and son at bedside. 2000: Patient was cleaned after having a bowel movement, grimacing and tearing in considerable pain after MRI and movement in bed, in too much pain to have HOB elevated to eat. Medicated with prn oxycodone. 2030: Patient sleeping, no signs of discomfort on her face. 2145: Patient awake, talking with son who is at the bedside with her HOB elevated. She is considerably more animated, expressive, talking clearly in full sentences and without long delays, making jokes. She ate about 20% of her dinner. Says pain is much better after medication. 2300: Son gone for night, patient sleeping soundly. 0225: Patient profusely diaphoretic, gave full bath and linen change. Patient was conversing with me the entire time and afterwards, clearly expressing her needs. Was able to move her legs, bend her knees, slightly but independently which was an improvement for her. Still very weak in her movements both in legs and arms. When talking, she still has trouble with more complex topics, takes a long time to respond. Able to tell me her pain was at a 7 but when asked what a her pain goal would be, was able to eventually tell me after thinking for several minutes \"I guess I should be working to improve my tolerance\". She is excited that her dog Gabriella Duvall is coming to visit tomorrow. Removed foot drop braces and SCDs to give skin on feet/legs a break    0600: Patient sleeping soundly    0730: Bedside and Verbal shift change report given to Jak Lennon RN (oncoming nurse) by Veronica Tellez RN (offgoing nurse).  Report included the following information SBAR, Kardex, Procedure Summary, Intake/Output, MAR, Accordion, Recent Results and Cardiac Rhythm NSR. Care Plan Summary  Problem: Falls - Risk of  Goal: *Absence of Falls  Document Handy Fall Risk and appropriate interventions in the flowsheet. Outcome: Progressing Towards Goal  Fall Risk Interventions:  Mobility Interventions: Bed/chair exit alarm  Mentation Interventions: Bed/chair exit alarm, Door open when patient unattended, More frequent rounding  Medication Interventions: Bed/chair exit alarm  Elimination Interventions: Bed/chair exit alarm, Toileting schedule/hourly rounds       Problem: Pressure Injury - Risk of  Goal: *Prevention of pressure injury  Document Nikolay Scale and appropriate interventions in the flowsheet. Outcome: Progressing Towards Goal  Pressure Injury Interventions:  Sensory Interventions: Assess need for specialty bed, Check visual cues for pain, Discuss PT/OT consult with provider, Keep linens dry and wrinkle-free, Minimize linen layers, Monitor skin under medical devices, Turn and reposition approx.  every two hours (pillows and wedges if needed)  Moisture Interventions: Apply protective barrier, creams and emollients, Absorbent underpads, Internal/External urinary devices, Maintain skin hydration (lotion/cream), Limit adult briefs, Minimize layers  Activity Interventions: PT/OT evaluation  Mobility Interventions: HOB 30 degrees or less, PT/OT evaluation, Trapeze to reposition  Nutrition Interventions: Document food/fluid/supplement intake, Offer support with meals,snacks and hydration  Friction and Shear Interventions: Apply protective barrier, creams and emollients, Lift sheet, Lift team/patient mobility team, Minimize layers, Transferring/repositioning devices      Problem: Sepsis: Discharge Outcomes  Goal: *Demonstrates progressive activity  Outcome: Progressing Towards Goal  Variance: Patient slowly responding  Comments: Patient remains bedbound, low tolerance with PT/OT

## 2018-12-16 NOTE — PROGRESS NOTES
Neurology Hospital Progress Note    Patient ID:  Hayden Crockett  [id number]  51 y.o.  1955      Subjective:   History:  Hayden Crockett is a complicated 61 y.o. female morbidly obese who  has a past medical history of Chronic back pain, Diabetes mellitus, type 2 ([address] History of vascular access device, and HTN (hypertension). who was admitted on 11/30/18 for AMS and found to have BAYLEE and sepsis requiring intubation. During admission, patient was found to have C5-C7 osteomyelitis and epdirual phelgmon with severe L C3C34 foraminal stenosis. Patient also found to have R elbow cellulitis/bursitis/acute pain: s/p joint aspiration by IR on 12/6, cultures negative to date. MRI brain (12/6/18) was unremarkable for stroke. Ortho and ID are involved. However,  patient was noted to have R UE weakness of unclear onset. (+) numbness of the R UE. History is limited.     Patient is doing better today and more alert. However, still has persistent R UE weakness.     ROS:  Per HPI-  Otherwise the remainder of the review of system was negative      Social Hx:  Social History     Socioeconomic History    Marital status:      Spouse name: Not on file    Number of children: Not on file    Years of education: Not on file    Highest education level: Not on file   Tobacco Use    Smoking status: Never Smoker    Smokeless tobacco: Never Used   Substance and Sexual Activity    Alcohol use: No     Frequency: Never   Other Topics Concern       Meds:  No current facility-administered medications on file prior to encounter. Current Outpatient Medications on File Prior to Encounter   Medication Sig Dispense Refill    atenolol (TENORMIN) 25 mg tablet Take 25 mg by mouth daily.  cyclobenzaprine (FLEXERIL) 10 mg tablet Take 10 mg by mouth daily as needed for Muscle Spasm(s).  fenofibrate (LOFIBRA) 160 mg tablet Take 160 mg by mouth nightly.       gabapentin (NEURONTIN) 300 mg capsule Take 300 mg by mouth three (3) times daily.      insulin glargine (LANTUS,BASAGLAR) 100 unit/mL (3 mL) inpn 75 Units by SubCUTAneous route nightly.  levothyroxine (SYNTHROID) 125 mcg tablet Take 125 mcg by mouth Daily (before breakfast).  lisinopril-hydroCHLOROthiazide (PRINZIDE, ZESTORETIC) 20-12.5 mg per tablet Take 2 Tabs by mouth daily.  metFORMIN (GLUCOPHAGE) 500 mg tablet Take 500 mg by mouth two (2) times daily (with meals).  niacin ER (NIASPAN) 500 mg tablet Take 500 mg by mouth nightly.  omega 3-DHA-EPA-fish oil 1,000 mg (120 mg-180 mg) capsule Take 2 Caps by mouth two (2) times a day.  oxyCODONE IR (ROXICODONE) 5 mg immediate release tablet Take 5 mg by mouth daily as needed (breakthrough pain).  oxyCODONE ER (XTAMPZA ER) 18 mg ER capsule Take 18 mg by mouth every twelve (12) hours.  sertraline (ZOLOFT) 100 mg tablet Take 150 mg by mouth nightly.  simvastatin (ZOCOR) 40 mg tablet Take 40 mg by mouth nightly.  triamcinolone acetonide (KENALOG) 0.1 % topical cream Apply  to affected area two (2) times a day. use thin layer      multivit-min-FA-lycopen-lutein (CENTRUM SILVER) 0.4-300-250 mg-mcg-mcg tab Take 1 Tab by mouth daily.  OTHER,NON-FORMULARY, Take 1 Tab by mouth two (2) times a day. Osteo Biflex      aspirin delayed-release 81 mg tablet Take 81 mg by mouth two (2) times a day.  ibuprofen (MOTRIN) 200 mg tablet Take 400 mg by mouth two (2) times a day.  ubidecarenone (COENZYME Q10, BULK,) Take 1 Tab by mouth daily. Imaging:    CT Results (recent):  Results from Hospital Encounter encounter on 11/30/18   CT HEAD WO CONT    Narrative EXAM:  CT HEAD WO CONT  Clinical history: Worsening confusion  INDICATION:   worsening confusion    COMPARISON: None. CONTRAST:  None. TECHNIQUE: Unenhanced CT of the head was performed using 5 mm images. Brain and  bone windows were generated.   CT dose reduction was achieved through use of a  standardized protocol tailored for this examination and automatic exposure  control for dose modulation. FINDINGS:  Minimal sulcal and ventricular prominence. . There is no intracranial hemorrhage,  extra-axial collection, mass, mass effect or midline shift. The basilar  cisterns are open. No acute infarct is identified. The bone windows demonstrate  no abnormalities. The visualized portions of the paranasal sinuses and mastoid  air cells are clear. Impression IMPRESSION:     No acute intracranial process. MRI Results (recent):  Results from [place] encounter on 11/30/18   MRI CERV SPINE W WO CONT    Narrative EXAM: MRI CERV SPINE W WO CONT    INDICATION: Neck pain, chronic, abn neuro exam, xray old trauma; Evaluate for  progression of C5-C7 epidural phlegmon causing R upper extremity weakness. COMPARISON: 12/7/2018 MRI    TECHNIQUE: MR imaging of the cervical spine was performed using the following  sequences: sagittal T1, T2, STIR;  axial T2, T1; post IV contrast-enhanced axial  T1, axial T1 fat saturated, sagittal T1 fat-saturated. CONTRAST: 18 mL of Dotarem. FINDINGS:    Cord signal and enhancement remain normal. Vertebral body heights are normal.  Reversal of cervical lordosis is again shown centered at C5. Anterolisthesis at  C3-4 measuring 5 mm is again shown. Diffusely abnormal T1 hypointense and T2  hyperintense signal is again shown in the C5, C6 and C7 vertebral bodies, with  diffuse enhancement of the vertebral bodies and prominent anterior epidural  swelling and enhancement extending from C5 through C7 with mild canal stenosis  at C5 and C6. Paraspinal soft tissue enhancement and swelling is also shown  again shown with small anterior paraspinal abscesses adjacent to the anterior  disc margins of C5-6 and C6-7, measuring up to 9 mm in size. C2-C3: No herniation or stenosis. C3-C4: Severe disc space narrowing. Mild diffuse disc bulging. Substantial  bilateral facet osteoarthrosis. Teena Christina  No substantial canal stenosis. Severe left and  mild right foraminal stenosis unchanged    C4-C5: Severe disc space narrowing with mild diffuse disc osteophyte complex  formation. No canal or foraminal stenosis. C5-C6: Severe disc space narrowing. Endplate osteophytes. Mild canal stenosis. No substantial foraminal narrowing. C6-C7: Severe disc space narrowing. Endplate marginal osteophytes. No  substantial canal stenosis. No substantial foraminal stenosis. C7-T1: No herniation or stenosis. Impression IMPRESSION:    1. There has been no substantial interval change in the appearance of vertebral  osteomyelitis at C5, C6 and C7 and anterior epidural phlegmon and anterior  paraspinal phlegmon with small abscesses. 2. Unchanged appearance of degenerative disc and facet arthrosis with grade 1-2  anterolisthesis at C3-4. IR Results (recent):  No results found for this or any previous visit. VAS/US Results (recent):  Results from [place] encounter on 11/30/18   [id number] records in [facility] and [employer] tab today    Lab Review     Admission on 11/30/2018   No results displayed because visit has over 200 results. Objective:       Exam:  Visit Vitals  /81 (BP 1 Location: Right arm, BP Patient Position: Sitting)   Pulse 95   Temp 98.1 °F (36.7 °C)   Resp 18   Ht 5' 1\" (1.549 m)   Wt 89.4 kg (197 lb 3.2 oz)   SpO2 94%   BMI 37.26 kg/m²     Gen: Awake, alert, follows commands  Appropriate appearance, normal speech. Oriented to all spheres. No visual field defect on confrontation exam.  Full eyes movement, with no nystagmus, no diplopia, no ptosis. Normal gag and swallow.   All remaining cranial nerves were normal  Motor: (+) R wrist extensor 4/5 R finger extensor 0/5  Rest is 4/5 more likely due to effort  (+) pain on moving R UE  (+) R hand swelling   Sensory: Dec PP dorsum of R hand  Coordination: Good FTN and HTS  Gait: deferred    Assessment: 1. High anion gap metabolic acidosis    2. BAYLEE (acute kidney injury) ([facility] [address]    3. Altered mental status, unspecified altered mental status type    4. Foot drop, bilateral    5. Encephalopathy    6. Upper limb weakness      R UE weakness possibly due to R radial nerve injury s/p elbow joint aspiration. Plan:   1. I had a long discussion with patient and family. 2. Discussed MRI brain did not show stroke  3. Reviewed MRI cervical spine show no interval change to explain R UE symptoms  4. Follow up with neurology if patient continues to have R UE weakness. Will need to be scheduled for EMG/NCS of the R UE as out patient to look for a R radial neuropathy  5. Physical therapy/ occupational therapy    Will sign off    Please call for questions    35 mins of time spent, 50% of which was spent on counseling and coordination of care.       Cody Sevilla MD  Diplomate, American Board of Psychiatry and Neurology  Diplomate, Neuromuscular Medicine  Diplomate, American Board of Electrodiagnostic Medicine

## 2018-12-16 NOTE — PROGRESS NOTES
Problem: Falls - Risk of  Goal: *Absence of Falls  Document Handy Fall Risk and appropriate interventions in the flowsheet. Outcome: Progressing Towards Goal  Fall Risk Interventions:  Mobility Interventions: Bed/chair exit alarm    Mentation Interventions: Bed/chair exit alarm, Door open when patient unattended, More frequent rounding    Medication Interventions: Bed/chair exit alarm, Evaluate medications/consider consulting pharmacy    Elimination Interventions: Bed/chair exit alarm, Call light in reach, Toileting schedule/hourly rounds             Problem: Pressure Injury - Risk of  Goal: *Prevention of pressure injury  Document Nikolay Scale and appropriate interventions in the flowsheet. Outcome: Progressing Towards Goal  Pressure Injury Interventions:  Sensory Interventions: Assess changes in LOC, Assess need for specialty bed, Keep linens dry and wrinkle-free, Maintain/enhance activity level, Minimize linen layers, Turn and reposition approx. every two hours (pillows and wedges if needed), Pressure redistribution bed/mattress (bed type), Monitor skin under medical devices    Moisture Interventions: Absorbent underpads, Check for incontinence Q2 hours and as needed, Limit adult briefs, Internal/External urinary devices, Minimize layers, Maintain skin hydration (lotion/cream)    Activity Interventions: PT/OT evaluation    Mobility Interventions: HOB 30 degrees or less, PT/OT evaluation, Turn and reposition approx. every two hours(pillow and wedges)    Nutrition Interventions: Document food/fluid/supplement intake, Offer support with meals,snacks and hydration    Friction and Shear Interventions: Feet elevated on foot rest, Lift sheet, Lift team/patient mobility team, Minimize layers, Transferring/repositioning devices. 0700- Bedside and Verbal shift change report given to Brandon Hatfield RN (oncoming nurse) by Chelo Maurer RN (offgoing nurse). Report included the following information SBAR, Kardex and Recent Results. .. 80- Family is here with DOG. . Pt is very happy to see Nyla Canas ( DOG). .    1600- resting. .    1700- eating. . Family @ bedside. .    1800- resting. 1900- Bedside and Verbal shift change report given to Varsha Patel RN (oncoming nurse) by Chey Perez (offgoing nurse). Report included the following information SBAR, Kardex and Recent Results. ..

## 2018-12-16 NOTE — PROGRESS NOTES
Orthopaedic Progress Note    December 16, 2018 9:40 AM     Patient: Tanisha Hernandez MRN: [id number]  SSN: [id number]    YOB: 1955  Age: 61 y.o. Sex: female      Admit date:  11/30/2018  Date of Surgery:  12/7/2018   Admitting Physician:  Lauren Diez MD   Surgeon:  León Ivey) and Role:     Daniel Martinez MD - Primary    Consulting Physician(s): Treatment Team: Attending Provider: Quirino Lord MD; Consulting Provider: Mamta Acosta; Consulting Provider: Celia Soto MD; Consulting Provider: Lizbet Ontiveros DO; Utilization Review: Randi Sanchez; Utilization Review: Haylie Luis RN; Consulting Provider: Milagros Fox MD; Consulting Provider: Roge Gant MD; Consulting Provider: Roland Funes NP; Care Manager: Viridiana Venegas; Consulting Provider: Kaila Knight MD    SUBJECTIVE:     Tanisha Hernandez is a 61 y.o. female is resting in bed this morning with no complaints. She does not remember the orthopedic service, but states she doesn't remember much of this hospital course. She is alert to person and time currently. No complaints of nausea, vomiting, dizziness, lightheadedness, chest pain, or shortness of breath. OBJECTIVE:       Physical Exam:  General: Alert, cooperative, no distress. Respiratory: Respirations unlabored  Neurological:  Neurovascular exam within normal limits. Motor: + DF/PF. Musculoskeletal: Calves soft, supple, non-tender upon palpation. Right arm: bicep 3+/5. Tricep 3+/5.  4-/5. Negative hoffmans sign. No clonus in the LE.  +radial and ulnar pulses. IMAGING:  IMPRESSION:     1. There has been no substantial interval change in the appearance of vertebral  osteomyelitis at C5, C6 and C7 and anterior epidural phlegmon and anterior  paraspinal phlegmon with small abscesses. 2. Unchanged appearance of degenerative disc and facet arthrosis with grade 1-2  anterolisthesis at C3-4.     Vital Signs: Patient Vitals for the past 8 hrs:   BP Temp Pulse Resp SpO2 Weight   18 0817 160/89  90 12 94 %    18 0739 164/88 98.1 °F (36.7 °C) 93 16 95 %    18 0706   87      18 0425 174/79 97.6 °F (36.4 °C) 88 13 96 %    18 0208      89.4 kg (197 lb 3.2 oz)                                          Temp (24hrs), Av °F (36.7 °C), Min:97.6 °F (36.4 °C), Max:98.2 °F (36.8 °C)      Labs:        Recent Labs     12/15/18  0109   HCT 26.0*   HGB 8.2*     Lab Results   Component Value Date/Time    Sodium 143 12/15/2018 01:09 AM    Potassium 3.6 12/15/2018 01:09 AM    Chloride 108 12/15/2018 01:09 AM    CO2 26 12/15/2018 01:09 AM    Glucose 153 (H) 12/15/2018 01:09 AM    BUN 22 (H) 12/15/2018 01:09 AM    Creatinine 0.84 12/15/2018 01:09 AM    Calcium 8.9 12/15/2018 01:09 AM       PT/OT:                Patient mobility  Bed Mobility Training  Rolling: Total assistance, Additional time, Assist x2  Supine to Sit: Total assistance, Additional time, Assist x2(bed placed in chair position)                      ASSESSMENT / PLAN:   Principal Problem:    BAYLEE (acute kidney injury) ([facility] [address] (2018)    Active Problems:    Metabolic acidosis ()      Chronic back pain ()      Diabetes mellitus, type 2 (HCC) ()      Leukocytosis (2018)      Right elbow pain (2018)      Hypotension (2018)            A: 1. C5-C7 osteomyelitis and discitis  2. C5-C7 phlegmon without epidural abscess  3. Right elbow synovitis and cellulitis   4. Deconditioning    P: 1. Cervical discitis: Continue IV antibiotics for 6 weeks. No significant denis destruction or cord compression on new MRI. No plans for surgical intervention as patient has objectively improved. Cervical collar could benefit if patient has worsening pain, but body habitus likely will prevent her from wearing this. She will need close followup with orthopedic spine surgery when she is discharged.   Dr. Ryne Garrido has followed during this admission and will recommend followup with him in 1-2 weeks. 2. Right elbow:  Pain has improved. Patient with weakness which is likely multifactorial.  Recommend OT to see for ROM (active and passive) of elbow, wrist, and hand. Sling for comfort if needed.         Signed By:  Rachel Zuluaga, [address] [facility] [address]

## 2018-12-16 NOTE — PROGRESS NOTES
Blanco Mcpherson [facility] 79  [address] [facility], [place], [address] [place] [address]  [phone]      Medical Progress Note      NAME: Nadia Serrano   :  1955  MRM:  [id number]    Date/Time: 2018  8:13 AM       Assessment and Plan:   1.  C-spine discitis: MRI showed evidence of discitis at C5-C6 and C6-C7, with abnormal enhancement of the vertebral bodies. There is enhancing phlegmon in the epidural space. No drainable abscess. Evaluated by ortho and ID. Needs 6 weeks of IV ABx. Currently on meropenem     2. Acute metabolic encephalopathy: unclear etiology. ? GBS meningitis, ?medications and relation to the diskitis. Head CT, MRI brain without acute process. TSH, B12/folate, ammonia WNL s/p LP. HSV neg. EEG negative for seizure activity, RPR negative, West Nile negative, HIV negative, Lyme IgG positive but IgM negative. Slowly improving clinically      3. Hemoptysis vs epistaxis: sudden onset . Underwent bronchoscopy which showed large amount of blood in the trachea, however past paula only scants amount of blood in RLL. Some thick yellow/white secretions in LLL and RLL. All lobes/subsegments inspected with no signs of bleeding. Evaluated by ENT. Stable      4. Severe sepsis/bacteremia: Blood Cx + 4/4 bottles with GBS on admission, repeat blood Cx NGTD. With R arm/elbow cellulitis and now evidence of discitis. No e/o valvular veg on TTE  Continue meropenem as per ID      5.  R elbow cellulitis/bursitis/acute pain: s/p joint aspiration by IR on , cultures negative to date. On meropenem. 6.  ARF: likely from ATN/sepsis, hypovolemia. Resolved. 7.  AG metabolic acidosis: resolved. Due to sepsis     8. DM type 2 w/ hypoglycemia: A1C 7.2%. On lantus. 9.  HTN: on atenolol, lisinopril      10. Anemia: Fe studies consistent with chronic dz. Monitor     11. Urinary retention. Put back Shankar catheter. Will consult urology on Monday. 12.  RT arm weakness.  Appreciated neurology evaluation. Repeat MRI of the brain and C spine without any change from previous imaging. Continue PT/OT               Subjective:     Chief Complaint:  Follow up of pt who was admitted with encephalopathy, cellulitis. C/o back pain. ROS:  (bold if positive, if negative)      Tolerating PT  Tolerating Diet        Objective:     Last 24hrs VS reviewed since prior progress note.  Most recent are:    Visit Vitals  /79 (BP 1 Location: Left arm, BP Patient Position: At rest)   Pulse 88   Temp 97.6 °F (36.4 °C)   Resp 13   Ht 5' 1\" (1.549 m)   Wt 89.4 kg (197 lb 3.2 oz)   SpO2 96%   BMI 37.26 kg/m²     SpO2 Readings from Last 6 Encounters:   12/16/18 96%    O2 Flow Rate (L/min): 2 l/min       Intake/Output Summary (Last 24 hours) at 12/16/2018 0721  Last data filed at 12/16/2018 1171  Gross per 24 hour   Intake 1660 ml   Output 1100 ml   Net 560 ml        Physical Exam:    Gen:  Well-developed, well-nourished, in no acute distress  HEENT:  Pink conjunctivae, PERRL, hearing intact to voice, moist mucous membranes  Neck:  Supple, without masses, thyroid non-tender  Resp:  No accessory muscle use, clear breath sounds without wheezes rales or rhonchi  Card:  No murmurs, normal S1, S2 without thrills, bruits or peripheral edema  Abd:  Soft, non-tender, non-distended, normoactive bowel sounds are present, no palpable organomegaly and no detectable hernias  Lymph:  No cervical or inguinal adenopathy  Musc:  No cyanosis or clubbing  Skin:  No rashes or ulcers, skin turgor is good  Neuro:  Cranial nerves are grossly intact, Rt arm weakness  Psych:  Poor insight,    __________________________________________________________________  Medications Reviewed: (see below)  Medications:     Current Facility-Administered Medications   Medication Dose Route Frequency    atenolol (TENORMIN) tablet 50 mg  50 mg Oral DAILY    chlorthalidone (HYGROTEN) tablet 25 mg  25 mg Oral DAILY    oxyCODONE IR (ROXICODONE) tablet 5 mg  5 mg Oral Q4H PRN    meropenem (MERREM) 2 g in 0.9% sodium chloride 100 mL IVPB  2 g IntraVENous Q8H    lisinopril (PRINIVIL, ZESTRIL) tablet 40 mg  40 mg Oral DAILY    levothyroxine (SYNTHROID) tablet 125 mcg  125 mcg Oral ACB    insulin lispro (HUMALOG) injection   SubCUTAneous AC&HS    HYDROmorphone (PF) (DILAUDID) injection 0.5 mg  0.5 mg IntraVENous Q3H PRN    pantoprazole (PROTONIX) 40 mg in sodium chloride 0.9% 10 mL injection  40 mg IntraVENous DAILY    albuterol (PROVENTIL VENTOLIN) nebulizer solution 2.5 mg  2.5 mg Nebulization Q4H PRN    insulin glargine (LANTUS) injection 10 Units  10 Units SubCUTAneous DAILY    bisacodyl (DULCOLAX) suppository 10 mg  10 mg Rectal DAILY    influenza vaccine 2018-19 (6 mos+)(PF) (FLUARIX QUAD/FLULAVAL QUAD) injection 0.5 mL  0.5 mL IntraMUSCular PRIOR TO DISCHARGE    labetalol (NORMODYNE;TRANDATE) injection 10 mg  10 mg IntraVENous Q4H PRN    hydrALAZINE (APRESOLINE) 20 mg/mL injection 20 mg  20 mg IntraVENous Q6H PRN    alteplase (CATHFLO) 1 mg in sterile water (preservative free) 1 mL injection  1 mg InterCATHeter PRN    sodium chloride (NS) flush 5-10 mL  5-10 mL IntraVENous Q8H    sodium chloride (NS) flush 5-10 mL  5-10 mL IntraVENous PRN    acetaminophen (TYLENOL) tablet 650 mg  650 mg Oral Q4H PRN    ondansetron (ZOFRAN) injection 4 mg  4 mg IntraVENous Q4H PRN    glucose chewable tablet 16 g  4 Tab Oral PRN    dextrose (D50W) injection syrg 12.5-25 g  25-50 mL IntraVENous PRN    glucagon (GLUCAGEN) injection 1 mg  1 mg IntraMUSCular PRN        Lab Data Reviewed: (see below)  Lab Review:     Recent Labs     12/15/18  0109   WBC 11.1*   HGB 8.2*   HCT 26.0*   *     Recent Labs     12/15/18  0109      K 3.6      CO2 26   *   BUN 22*   CREA 0.84   CA 8.9     Lab Results   Component Value Date/Time    Glucose (POC) 150 (H) 12/16/2018 06:29 AM    Glucose (POC) 123 (H) 12/15/2018 08:48 PM    Glucose (POC) 131 (H) 12/15/2018 04:31 PM    Glucose (POC) 256 (H) 12/15/2018 11:08 AM    Glucose (POC) 150 (H) 12/15/2018 06:51 AM     No results for input(s): PH, PCO2, PO2, HCO3, FIO2 in the last 72 hours. No results for input(s): INR in the last 72 hours. No lab exists for component: [facility]  All Micro Results     Procedure Component Value Units Date/Time    URINE CULTURE HOLD SAMPLE [[id number]] Collected:  12/15/18 0400    Order Status:  Completed Specimen:  Urine from Serum Updated:  12/15/18 0408     Urine culture hold       URINE ON HOLD IN MICROBIOLOGY DEPT FOR 3 DAYS. IF UNPRESERVED URINE IS SUBMITTED, IT CANNOT BE USED FOR ADDITIONAL TESTING AFTER 24 HRS, RECOLLECTION WILL BE REQUIRED. CULTURE, FUNGUS [[id number]]  (Abnormal) Collected:  12/07/18 1330    Order Status:  Completed Specimen:  Bronchial lavage Updated:  12/12/18 1129     Special Requests: NO SPECIAL REQUESTS        Culture result:       HEAVY CANDIDA PARAPSILOSIS                  CULTURE WILL BE HELD FOR 4 WEEKS. IF THERE IS ADDITIONAL FUNGAL GROWTH, A NEW REPORT WILL FOLLOW. CULTURE, CSF Shanthi Flatten STAIN [[id number]] Collected:  12/04/18 1430    Order Status:  Completed Specimen:  Cerebrospinal Fluid Updated:  12/11/18 0919     Special Requests: NO SPECIAL REQUESTS        GRAM STAIN RARE WBCS SEEN         NO ORGANISMS SEEN        Culture result:       Culture performed on Unspun Fluid                  NO GROWTH ON SOLID MEDIA AT 4 DAYS                  NO GROWTH IN THIO BROTH AT 7 DAYS          CULTURE, FUNGUS [[id number]] Collected:  12/06/18 1509    Order Status:  Completed Specimen:   Body fld Updated:  12/10/18 0916     Special Requests: NO SPECIAL REQUESTS        Culture result: NO FUNGUS ISOLATED 4 DAYS       AFB CULTURE & SMEAR W/REFLEX ID [[id number]] Collected:  12/07/18 1330    Order Status:  Completed Specimen:  Miscellaneous sample Updated:  12/10/18 0635     Source BRONCHIAL LAVAGE        AFB Specimen processing Concentration     Acid Fast Smear NEGATIVE         Comment: (NOTE)  Performed At: [address] [id number]  Frankie Hurley MD SP:[id number]          Acid Fast Culture PENDING    CULTURE, SPUTUM/BRONCH/OTH [[id number]]  (Abnormal) Collected:  12/07/18 1330    Order Status:  Completed Specimen:  Sputum from Bronchial lavage Updated:  12/09/18 1015     Special Requests: NO SPECIAL REQUESTS        GRAM STAIN RARE WBCS SEEN               RARE EPITHELIAL CELLS SEEN            FEW BUDDING YEAST        Culture result:       SCANT NORMAL RESPIRATORY TRIXIE            MODERATE YEAST       CULTURE, ANAEROBIC [[id number]] Collected:  12/06/18 1509    Order Status:  Completed Specimen:  Aspirate Updated:  12/08/18 0917     Special Requests: NO SPECIAL REQUESTS        Culture result:       No growth thus far, holding 14 days. CULTURE, BODY FLUID Karlie Mac [[id number]] Collected:  12/06/18 1509    Order Status:  Completed Specimen:  Elbow Updated:  12/08/18 0917     Special Requests: RT ELBOW ASPIRATE     GRAM STAIN RARE WBCS SEEN         NO ORGANISMS SEEN        Culture result:       Culture performed on Fluid swab specimen                  No growth thus far, holding 14 days.           CULTURE LEGIONELLA [[id number]] Collected:  12/07/18 1330    Order Status:  Completed Specimen:  Other Updated:  12/07/18 1402    CULTURE, VIRAL [[id number]]     Order Status:  Canceled Specimen:  Other     CULTURE, BLOOD [[id number]] Collected:  12/01/18 1231    Order Status:  Completed Specimen:  Blood Updated:  12/07/18 0450     Special Requests: NO SPECIAL REQUESTS        Culture result: NO GROWTH 6 DAYS       CULTURE, BLOOD [[id number]] Collected:  12/01/18 1231    Order Status:  Completed Specimen:  Blood Updated:  12/07/18 0450     Special Requests: NO SPECIAL REQUESTS        Culture result: NO GROWTH 6 DAYS       CULTURE, Electa Cesario STAIN [[id number]] Collected:  12/06/18 1200    Order Status:  Canceled Specimen:  Wound from Elbow     MENINGITIS PATHOGENS PANEL, CSF (BY PCR) [[id number]] Collected:  12/04/18 1442    Order Status:  Completed Specimen:  Cerebrospinal Fluid Updated:  12/04/18 2132     Escherichia coli K1 NOT DETECTED        Haemophilus Influenzae NOT DETECTED        Listeria Monocytogenes NOT DETECTED        Neisseria Meningitidis NOT DETECTED        Streptococcus Agalactiae NOT DETECTED        Streptococcus Pneumoniae NOT DETECTED        Cytomegalovirus NOT DETECTED        Enterovirus NOT DETECTED        Herpes Simplex Virus 1 NOT DETECTED        Herpes Simplex Virus 2 NOT DETECTED        Human Herpesvirus 6 NOT DETECTED        Human Parechovirus NOT DETECTED        Varicella Zoster Virus NOT DETECTED        Crypto. neoformans/gattii NOT DETECTED       CULTURE, CSF  TUBE 2 [[id number]] Collected:  12/04/18 1425    Order Status:  Canceled Specimen:  Cerebrospinal Fluid Updated:  12/04/18 1448    CULTURE, BLOOD [[id number]]  (Abnormal) Collected:  11/30/18 1230    Order Status:  Completed Specimen:  Blood Updated:  12/03/18 0927     Special Requests: NO SPECIAL REQUESTS        Culture result:       STREPTOCOCCI, BETA HEMOLYTIC GROUP B GROWING IN BOTH BOTTLES DRAWN SITE=LT HAND PLEASE REFER TO Lena Dus [id number] FOR SENSITIVITIES                  PRELIMINARY REPORT OF GRAM POSITIVE COCCI IN PAIRS AND CHAINS GROWING IN BOTH BOTTLES DRAWN CALLED TO AND READ BACK BY DR SUAZO ON 12/1/18 AT 1033. SH/AOW          CULTURE, BLOOD [[id number]]  (Abnormal)  (Susceptibility) Collected:  11/30/18 1210    Order Status:  Completed Specimen:  Blood Updated:  12/03/18 0926     Special Requests: NO SPECIAL REQUESTS        Culture result:       STREPTOCOCCI, BETA HEMOLYTIC GROUP B GROWING IN BOTH BOTTLES DRAWN SITE=LAC (SENSITIVITIES PERFORMED BY E-TEST)                  PRELIMINARY REPORT OF GRAM POSITIVE COCCI IN PAIRS AND CHAINS GROWING IN BOTH BOTTLES DRAWN CALLED TO AND READ BACK BY DR SUAZO ON 12/1/18 AT 1033. SH/AOW                  STAPHYLOCOCCUS SPECIES, COAGULASE NEGATIVE GROWING IN ONLY 1 OF THE ABOVE BOTTLES PLATES WILL BE HELD 3 DAYS. CALL [phone] IF SENSITIVITIES ARE NEEDED. RESPIRATORY PANEL,PCR,NASOPHARYNGEAL [[id number]] Collected:  11/30/18 1810    Order Status:  Completed Specimen:  Nasopharyngeal Updated:  11/30/18 2146     Adenovirus NOT DETECTED        Coronavirus 229E NOT DETECTED        Coronavirus HKU1 NOT DETECTED        Coronavirus CVNL63 NOT DETECTED        Coronavirus OC43 NOT DETECTED        Metapneumovirus NOT DETECTED        Rhinovirus and Enterovirus NOT DETECTED        Influenza A NOT DETECTED        Influenza A, subtype H1 NOT DETECTED        Influenza A, subtype H3 NOT DETECTED        INFLUENZA A H1N1 PCR NOT DETECTED        Influenza B NOT DETECTED        Parainfluenza 1 NOT DETECTED        Parainfluenza 2 NOT DETECTED        Parainfluenza 3 NOT DETECTED        Parainfluenza virus 4 NOT DETECTED        RSV by PCR NOT DETECTED        Bordetella pertussis - PCR NOT DETECTED        Chlamydophila pneumoniae DNA, QL, PCR NOT DETECTED        Mycoplasma pneumoniae DNA, QL, PCR NOT DETECTED       URINE CULTURE HOLD SAMPLE [[id number]] Collected:  11/30/18 1241    Order Status:  Completed Specimen:  Serum Updated:  11/30/18 1257     Urine culture hold       URINE ON HOLD IN MICROBIOLOGY DEPT FOR 3 DAYS. IF UNPRESERVED URINE IS SUBMITTED, IT CANNOT BE USED FOR ADDITIONAL TESTING AFTER 24 HRS, RECOLLECTION WILL BE REQUIRED. I have reviewed notes of prior 24hr. Other pertinent lab:       Total time spent with patient: [address] discussed with: Patient, Nursing Staff and >50% of time spent in counseling and coordination of care    Discussed:  Care Plan    Prophylaxis:  SCD's    Disposition:  SNF/LTC           ___________________________________________________    Attending Physician: Helena Foote MD

## 2018-12-17 VITALS
TEMPERATURE: 98.6 F | DIASTOLIC BLOOD PRESSURE: 88 MMHG | OXYGEN SATURATION: 96 % | RESPIRATION RATE: 18 BRPM | HEIGHT: 61 IN | SYSTOLIC BLOOD PRESSURE: 157 MMHG | BODY MASS INDEX: 36.8 KG/M2 | WEIGHT: 194.9 LBS | HEART RATE: 81 BPM

## 2018-12-17 LAB
GLUCOSE BLD STRIP.AUTO-MCNC: 129 MG/DL (ref 65–100)
GLUCOSE BLD STRIP.AUTO-MCNC: 137 MG/DL (ref 65–100)
GLUCOSE BLD STRIP.AUTO-MCNC: 207 MG/DL (ref 65–100)
LEGIONELLA SPEC CULT: NORMAL
LEGIONELLA SPEC CULT: NORMAL
Lab: NORMAL
REFERENCE LAB,REFLB: NORMAL
SERVICE CMNT-IMP: ABNORMAL
SPECIMEN SOURCE: NORMAL
TEST DESCRIPTION:,ATST: NORMAL

## 2018-12-17 PROCEDURE — 74011636637 HC RX REV CODE- 636/637: Performed by: INTERNAL MEDICINE

## 2018-12-17 PROCEDURE — 74011250637 HC RX REV CODE- 250/637: Performed by: PHYSICIAN ASSISTANT

## 2018-12-17 PROCEDURE — 77010033678 HC OXYGEN DAILY

## 2018-12-17 PROCEDURE — 97530 THERAPEUTIC ACTIVITIES: CPT

## 2018-12-17 PROCEDURE — 74011250637 HC RX REV CODE- 250/637: Performed by: INTERNAL MEDICINE

## 2018-12-17 PROCEDURE — 74011000258 HC RX REV CODE- 258: Performed by: INTERNAL MEDICINE

## 2018-12-17 PROCEDURE — 90686 IIV4 VACC NO PRSV 0.5 ML IM: CPT | Performed by: INTERNAL MEDICINE

## 2018-12-17 PROCEDURE — 90471 IMMUNIZATION ADMIN: CPT

## 2018-12-17 PROCEDURE — 82962 GLUCOSE BLOOD TEST: CPT

## 2018-12-17 PROCEDURE — 74011250636 HC RX REV CODE- 250/636: Performed by: INTERNAL MEDICINE

## 2018-12-17 PROCEDURE — 94760 N-INVAS EAR/PLS OXIMETRY 1: CPT

## 2018-12-17 PROCEDURE — C9113 INJ PANTOPRAZOLE SODIUM, VIA: HCPCS | Performed by: INTERNAL MEDICINE

## 2018-12-17 PROCEDURE — 92507 TX SP LANG VOICE COMM INDIV: CPT

## 2018-12-17 PROCEDURE — 97530 THERAPEUTIC ACTIVITIES: CPT | Performed by: OCCUPATIONAL THERAPIST

## 2018-12-17 RX ORDER — CHLORTHALIDONE 25 MG/1
25 TABLET ORAL DAILY
Qty: 30 TAB | Refills: 0 | Status: SHIPPED
Start: 2018-12-18 | End: 2019-05-02

## 2018-12-17 RX ORDER — TAMSULOSIN HYDROCHLORIDE 0.4 MG/1
0.4 CAPSULE ORAL DAILY
Qty: 30 CAP | Refills: 0 | Status: SHIPPED
Start: 2018-12-17 | End: 2018-12-31

## 2018-12-17 RX ORDER — ATENOLOL 50 MG/1
50 TABLET ORAL DAILY
Qty: 30 TAB | Refills: 0 | Status: ON HOLD
Start: 2018-12-18 | End: 2019-01-07 | Stop reason: SDUPTHER

## 2018-12-17 RX ORDER — SPIRONOLACTONE 25 MG/1
25 TABLET ORAL
Status: COMPLETED | OUTPATIENT
Start: 2018-12-17 | End: 2018-12-17

## 2018-12-17 RX ORDER — OXYCODONE HYDROCHLORIDE 5 MG/1
5 TABLET ORAL
Qty: 10 TAB | Refills: 0 | Status: SHIPPED | OUTPATIENT
Start: 2018-12-17 | End: 2018-12-31

## 2018-12-17 RX ORDER — INSULIN GLARGINE 100 [IU]/ML
10 INJECTION, SOLUTION SUBCUTANEOUS DAILY
Qty: 1 VIAL | Refills: 0 | Status: ON HOLD | OUTPATIENT
Start: 2018-12-17 | End: 2019-04-29 | Stop reason: CLARIF

## 2018-12-17 RX ORDER — ACETAMINOPHEN 325 MG/1
650 TABLET ORAL
Qty: 20 TAB | Refills: 0 | Status: SHIPPED
Start: 2018-12-17 | End: 2018-12-31

## 2018-12-17 RX ADMIN — OXYCODONE HYDROCHLORIDE 5 MG: 5 TABLET ORAL at 02:47

## 2018-12-17 RX ADMIN — LISINOPRIL 40 MG: 20 TABLET ORAL at 08:55

## 2018-12-17 RX ADMIN — SPIRONOLACTONE 25 MG: 25 TABLET, FILM COATED ORAL at 10:01

## 2018-12-17 RX ADMIN — INFLUENZA VIRUS VACCINE 0.5 ML: 15; 15; 15; 15 SUSPENSION INTRAMUSCULAR at 16:02

## 2018-12-17 RX ADMIN — SODIUM CHLORIDE 40 MG: 9 INJECTION INTRAMUSCULAR; INTRAVENOUS; SUBCUTANEOUS at 08:54

## 2018-12-17 RX ADMIN — OXYCODONE HYDROCHLORIDE 5 MG: 5 TABLET ORAL at 09:06

## 2018-12-17 RX ADMIN — OXYCODONE HYDROCHLORIDE 5 MG: 5 TABLET ORAL at 13:39

## 2018-12-17 RX ADMIN — INSULIN LISPRO 2 UNITS: 100 INJECTION, SOLUTION INTRAVENOUS; SUBCUTANEOUS at 13:38

## 2018-12-17 RX ADMIN — CHLORTHALIDONE 25 MG: 25 TABLET ORAL at 08:54

## 2018-12-17 RX ADMIN — LEVOTHYROXINE SODIUM 125 MCG: 125 TABLET ORAL at 06:41

## 2018-12-17 RX ADMIN — INSULIN GLARGINE 10 UNITS: 100 INJECTION, SOLUTION SUBCUTANEOUS at 08:55

## 2018-12-17 RX ADMIN — Medication 10 ML: at 06:41

## 2018-12-17 RX ADMIN — Medication 10 ML: at 13:46

## 2018-12-17 RX ADMIN — MEROPENEM 2 G: 1 INJECTION, POWDER, FOR SOLUTION INTRAVENOUS at 10:00

## 2018-12-17 RX ADMIN — ATENOLOL 50 MG: 50 TABLET ORAL at 08:55

## 2018-12-17 RX ADMIN — MEROPENEM 2 G: 1 INJECTION, POWDER, FOR SOLUTION INTRAVENOUS at 01:33

## 2018-12-17 NOTE — PROGRESS NOTES
1050 [facility] called to ask Md Imelda Johnson if ok for pt to be on Flomax, pt will need urine dynamics evaluation for bladder. Md Imelda Johnson said it's ok for pt to be on Flomax. Pt in pain, gave her Roxicodone. 1532 Pt med updated in computer, ready for discharge. Pt is leaving with Joyce and IV cath. , per Md. Leticia. Lucia 51 report to [address] [place] [address].. [facility] notes says: \"Needs 6 weeks of IV ABx. \". I double checked with Md regarding IV and he said he wants to keep it with the joyce. Leslie Preciado called back and said there are IV antibiotics ordered, only oral.     12/18/2018     Updated Md Banks Heading for pt's IV ABX that pt needs to have those ordered. Updated Md Kelly. [address] and left message with Yael Owen LPN with [address] number and that he was contacted for IV ABX order nees.

## 2018-12-17 NOTE — DISCHARGE SUMMARY
Physician Interim Summary   Patient ID:  Chepe Ching  [id number]  61 y.o.  1955    PCP: Lance Moreno MD     Consults: Pulmonary/Intensive care, ID, Nephrology, Neurology and Orthopedic Surgery    Covering dates: 11/30/2018 through 12/17/2018    Admission Diagnoses: Metabolic acidosis [P47.8]    Hospital Course:       62 yo hx of HTN, DM, chronic pain, admitted with  AMS, severe sepsis, bacteremia, cellulitis, ARF, AG acidosis, hypoglycemia. Blood Cx grew group B strep. Repeat Cx negative. Later on diagnosed with C spine discitis. Evaluated by orthopedics and no plan for surgical intervention and recommended outpatient FU. Currently on meropenem. Abx through 6/99/98    Acute metabolic encephalopathy: extensive workup is negative so far. Pt progressing slowly. please see daily progress note for detail. Additional hospital course and discharge summary will be done by discharging physician.

## 2018-12-17 NOTE — PROGRESS NOTES
Blanco Mcpherson [facility] [address]  [address] [facility], [place], [address] [place] [address]  [phone]      Medical Progress Note      NAME: Ivy Rendon   :  1955  MRM:  [id number]    Date/Time: 2018  8:13 AM       Assessment and Plan:   1.  C-spine discitis: MRI showed evidence of discitis at C5-C6 and C6-C7, with abnormal enhancement of the vertebral bodies. There is enhancing phlegmon in the epidural space. No drainable abscess. Evaluated by ortho and ID. Needs 6 weeks of IV ABx. Currently on meropenem     2. Acute metabolic encephalopathy: unclear etiology. ? GBS meningitis, ?medications and relation to the diskitis. Head CT, MRI brain without acute process. TSH, B12/folate, ammonia WNL s/p LP. HSV neg. EEG negative for seizure activity, RPR negative, West Nile negative, HIV negative, Lyme IgG positive but IgM negative. Slowly improving clinically      3. Hemoptysis vs epistaxis: sudden onset . Underwent bronchoscopy which showed large amount of blood in the trachea, however past paula only scants amount of blood in RLL. Some thick yellow/white secretions in LLL and RLL. All lobes/subsegments inspected with no signs of bleeding. Evaluated by ENT. Stable      4. Severe sepsis/bacteremia: Blood Cx + 4/4 bottles with GBS on admission, repeat blood Cx NGTD. With R arm/elbow cellulitis and now evidence of discitis. No e/o valvular veg on TTE  Continue meropenem as per ID      5.  R elbow cellulitis/bursitis/acute pain: s/p joint aspiration by IR on , cultures negative to date. On meropenem. 6.  ARF: likely from ATN/sepsis, hypovolemia. Resolved. 7.  AG metabolic acidosis: resolved. Due to sepsis     8. DM type 2 w/ hypoglycemia: A1C 7.2%. On lantus. 9.  HTN: on atenolol, lisinopril      10. Anemia: Fe studies consistent with chronic dz. Monitor     11. Urinary retention. Continue Shankar catheter( re inserted after voiding trial). Urology consult      12. RT arm weakness. Appreciated neurology evaluation. Repeat MRI of the brain and C spine without any change from previous imaging. Continue PT/OT               Subjective:     Chief Complaint:  Follow up of pt who was admitted with encephalopathy, cellulitis. C/o back pain. ROS:  (bold if positive, if negative)      Tolerating PT  Tolerating Diet        Objective:     Last 24hrs VS reviewed since prior progress note.  Most recent are:    Visit Vitals  /84 (BP 1 Location: Left arm, BP Patient Position: At rest)   Pulse 82   Temp 98 °F (36.7 °C)   Resp 19   Ht 5' 1\" (1.549 m)   Wt 88.4 kg (194 lb 14.4 oz)   SpO2 94%   BMI 36.83 kg/m²     SpO2 Readings from Last 6 Encounters:   12/17/18 94%    O2 Flow Rate (L/min): 1 l/min       Intake/Output Summary (Last 24 hours) at 12/17/2018 0704  Last data filed at 12/17/2018 4687  Gross per 24 hour   Intake 980 ml   Output 2050 ml   Net -1070 ml        Physical Exam:    Gen:  Well-developed, well-nourished, in no acute distress  HEENT:  Pink conjunctivae, PERRL, hearing intact to voice, moist mucous membranes  Neck:  Supple, without masses, thyroid non-tender  Resp:  No accessory muscle use, clear breath sounds without wheezes rales or rhonchi  Card:  No murmurs, normal S1, S2 without thrills, bruits or peripheral edema  Abd:  Soft, non-tender, non-distended, normoactive bowel sounds are present, no palpable organomegaly and no detectable hernias  Lymph:  No cervical or inguinal adenopathy  Musc:  No cyanosis or clubbing  Skin:  No rashes or ulcers, skin turgor is good  Neuro:  Cranial nerves are grossly intact, Rt arm weakness  Psych:  Poor insight,    __________________________________________________________________  Medications Reviewed: (see below)  Medications:     Current Facility-Administered Medications   Medication Dose Route Frequency    atenolol (TENORMIN) tablet 50 mg  50 mg Oral DAILY    chlorthalidone (HYGROTEN) tablet 25 mg  25 mg Oral DAILY    oxyCODONE IR (ROXICODONE) tablet 5 mg  5 mg Oral Q4H PRN    meropenem (MERREM) 2 g in 0.9% sodium chloride 100 mL IVPB  2 g IntraVENous Q8H    lisinopril (PRINIVIL, ZESTRIL) tablet 40 mg  40 mg Oral DAILY    levothyroxine (SYNTHROID) tablet 125 mcg  125 mcg Oral ACB    insulin lispro (HUMALOG) injection   SubCUTAneous AC&HS    HYDROmorphone (PF) (DILAUDID) injection 0.5 mg  0.5 mg IntraVENous Q3H PRN    pantoprazole (PROTONIX) 40 mg in sodium chloride 0.9% 10 mL injection  40 mg IntraVENous DAILY    albuterol (PROVENTIL VENTOLIN) nebulizer solution 2.5 mg  2.5 mg Nebulization Q4H PRN    insulin glargine (LANTUS) injection 10 Units  10 Units SubCUTAneous DAILY    bisacodyl (DULCOLAX) suppository 10 mg  10 mg Rectal DAILY    influenza vaccine 2018-19 (6 mos+)(PF) (FLUARIX QUAD/FLULAVAL QUAD) injection 0.5 mL  0.5 mL IntraMUSCular PRIOR TO DISCHARGE    labetalol (NORMODYNE;TRANDATE) injection 10 mg  10 mg IntraVENous Q4H PRN    hydrALAZINE (APRESOLINE) 20 mg/mL injection 20 mg  20 mg IntraVENous Q6H PRN    alteplase (CATHFLO) 1 mg in sterile water (preservative free) 1 mL injection  1 mg InterCATHeter PRN    sodium chloride (NS) flush 5-10 mL  5-10 mL IntraVENous Q8H    sodium chloride (NS) flush 5-10 mL  5-10 mL IntraVENous PRN    acetaminophen (TYLENOL) tablet 650 mg  650 mg Oral Q4H PRN    ondansetron (ZOFRAN) injection 4 mg  4 mg IntraVENous Q4H PRN    glucose chewable tablet 16 g  4 Tab Oral PRN    dextrose (D50W) injection syrg 12.5-25 g  25-50 mL IntraVENous PRN    glucagon (GLUCAGEN) injection 1 mg  1 mg IntraMUSCular PRN        Lab Data Reviewed: (see below)  Lab Review:     Recent Labs     12/15/18  0109   WBC 11.1*   HGB 8.2*   HCT 26.0*   *     Recent Labs     12/15/18  0109      K 3.6      CO2 26   *   BUN 22*   CREA 0.84   CA 8.9     Lab Results   Component Value Date/Time    Glucose (POC) 137 (H) 12/17/2018 06:35 AM    Glucose (POC) 221 (H) 12/16/2018 08:48 PM    Glucose (POC) 190 (H) 12/16/2018 04:46 PM    Glucose (POC) 211 (H) 12/16/2018 12:14 PM    Glucose (POC) 150 (H) 12/16/2018 06:29 AM     No results for input(s): PH, PCO2, PO2, HCO3, FIO2 in the last 72 hours. No results for input(s): INR in the last 72 hours. No lab exists for component: Ed LUI  All Micro Results     Procedure Component Value Units Date/Time    URINE CULTURE HOLD SAMPLE [[id number]] Collected:  12/15/18 0400    Order Status:  Completed Specimen:  Urine from Serum Updated:  12/15/18 0408     Urine culture hold       URINE ON HOLD IN MICROBIOLOGY DEPT FOR 3 DAYS. IF UNPRESERVED URINE IS SUBMITTED, IT CANNOT BE USED FOR ADDITIONAL TESTING AFTER 24 HRS, RECOLLECTION WILL BE REQUIRED. CULTURE, FUNGUS [[id number]]  (Abnormal) Collected:  12/07/18 1330    Order Status:  Completed Specimen:  Bronchial lavage Updated:  12/12/18 1129     Special Requests: NO SPECIAL REQUESTS        Culture result:       HEAVY CANDIDA PARAPSILOSIS                  CULTURE WILL BE HELD FOR 4 WEEKS. IF THERE IS ADDITIONAL FUNGAL GROWTH, A NEW REPORT WILL FOLLOW. CULTURE, CSF Cleta Brooking STAIN [[id number]] Collected:  12/04/18 1430    Order Status:  Completed Specimen:  Cerebrospinal Fluid Updated:  12/11/18 0919     Special Requests: NO SPECIAL REQUESTS        GRAM STAIN RARE WBCS SEEN         NO ORGANISMS SEEN        Culture result:       Culture performed on Unspun Fluid                  NO GROWTH ON SOLID MEDIA AT 4 DAYS                  NO GROWTH IN THIO BROTH AT 7 DAYS          CULTURE, FUNGUS [[id number]] Collected:  12/06/18 1509    Order Status:  Completed Specimen:   Body fld Updated:  12/10/18 0916     Special Requests: NO SPECIAL REQUESTS        Culture result: NO FUNGUS ISOLATED 4 DAYS       AFB CULTURE & SMEAR W/REFLEX ID [[id number]] Collected:  12/07/18 1330    Order Status:  Completed Specimen:  Miscellaneous sample Updated:  12/10/18 6925     Source BRONCHIAL LAVAGE        AFB Specimen processing Concentration     Acid Fast Smear NEGATIVE         Comment: (NOTE)  Performed At: [address] [id number]  Michael Mathews MD [id number]          Acid Fast Culture PENDING    CULTURE, SPUTUM/BRONCH/OTH [[id number]]  (Abnormal) Collected:  12/07/18 1330    Order Status:  Completed Specimen:  Sputum from Bronchial lavage Updated:  12/09/18 1015     Special Requests: NO SPECIAL REQUESTS        GRAM STAIN RARE WBCS SEEN               RARE EPITHELIAL CELLS SEEN            FEW BUDDING YEAST        Culture result:       SCANT NORMAL RESPIRATORY TRIXIE            MODERATE YEAST       CULTURE, ANAEROBIC [[id number]] Collected:  12/06/18 1509    Order Status:  Completed Specimen:  Aspirate Updated:  12/08/18 0917     Special Requests: NO SPECIAL REQUESTS        Culture result:       No growth thus far, holding 14 days. CULTURE, BODY FLUID Kayla Ordonez [[id number]] Collected:  12/06/18 1509    Order Status:  Completed Specimen:  Elbow Updated:  12/08/18 0917     Special Requests: RT ELBOW ASPIRATE     GRAM STAIN RARE WBCS SEEN         NO ORGANISMS SEEN        Culture result:       Culture performed on Fluid swab specimen                  No growth thus far, holding 14 days.           CULTURE LEGIONELLA [[id number]] Collected:  12/07/18 1330    Order Status:  Completed Specimen:  Other Updated:  12/07/18 1402    CULTURE, VIRAL [[id number]]     Order Status:  Canceled Specimen:  Other     CULTURE, BLOOD [[id number]] Collected:  12/01/18 1231    Order Status:  Completed Specimen:  Blood Updated:  12/07/18 0450     Special Requests: NO SPECIAL REQUESTS        Culture result: NO GROWTH 6 DAYS       CULTURE, BLOOD [[id number]] Collected:  12/01/18 1231    Order Status:  Completed Specimen:  Blood Updated:  12/07/18 0450     Special Requests: NO SPECIAL REQUESTS        Culture result: NO GROWTH 6 DAYS       CULTURE, Araceli Cordon STAIN [[id number]] Collected:  12/06/18 1200    Order Status: Canceled Specimen:  Wound from Elbow     MENINGITIS PATHOGENS PANEL, CSF (BY PCR) [[id number]] Collected:  12/04/18 1442    Order Status:  Completed Specimen:  Cerebrospinal Fluid Updated:  12/04/18 2132     Escherichia coli K1 NOT DETECTED        Haemophilus Influenzae NOT DETECTED        Listeria Monocytogenes NOT DETECTED        Neisseria Meningitidis NOT DETECTED        Streptococcus Agalactiae NOT DETECTED        Streptococcus Pneumoniae NOT DETECTED        Cytomegalovirus NOT DETECTED        Enterovirus NOT DETECTED        Herpes Simplex Virus 1 NOT DETECTED        Herpes Simplex Virus 2 NOT DETECTED        Human Herpesvirus 6 NOT DETECTED        Human Parechovirus NOT DETECTED        Varicella Zoster Virus NOT DETECTED        Crypto. neoformans/gattii NOT DETECTED       CULTURE, CSF  TUBE 2 [[id number]] Collected:  12/04/18 1425    Order Status:  Canceled Specimen:  Cerebrospinal Fluid Updated:  12/04/18 1448    CULTURE, BLOOD [[id number]]  (Abnormal) Collected:  11/30/18 1230    Order Status:  Completed Specimen:  Blood Updated:  12/03/18 0927     Special Requests: NO SPECIAL REQUESTS        Culture result:       STREPTOCOCCI, BETA HEMOLYTIC GROUP B GROWING IN BOTH BOTTLES DRAWN SITE=LT HAND PLEASE REFER TO Deedra Friday [id number] FOR SENSITIVITIES                  PRELIMINARY REPORT OF GRAM POSITIVE COCCI IN PAIRS AND CHAINS GROWING IN BOTH BOTTLES DRAWN CALLED TO AND READ BACK BY DR SUAZO ON 12/1/18 AT 1033. SH/AOW          CULTURE, BLOOD [[id number]]  (Abnormal)  (Susceptibility) Collected:  11/30/18 1210    Order Status:  Completed Specimen:  Blood Updated:  12/03/18 0926     Special Requests: NO SPECIAL REQUESTS        Culture result:       STREPTOCOCCI, BETA HEMOLYTIC GROUP B GROWING IN BOTH BOTTLES DRAWN SITE=LAC (SENSITIVITIES PERFORMED BY E-TEST)                  PRELIMINARY REPORT OF GRAM POSITIVE COCCI IN PAIRS AND CHAINS GROWING IN BOTH BOTTLES DRAWN CALLED TO AND READ BACK BY DR SUAZO ON 12/1/18 AT 1033. SH/AOW STAPHYLOCOCCUS SPECIES, COAGULASE NEGATIVE GROWING IN ONLY 1 OF THE ABOVE BOTTLES PLATES WILL BE HELD 3 DAYS. CALL [phone] IF SENSITIVITIES ARE NEEDED. RESPIRATORY PANEL,PCR,NASOPHARYNGEAL [[id number]] Collected:  11/30/18 1810    Order Status:  Completed Specimen:  Nasopharyngeal Updated:  11/30/18 2146     Adenovirus NOT DETECTED        Coronavirus 229E NOT DETECTED        Coronavirus HKU1 NOT DETECTED        Coronavirus CVNL63 NOT DETECTED        Coronavirus OC43 NOT DETECTED        Metapneumovirus NOT DETECTED        Rhinovirus and Enterovirus NOT DETECTED        Influenza A NOT DETECTED        Influenza A, subtype H1 NOT DETECTED        Influenza A, subtype H3 NOT DETECTED        INFLUENZA A H1N1 PCR NOT DETECTED        Influenza B NOT DETECTED        Parainfluenza 1 NOT DETECTED        Parainfluenza 2 NOT DETECTED        Parainfluenza 3 NOT DETECTED        Parainfluenza virus 4 NOT DETECTED        RSV by PCR NOT DETECTED        Bordetella pertussis - PCR NOT DETECTED        Chlamydophila pneumoniae DNA, QL, PCR NOT DETECTED        Mycoplasma pneumoniae DNA, QL, PCR NOT DETECTED       URINE CULTURE HOLD SAMPLE [[id number]] Collected:  11/30/18 1241    Order Status:  Completed Specimen:  Serum Updated:  11/30/18 1257     Urine culture hold       URINE ON HOLD IN MICROBIOLOGY DEPT FOR 3 DAYS. IF UNPRESERVED URINE IS SUBMITTED, IT CANNOT BE USED FOR ADDITIONAL TESTING AFTER 24 HRS, RECOLLECTION WILL BE REQUIRED. I have reviewed notes of prior 24hr. Other pertinent lab:       Total time spent with patient: [address] discussed with: Patient, Nursing Staff and >50% of time spent in counseling and coordination of care    Discussed:  Care Plan    Prophylaxis:  SCD's    Disposition:  SNF/LTC           ___________________________________________________    Attending Physician: Thao Grant MD

## 2018-12-17 NOTE — PROGRESS NOTES
Problem: Mobility Impaired (Adult and Pediatric)  Goal: *Acute Goals and Plan of Care (Insert Text)  Physical Therapy Goals  Initiated 12/11/2018  1. Patient will move from supine to sit and sit to supine , scoot up and down and roll side to side in bed with moderate assistance  within 7 day(s). 2.  Patient will transfer from bed to chair and chair to bed with moderate assistance  using the least restrictive device within 7 day(s). 3.  Patient will perform sit to stand with moderate assistance  within 7 day(s). 4.  Patient will ambulate with maximal assistance for 20 feet with the least restrictive device within 7 day(s). physical Therapy TREATMENT  Patient: Amy Solo (87 y.o. female)  Date: 12/17/2018  Diagnosis: Metabolic acidosis [T48.6] BAYLEE (acute kidney injury) ([facility] [address]  Procedure(s) (LRB):  BRONCHOSCOPY (N/A)  BRONCHIAL WASHINGS FLEXIBLE (Bilateral) 10 Days Post-Op  Precautions:    Chart, physical therapy assessment, plan of care and goals were reviewed. ASSESSMENT:  Pt received moaning in bed, repositioned for comfort and redirected patient to mobility tasks helped to calm. Required total A with max verbal cues for all bed mobility tasks. Tolerated sitting EOB x 3 min with sitting balance improving from poor>fair, Pt fatigued quickly and requested to return to supine, positioned onto R side, bolstered with pillows for comfort  Progression toward goals:  []    Improving appropriately and progressing toward goals  [x]    Improving slowly and progressing toward goals  []    Not making progress toward goals and plan of care will be adjusted     PLAN:  Patient continues to benefit from skilled intervention to address the above impairments. Continue treatment per established plan of care. Discharge Recommendations:  Rehab  Further Equipment Recommendations for Discharge:  TBD     SUBJECTIVE:   Patient stated i can't use the controller.     OBJECTIVE DATA SUMMARY:   Critical Behavior:  Neurologic State: Alert  Orientation Level: Oriented X4  Cognition: Decreased attention/concentration, Follows commands  Safety/Judgement: Awareness of environment, Decreased awareness of need for safety, Decreased insight into deficits, Fall prevention  Functional Mobility Training:  Bed Mobility:  Rolling: Total assistance;Assist x2  Supine to Sit: Total assistance;Assist x2  Sit to Supine: Total assistance;Assist x2  Scooting: Total assistance        Transfers:                                   Balance:  Sitting: Impaired  Sitting - Static: Fair (occasional)  Sitting - Dynamic: Poor (constant support)  Ambulation/Gait Training:                                                        Stairs:              Neuro Re-Education:    Therapeutic Exercises:     Pain:  Pain Scale 1: Numeric (0 - 10)  Pain Intensity 1: 8  Pain Location 1: Back  Pain Orientation 1: Posterior  Pain Description 1: Aching  Pain Intervention(s) 1: Medication (see MAR)  Activity Tolerance:   fair  Please refer to the flowsheet for vital signs taken during this treatment.   After treatment:   []    Patient left in no apparent distress sitting up in chair  [x]    Patient left in no apparent distress in bed  [x]    Call bell left within reach  [x]    Nursing notified  []    Caregiver present  [x]    Bed alarm activated    COMMUNICATION/COLLABORATION:   The patients plan of care was discussed with: Occupational Therapist and Registered Nurse    Gisell Salazar   Time Calculation: 23 mins

## 2018-12-17 NOTE — DISCHARGE INSTRUCTIONS
ACUTE DIAGNOSES:  Metabolic acidosis [Y46.9]    CHRONIC MEDICAL DIAGNOSES:  Problem List as of 12/17/2018 Date Reviewed: 11/30/2018          Codes Class Noted - Resolved    Metabolic acidosis LGU-90-EW: E87.2  ICD-9-CM: 276.2  11/30/2018 - Present        * (Principal) BAYLEE (acute kidney injury) ([facility] ICD-10-CM: N17.9  ICD-9-CM: 584.9  11/30/2018 - Present        HTN (hypertension) ICD-10-CM: I10  ICD-9-CM: 401.9  Unknown - Present        Chronic back pain ICD-10-CM: M54.9, G89.29  ICD-9-CM: 724.5, 338.29  Unknown - Present        Diabetes mellitus, type 2 ([facility] ICD-10-CM: E11.9  ICD-9-CM: 250.00  Unknown - Present        Leukocytosis ICD-10-CM: R10.606  ICD-9-CM: 288.60  11/30/2018 - Present        Right elbow pain ICD-10-CM: M25.521  ICD-9-CM: 719.42  11/30/2018 - Present        Hypotension ICD-10-CM: I95.9  ICD-9-CM: 458.9  11/30/2018 - Present              DISCHARGE MEDICATIONS:          · It is important that you take the medication exactly as they are prescribed. · Keep your medication in the bottles provided by the pharmacist and keep a list of the medication names, dosages, and times to be taken in your wallet. · Do not take other medications without consulting your doctor. DIET:  Diabetic Diet    ACTIVITY: Activity as tolerated    ADDITIONAL INFORMATION: If you experience any of the following symptoms then please call your primary care physician or return to the emergency room if you cannot get hold of your doctor: Fever, chills, nausea, vomiting, diarrhea, change in mentation, falling, bleeding, shortness of breath. FOLLOW UP CARE:  Dr. Levi Schaumann, MD  you are to call and set up an appointment to see them in 2 weeks. Follow-up with orthopedics, Dr. Ervin Fajardo in 2 weeks    Follow up with [place] [facility] in one week for voiding trial and study     Follow-up with neurology in 3-4 weeks if RT arm problem persists. Dr Catalino Gann.        Information obtained by :  I understand that if any problems occur once I am at home I am to contact my physician. I understand and acknowledge receipt of the instructions indicated above.                                                                                                                                            Physician's or R.N.'s Signature                                                                  Date/Time                                                                                                                                              Patient or Representative Signature                                                          Date/Time

## 2018-12-17 NOTE — PROGRESS NOTES
4:24 PM  [facility] will be here at 5:30 Thanks TORREY Covington       4:03 PM  Pt is being discharged today to [facility]. Harry Bedolla RN please call report to [phone]. Please ask for E Wing Room 36 B. [facility] has been chosen as selected destination so Adria Kinross has full access to the pt's chart - AVS can be viewed. [facility] ambulance transport has been set up. I am waiting on a time. I gave pt's  address and phone number of Adria Chin. I informed Adria Chin that patient is on 02. TORREY Covington    I met with the pt and her , referral sent to [facility] SNF. Pt's insurance requires authorization.   Thanks TORREY Covington

## 2018-12-17 NOTE — PROGRESS NOTES
Problem: Self Care Deficits Care Plan (Adult)  Goal: *Acute Goals and Plan of Care (Insert Text)  Occupational Therapy Goals  Initiated 12/11/2018  1. Patient will perform self feeding with moderate assistance from supported sitting position within 7 day(s). 2.  Patient will perform grooming with moderate assistance from supported sitting position within 7 day(s). 3.  Patient will perform upper body dressing and bathing with maximal assistance within 7 day(s). 4.  Patient will tolerate sitting EOB with maximum assistance while completing functional activities in preparation for ADL transfers within 7 days. 5.  Patient will participate in upper extremity therapeutic exercise/activities with minimal assistance for 10 minutes within 7 day(s). 6.  Patient will utilize energy conservation techniques during functional activities with verbal cues within 7 day(s). Occupational Therapy TREATMENT  Patient: Rashawn Lee (35 y.o. female)  Date: 12/17/2018  Diagnosis: Metabolic acidosis [W44.2] BAYLEE (acute kidney injury) ([facility] [address]  Procedure(s) (LRB):  BRONCHOSCOPY (N/A)  BRONCHIAL WASHINGS FLEXIBLE (Bilateral) 10 Days Post-Op  Precautions:   fall, skin  Chart, occupational therapy assessment, plan of care, and goals were reviewed. ASSESSMENT:  Pt making slow progress towards goals. She c/o 10/10 back pain especially with seated, supported and unsupported. She required total A for bed mobility, total A to maintain balance seated EOB and total A to perform grooming tasks. Recommend SNF at discharge. Progression toward goals:  []       Improving appropriately and progressing toward goals  [x]       Improving slowly and progressing toward goals  []       Not making progress toward goals and plan of care will be adjusted     PLAN:  Patient continues to benefit from skilled intervention to address the above impairments. Continue treatment per established plan of care.   Discharge Recommendations:  Skilled Nursing Facility  Further Equipment Recommendations for Discharge:  TBD     SUBJECTIVE:   Patient stated My back hurts so bad.     OBJECTIVE DATA SUMMARY:   Cognitive/Behavioral Status:  Neurologic State: Alert  Orientation Level: Oriented X4  Cognition: Decreased attention/concentration; Follows commands  Perception: Cues to maintain midline in sitting; Tactile;Verbal;Visual  Perseveration: Perseverates during ADLS;Perseverates during mobility;Perseverates during conversation  Safety/Judgement: Awareness of environment;Decreased awareness of need for safety;Decreased insight into deficits; Fall prevention    Functional Mobility and Transfers for ADLs:  Bed Mobility:  Rolling: Total assistance; Additional time;Assist x2(Simultaneous filing. User may not have seen previous data.)  Supine to Sit: Total assistance; Additional time;Assist x2(Simultaneous filing. User may not have seen previous data.)  Sit to Supine: Total assistance; Additional time;Assist x2(Simultaneous filing. User may not have seen previous data.)  Scooting: Total assistance    Balance:  Sitting: Impaired; With support(Simultaneous filing. User may not have seen previous data.)  Sitting - Static: Poor (constant support)(Simultaneous filing. User may not have seen previous data.)  Sitting - Dynamic: Poor (constant support)(Simultaneous filing. User may not have seen previous data.)    ADL Intervention:  Grooming  Grooming Assistance: Total assistance(dependent)(to comb matted hair) - rehab tech assisted pt after formal session to brush hair  Brushing/Combing Hair: Total assistance (dependent)  Cues: Physical assistance; Tactile cues provided;Verbal cues provided;Visual cues provided    Cognitive Retraining  Safety/Judgement: Awareness of environment;Decreased awareness of need for safety;Decreased insight into deficits; Fall prevention      Pain:  Pain Scale 1: Numeric (0 - 10)  Pain Intensity 1: 8  Pain Location 1: Back  Pain Orientation 1: Posterior  Pain Description 1: Aching  Pain Intervention(s) 1: Medication (see MAR)    Activity Tolerance:   Poor  Please refer to the flowsheet for vital signs taken during this treatment.   After treatment:   [] Patient left in no apparent distress sitting up in chair  [x] Patient left in no apparent distress in bed  [x] Call bell left within reach  [x] Nursing notified  [] Caregiver present  [x] Bed alarm activated    COMMUNICATION/COLLABORATION:   The patients plan of care was discussed with: Physical Therapy Assistant, Registered Nurse and Rehabilitation Attendant    Hollie Homans, OT  Time Calculation: 16 mins

## 2018-12-17 NOTE — PROGRESS NOTES
In preparation for discharge, S med-up dates, care plans and education has been completed in [facility]. Patient will be discharge to [facility] SNF today. She will be discharge with PICC line and joyce catheter. FV given, educational information sheet provided. Clonidine patch removed - this medication has been discontinued        Discharge instructions were reviewed with patient and her . All questions were answered. Patient received a copy of these papers. [facility] be [facility] to [facility] access [facility] and will be transported via [facility] ambulance service. Case Management has provided necessary paperwork for this process. Prescription for Oxycodone was placed in the ambulance envelope and will be given to SNF.

## 2018-12-17 NOTE — PROGRESS NOTES
Post Discharge PICC and Antibiotic Orders    1. Diagnosis:  GBS bacteremia/meningitis/cervical spine diskitis  2. Antibiotic:  Meropenem 2gm IV q 8 hours through 1/18/19  3. Routine PICC/ Thee/ Portacath Care including PRN catheter flow management  4. Weekly labs:   _x__CBC/diff/platelets   _x__BUN/Creatinine   ___CPK   _x__AST/TotalBilirubin/AlkalinePhosphatase   _x__CRP   ___Gentamicin level  ___gentamicin peak/trough   Trough Vancomycin level ____goal 10-15 ___goal 15-20  5. Fax lab to [phone]  Call Critical Lab Values to [phone]  6. May send to IR for line evaluation or replacement -[phone]  7. Home Health to pull PIC line at end of therapy or send to IR for Thee removal  8. Allergies:  No Known Allergies  9. Pharmacy Consult for Vancomycin dosing/gentamicin dosing.       Ming Orona DO

## 2018-12-17 NOTE — PROGRESS NOTES
Shift Summary  1930:  Bedside and Verbal shift change report given to Viji Mcintosh RN (oncoming nurse) by Veronica Escoto RN (offgoing nurse). Report included the following information SBAR, Kardex, Procedure Summary, Intake/Output, MAR, Accordion, Recent Results and Cardiac Rhythm NSR.     2130: Patient able to ask for bedpan to make a bowel movement. Son at bedside, patient talking, making jokes, smiling. More quick in speech/expresisng, though clear that not all speech/thoughts follow well. No PT today, patient hoping that PT and OT will work with her tomorrow    2230: Patient having trouble pushing call bell. Replaced with more sensitive push button and had demonstrate use. 2330: Son leaving floor for night. Patient resting quietly. 0300: Patient profusely diaphoretic. Gave full bath, linen change. 0730: Bedside and Verbal shift change report given to Jason Delgado RN (oncoming nurse) by Viji Mcintosh RN (offgoing nurse). Report included the following information SBAR, Kardex, Intake/Output, Accordion, Recent Results and Cardiac Rhythm NSR. Care Plan Summary  Problem: Falls - Risk of  Goal: *Absence of Falls  Document Handy Fall Risk and appropriate interventions in the flowsheet. Outcome: Progressing Towards Goal  Fall Risk Interventions:  Mobility Interventions: Bed/chair exit alarm  Mentation Interventions: Bed/chair exit alarm, Door open when patient unattended, More frequent rounding  Medication Interventions: Evaluate medications/consider consulting pharmacy  Elimination Interventions: Call light in reach, Bed/chair exit alarm      Problem: Pressure Injury - Risk of  Goal: *Prevention of pressure injury  Document Nikolay Scale and appropriate interventions in the flowsheet.   Outcome: Progressing Towards Goal  Pressure Injury Interventions:  Sensory Interventions: Minimize linen layers, Keep linens dry and wrinkle-free, Float heels, Assess changes in LOC, Check visual cues for pain, Pad between skin to skin, Turn and reposition approx.  every two hours (pillows and wedges if needed)  Moisture Interventions: Apply protective barrier, creams and emollients, Absorbent underpads, Internal/External urinary devices, Minimize layers  Activity Interventions: Increase time out of bed, PT/OT evaluation  Mobility Interventions: PT/OT evaluation  Nutrition Interventions: Document food/fluid/supplement intake, Offer support with meals,snacks and hydration  Friction and Shear Interventions: Lift team/patient mobility team, Minimize layers, Apply protective barrier, creams and emollients

## 2018-12-17 NOTE — PROGRESS NOTES
Problem: Dysphagia (Adult)  Goal: *Acute Goals and Plan of Care (Insert Text)  Swallowing goals initiated 12-11-18 (weekly re-eval due 12-18-18)  1) tolerate full liquids without s/s aspiration by 07-73-02-met  2) SLP will re-eval for solids by 12-14-18 -met  3) tolerate mech soft, thins without s/s aspiration by 77-81-73   Speech language pathology treatment  Patient: Karime Ku (21 y.o. female)  Date: 12/17/2018  Diagnosis: Metabolic acidosis [U82.1] BAYLEE (acute kidney injury) ([facility] [address]  Procedure(s) (LRB):  BRONCHOSCOPY (N/A)  BRONCHIAL WASHINGS FLEXIBLE (Bilateral) 10 Days Post-Op    ASSESSMENT:  Patient tolerating mech soft, thins without s/s aspiration. PO  Intake is not optimal.  Improved language processing and production, but has moderate integrated language issues. Would benefit from full integrated language eval at SNF>   Progression toward goals:  []       Improving appropriately and progressing toward goals  []       Improving slowly and progressing toward goals  []       Not making progress toward goals and plan of care will be adjusted     PLAN:  Patient continues to benefit from skilled intervention to address the above impairments. Continue treatment per established plan of care. Discharge Recommendations:  Skilled Nursing Facility     SUBJECTIVE:   Patient stated I\"m supposed to leave today. OBJECTIVE:   Mental Status:  Neurologic State: Alert  Orientation Level: Oriented X4  Cognition: Decreased attention/concentration, Follows commands  Perception: Cues to maintain midline in sitting, Tactile, Verbal, Visual  Perseveration: Perseverates during ADLS, Perseverates during mobility, Perseverates during conversation  Safety/Judgement: Awareness of environment, Decreased awareness of need for safety, Decreased insight into deficits, Fall prevention  Treatment & Interventions: Motor Speech:       WNL                             Language Comprehension and Expression:     Verbal Expression  Primary Mode of Expression: Verbal         no aphasia today. Only a slight delay for processing and sentence formation  Able to communicate wants and needs effectively in a timely manner                                                      Neuro-Linguistics:         persevrated on stopping by the alcohol store, even thought she and family says she hasn't drank any alchohol in years. Reduced awareness of deficits, reduced insight and problem solving. Swallowing:   Completed lunch_husband repots she ate 30%; nutrition has been a persistent concern for this patient since admission. He reports that she fed herself. Voice:        Response & Tolerance to Activities:     Pain:  Pain Scale 1: Visual  Pain Intensity 1: 5  Pain Location 1: Back  After treatment:   []       Patient left in no apparent distress sitting up in chair  []       Patient left in no apparent distress in bed  []       Call bell left within reach  []       Nursing notified  []       Caregiver present  []       Bed alarm activated    COMMUNICATION/COLLABORATION:   Patient was educated regarding Her deficit(s) of integrated language as this relates to Her diagnosis of BAYLEE. She demonstrated Fair understanding as evidenced by discussionn.  present and understood.  .    The patients plan of care was discussed with: none    Eulas Cabot, SLP  Time Calculation: 10 mins

## 2018-12-17 NOTE — DISCHARGE SUMMARY
Hospitalist Discharge Summary     Patient ID:    Ione Goodell  [id number]  61 y.o.  1955    Admit date: 11/30/2018    Discharge date and time: 12/17/2018    Admission Diagnoses: Metabolic acidosis [V77.5]    Chronic Diagnoses:    Problem List as of 12/17/2018 Date Reviewed: 11/30/2018          Codes Class Noted - Resolved    Metabolic acidosis MTO-92-FX: E87.2  ICD-9-CM: 276.2  11/30/2018 - Present        * (Principal) BAYLEE (acute kidney injury) ([facility] ICD-10-CM: N17.9  ICD-9-CM: 584.9  11/30/2018 - Present        HTN (hypertension) ICD-10-CM: I10  ICD-9-CM: 401.9  Unknown - Present        Chronic back pain ICD-10-CM: M54.9, G89.29  ICD-9-CM: 724.5, 338.29  Unknown - Present        Diabetes mellitus, type 2 ([facility] ICD-10-CM: E11.9  ICD-9-CM: 250.00  Unknown - Present        Leukocytosis ICD-10-CM: D72.829  ICD-9-CM: 288.60  11/30/2018 - Present        Right elbow pain ICD-10-CM: M25.521  ICD-9-CM: 719.42  11/30/2018 - Present        Hypotension ICD-10-CM: I95.9  ICD-9-CM: 458.9  11/30/2018 - Present              Discharge Medications:   Current Discharge Medication List      START taking these medications    Details   insulin glargine (LANTUS) 100 unit/mL injection 10 Units by SubCUTAneous route daily. Qty: 1 Vial, Refills: 0      chlorthalidone (HYGROTEN) 25 mg tablet Take 1 Tab by mouth daily. Qty: 30 Tab, Refills: 0         CONTINUE these medications which have CHANGED    Details   atenolol (TENORMIN) 50 mg tablet Take 1 Tab by mouth daily. Qty: 30 Tab, Refills: 0      oxyCODONE IR (ROXICODONE) 5 mg immediate release tablet Take 1 Tab by mouth every six (6) hours as needed (breakthrough pain). Max Daily Amount: 20 mg.  Qty: 10 Tab, Refills: 0    Associated Diagnoses: Right elbow pain         CONTINUE these medications which have NOT CHANGED    Details   levothyroxine (SYNTHROID) 125 mcg tablet Take 125 mcg by mouth Daily (before breakfast). sertraline (ZOLOFT) 100 mg tablet Take 150 mg by mouth nightly. simvastatin (ZOCOR) 40 mg tablet Take 40 mg by mouth nightly. aspirin delayed-release 81 mg tablet Take 81 mg by mouth two (2) times a day. STOP taking these medications       cyclobenzaprine (FLEXERIL) 10 mg tablet Comments:   Reason for Stopping:         fenofibrate (LOFIBRA) 160 mg tablet Comments:   Reason for Stopping:         gabapentin (NEURONTIN) 300 mg capsule Comments:   Reason for Stopping:         insulin glargine (LANTUS,BASAGLAR) 100 unit/mL (3 mL) inpn Comments:   Reason for Stopping:         lisinopril-hydroCHLOROthiazide (PRINZIDE, ZESTORETIC) 20-12.5 mg per tablet Comments:   Reason for Stopping:         metFORMIN (GLUCOPHAGE) 500 mg tablet Comments:   Reason for Stopping:         niacin ER (NIASPAN) 500 mg tablet Comments:   Reason for Stopping:         omega 3-DHA-EPA-fish oil 1,000 mg (120 mg-180 mg) capsule Comments:   Reason for Stopping:         oxyCODONE ER (XTAMPZA ER) 18 mg ER capsule Comments:   Reason for Stopping:         predniSONE (DELTASONE) 20 mg tablet Comments:   Reason for Stopping:         triamcinolone acetonide (KENALOG) 0.1 % topical cream Comments:   Reason for Stopping:         multivit-min-FA-lycopen-lutein (CENTRUM SILVER) 0.4-300-250 mg-mcg-mcg tab Comments:   Reason for Stopping:         OTHER,NON-FORMULARY, Comments:   Reason for Stopping:         ibuprofen (MOTRIN) 200 mg tablet Comments:   Reason for Stopping:         ubidecarenone (COENZYME Q10, BULK,) Comments:   Reason for Stopping: Follow up Care:    1. Srinath Arthur MD in 1-2 weeks  2. Orthopedics, in 2 weeks     Diet:  Diabetic Diet    Disposition:  Home. Advanced Directive:    Discharge Exam:  See today's note.     CONSULTATIONS: ID, Nephrology, Neurology, Orthopedic Surgery and Urology    Significant Diagnostic Studies:   Recent Labs     12/15/18  0109   WBC 11.1*   HGB 8.2*   HCT 26.0*   *     Recent Labs     12/15/18  0109      K 3.6      CO2 26 BUN 22*   CREA 0.84   *   CA 8.9     No results for input(s): SGOT, GPT, ALT, AP, TBIL, TBILI, TP, ALB, GLOB, GGT, AML, LPSE in the last 72 hours. No lab exists for component: AMYP, HLPSE  No results for input(s): INR, PTP, APTT in the last 72 hours. No lab exists for component: INREXT   No results for input(s): FE, TIBC, PSAT, FERR in the last 72 hours. No results for input(s): PH, PCO2, PO2 in the last 72 hours. No results for input(s): CPK, CKMB in the last 72 hours. No lab exists for component: TROPONINI  Lab Results   Component Value Date/Time    Glucose (POC) 207 (H) 12/17/2018 11:38 AM    Glucose (POC) 137 (H) 12/17/2018 06:35 AM    Glucose (POC) 221 (H) 12/16/2018 08:48 PM    Glucose (POC) 190 (H) 12/16/2018 04:46 PM    Glucose (POC) 211 (H) 12/16/2018 12:14 PM             HOSPITAL COURSE & TREATMENT RENDERED:   1.  C-spine discitis: MRI showed evidence of discitis at C5-C6 and C6-C7, with abnormal enhancement of the vertebral bodies. There is enhancing phlegmon in the epidural space. No drainable abscess. Evaluated by ortho and ID. Needs 6 weeks of IV ABx. Currently on meropenem     2. Acute metabolic encephalopathy: unclear etiology. ? GBS meningitis, ?medications and relation to the diskitis. Head CT, MRI brain without acute process. TSH, B12/folate, ammonia WNL s/p LP. HSV neg. EEG negative for seizure activity, RPR negative, West Nile negative, HIV negative, Lyme IgG positive but IgM negative. Slowly improving clinically      3. Hemoptysis vs epistaxis: sudden onset 12/7. Underwent bronchoscopy which showed large amount of blood in the trachea, however past paula only scants amount of blood in RLL. Some thick yellow/white secretions in LLL and RLL. All lobes/subsegments inspected with no signs of bleeding. Evaluated by ENT. Stable      4. Severe sepsis/bacteremia: Blood Cx + 4/4 bottles with GBS on admission, repeat blood Cx NGTD.  With R arm/elbow cellulitis and now evidence of discitis. No e/o valvular veg on TTE  Continue meropenem as per ID      5.  R elbow cellulitis/bursitis/acute pain: s/p joint aspiration by IR on 12/6, cultures negative to date. On meropenem. 6.  ARF: likely from ATN/sepsis, hypovolemia. Resolved. 7.  AG metabolic acidosis: resolved. Due to sepsis     8. DM type 2 w/ hypoglycemia: A1C 7.2%. On lantus. 9.  HTN: on atenolol, lisinopril      10. Anemia: Fe studies consistent with chronic dz. Monitor      11. Urinary retention. Continue Shankar catheter( re inserted after voiding trial). Follow up with [facility] for voiding trail and urodynamic study.      12. RT arm weakness. Appreciated neurology evaluation. Repeat MRI of the brain and C spine without any change from previous imaging.  Continue PT/OT        Discharged in stable condition       Signed:  Daphnie Howell MD  12/17/2018  3:17 PM

## 2018-12-17 NOTE — PROGRESS NOTES
[address]  YOB: 1955          Assessment & Plan:   HTN  · Not under control  · Back on PO BB,ACE,Clonidine patch,Chlorthalidone  · Eval for 2 HTN underway  · Cortisol ok  · Renin,brenda pending  · Add Aldactone: monitor lytes    ARF due to IVVD/sepsis  · Creat better    AMS  ANEMIA       Subjective:   CC: f/u ARF  HPI:BAYLEE resolved  HTN: Not under control,on 4 meds  On insulin  AMS persists  ROS: Unable to obtain due to AMS  Current Facility-Administered Medications   Medication Dose Route Frequency    atenolol (TENORMIN) tablet 50 mg  50 mg Oral DAILY    chlorthalidone (HYGROTEN) tablet 25 mg  25 mg Oral DAILY    oxyCODONE IR (ROXICODONE) tablet 5 mg  5 mg Oral Q4H PRN    meropenem (MERREM) 2 g in 0.9% sodium chloride 100 mL IVPB  2 g IntraVENous Q8H    lisinopril (PRINIVIL, ZESTRIL) tablet 40 mg  40 mg Oral DAILY    levothyroxine (SYNTHROID) tablet 125 mcg  125 mcg Oral ACB    insulin lispro (HUMALOG) injection   SubCUTAneous AC&HS    HYDROmorphone (PF) (DILAUDID) injection 0.5 mg  0.5 mg IntraVENous Q3H PRN    pantoprazole (PROTONIX) 40 mg in sodium chloride 0.9% 10 mL injection  40 mg IntraVENous DAILY    albuterol (PROVENTIL VENTOLIN) nebulizer solution 2.5 mg  2.5 mg Nebulization Q4H PRN    insulin glargine (LANTUS) injection 10 Units  10 Units SubCUTAneous DAILY    bisacodyl (DULCOLAX) suppository 10 mg  10 mg Rectal DAILY    influenza vaccine 2018-19 (6 mos+)(PF) (FLUARIX QUAD/FLULAVAL QUAD) injection 0.5 mL  0.5 mL IntraMUSCular PRIOR TO DISCHARGE    labetalol (NORMODYNE;TRANDATE) injection 10 mg  10 mg IntraVENous Q4H PRN    hydrALAZINE (APRESOLINE) 20 mg/mL injection 20 mg  20 mg IntraVENous Q6H PRN    alteplase (CATHFLO) 1 mg in sterile water (preservative free) 1 mL injection  1 mg InterCATHeter PRN    sodium chloride (NS) flush 5-10 mL  5-10 mL IntraVENous Q8H    sodium chloride (NS) flush 5-10 mL 5-10 mL IntraVENous PRN    acetaminophen (TYLENOL) tablet 650 mg  650 mg Oral Q4H PRN    ondansetron (ZOFRAN) injection 4 mg  4 mg IntraVENous Q4H PRN    glucose chewable tablet 16 g  4 Tab Oral PRN    dextrose (D50W) injection syrg 12.5-25 g  25-50 mL IntraVENous PRN    glucagon (GLUCAGEN) injection 1 mg  1 mg IntraMUSCular PRN          Objective:     Vitals:  Blood pressure 162/85, pulse 83, temperature 98.9 °F (37.2 °C), resp. rate 18, height 5' 1\" (1.549 m), weight 88.4 kg (194 lb 14.4 oz), SpO2 96 %. Temp (24hrs), Av.4 °F (36.9 °C), Min:97.5 °F (36.4 °C), Max:99.7 °F (37.6 °C)      Intake and Output:  No intake/output data recorded. 12/15 1901 -  0700  In: 7034 [P.O.:950; I.V.:440]  Out:  [Urine:]    Physical Exam:               GENERAL ASSESSMENT: AMS  CHEST: CTA  HEART: S1S2  ABDOMEN: Soft,NT  EXTREMITY: no EDEMA          ECG/rhythm:    Data Review      No results for input(s): TNIPOC in the last 72 hours. No lab exists for component: ITNL   No results for input(s): CPK, CKMB, TROIQ in the last 72 hours. Recent Labs     12/15/18  0109      K 3.6      CO2 26   BUN 22*   CREA 0.84   *   CA 8.9   WBC 11.1*   HGB 8.2*   HCT 26.0*   *      No results for input(s): INR, PTP, APTT in the last 72 hours. No lab exists for component: INREXT, INREXT  Needs: urine analysis, urine sodium, protein and creatinine  Lab Results   Component Value Date/Time    Sodium,urine random 29 2018 06:10 PM    Creatinine, urine 187.11 2018 06:10 PM           : Chaya Mota MD  2018        [facility]:  [email or web address]  [facility] office:  [address] [place] [address], [address] [place] [address] [address]  [place], [address] [facility]  Phone: [phone]  Fax :     [phone]    [facility] office:  [address] [facility], [address]  Phone - [phone]  Fax - [phone]

## 2018-12-17 NOTE — PROGRESS NOTES
Requesting Provider: Bo Barrow MD  Reason for Consultation: retention  Pre-existing [facility] Patient:   No                Patient: Willie Ho MRN: [id number]  SSN: [id number]    YOB: 1955  Age: 61 y.o. Sex: female     Location: 327/01       Code Status: Full Code   PCP: Porsche Edwards MD  - [phone]   Emergency Contact:  Primary Emergency Contact: Arlene Roman, Home Phone: [phone]   Race/Adventism/Language: [facility] / [facility] / Tami Anjana: Payor: Lucero Cabrera / Plan: Jacqueline Larsen HMO / Product Type: HMO /    Prior Admission Data:         Hospitalized:  Hospital Day: 18 - Admitted 11/30/2018 11:13 AM   POD # 10 Days Post-Op Procedure(s):  BRONCHOSCOPY  BRONCHIAL WASHINGS FLEXIBLE by Landry Hernández MD - Blood Loss: * No values recorded between 12/7/2018  1:09 PM and 12/7/2018  1:20 PM * 0 Hr 11 Min 0 Sec     CONSULTANTS  IP CONSULT TO ORTHOPEDIC SURGERY  IP CONSULT TO ORTHOPEDIC SURGERY  IP CONSULT TO NEPHROLOGY  IP CONSULT TO INFECTIOUS DISEASES  IP CONSULT TO NEUROLOGY  IP CONSULT TO OTOLARYNGOLOGY  IP CONSULT TO NEUROLOGY  IP CONSULT TO UROLOGY  IP CONSULT TO PULMONOLOGY   ADMISSION DIAGNOSES    ICD-10-CM ICD-9-CM   1. High anion gap metabolic acidosis U60.6 021.0   2. [facility] N17.9 584.9   3. Altered mental status, unspecified altered mental status type R41.82 780.97   4. Foot drop, bilateral M21.371 736.79    M21.372    5. Encephalopathy G93.40 348.30   6. Upper limb weakness R29.898 729.89   7. Right elbow pain M25.521 719.42         Assessment/Plan:     · Retention due to dibilitation and ? Neurogenic bladder  · To snf with joyce, VT in 2-3 weeksn continue tamsulosin     CC: Low Blood Sugar   HPI: She is a 61 y.o. female admitted with encephalopathy due to cellulitis and diskitis. On abx    Has had joyce x 3 weeks. Failed VT x 3. Last on 12/15. tamsulosin started today.  provides the history.  PTA she had leakage and wore 1-2 pads a day and otherwise had no voiding complaints. Was also ambulatory without difficulty. Was not able to ambulate at time of admission.     I am asked to see for above      Temp (24hrs), Av.5 °F (36.9 °C), Min:97.5 °F (36.4 °C), Max:99.7 °F (37.6 °C)    Creatinine   Date/Time Value Ref Range Status   12/15/2018 01:09 AM 0.84 0.55 - 1.02 MG/DL Final   2018 06:24 AM 0.84 0.55 - 1.02 MG/DL Final   2018 03:38 AM 0.89 0.55 - 1.02 MG/DL Final   2018 04:12 PM 0.93 0.55 - 1.02 MG/DL Final   12/10/2018 03:52 AM 1.17 (H) 0.55 - 1.02 MG/DL Final     Current Antimicrobial Therapy (168h ago, onward)    Ordered     Start Stop    18 0847  meropenem (MERREM) 2 g in 0.9% sodium chloride 100 mL IVPB  2 g,   IntraVENous,   EVERY 8 HOURS     Comments:  Need dosing for meningitis/epidural abscess, renally dosed    18 1000 --        Diet: DIET NUTRITIONAL SUPPLEMENTS Breakfast, Dinner; Glucerna Shake  DIET DIABETIC CONSISTENT CARB Mechanical Soft - % Diet Eaten: 20 %  Urinary Status: Shankar     Labs     Lab Results   Component Value Date/Time    Lactic acid 0.9 2018 02:24 AM    WBC 11.1 (H) 12/15/2018 01:09 AM    HCT 26.0 (L) 12/15/2018 01:09 AM    PLATELET 255 (H)  01:09 AM    Sodium 143 12/15/2018 01:09 AM    Potassium 3.6 12/15/2018 01:09 AM    Chloride 108 12/15/2018 01:09 AM    CO2 26 12/15/2018 01:09 AM    BUN 22 (H) 12/15/2018 01:09 AM    Creatinine 0.84 12/15/2018 01:09 AM    Glucose 153 (H) 12/15/2018 01:09 AM    Calcium 8.9 12/15/2018 01:09 AM    Magnesium 1.7 2018 06:24 AM    INR 1.3 (H) 2018 04:59 AM     UA:   Lab Results   Component Value Date/Time    Color YELLOW/STRAW 12/15/2018 04:00 AM    Appearance CLEAR 12/15/2018 04:00 AM    Specific gravity 1.016 12/15/2018 04:00 AM    pH (UA) 6.0 12/15/2018 04:00 AM    Protein 100 (A) 12/15/2018 04:00 AM    Glucose 100 (A) 12/15/2018 04:00 AM    Ketone NEGATIVE  12/15/2018 04:00 AM    Bilirubin NEGATIVE  12/15/2018 04:00 AM    Urobilinogen 0.2 12/15/2018 04:00 AM    Nitrites NEGATIVE  12/15/2018 04:00 AM    Leukocyte Esterase NEGATIVE  12/15/2018 04:00 AM    Epithelial cells FEW 12/15/2018 04:00 AM    Bacteria NEGATIVE  12/15/2018 04:00 AM    WBC 0-4 12/15/2018 04:00 AM    RBC 0-5 12/15/2018 04:00 AM     Imaging     Results for orders placed during the hospital encounter of 11/30/18   CT HEAD WO CONT    Narrative EXAM:  CT HEAD WO CONT  Clinical history: Worsening confusion  INDICATION:   worsening confusion    COMPARISON: None. CONTRAST:  None. TECHNIQUE: Unenhanced CT of the head was performed using 5 mm images. Brain and  bone windows were generated. CT dose reduction was achieved through use of a  standardized protocol tailored for this examination and automatic exposure  control for dose modulation. FINDINGS:  Minimal sulcal and ventricular prominence. . There is no intracranial hemorrhage,  extra-axial collection, mass, mass effect or midline shift. The basilar  cisterns are open. No acute infarct is identified. The bone windows demonstrate  no abnormalities. The visualized portions of the paranasal sinuses and mastoid  air cells are clear. Impression IMPRESSION:     No acute intracranial process. US Results (most recent):  Results from [place] encounter on 11/30/18   US GUIDE NDL ASP  DRAIN    Narrative INDICATION: Right elbow septic arthritis     TECHNIQUE: Risks and benefits of the procedure were discussed with the patient's  /POA. Written consent was obtained. Preliminary ultrasound imaging of the  right elbow again demonstrates a complex elbow joint effusion. The patient was  prepped and draped in sterile fashion. 1% lidocaine was injected locally. A  20-gauge needle was advanced into the joint space under direct ultrasound  visualization. A small amount of bloody fluid was aspirated. The needle was then  removed.  The patient tolerated the procedure well. Fluid specimen was  transmitted to pathology. Impression IMPRESSION: Successful ultrasound-guided aspiration of right elbow joint fluid. Cultures     All Micro Results     Procedure Component Value Units Date/Time    CULTURE LEGIONELLA [[id number]] Collected:  12/07/18 1330    Order Status:  Completed Specimen:  Other from Miscellaneous sample Updated:  12/17/18 1336     Source BRONCHIAL LAVAGE        Legionella Species Culture Final Report Below     Comment: (NOTE)  Performed At: [address] [id number]  Gian Wright MD YY:[id number]         Christine Mayberry [[id number]] Collected:  12/06/18 1509    Order Status:  Completed Specimen: Body fld Updated:  12/17/18 0907     Special Requests: NO SPECIAL REQUESTS        Culture result:       NO FUNGUS ISOLATED 11 DAYS          URINE CULTURE HOLD SAMPLE [[id number]] Collected:  12/15/18 0400    Order Status:  Completed Specimen:  Urine from Serum Updated:  12/15/18 0408     Urine culture hold       URINE ON HOLD IN MICROBIOLOGY DEPT FOR 3 DAYS. IF UNPRESERVED URINE IS SUBMITTED, IT CANNOT BE USED FOR ADDITIONAL TESTING AFTER 24 HRS, RECOLLECTION WILL BE REQUIRED. CULTURE, FUNGUS [[id number]]  (Abnormal) Collected:  12/07/18 1330    Order Status:  Completed Specimen:  Bronchial lavage Updated:  12/12/18 1129     Special Requests: NO SPECIAL REQUESTS        Culture result:       HEAVY CANDIDA PARAPSILOSIS                  CULTURE WILL BE HELD FOR 4 WEEKS. IF THERE IS ADDITIONAL FUNGAL GROWTH, A NEW REPORT WILL FOLLOW.           CULTURE, CSF Slava Lipschutz STAIN [[id number]] Collected:  12/04/18 1430    Order Status:  Completed Specimen:  Cerebrospinal Fluid Updated:  12/11/18 0919     Special Requests: NO SPECIAL REQUESTS        GRAM STAIN RARE WBCS SEEN         NO ORGANISMS SEEN        Culture result:       Culture performed on Unspun Fluid                  NO GROWTH ON SOLID MEDIA AT 4 DAYS                  NO GROWTH IN THIO BROTH AT 7 DAYS          AFB CULTURE & SMEAR W/REFLEX ID [[id number]] Collected:  12/07/18 1330    Order Status:  Completed Specimen:  Miscellaneous sample Updated:  12/10/18 0635     Source BRONCHIAL LAVAGE        AFB Specimen processing Concentration     Acid Fast Smear NEGATIVE         Comment: (NOTE)  Performed At: [address] [id number]  Verito French MD US:[id number]          Acid Fast Culture PENDING    CULTURE, SPUTUM/BRONCH/OTH [[id number]]  (Abnormal) Collected:  12/07/18 1330    Order Status:  Completed Specimen:  Sputum from Bronchial lavage Updated:  12/09/18 1015     Special Requests: NO SPECIAL REQUESTS        GRAM STAIN RARE WBCS SEEN               RARE EPITHELIAL CELLS SEEN            FEW BUDDING YEAST        Culture result:       SCANT NORMAL RESPIRATORY TRIXIE            MODERATE YEAST       CULTURE, ANAEROBIC [[id number]] Collected:  12/06/18 1509    Order Status:  Completed Specimen:  Aspirate Updated:  12/08/18 0917     Special Requests: NO SPECIAL REQUESTS        Culture result:       No growth thus far, holding 14 days. CULTURE, BODY FLUID Jessenia Frazier [[id number]] Collected:  12/06/18 1509    Order Status:  Completed Specimen:  Elbow Updated:  12/08/18 0917     Special Requests: RT ELBOW ASPIRATE     GRAM STAIN RARE WBCS SEEN         NO ORGANISMS SEEN        Culture result:       Culture performed on Fluid swab specimen                  No growth thus far, holding 14 days.           CULTURE, VIRAL [[id number]]     Order Status:  Canceled Specimen:  Other     CULTURE, BLOOD [[id number]] Collected:  12/01/18 1231    Order Status:  Completed Specimen:  Blood Updated:  12/07/18 0450     Special Requests: NO SPECIAL REQUESTS        Culture result: NO GROWTH 6 DAYS       CULTURE, BLOOD [[id number]] Collected:  12/01/18 1231    Order Status:  Completed Specimen:  Blood Updated:  12/07/18 0450     Special Requests: NO SPECIAL REQUESTS        Culture result: NO GROWTH 6 DAYS       CULTURE, Jorge Horowitz [[id number]] Collected:  12/06/18 1200    Order Status:  Canceled Specimen:  Wound from Elbow     MENINGITIS PATHOGENS PANEL, CSF (BY PCR) [[id number]] Collected:  12/04/18 1442    Order Status:  Completed Specimen:  Cerebrospinal Fluid Updated:  12/04/18 2132     Escherichia coli K1 NOT DETECTED        Haemophilus Influenzae NOT DETECTED        Listeria Monocytogenes NOT DETECTED        Neisseria Meningitidis NOT DETECTED        Streptococcus Agalactiae NOT DETECTED        Streptococcus Pneumoniae NOT DETECTED        Cytomegalovirus NOT DETECTED        Enterovirus NOT DETECTED        Herpes Simplex Virus 1 NOT DETECTED        Herpes Simplex Virus 2 NOT DETECTED        Human Herpesvirus 6 NOT DETECTED        Human Parechovirus NOT DETECTED        Varicella Zoster Virus NOT DETECTED        Crypto. neoformans/gattii NOT DETECTED       CULTURE, CSF  TUBE 2 [[id number]] Collected:  12/04/18 1425    Order Status:  Canceled Specimen:  Cerebrospinal Fluid Updated:  12/04/18 1448    CULTURE, BLOOD [[id number]]  (Abnormal) Collected:  11/30/18 1230    Order Status:  Completed Specimen:  Blood Updated:  12/03/18 0927     Special Requests: NO SPECIAL REQUESTS        Culture result:       STREPTOCOCCI, BETA HEMOLYTIC GROUP B GROWING IN BOTH BOTTLES DRAWN SITE=LT HAND PLEASE REFER TO Isaías Quezada [id number] FOR SENSITIVITIES                  PRELIMINARY REPORT OF GRAM POSITIVE COCCI IN PAIRS AND CHAINS GROWING IN BOTH BOTTLES DRAWN CALLED TO AND READ BACK BY DR SUAZO ON 12/1/18 AT 1033. SH/AOW          CULTURE, BLOOD [[id number]]  (Abnormal)  (Susceptibility) Collected:  11/30/18 1210    Order Status:  Completed Specimen:  Blood Updated:  12/03/18 0926     Special Requests: NO SPECIAL REQUESTS        Culture result:       STREPTOCOCCI, BETA HEMOLYTIC GROUP B GROWING IN BOTH BOTTLES DRAWN SITE=LAC (SENSITIVITIES PERFORMED BY E-TEST)                  PRELIMINARY REPORT OF GRAM POSITIVE COCCI IN PAIRS AND CHAINS GROWING IN BOTH BOTTLES DRAWN CALLED TO AND READ BACK BY DR SUAZO ON 12/1/18 AT 1033. SH/AOW                  STAPHYLOCOCCUS SPECIES, COAGULASE NEGATIVE GROWING IN ONLY 1 OF THE ABOVE BOTTLES PLATES WILL BE HELD 3 DAYS. CALL [phone] IF SENSITIVITIES ARE NEEDED. RESPIRATORY PANEL,PCR,NASOPHARYNGEAL [[id number]] Collected:  11/30/18 1810    Order Status:  Completed Specimen:  Nasopharyngeal Updated:  11/30/18 2146     Adenovirus NOT DETECTED        Coronavirus 229E NOT DETECTED        Coronavirus HKU1 NOT DETECTED        Coronavirus CVNL63 NOT DETECTED        Coronavirus OC43 NOT DETECTED        Metapneumovirus NOT DETECTED        Rhinovirus and Enterovirus NOT DETECTED        Influenza A NOT DETECTED        Influenza A, subtype H1 NOT DETECTED        Influenza A, subtype H3 NOT DETECTED        INFLUENZA A H1N1 PCR NOT DETECTED        Influenza B NOT DETECTED        Parainfluenza 1 NOT DETECTED        Parainfluenza 2 NOT DETECTED        Parainfluenza 3 NOT DETECTED        Parainfluenza virus 4 NOT DETECTED        RSV by PCR NOT DETECTED        Bordetella pertussis - PCR NOT DETECTED        Chlamydophila pneumoniae DNA, QL, PCR NOT DETECTED        Mycoplasma pneumoniae DNA, QL, PCR NOT DETECTED       URINE CULTURE HOLD SAMPLE [[id number]] Collected:  11/30/18 1241    Order Status:  Completed Specimen:  Serum Updated:  11/30/18 1257     Urine culture hold       URINE ON HOLD IN MICROBIOLOGY DEPT FOR 3 DAYS. IF UNPRESERVED URINE IS SUBMITTED, IT CANNOT BE USED FOR ADDITIONAL TESTING AFTER 24 HRS, RECOLLECTION WILL BE REQUIRED.                  Past History: (Complete 2+/3 areas)   No Known Allergies   Current Facility-Administered Medications   Medication Dose Route Frequency    atenolol (TENORMIN) tablet 50 mg  50 mg Oral DAILY    chlorthalidone (HYGROTEN) tablet 25 mg  25 mg Oral DAILY    oxyCODONE IR (ROXICODONE) tablet 5 mg  5 mg Oral Q4H PRN    meropenem (MERREM) 2 g in 0.9% sodium chloride 100 mL IVPB  2 g IntraVENous Q8H    lisinopril (PRINIVIL, ZESTRIL) tablet 40 mg  40 mg Oral DAILY    levothyroxine (SYNTHROID) tablet 125 mcg  125 mcg Oral ACB    insulin lispro (HUMALOG) injection   SubCUTAneous AC&HS    HYDROmorphone (PF) (DILAUDID) injection 0.5 mg  0.5 mg IntraVENous Q3H PRN    albuterol (PROVENTIL VENTOLIN) nebulizer solution 2.5 mg  2.5 mg Nebulization Q4H PRN    insulin glargine (LANTUS) injection 10 Units  10 Units SubCUTAneous DAILY    bisacodyl (DULCOLAX) suppository 10 mg  10 mg Rectal DAILY    influenza vaccine 2018-19 (6 mos+)(PF) (FLUARIX QUAD/FLULAVAL QUAD) injection 0.5 mL  0.5 mL IntraMUSCular PRIOR TO DISCHARGE    labetalol (NORMODYNE;TRANDATE) injection 10 mg  10 mg IntraVENous Q4H PRN    hydrALAZINE (APRESOLINE) 20 mg/mL injection 20 mg  20 mg IntraVENous Q6H PRN    alteplase (CATHFLO) 1 mg in sterile water (preservative free) 1 mL injection  1 mg InterCATHeter PRN    sodium chloride (NS) flush 5-10 mL  5-10 mL IntraVENous Q8H    sodium chloride (NS) flush 5-10 mL  5-10 mL IntraVENous PRN    acetaminophen (TYLENOL) tablet 650 mg  650 mg Oral Q4H PRN    ondansetron (ZOFRAN) injection 4 mg  4 mg IntraVENous Q4H PRN    glucose chewable tablet 16 g  4 Tab Oral PRN    dextrose (D50W) injection syrg 12.5-25 g  25-50 mL IntraVENous PRN    glucagon (GLUCAGEN) injection 1 mg  1 mg IntraMUSCular PRN    Prior to Admission medications    Medication Sig Start Date End Date Taking? Authorizing Provider   atenolol (TENORMIN) 50 mg tablet Take 1 Tab by mouth daily. 12/18/18  Yes Sarah Garduno MD   insulin glargine (LANTUS) 100 unit/mL injection 10 Units by SubCUTAneous route daily. 12/17/18  Yes Sarah Garduno MD   chlorthalidone (HYGROTEN) 25 mg tablet Take 1 Tab by mouth daily. 12/18/18  Yes Sarah Garduno MD   oxyCODONE IR (ROXICODONE) 5 mg immediate release tablet Take 1 Tab by mouth every six (6) hours as needed (breakthrough pain). Max Daily Amount: 20 mg. 12/17/18  Yes Diamond Ray MD   tamsulosin (FLOMAX) 0.4 mg capsule Take 1 Cap by mouth daily. 12/17/18  Yes Diamond Ray MD   acetaminophen (TYLENOL) 325 mg tablet Take 2 Tabs by mouth every six (6) hours as needed for Pain. 12/17/18  Yes Diamond Ray MD   atenolol (TENORMIN) 25 mg tablet Take 25 mg by mouth daily. Yes Provider, Historical   cyclobenzaprine (FLEXERIL) 10 mg tablet Take 10 mg by mouth daily as needed for Muscle Spasm(s). Yes Provider, Historical   fenofibrate (LOFIBRA) 160 mg tablet Take 160 mg by mouth nightly. Yes Provider, Historical   gabapentin (NEURONTIN) 300 mg capsule Take 300 mg by mouth three (3) times daily. Yes Provider, Historical   insulin glargine (LANTUS,BASAGLAR) 100 unit/mL (3 mL) inpn 75 Units by SubCUTAneous route nightly. Yes Provider, Historical   levothyroxine (SYNTHROID) 125 mcg tablet Take 125 mcg by mouth Daily (before breakfast). Yes Provider, Historical   lisinopril-hydroCHLOROthiazide (PRINZIDE, ZESTORETIC) 20-12.5 mg per tablet Take 2 Tabs by mouth daily. Yes Provider, Historical   metFORMIN (GLUCOPHAGE) 500 mg tablet Take 500 mg by mouth two (2) times daily (with meals). Yes Provider, Historical   niacin ER (NIASPAN) 500 mg tablet Take 500 mg by mouth nightly. Yes Provider, Historical   omega 3-DHA-EPA-fish oil 1,000 mg (120 mg-180 mg) capsule Take 2 Caps by mouth two (2) times a day. Yes Provider, Historical   oxyCODONE ER (XTAMPZA ER) 18 mg ER capsule Take 18 mg by mouth every twelve (12) hours. Yes Provider, Historical   sertraline (ZOLOFT) 100 mg tablet Take 150 mg by mouth nightly. Yes Provider, Historical   simvastatin (ZOCOR) 40 mg tablet Take 40 mg by mouth nightly. Yes Provider, Historical   triamcinolone acetonide (KENALOG) 0.1 % topical cream Apply  to affected area two (2) times a day.  use thin layer   Yes Provider, Historical   multivit-min-FA-lycopen-lutein (CENTRUM SILVER) 0.4-300-250 mg-mcg-mcg tab Take 1 Tab by mouth daily. Yes Provider, Historical   OTHER,NON-FORMULARY, Take 1 Tab by mouth two (2) times a day. Osteo Biflex   Yes Provider, Historical   aspirin delayed-release 81 mg tablet Take 81 mg by mouth two (2) times a day. Yes Provider, Historical   ibuprofen (MOTRIN) 200 mg tablet Take 400 mg by mouth two (2) times a day. Yes Provider, Historical   ubidecarenone (COENZYME Q10, BULK,) Take 1 Tab by mouth daily. Yes Provider, Historical        PMHx:  has a past medical history of Chronic back pain, Diabetes mellitus, type 2 ([address] History of vascular access device, and HTN (hypertension). PSurgHx:  has a past surgical history that includes BRONCHOSCOPY (N/A, 12/7/2018) and BRONCHIAL WASHINGS FLEXIBLE (Bilateral, 12/7/2018). PSocHx:  reports that  has never smoked. she has never used smokeless tobacco. She reports that she does not drink alcohol.    ROS:  (Complete - 10 systems) - positives are bolded : Weightloss (Constitutional), Dry mouth (ENMT), Chest pain (CV), SOB (Respiratory), Constipation (GI), Weakness (MS), Pallor (Skin), TIA Sx (Neuro), Confusion (Psych), Easy bruising (Heme)    Physical Exam: (Comprehesive - 8+ 1995 Systems)     (1) Constitutional:  FIO2: FIO2 (%): 40 % on SpO2: O2 Sat (%): 93 %  O2 Device: Nasal cannula O2 Flow Rate (L/min): 1 l/min  Patient Vitals for the past 24 hrs:   BP Temp Pulse Resp SpO2 Weight   12/17/18 1503   77      12/17/18 1058 160/73 99 °F (37.2 °C) 84 16 93 %    12/17/18 0725 162/85 98.9 °F (37.2 °C) 83 18 96 %    12/17/18 0702   85      12/17/18 0303 165/84 98 °F (36.7 °C) 82 19 94 % 88.4 kg (194 lb 14.4 oz)   12/16/18 2311 160/87 98.1 °F (36.7 °C) 80 18 95 %    12/16/18 2300   84      12/16/18 2007 152/85 97.5 °F (36.4 °C) 89 17 95 %    12/16/18 1603 183/90 99.7 °F (37.6 °C) 89 18 90 %        Date 12/16/18 0700 - 12/17/18 0659 12/17/18 0700 - 12/18/18 0659   Shift 7556-4594 1892-3775 24 Hour Total 9486-5821 6831-5636 24 Hour Total   INTAKE   P.O. 350 300 650        P. O. 350 300 650      I. V.(mL/kg/hr)  330(0.3) 330(0.2)        I.V.  30 30        Volume (meropenem (MERREM) 2 g in 0.9% sodium chloride 100 mL IVPB)  300 300      Shift Total(mL/kg) 350(3.9) 630(7.1) 980(11.1)      OUTPUT   Urine(mL/kg/hr) 1250(1.2) 800(0.8) 2050(1)        Urine Voided 300  300        Urine Output (mL) (Urinary Catheter 12/15/18 Shankar)       Stool           Stool Occurrence(s)  3 x 3 x      Shift Total(mL/kg) 1250(14) 800(9) 2887(33.9)      NET -900 170 -8999      Weight (kg) 89.4 88.4 88.4 88.4 88.4 88.4      (2) ENMT:   moist mucous membranes, pt sleeping   (3) Respiratory:  breathing easily   (4) GI:  no abdominal masses, tenderness, no hepatosplenomegaly, no ventral hernia, stool for occult blood not indicated as urologist.   (5) :   Shankar draining clear urine   (6) Lymphatic:  no adenopathy   (7) Muscloskeletal:  no gross deformity   (8) Skin:  no rash   (9) Neuro:  sleeping      Signed By: Glo Rebollar MD  - December 17, 2018

## 2018-12-17 NOTE — PROGRESS NOTES
[facility] Progress Note           Kandi Rodriguez DO    [phone] Office  [phone]  Fax    2018      Assessment & Plan:   1. Right elbow cellulitis complicated by GBS bacteremia. Repeat BC's NGSF. Orthopedic surgery is following. S/p joint aspiration by IR . Cultures sterile at final reading. .   Continue meropenem. 2. Encephalopathy. MS improving. 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 Traumatic tap with  abnormal.LP results. Meningitis pathogens panel negative. CSF culture NGSF. Lyme CSF and WNV negative. CSF fluid cytology unremarkable. HSV 1&2 PCR negative. MRI brain negative. Treat empirically for GBS meningitis  3. Cervical spine diskitis. Abx as above. Needs 6 weeks IV abx. IV abx orders in chart  4. Thrombocytosis. Follow  5. Acute kidney injury. Resolved    6. Diabetes mellitus. Management per primary team.  7. Rash prior to admission. Resolved. Pictures reviewed with patients . Possible cellulitis? Rash not c/w HSV/VZV. 8.   CoNS isolated from a single blood culture. This represents a contaminant. Subjective:     Mental status continues to improve    Objective:     Vitals:   Visit Vitals  /73 (BP 1 Location: Left arm, BP Patient Position: At rest)   Pulse 84   Temp 99 °F (37.2 °C)   Resp 16   Ht 5' 1\" (1.549 m)   Wt 88.4 kg (194 lb 14.4 oz)   SpO2 93%   BMI 36.83 kg/m²        Tmax:  Temp (24hrs), Av.5 °F (36.9 °C), Min:97.5 °F (36.4 °C), Max:99.7 °F (37.6 °C)      Exam:   Patient is intubated:      Physical Examination:   General:  Awake NAD    Head:  Normocephalic, atraumatic. Eyes:  Conjunctivae clear   Neck: Supple       Lungs:   Clear to auscultation bilaterally.    Chest wall:     Heart:  Regular rate and rhythm   Abdomen:      Extremities: Edema   Skin:  No rash   Neurologic: CN II-XII GI     Labs:        No lab exists for component: ITNL   No results for input(s): CPK, CKMB, TROIQ in the last 72 hours. Recent Labs     12/15/18  0109      K 3.6      CO2 26   BUN 22*   CREA 0.84   *   WBC 11.1*   HGB 8.2*   HCT 26.0*   *     No results for input(s): INR, PTP, APTT in the last 72 hours. No lab exists for component: INREXT, INREXT  Needs: urine analysis, urine sodium, protein and creatinine  Lab Results   Component Value Date/Time    Sodium,urine random 29 11/30/2018 06:10 PM    Creatinine, urine 187.11 11/30/2018 06:10 PM         Cultures:     No results found for: SDES  Lab Results   Component Value Date/Time    Culture result: SCANT NORMAL RESPIRATORY TRIXIE 12/07/2018 01:30 PM    Culture result: MODERATE YEAST (A) 12/07/2018 01:30 PM    Culture result: HEAVY CANDIDA PARAPSILOSIS (A) 12/07/2018 01:30 PM    Culture result:  12/07/2018 01:30 PM     CULTURE WILL BE HELD FOR 4 WEEKS. IF THERE IS ADDITIONAL FUNGAL GROWTH, A NEW REPORT WILL FOLLOW.        Radiology:     Medications       Current Facility-Administered Medications   Medication Dose Route Frequency Last Dose    atenolol (TENORMIN) tablet 50 mg  50 mg Oral DAILY 50 mg at 12/17/18 0855    chlorthalidone (HYGROTEN) tablet 25 mg  25 mg Oral DAILY 25 mg at 12/17/18 0854    oxyCODONE IR (ROXICODONE) tablet 5 mg  5 mg Oral Q4H PRN 5 mg at 12/17/18 0906    meropenem (MERREM) 2 g in 0.9% sodium chloride 100 mL IVPB  2 g IntraVENous Q8H 2 g at 12/17/18 1000    lisinopril (PRINIVIL, ZESTRIL) tablet 40 mg  40 mg Oral DAILY 40 mg at 12/17/18 0855    levothyroxine (SYNTHROID) tablet 125 mcg  125 mcg Oral  mcg at 12/17/18 0641    insulin lispro (HUMALOG) injection   SubCUTAneous AC&HS Stopped at 12/17/18 0730    HYDROmorphone (PF) (DILAUDID) injection 0.5 mg  0.5 mg IntraVENous Q3H PRN 0.5 mg at 12/15/18 0228    pantoprazole (PROTONIX) 40 mg in sodium chloride 0.9% 10 mL injection  40 mg IntraVENous DAILY 40 mg at 12/17/18 0854    albuterol (PROVENTIL VENTOLIN) nebulizer solution 2.5 mg  2.5 mg Nebulization Q4H PRN 2.5 mg at 12/10/18 1539    insulin glargine (LANTUS) injection 10 Units  10 Units SubCUTAneous DAILY 10 Units at 12/17/18 0855    bisacodyl (DULCOLAX) suppository 10 mg  10 mg Rectal DAILY 10 mg at 12/16/18 1242    influenza vaccine 2018-19 (6 mos+)(PF) (FLUARIX QUAD/FLULAVAL QUAD) injection 0.5 mL  0.5 mL IntraMUSCular PRIOR TO DISCHARGE      labetalol (NORMODYNE;TRANDATE) injection 10 mg  10 mg IntraVENous Q4H PRN 10 mg at 12/12/18 0501    hydrALAZINE (APRESOLINE) 20 mg/mL injection 20 mg  20 mg IntraVENous Q6H PRN 20 mg at 12/15/18 0026    alteplase (CATHFLO) 1 mg in sterile water (preservative free) 1 mL injection  1 mg InterCATHeter PRN      sodium chloride (NS) flush 5-10 mL  5-10 mL IntraVENous Q8H 10 mL at 12/17/18 0641    sodium chloride (NS) flush 5-10 mL  5-10 mL IntraVENous PRN      acetaminophen (TYLENOL) tablet 650 mg  650 mg Oral Q4H  mg at 12/16/18 1633    ondansetron (ZOFRAN) injection 4 mg  4 mg IntraVENous Q4H PRN 4 mg at 12/06/18 0818    glucose chewable tablet 16 g  4 Tab Oral PRN      dextrose (D50W) injection syrg 12.5-25 g  25-50 mL IntraVENous PRN 25 g at 12/07/18 0653    glucagon (GLUCAGEN) injection 1 mg  1 mg IntraMUSCular PRN             Case discussed with:      Marialuisa Camarillo DO

## 2018-12-18 NOTE — PROGRESS NOTES
12/18/2018   11:37 AM  DAMION spoke w/ Ajit Delatorreter admissions she has verified, nursing has all order for pt's IV ABx, and that orders were received yesterday by nursing  at admission. Jose Iqbal      10:56 AM  DAMION spoke w/ Dr. Kerri Manzano re: orders for IV ABx, DAMION spoke w/ Jersey Albrecht in admissions order sent in [address] [place] [address].   Jose Iqbal

## 2018-12-20 LAB
ALDOST SERPL-MCNC: <1 NG/DL (ref 0–30)
BACTERIA SPEC CULT: NORMAL
COLLECT DURATION TIME UR: 24 HR
CORTIS F 24H UR-MRATE: 46 UG/24 HR (ref 0–50)
CORTIS F UR-MCNC: 39 UG/L
GRAM STN SPEC: NORMAL
GRAM STN SPEC: NORMAL
RENIN PLAS-CCNC: 0.47 NG/ML/HR (ref 0.17–5.38)
SERVICE CMNT-IMP: NORMAL
SERVICE CMNT-IMP: NORMAL
SPECIMEN SOURCE: NORMAL
SPECIMEN VOL ?TM UR: 1175 ML

## 2018-12-31 ENCOUNTER — APPOINTMENT (OUTPATIENT)
Dept: GENERAL RADIOLOGY | Age: 63
DRG: 871 | End: 2018-12-31
Attending: EMERGENCY MEDICINE
Payer: COMMERCIAL

## 2018-12-31 ENCOUNTER — HOSPITAL ENCOUNTER (INPATIENT)
Age: 63
LOS: 9 days | Discharge: NURSING, REHAB OR LONG-TERM CARE | DRG: 871 | End: 2019-01-10
Attending: EMERGENCY MEDICINE | Admitting: INTERNAL MEDICINE
Payer: COMMERCIAL

## 2018-12-31 ENCOUNTER — APPOINTMENT (OUTPATIENT)
Dept: CT IMAGING | Age: 63
DRG: 871 | End: 2018-12-31
Attending: EMERGENCY MEDICINE
Payer: COMMERCIAL

## 2018-12-31 DIAGNOSIS — M54.50 CHRONIC MIDLINE LOW BACK PAIN WITHOUT SCIATICA: ICD-10-CM

## 2018-12-31 DIAGNOSIS — G89.29 CHRONIC MIDLINE LOW BACK PAIN WITHOUT SCIATICA: ICD-10-CM

## 2018-12-31 DIAGNOSIS — R56.9 NEW ONSET SEIZURE (HCC): ICD-10-CM

## 2018-12-31 DIAGNOSIS — G93.41 ACUTE METABOLIC ENCEPHALOPATHY: ICD-10-CM

## 2018-12-31 DIAGNOSIS — E87.6 HYPOKALEMIA: ICD-10-CM

## 2018-12-31 DIAGNOSIS — R50.9 FEVER, UNSPECIFIED FEVER CAUSE: Primary | ICD-10-CM

## 2018-12-31 PROBLEM — L03.113 CELLULITIS OF RIGHT ELBOW: Status: ACTIVE | Noted: 2018-12-31

## 2018-12-31 PROBLEM — D64.9 ANEMIA: Status: ACTIVE | Noted: 2018-12-31

## 2018-12-31 PROBLEM — R00.0 SINUS TACHYCARDIA: Status: ACTIVE | Noted: 2018-12-31

## 2018-12-31 PROBLEM — A41.9 SEPSIS (HCC): Status: ACTIVE | Noted: 2018-12-31

## 2018-12-31 PROBLEM — R33.9 URINARY RETENTION: Status: ACTIVE | Noted: 2018-12-31

## 2018-12-31 PROBLEM — M46.42 DISCITIS OF CERVICAL REGION: Status: ACTIVE | Noted: 2018-12-31

## 2018-12-31 PROBLEM — E87.0 HYPERNATREMIA: Status: ACTIVE | Noted: 2018-12-31

## 2018-12-31 LAB
ALBUMIN SERPL-MCNC: 1.8 G/DL (ref 3.5–5)
ALBUMIN/GLOB SERPL: 0.4 {RATIO} (ref 1.1–2.2)
ALP SERPL-CCNC: 130 U/L (ref 45–117)
ALT SERPL-CCNC: 22 U/L (ref 12–78)
ANION GAP SERPL CALC-SCNC: 10 MMOL/L (ref 5–15)
APPEARANCE UR: ABNORMAL
AST SERPL-CCNC: 31 U/L (ref 15–37)
BACTERIA URNS QL MICRO: NEGATIVE /HPF
BASOPHILS # BLD: 0.1 K/UL (ref 0–0.1)
BASOPHILS NFR BLD: 1 % (ref 0–1)
BILIRUB SERPL-MCNC: 0.6 MG/DL (ref 0.2–1)
BILIRUB UR QL CFM: NEGATIVE
BILIRUB UR QL: ABNORMAL
BUN SERPL-MCNC: 25 MG/DL (ref 6–20)
BUN/CREAT SERPL: 26 (ref 12–20)
CALCIUM SERPL-MCNC: 8.7 MG/DL (ref 8.5–10.1)
CHLORIDE SERPL-SCNC: 104 MMOL/L (ref 97–108)
CO2 SERPL-SCNC: 32 MMOL/L (ref 21–32)
COLOR UR: ABNORMAL
COMMENT, HOLDF: NORMAL
CREAT SERPL-MCNC: 0.98 MG/DL (ref 0.55–1.02)
DIFFERENTIAL METHOD BLD: ABNORMAL
EOSINOPHIL # BLD: 0.4 K/UL (ref 0–0.4)
EOSINOPHIL NFR BLD: 4 % (ref 0–7)
EPITH CASTS URNS QL MICRO: ABNORMAL /LPF
ERYTHROCYTE [DISTWIDTH] IN BLOOD BY AUTOMATED COUNT: 15.3 % (ref 11.5–14.5)
GLOBULIN SER CALC-MCNC: 4.5 G/DL (ref 2–4)
GLUCOSE SERPL-MCNC: 141 MG/DL (ref 65–100)
GLUCOSE UR STRIP.AUTO-MCNC: NEGATIVE MG/DL
HCT VFR BLD AUTO: 34.7 % (ref 35–47)
HGB BLD-MCNC: 11 G/DL (ref 11.5–16)
HGB UR QL STRIP: ABNORMAL
IMM GRANULOCYTES # BLD: 0.1 K/UL (ref 0–0.04)
IMM GRANULOCYTES NFR BLD AUTO: 1 % (ref 0–0.5)
KETONES UR QL STRIP.AUTO: ABNORMAL MG/DL
LACTATE BLD-SCNC: 1.34 MMOL/L (ref 0.4–2)
LEUKOCYTE ESTERASE UR QL STRIP.AUTO: ABNORMAL
LYMPHOCYTES # BLD: 1.5 K/UL (ref 0.8–3.5)
LYMPHOCYTES NFR BLD: 18 % (ref 12–49)
MCH RBC QN AUTO: 27.1 PG (ref 26–34)
MCHC RBC AUTO-ENTMCNC: 31.7 G/DL (ref 30–36.5)
MCV RBC AUTO: 85.5 FL (ref 80–99)
MONOCYTES # BLD: 0.6 K/UL (ref 0–1)
MONOCYTES NFR BLD: 8 % (ref 5–13)
NEUTS SEG # BLD: 5.7 K/UL (ref 1.8–8)
NEUTS SEG NFR BLD: 69 % (ref 32–75)
NITRITE UR QL STRIP.AUTO: NEGATIVE
NRBC # BLD: 0 K/UL (ref 0–0.01)
NRBC BLD-RTO: 0 PER 100 WBC
PH UR STRIP: 6 [PH] (ref 5–8)
PLATELET # BLD AUTO: 378 K/UL (ref 150–400)
PMV BLD AUTO: 9.7 FL (ref 8.9–12.9)
POTASSIUM SERPL-SCNC: 2.6 MMOL/L (ref 3.5–5.1)
PROT SERPL-MCNC: 6.3 G/DL (ref 6.4–8.2)
PROT UR STRIP-MCNC: >300 MG/DL
RBC # BLD AUTO: 4.06 M/UL (ref 3.8–5.2)
RBC #/AREA URNS HPF: ABNORMAL /HPF (ref 0–5)
SAMPLES BEING HELD,HOLD: NORMAL
SODIUM SERPL-SCNC: 146 MMOL/L (ref 136–145)
SP GR UR REFRACTOMETRY: 1.02 (ref 1–1.03)
TROPONIN I SERPL-MCNC: <0.05 NG/ML
UR CULT HOLD, URHOLD: NORMAL
UROBILINOGEN UR QL STRIP.AUTO: 0.2 EU/DL (ref 0.2–1)
WBC # BLD AUTO: 8.3 K/UL (ref 3.6–11)
WBC URNS QL MICRO: ABNORMAL /HPF (ref 0–4)
YEAST BUDDING URNS QL: PRESENT

## 2018-12-31 PROCEDURE — 71045 X-RAY EXAM CHEST 1 VIEW: CPT

## 2018-12-31 PROCEDURE — 74011636320 HC RX REV CODE- 636/320: Performed by: RADIOLOGY

## 2018-12-31 PROCEDURE — 87103 BLOOD FUNGUS CULTURE: CPT

## 2018-12-31 PROCEDURE — 99285 EMERGENCY DEPT VISIT HI MDM: CPT

## 2018-12-31 PROCEDURE — 81001 URINALYSIS AUTO W/SCOPE: CPT

## 2018-12-31 PROCEDURE — 83605 ASSAY OF LACTIC ACID: CPT

## 2018-12-31 PROCEDURE — 36415 COLL VENOUS BLD VENIPUNCTURE: CPT

## 2018-12-31 PROCEDURE — 87040 BLOOD CULTURE FOR BACTERIA: CPT

## 2018-12-31 PROCEDURE — 74011000258 HC RX REV CODE- 258: Performed by: EMERGENCY MEDICINE

## 2018-12-31 PROCEDURE — 70450 CT HEAD/BRAIN W/O DYE: CPT

## 2018-12-31 PROCEDURE — 96368 THER/DIAG CONCURRENT INF: CPT

## 2018-12-31 PROCEDURE — 84484 ASSAY OF TROPONIN QUANT: CPT

## 2018-12-31 PROCEDURE — 73080 X-RAY EXAM OF ELBOW: CPT

## 2018-12-31 PROCEDURE — 74011250637 HC RX REV CODE- 250/637: Performed by: EMERGENCY MEDICINE

## 2018-12-31 PROCEDURE — 71275 CT ANGIOGRAPHY CHEST: CPT

## 2018-12-31 PROCEDURE — 74011250636 HC RX REV CODE- 250/636: Performed by: INTERNAL MEDICINE

## 2018-12-31 PROCEDURE — 87106 FUNGI IDENTIFICATION YEAST: CPT

## 2018-12-31 PROCEDURE — 87086 URINE CULTURE/COLONY COUNT: CPT

## 2018-12-31 PROCEDURE — 80053 COMPREHEN METABOLIC PANEL: CPT

## 2018-12-31 PROCEDURE — 96365 THER/PROPH/DIAG IV INF INIT: CPT

## 2018-12-31 PROCEDURE — 87633 RESP VIRUS 12-25 TARGETS: CPT

## 2018-12-31 PROCEDURE — 74011250636 HC RX REV CODE- 250/636: Performed by: EMERGENCY MEDICINE

## 2018-12-31 PROCEDURE — 93005 ELECTROCARDIOGRAM TRACING: CPT

## 2018-12-31 PROCEDURE — 85025 COMPLETE CBC W/AUTO DIFF WBC: CPT

## 2018-12-31 PROCEDURE — 96361 HYDRATE IV INFUSION ADD-ON: CPT

## 2018-12-31 RX ORDER — IBUPROFEN 400 MG/1
400 TABLET ORAL 3 TIMES DAILY
COMMUNITY
End: 2019-01-10

## 2018-12-31 RX ORDER — MAGNESIUM SULFATE 100 %
4 CRYSTALS MISCELLANEOUS AS NEEDED
Status: CANCELLED | OUTPATIENT
Start: 2018-12-31

## 2018-12-31 RX ORDER — SODIUM CHLORIDE 0.9 % (FLUSH) 0.9 %
5-10 SYRINGE (ML) INJECTION EVERY 8 HOURS
Status: CANCELLED | OUTPATIENT
Start: 2018-12-31

## 2018-12-31 RX ORDER — POTASSIUM CHLORIDE 7.45 MG/ML
10 INJECTION INTRAVENOUS
Status: DISPENSED | OUTPATIENT
Start: 2018-12-31 | End: 2019-01-01

## 2018-12-31 RX ORDER — POTASSIUM CHLORIDE 7.45 MG/ML
10 INJECTION INTRAVENOUS
Status: COMPLETED | OUTPATIENT
Start: 2018-12-31 | End: 2018-12-31

## 2018-12-31 RX ORDER — ONDANSETRON 4 MG/1
4 TABLET, FILM COATED ORAL
Status: ON HOLD | COMMUNITY
End: 2019-04-29 | Stop reason: CLARIF

## 2018-12-31 RX ORDER — DEXTROSE 50 % IN WATER (D50W) INTRAVENOUS SYRINGE
25-50 AS NEEDED
Status: CANCELLED | OUTPATIENT
Start: 2018-12-31

## 2018-12-31 RX ORDER — SENNOSIDES 8.6 MG/1
1 TABLET ORAL 2 TIMES DAILY
COMMUNITY
End: 2019-04-29

## 2018-12-31 RX ORDER — ASPIRIN 81 MG/1
81 TABLET ORAL 2 TIMES DAILY
Status: CANCELLED | OUTPATIENT
Start: 2019-01-01

## 2018-12-31 RX ORDER — DULOXETIN HYDROCHLORIDE 60 MG/1
60 CAPSULE, DELAYED RELEASE ORAL DAILY
COMMUNITY
End: 2019-04-29

## 2018-12-31 RX ORDER — SODIUM CHLORIDE 0.9 % (FLUSH) 0.9 %
5-10 SYRINGE (ML) INJECTION AS NEEDED
Status: CANCELLED | OUTPATIENT
Start: 2018-12-31

## 2018-12-31 RX ORDER — CYCLOBENZAPRINE HCL 10 MG
10 TABLET ORAL
COMMUNITY
End: 2019-04-29

## 2018-12-31 RX ORDER — ACETAMINOPHEN 650 MG/1
650 SUPPOSITORY RECTAL
Status: COMPLETED | OUTPATIENT
Start: 2018-12-31 | End: 2018-12-31

## 2018-12-31 RX ORDER — ZOLPIDEM TARTRATE 5 MG/1
5 TABLET ORAL
Status: CANCELLED | OUTPATIENT
Start: 2018-12-31

## 2018-12-31 RX ORDER — SIMVASTATIN 20 MG/1
40 TABLET, FILM COATED ORAL
Status: CANCELLED | OUTPATIENT
Start: 2018-12-31

## 2018-12-31 RX ORDER — SODIUM CHLORIDE 0.9 % (FLUSH) 0.9 %
5-10 SYRINGE (ML) INJECTION AS NEEDED
Status: DISCONTINUED | OUTPATIENT
Start: 2018-12-31 | End: 2019-01-10 | Stop reason: HOSPADM

## 2018-12-31 RX ORDER — TAMSULOSIN HYDROCHLORIDE 0.4 MG/1
0.4 CAPSULE ORAL DAILY
Status: CANCELLED | OUTPATIENT
Start: 2019-01-01

## 2018-12-31 RX ORDER — SERTRALINE HYDROCHLORIDE 50 MG/1
150 TABLET, FILM COATED ORAL
Status: CANCELLED | OUTPATIENT
Start: 2018-12-31

## 2018-12-31 RX ORDER — OXYCODONE HYDROCHLORIDE 5 MG/1
5 TABLET ORAL EVERY 4 HOURS
Status: ON HOLD | COMMUNITY
End: 2019-01-07 | Stop reason: SDUPTHER

## 2018-12-31 RX ORDER — INSULIN GLARGINE 100 [IU]/ML
10 INJECTION, SOLUTION SUBCUTANEOUS DAILY
Status: CANCELLED | OUTPATIENT
Start: 2019-01-01

## 2018-12-31 RX ORDER — NALOXONE HYDROCHLORIDE 0.4 MG/ML
0.4 INJECTION, SOLUTION INTRAMUSCULAR; INTRAVENOUS; SUBCUTANEOUS AS NEEDED
Status: CANCELLED | OUTPATIENT
Start: 2018-12-31

## 2018-12-31 RX ORDER — POTASSIUM CHLORIDE AND SODIUM CHLORIDE 450; 150 MG/100ML; MG/100ML
INJECTION, SOLUTION INTRAVENOUS CONTINUOUS
Status: DISCONTINUED | OUTPATIENT
Start: 2018-12-31 | End: 2019-01-04

## 2018-12-31 RX ORDER — ATENOLOL 50 MG/1
50 TABLET ORAL DAILY
Status: CANCELLED | OUTPATIENT
Start: 2019-01-01

## 2018-12-31 RX ADMIN — POTASSIUM CHLORIDE 10 MEQ: 10 INJECTION, SOLUTION INTRAVENOUS at 22:42

## 2018-12-31 RX ADMIN — SODIUM CHLORIDE AND POTASSIUM CHLORIDE: 4.5; 1.49 INJECTION, SOLUTION INTRAVENOUS at 23:25

## 2018-12-31 RX ADMIN — IOPAMIDOL 100 ML: 755 INJECTION, SOLUTION INTRAVENOUS at 22:21

## 2018-12-31 RX ADMIN — MEROPENEM 2 G: 1 INJECTION, POWDER, FOR SOLUTION INTRAVENOUS at 22:50

## 2018-12-31 RX ADMIN — ACETAMINOPHEN 650 MG: 650 SUPPOSITORY RECTAL at 21:36

## 2018-12-31 RX ADMIN — SODIUM CHLORIDE 434 ML: 900 INJECTION, SOLUTION INTRAVENOUS at 21:11

## 2018-12-31 RX ADMIN — POTASSIUM CHLORIDE 10 MEQ: 10 INJECTION, SOLUTION INTRAVENOUS at 23:35

## 2018-12-31 RX ADMIN — SODIUM CHLORIDE 1000 ML: 900 INJECTION, SOLUTION INTRAVENOUS at 21:11

## 2019-01-01 ENCOUNTER — APPOINTMENT (OUTPATIENT)
Dept: CT IMAGING | Age: 64
DRG: 871 | End: 2019-01-01
Attending: INTERNAL MEDICINE
Payer: COMMERCIAL

## 2019-01-01 LAB
ANION GAP SERPL CALC-SCNC: 14 MMOL/L (ref 5–15)
B PERT DNA SPEC QL NAA+PROBE: NOT DETECTED
BASOPHILS # BLD: 0.1 K/UL (ref 0–0.1)
BASOPHILS NFR BLD: 1 % (ref 0–1)
BUN SERPL-MCNC: 26 MG/DL (ref 6–20)
BUN/CREAT SERPL: 29 (ref 12–20)
C PNEUM DNA SPEC QL NAA+PROBE: NOT DETECTED
CALCIUM SERPL-MCNC: 7.9 MG/DL (ref 8.5–10.1)
CHLORIDE SERPL-SCNC: 105 MMOL/L (ref 97–108)
CO2 SERPL-SCNC: 26 MMOL/L (ref 21–32)
COMMENT, HOLDF: NORMAL
CREAT SERPL-MCNC: 0.91 MG/DL (ref 0.55–1.02)
DIFFERENTIAL METHOD BLD: ABNORMAL
EOSINOPHIL # BLD: 0.5 K/UL (ref 0–0.4)
EOSINOPHIL NFR BLD: 6 % (ref 0–7)
ERYTHROCYTE [DISTWIDTH] IN BLOOD BY AUTOMATED COUNT: 15.5 % (ref 11.5–14.5)
FLUAV H1 2009 PAND RNA SPEC QL NAA+PROBE: NOT DETECTED
FLUAV H1 RNA SPEC QL NAA+PROBE: NOT DETECTED
FLUAV H3 RNA SPEC QL NAA+PROBE: NOT DETECTED
FLUAV SUBTYP SPEC NAA+PROBE: NOT DETECTED
FLUBV RNA SPEC QL NAA+PROBE: NOT DETECTED
GLUCOSE BLD STRIP.AUTO-MCNC: 118 MG/DL (ref 65–100)
GLUCOSE BLD STRIP.AUTO-MCNC: 121 MG/DL (ref 65–100)
GLUCOSE BLD STRIP.AUTO-MCNC: 138 MG/DL (ref 65–100)
GLUCOSE SERPL-MCNC: 139 MG/DL (ref 65–100)
HADV DNA SPEC QL NAA+PROBE: NOT DETECTED
HCOV 229E RNA SPEC QL NAA+PROBE: NOT DETECTED
HCOV HKU1 RNA SPEC QL NAA+PROBE: NOT DETECTED
HCOV NL63 RNA SPEC QL NAA+PROBE: NOT DETECTED
HCOV OC43 RNA SPEC QL NAA+PROBE: NOT DETECTED
HCT VFR BLD AUTO: 29.9 % (ref 35–47)
HGB BLD-MCNC: 9.5 G/DL (ref 11.5–16)
HMPV RNA SPEC QL NAA+PROBE: NOT DETECTED
HPIV1 RNA SPEC QL NAA+PROBE: NOT DETECTED
HPIV2 RNA SPEC QL NAA+PROBE: NOT DETECTED
HPIV3 RNA SPEC QL NAA+PROBE: NOT DETECTED
HPIV4 RNA SPEC QL NAA+PROBE: NOT DETECTED
IMM GRANULOCYTES # BLD: 0 K/UL (ref 0–0.04)
IMM GRANULOCYTES NFR BLD AUTO: 0 % (ref 0–0.5)
LYMPHOCYTES # BLD: 2.3 K/UL (ref 0.8–3.5)
LYMPHOCYTES NFR BLD: 27 % (ref 12–49)
M PNEUMO DNA SPEC QL NAA+PROBE: NOT DETECTED
MCH RBC QN AUTO: 27.4 PG (ref 26–34)
MCHC RBC AUTO-ENTMCNC: 31.8 G/DL (ref 30–36.5)
MCV RBC AUTO: 86.2 FL (ref 80–99)
MONOCYTES # BLD: 0.8 K/UL (ref 0–1)
MONOCYTES NFR BLD: 9 % (ref 5–13)
NEUTS SEG # BLD: 4.9 K/UL (ref 1.8–8)
NEUTS SEG NFR BLD: 57 % (ref 32–75)
NRBC # BLD: 0 K/UL (ref 0–0.01)
NRBC BLD-RTO: 0 PER 100 WBC
PLATELET # BLD AUTO: 338 K/UL (ref 150–400)
PMV BLD AUTO: 9.5 FL (ref 8.9–12.9)
POTASSIUM SERPL-SCNC: 3.1 MMOL/L (ref 3.5–5.1)
RBC # BLD AUTO: 3.47 M/UL (ref 3.8–5.2)
RSV RNA SPEC QL NAA+PROBE: NOT DETECTED
RV+EV RNA SPEC QL NAA+PROBE: NOT DETECTED
SERVICE CMNT-IMP: ABNORMAL
SODIUM SERPL-SCNC: 145 MMOL/L (ref 136–145)
WBC # BLD AUTO: 8.5 K/UL (ref 3.6–11)

## 2019-01-01 PROCEDURE — 74011250637 HC RX REV CODE- 250/637: Performed by: INTERNAL MEDICINE

## 2019-01-01 PROCEDURE — 82962 GLUCOSE BLOOD TEST: CPT

## 2019-01-01 PROCEDURE — 95816 EEG AWAKE AND DROWSY: CPT | Performed by: INTERNAL MEDICINE

## 2019-01-01 PROCEDURE — 74011000258 HC RX REV CODE- 258: Performed by: INTERNAL MEDICINE

## 2019-01-01 PROCEDURE — 74011250636 HC RX REV CODE- 250/636: Performed by: INTERNAL MEDICINE

## 2019-01-01 PROCEDURE — 65660000000 HC RM CCU STEPDOWN

## 2019-01-01 PROCEDURE — 85025 COMPLETE CBC W/AUTO DIFF WBC: CPT

## 2019-01-01 PROCEDURE — 65610000006 HC RM INTENSIVE CARE

## 2019-01-01 PROCEDURE — 94760 N-INVAS EAR/PLS OXIMETRY 1: CPT

## 2019-01-01 PROCEDURE — 80048 BASIC METABOLIC PNL TOTAL CA: CPT

## 2019-01-01 PROCEDURE — 70450 CT HEAD/BRAIN W/O DYE: CPT

## 2019-01-01 PROCEDURE — 77010033678 HC OXYGEN DAILY

## 2019-01-01 PROCEDURE — 36415 COLL VENOUS BLD VENIPUNCTURE: CPT

## 2019-01-01 RX ORDER — ASPIRIN 81 MG/1
81 TABLET ORAL 2 TIMES DAILY
Status: DISCONTINUED | OUTPATIENT
Start: 2019-01-01 | End: 2019-01-01

## 2019-01-01 RX ORDER — SENNOSIDES 8.6 MG/1
1 TABLET ORAL 2 TIMES DAILY
Status: DISCONTINUED | OUTPATIENT
Start: 2019-01-01 | End: 2019-01-01

## 2019-01-01 RX ORDER — CYCLOBENZAPRINE HCL 10 MG
10 TABLET ORAL
Status: DISCONTINUED | OUTPATIENT
Start: 2019-01-01 | End: 2019-01-01

## 2019-01-01 RX ORDER — HALOPERIDOL 5 MG/ML
2 INJECTION INTRAMUSCULAR
Status: DISCONTINUED | OUTPATIENT
Start: 2019-01-01 | End: 2019-01-10 | Stop reason: HOSPADM

## 2019-01-01 RX ORDER — DULOXETIN HYDROCHLORIDE 30 MG/1
60 CAPSULE, DELAYED RELEASE ORAL DAILY
Status: DISCONTINUED | OUTPATIENT
Start: 2019-01-02 | End: 2019-01-01

## 2019-01-01 RX ORDER — OXYCODONE AND ACETAMINOPHEN 5; 325 MG/1; MG/1
1 TABLET ORAL
Status: DISCONTINUED | OUTPATIENT
Start: 2019-01-01 | End: 2019-01-01

## 2019-01-01 RX ORDER — ATENOLOL 50 MG/1
50 TABLET ORAL DAILY
Status: DISCONTINUED | OUTPATIENT
Start: 2019-01-02 | End: 2019-01-01

## 2019-01-01 RX ORDER — HYDROMORPHONE HYDROCHLORIDE 2 MG/ML
0.5 INJECTION, SOLUTION INTRAMUSCULAR; INTRAVENOUS; SUBCUTANEOUS ONCE
Status: DISCONTINUED | OUTPATIENT
Start: 2019-01-01 | End: 2019-01-01

## 2019-01-01 RX ORDER — LORAZEPAM 2 MG/ML
1 INJECTION INTRAMUSCULAR
Status: DISCONTINUED | OUTPATIENT
Start: 2019-01-01 | End: 2019-01-10 | Stop reason: HOSPADM

## 2019-01-01 RX ORDER — SIMVASTATIN 20 MG/1
40 TABLET, FILM COATED ORAL
Status: DISCONTINUED | OUTPATIENT
Start: 2019-01-01 | End: 2019-01-01

## 2019-01-01 RX ORDER — CHLORTHALIDONE 25 MG/1
25 TABLET ORAL DAILY
Status: DISCONTINUED | OUTPATIENT
Start: 2019-01-02 | End: 2019-01-01

## 2019-01-01 RX ORDER — LEVOTHYROXINE SODIUM 125 UG/1
125 TABLET ORAL
Status: DISCONTINUED | OUTPATIENT
Start: 2019-01-02 | End: 2019-01-01

## 2019-01-01 RX ORDER — ACETAMINOPHEN 650 MG/1
650 SUPPOSITORY RECTAL
Status: DISCONTINUED | OUTPATIENT
Start: 2019-01-01 | End: 2019-01-10 | Stop reason: HOSPADM

## 2019-01-01 RX ORDER — LABETALOL HYDROCHLORIDE 5 MG/ML
10 INJECTION, SOLUTION INTRAVENOUS
Status: DISCONTINUED | OUTPATIENT
Start: 2019-01-01 | End: 2019-01-10 | Stop reason: HOSPADM

## 2019-01-01 RX ORDER — MORPHINE SULFATE 4 MG/ML
2 INJECTION INTRAVENOUS
Status: DISCONTINUED | OUTPATIENT
Start: 2019-01-01 | End: 2019-01-10 | Stop reason: HOSPADM

## 2019-01-01 RX ORDER — POTASSIUM CHLORIDE 7.45 MG/ML
10 INJECTION INTRAVENOUS
Status: COMPLETED | OUTPATIENT
Start: 2019-01-01 | End: 2019-01-01

## 2019-01-01 RX ORDER — LORAZEPAM 2 MG/ML
1 INJECTION INTRAMUSCULAR ONCE
Status: COMPLETED | OUTPATIENT
Start: 2019-01-01 | End: 2019-01-01

## 2019-01-01 RX ORDER — LORAZEPAM 2 MG/ML
INJECTION INTRAMUSCULAR
Status: DISCONTINUED
Start: 2019-01-01 | End: 2019-01-01

## 2019-01-01 RX ORDER — HEPARIN SODIUM 5000 [USP'U]/ML
5000 INJECTION, SOLUTION INTRAVENOUS; SUBCUTANEOUS EVERY 8 HOURS
Status: DISCONTINUED | OUTPATIENT
Start: 2019-01-01 | End: 2019-01-10 | Stop reason: HOSPADM

## 2019-01-01 RX ORDER — SERTRALINE HYDROCHLORIDE 50 MG/1
100 TABLET, FILM COATED ORAL DAILY
Status: DISCONTINUED | OUTPATIENT
Start: 2019-01-02 | End: 2019-01-01

## 2019-01-01 RX ADMIN — HALOPERIDOL LACTATE 2 MG: 5 INJECTION, SOLUTION INTRAMUSCULAR at 12:46

## 2019-01-01 RX ADMIN — SODIUM CHLORIDE AND POTASSIUM CHLORIDE: 4.5; 1.49 INJECTION, SOLUTION INTRAVENOUS at 14:45

## 2019-01-01 RX ADMIN — ACETAMINOPHEN 650 MG: 650 SUPPOSITORY RECTAL at 14:41

## 2019-01-01 RX ADMIN — MORPHINE SULFATE 2 MG: 4 INJECTION, SOLUTION INTRAMUSCULAR; INTRAVENOUS at 18:38

## 2019-01-01 RX ADMIN — VANCOMYCIN HYDROCHLORIDE 2000 MG: 10 INJECTION, POWDER, LYOPHILIZED, FOR SOLUTION INTRAVENOUS at 15:33

## 2019-01-01 RX ADMIN — MORPHINE SULFATE 2 MG: 4 INJECTION, SOLUTION INTRAMUSCULAR; INTRAVENOUS at 15:19

## 2019-01-01 RX ADMIN — POTASSIUM CHLORIDE 10 MEQ: 10 INJECTION, SOLUTION INTRAVENOUS at 01:52

## 2019-01-01 RX ADMIN — SODIUM CHLORIDE AND POTASSIUM CHLORIDE: 4.5; 1.49 INJECTION, SOLUTION INTRAVENOUS at 13:06

## 2019-01-01 RX ADMIN — POTASSIUM CHLORIDE 10 MEQ: 10 INJECTION, SOLUTION INTRAVENOUS at 15:20

## 2019-01-01 RX ADMIN — SODIUM CHLORIDE 250 ML: 900 INJECTION, SOLUTION INTRAVENOUS at 23:17

## 2019-01-01 RX ADMIN — MEROPENEM 500 MG: 500 INJECTION, POWDER, FOR SOLUTION INTRAVENOUS at 13:06

## 2019-01-01 RX ADMIN — HEPARIN SODIUM 5000 UNITS: 5000 INJECTION INTRAVENOUS; SUBCUTANEOUS at 21:49

## 2019-01-01 RX ADMIN — HEPARIN SODIUM 5000 UNITS: 5000 INJECTION INTRAVENOUS; SUBCUTANEOUS at 13:11

## 2019-01-01 RX ADMIN — POTASSIUM CHLORIDE 10 MEQ: 10 INJECTION, SOLUTION INTRAVENOUS at 00:38

## 2019-01-01 RX ADMIN — LORAZEPAM 1 MG: 2 INJECTION INTRAMUSCULAR; INTRAVENOUS at 14:00

## 2019-01-01 RX ADMIN — POTASSIUM CHLORIDE 10 MEQ: 10 INJECTION, SOLUTION INTRAVENOUS at 17:39

## 2019-01-01 RX ADMIN — POTASSIUM CHLORIDE 10 MEQ: 10 INJECTION, SOLUTION INTRAVENOUS at 16:40

## 2019-01-01 RX ADMIN — MORPHINE SULFATE 2 MG: 4 INJECTION, SOLUTION INTRAMUSCULAR; INTRAVENOUS at 21:50

## 2019-01-01 RX ADMIN — OXYCODONE AND ACETAMINOPHEN 1 TABLET: 5; 325 TABLET ORAL at 06:19

## 2019-01-01 RX ADMIN — MEROPENEM 500 MG: 500 INJECTION, POWDER, FOR SOLUTION INTRAVENOUS at 19:16

## 2019-01-01 NOTE — ED NOTES
TRANSFER - OUT REPORT: 
 
Verbal report given to Milton Costa on Sushil Leisure  being transferred to [facility] the [facility](unit) for routine progression of care Report consisted of patients Situation, Background, Assessment and  
Recommendations(SBAR). Information from the following report(s) SBAR, Kardex, ED Summary, MAR, Recent Results and Cardiac Rhythm NSR/sinus tach was reviewed with the receiving nurse. Lines: PICC Single Lumen 25/60/79 Left;Cephalic (Active) Peripheral IV 12/31/18 Right;Upper Other(comment) (Active) Site Assessment Clean, dry, & intact 12/31/2018  8:53 PM  
Phlebitis Assessment 0 12/31/2018  8:53 PM  
Infiltration Assessment 0 12/31/2018  8:53 PM  
Dressing Status Clean, dry, & intact 12/31/2018  8:53 PM  
Dressing Type Tape;Transparent 12/31/2018  8:53 PM  
Hub Color/Line Status Pink;Patent; Flushed 12/31/2018  8:53 PM  
  
 
Opportunity for questions and clarification was provided. Patient transported with: 
 Monitor O2 @ 4 liters Registered Nurse

## 2019-01-01 NOTE — PROGRESS NOTES
Bedside and Verbal shift change report given to Allan (oncoming nurse) by Oly Verduzco (offgoing nurse). Report included the following information SBAR, Kardex, Procedure Summary, Intake/Output and MAR.  
 
0844-Patient refused lab work, medication and AM care. Notified Dr. Kristian Parikh will try again with  when or if they arrive. Haldol given to calm patient to try to give Tylenol for fever 102.1 at 1251. Patient has been refusing care and agitated. Patient  enter room patient agrees for staff with the start of care. Patient looks to the left and grabs for the rail and begins to have seizure like activities. Took a deep breathe sat went down in the 60's Dr. Kristian Parikh and staff at bedside. Ativan and lab work drawn. STAT to CT. Transferred to ICU.

## 2019-01-01 NOTE — H&P
[facility] Hospitalist Admission Note NAME:  Catherine Dobbins :   1955 MRN:  [id number] PCP:  Jessica Escobar MD  
 
Date/Time:  2018 10:23 PM 
 
  
  
Subjective: CHIEF COMPLAINT: fever HISTORY OF PRESENT ILLNESS:    
Ms. Satinder Moraes is a 61 y.o.  female who presented to the Emergency Department complaining of fever. Fever noted has her facility, along with nausea, hypoxia, cough and confusion. New today. ER workup finds sepsis criteria of tachycardia, hypoxia and fever, but no elevated WBC. She is on meropenum for recent C spine discitis Dx and discharge about 2 weeks ago. We will admit her for management. Past Medical History:  
Diagnosis Date  Chronic back pain  Diabetes mellitus, type 2 ([address]  History of vascular access device 2018 [facility] VAT - 4 FR single, L cephalic, 44 cm for LTA  HTN (hypertension) History reviewed. No pertinent surgical history. Social History Tobacco Use  Smoking status: Never Smoker  Smokeless tobacco: Never Used Substance Use Topics  Alcohol use: No  
  Frequency: Never Family History Problem Relation Age of Onset  Diabetes Father  Heart Disease Father No Known Allergies Prior to Admission medications Medication Sig Start Date End Date Taking? Authorizing Provider  
atenolol (TENORMIN) 50 mg tablet Take 1 Tab by mouth daily. 18   Silvio Ernst MD  
insulin glargine (LANTUS) 100 unit/mL injection 10 Units by SubCUTAneous route daily. 18   Silvio Ernst MD  
chlorthalidone (HYGROTEN) 25 mg tablet Take 1 Tab by mouth daily. 18   Silvio Ernst MD  
oxyCODONE IR (ROXICODONE) 5 mg immediate release tablet Take 1 Tab by mouth every six (6) hours as needed (breakthrough pain). Max Daily Amount: 20 mg. 18   Silvio Ernst MD  
tamsulosin (FLOMAX) 0.4 mg capsule Take 1 Cap by mouth daily.  18   Silvio Ernst MD  
 acetaminophen (TYLENOL) 325 mg tablet Take 2 Tabs by mouth every six (6) hours as needed for Pain. 12/17/18   Nereida Campos MD  
levothyroxine (SYNTHROID) 125 mcg tablet Take 125 mcg by mouth Daily (before breakfast). Provider, Historical  
sertraline (ZOLOFT) 100 mg tablet Take 150 mg by mouth nightly. Provider, Historical  
simvastatin (ZOCOR) 40 mg tablet Take 40 mg by mouth nightly. Provider, Historical  
aspirin delayed-release 81 mg tablet Take 81 mg by mouth two (2) times a day. Provider, Historical  
 
 
Review of Systems: Vague 
(bold if positive, if negative) Gen:  feverEyes:  ENT:  CVS:  Pulm:  GI:  nausea :   
MS:  Skin:  Psych:  Endo:   
Hem:  Renal:   
Neuro:  weakness Objective: VITALS:   
Vital signs reviewed; most recent are: 
 
Visit Vitals BP (!) 165/114 Pulse 96 Temp (!) 104.3 °F (40.2 °C) Resp 19 Ht 5' 1\" (1.549 m) Wt 95.3 kg (210 lb) SpO2 95% BMI 39.68 kg/m² SpO2 Readings from Last 6 Encounters:  
12/31/18 95% 12/17/18 96% O2 Flow Rate (L/min): 4 l/min No intake or output data in the 24 hours ending 12/31/18 2223 Exam:  
 
Physical Exam: 
 
Gen:  Morbid obese, in no acute distress HEENT:  Pink conjunctivae, PERRL, hearing barely intact to voice, moist mucous membranes Neck:  Supple, without masses, thyroid non-tender Resp:  No accessory muscle use, coarse bilateral breath sounds Card:  No murmurs, tachycardic S1, S2 without thrills, bruits or peripheral edema Abd:  Soft, non-tender, non-distended, reduced bowel sounds are present, no mass Lymph:  No cervical or inguinal adenopathy Musc:  No cyanosis or clubbing Skin:  No rashes or ulcers, skin turgor is good Neuro:  Cranial nerves are grossly intact, general motor weakness, does not follow commands appropriately Psych:  Poor insight, oriented to person perhaps Labs: 
 
Recent Labs 12/31/18 2054 WBC 8.3 HGB 11.0*  
HCT 34.7*  
 Recent Labs 12/31/18 2054  
*  
K 2.6*  
 CO2 32 * BUN 25* CREA 0.98  
CA 8.7 ALB 1.8* TBILI 0.6 SGOT 31 ALT 22 Lab Results Component Value Date/Time Glucose (POC) 129 (H) 12/17/2018 05:13 PM  
 Glucose (POC) 207 (H) 12/17/2018 11:38 AM  
 
No results for input(s): PH, PCO2, PO2, HCO3, FIO2 in the last 72 hours. No results for input(s): INR in the last 72 hours. No lab exists for component: INREXT All Micro Results Procedure Component Value Units Date/Time RESPIRATORY PANEL,PCR,NASOPHARYNGEAL [[id number]] Order Status:  Sent Specimen:  NASOPHARYNGEAL SWAB URINE CULTURE HOLD SAMPLE [[id number]] Collected:  12/31/18 2140 Order Status:  Completed Specimen:  Urine from Serum Updated:  12/31/18 2150 Urine culture hold URINE ON HOLD IN MICROBIOLOGY DEPT FOR 3 DAYS. IF UNPRESERVED URINE IS SUBMITTED, IT CANNOT BE USED FOR ADDITIONAL TESTING AFTER 24 HRS, RECOLLECTION WILL BE REQUIRED. CULTURE, URINE [[id number]] Collected:  12/31/18 2140 Order Status:  Completed Specimen:  Cath Urine Updated:  12/31/18 2149 CULTURE, BLOOD [[id number]] Collected:  12/31/18 2054 Order Status:  Completed Specimen:  Blood Updated:  12/31/18 2126 CULTURE, BLOOD [[id number]] Collected:  12/31/18 2054 Order Status:  Completed Specimen:  Blood Updated:  12/31/18 2126 CULTURE, FUNGUS [[id number]] Order Status:  Sent Specimen: Body Fluid I have reviewed previous records Assessment and Plan:  
  
Sepsis / Fever / Sinus tachycardia - POA, unclear etiology. Imaging unremarkable. DDx fungemia, viral, bacterial.  Continue meropenum. Consult ID. Check fungal and bacterial cx. Check viral panel. Lactate normal and no end organ damage, and so NOT severe sepsis. C-spine discitis - Dx 3 weeks ago. MRI showed evidence of discitis at C5-C6 and C6-C7, with abnormal enhancement of the vertebral bodies.  There is enhancing phlegmon in the epidural space. No drainable abscess. Evaluated by ortho and ID. Needed 6 weeks of IV ABx, half way through that. Currently on meropenem Acute metabolic encephalopathy - POA, recurrent, unclear etiology, likely due to sepsis. On recent admit had unremarkable head CT, MRI brain. TSH, B12/folate, ammonia WNL s/p LP. HSV neg. EEG negative for seizure activity, RPR negative, West Nile negative, HIV negative, Lyme IgG positive but IgM negative. Slowly improved clinically prior to discharge. Suspect dementia. Continue sertraline, ASA. Hold oxycodone 
  
R elbow cellulitis / bursitis / acute pain / Weakness - Noted on last admit, s/p joint aspiration by IR on 12/6, cultures negative to date. Recent MRI of the brain and C spine without any change. On meropenem. DM type 2 w/ hypoglycemia - Diabetic diet and counseling. SSI per protocol. Continue home Lantus if eating. Resent A1C 7.2%. HTN - If BP holds up continue atenolol, hold chlorthaldone 
  
Hypokalemia / Hypernatremia - Cr stable. Hydrate and follow. Hold chlorthalidone. Anemia - POA, chronic, stable, Fe studies consistent with chronic dz. Monitor  
  
Urinary retention - Recent Dx. Discharged with Shankar catheter. Continue flomax Hypothyroidism - Continue synthroid Hyperlipidemia - continue simvastatin Telemetry reviewed:   normal sinus rhythm Risk of deterioration: high Total time spent with patient: 70 Minutes Care Plan discussed with: Patient, Family, Nursing Staff and >50% of time spent in counseling and coordination of care Discussed:  Care Plan      
___________________________________________________ Attending Physician: Shyanne Pringle MD

## 2019-01-01 NOTE — ED NOTES
[facility] charge nurse Gianfranco Summers RN called regarding room assignment received, this relayed to Rj Muñoz- ED primary nurse.

## 2019-01-01 NOTE — ED TRIAGE NOTES
Pt arrives via EMS c/o AMS, alert to name only, recent sepsis dx. Lethargy at 1600, nausea, 102 temp. Low K 2.6 at 1800 at facility, 10L NRB with o2 sats at 90 in route. Edema elbow to wrist R side

## 2019-01-01 NOTE — PROGRESS NOTES
[facility]: MED RECONCILIATION Comments/Recommendations:  
? Patient recently discharged from [facility] on 12/17/18 to [facility]. Medication list abstracted from [facility] medication administration record. Medication list from [facility] has been scanned into the chart. ? Of note- There were multiple medication changes from the [facility] discharge medication list that were made at her SNF. Pertinent changes listed below. - Meropenem 2 g Q8 hours: Ordered on hospital discharge through 1/18/19, changed to meropenem 2 g every 12 hours through 1/18/19 at SNF 
- Oxycodone ER 18 mg Q12 hours: Discontinued on hospital discharge, resumed at [facility] 
- Oxycodone 5 mg every 4 hours scheduled: Ordered as needed on hospital discharge, changed to scheduled at [facility] 
- Ibuprofen 400 mg three times daily scheduled: Discontinued on hospital discharge, resumed at [facility] 
- Cyclobenzaprine 10 mg nightly: Discontinued on hospital discharge, resumed at [facility] Information obtained from: Transfer papers- [facility] Allergies: Patient has no known allergies. Prior to Admission Medications:  
 
Medication Documentation Review Audit Reviewed by Mariana Nava, PHARMD (Pharmacist) on 12/31/18 at 31 62 12 Medication Sig Documenting Provider Last Dose Status Taking?  
0.9% sodium chloride solp 100 mL with meropenem 1 gram solr 2 g 2 g by IntraVENous route every twelve (12) hours. Provider, Historical 12/31/2018 AM Active Yes  
aspirin delayed-release 81 mg tablet Take 81 mg by mouth two (2) times a day. Provider, Historical 12/31/2018 AM Active Yes  
atenolol (TENORMIN) 50 mg tablet Take 1 Tab by mouth daily. Olga Mackey MD 12/31/2018 AM Active Yes  
chlorthalidone (HYGROTEN) 25 mg tablet Take 1 Tab by mouth daily. Olga Mackey MD 12/31/2018 AM Active Yes  
cyclobenzaprine (FLEXERIL) 10 mg tablet Take 10 mg by mouth nightly. Provider, Historical 12/30/2018 PM Active Yes DULoxetine (CYMBALTA) 60 mg capsule Take 60 mg by mouth daily. Provider, Historical 12/31/2018 AM Active Yes  
ibuprofen (MOTRIN) 400 mg tablet Take 400 mg by mouth three (3) times daily. Provider, Historical 12/31/2018 AM Active Yes  
insulin glargine (LANTUS) 100 unit/mL injection 10 Units by SubCUTAneous route daily. Campbell Rock MD 12/31/2018 AM Active Yes  
levothyroxine (SYNTHROID) 125 mcg tablet Take 125 mcg by mouth Daily (before breakfast). Provider, Historical 12/31/2018 AM Active Yes  
ondansetron hcl (ZOFRAN) 4 mg tablet Take 4 mg by mouth every six (6) hours as needed for Nausea. Provider, Historical  Active Yes  
oxyCODONE ER (XTAMPZA ER) 18 mg ER capsule Take 18 mg by mouth every twelve (12) hours. Provider, Historical 12/31/2018 AM Active Yes  
oxyCODONE IR (ROXICODONE) 5 mg immediate release tablet Take 5 mg by mouth every four (4) hours. Provider, Historical 12/31/2018 AM Active Yes  
senna (SENNA) 8.6 mg tablet Take 1 Tab by mouth two (2) times a day. Provider, Historical 12/31/2018 AM Active Yes  
sertraline (ZOLOFT) 100 mg tablet Take 100 mg by mouth daily. Provider, Historical 12/31/2018 AM Active Yes  
simvastatin (ZOCOR) 40 mg tablet Take 40 mg by mouth nightly. Provider, Historical 12/30/2018 PM Active Yes DENICE MackD   Contact: [phone]

## 2019-01-01 NOTE — PROGRESS NOTES
@6074 Bedside and Verbal shift change report given to Shiloh Johnson RN (oncoming nurse) by Bridget Erazo RN (offgoing nurse). Report included the following information SBAR, Kardex, ED Summary, Procedure Summary, Intake/Output, MAR, Accordion, Recent Results, Med Rec Status, Cardiac Rhythm Sinus tachycardia and Alarm Parameters . RRT report received from ICU Charge. Apparently patient went into a grand mal seizure for about 2 minutes requiring ativan 1 mg IVP for effect. She also had been febrile 102 and AMS since admit but with declining mentation prior to RRT. Informed she was tachycardiac, hypertensive, and hypoxic when a respiratory code was called. Also informed she loss bladder function. Head CT completed and negative. Informed of posterior bed sore to the head, sacrum, and right rib cage. @9560 Dr. She Velasquez arrives and exams the patient. Discusses case with  Jun Arteaga. Discontinues isolation and oral meds til mentation improves. EEG completed by [employer]. @7793 Heel boots on, turn right, and on ICU bed. Sacrum is pink and blanchable, noted what may be a rash on her rib cage, and can't discern a wound on the back of her head. @6587 Bedside and Verbal shift change report given to Guillermina John RN (oncoming nurse) by Shiloh Johnson RN (offgoing nurse). Report included the following information SBAR, Kardex, ED Summary, Procedure Summary, Intake/Output, MAR, Accordion, Recent Results, Med Rec Status, Cardiac Rhythm Sinus and Alarm Parameters .

## 2019-01-01 NOTE — PROGRESS NOTES
Blanco Mcpherson [facility] 
[address] [facility], [place], [address] [place] [address] 
[phone] Medical Progress Note NAME: Danny Marie :  1955 MRM:  [id number] Date/Time: 2019  8:12 AM 
 
  
Assessment and Plan: 1. Sepsis / Fever / Sinus tachycardia - POA, unclear etiology. Imaging unremarkable. DDx fungemia, viral, bacterial.  Continue meropenum. Consult ID. Check fungal and bacterial cx. Check acute respiratory viral panel. Lactate normal and no end organ damage. NOT severe sepsis. Add vancomycin  
  
2.  C-spine discitis - Dx 3 weeks ago. MRI showed evidence of discitis at C5-C6 and C6-C7, with abnormal enhancement of the vertebral bodies. There is enhancing phlegmon in the epidural space. No drainable abscess. Evaluated by ortho and ID. Currently on meropenem through 19 
  
2. Acute metabolic encephalopathy - POA, recurrent, unclear etiology, likely due to sepsis. On recent admit had unremarkable head CT, MRI brain and evaluated by neurology. Recent TSH, B12/folate, ammonia WNL s/p LP. HSV neg. EEG negative for seizure activity, RPR negative, West Nile negative, HIV negative, Lyme IgG positive but IgM negative. Hold oxycodone 
  
3.  R elbow cellulitis / bursitis / acute pain / Weakness - Noted on last admit, s/p joint aspiration by IR on , cultures negative to date. Recent MRI of the brain and C spine without any change. On meropenem.  
  
4.  DM type 2 w/ hypoglycemia - Diabetic diet and counseling. SSI per protocol. Continue home Lantus if eating. Resent A1C 7.2%.  
  
5. HTN - Continue atenolol, hold chlorthaldone. PRN labetalol  
  
6. Hypokalemia / Hypernatremia - Cr stable. Hydrate and follow. Hold chlorthalidone. 
  
7. Anemia - POA, chronic, stable, Fe studies consistent with chronic dz. Monitor  
  
8. Urinary retention - Recent Dx. Discharged with Shankar catheter. Continue flomax 
  
9.   Hypothyroidism - Continue synthroid 
  
 10.  Hyperlipidemia - continue simvastatin 11. Obesity. Would benefit from weight loss.  
  
  
 
  
Subjective: Chief Complaint:  Follow up of pt who was admitted with sepsis/ fever ROS: 
(bold if positive, if negative) Fever/chills Tolerating PT  Tolerating Diet Objective:  
 
Last 24hrs VS reviewed since prior progress note. Most recent are: 
 
Visit Vitals BP (!) 178/92 Pulse 94 Temp 98.5 °F (36.9 °C) Resp 28 Ht 5' 1\" (1.549 m) Wt 81.3 kg (179 lb 5 oz) SpO2 95% BMI 33.88 kg/m² SpO2 Readings from Last 6 Encounters:  
01/01/19 95% 12/17/18 96% O2 Flow Rate (L/min): 4 l/min Intake/Output Summary (Last 24 hours) at 1/1/2019 9008 Last data filed at 1/1/2019 7990 Gross per 24 hour Intake 711.25 ml Output 650 ml Net 61.25 ml Physical Exam: 
 
Gen:  Obese,  in no acute distress HEENT:  Pink conjunctivae, PERRL, hearing intact to voice, moist mucous membranes Neck:  Supple, without masses, thyroid non-tender Resp:  No accessory muscle use, clear breath sounds without wheezes rales or rhonchi 
Card:  No murmurs, normal S1, S2 without thrills, bruits or peripheral edema Abd:  Soft, non-tender, non-distended, normoactive bowel sounds are present, no palpable organomegaly and no detectable hernias Lymph:  No cervical or inguinal adenopathy Musc:  No cyanosis or clubbing Skin:  No rashes or ulcers, skin turgor is good Neuro: doesn't , follow commands Psych:poor  insight, confused. __________________________________________________________________ Medications Reviewed: (see below) Medications:  
 
Current Facility-Administered Medications Medication Dose Route Frequency  oxyCODONE-acetaminophen (PERCOCET) 5-325 mg per tablet 1 Tab  1 Tab Oral Q4H PRN  
 sodium chloride (NS) flush 5-10 mL  5-10 mL IntraVENous PRN  
 0.45% sodium chloride with KCl 20 mEq/L infusion   IntraVENous CONTINUOUS Lab Data Reviewed: (see below) Lab Review: Recent Labs 12/31/18 2054 WBC 8.3 HGB 11.0*  
HCT 34.7*  
 Recent Labs 12/31/18 2054 *  
K 2.6*  
 CO2 32 * BUN 25* CREA 0.98  
CA 8.7 ALB 1.8* TBILI 0.6 SGOT 31 ALT 22 Lab Results Component Value Date/Time Glucose (POC) 118 (H) 01/01/2019 06:45 AM  
 Glucose (POC) 129 (H) 12/17/2018 05:13 PM  
 Glucose (POC) 207 (H) 12/17/2018 11:38 AM  
 Glucose (POC) 137 (H) 12/17/2018 06:35 AM  
 Glucose (POC) 221 (H) 12/16/2018 08:48 PM  
 
No results for input(s): PH, PCO2, PO2, HCO3, FIO2 in the last 72 hours. No results for input(s): INR in the last 72 hours. No lab exists for component: INREXT All Micro Results Procedure Component Value Units Date/Time CULTURE, BLOOD [[id number]] Collected:  12/31/18 2054 Order Status:  Completed Specimen:  Blood Updated:  01/01/19 4692 Special Requests: NO SPECIAL REQUESTS Culture result: NO GROWTH AFTER 9 HOURS     
 CULTURE, BLOOD FOR FUNGUS [[id number]] Collected:  12/31/18 2233 Order Status:  Completed Specimen:  Blood Updated:  01/01/19 7218 Special Requests: HOLD 21 DAYS FOR FUNGUS Culture result: NO GROWTH AFTER 7 HOURS     
 CULTURE, BLOOD [[id number]] Collected:  12/31/18 2054 Order Status:  Completed Specimen:  Blood Updated:  01/01/19 4444 Special Requests: NO SPECIAL REQUESTS Culture result: NO GROWTH AFTER 9 HOURS     
 RESPIRATORY PANEL,PCR,NASOPHARYNGEAL [[id number]] Collected:  12/31/18 2233 Order Status:  Completed Specimen:  NASOPHARYNGEAL SWAB Updated:  12/31/18 2251 Satinder Hodges [[id number]] Collected:  12/31/18 2233 Order Status:  Canceled Specimen: Body Fluid URINE CULTURE HOLD SAMPLE [[id number]] Collected:  12/31/18 2140 Order Status:  Completed Specimen:  Urine from Serum Updated:  12/31/18 2150 Urine culture hold URINE ON HOLD IN MICROBIOLOGY DEPT FOR 3 DAYS.  IF UNPRESERVED URINE IS SUBMITTED, IT CANNOT BE USED FOR ADDITIONAL TESTING AFTER 24 HRS, RECOLLECTION WILL BE REQUIRED. CULTURE, URINE [[id number]] Collected:  12/31/18 2140 Order Status:  Completed Specimen:  Cath Urine Updated:  12/31/18 2149 I have reviewed notes of prior 24hr. Other pertinent lab: Total time spent with patient: 28 Care Plan discussed with: Patient, Nursing Staff and >50% of time spent in counseling and coordination of care Discussed:  Care Plan Prophylaxis:  Lovenox Disposition:  SNF/LTC 
        
___________________________________________________ Attending Physician: Courtney Corral MD

## 2019-01-01 NOTE — PROGRESS NOTES
Pt has tonic clonic type seizure. She has been having high grade fever and is septic. Pt is protecting her air way. ativan was given and will transfer to ICU. Check CT scan of the head and EEG. Seizure: likely secondary to sepsis. Monitor in ICU. Consult neurology( spoke with Dr Becky Correa, neurology). Spent 35 minutes of critical time.

## 2019-01-01 NOTE — PROGRESS NOTES
[address] Dosing Services: Antimicrobial Stewardship Progress Note Consult for antibiotic dosing of vancomycin by Dr. Isrrael Petty Pharmacist reviewed antibiotic appropriateness for 61y.o. year old , female  for indication of sepsis Day of Therapy 1 Allergies Patient has no known allergies. Plan: 
Vancomycin therapy: 
Start Vancomycin therapy, with loading dose of 2000 (mg). Follow with maintenance dose of 1250 (mg), every 12 hours (frequency). Dose calculated to approximate a therapeutic trough of 15-20mcg/mL. Last trough level / Plan for level: prior to 4th dose - not ordered yet Pharmacy to follow daily and will make changes to dose and/or frequency based on clinical status. PK levels No results found for: [facility] Date Dose & Interval Measured (mcg/mL) Extrapolated (mcg/mL) ? ? ? ?  
? ? ? ?  
? ? ? ? Non-Kinetic Antimicrobial Dosing:  
Current Regimen:  
Current Antimicrobial Therapy (168h ago, onward) Ordered     Start Stop  
 01/01/19 1446  vancomycin (VANCOCIN) 2,000 mg in 0.9% sodium chloride 500 mL IVPB  2,000 mg,   IntraVENous,   ONCE    
 01/01/19 1500 01/02/19 0259  
 01/01/19 0837  meropenem (MERREM) 500 mg in 0.9% sodium chloride (MBP/ADV) 50 mL  0.5 g,   IntraVENous,   EVERY 6 HOURS    
 01/01/19 0900 --  
 
  
 
   
Recommendation:  
Cultures All Micro Results Procedure Component Value Units Date/Time RESPIRATORY PANEL,PCR,NASOPHARYNGEAL [[id number]] Collected:  12/31/18 5488 Order Status:  Completed Specimen:  Nasopharyngeal Updated:  01/01/19 1352 Adenovirus NOT DETECTED Coronavirus 229E NOT DETECTED Coronavirus HKU1 NOT DETECTED Coronavirus CVNL63 NOT DETECTED Coronavirus OC43 NOT DETECTED Metapneumovirus NOT DETECTED Rhinovirus and Enterovirus NOT DETECTED Influenza A NOT DETECTED Influenza A, subtype H1 NOT DETECTED   Influenza A, subtype H3 NOT DETECTED     
 INFLUENZA A H1N1 PCR NOT DETECTED Influenza B NOT DETECTED Parainfluenza 1 NOT DETECTED Parainfluenza 2 NOT DETECTED Parainfluenza 3 NOT DETECTED Parainfluenza virus 4 NOT DETECTED     
  RSV by PCR NOT DETECTED Bordetella pertussis - PCR NOT DETECTED Chlamydophila pneumoniae DNA, QL, PCR NOT DETECTED Mycoplasma pneumoniae DNA, QL, PCR NOT DETECTED     
 CULTURE, URINE [[id number]] Collected:  12/31/18 2140 Order Status:  Completed Specimen:  Cath Urine Updated:  01/01/19 1013 CULTURE, BLOOD [[id number]] Collected:  12/31/18 2054 Order Status:  Completed Specimen:  Blood Updated:  01/01/19 9854 Special Requests: NO SPECIAL REQUESTS Culture result: NO GROWTH AFTER 9 HOURS     
 CULTURE, BLOOD FOR FUNGUS [[id number]] Collected:  12/31/18 2233 Order Status:  Completed Specimen:  Blood Updated:  01/01/19 6793 Special Requests: HOLD 21 DAYS FOR FUNGUS Culture result: NO GROWTH AFTER 7 HOURS     
 CULTURE, BLOOD [[id number]] Collected:  12/31/18 2054 Order Status:  Completed Specimen:  Blood Updated:  01/01/19 0283 Special Requests: NO SPECIAL REQUESTS Culture result: NO GROWTH AFTER 9 HOURS     
 CULTURE, FUNGUS [[id number]] Collected:  12/31/18 2233 Order Status:  Canceled Specimen: Body Fluid URINE CULTURE HOLD SAMPLE [[id number]] Collected:  12/31/18 2140 Order Status:  Completed Specimen:  Urine from Serum Updated:  12/31/18 2150 Urine culture hold URINE ON HOLD IN MICROBIOLOGY DEPT FOR 3 DAYS. IF UNPRESERVED URINE IS SUBMITTED, IT CANNOT BE USED FOR ADDITIONAL TESTING AFTER 24 HRS, RECOLLECTION WILL BE REQUIRED. Serum Creatinine Lab Results Component Value Date/Time Creatinine 0.91 01/01/2019 01:25 PM  
   
Creatinine Clearance Estimated Creatinine Clearance: 61.1 mL/min (based on SCr of 0.91 mg/dL). Temp  
(!) 102.1 °F (38.9 °C) WBC Lab Results Component Value Date/Time WBC 8.5 01/01/2019 01:25 PM  
   
H/H Lab Results Component Value Date/Time HGB 9.5 (L) 01/01/2019 01:25 PM  
  
 
Platelets Lab Results Component Value Date/Time PLATELET 207 41/40/7533 01:25 PM  
  
 
 
Pharmacist: DENICE BellD Contact information: [id number]

## 2019-01-01 NOTE — ED PROVIDER NOTES
Pt arrives via EMS c/o AMS, alert to name only, recent sepsis dx. Lethargy at 1600, nausea, 102 temp. Low K 2.6 at 1800 at facility, 10L NRB with o2 sats at 90 in route. The history is provided by the EMS personnel. Altered mental status This is a new problem. The current episode started 3 to 5 hours ago. Associated symptoms include confusion, somnolence and weakness. Her past medical history is significant for diabetes. Past Medical History:  
Diagnosis Date  Chronic back pain  Diabetes mellitus, type 2 ([address]  History of vascular access device 12/03/2018 [facility] VAT - 4 FR single, L cephalic, 44 cm for LTA  HTN (hypertension) History reviewed. No pertinent surgical history. Family History:  
Problem Relation Age of Onset  Diabetes Father  Heart Disease Father Social History Socioeconomic History  Marital status:  Spouse name: Not on file  Number of children: Not on file  Years of education: Not on file  Highest education level: Not on file Social Needs  Financial resource strain: Not on file  Food insecurity - worry: Not on file  Food insecurity - inability: Not on file  Transportation needs - medical: Not on file  Transportation needs - non-medical: Not on file Occupational History  Not on file Tobacco Use  Smoking status: Never Smoker  Smokeless tobacco: Never Used Substance and Sexual Activity  Alcohol use: No  
  Frequency: Never  Drug use: Not on file  Sexual activity: Not on file Other Topics Concern [address] Service Not Asked  Blood Transfusions Not Asked  Caffeine Concern Not Asked  Occupational Exposure Not Asked Reubin Castellani Hazards Not Asked  Sleep Concern Not Asked  Stress Concern Not Asked  Weight Concern Not Asked  Special Diet Not Asked  Back Care Not Asked  Exercise Not Asked  Bike Helmet Not Asked  Seat Belt Not Asked  Self-Exams Not Asked Social History Narrative  Not on file ALLERGIES: Patient has no known allergies. Review of Systems Unable to perform ROS: Mental status change Neurological: Positive for weakness. Psychiatric/Behavioral: Positive for confusion. Vitals:  
 12/31/18 2130 12/31/18 2145 12/31/18 2200 12/31/18 2246 BP: (!) 188/94 (!) 177/92 (!) 165/114 Pulse: 89 86 96 Resp: (!) 31 27 19 Temp:    (!) 101.3 °F (38.5 °C) SpO2: 93% 94% 95% Weight:      
Height:      
      
 
Physical Exam  
Constitutional: She appears well-developed and well-nourished. No distress. HENT:  
Head: Normocephalic and atraumatic. Right Ear: External ear normal.  
Left Ear: External ear normal.  
Nose: Nose normal.  
Mouth/Throat: Oropharynx is clear and moist. No oropharyngeal exudate. Eyes: Conjunctivae and EOM are normal. Pupils are equal, round, and reactive to light. Right eye exhibits no discharge. Left eye exhibits no discharge. No scleral icterus. Neck: Normal range of motion. Neck supple. No JVD present. No tracheal deviation present. Cardiovascular: Normal rate, regular rhythm, normal heart sounds and intact distal pulses. Exam reveals no gallop and no friction rub. No murmur heard. Pulmonary/Chest: Effort normal. No stridor. No respiratory distress. She has decreased breath sounds in the right lower field and the left lower field. She has no wheezes. She has rhonchi in the right middle field and the left middle field. She has no rales. She exhibits no tenderness. Abdominal: Soft. Bowel sounds are normal. She exhibits no distension. There is no tenderness. There is no rebound and no guarding. Genitourinary:  
Genitourinary Comments: Indwelling joyce Musculoskeletal: Normal range of motion. She exhibits no tenderness. Right elbow: She exhibits swelling. Right forearm: She exhibits swelling and edema. Crepitus of right elbow with movement. Neurological: She is alert. She has normal strength and normal reflexes. She is disoriented (oriented to person and place). She displays normal reflexes. No cranial nerve deficit or sensory deficit. She exhibits normal muscle tone. Coordination normal. GCS eye subscore is 4. GCS verbal subscore is 5. GCS motor subscore is 6. Skin: Skin is warm and dry. Capillary refill takes less than 2 seconds. No rash noted. She is not diaphoretic. No erythema. No pallor. Psychiatric: She has a normal mood and affect. Her behavior is normal. Judgment and thought content normal.  
Nursing note and vitals reviewed. MDM Number of Diagnoses or Management Options Acute metabolic encephalopathy:  
Fever, unspecified fever cause: Hypokalemia:  
  
Amount and/or Complexity of Data Reviewed Clinical lab tests: ordered and reviewed Tests in the radiology section of CPT®: ordered and reviewed Tests in the medicine section of CPT®: ordered and reviewed Review and summarize past medical records: yes (From rehab facility.) Discuss the patient with other providers: yes (Hospitalist) Independent visualization of images, tracings, or specimens: yes (ekg) Risk of Complications, Morbidity, and/or Mortality Presenting problems: moderate Diagnostic procedures: moderate Management options: moderate Patient Progress Patient progress: stable Procedures Chief Complaint Patient presents with  Altered mental status The patient's presenting problems have been discussed, and they are in agreement with the care plan formulated and outlined with them. I have encouraged them to ask questions as they arise throughout their visit. MEDICATIONS GIVEN: 
Medications  
sodium chloride (NS) flush 5-10 mL (not administered) potassium chloride 10 mEq in 100 ml IVPB (10 mEq IntraVENous New Bag 12/31/18 2242) meropenem (MERREM) 2 g in 0.9% sodium chloride 100 mL IVPB (2 g IntraVENous Given 12/31/18 2250) potassium chloride 10 mEq in 100 ml IVPB (not administered) 0.45% sodium chloride with KCl 20 mEq/L infusion (not administered)  
acetaminophen (TYLENOL) suppository 650 mg (650 mg Rectal Given 12/31/18 2136)  
sodium chloride 0.9 % bolus infusion 1,000 mL (1,000 mL IntraVENous New Bag 12/31/18 2111) Followed by  
sodium chloride 0.9 % bolus infusion 434 mL (0 mL IntraVENous IV Completed 12/31/18 2211) iopamidol (ISOVUE-370) 76 % injection 100 mL (100 mL IntraVENous Given 12/31/18 2221) LABS REVIEWED: 
Recent Results (from the past 24 hour(s)) METABOLIC PANEL, COMPREHENSIVE Collection Time: 12/31/18  8:54 PM  
Result Value Ref Range Sodium 146 (H) 136 - 145 mmol/L Potassium 2.6 (LL) 3.5 - 5.1 mmol/L Chloride 104 97 - 108 mmol/L  
 CO2 32 21 - 32 mmol/L Anion gap 10 5 - 15 mmol/L Glucose 141 (H) 65 - 100 mg/dL BUN 25 (H) 6 - 20 MG/DL Creatinine 0.98 0.55 - 1.02 MG/DL  
 BUN/Creatinine ratio 26 (H) 12 - 20 GFR est AA >60 >60 ml/min/1.73m2 GFR est non-AA 57 (L) >60 ml/min/1.73m2 Calcium 8.7 8.5 - 10.1 MG/DL Bilirubin, total 0.6 0.2 - 1.0 MG/DL  
 ALT (SGPT) 22 12 - 78 U/L  
 AST (SGOT) 31 15 - 37 U/L Alk. phosphatase 130 (H) 45 - 117 U/L Protein, total 6.3 (L) 6.4 - 8.2 g/dL Albumin 1.8 (L) 3.5 - 5.0 g/dL Globulin 4.5 (H) 2.0 - 4.0 g/dL A-G Ratio 0.4 (L) 1.1 - 2.2    
CBC WITH AUTOMATED DIFF Collection Time: 12/31/18  8:54 PM  
Result Value Ref Range WBC 8.3 3.6 - 11.0 K/uL  
 RBC 4.06 3.80 - 5.20 M/uL  
 HGB 11.0 (L) 11.5 - 16.0 g/dL HCT 34.7 (L) 35.0 - 47.0 % MCV 85.5 80.0 - 99.0 FL  
 MCH 27.1 26.0 - 34.0 PG  
 MCHC 31.7 30.0 - 36.5 g/dL  
 RDW 15.3 (H) 11.5 - 14.5 % PLATELET 619 499 - 584 K/uL MPV 9.7 8.9 - 12.9 FL  
 NRBC 0.0 0  WBC ABSOLUTE NRBC 0.00 0.00 - 0.01 K/uL NEUTROPHILS 69 32 - 75 % LYMPHOCYTES 18 12 - 49 % MONOCYTES 8 5 - 13 % EOSINOPHILS 4 0 - 7 % BASOPHILS 1 0 - 1 % IMMATURE GRANULOCYTES 1 (H) 0.0 - 0.5 % ABS. NEUTROPHILS 5.7 1.8 - 8.0 K/UL  
 ABS. LYMPHOCYTES 1.5 0.8 - 3.5 K/UL  
 ABS. MONOCYTES 0.6 0.0 - 1.0 K/UL  
 ABS. EOSINOPHILS 0.4 0.0 - 0.4 K/UL  
 ABS. BASOPHILS 0.1 0.0 - 0.1 K/UL  
 ABS. IMM. GRANS. 0.1 (H) 0.00 - 0.04 K/UL  
 DF AUTOMATED    
TROPONIN I Collection Time: 12/31/18  8:54 PM  
Result Value Ref Range Troponin-I, Qt. <0.05 <0.05 ng/mL SAMPLES BEING HELD Collection Time: 12/31/18  8:58 PM  
Result Value Ref Range SAMPLES BEING HELD [facility] COMMENT Add-on orders for these samples will be processed based on acceptable specimen integrity and analyte stability, which may vary by analyte. POC LACTIC ACID Collection Time: 12/31/18  9:01 PM  
Result Value Ref Range Lactic Acid (POC) 1.34 0.40 - 2.00 mmol/L  
EKG, 12 LEAD, INITIAL Collection Time: 12/31/18  9:07 PM  
Result Value Ref Range Ventricular Rate 96 BPM  
 Atrial Rate 96 BPM  
 P-R Interval 162 ms QRS Duration 88 ms Q-T Interval 372 ms QTC Calculation (Bezet) 469 ms Calculated P Axis 32 degrees Calculated R Axis 22 degrees Calculated T Axis 31 degrees Diagnosis Normal sinus rhythm Possible Left atrial enlargement Nonspecific ST and T wave abnormality Abnormal ECG When compared with ECG of 30-NOV-2018 11:24, No significant change was found URINALYSIS W/MICROSCOPIC Collection Time: 12/31/18  9:40 PM  
Result Value Ref Range Color YELLOW/STRAW Appearance HAZY (A) CLEAR Specific gravity 1.025 1.003 - 1.030    
 pH (UA) 6.0 5.0 - 8.0 Protein >300 (A) NEG mg/dL Glucose NEGATIVE  NEG mg/dL Ketone TRACE (A) NEG mg/dL Bilirubin SMALL (A) NEG Blood MODERATE (A) NEG Urobilinogen 0.2 0.2 - 1.0 EU/dL Nitrites NEGATIVE  NEG Leukocyte Esterase TRACE (A) NEG Bilirubin UA, confirm NEGATIVE  NEG    
 WBC 10-20 0 - 4 /hpf  
 RBC 0-5 0 - 5 /hpf Epithelial cells FEW FEW /lpf Bacteria NEGATIVE  NEG /hpf Budding yeast PRESENT (A) NEG URINE CULTURE HOLD SAMPLE Collection Time: 12/31/18  9:40 PM  
Result Value Ref Range Urine culture hold URINE ON HOLD IN MICROBIOLOGY DEPT FOR 3 DAYS. IF UNPRESERVED URINE IS SUBMITTED, IT CANNOT BE USED FOR ADDITIONAL TESTING AFTER 24 HRS, RECOLLECTION WILL BE REQUIRED. VITAL SIGNS: 
Patient Vitals for the past 12 hrs: 
 Temp Pulse Resp BP SpO2  
12/31/18 2246 (!) 101.3 °F (38.5 °C)      
12/31/18 2200  96 19 (!) 165/114 95 % 12/31/18 2145  86 27 (!) 177/92 94 % 12/31/18 2130  89 (!) 31 (!) 188/94 93 % 12/31/18 2115  91 28 (!) 175/113 94 % 12/31/18 2100  93 30 (!) 184/106 95 % 12/31/18 2045 (!) 104.3 °F (40.2 °C) (!) 109 24 (!) 186/114 93 % RADIOLOGY RESULTS: 
The following have been ordered and reviewed: 
Xr Elbow Rt Min 3 V Result Date: 12/31/2018 EXAM: XR ELBOW RT MIN 3 V INDICATION: crepitus and swelling. COMPARISON: November 30, 2018 FINDINGS: Three views of the right elbow demonstrate no fracture, dislocation, effusion or other acute abnormality. Multiple small calcified intra-articular loose bodies are noted. IMPRESSION: No acute abnormality. Multiple small calcified intra-articular loose bodies. Ct Head Wo Cont Result Date: 12/31/2018 EXAM: CT HEAD WO CONT INDICATION: Altered mental status COMPARISON: December 4, 2018. CONTRAST: None. TECHNIQUE: Unenhanced CT of the head was performed using 5 mm images. Brain and bone windows were generated. CT dose reduction was achieved through use of a standardized protocol tailored for this examination and automatic exposure control for dose modulation. FINDINGS: The ventricles and sulci are normal in size, shape and configuration and midline. There is no significant white matter disease. There is no intracranial hemorrhage, extra-axial collection, mass, mass effect or midline shift. The basilar cisterns are open.  No acute infarct is identified. The bone windows demonstrate no abnormalities. The visualized portions of the paranasal sinuses and mastoid air cells are clear. IMPRESSION: No acute intracranial process. Cta Chest W Or W Wo Cont Result Date: 12/31/2018 EXAM: CT ANGIOGRAPHY CHEST INDICATION:  cough, fever, ? pe. Confusion, alert to name only, recent sepsis, now with lethargy, nausea and fever. History of osteomyelitis at C5-6. COMPARISON: Chest x-ray earlier same day. CONTRAST: 96 mL of Isovue-370. TECHNIQUE: Precontrast  images were obtained to localize the volume for acquisition. Multislice helical CT arteriography was performed from the diaphragm to the thoracic inlet during uneventful rapid bolus intravenous contrast administration. Lung and soft tissue windows were generated. Coronal and sagittal  reformatted images were also generated. 3D post processing consisting of coronal maximum intensity projection images was performed. CT dose reduction was achieved through use of a standardized protocol tailored for this examination and automatic exposure control for dose modulation. FINDINGS: Mild patchy atelectasis throughout both lungs. . The pulmonary arteries are well enhanced and no pulmonary emboli are identified. There is no mediastinal or hilar adenopathy or mass. The aorta enhances normally without evidence of aneurysm or dissection. The gallbladder is distended. The visualized portions of the upper abdominal organs are otherwise normal. Incidentally noted anterior compression deformity at the cervicothoracic junction, possibly related to the history of osteomyelitis in the past.  
 
IMPRESSION: 1. No CT evidence for central pulmonary embolus at this time . 2. Other incidental findings as described. Xr Chest [place] Result Date: 12/31/2018 INDICATION: Meets SIRS criteria COMPARISON: December 10, 2018 FINDINGS: AP portable imaging of the chest performed at 9:59 PM demonstrates a normal cardiomediastinal silhouette. There is stable mild elevation of the right hemidiaphragm. No focal consolidation or pleural effusion is evident left arm PICC line has its tip overlying the azygos arch. No significant osseous abnormalities are seen. IMPRESSION: No evidence of pneumonia or pleural effusion. ED EKG interpretation: 
Rhythm: normal sinus rhythm; and regular . Rate (approx.): 96; Axis: normal; P wave: normal; QRS interval: normal ; ST/T wave: non-specific changes; Other findings: abnormal ekg. This EKG was interpreted by Alicja Lantigua DO, ED Provider. CONSULTATIONS:  
CONSULT NOTE: 
11:00 PM Johanna Rodríguez DO spoke with Dr. Concetta Garrison, Consult for Hospitalist.  Discussed available diagnostic tests and clinical findings. He is in agreement with care plans as outlined. Dr. Concetta Garrison will see and admit pt for further evaluation and treatment. PROGRESS NOTES: 
Discussed results and plan with patient. Patient will be admitted/observed for further evaluation and treatment. DIAGNOSIS: 
 
1. Fever, unspecified fever cause 2. Acute metabolic encephalopathy 3. Hypokalemia PLAN: 
Admit/obs ED COURSE: The patient's hospital course has been uncomplicated.

## 2019-01-01 NOTE — PROCEDURES
Patient Name: Perico White  : 1955  Age: 61 y.o. Ordering physician: No ref. provider found  Date of EE2019  EEG procedure number: [id number]  Diagnosis: seizure  Interpreting physician: Mami Richard MD      ELECTROENCEPHALOGRAM REPORT     PROCEDURE: EEG. CLINICAL INDICATION: The patient is a 61 y.o. female with a history of   possible seizures. EEG to rule out seizures, rule out stroke, rule out   cortical abnormality. EEG CLASSIFICATION: Essentially normal    DESCRIPTION OF THE RECORD:   The background of this recording contains a posteriorly-located occipital alpha rhythm of 8 Hz that attenuates with eye opening. Throughout the recording, there were no clear areas of focal slowing nor spike or spike-and-wave discharges seen. Hyperventilation was not performed. Photic stimulation produced no response. During the recording the patient did not achieve stage II sleep    INTERPRETATION: This is a normal electroencephalogram showing no clear focal abnormalities or epileptiform activity. A normal EEG doesn't not rule out seizures. Clinical correlation recommended.       Palak Meadows MD  2019  3:11 PM

## 2019-01-01 NOTE — PROGRESS NOTES
TRANSFER - IN REPORT: 
 
Verbal report received from Everetts, RN(name) on Kameron Mauro  being received from ED(unit) for routine progression of care Report consisted of patients Situation, Background, Assessment and  
Recommendations(SBAR). Information from the following report(s) SBAR, Kardex, ED Summary, Intake/Output, Recent Results, Med Rec Status and Cardiac Rhythm NSR/ST was reviewed with the receiving nurse. Opportunity for questions and clarification was provided. Assessment completed upon patients arrival to unit and care assumed. 0102: Pt arrived vie bed accompanied by RN and Family members. Pt lethargic, moaning, eyes closed. Patient Vitals for the past 8 hrs: 
 Temp Pulse Resp BP SpO2  
01/01/19 0600 98.5 °F (36.9 °C) 94 28 (!) 178/92 95 % 01/01/19 0400 99.2 °F (37.3 °C) 97 21 161/84 97 % 01/01/19 0300  94 24  96 % 01/01/19 0102 99.1 °F (37.3 °C) 92 18 156/84 98 % 01/01/19 0030  88 24 (!) 160/97 96 % 01/01/19 0015 (!) 102.4 °F (39.1 °C) 94 22 152/84 93 % 01/01/19 0000    (!) 164/97   
12/31/18 2359  90 25  96 % 12/31/18 2345  86 26 161/89 94 % 12/31/18 2330  93 23 (!) 168/95 92 % 12/31/18 2315  87 20 (!) 163/99 92 %  
   
0600: Notified Dr. Marissa Issa of pt increasing B/P; and pt reporting severe back pain. Dr. Ha Renteria every 4 hours PRN.  
 
9022: Pt awake; moaning, agitated. Administered percocet 5/325 tab. Pt tolerated drinking water well; pt reported it was very painful to sit up in bed; feels much better to lay flat. Bedside and Verbal shift change report given to Consuelo Ayala RN (oncoming nurse) by Silva Mota (offgoing nurse). Report included the following information SBAR, Kardex, ED Summary, Intake/Output, Recent Results, Med Rec Status and Cardiac Rhythm NSR.

## 2019-01-01 NOTE — PROGRESS NOTES
[address] Dosing Services: Antimicrobial Stewardship Progress Note Consult for Meropenem dosing by Dr. Taryn Vaughn. Pharmacist reviewed antibiotic appropriateness for  this 61year old , female  for Sepsis/ Fever/ C-spine discitis/ R elbow cellulitis / bursitis Therapy resumed from outpatient (through 1/18/19?)- ID consulted Plan: PTA regimen:  Meropenem 2 gm IV Q 12hrs Change regimen while in patient to 500 mg IV Q 6 hrs Other Antimicrobial 
(not dosed by pharmacist) 
 none Cultures 12/31 Blood x2: in process 12/31 Urine: in process 12/31 Blood for fungus: NG (pending) 12/31 Nasopharyngeal swab: in process Serum Creatinine Lab Results Component Value Date/Time Creatinine 0.98 12/31/2018 08:54 PM  
   
Creatinine Clearance Estimated Creatinine Clearance: 56.8 mL/min (based on SCr of 0.98 mg/dL). Temp 98.5 °F (36.9 °C) WBC Lab Results Component Value Date/Time WBC 8.3 12/31/2018 08:54 PM  
   
H/H Lab Results Component Value Date/Time HGB 11.0 (L) 12/31/2018 08:54 PM  
  
 
Platelets Lab Results Component Value Date/Time PLATELET 532 88/33/5585 08:54 PM  
  
 
 
Pharmacist: Signed Harvey Crooks PHARMD Contact information: [phone]

## 2019-01-01 NOTE — PROGRESS NOTES
Spiritual Care Assessment/Progress Note Zuniga Adalid 
 
 
NAME: Michelle Pablo      MRN: [id number] AGE: 61 y.o. SEX: female Jew Affiliation: No Pentecostalism Language: [place] 1/1/2019     Total Time (in minutes): 13 Spiritual Assessment begun in [facility] 3 INTENSIVE CARE through conversation with: 
  
    []Patient        [x] Family    [] Friend(s) Reason for Consult: Code Blue/99 Spiritual beliefs: (Please include comment if needed) 
   [] Identifies with a keerthi tradition:     
   [] Supported by a keerthi community:        
   [x] Claims no spiritual orientation:       
   [] Seeking spiritual identity:            
   [] Adheres to an individual form of spirituality:       
   [] Not able to assess:                   
 
    
Identified resources for coping:  
   [] Prayer                           
   [] Music                  [] Guided Imagery [x] Family/friends                 [] Pet visits [] Devotional reading                         [] Unknown 
   [] Other:                                           
 
 
Interventions offered during this visit: (See comments for more details) Family/Friend(s): Affirmation of emotions/emotional suffering, Catharsis/review of pertinent events in supportive environment, Normalization of emotional/spiritual concerns Plan of Care: 
 
 [x] Support spiritual and/or cultural needs  
 [] Support AMD and/or advance care planning process    
 [] Support grieving process 
 [] Coordinate Rites and/or Rituals  
 [] Coordination with community clergy [] No spiritual needs identified at this time 
 [] Detailed Plan of Care below (See Comments)  [] Make referral to Music Therapy 
[] Make referral to Pet Therapy    
[] Make referral to Addiction services 
[] Make referral to [facility] 
[] Make referral to Spiritual Care Partner 
[] No future visits requested       
[] Follow up visits as needed Comments: [employer] is responding to Code Blue in room 336. Pt's  was outside room at the time.  offered a supportive presence with pt's spouse as he processed pt's continued experience in various healthcare settings and how frustrating this has been for her, in addition to her presentation as confused at different points. Pt and spouse are not Religion. They have excellent family support and are mutually supportive of one another.  offered a listening presence, words of encouragement, and assurance of  support as needed. Spouse expressed thanks for  presence. Chaplain Kwabena Milligan M.Div, M.S, [facility] Spiritual Care available at [phone])

## 2019-01-01 NOTE — PROGRESS NOTES
1630: [facility] Patient yelling leave me alone at this time. Unable to assess whether she is oriented and she will not answer any of my questions or follow verbal commands. Patient will look at me. VSS. MD aware of patients mental status at this time. 1838: Patient moaning and when asked if she has pain nods head yes. Asked patient if she could rate it and she shook her head no. Gave 2mg morphine IV. Bedside shift change report given to Sri larios RN (oncoming nurse) by Deb Christian RN (offgoing nurse). Report included the following information SBAR, Kardex, ED Summary, Intake/Output, MAR and Recent Results.

## 2019-01-02 ENCOUNTER — APPOINTMENT (OUTPATIENT)
Dept: MRI IMAGING | Age: 64
DRG: 871 | End: 2019-01-02
Attending: NURSE PRACTITIONER
Payer: COMMERCIAL

## 2019-01-02 LAB
ANION GAP SERPL CALC-SCNC: 10 MMOL/L (ref 5–15)
ATRIAL RATE: 96 BPM
BASOPHILS # BLD: 0.1 K/UL (ref 0–0.1)
BASOPHILS NFR BLD: 1 % (ref 0–1)
BUN SERPL-MCNC: 30 MG/DL (ref 6–20)
BUN/CREAT SERPL: 36 (ref 12–20)
CALCIUM SERPL-MCNC: 7.7 MG/DL (ref 8.5–10.1)
CALCULATED P AXIS, ECG09: 32 DEGREES
CALCULATED R AXIS, ECG10: 22 DEGREES
CALCULATED T AXIS, ECG11: 31 DEGREES
CHLORIDE SERPL-SCNC: 108 MMOL/L (ref 97–108)
CO2 SERPL-SCNC: 26 MMOL/L (ref 21–32)
CREAT SERPL-MCNC: 0.83 MG/DL (ref 0.55–1.02)
DIAGNOSIS, 93000: NORMAL
DIFFERENTIAL METHOD BLD: ABNORMAL
EOSINOPHIL # BLD: 1 K/UL (ref 0–0.4)
EOSINOPHIL NFR BLD: 15 % (ref 0–7)
ERYTHROCYTE [DISTWIDTH] IN BLOOD BY AUTOMATED COUNT: 15.3 % (ref 11.5–14.5)
GLUCOSE SERPL-MCNC: 122 MG/DL (ref 65–100)
HCT VFR BLD AUTO: 26 % (ref 35–47)
HGB BLD-MCNC: 8 G/DL (ref 11.5–16)
IMM GRANULOCYTES # BLD: 0 K/UL
IMM GRANULOCYTES NFR BLD AUTO: 0 %
LYMPHOCYTES # BLD: 0.9 K/UL (ref 0.8–3.5)
LYMPHOCYTES NFR BLD: 14 % (ref 12–49)
MCH RBC QN AUTO: 26.8 PG (ref 26–34)
MCHC RBC AUTO-ENTMCNC: 30.8 G/DL (ref 30–36.5)
MCV RBC AUTO: 87.2 FL (ref 80–99)
MONOCYTES # BLD: 0.3 K/UL (ref 0–1)
MONOCYTES NFR BLD: 4 % (ref 5–13)
NEUTS SEG # BLD: 4.1 K/UL (ref 1.8–8)
NEUTS SEG NFR BLD: 66 % (ref 32–75)
NRBC # BLD: 0 K/UL (ref 0–0.01)
NRBC BLD-RTO: 0 PER 100 WBC
P-R INTERVAL, ECG05: 162 MS
PLATELET # BLD AUTO: 241 K/UL (ref 150–400)
PMV BLD AUTO: 10 FL (ref 8.9–12.9)
POTASSIUM SERPL-SCNC: 3.3 MMOL/L (ref 3.5–5.1)
Q-T INTERVAL, ECG07: 372 MS
QRS DURATION, ECG06: 88 MS
QTC CALCULATION (BEZET), ECG08: 469 MS
RBC # BLD AUTO: 2.98 M/UL (ref 3.8–5.2)
RBC MORPH BLD: ABNORMAL
SODIUM SERPL-SCNC: 144 MMOL/L (ref 136–145)
VENTRICULAR RATE, ECG03: 96 BPM
WBC # BLD AUTO: 6.4 K/UL (ref 3.6–11)

## 2019-01-02 PROCEDURE — 74011000258 HC RX REV CODE- 258: Performed by: INTERNAL MEDICINE

## 2019-01-02 PROCEDURE — 74011250636 HC RX REV CODE- 250/636: Performed by: INTERNAL MEDICINE

## 2019-01-02 PROCEDURE — 85025 COMPLETE CBC W/AUTO DIFF WBC: CPT

## 2019-01-02 PROCEDURE — 70553 MRI BRAIN STEM W/O & W/DYE: CPT

## 2019-01-02 PROCEDURE — 65610000006 HC RM INTENSIVE CARE

## 2019-01-02 PROCEDURE — 72156 MRI NECK SPINE W/O & W/DYE: CPT

## 2019-01-02 PROCEDURE — 93971 EXTREMITY STUDY: CPT

## 2019-01-02 PROCEDURE — A9575 INJ GADOTERATE MEGLUMI 0.1ML: HCPCS | Performed by: RADIOLOGY

## 2019-01-02 PROCEDURE — 36415 COLL VENOUS BLD VENIPUNCTURE: CPT

## 2019-01-02 PROCEDURE — 77010033678 HC OXYGEN DAILY

## 2019-01-02 PROCEDURE — 74011250637 HC RX REV CODE- 250/637: Performed by: INTERNAL MEDICINE

## 2019-01-02 PROCEDURE — 80048 BASIC METABOLIC PNL TOTAL CA: CPT

## 2019-01-02 PROCEDURE — 74011250636 HC RX REV CODE- 250/636: Performed by: RADIOLOGY

## 2019-01-02 RX ORDER — POTASSIUM CHLORIDE 7.45 MG/ML
10 INJECTION INTRAVENOUS
Status: COMPLETED | OUTPATIENT
Start: 2019-01-02 | End: 2019-01-02

## 2019-01-02 RX ORDER — GADOTERATE MEGLUMINE 376.9 MG/ML
15 INJECTION INTRAVENOUS
Status: COMPLETED | OUTPATIENT
Start: 2019-01-02 | End: 2019-01-02

## 2019-01-02 RX ORDER — OXYCODONE HYDROCHLORIDE 5 MG/1
5 TABLET ORAL
Status: DISCONTINUED | OUTPATIENT
Start: 2019-01-02 | End: 2019-01-10 | Stop reason: HOSPADM

## 2019-01-02 RX ADMIN — POTASSIUM CHLORIDE 10 MEQ: 7.46 INJECTION, SOLUTION INTRAVENOUS at 14:38

## 2019-01-02 RX ADMIN — MORPHINE SULFATE 2 MG: 4 INJECTION, SOLUTION INTRAMUSCULAR; INTRAVENOUS at 10:06

## 2019-01-02 RX ADMIN — HEPARIN SODIUM 5000 UNITS: 5000 INJECTION INTRAVENOUS; SUBCUTANEOUS at 22:03

## 2019-01-02 RX ADMIN — HEPARIN SODIUM 5000 UNITS: 5000 INJECTION INTRAVENOUS; SUBCUTANEOUS at 05:04

## 2019-01-02 RX ADMIN — MEROPENEM 500 MG: 500 INJECTION, POWDER, FOR SOLUTION INTRAVENOUS at 00:49

## 2019-01-02 RX ADMIN — POTASSIUM CHLORIDE 10 MEQ: 7.46 INJECTION, SOLUTION INTRAVENOUS at 12:43

## 2019-01-02 RX ADMIN — MEROPENEM 500 MG: 500 INJECTION, POWDER, FOR SOLUTION INTRAVENOUS at 18:09

## 2019-01-02 RX ADMIN — VANCOMYCIN HYDROCHLORIDE 1250 MG: 10 INJECTION, POWDER, LYOPHILIZED, FOR SOLUTION INTRAVENOUS at 14:38

## 2019-01-02 RX ADMIN — SODIUM CHLORIDE AND POTASSIUM CHLORIDE: 4.5; 1.49 INJECTION, SOLUTION INTRAVENOUS at 05:10

## 2019-01-02 RX ADMIN — SODIUM CHLORIDE, SODIUM LACTATE, POTASSIUM CHLORIDE, AND CALCIUM CHLORIDE 500 ML: 600; 310; 30; 20 INJECTION, SOLUTION INTRAVENOUS at 09:57

## 2019-01-02 RX ADMIN — POTASSIUM CHLORIDE 10 MEQ: 7.46 INJECTION, SOLUTION INTRAVENOUS at 15:26

## 2019-01-02 RX ADMIN — OXYCODONE HYDROCHLORIDE 5 MG: 5 TABLET ORAL at 22:03

## 2019-01-02 RX ADMIN — MEROPENEM 500 MG: 500 INJECTION, POWDER, FOR SOLUTION INTRAVENOUS at 12:42

## 2019-01-02 RX ADMIN — OXYCODONE HYDROCHLORIDE 5 MG: 5 TABLET ORAL at 12:48

## 2019-01-02 RX ADMIN — MEROPENEM 500 MG: 500 INJECTION, POWDER, FOR SOLUTION INTRAVENOUS at 06:43

## 2019-01-02 RX ADMIN — HEPARIN SODIUM 5000 UNITS: 5000 INJECTION INTRAVENOUS; SUBCUTANEOUS at 14:37

## 2019-01-02 RX ADMIN — GADOTERATE MEGLUMINE 15 ML: 376.9 INJECTION INTRAVENOUS at 20:00

## 2019-01-02 RX ADMIN — SODIUM CHLORIDE AND POTASSIUM CHLORIDE: 4.5; 1.49 INJECTION, SOLUTION INTRAVENOUS at 18:09

## 2019-01-02 RX ADMIN — OXYCODONE HYDROCHLORIDE 5 MG: 5 TABLET ORAL at 18:09

## 2019-01-02 RX ADMIN — MORPHINE SULFATE 2 MG: 4 INJECTION, SOLUTION INTRAMUSCULAR; INTRAVENOUS at 05:04

## 2019-01-02 RX ADMIN — MORPHINE SULFATE 2 MG: 4 INJECTION, SOLUTION INTRAMUSCULAR; INTRAVENOUS at 00:55

## 2019-01-02 RX ADMIN — POTASSIUM CHLORIDE 10 MEQ: 7.46 INJECTION, SOLUTION INTRAVENOUS at 16:29

## 2019-01-02 RX ADMIN — VANCOMYCIN HYDROCHLORIDE 1250 MG: 10 INJECTION, POWDER, LYOPHILIZED, FOR SOLUTION INTRAVENOUS at 03:09

## 2019-01-02 RX ADMIN — LEVOTHYROXINE SODIUM 125 MCG: 25 TABLET ORAL at 12:48

## 2019-01-02 NOTE — PROGRESS NOTES
Please complete MRI History and Safety Screening Form for this patient Using [facility] only under Orders Requiring a Screening Form: 
Example: 
Orders Requiring a Screening Form Procedure                    Order Status                      Form Status MRI EXAM                   Active                                In Progress Click on blue hyperlink as shown above to launch MRI screening form Answer all questions completely including Weight, Surgery, and Implant History. Document MUST be \"eSigned\" using a \"Signature Pad\" by the person completing form.  (patient and RN or MD completing form with the patient) Patient cannot be scanned until this form is completed and reviewed in MRI to ensure patient is SAFE and eligible for MRI. Call MRI when this has been successfully completed at [phone]. This must be done under KARDEX. Do not use the Nursing Flowsheet.

## 2019-01-02 NOTE — PROGRESS NOTES
Blanco Mcpherson [facility] 79 
[address] [facility], [place], [address] [place] [address] 
[phone] Medical Progress Note NAME: Catherine Dobbins :  1955 MRM:  [id number] Date/Time: 2019  8:12 AM 
 
  
Assessment and Plan: 1. Sepsis / Fever / Sinus tachycardia - POA, unclear etiology. Imaging unremarkable. DDx fungemia, viral, bacterial.  Continue meropenum. Consult ID. Check fungal and bacterial cx. Check acute respiratory viral panel. Lactate normal and no end organ damage. NOT severe sepsis. Added vancomycin. May need to repeat MRI of the spine.  
  
2.  C-spine discitis - Dx 3 weeks ago. MRI showed evidence of discitis at C5-C6 and C6-C7, with abnormal enhancement of the vertebral bodies. There is enhancing phlegmon in the epidural space. No drainable abscess. Evaluated by ortho and ID. Currently on meropenem through 19 
  
3. Acute metabolic encephalopathy - POA, recurrent, unclear etiology, likely due to sepsis. On recent admit had unremarkable head CT, MRI brain and evaluated by neurology. Recent TSH, B12/folate, ammonia WNL s/p LP. HSV neg. EEG negative for seizure activity, RPR negative, West Nile negative, HIV negative, Lyme IgG positive but IgM negative. Hold oxycodone 4. Seizure. likely secondary to sepsis/ fever. Has an episode of seizure on 19. EEG is without seizure. Neurology consulted. Ativan PRN. Seizure precaution.  
  
5.  R elbow cellulitis / bursitis / acute pain / Weakness - Noted on last admit, s/p joint aspiration by IR on , cultures negative to date. Recent MRI of the brain and C spine without any change. On meropenem.  
  
6.  DM type 2 w/ hypoglycemia - Diabetic diet and counseling. SSI per protocol. Continue home Lantus if eating. Resent A1C 7.2%.  
  
7. HTN - Continue atenolol, hold chlorthaldone. PRN labetalol  
  
8. Hypokalemia / Hypernatremia - Cr stable. Hydrate and follow.   Hold chlorthalidone. 
  
 9.  Anemia - POA, chronic, stable, Fe studies consistent with chronic dz. Monitor  
  
10. Urinary retention - Recent Dx. Discharged with Shankar catheter. Continue flomax 
  
11. Hypothyroidism - Continue synthroid 
  
12. Hyperlipidemia - continue simvastatin 13. Obesity. Would benefit from weight loss.  
  
  
 
  
Subjective: Chief Complaint:  Follow up of pt who was admitted with sepsis/ fever ROS: 
(bold if positive, if negative) Fever/chills Tolerating PT  Tolerating Diet Objective:  
 
Last 24hrs VS reviewed since prior progress note. Most recent are: 
 
Visit Vitals /61 Pulse 81 Temp 98.2 °F (36.8 °C) Resp 21 Ht 5' 1\" (1.549 m) Wt 81.3 kg (179 lb 5 oz) SpO2 94% BMI 33.88 kg/m² SpO2 Readings from Last 6 Encounters:  
01/02/19 94% 12/17/18 96% O2 Flow Rate (L/min): 4 l/min Intake/Output Summary (Last 24 hours) at 1/2/2019 1151 Last data filed at 1/2/2019 1001 Gross per 24 hour Intake 2450.42 ml Output 679 ml Net 1771.42 ml Physical Exam: 
 
Gen:  Obese,  in no acute distress HEENT:  Pink conjunctivae, PERRL, hearing intact to voice, moist mucous membranes Neck:  Supple, without masses, thyroid non-tender Resp:  No accessory muscle use, clear breath sounds without wheezes rales or rhonchi 
Card:  No murmurs, normal S1, S2 without thrills, bruits or peripheral edema Abd:  Soft, non-tender, non-distended, normoactive bowel sounds are present, no palpable organomegaly and no detectable hernias Lymph:  No cervical or inguinal adenopathy Musc:  No cyanosis or clubbing Skin:  No rashes or ulcers, skin turgor is good Neuro: doesn't , follow commands Psych:poor  insight, confused. __________________________________________________________________ Medications Reviewed: (see below) Medications:  
 
Current Facility-Administered Medications Medication Dose Route Frequency  [START ON 1/3/2019] Vancomycin Trough - draw at 0230 on 1/3 (draw 30 min prior to 0300 dose on 1/3)   Other ONCE  
 oxyCODONE IR (ROXICODONE) tablet 5 mg  5 mg Oral Q4H PRN  potassium chloride 10 mEq in 100 ml IVPB  10 mEq IntraVENous Q1H  
 heparin (porcine) injection 5,000 Units  5,000 Units SubCUTAneous Q8H  
 labetalol (NORMODYNE;TRANDATE) injection 10 mg  10 mg IntraVENous Q2H PRN  
 meropenem (MERREM) 500 mg in 0.9% sodium chloride (MBP/ADV) 50 mL  0.5 g IntraVENous Q6H  
 haloperidol lactate (HALDOL) injection 2 mg  2 mg IntraVENous Q6H PRN  
 morphine injection 2 mg  2 mg IntraVENous Q3H PRN  
 acetaminophen (TYLENOL) suppository 650 mg  650 mg Rectal Q6H PRN  
 LORazepam (ATIVAN) injection 1 mg  1 mg IntraVENous Q5MIN PRN  
 vancomycin (VANCOCIN) 1,250 mg in 0.9% sodium chloride 250 mL IVPB  1,250 mg IntraVENous Q12H  
 sodium chloride (NS) flush 5-10 mL  5-10 mL IntraVENous PRN  
 0.45% sodium chloride with KCl 20 mEq/L infusion   IntraVENous CONTINUOUS Lab Data Reviewed: (see below) Lab Review:  
 
Recent Labs 01/02/19 
0205 01/01/19 
1325 12/31/18 2054 WBC 6.4 8.5 8.3 HGB 8.0* 9.5* 11.0*  
HCT 26.0* 29.9* 34.7*  
 338 378 Recent Labs 01/02/19 
0205 01/01/19 
1325 12/31/18 2054  145 146*  
K 3.3* 3.1* 2.6*  
 105 104 CO2 26 26 32 * 139* 141* BUN 30* 26* 25* CREA 0.83 0.91 0.98  
CA 7.7* 7.9* 8.7 ALB  --   --  1.8* TBILI  --   --  0.6 SGOT  --   --  31 ALT  --   --  22 Lab Results Component Value Date/Time Glucose (POC) 138 (H) 01/01/2019 10:25 PM  
 Glucose (POC) 121 (H) 01/01/2019 11:57 AM  
 Glucose (POC) 118 (H) 01/01/2019 06:45 AM  
 Glucose (POC) 129 (H) 12/17/2018 05:13 PM  
 Glucose (POC) 207 (H) 12/17/2018 11:38 AM  
 
No results for input(s): PH, PCO2, PO2, HCO3, FIO2 in the last 72 hours. No results for input(s): INR in the last 72 hours.  
 
No lab exists for component: INREXT, INREXT 
 All Micro Results Procedure Component Value Units Date/Time CULTURE, URINE [[id number]]  (Abnormal) Collected:  12/31/18 2140 Order Status:  Completed Specimen:  Cath Urine Updated:  01/02/19 0945 Special Requests: NO SPECIAL REQUESTS [place] Count --     
  >100,000 COLONIES/mL Culture result: YEAST IDENTIFICATION TO FOLLOW CULTURE, BLOOD FOR FUNGUS [[id number]] Collected:  12/31/18 2233 Order Status:  Completed Specimen:  Blood Updated:  01/02/19 1033 Special Requests: HOLD 21 DAYS FOR FUNGUS Culture result: NO GROWTH 2 DAYS     
 CULTURE, BLOOD [[id number]] Collected:  12/31/18 2054 Order Status:  Completed Specimen:  Blood Updated:  01/02/19 0122 Special Requests: NO SPECIAL REQUESTS Culture result: NO GROWTH 2 DAYS     
 CULTURE, BLOOD [[id number]] Collected:  12/31/18 2054 Order Status:  Completed Specimen:  Blood Updated:  01/02/19 1328 Special Requests: NO SPECIAL REQUESTS Culture result: NO GROWTH 2 DAYS     
 RESPIRATORY PANEL,PCR,NASOPHARYNGEAL [[id number]] Collected:  12/31/18 2233 Order Status:  Completed Specimen:  Nasopharyngeal Updated:  01/01/19 1352 Adenovirus NOT DETECTED Coronavirus 229E NOT DETECTED Coronavirus HKU1 NOT DETECTED Coronavirus CVNL63 NOT DETECTED Coronavirus OC43 NOT DETECTED Metapneumovirus NOT DETECTED Rhinovirus and Enterovirus NOT DETECTED Influenza A NOT DETECTED Influenza A, subtype H1 NOT DETECTED Influenza A, subtype H3 NOT DETECTED INFLUENZA A H1N1 PCR NOT DETECTED Influenza B NOT DETECTED Parainfluenza 1 NOT DETECTED Parainfluenza 2 NOT DETECTED Parainfluenza 3 NOT DETECTED Parainfluenza virus 4 NOT DETECTED     
  RSV by PCR NOT DETECTED Bordetella pertussis - PCR NOT DETECTED   Chlamydophila pneumoniae DNA, QL, PCR NOT DETECTED     
 Mycoplasma pneumoniae DNA, QL, PCR NOT DETECTED     
 CULTURE, FUNGUS [[id number]] Collected:  12/31/18 2233 Order Status:  Canceled Specimen: Body Fluid URINE CULTURE HOLD SAMPLE [[id number]] Collected:  12/31/18 2140 Order Status:  Completed Specimen:  Urine from Serum Updated:  12/31/18 2150 Urine culture hold URINE ON HOLD IN MICROBIOLOGY DEPT FOR 3 DAYS. IF UNPRESERVED URINE IS SUBMITTED, IT CANNOT BE USED FOR ADDITIONAL TESTING AFTER 24 HRS, RECOLLECTION WILL BE REQUIRED. I have reviewed notes of prior 24hr. Other pertinent lab: Total time spent with patient: 28 Care Plan discussed with: Patient, Nursing Staff and >50% of time spent in counseling and coordination of care Discussed:  Care Plan Prophylaxis:  Lovenox Disposition:  SNF/LTC 
        
___________________________________________________ Attending Physician: Nawaf Bryant MD

## 2019-01-02 NOTE — PROGRESS NOTES
1900: Bedside and Verbal shift change report given to Charles Briggs RN (oncoming nurse) by Lorenza Samuel RN (offgoing nurse). Report included the following information SBAR, Kardex, Procedure Summary, Intake/Output, MAR, Recent Results, Med Rec Status and Cardiac Rhythm NSR . 2000: Patient assessment noted, see flowsheet. Patient moaning in bed, Patient able to state her name and that she is at [facility], disoriented to time and situation, opens eyes to voice, speech is clear, will follow commands with lots of encouragement, PERRLA, VSS, afebrile, on 4L NC, joyce draining. Sacrum red but blanchable, patient on turn team. Seizure pad on bed, HOB elevated. 1/2NS 20K infusing at 75. Will continue to monitor. 2300: Patients urine output has decreased to < 20cc in the last 3 hours, bladder scanned patient to make sure joyce is working properly, bladder scan shows 4mL of urine in bladder. Dr. Reynold Beckman aware, order to increase fluids to 100mL/hr and 250cc bolus, will continue to monitor. 0000: Patient resting comfortably in bed, states name but unable to state where she is, speech is clear, no new changes at this time. 0300: Patients urine output remains < 20cc per hour, Made Dr. Reynold Beckman aware, no new orders received. 0700: Bedside and Verbal shift change report given to Torrey (oncoming nurse) by Charles Briggs RN (offgoing nurse). Report included the following information SBAR, Kardex, Procedure Summary, Intake/Output, MAR, Recent Results, Med Rec Status and Cardiac Rhythm NSR .

## 2019-01-02 NOTE — PROCEDURES
[address]  *** FINAL REPORT ***    Name: Katherine Rodriguez  MRN: [id number]    Inpatient  : 10 Feb 1955  HIS Order #: [id number]  [id number] [facility] Visit #: [id number]  Date: 2019    TYPE OF TEST: Peripheral Venous Testing    REASON FOR TEST  Limb swelling    Right Arm:-  Deep venous thrombosis:           No  Superficial venous thrombosis:    No      INTERPRETATION/FINDINGS  PROCEDURE: RIGHT UPPER EXTREMITY VENOUS DUPLEX: Evaluation of upper  extremity veins with ultrasound (B-mode imaging, pulsed Doppler, color   Doppler). Includes the internal jugular, subclavian, axillary,  brachial, radial, ulnar, basilic, and cephalic veins. FINDINGS: Demetria Gordon scale and color flow duplex images of the veins of the  right upper extremity and bilateral subclavian veins demonstrate  normal compressibility, absence of filing defects, reflux or phlebitic   changes of the internal jugular, bilateral subclavian, axillary,  upper arm and forearm veins of the right arm. CONCLUSION: Normal Right upper extremity venous duplex. No deep vein  thrombosis or thrombophlebitis. No evidence of thrombus in  contralateral left subclavian vein. ADDITIONAL COMMENTS    I have personally reviewed the data relevant to the interpretation of  this  study. TECHNOLOGIST: Ok Can  Signed: 2019 03:29 PM    PHYSICIAN: Queen Marni Rees MD  Signed: 2019 07:16 AM

## 2019-01-02 NOTE — PROGRESS NOTES
Reason for Readmission:     Encephalopathy,seizure,fevers,sepsis ,recent C-spine discitis RRAT Score and Risk Level:    RRAT 9,risk level 1 Level of Readmission:    Even though pt has a low RRAT ,risk for readmission is moderate to high Care Conference scheduled:   Not @ this juncr=ture Resources/supports as identified by patient/family:    
    
Top Challenges facing patient (as identified by patient/family and CM): Finances/Medication cost?   Pt has [employer] Transportation      [facility] or family Support system or lack thereof?  Living arrangements? Typically with  Self-care/ADLs/Cognition? Encephalopathic currently Current Advanced Directive/Advance Care Plan:  AMD not on file Plan for utilizing home health:   No,will return to SNF Likelihood of additional readmission:   Moderate to high Transition of Care Plan:    Based on readmission, the patient's previous Plan of Care 
 has been evaluated and/or modified. The current Transition of Care Plan is:        
Pt is a readmission to [place] [address]. Pt was recently in [facility] from 11/30/18 to 12/17/18 At that time,pt was admitted with encephalopathy,c-spine discitis,severe sepsis and metaboloc acidosis. When discharged,pt was discharged to SNF on iv meropenum through January 18th,2019. ID has been consulted to evaluate pt  Pt was readmitted to [facility] on 1/1/19 with fevers,seizure witnessed ,sepsis,c-spine discitis ,acute metabolic encephalopathy ,right elbow cellulitis./bursitis ,hypokalemia and hypernatremia. The current plan is for pt to return to [address] [place] [address] to complete rehab and her course ogf iv antibiotic therapy. Clinicals sent to [address] [place] [address] SNF through the cc link. If iv antibiotic therapy is changed,case management will need new orders for SNF. When discharged,the patient will need hard scripts for any narcotics. PCP is Dr Darren Harden. She has no nurse navigators to contact. I will continue to follow pt for discharge needs. Kong Narvaez I will continue to follow pt

## 2019-01-02 NOTE — PROGRESS NOTES
0700-Bedside shift change report given to Torrey (oncoming nurse) by Heidi Funes (offgoing nurse). Report included the following information SBAR, Intake/Output, MAR, Recent Results, Cardiac Rhythm NSR and Alarm Parameters . 0800-Patient is easily awoken with name calling. Oriented to self and somewhat to situation. Thinks she is at a different facility and when questioned about recent hospitalization, she doesn't recall being in the hospital. Currently afebrile. 1000- at bedside. Updated on POC and overnight events. 1100-Multidiscplinary rounds with ICU care team. New orders received. 1200-Patient resting. No complaints at this time. 1600-Patient resting.  and son at bedside. Awaiting MRI Brain/neck. Consent and checklist completed. 1900-Bedside shift change report given to Kirsten MANN (oncoming nurse) by Danny Osorio RN (offgoing nurse). Report included the following information SBAR, Procedure Summary, Intake/Output, MAR, Recent Results, Cardiac Rhythm NSR and Alarm Parameters 1910-Patient to MRI.

## 2019-01-02 NOTE — CONSULTS
[facility]     Name: Ctaherine Dobbins MRN: [id number]   : 1955 Hospital: [facility]   Date: 2019        Impression Plan   1. Encephalopathy  2. Seizures  3. Fevers  4. Hx of C-spine diskitis  5. Hx of strep bacteremia  6. Distended gallbladder on CT- no abdominal pain               · Vanc/Meropenem  · Seizure precautions  · ID and Neurology following  · Cooling blanket  · Follow up blood cultures  · Question if MRI cervical spine needs to be repeated to eval for progression since now with fevers  · Heparin SQ q8h  · Repleat electrolytes PRN               Radiology  ( personally reviewed) CT chest reviewed: no infiltrates   ABG No results for input(s): PHI, PO2I, PCO2I in the last 72 hours. Subjective     Cc: seizures    62 yo with PMHx of cervical diskitis and strep bacteremia in 2018 presenting to ED with fevers of 102, encephalopathy and somnolence. Has been on meropenem at home for the diskitis. Was admitted to the floor but developed fever to 103 and had a seizure. Head CT was negative at that time and pt was transferred to ICU for cooling blanket. Pt somnolent, but wakes up. States she is at [employer] Does not know the year. Complaining of pain in her fingers during my exam of her extremeties. Review of Systems:  Review of systems not obtained due to patient factors. Past Medical History:   Diagnosis Date    Chronic back pain     Diabetes mellitus, type 2 ([facility] [address]     History of vascular access device 2018    [facility] VAT - 4 FR single, L cephalic, 44 cm for LTA    HTN (hypertension)       History reviewed. No pertinent surgical history. Prior to Admission medications    Medication Sig Start Date End Date Taking? Authorizing Provider   cyclobenzaprine (FLEXERIL) 10 mg tablet Take 10 mg by mouth nightly. Yes Provider, Historical   DULoxetine (CYMBALTA) 60 mg capsule Take 60 mg by mouth daily.    Yes Provider, Historical   ibuprofen (MOTRIN) 400 mg tablet Take 400 mg by mouth three (3) times daily. Yes Provider, Historical   0.9% sodium chloride solp 100 mL with meropenem 1 gram solr 2 g 2 g by IntraVENous route every twelve (12) hours. 12/17/18 1/18/19 Yes Provider, Historical   ondansetron hcl (ZOFRAN) 4 mg tablet Take 4 mg by mouth every six (6) hours as needed for Nausea. Yes Provider, Historical   oxyCODONE IR (ROXICODONE) 5 mg immediate release tablet Take 5 mg by mouth every four (4) hours. Yes Provider, Historical   senna (SENNA) 8.6 mg tablet Take 1 Tab by mouth two (2) times a day. Yes Provider, Historical   oxyCODONE ER (XTAMPZA ER) 18 mg ER capsule Take 18 mg by mouth every twelve (12) hours. Yes Provider, Historical   atenolol (TENORMIN) 50 mg tablet Take 1 Tab by mouth daily. 12/18/18  Yes Donnie Garcia MD   insulin glargine (LANTUS) 100 unit/mL injection 10 Units by SubCUTAneous route daily. 12/17/18  Yes Donnie Garcia MD   chlorthalidone (HYGROTEN) 25 mg tablet Take 1 Tab by mouth daily. 12/18/18  Yes Donnie Garcia MD   levothyroxine (SYNTHROID) 125 mcg tablet Take 125 mcg by mouth Daily (before breakfast). Yes Provider, Historical   sertraline (ZOLOFT) 100 mg tablet Take 100 mg by mouth daily. Yes Provider, Historical   simvastatin (ZOCOR) 40 mg tablet Take 40 mg by mouth nightly. Yes Provider, Historical   aspirin delayed-release 81 mg tablet Take 81 mg by mouth two (2) times a day.    Yes Provider, Historical     Current Facility-Administered Medications   Medication Dose Route Frequency    heparin (porcine) injection 5,000 Units  5,000 Units SubCUTAneous Q8H    meropenem (MERREM) 500 mg in 0.9% sodium chloride (MBP/ADV) 50 mL  0.5 g IntraVENous Q6H    vancomycin (VANCOCIN) 1,250 mg in 0.9% sodium chloride 250 mL IVPB  1,250 mg IntraVENous Q12H    0.45% sodium chloride with KCl 20 mEq/L infusion   IntraVENous CONTINUOUS     No Known Allergies   Social History     Tobacco Use    Smoking status: Never Smoker    Smokeless tobacco: Never Used   Substance Use Topics    Alcohol use: No     Frequency: Never      Family History   Problem Relation Age of Onset    Diabetes Father     Heart Disease Father           Laboratory: I have personally reviewed the critical care flowsheet and labs. Recent Labs     01/02/19  0205 01/01/19  1325 12/31/18 2054   WBC 6.4 8.5 8.3   HGB 8.0* 9.5* 11.0*   HCT 26.0* 29.9* 34.7*    338 378     Recent Labs     01/02/19  0205 01/01/19  1325 12/31/18 2054    145 146*   K 3.3* 3.1* 2.6*    105 104   CO2 26 26 32   * 139* 141*   BUN 30* 26* 25*   CREA 0.83 0.91 0.98   CA 7.7* 7.9* 8.7   ALB  --   --  1.8*   SGOT  --   --  31   ALT  --   --  22       Objective:     Mode Rate Tidal Volume Pressure FiO2 PEEP                    Vital Signs:     TMAX(24)      Intake/Output:   Last shift:         Last 3 shifts: No intake/output data recorded. [id number]    Intake/Output Summary (Last 24 hours) at 1/2/2019 0758  Last data filed at 1/2/2019 0645  Gross per 24 hour   Intake 2450.42 ml   Output 554 ml   Net 1896.42 ml     EXAM:   GENERAL: well developed, sleepy, meets eye contact, HEENT:  PERRL, EOMI, no alar flaring or epistaxis, oral mucosa moist without cyanosis, NECK:  no jugular vein distention, no retractions, no thyromegaly or masses, LUNGS: CTA, no w/r/r, HEART:  Regular rate and rhythm with no MGR; no edema is present, ABDOMEN:  soft with no tenderness, bowel sounds present, EXTREMITIES:  warm with no cyanosis, no osler nodes, no splinter hemorrhages.  Pain on movement of right index finger, but no swelling or erythema SKIN:  no jaundice or ecchymosis and NEUROLOGIC:  alert and oriented x1, grossly non-focal, sleepy    Breanna Hubbard MD  [facility] [place]

## 2019-01-02 NOTE — CONSULTS
ADRIANNA SECOURS: [id number] [facility] 615 [facility] Neurology  [address] [place] [address] [facility]      Name:   Roel Layne record #: [id number]  Admission Date: 12/31/2018   Who Consulted: Dr. Anne-Marie Feng  Reason for Consult: Seizure    HISTORY OF PRESENT ILLNESS     This is a 61 y.o. female with  has a past medical history of Chronic back pain, Diabetes mellitus, type 2 ([address] History of vascular access device, and HTN (hypertension). who is admitted for AMS. The Neurology Service is asked to evaluate for seizures. Ms. Jake Mario presented to the ED on 12/31/2018 with fever and confusion. She was last seen by our service on 12/16/2018 for encephalopathy. During that admission she was found to have cervical discitis and started on Vanc and Meropenem. We are now consulted for an episode yesterday afternoon, while febrile to 102., in which \"Patient looks to the left and grabs for the rail and begins to have seizure like activities. Took a deep breathe sat went down in the 60's\". It was further described by Dr. Anne-Marie Feng as tonic clonic, the ICU documented that it lasted approximately 2 minutes and was associated with bladder incontience. Michaelyn Daily She was given ativan and transferred to the ICU after this event. Clinical Data  Imaging review:   CT Head:  No acute process    EEG:      Assessment/Plan   1. Rule out seizure/spell:    · Seizure precautions  · CBC and CMP unremarkable, TSH was normal a month ago  · Urine with budding yeast, hypokalemic  · EEG  normal    Thank you for allowing the Neurology Service the pleasure of participating in the care of your patient. This patient will be discussed with my collaborating care team physician Dr. Ifeanyi Rodrigues and she may have further recommendations regarding this patient's care.      Attending Attestation:   ==============================================================    Attending Addendum    I have reviewed the documentation provided by the nurse practitioner, Lydia Galaviz, discussed her findings, clinical impression, and the proposed management plans with regards to this patient's encounter. I have personally evaluated the patient, verify the history and confirm physical findings. Below are my additional findings:    HPI  This is a 71-year-old female admitted for fever and confusion. She was recently admitted and treated for cervical discitis. She was recovering at a rehab facility when she started to become altered and spike high temperatures. She was brought to the emergency room for further evaluation. She is admitted and started on treatment for possible sepsis. Yesterday she had was described as a generalized tonic-clonic seizure. She has no prior history of seizures. She has no known epilepsy risk factors. She was given Ativan and transferred to the ICU. She did have a CT of the head that was negative and an EEG that was negative. When I came to see the patient today she was alert oriented and doing much better. Her  was at the bedside. He reports that she looked better than she has in many days.     CLINICAL DATA REVIEW  IMAGING: CT head: Negative (I personally reviewed these images in PACS and this is my impression)  EEG: Negative    PHYSICAL EXAM (additional findings)  Patient Vitals for the past 8 hrs:   Temp Pulse Resp BP SpO2   01/02/19 1600  87 17  97 %   01/02/19 1530  79 13  98 %   01/02/19 1501  84      01/02/19 1500  85 23  98 %   01/02/19 1430  81 20 157/67 96 %   01/02/19 1400 98.6 °F (37 °C) 84 17 159/75 96 %   01/02/19 1330  83 16 151/71 96 %   01/02/19 1300  79 16 143/67 97 %   01/02/19 1230  87 16 141/67 96 %   01/02/19 1200 98.4 °F (36.9 °C) 85 22 148/80 98 %   01/02/19 1130  84 28 144/84 98 %   01/02/19 1100  88 20 145/85 99 %   01/02/19 1030  81 21 138/61 94 %   01/02/19 1000 98.2 °F (36.8 °C) 87 21 150/83 96 %   01/02/19 0930  81 16 135/68 99 %   01/02/19 0900  81 17 132/66 100 %   01/02/19 0830  78 16 123/71 95 %       Neurologic:  Gen: Attention normal             Language: naming, repetition, fluency normal             Memory: Alert and oriented x3  Cranial Nerves: 2- 12 intact  Motor: normal bulk and tone, no tremor              Strength: 5/5 on left upper extremity, 3/5 in right upper extremity secondary to pain  Wiggles toes in bilateral lower extremities with foot drop bilaterally  Sensory: intact to LT     ADDITIONAL ASSESSMENT AND RECOMMENDATIONS:  This is a 77-year-old female admitted for confusion and fevers. She was recently admitted for encephalopathy and found to have cervical discitis at that time. She is continuing on IV antibiotics in a rehab facility when she became acutely altered. This admission she had new onset generalized tonic clonic seizure. I suspect this is secondary to infection as she has no epilepsy risk factors. However, given her prolonged hospital course previously and extensive antibiotic use, there is a possibility that she had some type of meningitis or encephalitis that was not diagnosed due to already being on antibiotics. This would put her at risk for seizures. However, she had no further seizure activity. 1.  MRI of the brain with and without contrast  2. MRI of the cervical spine with and without contrast  3. EEG negative. Will not repeat at this time  4. Continue Ativan and seizure precautions  5. If patient has additional seizure activity will start Keppra 500 mg IV every 12  6. Continue antibiotics per primary    We will follow with you      Stacey Stack MD  January 2, 2019       Review of Systems: 10 point ROS was performed. Pertinent positives listed in HPI. Negative ROS is as follows.   Pt denies: angina, palpitations, paresthesias, weakness, vision loss, slurred speech, aphasia, confusion, fever, chills, falls, headache, diplopia, back pain, neck pain, prior episodes of vertigo, hallucinations, new medications or dosage changes. 2018 Exam by Dr. Syd Adams:  Gen: Awake, alert, follows commands  Appropriate appearance, normal speech. Oriented to all spheres. No visual field defect on confrontation exam.  Full eyes movement, with no nystagmus, no diplopia, no ptosis. Normal gag and swallow. All remaining cranial nerves were normal  Motor: (+) R wrist extensor 4/5 R finger extensor 0/5  Rest is 4/5 more likely due to effort  (+) pain on moving R UE  (+) R hand swelling   Sensory: Dec PP dorsum of R hand  Coordination: Good FTN and HTS  Gait: deferred               PHYSICAL EXAM     Visit Vitals  /69 (BP 1 Location: Right arm, BP Patient Position: At rest)   Pulse 86   Temp 98.3 °F (36.8 °C)   Resp 18   Ht 5' 1\" (1.549 m)   Wt 81.3 kg (179 lb 5 oz)   SpO2 98%   BMI 33.88 kg/m²    O2 Flow Rate (L/min): 4 l/min O2 Device: Nasal cannula    Temp (24hrs), Av °F (37.2 °C), Min:97.6 °F (36.4 °C), Max:102.1 °F (38.9 °C)    No intake/output data recorded.  1901 -  0700  In: 3161.7 [P.O.:150; I.V.:3011.7]  Out: 1204 [UBXTO:1436]     General:  Alert, cooperative, no acute distress. Lungs:   Clear to auscultation bilaterally. No crackles/wheezes. Heart:    Abdominal:  Regular rate and rhythm, No murmur, click, rub or gallop. No carotid bruit. Soft and nondistended   Skin: Skin color, texture, turgor normal.      NEUROLOGICAL EXAM  Appearance:  Well developed, well nourished,  and is in no acute distress. Mental Status: Oriented to time, place and person. Fully attentive. No aphasia. Full fund of knowledge. Normal recent and remote memory. Cranial Nerves:   Intact visual fields. PERRL, EOM's full, no nystagmus, no ptosis. Facial sensation is normal. Facial movement is symmetric. Palate is midline. Normal sternocleidomastoid strength. Tongue is midline. Reflexes:   Deep tendon reflexes 2+/4 and symmetrical.   Sensory:   Normal to temperature and vibration. Gait:  Normal gait. Tremor:   No tremor noted. Cerebellar:  No cerebellar signs present. Motor: No pronator drift of either outstretched arm. Recent Results (from the past 24 hour(s))   GLUCOSE, POC    Collection Time: 01/01/19 11:57 AM   Result Value Ref Range    Glucose (POC) 121 (H) 65 - 100 mg/dL    Performed by Rayna Kim    CBC WITH AUTOMATED DIFF    Collection Time: 01/01/19  1:25 PM   Result Value Ref Range    WBC 8.5 3.6 - 11.0 K/uL    RBC 3.47 (L) 3.80 - 5.20 M/uL    HGB 9.5 (L) 11.5 - 16.0 g/dL    HCT 29.9 (L) 35.0 - 47.0 %    MCV 86.2 80.0 - 99.0 FL    MCH 27.4 26.0 - 34.0 PG    MCHC 31.8 30.0 - 36.5 g/dL    RDW 15.5 (H) 11.5 - 14.5 %    PLATELET 044 142 - 127 K/uL    MPV 9.5 8.9 - 12.9 FL    NRBC 0.0 0  WBC    ABSOLUTE NRBC 0.00 0.00 - 0.01 K/uL    NEUTROPHILS 57 32 - 75 %    LYMPHOCYTES 27 12 - 49 %    MONOCYTES 9 5 - 13 %    EOSINOPHILS 6 0 - 7 %    BASOPHILS 1 0 - 1 %    IMMATURE GRANULOCYTES 0 0.0 - 0.5 %    ABS. NEUTROPHILS 4.9 1.8 - 8.0 K/UL    ABS. LYMPHOCYTES 2.3 0.8 - 3.5 K/UL    ABS. MONOCYTES 0.8 0.0 - 1.0 K/UL    ABS. EOSINOPHILS 0.5 (H) 0.0 - 0.4 K/UL    ABS. BASOPHILS 0.1 0.0 - 0.1 K/UL    ABS. IMM. GRANS. 0.0 0.00 - 0.04 K/UL    DF AUTOMATED     METABOLIC PANEL, BASIC    Collection Time: 01/01/19  1:25 PM   Result Value Ref Range    Sodium 145 136 - 145 mmol/L    Potassium 3.1 (L) 3.5 - 5.1 mmol/L    Chloride 105 97 - 108 mmol/L    CO2 26 21 - 32 mmol/L    Anion gap 14 5 - 15 mmol/L    Glucose 139 (H) 65 - 100 mg/dL    BUN 26 (H) 6 - 20 MG/DL    Creatinine 0.91 0.55 - 1.02 MG/DL    BUN/Creatinine ratio 29 (H) 12 - 20      GFR est AA >60 >60 ml/min/1.73m2    GFR est non-AA >60 >60 ml/min/1.73m2    Calcium 7.9 (L) 8.5 - 10.1 MG/DL   SAMPLES BEING HELD    Collection Time: 01/01/19  1:25 PM   Result Value Ref Range    COMMENT        Add-on orders for these samples will be processed based on acceptable specimen integrity and analyte stability, which may vary by analyte.    GLUCOSE, POC Collection Time: 01/01/19 10:25 PM   Result Value Ref Range    Glucose (POC) 138 (H) 65 - 100 mg/dL    Performed by Rd Kam    CBC WITH AUTOMATED DIFF    Collection Time: 01/02/19  2:05 AM   Result Value Ref Range    WBC 6.4 3.6 - 11.0 K/uL    RBC 2.98 (L) 3.80 - 5.20 M/uL    HGB 8.0 (L) 11.5 - 16.0 g/dL    HCT 26.0 (L) 35.0 - 47.0 %    MCV 87.2 80.0 - 99.0 FL    MCH 26.8 26.0 - 34.0 PG    MCHC 30.8 30.0 - 36.5 g/dL    RDW 15.3 (H) 11.5 - 14.5 %    PLATELET 708 360 - 908 K/uL    MPV 10.0 8.9 - 12.9 FL    NRBC 0.0 0  WBC    ABSOLUTE NRBC 0.00 0.00 - 0.01 K/uL    NEUTROPHILS 66 32 - 75 %    LYMPHOCYTES 14 12 - 49 %    MONOCYTES 4 (L) 5 - 13 %    EOSINOPHILS 15 (H) 0 - 7 %    BASOPHILS 1 0 - 1 %    IMMATURE GRANULOCYTES 0 %    ABS. NEUTROPHILS 4.1 1.8 - 8.0 K/UL    ABS. LYMPHOCYTES 0.9 0.8 - 3.5 K/UL    ABS. MONOCYTES 0.3 0.0 - 1.0 K/UL    ABS. EOSINOPHILS 1.0 (H) 0.0 - 0.4 K/UL    ABS. BASOPHILS 0.1 0.0 - 0.1 K/UL    ABS. IMM. GRANS. 0.0 K/UL    DF MANUAL      RBC COMMENTS ANISOCYTOSIS  1+       METABOLIC PANEL, BASIC    Collection Time: 01/02/19  2:05 AM   Result Value Ref Range    Sodium 144 136 - 145 mmol/L    Potassium 3.3 (L) 3.5 - 5.1 mmol/L    Chloride 108 97 - 108 mmol/L    CO2 26 21 - 32 mmol/L    Anion gap 10 5 - 15 mmol/L    Glucose 122 (H) 65 - 100 mg/dL    BUN 30 (H) 6 - 20 MG/DL    Creatinine 0.83 0.55 - 1.02 MG/DL    BUN/Creatinine ratio 36 (H) 12 - 20      GFR est AA >60 >60 ml/min/1.73m2    GFR est non-AA >60 >60 ml/min/1.73m2    Calcium 7.7 (L) 8.5 - 10.1 MG/DL       History  Past Medical History:   Diagnosis Date    Chronic back pain     Diabetes mellitus, type 2 (HCC)     History of vascular access device 12/03/2018    [facility] VAT - 4 FR single, L cephalic, 44 cm for LTA    HTN (hypertension)      History reviewed. No pertinent surgical history.   Family History   Problem Relation Age of Onset    Diabetes Father     Heart Disease Father      Social History     Socioeconomic History  Marital status:      Spouse name: Not on file    Number of children: Not on file    Years of education: Not on file    Highest education level: Not on file   Social Needs    Financial resource strain: Not on file    Food insecurity - worry: Not on file    Food insecurity - inability: Not on file    Transportation needs - medical: Not on file    Transportation needs - non-medical: Not on file   Occupational History    Not on file   Tobacco Use    Smoking status: Never Smoker    Smokeless tobacco: Never Used   Substance and Sexual Activity    Alcohol use: No     Frequency: Never    Drug use: Not on file    Sexual activity: Not on file   Other Topics Concern     Service Not Asked    Blood Transfusions Not Asked    Caffeine Concern Not Asked    Occupational Exposure Not Asked    Hobby Hazards Not Asked    Sleep Concern Not Asked    Stress Concern Not Asked    Weight Concern Not Asked    Special Diet Not Asked    Back Care Not Asked    Exercise Not Asked    Bike Helmet Not Asked   [address] Not Asked    Self-Exams Not Asked   Social History Narrative    Not on file       Allergies   No Known Allergies    Outpatient Meds  No current facility-administered medications on file prior to encounter. Current Outpatient Medications on File Prior to Encounter   Medication Sig Dispense Refill    cyclobenzaprine (FLEXERIL) 10 mg tablet Take 10 mg by mouth nightly.  DULoxetine (CYMBALTA) 60 mg capsule Take 60 mg by mouth daily.  ibuprofen (MOTRIN) 400 mg tablet Take 400 mg by mouth three (3) times daily.  0.9% sodium chloride solp 100 mL with meropenem 1 gram solr 2 g 2 g by IntraVENous route every twelve (12) hours.  ondansetron hcl (ZOFRAN) 4 mg tablet Take 4 mg by mouth every six (6) hours as needed for Nausea.  oxyCODONE IR (ROXICODONE) 5 mg immediate release tablet Take 5 mg by mouth every four (4) hours.       senna (SENNA) 8.6 mg tablet Take 1 Tab by mouth two (2) times a day.  oxyCODONE ER (XTAMPZA ER) 18 mg ER capsule Take 18 mg by mouth every twelve (12) hours.  atenolol (TENORMIN) 50 mg tablet Take 1 Tab by mouth daily. 30 Tab 0    insulin glargine (LANTUS) 100 unit/mL injection 10 Units by SubCUTAneous route daily. 1 Vial 0    chlorthalidone (HYGROTEN) 25 mg tablet Take 1 Tab by mouth daily. 30 Tab 0    levothyroxine (SYNTHROID) 125 mcg tablet Take 125 mcg by mouth Daily (before breakfast).  sertraline (ZOLOFT) 100 mg tablet Take 100 mg by mouth daily.  simvastatin (ZOCOR) 40 mg tablet Take 40 mg by mouth nightly.  aspirin delayed-release 81 mg tablet Take 81 mg by mouth two (2) times a day.          Inpatient Meds    Current Facility-Administered Medications:     lactated ringers bolus infusion 500 mL, 500 mL, IntraVENous, ONCE, Dedrick Wills MD    [START ON 1/3/2019] Vancomycin Trough - draw at 0230 on 1/3 (draw 30 min prior to 0300 dose on 1/3), , Other, ONCE, Harish Kelly MD    heparin (porcine) injection 5,000 Units, 5,000 Units, SubCUTAneous, Q8H, Harish Kelly MD, 5,000 Units at 01/02/19 0504    labetalol (NORMODYNE;TRANDATE) injection 10 mg, 10 mg, IntraVENous, Q2H PRN, Harish Kelly MD    meropenem (MERREM) 500 mg in 0.9% sodium chloride (MBP/ADV) 50 mL, 0.5 g, IntraVENous, Q6H, Harish Kelly MD, Last Rate: 100 mL/hr at 01/02/19 0643, 500 mg at 01/02/19 0643    haloperidol lactate (HALDOL) injection 2 mg, 2 mg, IntraVENous, Q6H PRN, Harish Kelly MD, 2 mg at 01/01/19 1246    morphine injection 2 mg, 2 mg, IntraVENous, Q3H PRN, Harish Calderón MD, 2 mg at 01/02/19 0504    acetaminophen (TYLENOL) suppository 650 mg, 650 mg, Rectal, Q6H PRN, Harish Kelly MD, 650 mg at 01/01/19 1441    LORazepam (ATIVAN) injection 1 mg, 1 mg, IntraVENous, Q5MIN PRN, Harish Kelly MD    vancomycin (VANCOCIN) 1,250 mg in 0.9% sodium chloride 250 mL IVPB, 1,250 mg, IntraVENous, Q12H, Harish Kelly MD, Last Rate: 166.7 mL/hr at 01/02/19 0309, 1,250 mg at 01/02/19 0309    sodium chloride (NS) flush 5-10 mL, 5-10 mL, IntraVENous, PRN, Venkat ELDER, DO    0.45% sodium chloride with KCl 20 mEq/L infusion, , IntraVENous, CONTINUOUS, Samuel Isabel MD, Last Rate: 100 mL/hr at 01/02/19 0510    Care Plan discussed with:  Patient x   Family    RN    Care Manager    Consultant/Specialist:

## 2019-01-02 NOTE — DIABETES MGMT
DTC Progress Note Recommendations/ Comments: Pt with know diabetes. Pt currently does not have any diabetes medications or POC testing ordered. If appropriate, please consider adding POC glucose ac and hs and correction scale Humalog, high sensitivity. Current hospital DM medication: none Chart reviewed on Kameron Mauro. Patient is a 61 y.o. female with known diabetes on Lantus 10 units  at home. A1c:  
Lab Results Component Value Date/Time Hemoglobin A1c 7.2 (H) 11/30/2018 06:10 PM  
 
 
Recent Glucose Results:  
Lab Results Component Value Date/Time  (H) 01/02/2019 02:05 AM  
  (H) 01/01/2019 01:25 PM  
 GLUCPOC 138 (H) 01/01/2019 10:25 PM  
  
 
Lab Results Component Value Date/Time Creatinine 0.83 01/02/2019 02:05 AM  
 
Estimated Creatinine Clearance: 67 mL/min (based on SCr of 0.83 mg/dL). Active Orders There are no active orders of the following type(s): Diet. PO intake: No data found. Will continue to follow as needed. Thank you Mary Segovia RD, CDE Time spent: 5 minutes

## 2019-01-02 NOTE — CONSULTS
[address]  MR#: [id number]  : 1955  ACCOUNT #: [de-identified]   DATE OF SERVICE: 2019    REQUESTING PHYSICIAN:  Nabil Zimmerman MD    CHIEF COMPLAINT:  Fever. HISTORY OF PRESENT ILLNESS:  Patient is a 24-year-old female with a past medical history significant for diabetes mellitus and hypertension who was admitted to [place] [address] on  with complaints of fever and altered mental status. The patient is well known to the infectious disease service. She had a lengthy hospitalization recently for a right elbow cellulitis complicated by group B streptococcal bacteremia with cervical spine diskitis. She was medically stabilized and discharged to a rehab facility on a planned 6-week course of meropenem, which was scheduled to be completed on 2019. According to her , the patient did very well at rehab up until the last several days when she developed fever and progressive altered mental status. She was brought to the emergency department, where she was found to be tachycardic and febrile. She was initially admitted to medical floor, but was transferred to the ICU after having a seizure. She is currently in the ICU. She remains on meropenem. Vancomycin has been started. At the time of examination, she has returned to her baseline mental status. She has been afebrile for almost 24 hours. Workup for the cause of the fever remains unrevealing. PAST MEDICAL HISTORY:  Chronic back pain, diabetes mellitus, hypertension, group B streptococcal bacteremia with cervical spine diskitis. SOCIAL HISTORY:  No alcohol, tobacco or illicit drug use. FAMILY HISTORY:  Diabetes mellitus, heart disease. ALLERGIES:  NO KNOWN DRUG ALLERGIES. OUTPATIENT MEDICATIONS:  The patient is on meropenem, which was supposed to be completed on 2019 as above. REVIEW OF SYSTEMS:  As per HPI.   Remainder of 10-system review of systems unremarkable. LABORATORY DATA:  White blood cell count is 6400, platelet count 508,177. Urinalysis positive for 10-20 white blood cells, trace leukocyte esterase. Creatinine is 0.83. Blood cultures revealed no growth to date. Urine culture is growing yeast.  Respiratory virus panel PCR is negative. CT scan of the chest is negative for pneumonia. PHYSICAL EXAMINATION:  VITAL SIGNS:  Maximal temperature is 104.3 degrees Fahrenheit, current temperature is 98.4 degrees Fahrenheit, heart rate 79 beats per minute, blood pressure 143/67, respiratory rate 16, oxygen saturation 97% on 2 liters nasal cannula. GENERAL:  Alert, in no acute distress. HEENT:  Normocephalic, atraumatic. Mucous membranes moist.  NECK:  Supple. CARDIOVASCULAR:  Regular rate and rhythm. No murmurs, gallops or rubs. LUNGS:  Clear to auscultation anteriorly. ABDOMEN:  Soft, nondistended, nontender. EXTREMITIES:  There is edema involving the right upper extremity. SKIN:  No rashes. NEUROLOGIC:  Cranial nerves II-XII grossly intact. PSYCHIATRIC:  Normal affect. ASSESSMENT AND PLAN:  1. Febrile illness. Multiple potential etiologies for the patient's fever source remains unclear at this point. She is at risk for bloodstream infection with the PICC line in place. Viral illness is also in the differential diagnosis. The patient's mental status has returned to baseline and her fever has resolved with the addition of vancomycin. No antibiotic changes planned at this time. Blood cultures to rule out bloodstream infection are pending. A CT scan of the cervical spine to follow up on the diskitis has been ordered. Further recommendations pending results of the above. 2.  Seizure. I suspect this is due to the patient's high fever. Her fever was as high as 104 degrees at the time of admission. Her mental status has normalized. She denies any neck stiffness or headache.   Would not pursue lumbar puncture at this time. Neurology is following. Unfortunately, all beta lactam antibiotics may lower the seizure threshold, so I do not feel that there is any \"safer\" antibiotics for her at this time. She has done well on meropenem for several weeks up until she developed this high fever. 2.  Diabetes mellitus. Management per primary team.  3.  Recent right elbow cellulitis complicated by group B streptococcal bacteremia and cervical spine diskitis. Continue meropenem through 01/18/2019. Thank you for allowing me to participate in the care of this patient.       DO ALBERT Meyers / LN  D: 01/02/2019 14:09     T: 01/02/2019 16:05  JOB #: [id number]  CC: Maris Donnelly MD

## 2019-01-03 LAB
ANION GAP SERPL CALC-SCNC: 12 MMOL/L (ref 5–15)
B PERT DNA SPEC QL NAA+PROBE: NOT DETECTED
BASOPHILS # BLD: 0 K/UL (ref 0–0.1)
BASOPHILS NFR BLD: 1 % (ref 0–1)
BUN SERPL-MCNC: 23 MG/DL (ref 6–20)
BUN/CREAT SERPL: 35 (ref 12–20)
C PNEUM DNA SPEC QL NAA+PROBE: NOT DETECTED
CALCIUM SERPL-MCNC: 7.8 MG/DL (ref 8.5–10.1)
CHLORIDE SERPL-SCNC: 107 MMOL/L (ref 97–108)
CO2 SERPL-SCNC: 24 MMOL/L (ref 21–32)
CREAT SERPL-MCNC: 0.65 MG/DL (ref 0.55–1.02)
DATE LAST DOSE: ABNORMAL
DIFFERENTIAL METHOD BLD: ABNORMAL
EOSINOPHIL # BLD: 0.4 K/UL (ref 0–0.4)
EOSINOPHIL NFR BLD: 9 % (ref 0–7)
ERYTHROCYTE [DISTWIDTH] IN BLOOD BY AUTOMATED COUNT: 15 % (ref 11.5–14.5)
FLUAV H1 2009 PAND RNA SPEC QL NAA+PROBE: NOT DETECTED
FLUAV H1 RNA SPEC QL NAA+PROBE: NOT DETECTED
FLUAV H3 RNA SPEC QL NAA+PROBE: NOT DETECTED
FLUAV SUBTYP SPEC NAA+PROBE: NOT DETECTED
FLUBV RNA SPEC QL NAA+PROBE: NOT DETECTED
GLUCOSE SERPL-MCNC: 102 MG/DL (ref 65–100)
HADV DNA SPEC QL NAA+PROBE: NOT DETECTED
HCOV 229E RNA SPEC QL NAA+PROBE: NOT DETECTED
HCOV HKU1 RNA SPEC QL NAA+PROBE: NOT DETECTED
HCOV NL63 RNA SPEC QL NAA+PROBE: NOT DETECTED
HCOV OC43 RNA SPEC QL NAA+PROBE: NOT DETECTED
HCT VFR BLD AUTO: 24.2 % (ref 35–47)
HGB BLD-MCNC: 7.8 G/DL (ref 11.5–16)
HMPV RNA SPEC QL NAA+PROBE: NOT DETECTED
HPIV1 RNA SPEC QL NAA+PROBE: NOT DETECTED
HPIV2 RNA SPEC QL NAA+PROBE: NOT DETECTED
HPIV3 RNA SPEC QL NAA+PROBE: NOT DETECTED
HPIV4 RNA SPEC QL NAA+PROBE: NOT DETECTED
IMM GRANULOCYTES # BLD: 0 K/UL (ref 0–0.04)
IMM GRANULOCYTES NFR BLD AUTO: 0 % (ref 0–0.5)
LYMPHOCYTES # BLD: 0.7 K/UL (ref 0.8–3.5)
LYMPHOCYTES NFR BLD: 14 % (ref 12–49)
M PNEUMO DNA SPEC QL NAA+PROBE: NOT DETECTED
MCH RBC QN AUTO: 27.5 PG (ref 26–34)
MCHC RBC AUTO-ENTMCNC: 32.2 G/DL (ref 30–36.5)
MCV RBC AUTO: 85.2 FL (ref 80–99)
MONOCYTES # BLD: 0.4 K/UL (ref 0–1)
MONOCYTES NFR BLD: 7 % (ref 5–13)
NEUTS SEG # BLD: 3.4 K/UL (ref 1.8–8)
NEUTS SEG NFR BLD: 69 % (ref 32–75)
NRBC # BLD: 0 K/UL (ref 0–0.01)
NRBC BLD-RTO: 0 PER 100 WBC
PLATELET # BLD AUTO: 260 K/UL (ref 150–400)
PMV BLD AUTO: 10.8 FL (ref 8.9–12.9)
POTASSIUM SERPL-SCNC: 3.7 MMOL/L (ref 3.5–5.1)
RBC # BLD AUTO: 2.84 M/UL (ref 3.8–5.2)
REPORTED DOSE,DOSE: ABNORMAL UNITS
REPORTED DOSE/TIME,TMG: ABNORMAL
RSV RNA SPEC QL NAA+PROBE: NOT DETECTED
RV+EV RNA SPEC QL NAA+PROBE: NOT DETECTED
SODIUM SERPL-SCNC: 143 MMOL/L (ref 136–145)
VANCOMYCIN TROUGH SERPL-MCNC: 28.5 UG/ML (ref 5–10)
WBC # BLD AUTO: 5 K/UL (ref 3.6–11)

## 2019-01-03 PROCEDURE — 74011250636 HC RX REV CODE- 250/636: Performed by: INTERNAL MEDICINE

## 2019-01-03 PROCEDURE — 87633 RESP VIRUS 12-25 TARGETS: CPT

## 2019-01-03 PROCEDURE — 36415 COLL VENOUS BLD VENIPUNCTURE: CPT

## 2019-01-03 PROCEDURE — 85025 COMPLETE CBC W/AUTO DIFF WBC: CPT

## 2019-01-03 PROCEDURE — 74011000250 HC RX REV CODE- 250: Performed by: INTERNAL MEDICINE

## 2019-01-03 PROCEDURE — 74011000258 HC RX REV CODE- 258: Performed by: INTERNAL MEDICINE

## 2019-01-03 PROCEDURE — 74011250637 HC RX REV CODE- 250/637: Performed by: INTERNAL MEDICINE

## 2019-01-03 PROCEDURE — 80202 ASSAY OF VANCOMYCIN: CPT

## 2019-01-03 PROCEDURE — 80048 BASIC METABOLIC PNL TOTAL CA: CPT

## 2019-01-03 PROCEDURE — 77010033678 HC OXYGEN DAILY

## 2019-01-03 PROCEDURE — 65660000000 HC RM CCU STEPDOWN

## 2019-01-03 RX ORDER — ATENOLOL 25 MG/1
50 TABLET ORAL DAILY
Status: DISCONTINUED | OUTPATIENT
Start: 2019-01-03 | End: 2019-01-05

## 2019-01-03 RX ORDER — SERTRALINE HYDROCHLORIDE 50 MG/1
100 TABLET, FILM COATED ORAL DAILY
Status: DISCONTINUED | OUTPATIENT
Start: 2019-01-04 | End: 2019-01-10 | Stop reason: HOSPADM

## 2019-01-03 RX ORDER — DULOXETIN HYDROCHLORIDE 30 MG/1
60 CAPSULE, DELAYED RELEASE ORAL DAILY
Status: DISCONTINUED | OUTPATIENT
Start: 2019-01-04 | End: 2019-01-10 | Stop reason: HOSPADM

## 2019-01-03 RX ADMIN — HEPARIN SODIUM 5000 UNITS: 5000 INJECTION INTRAVENOUS; SUBCUTANEOUS at 06:13

## 2019-01-03 RX ADMIN — OXYCODONE HYDROCHLORIDE 5 MG: 5 TABLET ORAL at 12:16

## 2019-01-03 RX ADMIN — MEROPENEM 500 MG: 500 INJECTION, POWDER, FOR SOLUTION INTRAVENOUS at 00:08

## 2019-01-03 RX ADMIN — OXYCODONE HYDROCHLORIDE 5 MG: 5 TABLET ORAL at 07:32

## 2019-01-03 RX ADMIN — OXYCODONE HYDROCHLORIDE 5 MG: 5 TABLET ORAL at 02:49

## 2019-01-03 RX ADMIN — LEVOTHYROXINE SODIUM 125 MCG: 25 TABLET ORAL at 07:32

## 2019-01-03 RX ADMIN — HEPARIN SODIUM 5000 UNITS: 5000 INJECTION INTRAVENOUS; SUBCUTANEOUS at 15:29

## 2019-01-03 RX ADMIN — MEROPENEM 500 MG: 500 INJECTION, POWDER, FOR SOLUTION INTRAVENOUS at 12:16

## 2019-01-03 RX ADMIN — OXYCODONE HYDROCHLORIDE 5 MG: 5 TABLET ORAL at 17:16

## 2019-01-03 RX ADMIN — ATENOLOL 50 MG: 25 TABLET ORAL at 15:29

## 2019-01-03 RX ADMIN — LABETALOL HYDROCHLORIDE 10 MG: 5 INJECTION INTRAVENOUS at 10:52

## 2019-01-03 RX ADMIN — MORPHINE SULFATE 2 MG: 4 INJECTION, SOLUTION INTRAMUSCULAR; INTRAVENOUS at 09:17

## 2019-01-03 RX ADMIN — MEROPENEM 500 MG: 500 INJECTION, POWDER, FOR SOLUTION INTRAVENOUS at 07:32

## 2019-01-03 RX ADMIN — HALOPERIDOL LACTATE 2 MG: 5 INJECTION, SOLUTION INTRAMUSCULAR at 09:06

## 2019-01-03 RX ADMIN — MEROPENEM 500 MG: 500 INJECTION, POWDER, FOR SOLUTION INTRAVENOUS at 18:20

## 2019-01-03 RX ADMIN — VANCOMYCIN HYDROCHLORIDE 1250 MG: 10 INJECTION, POWDER, LYOPHILIZED, FOR SOLUTION INTRAVENOUS at 21:40

## 2019-01-03 RX ADMIN — SODIUM CHLORIDE AND POTASSIUM CHLORIDE: 4.5; 1.49 INJECTION, SOLUTION INTRAVENOUS at 10:50

## 2019-01-03 RX ADMIN — OXYCODONE HYDROCHLORIDE 5 MG: 5 TABLET ORAL at 21:09

## 2019-01-03 RX ADMIN — Medication 10 ML: at 21:10

## 2019-01-03 RX ADMIN — HEPARIN SODIUM 5000 UNITS: 5000 INJECTION INTRAVENOUS; SUBCUTANEOUS at 21:10

## 2019-01-03 RX ADMIN — LABETALOL HYDROCHLORIDE 10 MG: 5 INJECTION INTRAVENOUS at 04:19

## 2019-01-03 RX ADMIN — VANCOMYCIN HYDROCHLORIDE 1250 MG: 10 INJECTION, POWDER, LYOPHILIZED, FOR SOLUTION INTRAVENOUS at 02:49

## 2019-01-03 NOTE — PROGRESS NOTES
[address] [place] [address] Pharmacy Dosing Services: Antimicrobial Stewardship Daily Doc Consult for antibiotic dosing of Vancomycin and Meropenem by Dr. Deven Tello Indication: Febrile illness with multiple possible etiologies, recent right elbow cellulitis with group B streptococcal bacteremia and cervical spine diskitis Day of Therapy: 3 Vancomycin therapy: 
Current maintenance dose: 1250 mg IV every 12 hours. Dose calculated to approximate a therapeutic trough of 15-20 mcg/mL. Date Dose & Interval Measured (mcg/mL) Extrapolated (mcg/mL) ?1/3/19 at 0222 ?1250 mg IV every 12 hours ? 28.5 ?27.8  
? ? ? ?  
? ? ? ? Plan: ? Vancomycin trough level was drawn appropriately and is supra-therapeutic. Based on individual kinetics associated with level, will adjust regimen to vancomycin 1250 mg IV every 18 hours (maintain 15 mg/kg dosing and extend interval to predict a trough level of 15.5 mcg/mL). ? No leukocytosis, SCr slight drop, afebrile in past 24 hours, cultures pending ? Ordered SCr daily with AM labs per protocol Dose administration notes:   Doses given appropriately as scheduled Non-Kinetic Antimicrobial Dosing Regimen:  
Current Regimen:  Meropenem 500 mg IV every 6 hours Recommendation: Continue current regimen Dose administration notes:   Doses given appropriately as scheduled Other Antimicrobial  
(not dosed by pharmacist) None Cultures ? 12/31/2018 Blood for fungus: no growth x 3 days (pending) ? 12/31/2018 Respiratory viral panel: not detected (final) ? 12/31/2018 Urine: Yeast identification to follow - Prelim 
? 12/31/2018 Blood: no growth x 3 days (pending) ? 12/31/2018 Blood: no growth x 3 days (pending) Serum Creatinine Lab Results Component Value Date/Time Creatinine 0.65 01/03/2019 02:22 AM  
  
  
Creatinine Clearance Estimated Creatinine Clearance: 85.6 mL/min (based on SCr of 0.65 mg/dL). Temp Temp: 98.4 °F (36.9 °C) WBC Lab Results Component Value Date/Time WBC 5.0 01/03/2019 02:22 AM  
 
  
H/H Lab Results Component Value Date/Time HGB 7.8 (L) 01/03/2019 02:22 AM  
 
  
Platelets Lab Results Component Value Date/Time  PLATELET 464 47/07/0832 02:22 AM  
 
  
Pharmacist Ramandeep Mueller, DENICED

## 2019-01-03 NOTE — PROGRESS NOTES
Problem: Pressure Injury - Risk of 
Goal: *Prevention of pressure injury Document Nikolay Scale and appropriate interventions in the flowsheet. Outcome: Progressing Towards Goal 
Pressure Injury Interventions: 
Sensory Interventions: Assess changes in LOC, Check visual cues for pain, Float heels, Keep linens dry and wrinkle-free, Minimize linen layers, Monitor skin under medical devices, Pressure redistribution bed/mattress (bed type), Turn and reposition approx. every two hours (pillows and wedges if needed) Moisture Interventions: Absorbent underpads, Internal/External urinary devices, Maintain skin hydration (lotion/cream), Minimize layers, Moisture barrier Activity Interventions: Pressure redistribution bed/mattress(bed type) Mobility Interventions: Float heels, HOB 30 degrees or less, Pressure redistribution bed/mattress (bed type), Turn and reposition approx. every two hours(pillow and wedges) Nutrition Interventions: Document food/fluid/supplement intake Friction and Shear Interventions: Apply protective barrier, creams and emollients, Feet elevated on foot rest, HOB 30 degrees or less, Lift sheet, Lift team/patient mobility team, Minimize layers Problem: Falls - Risk of 
Goal: *Absence of Falls Document Morena Agarwal Fall Risk and appropriate interventions in the flowsheet. Outcome: Progressing Towards Goal 
Fall Risk Interventions: 
  
 
Mentation Interventions: Adequate sleep, hydration, pain control, More frequent rounding, Reorient patient, Room close to nurse's station, Toileting rounds, Update white board Medication Interventions: Evaluate medications/consider consulting pharmacy Elimination Interventions: Call light in reach, Patient to call for help with toileting needs, Toileting schedule/hourly rounds

## 2019-01-03 NOTE — PROGRESS NOTES
Neurology Progress Note Patient ID: 
Lucila Qureshi 
[id number] 
8 y.o. 
1955 Chief Complaint: I'm in pain Subjective:  
Pt alert and oriented but in severe pain in her back. She was having limited doses of pain meds due to AMS and seizure but has now restarted the pain meds this AM. Objective: All records in [facility] reviewed and noted ROS: 
Per HPI All other 12 pt ROS negative Meds: 
Current Facility-Administered Medications Medication Dose Route Frequency  oxyCODONE IR (ROXICODONE) tablet 5 mg  5 mg Oral Q4H PRN  
 levothyroxine (SYNTHROID) tablet 125 mcg  125 mcg Oral ACB  heparin (porcine) injection 5,000 Units  5,000 Units SubCUTAneous Q8H  
 labetalol (NORMODYNE;TRANDATE) injection 10 mg  10 mg IntraVENous Q2H PRN  
 meropenem (MERREM) 500 mg in 0.9% sodium chloride (MBP/ADV) 50 mL  0.5 g IntraVENous Q6H  
 haloperidol lactate (HALDOL) injection 2 mg  2 mg IntraVENous Q6H PRN  
 morphine injection 2 mg  2 mg IntraVENous Q3H PRN  
 acetaminophen (TYLENOL) suppository 650 mg  650 mg Rectal Q6H PRN  
 LORazepam (ATIVAN) injection 1 mg  1 mg IntraVENous Q5MIN PRN  
 vancomycin (VANCOCIN) 1,250 mg in 0.9% sodium chloride 250 mL IVPB  1,250 mg IntraVENous Q12H  
 sodium chloride (NS) flush 5-10 mL  5-10 mL IntraVENous PRN  
 0.45% sodium chloride with KCl 20 mEq/L infusion   IntraVENous CONTINUOUS Imaging: MRI brain: neg MRI C spine with continued discitis EEG neg Lab Review Recent Results ([facility] the past 24 hour(s)) [facility] Collection Time: 01/03/19  2:22 AM  
Result Value Ref Range Vancomycin,trough 28.5 (HH) 5.0 - 10.0 ug/mL Reported dose date: NOT PROVIDED Reported dose time: NOT PROVIDED Reported dose: NOT PROVIDED UNITS  
CBC WITH AUTOMATED DIFF Collection Time: 01/03/19  2:22 AM  
Result Value Ref Range WBC 5.0 3.6 - 11.0 K/uL  
 RBC 2.84 (L) 3.80 - 5.20 M/uL HGB 7.8 (L) 11.5 - 16.0 g/dL HCT 24.2 (L) 35.0 - 47.0 % MCV 85.2 80.0 - 99.0 FL  
 MCH 27.5 26.0 - 34.0 PG  
 MCHC 32.2 30.0 - 36.5 g/dL  
 RDW 15.0 (H) 11.5 - 14.5 % PLATELET 164 446 - 457 K/uL MPV 10.8 8.9 - 12.9 FL  
 NRBC 0.0 0  WBC ABSOLUTE NRBC 0.00 0.00 - 0.01 K/uL NEUTROPHILS 69 32 - 75 % LYMPHOCYTES 14 12 - 49 % MONOCYTES 7 5 - 13 % EOSINOPHILS 9 (H) 0 - 7 % BASOPHILS 1 0 - 1 % IMMATURE GRANULOCYTES 0 0.0 - 0.5 % ABS. NEUTROPHILS 3.4 1.8 - 8.0 K/UL  
 ABS. LYMPHOCYTES 0.7 (L) 0.8 - 3.5 K/UL  
 ABS. MONOCYTES 0.4 0.0 - 1.0 K/UL  
 ABS. EOSINOPHILS 0.4 0.0 - 0.4 K/UL  
 ABS. BASOPHILS 0.0 0.0 - 0.1 K/UL  
 ABS. IMM. GRANS. 0.0 0.00 - 0.04 K/UL  
 DF AUTOMATED METABOLIC PANEL, BASIC Collection Time: 01/03/19  2:22 AM  
Result Value Ref Range Sodium 143 136 - 145 mmol/L Potassium 3.7 3.5 - 5.1 mmol/L Chloride 107 97 - 108 mmol/L  
 CO2 24 21 - 32 mmol/L Anion gap 12 5 - 15 mmol/L Glucose 102 (H) 65 - 100 mg/dL BUN 23 (H) 6 - 20 MG/DL Creatinine 0.65 0.55 - 1.02 MG/DL  
 BUN/Creatinine ratio 35 (H) 12 - 20 GFR est AA >60 >60 ml/min/1.73m2 GFR est non-AA >60 >60 ml/min/1.73m2 Calcium 7.8 (L) 8.5 - 10.1 MG/DL Exam: 
Visit Vitals BP (!) 162/137 Pulse 99 Temp 98.4 °F (36.9 °C) Resp 19 Ht 5' 1\" (1.549 m) Wt 81.3 kg (179 lb 5 oz) SpO2 100% BMI 33.88 kg/m² Gen: Well developed CV: RRR Lungs: non labored breathing Abd: non distending Neuro: A&O x 3, no dysarthria or aphasia CN II-XII: PERRL, EOMI, face symmetric, tongue/palate midline Motor: strength moves all 4 ext equally Sensory: intact to LT Assessment:  
 
Patient Active Problem List  
Diagnosis Code  Metabolic acidosis Z28.9  BAYLEE (acute kidney injury) ([facility] [address] N17.9  
 HTN (hypertension) I10  
 Chronic back pain M54.9, G89.29  
 Diabetes mellitus, type 2 ([facility] [address] E11.9  Leukocytosis D72.829  Right elbow pain M25.521  
 Hypotension I95.9  Fever R50.9  Sinus tachycardia R00.0  Sepsis ([facility] [address] A41.9  Acute metabolic encephalopathy J78.52  
 Discitis of cervical region M46.42  
 Urinary retention R33.9  Cellulitis of right elbow L03. 113  
 Anemia D64.9  Hypokalemia E87.6  Hypernatremia E800  
 
51-year-old female admitted for confusion and fevers. She was recently admitted for encephalopathy and found to have cervical discitis at that time. She is continuing on IV antibiotics in a rehab facility when she became acutely altered. This admission she had new onset generalized tonic clonic seizure. I suspect this is secondary to infection as she has no epilepsy risk factors. However, given her prolonged hospital course previously and extensive antibiotic use, there is a possibility that she had some type of meningitis or encephalitis that was not diagnosed due to already being on antibiotics. This would put her at risk for seizures. MRI brain and EEG were neg. She is alert and oriented and mostly just in pain at this point. Neuro exam is non focal and she has had not further seizure activity. 
  
Plan: 1. MRI of the brain with and without contrast neg for stroke or acute process 2. MRI of the cervical spine with and without contrast with continued discitis 3. EEG negative. 4.  Continue Ativan and seizure precautions 5. If patient has additional seizure activity will start Keppra 500 mg IV every 12 
6. Continue antibiotics per primary 7. Spoke with Dr. Leslie Cole that pt can resume pain meds from neuro standpoint 
  
No further neuro w/u recommended at this time. Will sign off but please call with further questions.   
 
Signed: 
Abby Aguilar MD 
1/3/2019 
9:49 AM

## 2019-01-03 NOTE — PROGRESS NOTES
Critical access hospital Medical Progress Note NAME: Kameron Mauro :  1955 MRM:  [id number] Date/Time: 1/3/2019  1:59 PM 
 
  
Assessment and Plan:  
 
Sepsis / Fever / Sinus tachycardia - POA, unclear etiology. Imaging unremarkable. DDx fungemia, viral, bacterial.  Continue meropenum. Consulted ID. They added vancomycin. Check fungal and bacterial cx. Add resp viral panel. Urine cx with fungus, most likely colonization, not active infection. Lactate normal and no end organ damage, and so NOT severe sepsis. Fever resolved. 
  
C-spine discitis - Dx 3 weeks ago. MRI last month showed evidence of discitis at C5-C6 and C6-C7, with abnormal enhancement of the vertebral bodies. There is enhancing phlegmon in the epidural space. No drainable abscess. Repeat MRI shows \"Persistent osteomyelitis of C5, C6, and C7 is similar to last month. Ventral epidural phlegmon is decreased. Anterior paraspinal phlegmon is slightly decreased. No drainable abscess. Mild cord indentation at C5-C6 is unchanged. \" Evaluated by ortho and ID. Needed 6 weeks of IV ABx, half way through that. Currently on meropenem 
  
Anemia - POA, chronic, worsening with hydration, recent Fe studies consistent with chronic dz. Monitor  
  
Acute metabolic encephalopathy - POA, recurrent, unclear etiology, likely due to sepsis, and now resolving. On recent admit had unremarkable head CT, MRI brain. Repeat MRI also unremarkable. Recent TSH, B12/folate, ammonia, HSV, EEG, RPR, West Nile, HIV, Lyme IgM negative. Suspect dementia. Continue sertraline, resume duloxetine, ASA. Resumed oxycodone 
  
R elbow cellulitis / bursitis / acute pain / Weakness - Noted on last admit, s/p joint aspiration by IR on . On meropenem.  
  
DM type 2 w/ hypoglycemia - Diabetic diet and counseling. SSI per protocol. Continue home Lantus if eating. Recent A1C 7.2%.  
  
HTN - BP up now.   Resume atenolol, hold chlorthaldone 
  
 Hypokalemia / Hypernatremia - Cr stable. Hydrate and follow. 
  
Urinary retention - Recent Dx. Continue joyce and flomax 
  
Hypothyroidism - Continue synthroid 
  
Hyperlipidemia - Continue simvastatin Subjective: Chief Complaint: Back pain, chronic, persistent ROS: 
(bold if positive, if negative) Tolerating minimal PT Tolerating some Diet Objective:  
 
Last 24hrs VS reviewed since prior progress note. Most recent are: 
 
Visit Vitals BP (!) 156/92 Pulse (!) 104 Temp 98.6 °F (37 °C) Resp 29 Ht 5' 1\" (1.549 m) Wt 81.3 kg (179 lb 5 oz) SpO2 94% BMI 33.88 kg/m² SpO2 Readings from Last 6 Encounters:  
01/03/19 94% 12/17/18 96% O2 Flow Rate (L/min): 4 l/min Intake/Output Summary (Last 24 hours) at 1/3/2019 1359 Last data filed at 1/3/2019 1300 Gross per 24 hour Intake 3753.33 ml Output 1605 ml Net 2148.33 ml Physical Exam: 
 
Gen:  Obese. in no acute distress HEENT:  Pink conjunctivae, PERRL, hearing intact to voice, moist mucous membranes Neck:  Supple, without masses, thyroid non-tender Resp:  No accessory muscle use, distant breath sounds without wheezes rales or rhonchi 
Card:  No murmurs, tachycardic S1, S2 without thrills, bruits or peripheral edema Abd:  Soft, non-tender, non-distended, reduced bowel sounds are present, no mass Lymph:  No cervical or inguinal adenopathy Musc:  No cyanosis or clubbing Skin:  No rashes or ulcers, skin turgor is good Neuro:  Cranial nerves are grossly intact, general pain and motor weakness, follows commands vaguely Psych:  Poor insight, oriented to person, place maybe Telemetry reviewed:   normal sinus rhythm 
__________________________________________________________________ Medications Reviewed: (see below) Medications:  
 
Current Facility-Administered Medications Medication Dose Route Frequency  vancomycin (VANCOCIN) 1,250 mg in 0.9% sodium chloride 250 mL IVPB 1,250 mg IntraVENous Q18H  
 oxyCODONE IR (ROXICODONE) tablet 5 mg  5 mg Oral Q4H PRN  
 levothyroxine (SYNTHROID) tablet 125 mcg  125 mcg Oral ACB  heparin (porcine) injection 5,000 Units  5,000 Units SubCUTAneous Q8H  
 labetalol (NORMODYNE;TRANDATE) injection 10 mg  10 mg IntraVENous Q2H PRN  
 meropenem (MERREM) 500 mg in 0.9% sodium chloride (MBP/ADV) 50 mL  0.5 g IntraVENous Q6H  
 haloperidol lactate (HALDOL) injection 2 mg  2 mg IntraVENous Q6H PRN  
 morphine injection 2 mg  2 mg IntraVENous Q3H PRN  
 acetaminophen (TYLENOL) suppository 650 mg  650 mg Rectal Q6H PRN  
 LORazepam (ATIVAN) injection 1 mg  1 mg IntraVENous Q5MIN PRN  
 sodium chloride (NS) flush 5-10 mL  5-10 mL IntraVENous PRN  
 0.45% sodium chloride with KCl 20 mEq/L infusion   IntraVENous CONTINUOUS Lab Data Reviewed: (see below) Lab Review:  
 
Recent Labs 01/03/19 0222 01/02/19 
0205 01/01/19 
1325 WBC 5.0 6.4 8.5 HGB 7.8* 8.0* 9.5* HCT 24.2* 26.0* 29.9*  
 241 338 Recent Labs 01/03/19 0222 01/02/19 
0205 01/01/19 
1325 12/31/18 
2054  144 145 146*  
K 3.7 3.3* 3.1* 2.6*  
 108 105 104 CO2 24 26 26 32 * 122* 139* 141* BUN 23* 30* 26* 25* CREA 0.65 0.83 0.91 0.98  
CA 7.8* 7.7* 7.9* 8.7 ALB  --   --   --  1.8* TBILI  --   --   --  0.6 SGOT  --   --   --  31 ALT  --   --   --  22 Lab Results Component Value Date/Time Glucose (POC) 138 (H) 01/01/2019 10:25 PM  
 Glucose (POC) 121 (H) 01/01/2019 11:57 AM  
 Glucose (POC) 118 (H) 01/01/2019 06:45 AM  
 Glucose (POC) 129 (H) 12/17/2018 05:13 PM  
 Glucose (POC) 207 (H) 12/17/2018 11:38 AM  
 
No results for input(s): PH, PCO2, PO2, HCO3, FIO2 in the last 72 hours. No results for input(s): INR in the last 72 hours. No lab exists for component: INREXT All Micro Results Procedure Component Value Units Date/Time RESPIRATORY PANEL,PCR,NASOPHARYNGEAL [[id number]] Order Status:  Sent Specimen:  NASOPHARYNGEAL SWAB CULTURE, BLOOD FOR FUNGUS [[id number]] Collected:  12/31/18 2233 Order Status:  Completed Specimen:  Blood Updated:  01/03/19 0534 Special Requests: HOLD 21 DAYS FOR FUNGUS Culture result: NO GROWTH 3 DAYS     
 CULTURE, BLOOD [[id number]] Collected:  12/31/18 2054 Order Status:  Completed Specimen:  Blood Updated:  01/03/19 0534 Special Requests: NO SPECIAL REQUESTS Culture result: NO GROWTH 3 DAYS     
 CULTURE, BLOOD [[id number]] Collected:  12/31/18 2054 Order Status:  Completed Specimen:  Blood Updated:  01/03/19 0534 Special Requests: NO SPECIAL REQUESTS Culture result: NO GROWTH 3 DAYS     
 CULTURE, URINE [[id number]]  (Abnormal) Collected:  12/31/18 2140 Order Status:  Completed Specimen:  Cath Urine Updated:  01/02/19 0945 Special Requests: NO SPECIAL REQUESTS [place] Count --     
  >100,000 COLONIES/mL Culture result: YEAST IDENTIFICATION TO FOLLOW RESPIRATORY PANEL,PCR,NASOPHARYNGEAL [[id number]] Collected:  12/31/18 2233 Order Status:  Completed Specimen:  Nasopharyngeal Updated:  01/01/19 1352 Adenovirus NOT DETECTED Coronavirus 229E NOT DETECTED Coronavirus HKU1 NOT DETECTED Coronavirus CVNL63 NOT DETECTED Coronavirus OC43 NOT DETECTED Metapneumovirus NOT DETECTED Rhinovirus and Enterovirus NOT DETECTED Influenza A NOT DETECTED Influenza A, subtype H1 NOT DETECTED Influenza A, subtype H3 NOT DETECTED INFLUENZA A H1N1 PCR NOT DETECTED Influenza B NOT DETECTED Parainfluenza 1 NOT DETECTED Parainfluenza 2 NOT DETECTED Parainfluenza 3 NOT DETECTED Parainfluenza virus 4 NOT DETECTED     
  RSV by PCR NOT DETECTED Bordetella pertussis - PCR NOT DETECTED   Chlamydophila pneumoniae DNA, QL, PCR NOT DETECTED     
 Mycoplasma pneumoniae DNA, QL, PCR NOT DETECTED     
 CULTURE, FUNGUS [[id number]] Collected:  12/31/18 2233 Order Status:  Canceled Specimen: Body Fluid URINE CULTURE HOLD SAMPLE [[id number]] Collected:  12/31/18 2140 Order Status:  Completed Specimen:  Urine from Serum Updated:  12/31/18 2150 Urine culture hold URINE ON HOLD IN MICROBIOLOGY DEPT FOR 3 DAYS. IF UNPRESERVED URINE IS SUBMITTED, IT CANNOT BE USED FOR ADDITIONAL TESTING AFTER 24 HRS, RECOLLECTION WILL BE REQUIRED. I have reviewed notes of prior 24hr. Other pertinent lab: none Total time spent with patient: 45 Minutes Care Plan discussed with: Patient, Family, Care Manager, Nursing Staff, Consultant/Specialist and >50% of time spent in counseling and coordination of care Discussed:  Care Plan Prophylaxis:  H2B/PPI Disposition:  SNF/LTC 
        
___________________________________________________ Attending Physician: Wai Lemons MD

## 2019-01-03 NOTE — CONSULTS
[facility]     Name: Lucila Qureshi MRN: [id number]   : 1955 Hospital: [address] [place] [address] Dr   Date: 1/3/2019        Impression Plan   1. Encephalopathy  2. Seizures  3. Fevers  4. Hx of C-spine diskitis  5. Hx of strep bacteremia  6. Distended gallbladder on CT- no abdominal pain               · Vanc/Meropenem  · Seizure precautions  · ID and Neurology following  · Follow up blood cultures  · Follow-up final MRI results  · Heparin SQ q8h  · Repleat electrolytes PRN    Stable for transfer out of the ICU               Radiology  ( personally reviewed) CT chest reviewed: no infiltrates  MRI preliminarily with no significant change in osteo and phlegmon   ABG No results for input(s): PHI, PO2I, PCO2I in the last 72 hours. Subjective     No acute events overnight. Mental status improved, but tearful because she cannot remember the events that brought her here. Off of cooling blanket for >24 hours. Review of Systems:  Review of systems not obtained due to patient factors. Past Medical History:   Diagnosis Date    Chronic back pain     Diabetes mellitus, type 2 ([facility] [address]     History of vascular access device 2018    [facility] VAT - 4 FR single, L cephalic, 44 cm for LTA    HTN (hypertension)       History reviewed. No pertinent surgical history. Prior to Admission medications    Medication Sig Start Date End Date Taking? Authorizing Provider   cyclobenzaprine (FLEXERIL) 10 mg tablet Take 10 mg by mouth nightly. Yes Provider, Historical   DULoxetine (CYMBALTA) 60 mg capsule Take 60 mg by mouth daily. Yes Provider, Historical   ibuprofen (MOTRIN) 400 mg tablet Take 400 mg by mouth three (3) times daily. Yes Provider, Historical   0.9% sodium chloride solp 100 mL with meropenem 1 gram solr 2 g 2 g by IntraVENous route every twelve (12) hours.  18 Yes Provider, Historical   ondansetron hcl (ZOFRAN) 4 mg tablet Take 4 mg by mouth every six (6) hours as needed for Nausea. Yes Provider, Historical   oxyCODONE IR (ROXICODONE) 5 mg immediate release tablet Take 5 mg by mouth every four (4) hours. Yes Provider, Historical   senna (SENNA) 8.6 mg tablet Take 1 Tab by mouth two (2) times a day. Yes Provider, Historical   oxyCODONE ER (XTAMPZA ER) 18 mg ER capsule Take 18 mg by mouth every twelve (12) hours. Yes Provider, Historical   atenolol (TENORMIN) 50 mg tablet Take 1 Tab by mouth daily. 12/18/18  Yes Zoila Bello MD   insulin glargine (LANTUS) 100 unit/mL injection 10 Units by SubCUTAneous route daily. 12/17/18  Yes Zoila Bello MD   chlorthalidone (HYGROTEN) 25 mg tablet Take 1 Tab by mouth daily. 12/18/18  Yes Zoila Bello MD   levothyroxine (SYNTHROID) 125 mcg tablet Take 125 mcg by mouth Daily (before breakfast). Yes Provider, Historical   sertraline (ZOLOFT) 100 mg tablet Take 100 mg by mouth daily. Yes Provider, Historical   simvastatin (ZOCOR) 40 mg tablet Take 40 mg by mouth nightly. Yes Provider, Historical   aspirin delayed-release 81 mg tablet Take 81 mg by mouth two (2) times a day.    Yes Provider, Historical     Current Facility-Administered Medications   Medication Dose Route Frequency    levothyroxine (SYNTHROID) tablet 125 mcg  125 mcg Oral ACB    heparin (porcine) injection 5,000 Units  5,000 Units SubCUTAneous Q8H    meropenem (MERREM) 500 mg in 0.9% sodium chloride (MBP/ADV) 50 mL  0.5 g IntraVENous Q6H    vancomycin (VANCOCIN) 1,250 mg in 0.9% sodium chloride 250 mL IVPB  1,250 mg IntraVENous Q12H    0.45% sodium chloride with KCl 20 mEq/L infusion   IntraVENous CONTINUOUS     No Known Allergies   Social History     Tobacco Use    Smoking status: Never Smoker    Smokeless tobacco: Never Used   Substance Use Topics    Alcohol use: No     Frequency: Never      Family History   Problem Relation Age of Onset    Diabetes Father     Heart Disease Father           Laboratory: I have personally reviewed the critical care flowsheet and labs. Recent Labs     01/03/19  0222 01/02/19  0205 01/01/19  1325   WBC 5.0 6.4 8.5   HGB 7.8* 8.0* 9.5*   HCT 24.2* 26.0* 29.9*    241 338     Recent Labs     01/03/19  0222 01/02/19  0205 01/01/19  1325 12/31/18  2054    144 145 146*   K 3.7 3.3* 3.1* 2.6*    108 105 104   CO2 24 26 26 32   * 122* 139* 141*   BUN 23* 30* 26* 25*   CREA 0.65 0.83 0.91 0.98   CA 7.8* 7.7* 7.9* 8.7   ALB  --   --   --  1.8*   SGOT  --   --   --  31   ALT  --   --   --  22       Objective:     Mode Rate Tidal Volume Pressure FiO2 PEEP                    Vital Signs:     TMAX(24)      Intake/Output:   Last shift:         Last 3 shifts: No intake/output data recorded. [id number]    Intake/Output Summary (Last 24 hours) at 1/3/2019 0848  Last data filed at 1/3/2019 0700  Gross per 24 hour   Intake 3583.33 ml   Output 1370 ml   Net 2213.33 ml     EXAM:   GENERAL: well developed, alert, oriented, NAD, HEENT:  PERRL, EOMI, no alar flaring or epistaxis, oral mucosa moist without cyanosis, NECK:  no jugular vein distention, no retractions, no thyromegaly or masses, LUNGS: CTA, no w/r/r, HEART:  Regular rate and rhythm with no MGR; no edema is present, ABDOMEN:  soft with no tenderness, bowel sounds present, EXTREMITIES:  warm with no cyanosis, no osler nodes, no splinter hemorrhages.  LE atrophied SKIN:  no jaundice or ecchymosis and NEUROLOGIC:  alert and oriented x3, grossly non-focal    Meaghan Katz MD  [facility] [address]

## 2019-01-03 NOTE — PROGRESS NOTES
0700-Bedside shift change report given to Torrey (oncoming nurse) by Argentina Corcoran (off going nurse). Report included the following information SBAR, Intake/Output, MAR, Recent Results, Cardiac Rhythm NSR and Alarm Parameters . 0725-patient is sleeping peacefully. B/P with parameters. 0730-Patient awake now, asking for pain medicine. PRN Roxicodone given. 0800-AM assessment complete. Patient states she doesn't know where she is and where she is going from here. States \"I was on the floor last night. \" Per night shift staff, patient was in bed all night, did not get up, did not fall. Patient reoriented and is now tearful, stating \"I don't know what to do\" 0900-Patient crying inconsolably. States that some \"big black men came in and scared me on purpose. \" 2 staff members of turn team (2 black men with black female tech) had entered room for approx 2 minutes and asked patient if she would turn to which patient refused and they left room. Patient continues to yell and scream. Unable to calm patient. 2mg IV Haldol given per PRN orders. Dr Samantha Rice is aware. 0915-Patient continues to yell and cry. Now saying \"it hurts so bad, just tranquilize me. \" 2mg IV morphine given per order. 0930-Patient continues to whimper while looking around room. Attempt to reassure patient, somewhat calmer now. 1000-patient is now resting quietly. 1056-Patient calm; , Jean Spatz now at bedside. 1200-Patient resting; dozing. No distress noted. 1330-Attempted to turn patient. Refused to turn at this time.  remains at the bedside. 1600-Patient watching TV and talking to . Frequently refusing staff assistance to turn, but allows  to help her. 1730-Patient with BM. CHG bath completed. Son now at bedside. 1900-Bedside shift change report given to tSephani Salomon RN (oncoming nurse) by Torrey (offgoing nurse).  Report included the following information SBAR, Intake/Output, MAR, Recent Results, Cardiac Rhythm NSR and Alarm Parameters .

## 2019-01-03 NOTE — PROGRESS NOTES
Nutrition Assessment: 
 
RECOMMENDATIONS/INTERVENTION(S):  
1. Consistent CHO when able to consume PO. 
 
2. Dietary supplement pending appetite with diet advancement. 3. Will monitor intake, wt, wound healing and plan of care. ASSESSMENT:  
1/3: Pt screened for ICU length of stay x 2 days. Admitted with sepsis, tonic-clonic seizures. MRI + for C spine discitis, pt is half way through 6 wks of abx treatment. Pt confused with metabolic encephalopathy.  reports decreased PO for a few days prior to this admission but eating well before that. He did not think she had lost wt. Per wt history, wt back to 179 lbs from 1 month ago. Pt was admitted in early December as well with intake <25%. Denied any food allergies or intolerances. Pt stated she did not like the Glucerna at her last admission. Discussed other supplements options such as fortified pudding or ice cream, or a snack with peanut butter. Pt stated \"it was fine. \"  thought she liked the fruit last time she was admitted. Biggest complaint at this time is pain control. BG controlled. Lytes WNL. Trace urinary ketones. Diet Order: NPO 
% Eaten:  No data found. Pertinent Medications: [x] Reviewed Chemistries: [x] Reviewed Anthropometrics: Height: 5' 1\" (154.9 cm) Weight: 81.3 kg (179 lb 5 oz) IBW (%IBW):   ( ) UBW (%UBW):   (  %) BMI: Body mass index is 33.88 kg/m². This BMI is indicative of: 
 [] Underweight    [] Normal    [] Overweight    [x]  Obesity    []  Extreme Obesity (BMI>40) Estimated Nutrition Needs (Based on): 4805 Kcals/day(BMR 1305 x 1.3 AF) , 81 g(-98g (1.0-1.2g/kg actual bw)) Protein Carbohydrate: At Least 130 g/day  Fluids: 1700 ml(1 mL/kcal) Last BM:  1/2       Bowel Sounds: active Skin:    [] Intact   [] Incision  [] Breakdown   [] DTI   [] Tears/Excoriation/Abrasion  [x]Edema-2+ RUE [] Other: Wt Readings from Last 30 Encounters:  
01/01/19 81.3 kg (179 lb 5 oz) 12/17/18 88.4 kg (194 lb 14.4 oz) 12/01/18 81.6 kg (179 lb 14.3 oz) 12/01/18 81.6 kg (179 lb 14.3 oz) 12/01/18 81.6 kg (179 lb 14.3 oz) NUTRITION DIAGNOSES:  
Problem:  Increased nutrient needs Etiology: related to increased protein needs for wound healing Signs/Symptoms: as evidenced by sepsis, impaired skin integrity with cellulitis NUTRITION INTERVENTIONS: 
Meals/Snacks: General/healthful diet   Supplements: Commercial supplement(snacks/supplements when diet advances) GOAL:  
Diet to advance past NPO and pt to meet estimated protein needs in the next 3-5 days Cultural, Baptism, or Ethnic Dietary Needs: None EDUCATION & DISCHARGE NEEDS:  
 [x] None Identified 
 [] Identified and Education Provided/Documented 
 [] Identified and Pt declined/was not appropriate [x] Interdisciplinary Care Plan Reviewed/Documented  
 [x] Discharge Needs: Continue consistent CHO diet. [] No Nutrition Related Discharge Needs NUTRITION RISK:  
Pt Is At Nutrition Risk  [x] No Nutrition Risk Identified  [] PT SEEN FOR:  
 []  MD Consult: []Calorie Count []Diabetic Diet Education []Diet Education []Electrolyte Management []General Nutrition Management and Supplements []Management of Tube Feeding []TPN Recommendations []  RN Referral:  []MST score >=2 
   []Enteral/Parenteral Nutrition PTA []Pregnant: Gestational DM or Multigestation  
              [] Pressure Ulcer 
 
[]  Low BMI      [x]  Length of Stay       [] Dysphagia Diet         [] Ventilator 
[]  Follow-up Previous Recommendations: 
 [] Implemented          [] Not Implemented          [x] Not Applicable Previous Goal: 
 [] Met              [] Progressing Towards Goal              [] Not Progressing Towards Goal   [x] Not Applicable Aysha Marie RD Pager [phone] Office [phone]

## 2019-01-03 NOTE — PROGRESS NOTES
[facility] Progress Note Mirta Zepeda DO 
  [phone] Office [phone]  Fax 1/3/2019 Assessment & Plan: 1. Febrile illness. Resolving. Viral? W/u unrevealing to date. Continue current abx. Will stop vancomycin tomorrow if BC's remain sterile 2. Seizure. I suspect this is due to the patient's high fever. Her fever was as high as 104 degrees at the time of admission. Her mental status has normalized. She denies any neck stiffness or headache. Would not pursue lumbar puncture at this time. Neurology is following. 3. Recent right elbow cellulitis complicated by group B streptococcal bacteremia and cervical spine diskitis. Continue meropenem through 2019. Subjective:  
 
C/o back pain Objective:  
 
Vitals:  
Visit Vitals /74 Pulse 86 Temp 98.6 °F (37 °C) Resp 15 Ht 5' 1\" (1.549 m) Wt 81.3 kg (179 lb 5 oz) SpO2 98% BMI 33.88 kg/m² Tmax:  Temp (24hrs), Av.5 °F (36.9 °C), Min:98.1 °F (36.7 °C), Max:98.8 °F (37.1 °C) Exam:  
Patient is intubated:  no 
 
Physical Examination:  
General:  Alert, cooperative, milddistress Head:  Normocephalic, atraumatic. Eyes:  Conjunctivae clear Neck: Supple Lungs:   No distress. Clear to auscultation bilaterally. Chest wall:    
Heart:  Regular rate and rhythm Abdomen:   Soft, non-tender, non-distended Extremities: Moves all. Skin:  No rash Neurologic: CNII-XII intact. Labs:     
 
No lab exists for component: ITNL Recent Labs 18 TROIQ <0.05 Recent Labs 19 
0222 19 
0205 19 
1325 18  144 145 146*  
K 3.7 3.3* 3.1* 2.6*  
 108 105 104 CO2 24 26 26 32 BUN 23* 30* 26* 25* CREA 0.65 0.83 0.91 0.98  
* 122* 139* 141* ALB  --   --   --  1.8* WBC 5.0 6.4 8.5 8.3 HGB 7.8* 8.0* 9.5* 11.0*  
HCT 24.2* 26.0* 29.9* 34.7*  
 241 338 378 No results for input(s): INR, PTP, APTT in the last 72 hours. No lab exists for component: INREXT Needs: urine analysis, urine sodium, protein and creatinine Lab Results Component Value Date/Time Sodium,urine random 29 11/30/2018 06:10 PM  
 Creatinine, urine 187.11 11/30/2018 06:10 PM  
 
 
 
Cultures: No results found for: SDES Lab Results Component Value Date/Time Culture result: NO GROWTH 3 DAYS 12/31/2018 10:33 PM  
 Culture result: YEAST IDENTIFICATION TO FOLLOW (A) 12/31/2018 09:40 PM  
 Culture result: NO GROWTH 3 DAYS 12/31/2018 08:54 PM  
 Culture result: NO GROWTH 3 DAYS 12/31/2018 08:54 PM  
 
 
Radiology:  
 
Medications Current Facility-Administered Medications Medication Dose Route Frequency Last Dose  vancomycin (VANCOCIN) 1,250 mg in 0.9% sodium chloride 250 mL IVPB  1,250 mg IntraVENous Q18H    
 atenolol (TENORMIN) tablet 50 mg  50 mg Oral DAILY  [START ON 1/4/2019] DULoxetine (CYMBALTA) capsule 60 mg  60 mg Oral DAILY  [START ON 1/4/2019] sertraline (ZOLOFT) tablet 100 mg  100 mg Oral DAILY  oxyCODONE IR (ROXICODONE) tablet 5 mg  5 mg Oral Q4H PRN 5 mg at 01/03/19 1216  levothyroxine (SYNTHROID) tablet 125 mcg  125 mcg Oral  mcg at 01/03/19 0732  heparin (porcine) injection 5,000 Units  5,000 Units SubCUTAneous Q8H 5,000 Units at 01/03/19 5522  labetalol (NORMODYNE;TRANDATE) injection 10 mg  10 mg IntraVENous Q2H PRN 10 mg at 01/03/19 1052  meropenem (MERREM) 500 mg in 0.9% sodium chloride (MBP/ADV) 50 mL  0.5 g IntraVENous Q6H 500 mg at 01/03/19 1216  
 haloperidol lactate (HALDOL) injection 2 mg  2 mg IntraVENous Q6H PRN 2 mg at 01/03/19 2235  morphine injection 2 mg  2 mg IntraVENous Q3H PRN 2 mg at 01/03/19 7673  acetaminophen (TYLENOL) suppository 650 mg  650 mg Rectal Q6H  mg at 01/01/19 1441  LORazepam (ATIVAN) injection 1 mg  1 mg IntraVENous Q5MIN PRN    
  sodium chloride (NS) flush 5-10 mL  5-10 mL IntraVENous PRN    
 0.45% sodium chloride with KCl 20 mEq/L infusion   IntraVENous CONTINUOUS Case discussed with: 
 
 
Kit Block DO

## 2019-01-03 NOTE — PROGRESS NOTES
1927 Received report. 2028 Patient back in ICU 16 , after MRI. 
2203 Oxycodone 5 mg po given for generalized pain. 2591 Oxycodone 5 mg po given for pain. 0419 /85 , labetalol 10 mg iv given. 
0706 Bedside shift change report given to [facility] .  Report included the following information SBAR, Kardex, ED Summary, Intake/Output, MAR, Accordion, Recent Results, Med Rec Status and Cardiac Rhythm SR.

## 2019-01-03 NOTE — CONSULTS
Orthopedic History and Physical     Patient: Kandi Torres MRN: [id number]  SSN: [id number]    YOB: 1955  Age: 61 y.o. Sex: female          Subjective:     Patient is a 61 y.o.  female who was admitted on 12/31/18 due to tonic clonic type seizure and meeting sepsis criteria. Pt is well known to Ortho with recent admission 11/30/18-.12/17/18 due to c-spine diskitis C5-6/ C6-7 treated with IV abx x 6 weeks, on Meropenem. Pt's hospitalization was complicated by acute metabolic encephalopathy, hemoptysis, bacteremia, right elbow cellulitis/ bursitis. Pt right elbow was tapped by IR, no growth noted on culture. Right UE had been significantly swollen with erythema during her admission and we continued to follow her. . Prior to discharge right arm had improved with moderate swelling. Since patient has been readmitted, right right UE continues to be swollen. Neuro has consulted Ortho for evaluation. Dopplers of right UE done which were negative. Per patient, she thinks the right UE feels better than it did and feels like it is improving.      Patient Active Problem List    Diagnosis Date Noted    Fever 12/31/2018    Sinus tachycardia 12/31/2018    Sepsis ([address] 12/31/2018    Acute metabolic encephalopathy 14/16/6235    Discitis of cervical region 12/31/2018    Urinary retention 12/31/2018    Cellulitis of right elbow 12/31/2018    Anemia 12/31/2018    Hypokalemia 12/31/2018    Hypernatremia 68/89/1602    Metabolic acidosis 80/80/6075    BAYLEE (acute kidney injury) ([address] 11/30/2018    Leukocytosis 11/30/2018    Right elbow pain 11/30/2018    Hypotension 11/30/2018    HTN (hypertension)     Chronic back pain     Diabetes mellitus, type 2 ([address]      Past Medical History:   Diagnosis Date    Chronic back pain     Diabetes mellitus, type 2 ([address]     History of vascular access device 12/03/2018    [facility] VAT - 4 FR single, L cephalic, 44 cm for LTA    HTN (hypertension) History reviewed. No pertinent surgical history. Prior to Admission medications    Medication Sig Start Date End Date Taking? Authorizing Provider   cyclobenzaprine (FLEXERIL) 10 mg tablet Take 10 mg by mouth nightly. Yes Provider, Historical   DULoxetine (CYMBALTA) 60 mg capsule Take 60 mg by mouth daily. Yes Provider, Historical   ibuprofen (MOTRIN) 400 mg tablet Take 400 mg by mouth three (3) times daily. Yes Provider, Historical   0.9% sodium chloride solp 100 mL with meropenem 1 gram solr 2 g 2 g by IntraVENous route every twelve (12) hours. 12/17/18 1/18/19 Yes Provider, Historical   ondansetron hcl (ZOFRAN) 4 mg tablet Take 4 mg by mouth every six (6) hours as needed for Nausea. Yes Provider, Historical   oxyCODONE IR (ROXICODONE) 5 mg immediate release tablet Take 5 mg by mouth every four (4) hours. Yes Provider, Historical   senna (SENNA) 8.6 mg tablet Take 1 Tab by mouth two (2) times a day. Yes Provider, Historical   oxyCODONE ER (XTAMPZA ER) 18 mg ER capsule Take 18 mg by mouth every twelve (12) hours. Yes Provider, Historical   atenolol (TENORMIN) 50 mg tablet Take 1 Tab by mouth daily. 12/18/18  Yes Ethan Champagne MD   insulin glargine (LANTUS) 100 unit/mL injection 10 Units by SubCUTAneous route daily. 12/17/18  Yes Ethan Champagne MD   chlorthalidone (HYGROTEN) 25 mg tablet Take 1 Tab by mouth daily. 12/18/18  Yes Ethan Champagne MD   levothyroxine (SYNTHROID) 125 mcg tablet Take 125 mcg by mouth Daily (before breakfast). Yes Provider, Historical   sertraline (ZOLOFT) 100 mg tablet Take 100 mg by mouth daily. Yes Provider, Historical   simvastatin (ZOCOR) 40 mg tablet Take 40 mg by mouth nightly. Yes Provider, Historical   aspirin delayed-release 81 mg tablet Take 81 mg by mouth two (2) times a day.    Yes Provider, Historical     Current Facility-Administered Medications   Medication Dose Route Frequency    oxyCODONE IR (ROXICODONE) tablet 5 mg  5 mg Oral Q4H PRN    levothyroxine (SYNTHROID) tablet 125 mcg  125 mcg Oral ACB    heparin (porcine) injection 5,000 Units  5,000 Units SubCUTAneous Q8H    labetalol (NORMODYNE;TRANDATE) injection 10 mg  10 mg IntraVENous Q2H PRN    meropenem (MERREM) 500 mg in 0.9% sodium chloride (MBP/ADV) 50 mL  0.5 g IntraVENous Q6H    haloperidol lactate (HALDOL) injection 2 mg  2 mg IntraVENous Q6H PRN    morphine injection 2 mg  2 mg IntraVENous Q3H PRN    acetaminophen (TYLENOL) suppository 650 mg  650 mg Rectal Q6H PRN    LORazepam (ATIVAN) injection 1 mg  1 mg IntraVENous Q5MIN PRN    vancomycin (VANCOCIN) 1,250 mg in 0.9% sodium chloride 250 mL IVPB  1,250 mg IntraVENous Q12H    sodium chloride (NS) flush 5-10 mL  5-10 mL IntraVENous PRN    0.45% sodium chloride with KCl 20 mEq/L infusion   IntraVENous CONTINUOUS      No Known Allergies   Social History     Tobacco Use    Smoking status: Never Smoker    Smokeless tobacco: Never Used   Substance Use Topics    Alcohol use: No     Frequency: Never      Family History   Problem Relation Age of Onset    Diabetes Father     Heart Disease Father         Review of Systems  A comprehensive review of systems was negative except for that written in the HPI. Objective:     No data found. Temp (24hrs), Av.5 °F (36.9 °C), Min:98.1 °F (36.7 °C), Max:98.8 °F (37.1 °C)      EXAM:   Physical Exam:   Patient appears generally well, mood and affect are appropriate and pleasant. Extremities: Right UE: no erythema. (This is greatly improved when she was here a few weeks ago). Notable swelling of right hand, unable to make a fist. Generalized swelling with no area of fluctuance. FROM of right elbow. No redness/ erythema of right olecranon bursitis. RP +2. Full sensation. Strength: 4/5 strength of right  strength, bi's, tri's. 5/5 on the left. Vascular: 2+ dorsalis pedis pulses bilaterally.     Imaging Review    Imaging Review:  EXAM: XR ELBOW RT MIN 3 V     INDICATION: crepitus and swelling.       COMPARISON: November 30, 2018     FINDINGS: Three views of the right elbow demonstrate no fracture, dislocation,  effusion or other acute abnormality. Multiple small calcified intra-articular  loose bodies are noted.     IMPRESSION  IMPRESSION: No acute abnormality. Multiple small calcified intra-articular loose  bodies. Labs:   Recent Results (from the past 12 hour(s))   CBC WITH AUTOMATED DIFF    Collection Time: 01/03/19  2:22 AM   Result Value Ref Range    WBC 5.0 3.6 - 11.0 K/uL    RBC 2.84 (L) 3.80 - 5.20 M/uL    HGB 7.8 (L) 11.5 - 16.0 g/dL    HCT 24.2 (L) 35.0 - 47.0 %    MCV 85.2 80.0 - 99.0 FL    MCH 27.5 26.0 - 34.0 PG    MCHC 32.2 30.0 - 36.5 g/dL    RDW 15.0 (H) 11.5 - 14.5 %    PLATELET 354 538 - 909 K/uL    MPV 10.8 8.9 - 12.9 FL    NRBC 0.0 0  WBC    ABSOLUTE NRBC 0.00 0.00 - 0.01 K/uL    NEUTROPHILS 69 32 - 75 %    LYMPHOCYTES 14 12 - 49 %    MONOCYTES 7 5 - 13 %    EOSINOPHILS 9 (H) 0 - 7 %    BASOPHILS 1 0 - 1 %    IMMATURE GRANULOCYTES 0 0.0 - 0.5 %    ABS. NEUTROPHILS 3.4 1.8 - 8.0 K/UL    ABS. LYMPHOCYTES 0.7 (L) 0.8 - 3.5 K/UL    ABS. MONOCYTES 0.4 0.0 - 1.0 K/UL    ABS. EOSINOPHILS 0.4 0.0 - 0.4 K/UL    ABS. BASOPHILS 0.0 0.0 - 0.1 K/UL    ABS. IMM.  GRANS. 0.0 0.00 - 0.04 K/UL    DF AUTOMATED     METABOLIC PANEL, BASIC    Collection Time: 01/03/19  2:22 AM   Result Value Ref Range    Sodium 143 136 - 145 mmol/L    Potassium 3.7 3.5 - 5.1 mmol/L    Chloride 107 97 - 108 mmol/L    CO2 24 21 - 32 mmol/L    Anion gap 12 5 - 15 mmol/L    Glucose 102 (H) 65 - 100 mg/dL    BUN 23 (H) 6 - 20 MG/DL    Creatinine 0.65 0.55 - 1.02 MG/DL    BUN/Creatinine ratio 35 (H) 12 - 20      GFR est AA >60 >60 ml/min/1.73m2    GFR est non-AA >60 >60 ml/min/1.73m2    Calcium 7.8 (L) 8.5 - 10.1 MG/DL       Assessment:     Patient Active Problem List    Diagnosis Date Noted    Fever 12/31/2018    Sinus tachycardia 12/31/2018    S[address]is ([facility] 12/31/2018    Acute metabolic encephalopathy 12/31/2018    Discitis of cervical region 12/31/2018    Urinary retention 12/31/2018    Cellulitis of right elbow 12/31/2018    Anemia 12/31/2018    Hypokalemia 12/31/2018    Hypernatremia 41/11/5159    Metabolic acidosis 51/25/5267    BAYLEE (acute kidney injury) ([facility] 11/30/2018    Leukocytosis 11/30/2018    Right elbow pain 11/30/2018    Hypotension 11/30/2018    HTN (hypertension)     Chronic back pain     Diabetes mellitus, type 2 ([facility]          Plan:   62 yo female with right upper extremity / hand swelling with HISTORY of RUE cellulitis/ olecranon bursitis   RUE is greatly improved from 2 weeks ago. No active infection noted. Dopplers negative for DVT. Would recommend further evaluation and strict elevation of right upper extremity. Possibly compression with ace bandage/ although could interfere with blood pressures in that forearm. Will continue to follow along and monitor. Discussed with Dr. Nicolette Morel to discuss with Dr. Miguel Hobson who is also familiar with her.       Roxanne Hopkins, BENIGNO  Orthopaedic Surgery Nurse Practitioner

## 2019-01-04 LAB
ANION GAP SERPL CALC-SCNC: 10 MMOL/L (ref 5–15)
BACTERIA SPEC CULT: ABNORMAL
BUN SERPL-MCNC: 14 MG/DL (ref 6–20)
BUN/CREAT SERPL: 27 (ref 12–20)
CALCIUM SERPL-MCNC: 8 MG/DL (ref 8.5–10.1)
CC UR VC: ABNORMAL
CHLORIDE SERPL-SCNC: 107 MMOL/L (ref 97–108)
CO2 SERPL-SCNC: 25 MMOL/L (ref 21–32)
CREAT SERPL-MCNC: 0.52 MG/DL (ref 0.55–1.02)
CRP SERPL-MCNC: 0.96 MG/DL
ERYTHROCYTE [DISTWIDTH] IN BLOOD BY AUTOMATED COUNT: 15.2 % (ref 11.5–14.5)
GLUCOSE BLD STRIP.AUTO-MCNC: 109 MG/DL (ref 65–100)
GLUCOSE BLD STRIP.AUTO-MCNC: 110 MG/DL (ref 65–100)
GLUCOSE BLD STRIP.AUTO-MCNC: 168 MG/DL (ref 65–100)
GLUCOSE BLD STRIP.AUTO-MCNC: 182 MG/DL (ref 65–100)
GLUCOSE SERPL-MCNC: 100 MG/DL (ref 65–100)
HCT VFR BLD AUTO: 26.5 % (ref 35–47)
HGB BLD-MCNC: 8.5 G/DL (ref 11.5–16)
MAGNESIUM SERPL-MCNC: 1.5 MG/DL (ref 1.6–2.4)
MCH RBC QN AUTO: 27.3 PG (ref 26–34)
MCHC RBC AUTO-ENTMCNC: 32.1 G/DL (ref 30–36.5)
MCV RBC AUTO: 85.2 FL (ref 80–99)
NRBC # BLD: 0 K/UL (ref 0–0.01)
NRBC BLD-RTO: 0 PER 100 WBC
PLATELET # BLD AUTO: 265 K/UL (ref 150–400)
PMV BLD AUTO: 9.9 FL (ref 8.9–12.9)
POTASSIUM SERPL-SCNC: 3.8 MMOL/L (ref 3.5–5.1)
RBC # BLD AUTO: 3.11 M/UL (ref 3.8–5.2)
SERVICE CMNT-IMP: ABNORMAL
SODIUM SERPL-SCNC: 142 MMOL/L (ref 136–145)
WBC # BLD AUTO: 5.2 K/UL (ref 3.6–11)

## 2019-01-04 PROCEDURE — 36415 COLL VENOUS BLD VENIPUNCTURE: CPT

## 2019-01-04 PROCEDURE — 97530 THERAPEUTIC ACTIVITIES: CPT

## 2019-01-04 PROCEDURE — 82962 GLUCOSE BLOOD TEST: CPT

## 2019-01-04 PROCEDURE — 97161 PT EVAL LOW COMPLEX 20 MIN: CPT

## 2019-01-04 PROCEDURE — 74011000258 HC RX REV CODE- 258: Performed by: INTERNAL MEDICINE

## 2019-01-04 PROCEDURE — 85027 COMPLETE CBC AUTOMATED: CPT

## 2019-01-04 PROCEDURE — 74011250637 HC RX REV CODE- 250/637: Performed by: INTERNAL MEDICINE

## 2019-01-04 PROCEDURE — 80048 BASIC METABOLIC PNL TOTAL CA: CPT

## 2019-01-04 PROCEDURE — 83735 ASSAY OF MAGNESIUM: CPT

## 2019-01-04 PROCEDURE — 74011250636 HC RX REV CODE- 250/636: Performed by: INTERNAL MEDICINE

## 2019-01-04 PROCEDURE — 74011000250 HC RX REV CODE- 250: Performed by: INTERNAL MEDICINE

## 2019-01-04 PROCEDURE — 65660000000 HC RM CCU STEPDOWN

## 2019-01-04 PROCEDURE — 86140 C-REACTIVE PROTEIN: CPT

## 2019-01-04 RX ORDER — MAGNESIUM SULFATE 1 G/100ML
1 INJECTION INTRAVENOUS
Status: COMPLETED | OUTPATIENT
Start: 2019-01-04 | End: 2019-01-04

## 2019-01-04 RX ADMIN — SERTRALINE HYDROCHLORIDE 100 MG: 50 TABLET ORAL at 08:23

## 2019-01-04 RX ADMIN — DULOXETINE 60 MG: 30 CAPSULE, DELAYED RELEASE ORAL at 08:23

## 2019-01-04 RX ADMIN — HEPARIN SODIUM 5000 UNITS: 5000 INJECTION INTRAVENOUS; SUBCUTANEOUS at 21:34

## 2019-01-04 RX ADMIN — MAGNESIUM SULFATE HEPTAHYDRATE 1 G: 1 INJECTION, SOLUTION INTRAVENOUS at 14:50

## 2019-01-04 RX ADMIN — MEROPENEM 500 MG: 500 INJECTION, POWDER, FOR SOLUTION INTRAVENOUS at 00:40

## 2019-01-04 RX ADMIN — Medication 10 ML: at 21:34

## 2019-01-04 RX ADMIN — MORPHINE SULFATE 2 MG: 4 INJECTION, SOLUTION INTRAMUSCULAR; INTRAVENOUS at 02:27

## 2019-01-04 RX ADMIN — SODIUM CHLORIDE AND POTASSIUM CHLORIDE: 4.5; 1.49 INJECTION, SOLUTION INTRAVENOUS at 00:44

## 2019-01-04 RX ADMIN — LABETALOL HYDROCHLORIDE 10 MG: 5 INJECTION INTRAVENOUS at 13:51

## 2019-01-04 RX ADMIN — HEPARIN SODIUM 5000 UNITS: 5000 INJECTION INTRAVENOUS; SUBCUTANEOUS at 13:55

## 2019-01-04 RX ADMIN — MEROPENEM 500 MG: 500 INJECTION, POWDER, FOR SOLUTION INTRAVENOUS at 14:05

## 2019-01-04 RX ADMIN — LEVOTHYROXINE SODIUM 125 MCG: 25 TABLET ORAL at 06:34

## 2019-01-04 RX ADMIN — HEPARIN SODIUM 5000 UNITS: 5000 INJECTION INTRAVENOUS; SUBCUTANEOUS at 06:34

## 2019-01-04 RX ADMIN — MEROPENEM 500 MG: 500 INJECTION, POWDER, FOR SOLUTION INTRAVENOUS at 06:34

## 2019-01-04 RX ADMIN — OXYCODONE HYDROCHLORIDE 5 MG: 5 TABLET ORAL at 16:08

## 2019-01-04 RX ADMIN — OXYCODONE HYDROCHLORIDE 5 MG: 5 TABLET ORAL at 08:23

## 2019-01-04 RX ADMIN — MAGNESIUM SULFATE HEPTAHYDRATE 1 G: 1 INJECTION, SOLUTION INTRAVENOUS at 16:04

## 2019-01-04 RX ADMIN — LABETALOL HYDROCHLORIDE 10 MG: 5 INJECTION INTRAVENOUS at 06:36

## 2019-01-04 RX ADMIN — ATENOLOL 50 MG: 25 TABLET ORAL at 08:23

## 2019-01-04 RX ADMIN — MEROPENEM 500 MG: 500 INJECTION, POWDER, FOR SOLUTION INTRAVENOUS at 19:48

## 2019-01-04 RX ADMIN — LABETALOL HYDROCHLORIDE 10 MG: 5 INJECTION INTRAVENOUS at 11:08

## 2019-01-04 NOTE — PROGRESS NOTES
1-4-2019 CASE MANAGEMENT NOTE: 
I read the note from the covering CM about the pt wanting to change SNF to [facility]. The referral was sent but they have no vacancy and are not anticipating an opening. I informed the pt and her  of this.  Elsa Menon, BSW, CM

## 2019-01-04 NOTE — PROGRESS NOTES
Problem: Mobility Impaired (Adult and Pediatric) Goal: *Therapy Goal (Edit Goal, Insert Text) Physical Therapy Goals Initiated 1/4/2019 1. Patient will move from supine to sit and sit to supine  and roll side to side in bed with moderate assistance x 2  within 7 day(s). 2.  Patient will transfer from bed to chair and chair to bed with maximal assistance x 2 using the least restrictive device within 7 day(s). 3.  Patient will tolerate sitting with bed in chair position for 2-3 hours within 7 days. 4.  Patient will sit at the edge of bed for 5 min with VSS with maximum assistance within 7 days. physical Therapy EVALUATION Patient: Isabela Zhao (1 y.o. female) Date: 1/4/2019 Primary Diagnosis: Sepsis ([address] Precautions:   Fall, Seizure; multi-podus boots b/l feet ASSESSMENT : 
Based on the objective data described below, the patient presented from [facility] rehab to [facility] ED with AMS and fever. Did have a tonic clonic seizure here at the hospital due to high fever. Patient PTA while in rehab was yadira lift for all OOB activities. Patient's spouse states the last time she stood was November 28th 2018 and was [employer] at that time. Patient presents with R UE cellulitis, Cervical spine discitis and in high pain. Agrees although reluctantly to get up. Rolling several times left and right max A x 1-2; unable to tolerate L sidelying to sit due to pain L shoulder. Patient at this point states she needs to have a BM and bed pan placed. Patient c/o pain due to positioning on bed pan. Nursing assisting with hygiene and gown change. Added a draw sheet to assist with up in bed. Patient overall max/total A x 2 for bed mobility. Attempted to position the patient in comfortable position with pillow propping. multi-podus boots placed. Patient will most likely benefit from SNF rehab at discharge.  We will try a week of daily PT and based upon progress re-assess frequency at that time. Communicated with nursing patient is yadira lift and to utilize lift team for turns and to utilize the bed in chair position. Patient will benefit from skilled intervention to address the above impairments. Patients rehabilitation potential is considered to be Fair Factors which may influence rehabilitation potential include:  
[]         None noted 
[]         Mental ability/status [x]         Medical condition 
[x]         Home/family situation and support systems 
[]         Safety awareness [x]         Pain tolerance/management 
[]         Other: PLAN : 
Recommendations and Planned Interventions: 
[x]           Bed Mobility Training             [x]    Neuromuscular Re-Education 
[x]           Transfer Training                   []    Orthotic/Prosthetic Training 
[x]           Gait Training                         []    Modalities [x]           Therapeutic Exercises           []    Edema Management/Control 
[x]           Therapeutic Activities            [x]    Patient and Family Training/Education 
[]           Other (comment): Frequency/Duration: Patient will be followed by physical therapy  5 times a week to address goals. Discharge Recommendations: Rehab Further Equipment Recommendations for Discharge: TBD SUBJECTIVE:  
Patient stated I think I will try to do something.  OBJECTIVE DATA SUMMARY:  
HISTORY:   
Past Medical History:  
Diagnosis Date  Chronic back pain  Diabetes mellitus, type 2 ([address]  History of vascular access device 12/03/2018 [facility] VAT - 4 FR single, L cephalic, 44 cm for LTA  HTN (hypertension) History reviewed. No pertinent surgical history. Prior Level of Function/Home Situation: Prior to November Indep; While in rehab just PTA yadira lift Personal factors and/or comorbidities impacting plan of care: See above Home Situation Home Environment: Private residence Hand Rails : Bilateral 
 Wheelchair Ramp: Yes One/Two Story Residence: Two story # of Interior Steps: 15 Interior Rails: Right Living Alone: No 
Support Systems: Spouse/Significant Other/Partner Patient Expects to be Discharged to[de-identified] Rehabilitation facility Current DME Used/Available at Home: Walker, rolling, Wheelchair Tub or Shower Type: Tub/Shower combination EXAMINATION/PRESENTATION/DECISION MAKING: Critical Behavior: 
Neurologic State: Alert Orientation Level: Oriented X4 Cognition: Appropriate decision making Hearing: Auditory Auditory Impairment: NoneSkin:  fragile Edema: ankles Range Of Motion: 
AROM: Generally decreased, functional(decreased all extremities ) Strength:   
Strength: Generally decreased, functional(decreased all extremities) Tone & Sensation:  
Tone: Normal 
  
  
  
  
Sensation: Intact Coordination: 
Coordination: Generally decreased, functional 
Vision:  
  
Functional Mobility: 
Bed Mobility: 
Rolling: Maximum assistance;Assist x2;Total assistance Transfers: did not occur Balance:  
 Ambulation/Gait Training:did not occur Stairs:did not occur Functional Measure: 
Barthel Index: 
 
Bathin Bladder: 0 Bowels: 5 Groomin Dressin Feedin Mobility: 0 Stairs: 0 Toilet Use: 0 Transfer (Bed to Chair and Back): 0 Total: 10 The Barthel ADL Index: Guidelines 1. The index should be used as a record of what a patient does, not as a record of what a patient could do. 2. The main aim is to establish degree of independence from any help, physical or verbal, however minor and for whatever reason. 3. The need for supervision renders the patient not independent. 4. A patient's performance should be established using the best available evidence.  Asking the patient, friends/relatives and nurses are the usual sources, but direct observation and common sense are also important. However direct testing is not needed. 5. Usually the patient's performance over the preceding 24-48 hours is important, but occasionally longer periods will be relevant. 6. Middle categories imply that the patient supplies over 50 per cent of the effort. 7. Use of aids to be independent is allowed. Annie Harper., Barthel, D.W. (2239). Functional evaluation: the Barthel Index. [address] (14)2. Geni Mandujano elissa MATHEW Pearce, Christiano Riggins., Gillian Buchanan, Juan Fox, [address] (1999). Measuring the change indisability after inpatient rehabilitation; comparison of the responsiveness of the Barthel Index and Functional [place] Measure. Journal of Neurology, Neurosurgery, and Psychiatry, 66(4), 157-300. BO Hidalgo, ROXANA Alegria, & Nimisha Rand M.A. (2004.) Assessment of post-stroke quality of life in cost-effectiveness studies: The usefulness of the Barthel Index and the EuroQoL-5D. [facility], 67, 098-98 Physical Therapy Evaluation Charge Determination History Examination Presentation Decision-Making MEDIUM  Complexity : 1-2 comorbidities / personal factors will impact the outcome/ POC  LOW Complexity : 1-2 Standardized tests and measures addressing body structure, function, activity limitation and / or participation in recreation  LOW Complexity : Stable, uncomplicated  Other outcome measures Barthel  LOW Based on the above components, the patient evaluation is determined to be of the following complexity level: LOW Pain: 
Pain Scale 1: Numeric (0 - 10) Pain Intensity 1: 8 Pain Location 1: Back Pain Orientation 1: Lower Pain Description 1: Stabbing Pain Intervention(s) 1: Medication (see MAR) Activity Tolerance:  
See above Please refer to the flowsheet for vital signs taken during this treatment. After treatment:  
[]         Patient left in no apparent distress sitting up in chair [x]         Patient left in no apparent distress in bed 
[x]         Call bell left within reach [x]         Nursing notified 
[x]         Caregiver present [x]         Bed alarm activated COMMUNICATION/EDUCATION:  
The patients plan of care was discussed with: Registered Nurse. [x]         Fall prevention education was provided and the patient/caregiver indicated understanding. []         Patient/family have participated as able in goal setting and plan of care. [x]         Patient/family agree to work toward stated goals and plan of care. []         Patient understands intent and goals of therapy, but is neutral about his/her participation. []         Patient is unable to participate in goal setting and plan of care. Thank you for this referral. 
Jacki Osman DPT Time Calculation: 38 mins

## 2019-01-04 NOTE — PROGRESS NOTES
Shift Summary 1930: Bedside and Verbal shift change report given to Liyah Longoria RN (oncoming nurse) by Kevin García RN(offgoing nurse). Report included the following information SBAR, Kardex, Procedure Summary, Intake/Output, MAR, Accordion, Recent Results, Med Rec Status and Cardiac Rhythm NSR.  
 
2000: Son at bedside. Patient A&O (off by a couple of days on date), eyes open spontaneously, calm, cooperative. 2130: Patient unable to tolerate being turned on either side, returned to supine. 2330: Bedside and Verbal shift change report given to Lari Denver, RN (oncoming nurse) by Liyah Longoria RN (offgoing nurse). Report included the following information SBAR, Kardex, Procedure Summary, Intake/Output, MAR, Accordion, Recent Results and Cardiac Rhythm NSR. Care Plan Summary Problem: Pressure Injury - Risk of 
Goal: *Prevention of pressure injury Document Nikolay Scale and appropriate interventions in the flowsheet. Outcome: Progressing Towards Goal 
Pressure Injury Interventions: 
Sensory Interventions: Assess changes in LOC, Assess need for specialty bed, Float heels, Keep linens dry and wrinkle-free, Maintain/enhance activity level, Minimize linen layers, Monitor skin under medical devices, Pressure redistribution bed/mattress (bed type), Turn and reposition approx. every two hours (pillows and wedges if needed) Moisture Interventions: Internal/External urinary devices, Minimize layers, Apply protective barrier, creams and emollients, Absorbent underpads Activity Interventions: PT/OT evaluation, Pressure redistribution bed/mattress(bed type), Assess need for specialty bed Mobility Interventions: PT/OT evaluation, Turn and reposition approx. every two hours(pillow and wedges), Pressure redistribution bed/mattress (bed type), Float heels Nutrition Interventions: Document food/fluid/supplement intake, Offer support with meals,snacks and hydration Friction and Shear Interventions: Lift team/patient mobility team, Lift sheet, HOB 30 degrees or less, Feet elevated on foot rest, Apply protective barrier, creams and emollients Problem: Falls - Risk of 
Goal: *Absence of Falls Document Erin Fofana Fall Risk and appropriate interventions in the flowsheet. Outcome: Progressing Towards Goal 
Fall Risk Interventions: 
  
 
Mentation Interventions: Bed/chair exit alarm, Door open when patient unattended, Room close to nurse's station, More frequent rounding, Increase mobility Medication Interventions: Bed/chair exit alarm, Evaluate medications/consider consulting pharmacy Elimination Interventions: Call light in reach, Bed/chair exit alarm

## 2019-01-04 NOTE — ROUTINE PROCESS
[id number] 
Bedside and Verbal shift change report given to Elin (oncoming nurse) by Sohail Ennis (offgoing nurse). Report included the following information SBAR, Kardex, ED Summary, OR Summary, Procedure Summary, Intake/Output, MAR, Recent Results and Cardiac Rhythm NSR.

## 2019-01-04 NOTE — PROGRESS NOTES
01/04/19 12:19 PM 
Consult received for referral to be sent to [facility]. CM spoke with patient and her  to confirm choice. Patient stated that she was at [facility] and has been everywhere else and would \"like a change of atomosphere\" and [facility] is closer to her home. Clinicals and referral sent to [facility] in [employer]. CM explained that patient is not ready for discharge today, but they should begin to follow for acceptance.  
TORREY Byrd

## 2019-01-04 NOTE — ROUTINE PROCESS
Bedside and Verbal shift change report given to Emerald Valdez RN (oncoming nurse) by Dana Morales RN (offgoing nurse). Report included the following information SBAR, Kardex, Intake/Output, MAR, Accordion and Recent Results. TRANSFER - OUT REPORT: 
 
Verbal report given to Violette Flores RN (name) on Serafin Dempsey  being transferred to  (unit) for routine progression of care Report consisted of patients Situation, Background, Assessment and  
Recommendations(SBAR). Information from the following report(s) SBAR, Kardex, Intake/Output, MAR, Accordion and Recent Results was reviewed with the receiving nurse. Lines: PICC Single Lumen 54/73/88 Cephalic; Left (Active) Central Line Being Utilized Yes 1/4/2019 12:40 AM  
Criteria for Appropriate Use Limited/no vessel suitable for conventional peripheral access 1/4/2019 12:40 AM  
Site Assessment Clean, dry, & intact 1/4/2019 12:40 AM  
Phlebitis Assessment 0 1/4/2019 12:40 AM  
Infiltration Assessment 0 1/4/2019 12:40 AM  
Date of Last Dressing Change 01/01/19 1/3/2019  8:30 PM  
Dressing Status Clean, dry, & intact 1/3/2019  8:30 PM  
Dressing Type Disk with Chlorhexadine gluconate (CHG); Transparent 1/3/2019  8:30 PM  
Hub Color/Line Status Purple;Patent; Flushed 1/3/2019  8:30 PM  
Action Taken Open ports on tubing capped 1/3/2019  4:00 PM  
Positive Blood Return (Site #1) Yes 1/3/2019  8:30 PM  
Alcohol Cap Used Yes 1/4/2019 12:40 AM  
  
 
Opportunity for questions and clarification was provided. Patient transported with: 
 Monitor O2 @ 4 liters Patient-specific medications from Pharmacy Registered Nurse Tech

## 2019-01-04 NOTE — PROGRESS NOTES
[facility] Progress Note Stefani Samson DO 
  [phone] Office [phone]  Fax 2019 Assessment & Plan: 1. Febrile illness. Resolving. Viral? W/u unrevealing to date. Continue current abx. Stop vancomycin today. 2. Seizure. I suspect this is due to the patient's high fever. Her fever was as high as 104 degrees at the time of admission. Her mental status has normalized. She denies any neck stiffness or headache. Would not pursue lumbar puncture at this time. Neurology is following. 3. Recent right elbow cellulitis complicated by group B streptococcal bacteremia and cervical spine diskitis. Continue meropenem through 2019. Subjective: No complaints today Objective:  
 
Vitals:  
Visit Vitals BP (!) 171/96 Pulse 84 Temp 98 °F (36.7 °C) Resp 23 Ht 5' 1\" (1.549 m) Wt 81.3 kg (179 lb 5 oz) SpO2 97% BMI 33.88 kg/m² Tmax:  Temp (24hrs), Av.1 °F (36.7 °C), Min:97.8 °F (36.6 °C), Max:98.6 °F (37 °C) Exam:  
Patient is intubated:  no 
 
Physical Examination:  
General:  Alert, cooperative, no distress Head:  Normocephalic, atraumatic. Eyes:  Conjunctivae clear Neck: Supple Lungs:   No distress. Clear to auscultation bilaterally. Chest wall:    
Heart:  Regular rate and rhythm Abdomen:   Soft, non-tender, non-distended Extremities: Moves all. Skin:  No rash Neurologic: CNII-XII intact. Labs:     
 
No lab exists for component: ITNL No results for input(s): CPK, CKMB, TROIQ in the last 72 hours. Recent Labs 19 
1062 19 
0222 19 
0205  143 144  
K 3.8 3.7 3.3*  
 107 108 CO2 25 24 26 BUN 14 23* 30* CREA 0.52* 0.65 0.83  102* 122* MG 1.5*  --   --   
WBC 5.2 5.0 6.4 HGB 8.5* 7.8* 8.0*  
HCT 26.5* 24.2* 26.0*  
 260 241 No results for input(s): INR, PTP, APTT in the last 72 hours. No lab exists for component: INREXT, INREXT Needs: urine analysis, urine sodium, protein and creatinine Lab Results Component Value Date/Time Sodium,urine random 29 11/30/2018 06:10 PM  
 Creatinine, urine 187.11 11/30/2018 06:10 PM  
 
 
 
Cultures: No results found for: SDES Lab Results Component Value Date/Time Culture result: NO GROWTH 4 DAYS 12/31/2018 10:33 PM  
 Culture result: CANDIDA PARAPSILOSIS (A) 12/31/2018 09:40 PM  
 Culture result: NO GROWTH 4 DAYS 12/31/2018 08:54 PM  
 Culture result: NO GROWTH 4 DAYS 12/31/2018 08:54 PM  
 
 
Radiology:  
 
Medications Current Facility-Administered Medications Medication Dose Route Frequency Last Dose  atenolol (TENORMIN) tablet 50 mg  50 mg Oral DAILY 50 mg at 01/04/19 8844  DULoxetine (CYMBALTA) capsule 60 mg  60 mg Oral DAILY 60 mg at 01/04/19 7335  sertraline (ZOLOFT) tablet 100 mg  100 mg Oral DAILY 100 mg at 01/04/19 9881  oxyCODONE IR (ROXICODONE) tablet 5 mg  5 mg Oral Q4H PRN 5 mg at 01/04/19 0119  levothyroxine (SYNTHROID) tablet 125 mcg  125 mcg Oral  mcg at 01/04/19 0997  heparin (porcine) injection 5,000 Units  5,000 Units SubCUTAneous Q8H 5,000 Units at 01/04/19 0634  
 labetalol (NORMODYNE;TRANDATE) injection 10 mg  10 mg IntraVENous Q2H PRN 10 mg at 01/04/19 0636  meropenem (MERREM) 500 mg in 0.9% sodium chloride (MBP/ADV) 50 mL  0.5 g IntraVENous Q6H 500 mg at 01/04/19 0634  
 haloperidol lactate (HALDOL) injection 2 mg  2 mg IntraVENous Q6H PRN 2 mg at 01/03/19 3312  morphine injection 2 mg  2 mg IntraVENous Q3H PRN 2 mg at 01/04/19 0227  acetaminophen (TYLENOL) suppository 650 mg  650 mg Rectal Q6H  mg at 01/01/19 1441  LORazepam (ATIVAN) injection 1 mg  1 mg IntraVENous Q5MIN PRN    
 sodium chloride (NS) flush 5-10 mL  5-10 mL IntraVENous PRN 10 mL at 01/03/19 2110 Case discussed with: 
 
 
Reza Ennis DO

## 2019-01-04 NOTE — PROGRESS NOTES
SHIFT CHANGE: 
7:54 AM Report received from Lucien Monteiro RN. SBAR, Kardex, Procedure Summary, Intake/Output, MAR, Recent Results, Med Rec Status and Cardiac Rhythm NSR were discussed. SHIFT SUMMARY: 
8:27 AM  Patient complaining of 8/10 chronic back pain. PRN Oxycodone given. O2 sats 100% on 4L NC. O2 turned down to 2L NC and sats maintained at 97% or higher. Will monitor. 9:26 AM  Received call from telemetry that patients O2 sats in the 70's. In to see patient who is sound asleep. HOB elevated and O2 increased to 4L NC. Sats up to 92% 11:09 AM /88. PRN labetalol given. 1:52 PM  /112. PRN Labetalol given. END OF SHIFT REPORT: 
 
7:50 PM Bedside and Verbal shift change report given to Alexa Garza RN (oncoming nurse) by Fernie Cardenas RN (offgoing nurse). Report included the following information SBAR, Kardex, Procedure Summary, Intake/Output, MAR, Recent Results, Med Rec Status and Cardiac Rhythm NSR.

## 2019-01-04 NOTE — PROGRESS NOTES
[facility] Medical Progress Note NAME: Genie Leyden :  1955 MRM:  [id number] Date/Time: 2019  12:56 PM 
 
  
Assessment and Plan:  
 
Sepsis / Fever / Sinus tachycardia - POA, unclear etiology. Imaging unremarkable. DDx fungemia, viral, bacterial.  Continue meropenum. Consulted ID. They added vancomycin, but stopped that today. Negative fungal and bacterial cx. Negative resp viral panel. Urine cx with fungus, most likely colonization, not active infection. Lactate normal and no end organ damage, and so NOT severe sepsis. Fever resolved. 
  
C-spine discitis - Dx 3 weeks ago. MRI last month showed evidence of discitis at C5-C6 and C6-C7, with abnormal enhancement of the vertebral bodies. There is enhancing phlegmon in the epidural space. No drainable abscess. Repeat MRI shows \"Persistent osteomyelitis of C5, C6, and C7 is similar to last month. Ventral epidural phlegmon is decreased. Anterior paraspinal phlegmon is slightly decreased. No drainable abscess. Mild cord indentation at C5-C6 is unchanged. \" Evaluated by ortho and ID. Needed 6 weeks of IV ABx, half way through that. Currently on meropenem 
  
Anemia - POA, chronic, worsening with hydration, recent Fe studies consistent with chronic dz. Monitor  
  
Acute metabolic encephalopathy - POA, recurrent, unclear etiology, likely due to sepsis, and now resolving and near baseline. On recent admit had unremarkable head CT, MRI brain. Repeat MRI also unremarkable. Recent TSH, B12/folate, ammonia, HSV, EEG, RPR, West Nile, HIV, Lyme IgM negative. Suspect dementia. Continue sertraline, resume duloxetine, ASA. Resumed oxycodone 
  
R elbow cellulitis / bursitis / acute pain / Weakness - Noted on last admit, s/p joint aspiration by IR on . On meropenem. Will need to return to rehab. Ready in 1-2 days. 
  
DM type 2 w/ hypoglycemia - Diabetic diet and counseling.   SSI per protocol. Continue home Lantus if eating. Recent A1C 7.2%.  
  
HTN - BP up now. Resume atenolol, hold chlorthaldone 
  
Hypokalemia / Hypernatremia - Cr stable. Hydrate and follow. 
  
Urinary retention - Recent Dx. Continue joyce and flomax 
  
Hypothyroidism - Continue synthroid 
  
Hyperlipidemia - Continue simvastatin Subjective: Chief Complaint: Back pain, chronic, persistent, but closer to baseline, eating ROS: 
(bold if positive, if negative) Tolerating minimal PT Tolerating some Diet Objective:  
 
Last 24hrs VS reviewed since prior progress note. Most recent are: 
 
Visit Vitals /88 Pulse 84 Temp 98 °F (36.7 °C) Resp 15 Ht 5' 1\" (1.549 m) Wt 81.3 kg (179 lb 5 oz) SpO2 98% BMI 33.88 kg/m² SpO2 Readings from Last 6 Encounters:  
01/04/19 98% 12/17/18 96% O2 Flow Rate (L/min): 4 l/min Intake/Output Summary (Last 24 hours) at 1/4/2019 1256 Last data filed at 1/4/2019 1773 Gross per 24 hour Intake 2670 ml Output 1930 ml Net 740 ml Physical Exam: 
 
Gen:  Obese. in no acute distress HEENT:  Pink conjunctivae, PERRL, hearing intact to voice, moist mucous membranes Neck:  Supple, without masses, thyroid non-tender Resp:  No accessory muscle use, distant breath sounds without wheezes rales or rhonchi 
Card:  No murmurs, regular S1, S2 without thrills, bruits or peripheral edema Abd:  Soft, non-tender, non-distended, reduced bowel sounds are present, no mass Lymph:  No cervical or inguinal adenopathy Musc:  No cyanosis or clubbing Skin:  No rashes or ulcers, skin turgor is good Neuro:  Cranial nerves are grossly intact, general pain and motor weakness, follows commands vaguely Psych: Moderate insight, oriented to person, place and time Telemetry reviewed:   normal sinus rhythm 
__________________________________________________________________ Medications Reviewed: (see below) Medications: Current Facility-Administered Medications Medication Dose Route Frequency  atenolol (TENORMIN) tablet 50 mg  50 mg Oral DAILY  DULoxetine (CYMBALTA) capsule 60 mg  60 mg Oral DAILY  sertraline (ZOLOFT) tablet 100 mg  100 mg Oral DAILY  oxyCODONE IR (ROXICODONE) tablet 5 mg  5 mg Oral Q4H PRN  
 levothyroxine (SYNTHROID) tablet 125 mcg  125 mcg Oral ACB  heparin (porcine) injection 5,000 Units  5,000 Units SubCUTAneous Q8H  
 labetalol (NORMODYNE;TRANDATE) injection 10 mg  10 mg IntraVENous Q2H PRN  
 meropenem (MERREM) 500 mg in 0.9% sodium chloride (MBP/ADV) 50 mL  0.5 g IntraVENous Q6H  
 haloperidol lactate (HALDOL) injection 2 mg  2 mg IntraVENous Q6H PRN  
 morphine injection 2 mg  2 mg IntraVENous Q3H PRN  
 acetaminophen (TYLENOL) suppository 650 mg  650 mg Rectal Q6H PRN  
 LORazepam (ATIVAN) injection 1 mg  1 mg IntraVENous Q5MIN PRN  
 sodium chloride (NS) flush 5-10 mL  5-10 mL IntraVENous PRN Lab Data Reviewed: (see below) Lab Review:  
 
Recent Labs 01/04/19 
5280 01/03/19 
0222 01/02/19 
0205 WBC 5.2 5.0 6.4 HGB 8.5* 7.8* 8.0*  
HCT 26.5* 24.2* 26.0*  
 260 241 Recent Labs 01/04/19 
3333 01/03/19 
0222 01/02/19 
0205  143 144  
K 3.8 3.7 3.3*  
 107 108 CO2 25 24 26  102* 122* BUN 14 23* 30* CREA 0.52* 0.65 0.83 CA 8.0* 7.8* 7.7* MG 1.5*  --   --   
 
Lab Results Component Value Date/Time Glucose (POC) 110 (H) 01/04/2019 11:35 AM  
 Glucose (POC) 109 (H) 01/04/2019 06:21 AM  
 Glucose (POC) 138 (H) 01/01/2019 10:25 PM  
 Glucose (POC) 121 (H) 01/01/2019 11:57 AM  
 Glucose (POC) 118 (H) 01/01/2019 06:45 AM  
 
No results for input(s): PH, PCO2, PO2, HCO3, FIO2 in the last 72 hours. No results for input(s): INR in the last 72 hours. No lab exists for component: INREXT, INREXT All Micro Results Procedure Component Value Units Date/Time CULTURE, URINE [[id number]]  (Abnormal) Collected:  12/31/18 2140 Order Status:  Completed Specimen:  Cath Urine Updated:  01/04/19 1038 Special Requests: NO SPECIAL REQUESTS [place] Count --     
  >100,000 COLONIES/mL Culture result: CANDIDA PARAPSILOSIS     
 CULTURE, BLOOD FOR FUNGUS [[id number]] Collected:  12/31/18 2233 Order Status:  Completed Specimen:  Blood Updated:  01/04/19 8883 Special Requests: HOLD 21 DAYS FOR FUNGUS Culture result: NO GROWTH 4 DAYS     
 CULTURE, BLOOD [[id number]] Collected:  12/31/18 2054 Order Status:  Completed Specimen:  Blood Updated:  01/04/19 3386 Special Requests: NO SPECIAL REQUESTS Culture result: NO GROWTH 4 DAYS     
 CULTURE, BLOOD [[id number]] Collected:  12/31/18 2054 Order Status:  Completed Specimen:  Blood Updated:  01/04/19 4959 Special Requests: NO SPECIAL REQUESTS Culture result: NO GROWTH 4 DAYS     
 RESPIRATORY PANEL,PCR,NASOPHARYNGEAL [[id number]] Collected:  01/03/19 1557 Order Status:  Completed Specimen:  Nasopharyngeal Updated:  01/03/19 2139 Adenovirus NOT DETECTED Coronavirus 229E NOT DETECTED Coronavirus HKU1 NOT DETECTED Coronavirus CVNL63 NOT DETECTED Coronavirus OC43 NOT DETECTED Metapneumovirus NOT DETECTED Rhinovirus and Enterovirus NOT DETECTED Influenza A NOT DETECTED Influenza A, subtype H1 NOT DETECTED Influenza A, subtype H3 NOT DETECTED INFLUENZA A H1N1 PCR NOT DETECTED Influenza B NOT DETECTED Parainfluenza 1 NOT DETECTED Parainfluenza 2 NOT DETECTED Parainfluenza 3 NOT DETECTED Parainfluenza virus 4 NOT DETECTED     
  RSV by PCR NOT DETECTED Bordetella pertussis - PCR NOT DETECTED Chlamydophila pneumoniae DNA, QL, PCR NOT DETECTED   Mycoplasma pneumoniae DNA, QL, PCR NOT DETECTED     
 RESPIRATORY PANEL,PCR,NASOPHARYNGEAL [[id number]] Collected:  12/31/18 2233 Order Status:  Completed Specimen:  Nasopharyngeal Updated:  01/01/19 1352 Adenovirus NOT DETECTED Coronavirus 229E NOT DETECTED Coronavirus HKU1 NOT DETECTED Coronavirus CVNL63 NOT DETECTED Coronavirus OC43 NOT DETECTED Metapneumovirus NOT DETECTED Rhinovirus and Enterovirus NOT DETECTED Influenza A NOT DETECTED Influenza A, subtype H1 NOT DETECTED Influenza A, subtype H3 NOT DETECTED INFLUENZA A H1N1 PCR NOT DETECTED Influenza B NOT DETECTED Parainfluenza 1 NOT DETECTED Parainfluenza 2 NOT DETECTED Parainfluenza 3 NOT DETECTED Parainfluenza virus 4 NOT DETECTED     
  RSV by PCR NOT DETECTED Bordetella pertussis - PCR NOT DETECTED Chlamydophila pneumoniae DNA, QL, PCR NOT DETECTED Mycoplasma pneumoniae DNA, QL, PCR NOT DETECTED     
 CULTURE, FUNGUS [[id number]] Collected:  12/31/18 2233 Order Status:  Canceled Specimen: Body Fluid URINE CULTURE HOLD SAMPLE [[id number]] Collected:  12/31/18 2140 Order Status:  Completed Specimen:  Urine from Serum Updated:  12/31/18 2150 Urine culture hold URINE ON HOLD IN MICROBIOLOGY DEPT FOR 3 DAYS. IF UNPRESERVED URINE IS SUBMITTED, IT CANNOT BE USED FOR ADDITIONAL TESTING AFTER 24 HRS, RECOLLECTION WILL BE REQUIRED. I have reviewed notes of prior 24hr. Other pertinent lab: none Total time spent with patient: 45 Minutes Care Plan discussed with: Patient, Family, Care Manager, Nursing Staff, Consultant/Specialist and >50% of time spent in counseling and coordination of care Discussed:  Care Plan Prophylaxis:  H2B/PPI Disposition:  SNF/LTC 
        
___________________________________________________ Attending Physician: Roel Paz MD

## 2019-01-05 LAB
ALBUMIN SERPL-MCNC: 1.7 G/DL (ref 3.5–5)
ALBUMIN/GLOB SERPL: 0.5 {RATIO} (ref 1.1–2.2)
ALP SERPL-CCNC: 116 U/L (ref 45–117)
ALT SERPL-CCNC: 27 U/L (ref 12–78)
ANION GAP SERPL CALC-SCNC: 6 MMOL/L (ref 5–15)
AST SERPL-CCNC: 28 U/L (ref 15–37)
BACTERIA SPEC CULT: NORMAL
BILIRUB SERPL-MCNC: 0.3 MG/DL (ref 0.2–1)
BUN SERPL-MCNC: 14 MG/DL (ref 6–20)
BUN/CREAT SERPL: 21 (ref 12–20)
CALCIUM SERPL-MCNC: 8.3 MG/DL (ref 8.5–10.1)
CHLORIDE SERPL-SCNC: 108 MMOL/L (ref 97–108)
CO2 SERPL-SCNC: 29 MMOL/L (ref 21–32)
CREAT SERPL-MCNC: 0.67 MG/DL (ref 0.55–1.02)
ERYTHROCYTE [DISTWIDTH] IN BLOOD BY AUTOMATED COUNT: 15.3 % (ref 11.5–14.5)
GLOBULIN SER CALC-MCNC: 3.5 G/DL (ref 2–4)
GLUCOSE BLD STRIP.AUTO-MCNC: 125 MG/DL (ref 65–100)
GLUCOSE BLD STRIP.AUTO-MCNC: 145 MG/DL (ref 65–100)
GLUCOSE BLD STRIP.AUTO-MCNC: 169 MG/DL (ref 65–100)
GLUCOSE SERPL-MCNC: 140 MG/DL (ref 65–100)
HCT VFR BLD AUTO: 28.2 % (ref 35–47)
HGB BLD-MCNC: 9 G/DL (ref 11.5–16)
MAGNESIUM SERPL-MCNC: 1.9 MG/DL (ref 1.6–2.4)
MCH RBC QN AUTO: 27.3 PG (ref 26–34)
MCHC RBC AUTO-ENTMCNC: 31.9 G/DL (ref 30–36.5)
MCV RBC AUTO: 85.5 FL (ref 80–99)
NRBC # BLD: 0 K/UL (ref 0–0.01)
NRBC BLD-RTO: 0 PER 100 WBC
PHOSPHATE SERPL-MCNC: 3 MG/DL (ref 2.6–4.7)
PLATELET # BLD AUTO: 290 K/UL (ref 150–400)
PMV BLD AUTO: 9.9 FL (ref 8.9–12.9)
POTASSIUM SERPL-SCNC: 3 MMOL/L (ref 3.5–5.1)
PROT SERPL-MCNC: 5.2 G/DL (ref 6.4–8.2)
RBC # BLD AUTO: 3.3 M/UL (ref 3.8–5.2)
SERVICE CMNT-IMP: ABNORMAL
SERVICE CMNT-IMP: NORMAL
SODIUM SERPL-SCNC: 143 MMOL/L (ref 136–145)
WBC # BLD AUTO: 5.5 K/UL (ref 3.6–11)

## 2019-01-05 PROCEDURE — 82962 GLUCOSE BLOOD TEST: CPT

## 2019-01-05 PROCEDURE — 74011250637 HC RX REV CODE- 250/637: Performed by: INTERNAL MEDICINE

## 2019-01-05 PROCEDURE — 74011000258 HC RX REV CODE- 258: Performed by: INTERNAL MEDICINE

## 2019-01-05 PROCEDURE — 74011250636 HC RX REV CODE- 250/636: Performed by: INTERNAL MEDICINE

## 2019-01-05 PROCEDURE — 80053 COMPREHEN METABOLIC PANEL: CPT

## 2019-01-05 PROCEDURE — 85027 COMPLETE CBC AUTOMATED: CPT

## 2019-01-05 PROCEDURE — 77010033678 HC OXYGEN DAILY

## 2019-01-05 PROCEDURE — 83735 ASSAY OF MAGNESIUM: CPT

## 2019-01-05 PROCEDURE — 36415 COLL VENOUS BLD VENIPUNCTURE: CPT

## 2019-01-05 PROCEDURE — 84100 ASSAY OF PHOSPHORUS: CPT

## 2019-01-05 PROCEDURE — 65660000000 HC RM CCU STEPDOWN

## 2019-01-05 RX ORDER — ATENOLOL 50 MG/1
100 TABLET ORAL DAILY
Status: DISCONTINUED | OUTPATIENT
Start: 2019-01-05 | End: 2019-01-10 | Stop reason: HOSPADM

## 2019-01-05 RX ORDER — LISINOPRIL 5 MG/1
10 TABLET ORAL DAILY
Status: DISCONTINUED | OUTPATIENT
Start: 2019-01-05 | End: 2019-01-06

## 2019-01-05 RX ORDER — CHLORTHALIDONE 25 MG/1
25 TABLET ORAL DAILY
Status: DISCONTINUED | OUTPATIENT
Start: 2019-01-05 | End: 2019-01-10 | Stop reason: HOSPADM

## 2019-01-05 RX ADMIN — HEPARIN SODIUM 5000 UNITS: 5000 INJECTION INTRAVENOUS; SUBCUTANEOUS at 22:25

## 2019-01-05 RX ADMIN — MEROPENEM 500 MG: 500 INJECTION, POWDER, FOR SOLUTION INTRAVENOUS at 13:57

## 2019-01-05 RX ADMIN — DULOXETINE 60 MG: 30 CAPSULE, DELAYED RELEASE ORAL at 09:10

## 2019-01-05 RX ADMIN — ATENOLOL 100 MG: 50 TABLET ORAL at 09:10

## 2019-01-05 RX ADMIN — MEROPENEM 500 MG: 500 INJECTION, POWDER, FOR SOLUTION INTRAVENOUS at 20:03

## 2019-01-05 RX ADMIN — LEVOTHYROXINE SODIUM 125 MCG: 25 TABLET ORAL at 06:13

## 2019-01-05 RX ADMIN — HEPARIN SODIUM 5000 UNITS: 5000 INJECTION INTRAVENOUS; SUBCUTANEOUS at 13:57

## 2019-01-05 RX ADMIN — LISINOPRIL 10 MG: 5 TABLET ORAL at 09:10

## 2019-01-05 RX ADMIN — CHLORTHALIDONE 25 MG: 25 TABLET ORAL at 09:10

## 2019-01-05 RX ADMIN — SERTRALINE HYDROCHLORIDE 100 MG: 50 TABLET ORAL at 09:10

## 2019-01-05 RX ADMIN — OXYCODONE HYDROCHLORIDE 5 MG: 5 TABLET ORAL at 06:24

## 2019-01-05 RX ADMIN — MEROPENEM 500 MG: 500 INJECTION, POWDER, FOR SOLUTION INTRAVENOUS at 06:12

## 2019-01-05 RX ADMIN — LABETALOL HYDROCHLORIDE 10 MG: 5 INJECTION INTRAVENOUS at 00:10

## 2019-01-05 RX ADMIN — HEPARIN SODIUM 5000 UNITS: 5000 INJECTION INTRAVENOUS; SUBCUTANEOUS at 06:12

## 2019-01-05 RX ADMIN — MORPHINE SULFATE 2 MG: 4 INJECTION, SOLUTION INTRAMUSCULAR; INTRAVENOUS at 13:57

## 2019-01-05 RX ADMIN — OXYCODONE HYDROCHLORIDE 5 MG: 5 TABLET ORAL at 20:09

## 2019-01-05 RX ADMIN — MORPHINE SULFATE 2 MG: 4 INJECTION, SOLUTION INTRAMUSCULAR; INTRAVENOUS at 22:25

## 2019-01-05 RX ADMIN — OXYCODONE HYDROCHLORIDE 5 MG: 5 TABLET ORAL at 01:59

## 2019-01-05 RX ADMIN — MEROPENEM 500 MG: 500 INJECTION, POWDER, FOR SOLUTION INTRAVENOUS at 01:26

## 2019-01-05 RX ADMIN — LABETALOL HYDROCHLORIDE 10 MG: 5 INJECTION INTRAVENOUS at 06:12

## 2019-01-05 RX ADMIN — Medication 10 ML: at 20:03

## 2019-01-05 RX ADMIN — OXYCODONE HYDROCHLORIDE 5 MG: 5 TABLET ORAL at 10:31

## 2019-01-05 NOTE — PROGRESS NOTES
[facility] Medical Progress Note NAME: Silvana Tyson :  1955 MRM:  [id number] Date/Time: 2019  1:24 PM 
 
  
Assessment and Plan:  
 
C-spine discitis - Dx 4 weeks ago. MRI last month showed evidence of discitis at C5-C6 and C6-C7, with abnormal enhancement of the vertebral bodies. There was phlegmon, but no drainable abscess. Repeat MRI shows \"Persistent osteomyelitis of C5, C6, and C7 is similar to last month. Ventral epidural phlegmon is decreased. Anterior paraspinal phlegmon is slightly decreased. No drainable abscess. Mild cord indentation at C5-C6 is unchanged. \" Evaluated by ortho and ID. Needed 6 weeks of IV ABx, was half way through that. Currently on meropenem 
  
HTN - BP up consistent after she has recovered. Resume atenolol, increase dose to 100, add low dose ACE, and resume chlorthaldone 
  
Anemia - POA, chronic, worsening with hydration, recent Fe studies consistent with chronic dz. Monitor  
  
DM type 2 w/ hypoglycemia - Diabetic diet and counseling. SSI per protocol. Continue home Lantus if eating. Recent A1C 7.2%.  
  
Urinary retention - Recent Dx weeks ago. Has been with joyce and flomax. Had scheduled urology follow up for voiding trial in 3 days. I'll try that now, with pure wic and PVR scans. Consult urology if fails. 
  
Acute metabolic encephalopathy - POA, recurrent, unclear etiology, likely due to sepsis, and now resolving and near baseline. On recent admit had unremarkable head CT, MRI brain. Repeat MRI also unremarkable. Recent TSH, B12/folate, ammonia, HSV, EEG, RPR, West Nile, HIV, Lyme IgM negative. Suspect dementia. Continue sertraline, resume duloxetine, ASA. Resumed oxycodone 
  
R elbow cellulitis / bursitis / acute pain / Weakness - Noted on last admit, s/p joint aspiration by IR on . On meropenem. Will need to return to rehab. Ready in 1-2 days. 
  
Sepsis / Fever / Sinus tachycardia - POA, unclear etiology.   Imaging unremarkable. DDx fungemia, viral, bacterial.  Continue meropenum. Consulted ID. They added vancomycin, but stopped that today. Negative fungal and bacterial cx. Negative resp viral panel. Urine cx with fungus, most likely colonization, not active infection. Lactate normal and no end organ damage, and so NOT severe sepsis. Fever resolved. 
  
Hypokalemia / Hypernatremia - Cr stable. Hydrate and follow. 
  
Hypothyroidism - Continue synthroid 
  
Hyperlipidemia - Continue simvastatin Subjective: Chief Complaint: Back pain, chronic, persistent, but closer to baseline, eating, ready to discharge, but awaiting insurance approval. 
 
ROS: 
(bold if positive, if negative) Tolerating minimal PT Tolerating some Diet Objective:  
 
Last 24hrs VS reviewed since prior progress note. Most recent are: 
 
Visit Vitals BP (!) 187/93 Pulse 85 Temp 98.4 °F (36.9 °C) Resp 11 Ht 5' 1\" (1.549 m) Wt 87.5 kg (193 lb) SpO2 98% BMI 36.47 kg/m² SpO2 Readings from Last 6 Encounters:  
01/05/19 98% 12/17/18 96% O2 Flow Rate (L/min): 3 l/min Intake/Output Summary (Last 24 hours) at 1/5/2019 3455 Last data filed at 1/5/2019 4435 Gross per 24 hour Intake 780 ml Output 1050 ml Net -270 ml Physical Exam: 
 
Gen:  Obese. in no acute distress HEENT:  Pink conjunctivae, PERRL, hearing intact to voice, moist mucous membranes Neck:  Supple, without masses, thyroid non-tender Resp:  No accessory muscle use, distant breath sounds without wheezes rales or rhonchi 
Card:  No murmurs, regular S1, S2 without thrills, bruits or peripheral edema Abd:  Soft, non-tender, non-distended, reduced bowel sounds are present, no mass Lymph:  No cervical or inguinal adenopathy Musc:  No cyanosis or clubbing Skin:  No rashes or ulcers, skin turgor is good Neuro:  Cranial nerves are grossly intact, general pain and motor weakness, follows commands Psych:  Good insight, oriented to person, place and time Telemetry reviewed:   normal sinus rhythm 
__________________________________________________________________ Medications Reviewed: (see below) Medications:  
 
Current Facility-Administered Medications Medication Dose Route Frequency  atenolol (TENORMIN) tablet 100 mg  100 mg Oral DAILY  lisinopril (PRINIVIL, ZESTRIL) tablet 10 mg  10 mg Oral DAILY  DULoxetine (CYMBALTA) capsule 60 mg  60 mg Oral DAILY  sertraline (ZOLOFT) tablet 100 mg  100 mg Oral DAILY  oxyCODONE IR (ROXICODONE) tablet 5 mg  5 mg Oral Q4H PRN  
 levothyroxine (SYNTHROID) tablet 125 mcg  125 mcg Oral ACB  heparin (porcine) injection 5,000 Units  5,000 Units SubCUTAneous Q8H  
 labetalol (NORMODYNE;TRANDATE) injection 10 mg  10 mg IntraVENous Q2H PRN  
 meropenem (MERREM) 500 mg in 0.9% sodium chloride (MBP/ADV) 50 mL  0.5 g IntraVENous Q6H  
 haloperidol lactate (HALDOL) injection 2 mg  2 mg IntraVENous Q6H PRN  
 morphine injection 2 mg  2 mg IntraVENous Q3H PRN  
 acetaminophen (TYLENOL) suppository 650 mg  650 mg Rectal Q6H PRN  
 LORazepam (ATIVAN) injection 1 mg  1 mg IntraVENous Q5MIN PRN  
 sodium chloride (NS) flush 5-10 mL  5-10 mL IntraVENous PRN Lab Data Reviewed: (see below) Lab Review:  
 
Recent Labs 01/05/19 
1228 01/04/19 
9347 01/03/19 0222 WBC 5.5 5.2 5.0 HGB 9.0* 8.5* 7.8* HCT 28.2* 26.5* 24.2*  
 265 260 Recent Labs 01/05/19 
6006 01/04/19 
6321 01/03/19 0222  142 143  
K 3.0* 3.8 3.7  107 107 CO2 29 25 24 * 100 102* BUN 14 14 23* CREA 0.67 0.52* 0.65 CA 8.3* 8.0* 7.8*  
MG 1.9 1.5*  --   
PHOS 3.0  --   --   
ALB 1.7*  --   --   
TBILI 0.3  --   --   
SGOT 28  --   --   
ALT 27  --   --   
 
Lab Results Component Value Date/Time  Glucose (POC) 125 (H) 01/05/2019 06:25 AM  
 Glucose (POC) 182 (H) 01/04/2019 08:41 PM  
 Glucose (POC) 168 (H) 01/04/2019 04:08 PM  
 Glucose (POC) 110 (H) 01/04/2019 11:35 AM  
 Glucose (POC) 109 (H) 01/04/2019 06:21 AM  
 
No results for input(s): PH, PCO2, PO2, HCO3, FIO2 in the last 72 hours. No results for input(s): INR in the last 72 hours. No lab exists for component: INREXT, INREXT All Micro Results Procedure Component Value Units Date/Time CULTURE, URINE [[id number]]  (Abnormal) Collected:  12/31/18 2140 Order Status:  Completed Specimen:  Cath Urine Updated:  01/04/19 1038 Special Requests: NO SPECIAL REQUESTS [place] Count --     
  >100,000 COLONIES/mL Culture result: CANDIDA PARAPSILOSIS     
 CULTURE, BLOOD FOR FUNGUS [[id number]] Collected:  12/31/18 2233 Order Status:  Completed Specimen:  Blood Updated:  01/04/19 6425 Special Requests: HOLD 21 DAYS FOR FUNGUS Culture result: NO GROWTH 4 DAYS     
 CULTURE, BLOOD [[id number]] Collected:  12/31/18 2054 Order Status:  Completed Specimen:  Blood Updated:  01/04/19 3724 Special Requests: NO SPECIAL REQUESTS Culture result: NO GROWTH 4 DAYS     
 CULTURE, BLOOD [[id number]] Collected:  12/31/18 2054 Order Status:  Completed Specimen:  Blood Updated:  01/04/19 8087 Special Requests: NO SPECIAL REQUESTS Culture result: NO GROWTH 4 DAYS     
 RESPIRATORY PANEL,PCR,NASOPHARYNGEAL [[id number]] Collected:  01/03/19 1557 Order Status:  Completed Specimen:  Nasopharyngeal Updated:  01/03/19 2139 Adenovirus NOT DETECTED Coronavirus 229E NOT DETECTED Coronavirus HKU1 NOT DETECTED Coronavirus CVNL63 NOT DETECTED Coronavirus OC43 NOT DETECTED Metapneumovirus NOT DETECTED Rhinovirus and Enterovirus NOT DETECTED Influenza A NOT DETECTED Influenza A, subtype H1 NOT DETECTED Influenza A, subtype H3 NOT DETECTED INFLUENZA A H1N1 PCR NOT DETECTED Influenza B NOT DETECTED Parainfluenza 1 NOT DETECTED   Parainfluenza 2 NOT DETECTED     
 Parainfluenza 3 NOT DETECTED Parainfluenza virus 4 NOT DETECTED     
  RSV by PCR NOT DETECTED Bordetella pertussis - PCR NOT DETECTED Chlamydophila pneumoniae DNA, QL, PCR NOT DETECTED Mycoplasma pneumoniae DNA, QL, PCR NOT DETECTED     
 RESPIRATORY PANEL,PCR,NASOPHARYNGEAL [[id number]] Collected:  12/31/18 2233 Order Status:  Completed Specimen:  Nasopharyngeal Updated:  01/01/19 1352 Adenovirus NOT DETECTED Coronavirus 229E NOT DETECTED Coronavirus HKU1 NOT DETECTED Coronavirus CVNL63 NOT DETECTED Coronavirus OC43 NOT DETECTED Metapneumovirus NOT DETECTED Rhinovirus and Enterovirus NOT DETECTED Influenza A NOT DETECTED Influenza A, subtype H1 NOT DETECTED Influenza A, subtype H3 NOT DETECTED INFLUENZA A H1N1 PCR NOT DETECTED Influenza B NOT DETECTED Parainfluenza 1 NOT DETECTED Parainfluenza 2 NOT DETECTED Parainfluenza 3 NOT DETECTED Parainfluenza virus 4 NOT DETECTED     
  RSV by PCR NOT DETECTED Bordetella pertussis - PCR NOT DETECTED Chlamydophila pneumoniae DNA, QL, PCR NOT DETECTED Mycoplasma pneumoniae DNA, QL, PCR NOT DETECTED     
 CULTURE, FUNGUS [[id number]] Collected:  12/31/18 2233 Order Status:  Canceled Specimen: Body Fluid URINE CULTURE HOLD SAMPLE [[id number]] Collected:  12/31/18 2140 Order Status:  Completed Specimen:  Urine from Serum Updated:  12/31/18 2150 Urine culture hold URINE ON HOLD IN MICROBIOLOGY DEPT FOR 3 DAYS. IF UNPRESERVED URINE IS SUBMITTED, IT CANNOT BE USED FOR ADDITIONAL TESTING AFTER 24 HRS, RECOLLECTION WILL BE REQUIRED. I have reviewed notes of prior 24hr. Other pertinent lab: none Total time spent with patient: 45 Minutes Care Plan discussed with: Patient, Family, Care Manager, Nursing Staff, Consultant/Specialist and >50% of time spent in counseling and coordination of care Discussed:  Care Plan Prophylaxis:  H2B/PPI Disposition:  SNF/LTC 
        
___________________________________________________ Attending Physician: Haja Maurer MD

## 2019-01-05 NOTE — PROGRESS NOTES
3:47 PM 
DAMION spoke to MD regarding pt's dc status to [facility]. Per notes from previous CM [facility] does not have beds and will not have beds in the near future. Family had been informed. CM spoke to family and asked if pt has made other choices. She had been at [facility] but would like to be closer to home. They requested a referral to be sent to [facility]. If they cannot accept, she would be interested in returning back to [facility] if needed even though it is further away. If she returns back to [facility], she would like a private room or bed \"B. \"  CM will send referrals to both places. Will continue to follow. Tatianna Hernandez

## 2019-01-05 NOTE — PROGRESS NOTES
1900: Bedside and Verbal shift change report given to Guerda (oncoming nurse) by Vijay Dickinson (offgoing nurse). Report included the following information SBAR, Intake/Output, MAR, Accordion and Cardiac Rhythm  . 0000: /88. Administered PRN Labetalol. At recheck. /82.  
 
 
 
0610: Patients /93. Administered PRN Labetalol. 0700: Bedside and Verbal shift change report given to Eulogio Cook (oncoming nurse) by Guerda (offgoing nurse). Report included the following information SBAR, Intake/Output, MAR, Accordion and Cardiac Rhythm  .

## 2019-01-06 LAB
BACTERIA SPEC CULT: NORMAL
BACTERIA SPEC CULT: NORMAL
GLUCOSE BLD STRIP.AUTO-MCNC: 166 MG/DL (ref 65–100)
GLUCOSE BLD STRIP.AUTO-MCNC: 203 MG/DL (ref 65–100)
SERVICE CMNT-IMP: ABNORMAL
SERVICE CMNT-IMP: ABNORMAL
SERVICE CMNT-IMP: NORMAL
SERVICE CMNT-IMP: NORMAL

## 2019-01-06 PROCEDURE — 97165 OT EVAL LOW COMPLEX 30 MIN: CPT | Performed by: OCCUPATIONAL THERAPIST

## 2019-01-06 PROCEDURE — 74011000258 HC RX REV CODE- 258: Performed by: INTERNAL MEDICINE

## 2019-01-06 PROCEDURE — 65660000000 HC RM CCU STEPDOWN

## 2019-01-06 PROCEDURE — 51798 US URINE CAPACITY MEASURE: CPT

## 2019-01-06 PROCEDURE — 74011250636 HC RX REV CODE- 250/636: Performed by: INTERNAL MEDICINE

## 2019-01-06 PROCEDURE — 74011250637 HC RX REV CODE- 250/637: Performed by: INTERNAL MEDICINE

## 2019-01-06 PROCEDURE — 74011000250 HC RX REV CODE- 250: Performed by: INTERNAL MEDICINE

## 2019-01-06 PROCEDURE — 94760 N-INVAS EAR/PLS OXIMETRY 1: CPT

## 2019-01-06 PROCEDURE — 74011636637 HC RX REV CODE- 636/637: Performed by: INTERNAL MEDICINE

## 2019-01-06 PROCEDURE — 82962 GLUCOSE BLOOD TEST: CPT

## 2019-01-06 RX ORDER — DEXTROSE 50 % IN WATER (D50W) INTRAVENOUS SYRINGE
25-50 AS NEEDED
Status: DISCONTINUED | OUTPATIENT
Start: 2019-01-06 | End: 2019-01-10 | Stop reason: HOSPADM

## 2019-01-06 RX ORDER — LISINOPRIL 20 MG/1
20 TABLET ORAL DAILY
Status: DISCONTINUED | OUTPATIENT
Start: 2019-01-07 | End: 2019-01-08

## 2019-01-06 RX ORDER — INSULIN LISPRO 100 [IU]/ML
INJECTION, SOLUTION INTRAVENOUS; SUBCUTANEOUS
Status: DISCONTINUED | OUTPATIENT
Start: 2019-01-06 | End: 2019-01-10 | Stop reason: HOSPADM

## 2019-01-06 RX ORDER — MAGNESIUM SULFATE 100 %
4 CRYSTALS MISCELLANEOUS AS NEEDED
Status: DISCONTINUED | OUTPATIENT
Start: 2019-01-06 | End: 2019-01-10 | Stop reason: HOSPADM

## 2019-01-06 RX ORDER — INSULIN GLARGINE 100 [IU]/ML
10 INJECTION, SOLUTION SUBCUTANEOUS
Status: DISCONTINUED | OUTPATIENT
Start: 2019-01-06 | End: 2019-01-10 | Stop reason: HOSPADM

## 2019-01-06 RX ADMIN — LISINOPRIL 10 MG: 5 TABLET ORAL at 08:35

## 2019-01-06 RX ADMIN — Medication 10 ML: at 13:00

## 2019-01-06 RX ADMIN — INSULIN LISPRO 2 UNITS: 100 INJECTION, SOLUTION INTRAVENOUS; SUBCUTANEOUS at 22:14

## 2019-01-06 RX ADMIN — LABETALOL HYDROCHLORIDE 10 MG: 5 INJECTION INTRAVENOUS at 11:55

## 2019-01-06 RX ADMIN — MEROPENEM 500 MG: 500 INJECTION, POWDER, FOR SOLUTION INTRAVENOUS at 18:06

## 2019-01-06 RX ADMIN — INSULIN LISPRO 2 UNITS: 100 INJECTION, SOLUTION INTRAVENOUS; SUBCUTANEOUS at 18:06

## 2019-01-06 RX ADMIN — MEROPENEM 500 MG: 500 INJECTION, POWDER, FOR SOLUTION INTRAVENOUS at 01:15

## 2019-01-06 RX ADMIN — DULOXETINE 60 MG: 30 CAPSULE, DELAYED RELEASE ORAL at 08:35

## 2019-01-06 RX ADMIN — SERTRALINE HYDROCHLORIDE 100 MG: 50 TABLET ORAL at 08:35

## 2019-01-06 RX ADMIN — LEVOTHYROXINE SODIUM 125 MCG: 25 TABLET ORAL at 07:04

## 2019-01-06 RX ADMIN — LABETALOL HYDROCHLORIDE 10 MG: 5 INJECTION INTRAVENOUS at 01:16

## 2019-01-06 RX ADMIN — HEPARIN SODIUM 5000 UNITS: 5000 INJECTION INTRAVENOUS; SUBCUTANEOUS at 22:14

## 2019-01-06 RX ADMIN — CHLORTHALIDONE 25 MG: 25 TABLET ORAL at 08:35

## 2019-01-06 RX ADMIN — HEPARIN SODIUM 5000 UNITS: 5000 INJECTION INTRAVENOUS; SUBCUTANEOUS at 07:01

## 2019-01-06 RX ADMIN — MORPHINE SULFATE 2 MG: 4 INJECTION, SOLUTION INTRAMUSCULAR; INTRAVENOUS at 17:16

## 2019-01-06 RX ADMIN — MORPHINE SULFATE 2 MG: 4 INJECTION, SOLUTION INTRAMUSCULAR; INTRAVENOUS at 11:22

## 2019-01-06 RX ADMIN — Medication 10 ML: at 07:01

## 2019-01-06 RX ADMIN — ATENOLOL 100 MG: 50 TABLET ORAL at 08:57

## 2019-01-06 RX ADMIN — HEPARIN SODIUM 5000 UNITS: 5000 INJECTION INTRAVENOUS; SUBCUTANEOUS at 15:54

## 2019-01-06 RX ADMIN — INSULIN GLARGINE 10 UNITS: 100 INJECTION, SOLUTION SUBCUTANEOUS at 22:14

## 2019-01-06 RX ADMIN — OXYCODONE HYDROCHLORIDE 5 MG: 5 TABLET ORAL at 07:04

## 2019-01-06 RX ADMIN — MEROPENEM 500 MG: 500 INJECTION, POWDER, FOR SOLUTION INTRAVENOUS at 12:58

## 2019-01-06 RX ADMIN — MEROPENEM 500 MG: 500 INJECTION, POWDER, FOR SOLUTION INTRAVENOUS at 07:01

## 2019-01-06 NOTE — PROGRESS NOTES
Patient oriented to room. Son and  at bedside. Primary Nurse Martha Akins and Jimmy Acuña RN performed a dual skin assessment on this patient No impairment noted Nikolay score is 13 Bedside and Verbal shift change report given to JOHNATHAN Coello (oncoming nurse) by Summer Wilkes RN (offgoing nurse). Report included the following information SBAR, Kardex, Intake/Output, MAR and Recent Results.

## 2019-01-06 NOTE — PROGRESS NOTES
Problem: Self Care Deficits Care Plan (Adult) Goal: *Acute Goals and Plan of Care (Insert Text) Occupational Therapy Goals Initiated 1/6/2019 1. Patient will perform self-feeding seated supported with supervision/set-up within 7 day(s). 2.  Patient will perform grooming seated supported with minimal assistance/contact guard assist within 7 day(s). 3.  Patient will perform toilet transfers using sit to stand mechanical lift with total assistance within 7 day(s). 4.  Patient will participate in upper extremity therapeutic exercise/activities with minimal assistance/contact guard assist for 10 minutes within 7 day(s). 5.  Patient will utilize energy conservation techniques during functional activities with verbal, visual and tactile cues within 7 day(s). Occupational Therapy EVALUATION Patient: Cady Quiñones (10 y.o. female) Date: 1/6/2019 Primary Diagnosis: Sepsis ([facility] [address] Precautions:  Fall, Seizure(yadira lift transfers) ASSESSMENT : 
Based on the objective data described below, the patient presents with impaired cognition and emotionally labile, decreased strength and ROM in most joints, decreased endurance, mobility, balance and safety following admission for sepsis. Pt recently discharged from [facility] 2 weeks ago to SNF after prolonged hospitalization. She has been using a yadira lift there for all OOB mobility and fluctuating with active participation in ADLs per pt's . Pt currently requiring total A for all ADLs and functional mobility. Recommend return to SNF at discharge. Patient will benefit from skilled intervention to address the above impairments. Patients rehabilitation potential is considered to be Guarded Factors which may influence rehabilitation potential include:  
[]             None noted [x]             Mental ability/status [x]             Medical condition []             Home/family situation and support systems []             Safety awareness [x]             Pain tolerance/management 
[]             Other: PLAN : 
Recommendations and Planned Interventions: 
[x]               Self Care Training                  [x]        Therapeutic Activities [x]               Functional Mobility Training    [x]        Cognitive Retraining 
[x]               Therapeutic Exercises           [x]        Endurance Activities [x]               Balance Training                   [x]        Neuromuscular Re-Education []               Visual/Perceptual Training     [x]   Home Safety Training 
[x]               Patient Education                 [x]        Family Training/Education []               Other (comment): Frequency/Duration: Patient will be followed by occupational therapy 3 times a week to address goals. Discharge Recommendations: Dov Parham Further Equipment Recommendations for Discharge: TBD SUBJECTIVE:  
Patient stated Sd Plants put the catheter in and then left me alone.  OBJECTIVE DATA SUMMARY:  
HISTORY:  
Past Medical History:  
Diagnosis Date  Chronic back pain  Diabetes mellitus, type 2 ([address]  History of vascular access device 12/03/2018 [facility] VAT - 4 FR single, L cephalic, 44 cm for LTA  HTN (hypertension) History reviewed. No pertinent surgical history. Prior Level of Function/Environment/Context: patient presented from [facility] rehab to [facility] ED with AMS and fever. Did have a tonic clonic seizure here at the hospital due to high fever. Patient PTA while in rehab was yadira lift for all OOB activities. Patient's spouse states the last time she stood was November 28th 2018 and was [employer] at that time. Home Situation Home Environment: Skilled nursing facility Care Facility Name: [facility] x 2 weeks Hand Rails : Bilateral 
Wheelchair Ramp: Yes One/Two Story Residence: Two story # of Interior Steps: 15 Interior Rails: Right Living Alone: No 
Support Systems: Spouse/Significant Other/Partner Patient Expects to be Discharged to[de-identified] Skilled nursing facility Current DME Used/Available at Home: Walker, rolling, Wheelchair Tub or Shower Type: Tub/Shower combination Hand dominance: RightEXAMINATION OF PERFORMANCE DEFICITS: 
Cognitive/Behavioral Status: 
Neurologic State: Confused; Alert Orientation Level: Oriented to person;Oriented to place Cognition: Decreased attention/concentration;Decreased command following Perception: Appears intact Perseveration: Perseverates during ADLS;Perseverates during conversation;Perseverates during mobility Safety/Judgement: Awareness of environment;Decreased awareness of need for assistance;Decreased awareness of need for safety;Decreased insight into deficits; Fall prevention Hearing: Auditory Auditory Impairment: None Vision/Perceptual:   
Acuity: Impaired near vision; Impaired far vision Range of Motion: 
AROM: Generally decreased, functional(decreased in all extremities RUE > LUE) Strength: 
Strength: Grossly decreased, non-functional 
  
  
  
  
Coordination: 
Coordination: Generally decreased, functional 
Fine Motor Skills-Upper: Left Impaired;Right Impaired Gross Motor Skills-Upper: Left Intact; Right Impaired Tone & Sensation: 
Tone: Normal 
  
  
  
  
  
  
  
Balance: 
Sitting: (pt crying and declined participation) Functional Mobility and Transfers for ADLs:Bed Mobility: 
Rolling: Total assistance Supine to Sit: Total assistance(pt crying and declined participation) Transfers: 
Sit to Stand: Total assistance(yadira lift transfers at [facility]) Toilet Transfer : Total assistance(bed level) ADL Assessment: 
Feeding: Total assistance Oral Facial Hygiene/Grooming: Total assistance Bathing: Total assistance Upper Body Dressing: Total assistance Lower Body Dressing: Total assistance Toileting: Total assistance Cognitive Retraining Safety/Judgement: Awareness of environment;Decreased awareness of need for assistance;Decreased awareness of need for safety;Decreased insight into deficits; Fall prevention Functional Measure: 
Barthel Index: 
 
Bathin Bladder: 0 Bowels: 5 Groomin Dressin Feedin Mobility: 0 Stairs: 0 Toilet Use: 0 Transfer (Bed to Chair and Back): 0 Total: 5 The Barthel ADL Index: Guidelines 1. The index should be used as a record of what a patient does, not as a record of what a patient could do. 2. The main aim is to establish degree of independence from any help, physical or verbal, however minor and for whatever reason. 3. The need for supervision renders the patient not independent. 4. A patient's performance should be established using the best available evidence. Asking the patient, friends/relatives and nurses are the usual sources, but direct observation and common sense are also important. However direct testing is not needed. 5. Usually the patient's performance over the preceding 24-48 hours is important, but occasionally longer periods will be relevant. 6. Middle categories imply that the patient supplies over 50 per cent of the effort. 7. Use of aids to be independent is allowed. Patrizia Gomez., Barthel, DAngelicaW. (3514). Functional evaluation: the Barthel Index. [address] [facility] (14)2. MATHEW Ramirez, Analisa Howe., Wilberto Edgar., [place], [address] [place] [address] (). Measuring the change indisability after inpatient rehabilitation; comparison of the responsiveness of the Barthel Index and Functional [facility] Measure. Journal of Neurology, Neurosurgery, and Psychiatry, 66(4), 687-594. KENZIE Garcia.SIDNEY, ROXANA Alegria, & Tatianna Lopez MAngelicaA. (2004.) Assessment of post-stroke quality of life in cost-effectiveness studies: The usefulness of the Barthel Index and the EuroQoL-5D. [facility], 13, 028-96 Pain: 
Pain Scale 1: Numeric (0 - 10) Activity Tolerance:  
Poor Please refer to the flowsheet for vital signs taken during this treatment. After treatment:  
[] Patient left in no apparent distress sitting up in chair 
[x] Patient left in no apparent distress in bed 
[x] Call bell left within reach [x] Nursing notified 
[x] Caregiver present- pt's  
[] Bed alarm activated COMMUNICATION/EDUCATION:  
The patients plan of care was discussed with: Registered Nurse. [x] Home safety education was provided and the patient/caregiver indicated understanding. [x] Patient/family have participated as able in goal setting and plan of care. [x] Patient/family agree to work toward stated goals and plan of care. [] Patient understands intent and goals of therapy, but is neutral about his/her participation. [] Patient is unable to participate in goal setting and plan of care. This patients plan of care is appropriate for delegation to [facility]. Thank you for this referral. 
Suzanne Hopkins OT Time Calculation: 11 mins

## 2019-01-06 NOTE — PROGRESS NOTES
[facility] Medical Progress Note NAME: Stephania De La Cruz :  1955 MRM:  [id number] Date/Time: 2019  11:58 AM 
 
  
Assessment and Plan:  
 
C-spine discitis - Dx 4 weeks ago. MRI last month showed evidence of discitis at C5-C6 and C6-C7, with abnormal enhancement of the vertebral bodies. There was phlegmon, but no drainable abscess. Repeat MRI shows \"Persistent osteomyelitis of C5, C6, and C7 is similar to last month. Ventral epidural phlegmon is decreased. Anterior paraspinal phlegmon is slightly decreased. No drainable abscess. Mild cord indentation at C5-C6 is unchanged. \" Evaluated by ortho and ID. Needed 6 weeks of IV ABx, was most the way through that. Currently on meropenem 
  
HTN - BP up consistent after she has recovered. Resume atenolol, increase dose to 100, added low dose ACE, and resumed chlorthaldone 
  
Anemia - POA, chronic, worsening with hydration, recent Fe studies consistent with chronic dz. Monitor prn 
  
DM type 2 w/ hypoglycemia - Diabetic diet and counseling. SSI per protocol. Continue home Lantus. Recent A1C 7.2%.  
  
Urinary retention - Recent Dx weeks ago. Has been with joyce and flomax. Had scheduled urology follow up for voiding trial in 3 days. I tried joyce discontinuation, but she failed that based on PVR scans. Consult urology. 
  
Acute metabolic encephalopathy - POA, recurrent, unclear etiology, likely due to sepsis, and now resolving and near baseline. On recent admit had unremarkable head CT, MRI brain. Repeat MRI also unremarkable. Recent TSH, B12/folate, ammonia, HSV, EEG, RPR, West Nile, HIV, Lyme IgM negative. Suspect dementia. Continue sertraline, resume duloxetine, ASA. Resumed oxycodone 
  
R elbow cellulitis / bursitis / acute pain / Weakness - Noted on last admit, s/p joint aspiration by IR on . On meropenem. Will need to return to rehab.   Ready in 1-2 days. 
  
 Sepsis / Fever / Sinus tachycardia - POA, unclear etiology. Imaging unremarkable. DDx fungemia, viral, bacterial.  Continue meropenum. Consulted ID. They added vancomycin, but stopped that today. Negative fungal and bacterial cx. Negative resp viral panel. Urine cx with fungus, most likely colonization, not active infection. Lactate normal and no end organ damage, and so NOT severe sepsis. Fever resolved. 
  
Hypokalemia / Hypernatremia - Cr stable. Hydrate and follow. 
  
Hypothyroidism - Continue synthroid 
  
Hyperlipidemia - Continue simvastatin Subjective: Chief Complaint: Back pain, chronic, persistent, but closer to baseline, eating, ready to discharge, but still awaiting bed and insurance approval. 
 
ROS: 
(bold if positive, if negative) Tolerating minimal PT Tolerating some Diet Objective:  
 
Last 24hrs VS reviewed since prior progress note. Most recent are: 
 
Visit Vitals BP (!) 181/94 (BP 1 Location: Right arm, BP Patient Position: At rest) Pulse 84 Temp 97.9 °F (36.6 °C) Resp 23 Ht 5' 1\" (1.549 m) Wt 82.5 kg (181 lb 14.4 oz) SpO2 99% BMI 34.37 kg/m² SpO2 Readings from Last 6 Encounters:  
01/06/19 99% 12/17/18 96% O2 Flow Rate (L/min): 3 l/min Intake/Output Summary (Last 24 hours) at 1/6/2019 1158 Last data filed at 1/6/2019 2789 Gross per 24 hour Intake 320 ml Output 650 ml Net -330 ml Physical Exam: 
 
Gen:  Obese. in no acute distress HEENT:  Pink conjunctivae, PERRL, hearing intact to voice, moist mucous membranes Neck:  Supple, without masses, thyroid non-tender Resp:  No accessory muscle use, distant breath sounds without wheezes rales or rhonchi 
Card:  No murmurs, regular S1, S2 without thrills, bruits or peripheral edema Abd:  Soft, non-tender, non-distended, reduced bowel sounds are present, no mass Lymph:  No cervical or inguinal adenopathy Musc:  No cyanosis or clubbing Skin:  No rashes or ulcers, skin turgor is good Neuro:  Cranial nerves are grossly intact, general pain and motor weakness, follows commands Psych:  Good insight, oriented to person, place and time Telemetry reviewed:   normal sinus rhythm 
__________________________________________________________________ Medications Reviewed: (see below) Medications:  
 
Current Facility-Administered Medications Medication Dose Route Frequency  atenolol (TENORMIN) tablet 100 mg  100 mg Oral DAILY  lisinopril (PRINIVIL, ZESTRIL) tablet 10 mg  10 mg Oral DAILY  chlorthalidone (HYGROTEN) tablet 25 mg  25 mg Oral DAILY  DULoxetine (CYMBALTA) capsule 60 mg  60 mg Oral DAILY  sertraline (ZOLOFT) tablet 100 mg  100 mg Oral DAILY  oxyCODONE IR (ROXICODONE) tablet 5 mg  5 mg Oral Q4H PRN  
 levothyroxine (SYNTHROID) tablet 125 mcg  125 mcg Oral ACB  heparin (porcine) injection 5,000 Units  5,000 Units SubCUTAneous Q8H  
 labetalol (NORMODYNE;TRANDATE) injection 10 mg  10 mg IntraVENous Q2H PRN  
 meropenem (MERREM) 500 mg in 0.9% sodium chloride (MBP/ADV) 50 mL  0.5 g IntraVENous Q6H  
 haloperidol lactate (HALDOL) injection 2 mg  2 mg IntraVENous Q6H PRN  
 morphine injection 2 mg  2 mg IntraVENous Q3H PRN  
 acetaminophen (TYLENOL) suppository 650 mg  650 mg Rectal Q6H PRN  
 LORazepam (ATIVAN) injection 1 mg  1 mg IntraVENous Q5MIN PRN  
 sodium chloride (NS) flush 5-10 mL  5-10 mL IntraVENous PRN Lab Data Reviewed: (see below) Lab Review:  
 
Recent Labs 01/05/19 
8883 01/04/19 
9358 WBC 5.5 5.2 HGB 9.0* 8.5* HCT 28.2* 26.5*  
 265 Recent Labs 01/05/19 
1214 01/04/19 
0793  142  
K 3.0* 3.8  107 CO2 29 25 * 100 BUN 14 14 CREA 0.67 0.52* CA 8.3* 8.0*  
MG 1.9 1.5* PHOS 3.0  --   
ALB 1.7*  --   
TBILI 0.3  --   
SGOT 28  --   
ALT 27  --   
 
Lab Results Component Value Date/Time  Glucose (POC) 169 (H) 01/05/2019 03:55 PM  
 Glucose (POC) 145 (H) 01/05/2019 11:36 AM  
 Glucose (POC) 125 (H) 01/05/2019 06:25 AM  
 Glucose (POC) 182 (H) 01/04/2019 08:41 PM  
 Glucose (POC) 168 (H) 01/04/2019 04:08 PM  
 
No results for input(s): PH, PCO2, PO2, HCO3, FIO2 in the last 72 hours. No results for input(s): INR in the last 72 hours. No lab exists for component: INREXT, INREXT All Micro Results Procedure Component Value Units Date/Time CULTURE, BLOOD FOR FUNGUS [[id number]] Collected:  12/31/18 2233 Order Status:  Completed Specimen:  Blood Updated:  01/06/19 0240 Special Requests: HOLD 21 DAYS FOR FUNGUS Culture result: NO GROWTH 6 DAYS     
 CULTURE, BLOOD [[id number]] Collected:  12/31/18 2054 Order Status:  Completed Specimen:  Blood Updated:  01/06/19 6042 Special Requests: NO SPECIAL REQUESTS Culture result: NO GROWTH 6 DAYS     
 CULTURE, BLOOD [[id number]] Collected:  12/31/18 2054 Order Status:  Completed Specimen:  Blood Updated:  01/06/19 2236 Special Requests: NO SPECIAL REQUESTS Culture result: NO GROWTH 6 DAYS     
 CULTURE, URINE [[id number]]  (Abnormal) Collected:  12/31/18 2140 Order Status:  Completed Specimen:  Cath Urine Updated:  01/04/19 1038 Special Requests: NO SPECIAL REQUESTS [place] Count --     
  >100,000 COLONIES/mL Culture result: CANDIDA PARAPSILOSIS     
 RESPIRATORY PANEL,PCR,NASOPHARYNGEAL [[id number]] Collected:  01/03/19 1557 Order Status:  Completed Specimen:  Nasopharyngeal Updated:  01/03/19 2139 Adenovirus NOT DETECTED Coronavirus 229E NOT DETECTED Coronavirus HKU1 NOT DETECTED Coronavirus CVNL63 NOT DETECTED Coronavirus OC43 NOT DETECTED Metapneumovirus NOT DETECTED Rhinovirus and Enterovirus NOT DETECTED Influenza A NOT DETECTED Influenza A, subtype H1 NOT DETECTED Influenza A, subtype H3 NOT DETECTED   INFLUENZA A H1N1 PCR NOT DETECTED     
 Influenza B NOT DETECTED Parainfluenza 1 NOT DETECTED Parainfluenza 2 NOT DETECTED Parainfluenza 3 NOT DETECTED Parainfluenza virus 4 NOT DETECTED     
  RSV by PCR NOT DETECTED Bordetella pertussis - PCR NOT DETECTED Chlamydophila pneumoniae DNA, QL, PCR NOT DETECTED Mycoplasma pneumoniae DNA, QL, PCR NOT DETECTED     
 RESPIRATORY PANEL,PCR,NASOPHARYNGEAL [[id number]] Collected:  12/31/18 2233 Order Status:  Completed Specimen:  Nasopharyngeal Updated:  01/01/19 1352 Adenovirus NOT DETECTED Coronavirus 229E NOT DETECTED Coronavirus HKU1 NOT DETECTED Coronavirus CVNL63 NOT DETECTED Coronavirus OC43 NOT DETECTED Metapneumovirus NOT DETECTED Rhinovirus and Enterovirus NOT DETECTED Influenza A NOT DETECTED Influenza A, subtype H1 NOT DETECTED Influenza A, subtype H3 NOT DETECTED INFLUENZA A H1N1 PCR NOT DETECTED Influenza B NOT DETECTED Parainfluenza 1 NOT DETECTED Parainfluenza 2 NOT DETECTED Parainfluenza 3 NOT DETECTED Parainfluenza virus 4 NOT DETECTED     
  RSV by PCR NOT DETECTED Bordetella pertussis - PCR NOT DETECTED Chlamydophila pneumoniae DNA, QL, PCR NOT DETECTED Mycoplasma pneumoniae DNA, QL, PCR NOT DETECTED     
 CULTURE, FUNGUS [[id number]] Collected:  12/31/18 2233 Order Status:  Canceled Specimen: Body Fluid URINE CULTURE HOLD SAMPLE [[id number]] Collected:  12/31/18 2140 Order Status:  Completed Specimen:  Urine from Serum Updated:  12/31/18 2150 Urine culture hold URINE ON HOLD IN MICROBIOLOGY DEPT FOR 3 DAYS. IF UNPRESERVED URINE IS SUBMITTED, IT CANNOT BE USED FOR ADDITIONAL TESTING AFTER 24 HRS, RECOLLECTION WILL BE REQUIRED. I have reviewed notes of prior 24hr. Other pertinent lab: none Total time spent with patient: 35 Minutes Care Plan discussed with: Patient, Family, Care Manager, Nursing Staff, Consultant/Specialist and >50% of time spent in counseling and coordination of care Discussed:  Care Plan Prophylaxis:  H2B/PPI Disposition:  SNF/LTC 
        
___________________________________________________ Attending Physician: Kasia Coughlin MD

## 2019-01-06 NOTE — CONSULTS
Urology Consult    Subjective:     Date of Consultation:  January 6, 2019    Referring Physician: Melissa Aguirre    Reason for Consultation:  retention    History of Present Illness:   62 yo F with urinary retention. Had joyce placed 1 month ago on admission for encephalopathy, cervical discitis and was in rehab. Void trial attempted this admission but failed. Location bladder. A/w DM, cervical discitis, lumbar DDDz. Timing constant. Alleviated by joyce. Severity mild. Was supposed to see us in office this week. Past Medical History:   Diagnosis Date    Chronic back pain     Diabetes mellitus, type 2 ([address]     History of vascular access device 12/03/2018    [facility] VAT - 4 FR single, L cephalic, 44 cm for LTA    HTN (hypertension)       History reviewed. No pertinent surgical history. Family History   Problem Relation Age of Onset    Diabetes Father     Heart Disease Father       Social History     Tobacco Use    Smoking status: Never Smoker    Smokeless tobacco: Never Used   Substance Use Topics    Alcohol use: No     Frequency: Never     No Known Allergies   Prior to Admission medications    Medication Sig Start Date End Date Taking? Authorizing Provider   cyclobenzaprine (FLEXERIL) 10 mg tablet Take 10 mg by mouth nightly. Yes Provider, Historical   DULoxetine (CYMBALTA) 60 mg capsule Take 60 mg by mouth daily. Yes Provider, Historical   ibuprofen (MOTRIN) 400 mg tablet Take 400 mg by mouth three (3) times daily. Yes Provider, Historical   0.9% sodium chloride solp 100 mL with meropenem 1 gram solr 2 g 2 g by IntraVENous route every twelve (12) hours. 12/17/18 1/18/19 Yes Provider, Historical   ondansetron hcl (ZOFRAN) 4 mg tablet Take 4 mg by mouth every six (6) hours as needed for Nausea. Yes Provider, Historical   oxyCODONE IR (ROXICODONE) 5 mg immediate release tablet Take 5 mg by mouth every four (4) hours.    Yes Provider, Historical   senna (SENNA) 8.6 mg tablet Take 1 Tab by mouth two (2) times a day. Yes Provider, Historical   oxyCODONE ER (XTAMPZA ER) 18 mg ER capsule Take 18 mg by mouth every twelve (12) hours. Yes Provider, Historical   atenolol (TENORMIN) 50 mg tablet Take 1 Tab by mouth daily. 18  Yes Silvio Ernst MD   insulin glargine (LANTUS) 100 unit/mL injection 10 Units by SubCUTAneous route daily. 18  Yes Silvio Ernst MD   chlorthalidone (HYGROTEN) 25 mg tablet Take 1 Tab by mouth daily. 18  Yes Silvio Ernst MD   levothyroxine (SYNTHROID) 125 mcg tablet Take 125 mcg by mouth Daily (before breakfast). Yes Provider, Historical   sertraline (ZOLOFT) 100 mg tablet Take 100 mg by mouth daily. Yes Provider, Historical   simvastatin (ZOCOR) 40 mg tablet Take 40 mg by mouth nightly. Yes Provider, Historical   aspirin delayed-release 81 mg tablet Take 81 mg by mouth two (2) times a day. Yes Provider, Historical         Review of Systems:  A comprehensive review of systems was negative except for that written in the HPI. Objective:     Patient Vitals for the past 8 hrs:   BP Temp Pulse Resp SpO2   19 1136 (!) 181/94 97.9 °F (36.6 °C) 84 23 99 %   19 0815 (!) 177/96       19 0719 (!) 178/111 97.3 °F (36.3 °C) 83 20 98 %   19 0651   78       Temp (24hrs), Av °F (36.7 °C), Min:97.3 °F (36.3 °C), Max:98.4 °F (36.9 °C)      Intake and Output:    1901 -  0700  In: 320 [P.O.:200;  I.V.:120]  Out: 1000 [Urine:1000]    Physical Exam:            General:    fatigued, cooperative, no distress, appears stated age                     Skin:  Normal.                HEENT:  NCAT        Throat/Neck:  mucous membranes moist   Lymph nodes:  deferred                 Lungs: Sym chest rise   Cardiovascular: No edema             Abdomen[de-identified] nondistended             Genitalia: Shankar in place          Extremities: SCDs in place       Assessment:     Principal Problem:    Sepsis ([address] (2018)    Active Problems:    HTN (hypertension) ()      Chronic back pain ()      Diabetes mellitus, type 2 (HCC) ()      Fever (12/31/2018)      Sinus tachycardia (12/31/2018)      Acute metabolic encephalopathy (06/89/5975)      Discitis of cervical region (12/31/2018)      Urinary retention (12/31/2018)      Cellulitis of right elbow (12/31/2018)      Anemia (12/31/2018)      Hypokalemia (12/31/2018)      Hypernatremia (12/31/2018)          Urinary retention - may be multifactorial from diabetes causing poor detrusor contractility, recent encephalopathy and discitis as well as degenerative disc disease may play neurologic component. Plan:     Leave joyce for now. Needs urodynamic study to eval bladder contractility, obstruction. Will set up and re-schedule office for later this month.     Signed By: Gregoria Orellana MD                         January 6, 2019

## 2019-01-06 NOTE — PROGRESS NOTES
TRANSFER - OUT REPORT: 
 
Verbal report given to JOHNATHAN Martines on Sushil Leisure  being transferred to room 509 for routine progression of care Report consisted of patients Situation, Background, Assessment and  
Recommendations(SBAR). Information from the following report(s) Kardex, Procedure Summary, Intake/Output, MAR, Accordion, Recent Results, Med Rec Status and Cardiac Rhythm NSR was reviewed with the receiving nurse. Lines: PICC Single Lumen 34/87/12 Cephalic; Left (Active) Central Line Being Utilized Yes 1/6/2019  1:00 PM  
Criteria for Appropriate Use Limited/no vessel suitable for conventional peripheral access 1/6/2019  1:00 PM  
Site Assessment Clean, dry, & intact 1/6/2019  1:00 PM  
Phlebitis Assessment 0 1/6/2019  1:00 PM  
Infiltration Assessment 0 1/6/2019  1:00 PM  
Date of Last Dressing Change 01/01/19 1/6/2019  1:00 PM  
Dressing Status Clean, dry, & intact 1/6/2019  1:00 PM  
Dressing Type Disk with Chlorhexadine gluconate (CHG); Transparent 1/6/2019  1:00 PM  
Hub Color/Line Status Purple;Patent; Infusing;Flushed 1/6/2019  1:00 PM  
Action Taken Open ports on tubing capped 1/5/2019  8:03 PM  
Positive Blood Return (Site #1) Yes 1/6/2019  1:00 PM  
Alcohol Cap Used Yes 1/6/2019  1:00 PM  
  
 
Opportunity for questions and clarification was provided. Patient transported with: 
 O2 @ 3 liters Tech

## 2019-01-06 NOTE — PROGRESS NOTES
Care Plan Summary 1930; Bedside and Verbal shift change report given to Hermelinda Diez RN (oncoming nurse) by All Brown RN (offgoing nurse). Report included the following information SBAR, Kardex, Procedure Summary, Intake/Output, MAR, Accordion, Recent Results and Cardiac Rhythm NSR   
 
 
2230; Patient continuing to complain of severe pain, expressing anxiety about resting tonight. Treated with prn morphine. 0120; Patient has not voided, bladder scan revealed about 300ml retained 0250: Patient has not voided, bladder scan revealed 350ml retained. Called Dr. Aneesh Perez, hospitalist on call, received order to place new joyce if patient has not voided and bladder scan revealed greater than 400mo retained. 0600; Bladder scan revealed 430ml. Joyce placed with buddy system and sterile technique. Urinalysis/culture collected per protocol. 0730: Bedside and Verbal shift change report given to All Brown RN (oncoming nurse) by Hermelinda Diez RN (offgoing nurse). Report included the following information SBAR, Kardex, Procedure Summary, Intake/Output, MAR, Accordion, Recent Results and Cardiac Rhythm NSR, AVB 1. Care Plan Summary Problem: Pressure Injury - Risk of 
Goal: *Prevention of pressure injury Document Nikolay Scale and appropriate interventions in the flowsheet. Outcome: Progressing Towards Goal 
Pressure Injury Interventions: 
Sensory Interventions: Assess changes in LOC, Check visual cues for pain, Discuss PT/OT consult with provider, Float heels, Minimize linen layers, Maintain/enhance activity level Moisture Interventions: Absorbent underpads, Internal/External urinary devices, Minimize layers Activity Interventions: Increase time out of bed, PT/OT evaluation Mobility Interventions: Float heels, PT/OT evaluation, Suspension boots, Turn and reposition approx. every two hours(pillow and wedges) Nutrition Interventions: Document food/fluid/supplement intake, Offer support with meals,snacks and hydration Friction and Shear Interventions: Feet elevated on foot rest, Lift sheet, Lift team/patient mobility team, Minimize layers, Sit at 90-degree angle Problem: Falls - Risk of 
Goal: *Absence of Falls Document Hero Duran Fall Risk and appropriate interventions in the flowsheet. Outcome: Progressing Towards Goal 
Fall Risk Interventions: 
  
 
Mentation Interventions: Bed/chair exit alarm, Door open when patient unattended, Toileting rounds, Reorient patient, More frequent rounding Medication Interventions: Bed/chair exit alarm, Teach patient to arise slowly, Patient to call before getting OOB, Evaluate medications/consider consulting pharmacy Elimination Interventions: Call light in reach, Patient to call for help with toileting needs, Toileting schedule/hourly rounds

## 2019-01-07 LAB
ANION GAP SERPL CALC-SCNC: 6 MMOL/L (ref 5–15)
BUN SERPL-MCNC: 16 MG/DL (ref 6–20)
BUN/CREAT SERPL: 20 (ref 12–20)
CALCIUM SERPL-MCNC: 8.4 MG/DL (ref 8.5–10.1)
CHLORIDE SERPL-SCNC: 108 MMOL/L (ref 97–108)
CO2 SERPL-SCNC: 32 MMOL/L (ref 21–32)
CREAT SERPL-MCNC: 0.81 MG/DL (ref 0.55–1.02)
ERYTHROCYTE [DISTWIDTH] IN BLOOD BY AUTOMATED COUNT: 15.8 % (ref 11.5–14.5)
GLUCOSE BLD STRIP.AUTO-MCNC: 124 MG/DL (ref 65–100)
GLUCOSE BLD STRIP.AUTO-MCNC: 129 MG/DL (ref 65–100)
GLUCOSE BLD STRIP.AUTO-MCNC: 202 MG/DL (ref 65–100)
GLUCOSE BLD STRIP.AUTO-MCNC: 240 MG/DL (ref 65–100)
GLUCOSE SERPL-MCNC: 175 MG/DL (ref 65–100)
HCT VFR BLD AUTO: 27.4 % (ref 35–47)
HGB BLD-MCNC: 8.7 G/DL (ref 11.5–16)
MAGNESIUM SERPL-MCNC: 1.7 MG/DL (ref 1.6–2.4)
MCH RBC QN AUTO: 27.4 PG (ref 26–34)
MCHC RBC AUTO-ENTMCNC: 31.8 G/DL (ref 30–36.5)
MCV RBC AUTO: 86.2 FL (ref 80–99)
NRBC # BLD: 0 K/UL (ref 0–0.01)
NRBC BLD-RTO: 0 PER 100 WBC
PLATELET # BLD AUTO: 311 K/UL (ref 150–400)
PMV BLD AUTO: 10.1 FL (ref 8.9–12.9)
POTASSIUM SERPL-SCNC: 2.9 MMOL/L (ref 3.5–5.1)
RBC # BLD AUTO: 3.18 M/UL (ref 3.8–5.2)
SERVICE CMNT-IMP: ABNORMAL
SODIUM SERPL-SCNC: 146 MMOL/L (ref 136–145)
WBC # BLD AUTO: 6 K/UL (ref 3.6–11)

## 2019-01-07 PROCEDURE — 83735 ASSAY OF MAGNESIUM: CPT

## 2019-01-07 PROCEDURE — 65660000000 HC RM CCU STEPDOWN

## 2019-01-07 PROCEDURE — 82962 GLUCOSE BLOOD TEST: CPT

## 2019-01-07 PROCEDURE — 74011000258 HC RX REV CODE- 258: Performed by: INTERNAL MEDICINE

## 2019-01-07 PROCEDURE — 74011250636 HC RX REV CODE- 250/636: Performed by: INTERNAL MEDICINE

## 2019-01-07 PROCEDURE — 74011250637 HC RX REV CODE- 250/637: Performed by: INTERNAL MEDICINE

## 2019-01-07 PROCEDURE — 85027 COMPLETE CBC AUTOMATED: CPT

## 2019-01-07 PROCEDURE — 80048 BASIC METABOLIC PNL TOTAL CA: CPT

## 2019-01-07 PROCEDURE — 36415 COLL VENOUS BLD VENIPUNCTURE: CPT

## 2019-01-07 PROCEDURE — 94760 N-INVAS EAR/PLS OXIMETRY 1: CPT

## 2019-01-07 PROCEDURE — 74011636637 HC RX REV CODE- 636/637: Performed by: INTERNAL MEDICINE

## 2019-01-07 PROCEDURE — 97530 THERAPEUTIC ACTIVITIES: CPT

## 2019-01-07 RX ORDER — POTASSIUM CHLORIDE 1500 MG/1
20 TABLET, FILM COATED, EXTENDED RELEASE ORAL DAILY
Qty: 30 TAB | Refills: 0 | Status: SHIPPED | OUTPATIENT
Start: 2019-01-07 | End: 2019-01-10

## 2019-01-07 RX ORDER — POTASSIUM CHLORIDE 7.45 MG/ML
10 INJECTION INTRAVENOUS
Status: COMPLETED | OUTPATIENT
Start: 2019-01-07 | End: 2019-01-07

## 2019-01-07 RX ORDER — OXYCODONE HYDROCHLORIDE 5 MG/1
5 TABLET ORAL EVERY 4 HOURS
Qty: 20 TAB | Refills: 0 | Status: SHIPPED | OUTPATIENT
Start: 2019-01-07 | End: 2019-01-10 | Stop reason: SDUPTHER

## 2019-01-07 RX ORDER — ATENOLOL 100 MG/1
100 TABLET ORAL DAILY
Qty: 30 TAB | Refills: 0 | Status: SHIPPED | OUTPATIENT
Start: 2019-01-07 | End: 2019-02-06

## 2019-01-07 RX ORDER — POTASSIUM CHLORIDE 750 MG/1
40 TABLET, FILM COATED, EXTENDED RELEASE ORAL EVERY 4 HOURS
Status: COMPLETED | OUTPATIENT
Start: 2019-01-07 | End: 2019-01-07

## 2019-01-07 RX ORDER — LISINOPRIL 20 MG/1
20 TABLET ORAL DAILY
Qty: 30 TAB | Refills: 0 | Status: SHIPPED | OUTPATIENT
Start: 2019-01-07 | End: 2019-01-08

## 2019-01-07 RX ADMIN — MORPHINE SULFATE 2 MG: 4 INJECTION, SOLUTION INTRAMUSCULAR; INTRAVENOUS at 04:56

## 2019-01-07 RX ADMIN — DULOXETINE 60 MG: 30 CAPSULE, DELAYED RELEASE ORAL at 08:11

## 2019-01-07 RX ADMIN — INSULIN GLARGINE 10 UNITS: 100 INJECTION, SOLUTION SUBCUTANEOUS at 22:07

## 2019-01-07 RX ADMIN — POTASSIUM CHLORIDE 40 MEQ: 750 TABLET, EXTENDED RELEASE ORAL at 08:11

## 2019-01-07 RX ADMIN — INSULIN LISPRO 3 UNITS: 100 INJECTION, SOLUTION INTRAVENOUS; SUBCUTANEOUS at 16:35

## 2019-01-07 RX ADMIN — MEROPENEM 500 MG: 500 INJECTION, POWDER, FOR SOLUTION INTRAVENOUS at 12:33

## 2019-01-07 RX ADMIN — CHLORTHALIDONE 25 MG: 25 TABLET ORAL at 08:11

## 2019-01-07 RX ADMIN — HEPARIN SODIUM 5000 UNITS: 5000 INJECTION INTRAVENOUS; SUBCUTANEOUS at 06:19

## 2019-01-07 RX ADMIN — POTASSIUM CHLORIDE 10 MEQ: 7.46 INJECTION, SOLUTION INTRAVENOUS at 09:14

## 2019-01-07 RX ADMIN — LEVOTHYROXINE SODIUM 125 MCG: 25 TABLET ORAL at 06:30

## 2019-01-07 RX ADMIN — SERTRALINE HYDROCHLORIDE 100 MG: 50 TABLET ORAL at 08:11

## 2019-01-07 RX ADMIN — INSULIN LISPRO 2 UNITS: 100 INJECTION, SOLUTION INTRAVENOUS; SUBCUTANEOUS at 22:07

## 2019-01-07 RX ADMIN — POTASSIUM CHLORIDE 40 MEQ: 750 TABLET, EXTENDED RELEASE ORAL at 12:31

## 2019-01-07 RX ADMIN — MEROPENEM 500 MG: 500 INJECTION, POWDER, FOR SOLUTION INTRAVENOUS at 18:51

## 2019-01-07 RX ADMIN — POTASSIUM CHLORIDE 10 MEQ: 7.46 INJECTION, SOLUTION INTRAVENOUS at 11:41

## 2019-01-07 RX ADMIN — HEPARIN SODIUM 5000 UNITS: 5000 INJECTION INTRAVENOUS; SUBCUTANEOUS at 13:31

## 2019-01-07 RX ADMIN — OXYCODONE HYDROCHLORIDE 5 MG: 5 TABLET ORAL at 22:37

## 2019-01-07 RX ADMIN — ATENOLOL 100 MG: 50 TABLET ORAL at 08:12

## 2019-01-07 RX ADMIN — Medication 10 ML: at 06:19

## 2019-01-07 RX ADMIN — MEROPENEM 500 MG: 500 INJECTION, POWDER, FOR SOLUTION INTRAVENOUS at 00:50

## 2019-01-07 RX ADMIN — POTASSIUM CHLORIDE 10 MEQ: 7.46 INJECTION, SOLUTION INTRAVENOUS at 10:21

## 2019-01-07 RX ADMIN — LISINOPRIL 20 MG: 20 TABLET ORAL at 08:12

## 2019-01-07 RX ADMIN — HEPARIN SODIUM 5000 UNITS: 5000 INJECTION INTRAVENOUS; SUBCUTANEOUS at 22:08

## 2019-01-07 RX ADMIN — POTASSIUM CHLORIDE 40 MEQ: 750 TABLET, EXTENDED RELEASE ORAL at 16:36

## 2019-01-07 RX ADMIN — MEROPENEM 500 MG: 500 INJECTION, POWDER, FOR SOLUTION INTRAVENOUS at 06:30

## 2019-01-07 RX ADMIN — MORPHINE SULFATE 2 MG: 4 INJECTION, SOLUTION INTRAMUSCULAR; INTRAVENOUS at 08:12

## 2019-01-07 RX ADMIN — POTASSIUM CHLORIDE 10 MEQ: 7.46 INJECTION, SOLUTION INTRAVENOUS at 08:12

## 2019-01-07 NOTE — PROGRESS NOTES
[facility] Medical Progress Note NAME: Nora Kilgore :  1955 MRM:  [id number] Date/Time: 2019  10:45 AM 
 
  
Assessment and Plan:  
 
C-spine discitis - Dx 4 weeks ago. MRI last month showed evidence of discitis at C5-C6 and C6-C7, with abnormal enhancement of the vertebral bodies. There was phlegmon, but no drainable abscess. Repeat MRI shows \"Persistent osteomyelitis of C5, C6, and C7 is similar to last month. Ventral epidural phlegmon is decreased. Anterior paraspinal phlegmon is slightly decreased. No drainable abscess. Mild cord indentation at C5-C6 is unchanged. \" Evaluated by ortho and ID. Needed 6 weeks of IV ABx, was most the way through that. Currently on meropenem. Pain control with prn oxycodone. Weaned off oxycontin. 
  
HTN - BP up consistent after she has recovered. Resume atenolol, increase dose to 100, added low dose ACE, and resumed chlorthaldone 
  
Anemia - POA, chronic, worsening with hydration, recent Fe studies consistent with chronic dz. Monitor prn 
  
DM type 2 w/ hypoglycemia - Diabetic diet and counseling. SSI per protocol. Continue home Lantus. Recent A1C 7.2%.  
  
Urinary retention - Recent Dx weeks ago. Has been with continuous joyce and flomax. Had scheduled urology follow up  for voiding trial, but I tried joyce discontinuation on , but she failed that based on PVR scans. Consulted urology. They want outpatient office follow up for urodynamic studies. 
  
Acute metabolic encephalopathy - POA, recurrent, unclear etiology, likely due to sepsis, and now resolving and near baseline. On recent admit had unremarkable head CT, MRI brain. Repeat MRI also unremarkable. Recent TSH, B12/folate, ammonia, HSV, EEG, RPR, West Nile, HIV, Lyme IgM negative. Suspect dementia. Continue sertraline, resume duloxetine, ASA.   Resumed oxycodone 
  
R elbow cellulitis / bursitis / acute pain / Weakness - Noted on last admit, s/p joint aspiration by IR on 12/6. On meropenem. Will need to return to rehab. Ready in 1-2 days. 
  
Sepsis / Fever / Sinus tachycardia - POA, unclear etiology. Imaging unremarkable. DDx fungemia, viral, bacterial.  Continue meropenum. Consulted ID. They added vancomycin, but stopped that today. Negative fungal and bacterial cx. Negative resp viral panel. Urine cx with fungus, most likely colonization, not active infection. Lactate normal and no end organ damage, and so NOT severe sepsis. Fever resolved. 
  
Hypokalemia / Hypernatremia - Cr stable. Hydrate and follow. Replete K as needed. 
  
Hypothyroidism - Continue synthroid 
  
Hyperlipidemia - Continue simvastatin Subjective: Chief Complaint: Back pain, chronic, persistent, but closer to baseline, eating, ready to discharge for 48hr, but still awaiting bed and insurance approval for 48hr. ROS: 
(bold if positive, if negative) Tolerating minimal PT Tolerating some Diet Objective:  
 
Last 24hrs VS reviewed since prior progress note. Most recent are: 
 
Visit Vitals /70 (BP 1 Location: Right arm, BP Patient Position: At rest) Pulse 82 Temp 97.3 °F (36.3 °C) Resp 17 Ht 5' 1\" (1.549 m) Wt 82.5 kg (181 lb 14.4 oz) SpO2 98% BMI 34.37 kg/m² SpO2 Readings from Last 6 Encounters:  
01/07/19 98% 12/17/18 96% O2 Flow Rate (L/min): 3 l/min Intake/Output Summary (Last 24 hours) at 1/7/2019 1045 Last data filed at 1/7/2019 3944 Gross per 24 hour Intake 129 ml Output 550 ml Net -421 ml Physical Exam: 
 
Gen:  Obese. in no acute distress HEENT:  Pink conjunctivae, PERRL, hearing intact to voice, moist mucous membranes Neck:  Supple, without masses, thyroid non-tender Resp:  No accessory muscle use, distant breath sounds without wheezes rales or rhonchi 
Card:  No murmurs, regular S1, S2 without thrills, bruits or peripheral edema Abd:  Soft, non-tender, non-distended, reduced bowel sounds are present, no mass Lymph:  No cervical or inguinal adenopathy Musc:  No cyanosis or clubbing Skin:  No rashes or ulcers, skin turgor is good Neuro:  Cranial nerves are grossly intact, general pain and motor weakness, follows commands Psych:  Good insight, oriented to person, place and time Telemetry reviewed:   normal sinus rhythm 
__________________________________________________________________ Medications Reviewed: (see below) Medications:  
 
Current Facility-Administered Medications Medication Dose Route Frequency  potassium chloride SR (KLOR-CON 10) tablet 40 mEq  40 mEq Oral Q4H  potassium chloride 10 mEq in 100 ml IVPB  10 mEq IntraVENous Q1H  
 lisinopril (PRINIVIL, ZESTRIL) tablet 20 mg  20 mg Oral DAILY  glucose chewable tablet 16 g  4 Tab Oral PRN  
 dextrose (D50W) injection syrg 12.5-25 g  25-50 mL IntraVENous PRN  
 glucagon (GLUCAGEN) injection 1 mg  1 mg IntraMUSCular PRN  
 insulin glargine (LANTUS) injection 10 Units  10 Units SubCUTAneous QHS  insulin lispro (HUMALOG) injection   SubCUTAneous QID WITH MEALS  
 atenolol (TENORMIN) tablet 100 mg  100 mg Oral DAILY  chlorthalidone (HYGROTEN) tablet 25 mg  25 mg Oral DAILY  DULoxetine (CYMBALTA) capsule 60 mg  60 mg Oral DAILY  sertraline (ZOLOFT) tablet 100 mg  100 mg Oral DAILY  oxyCODONE IR (ROXICODONE) tablet 5 mg  5 mg Oral Q4H PRN  
 levothyroxine (SYNTHROID) tablet 125 mcg  125 mcg Oral ACB  heparin (porcine) injection 5,000 Units  5,000 Units SubCUTAneous Q8H  
 labetalol (NORMODYNE;TRANDATE) injection 10 mg  10 mg IntraVENous Q2H PRN  
 meropenem (MERREM) 500 mg in 0.9% sodium chloride (MBP/ADV) 50 mL  0.5 g IntraVENous Q6H  
 haloperidol lactate (HALDOL) injection 2 mg  2 mg IntraVENous Q6H PRN  
 morphine injection 2 mg  2 mg IntraVENous Q3H PRN  
 acetaminophen (TYLENOL) suppository 650 mg  650 mg Rectal Q6H PRN  
  LORazepam (ATIVAN) injection 1 mg  1 mg IntraVENous Q5MIN PRN  
 sodium chloride (NS) flush 5-10 mL  5-10 mL IntraVENous PRN Lab Data Reviewed: (see below) Lab Review:  
 
Recent Labs 01/07/19 
4929 01/05/19 
3751 WBC 6.0 5.5 HGB 8.7* 9.0*  
HCT 27.4* 28.2*  
 290 Recent Labs 01/07/19 
7482 01/05/19 
8696 * 143  
K 2.9* 3.0*  
 108 CO2 32 29 * 140* BUN 16 14 CREA 0.81 0.67 CA 8.4* 8.3*  
MG 1.7 1.9 PHOS  --  3.0 ALB  --  1.7* TBILI  --  0.3 SGOT  --  28 ALT  --  27 Lab Results Component Value Date/Time Glucose (POC) 129 (H) 01/07/2019 07:38 AM  
 Glucose (POC) 203 (H) 01/06/2019 09:20 PM  
 Glucose (POC) 166 (H) 01/06/2019 05:45 PM  
 Glucose (POC) 169 (H) 01/05/2019 03:55 PM  
 Glucose (POC) 145 (H) 01/05/2019 11:36 AM  
 
No results for input(s): PH, PCO2, PO2, HCO3, FIO2 in the last 72 hours. No results for input(s): INR in the last 72 hours. No lab exists for component: INREXT, INREXT All Micro Results Procedure Component Value Units Date/Time CULTURE, BLOOD FOR FUNGUS [[id number]] Collected:  12/31/18 2233 Order Status:  Completed Specimen:  Blood Updated:  01/07/19 0621 Special Requests: HOLD 21 DAYS FOR FUNGUS Culture result: NO GROWTH 7 DAYS     
 CULTURE, BLOOD [[id number]] Collected:  12/31/18 2054 Order Status:  Completed Specimen:  Blood Updated:  01/06/19 2268 Special Requests: NO SPECIAL REQUESTS Culture result: NO GROWTH 6 DAYS     
 CULTURE, BLOOD [[id number]] Collected:  12/31/18 2054 Order Status:  Completed Specimen:  Blood Updated:  01/06/19 9824 Special Requests: NO SPECIAL REQUESTS Culture result: NO GROWTH 6 DAYS     
 CULTURE, URINE [[id number]]  (Abnormal) Collected:  12/31/18 2140 Order Status:  Completed Specimen:  Cath Urine Updated:  01/04/19 1038 Special Requests: NO SPECIAL REQUESTS [place] Count --     
  >100,000 COLONIES/mL Culture result: CANDIDA PARAPSILOSIS     
 RESPIRATORY PANEL,PCR,NASOPHARYNGEAL [[id number]] Collected:  01/03/19 1557 Order Status:  Completed Specimen:  Nasopharyngeal Updated:  01/03/19 2139 Adenovirus NOT DETECTED Coronavirus 229E NOT DETECTED Coronavirus HKU1 NOT DETECTED Coronavirus CVNL63 NOT DETECTED Coronavirus OC43 NOT DETECTED Metapneumovirus NOT DETECTED Rhinovirus and Enterovirus NOT DETECTED Influenza A NOT DETECTED Influenza A, subtype H1 NOT DETECTED Influenza A, subtype H3 NOT DETECTED INFLUENZA A H1N1 PCR NOT DETECTED Influenza B NOT DETECTED Parainfluenza 1 NOT DETECTED Parainfluenza 2 NOT DETECTED Parainfluenza 3 NOT DETECTED Parainfluenza virus 4 NOT DETECTED     
  RSV by PCR NOT DETECTED Bordetella pertussis - PCR NOT DETECTED Chlamydophila pneumoniae DNA, QL, PCR NOT DETECTED Mycoplasma pneumoniae DNA, QL, PCR NOT DETECTED     
 RESPIRATORY PANEL,PCR,NASOPHARYNGEAL [[id number]] Collected:  12/31/18 2233 Order Status:  Completed Specimen:  Nasopharyngeal Updated:  01/01/19 1352 Adenovirus NOT DETECTED Coronavirus 229E NOT DETECTED Coronavirus HKU1 NOT DETECTED Coronavirus CVNL63 NOT DETECTED Coronavirus OC43 NOT DETECTED Metapneumovirus NOT DETECTED Rhinovirus and Enterovirus NOT DETECTED Influenza A NOT DETECTED Influenza A, subtype H1 NOT DETECTED Influenza A, subtype H3 NOT DETECTED INFLUENZA A H1N1 PCR NOT DETECTED Influenza B NOT DETECTED Parainfluenza 1 NOT DETECTED Parainfluenza 2 NOT DETECTED Parainfluenza 3 NOT DETECTED Parainfluenza virus 4 NOT DETECTED     
  RSV by PCR NOT DETECTED Bordetella pertussis - PCR NOT DETECTED   Chlamydophila pneumoniae DNA, QL, PCR NOT DETECTED     
 Mycoplasma pneumoniae DNA, QL, PCR NOT DETECTED     
 CULTURE, FUNGUS [[id number]] Collected:  12/31/18 2233 Order Status:  Canceled Specimen: Body Fluid URINE CULTURE HOLD SAMPLE [[id number]] Collected:  12/31/18 2140 Order Status:  Completed Specimen:  Urine from Serum Updated:  12/31/18 2150 Urine culture hold URINE ON HOLD IN MICROBIOLOGY DEPT FOR 3 DAYS. IF UNPRESERVED URINE IS SUBMITTED, IT CANNOT BE USED FOR ADDITIONAL TESTING AFTER 24 HRS, RECOLLECTION WILL BE REQUIRED. I have reviewed notes of prior 24hr. Other pertinent lab: none Total time spent with patient: 35 Minutes Care Plan discussed with: Patient, Family, Care Manager, Nursing Staff, Consultant/Specialist and >50% of time spent in counseling and coordination of care Discussed:  Care Plan Prophylaxis:  H2B/PPI Disposition:  SNF/LTC 
        
___________________________________________________ Attending Physician: Aleksander Noe MD

## 2019-01-07 NOTE — PROGRESS NOTES
1355: 
Updated clinicals sent in [address] to [facility]. They have accepted her and started auth. Updated notes sent. A referral was sent in [address] to [facility], placing pt on a will call for today-tomorrow. Pt and  notified of snf acceptance and transport arrangements. JOHNATHAN Charles 
 
6806: 
[facility] declined as they have no beds available at this time. No response yet from [facility]. JOHNATHAN Charles 
 
 Note: No response from [facility]. A call was placed and a message was left to reply to referral status. [facility] was called. They had not rec'd a referral.  Referral was faxed to them this morning. Awaiting replies.  
JOHNATHAN Charles

## 2019-01-07 NOTE — PROGRESS NOTES
Problem: Mobility Impaired (Adult and Pediatric) Goal: *Therapy Goal (Edit Goal, Insert Text) Physical Therapy Goals Initiated 1/4/2019 1. Patient will move from supine to sit and sit to supine  and roll side to side in bed with moderate assistance x 2  within 7 day(s). 2.  Patient will transfer from bed to chair and chair to bed with maximal assistance x 2 using the least restrictive device within 7 day(s). 3.  Patient will tolerate sitting with bed in chair position for 2-3 hours within 7 days. 4.  Patient will sit at the edge of bed for 5 min with VSS with maximum assistance within 7 days. physical Therapy TREATMENT Patient: Norberto Varela (11 y.o. female) Date: 1/7/2019 Diagnosis: Sepsis ([address] Sepsis ([facility] [address] Precautions: Fall, Seizure(yadira lift transfers) Chart, physical therapy assessment, plan of care and goals were reviewed. ASSESSMENT: 
Pt received in bed,  at bedside, agreeable to participate with physical therapy. Max A x2 for supine>sitting EOB. Verbal cues for sequencing, limited ROM in RUE. initially demonstrates posterior lean upon sitting improved with verbal and tactile cues, tolerate 7min sitting EOB without support. Performed seated fwd lean to facilitate increased WB thru BLE. Required max A x2 to scoot toward [address] with verbal cues for sequencing. Pt fatigued with session and requested to return to supine with Max A x2. Performed rolling bed mobility with Max A x1. Pt and spouse pleased with progress. Progression toward goals: 
[]    Improving appropriately and progressing toward goals [x]    Improving slowly and progressing toward goals 
[]    Not making progress toward goals and plan of care will be adjusted PLAN: 
Patient continues to benefit from skilled intervention to address the above impairments. Continue treatment per established plan of care. Discharge Recommendations:  Dov Parham Further Equipment Recommendations for Discharge:  Defer to rehab SUBJECTIVE:  
Patient stated  it is hard for me.  OBJECTIVE DATA SUMMARY:  
Critical Behavior: 
Neurologic State: Alert, Eyes open spontaneously, Confused Orientation Level: Oriented to person, Oriented to place, Disoriented to time, Disoriented to situation Cognition: Follows commands Safety/Judgement: Awareness of environment, Decreased awareness of need for assistance, Decreased awareness of need for safety, Decreased insight into deficits, Fall prevention Functional Mobility Training: 
Bed Mobility: 
Rolling: Maximum assistance Supine to Sit: Maximum assistance;Assist x2 Sit to Supine: Maximum assistance;Assist x2 Scooting: Maximum assistance;Assist x2; Additional time Transfers: 
  
  
     
  
     
  
  
  
  
Balance: 
Sitting: Impaired; Without support Sitting - Static: Fair (occasional) Sitting - Dynamic: Fair (occasional)Ambulation/Gait Training: 
  
  
  
  
  
  
  
  
  
  
  
  
  
  
  
  
  
  
Stairs: 
  
  
   
 
Neuro Re-Education: 
 
Therapeutic Exercises: Modified pelvic bridging, knee extension, SLR Pain: 
Pain Scale 1: Numeric (0 - 10) Pain Intensity 1: 5 Pain Location 1: Back Pain Orientation 1: Posterior Pain Description 1: Constant Pain Intervention(s) 1: Medication (see MAR) Activity Tolerance:  
good Please refer to the flowsheet for vital signs taken during this treatment. After treatment:  
[]    Patient left in no apparent distress sitting up in chair 
[x]    Patient left in no apparent distress in bed 
[x]    Call bell left within reach [x]    Nursing notified 
[x]    Caregiver present 
[]    Bed alarm activated COMMUNICATION/COLLABORATION:  
The patients plan of care was discussed with: Registered Nurse Dominique Flores Time Calculation: 34 mins

## 2019-01-07 NOTE — DISCHARGE SUMMARY
Physician Discharge Summary     Patient ID:  Gustavo Sorensen  [id number]  4 y.o.  1955    Admit date: 12/31/2018    Discharge date and time: 1/8/2019    Admission Diagnoses: Sepsis [facility])    Discharge Diagnoses:    Principal Diagnosis   Sepsis ([facility]                                             Other Diagnoses    HTN (hypertension) ()    Chronic back pain ()    Diabetes mellitus, type 2 ([facility] ()    Fever (12/31/2018)    Sinus tachycardia (12/31/2018)    Acute metabolic encephalopathy (83/00/3457)    Discitis of cervical region (12/31/2018)    Urinary retention (12/31/2018)    Cellulitis of right elbow (12/31/2018)    Anemia (12/31/2018)    Hypokalemia (12/31/2018)    Hypernatremia (12/31/2018)     Hospital Course:   C-spine discitis - Dx 4 weeks ago. MRI last month showed evidence of discitis at C5-C6 and C6-C7, with abnormal enhancement of the vertebral bodies. There was phlegmon, but no drainable abscess. Repeat MRI shows \"Persistent osteomyelitis of C5, C6, and C7 is similar to last month. Ventral epidural phlegmon is decreased. Anterior paraspinal phlegmon is slightly decreased. No drainable abscess. Mild cord indentation at C5-C6 is unchanged. \" Evaluated by ortho and ID. Still needs to complete her 6 weeks of IV Meropenum, was most the way through that. Pain control with prn oxycodone. Weaned off oxycontin. She has been ready for discharge for 72hr, just awaiting SNF bed.     HTN - BP up consistently, after she has recovered. Resume atenolol, increase dose to 100, added lisinopril, now up to 40, and resumed chlorthalidone. Pain and anxiety are driving BP     Anemia - POA, chronic, worsening with hydration, recent Fe studies consistent with chronic dz. Monitor prn     DM type 2 w/ hypoglycemia - Diabetic diet and counseling.  SSI per protocol.  Continue home Lantus. Recent A1C 7.2%.      Urinary retention - Recent Dx weeks ago. Asha Lau been with continuous joyce and flomax.  Had scheduled urology follow up 1/8 for voiding trial, but I tried joyce discontinuation on 1/5, but she failed that already by 1/6 based on PVR scans. Consulted urology. They want outpatient office follow up for urodynamic studies.     Hypothyroidism - Continue synthroid     Hyperlipidemia - Continue simvastatin     R elbow cellulitis / bursitis / acute pain / Weakness - Noted on last admit, s/p joint aspiration by IR on 12/6. On meropenem. Will need to return to rehab.      Acute metabolic encephalopathy - POA, recurrent, unclear etiology, likely due to sepsis, and now resolving and near baseline.  On recent admit had unremarkable head CT, MRI brain. Repeat MRI also unremarkable. Recent TSH, B12/folate, ammonia, HSV, EEG, RPR, West Nile, HIV, Lyme IgM negative. Suspect dementia.  Continue sertraline, resume duloxetine, ASA.  Resumed oxycodone     Sepsis / Fever / Sinus tachycardia - POA, unclear etiology.  Imaging unremarkable.  DDx fungemia, viral, bacterial.  Continue meropenum. Consulted ID. They added vancomycin, but stopped that today. Negative fungal and bacterial cx.  Negative resp viral panel. Urine cx with fungus, most likely colonization, not active infection. Lactate normal and no end organ damage, and so NOT severe sepsis.  Fever resolved.     Hypokalemia / Hypernatremia - Cr stable. Hydrate and follow. Replete K as needed.        PCP: Jeremiah Zuniga MD    Consults: Pulmonary/Intensive care, ID and Urology    Significant Diagnostic Studies: See Hospital Course    Discharged to SNF in improved condition. Discharge Exam:  BP (!) 184/99 (BP 1 Location: Right arm, BP Patient Position: At rest)   Pulse 92   Temp 99 °F (37.2 °C)   Resp 18   Ht 5' 1\" (1.549 m)   Wt 82.5 kg (181 lb 14.4 oz)   SpO2 94%   BMI 34.37 kg/m²      Gen:  Obese.  in no acute distress  HEENT:  Pink conjunctivae, PERRL, hearing intact to voice, moist mucous membranes  Neck:  Supple, without masses, thyroid non-tender  Resp:  No accessory muscle use, distant breath sounds without wheezes rales or rhonchi  Card:  No murmurs, regular S1, S2 without thrills, bruits or peripheral edema  Abd:  Soft, non-tender, non-distended, reduced bowel sounds are present, no mass  Lymph:  No cervical or inguinal adenopathy  Musc:  No cyanosis or clubbing  Skin:  No rashes or ulcers, skin turgor is good  Neuro:  Cranial nerves are grossly intact, general pain and motor weakness, follows commands   Psych:  Good insight, oriented to person, place and time    Patient Instructions:   Current Discharge Medication List      START taking these medications    Details   lisinopril (PRINIVIL, ZESTRIL) 40 mg tablet Take 1 Tab by mouth daily for 30 days. Qty: 30 Tab, Refills: 0      potassium chloride SR (K-TAB) 20 mEq tablet Take 1 Tab by mouth daily for 30 days. Qty: 30 Tab, Refills: 0         CONTINUE these medications which have CHANGED    Details   atenolol (TENORMIN) 100 mg tablet Take 1 Tab by mouth daily for 30 days. Qty: 30 Tab, Refills: 0      oxyCODONE IR (ROXICODONE) 5 mg immediate release tablet Take 1 Tab by mouth every four (4) hours. Max Daily Amount: 30 mg.  Qty: 20 Tab, Refills: 0    Associated Diagnoses: Chronic midline low back pain without sciatica         CONTINUE these medications which have NOT CHANGED    Details   cyclobenzaprine (FLEXERIL) 10 mg tablet Take 10 mg by mouth nightly. DULoxetine (CYMBALTA) 60 mg capsule Take 60 mg by mouth daily. ibuprofen (MOTRIN) 400 mg tablet Take 400 mg by mouth three (3) times daily. 0.9% sodium chloride solp 100 mL with meropenem 1 gram solr 2 g 2 g by IntraVENous route every twelve (12) hours. ondansetron hcl (ZOFRAN) 4 mg tablet Take 4 mg by mouth every six (6) hours as needed for Nausea. senna (SENNA) 8.6 mg tablet Take 1 Tab by mouth two (2) times a day. insulin glargine (LANTUS) 100 unit/mL injection 10 Units by SubCUTAneous route daily.   Qty: 1 Vial, Refills: 0      chlorthalidone (HYGROTEN) 25 mg tablet Take 1 Tab by mouth daily. Qty: 30 Tab, Refills: 0      levothyroxine (SYNTHROID) 125 mcg tablet Take 125 mcg by mouth Daily (before breakfast). sertraline (ZOLOFT) 100 mg tablet Take 100 mg by mouth daily. simvastatin (ZOCOR) 40 mg tablet Take 40 mg by mouth nightly. aspirin delayed-release 81 mg tablet Take 81 mg by mouth two (2) times a day. STOP taking these medications       oxyCODONE ER (XTAMPZA ER) 18 mg ER capsule Comments:   Reason for Stopping:             Activity: Activity as tolerated and PT/OT Eval and Treat  Diet: Cardiac Diet and Diabetic Diet  Wound Care: Reinforce dressing PRN, Routine catheter care and As directed    Follow-up with your PCP in a few weeks.   Follow-up tests/labs - Urology follow up as outpatient    Signed:  Paula Hanna MD  1/8/2019  2:49 PM

## 2019-01-07 NOTE — PROGRESS NOTES
Bedside and Verbal shift change report given to Iraj Rodriguez RN  by Aditya Villanueva RN. Report included the following information SBAR, Kardex and MAR.

## 2019-01-08 LAB
GLUCOSE BLD STRIP.AUTO-MCNC: 111 MG/DL (ref 65–100)
GLUCOSE BLD STRIP.AUTO-MCNC: 156 MG/DL (ref 65–100)
GLUCOSE BLD STRIP.AUTO-MCNC: 161 MG/DL (ref 65–100)
GLUCOSE BLD STRIP.AUTO-MCNC: 165 MG/DL (ref 65–100)
SERVICE CMNT-IMP: ABNORMAL

## 2019-01-08 PROCEDURE — 74011250637 HC RX REV CODE- 250/637: Performed by: INTERNAL MEDICINE

## 2019-01-08 PROCEDURE — 94760 N-INVAS EAR/PLS OXIMETRY 1: CPT

## 2019-01-08 PROCEDURE — 74011636637 HC RX REV CODE- 636/637: Performed by: INTERNAL MEDICINE

## 2019-01-08 PROCEDURE — 65660000000 HC RM CCU STEPDOWN

## 2019-01-08 PROCEDURE — 74011250636 HC RX REV CODE- 250/636: Performed by: INTERNAL MEDICINE

## 2019-01-08 PROCEDURE — 74011000250 HC RX REV CODE- 250: Performed by: INTERNAL MEDICINE

## 2019-01-08 PROCEDURE — 74011000258 HC RX REV CODE- 258: Performed by: INTERNAL MEDICINE

## 2019-01-08 PROCEDURE — 82962 GLUCOSE BLOOD TEST: CPT

## 2019-01-08 RX ORDER — LISINOPRIL 20 MG/1
40 TABLET ORAL DAILY
Status: DISCONTINUED | OUTPATIENT
Start: 2019-01-08 | End: 2019-01-10 | Stop reason: HOSPADM

## 2019-01-08 RX ORDER — LEVOFLOXACIN 500 MG/1
500 TABLET, FILM COATED ORAL EVERY 24 HOURS
Status: DISCONTINUED | OUTPATIENT
Start: 2019-01-08 | End: 2019-01-09

## 2019-01-08 RX ORDER — LISINOPRIL 40 MG/1
40 TABLET ORAL DAILY
Qty: 30 TAB | Refills: 0 | Status: SHIPPED | OUTPATIENT
Start: 2019-01-08 | End: 2019-02-07

## 2019-01-08 RX ADMIN — SERTRALINE HYDROCHLORIDE 100 MG: 50 TABLET ORAL at 08:47

## 2019-01-08 RX ADMIN — HEPARIN SODIUM 5000 UNITS: 5000 INJECTION INTRAVENOUS; SUBCUTANEOUS at 13:27

## 2019-01-08 RX ADMIN — LEVOTHYROXINE SODIUM 125 MCG: 25 TABLET ORAL at 06:19

## 2019-01-08 RX ADMIN — MEROPENEM 500 MG: 500 INJECTION, POWDER, FOR SOLUTION INTRAVENOUS at 06:20

## 2019-01-08 RX ADMIN — LISINOPRIL 40 MG: 20 TABLET ORAL at 08:47

## 2019-01-08 RX ADMIN — INSULIN LISPRO 2 UNITS: 100 INJECTION, SOLUTION INTRAVENOUS; SUBCUTANEOUS at 18:15

## 2019-01-08 RX ADMIN — ATENOLOL 100 MG: 50 TABLET ORAL at 08:47

## 2019-01-08 RX ADMIN — INSULIN LISPRO 2 UNITS: 100 INJECTION, SOLUTION INTRAVENOUS; SUBCUTANEOUS at 08:48

## 2019-01-08 RX ADMIN — OXYCODONE HYDROCHLORIDE 5 MG: 5 TABLET ORAL at 06:00

## 2019-01-08 RX ADMIN — MEROPENEM 500 MG: 500 INJECTION, POWDER, FOR SOLUTION INTRAVENOUS at 18:15

## 2019-01-08 RX ADMIN — MEROPENEM 500 MG: 500 INJECTION, POWDER, FOR SOLUTION INTRAVENOUS at 12:15

## 2019-01-08 RX ADMIN — LEVOFLOXACIN 500 MG: 500 TABLET, FILM COATED ORAL at 12:14

## 2019-01-08 RX ADMIN — CHLORTHALIDONE 25 MG: 25 TABLET ORAL at 08:47

## 2019-01-08 RX ADMIN — Medication 10 ML: at 13:27

## 2019-01-08 RX ADMIN — OXYCODONE HYDROCHLORIDE 5 MG: 5 TABLET ORAL at 12:14

## 2019-01-08 RX ADMIN — OXYCODONE HYDROCHLORIDE 5 MG: 5 TABLET ORAL at 18:15

## 2019-01-08 RX ADMIN — LABETALOL HYDROCHLORIDE 10 MG: 5 INJECTION INTRAVENOUS at 15:43

## 2019-01-08 RX ADMIN — HEPARIN SODIUM 5000 UNITS: 5000 INJECTION INTRAVENOUS; SUBCUTANEOUS at 06:20

## 2019-01-08 RX ADMIN — LABETALOL HYDROCHLORIDE 10 MG: 5 INJECTION INTRAVENOUS at 12:15

## 2019-01-08 RX ADMIN — MEROPENEM 500 MG: 500 INJECTION, POWDER, FOR SOLUTION INTRAVENOUS at 01:01

## 2019-01-08 RX ADMIN — DULOXETINE 60 MG: 30 CAPSULE, DELAYED RELEASE ORAL at 08:47

## 2019-01-08 RX ADMIN — MORPHINE SULFATE 2 MG: 4 INJECTION, SOLUTION INTRAMUSCULAR; INTRAVENOUS at 08:56

## 2019-01-08 NOTE — PROGRESS NOTES
Bedside shift change report given to Iva House (oncoming nurse) by Shaquille Lantigua (offgoing nurse). Report included the following information SBAR, Kardex, MAR and Recent Results.

## 2019-01-08 NOTE — PROGRESS NOTES
Post Discharge PICC and Antibiotic Orders 1. Diagnosis:  GBS bacteremia 2. Antibiotic:   Meropenem 2gm IV q 8 hours through 2/1/19 3. Routine PICC/ Thee/ Portacath Care including PRN catheter flow management 4. Weekly labs: 
 _x__CBC/diff/platelets _x__BUN/Creatinine 
 ___CPK _x__AST/TotalBilirubin/AlkalinePhosphatase _x__CRP 
 ___Gentamicin level  ___gentamicin peak/trough Trough Vancomycin level ____goal 10-15 ___goal 15-20 5. Fax lab to [phone]  Call Critical Lab Values to [phone] 6. May send to IR for line evaluation or replacement [phone] 7. Home Health to pull PIC line at end of therapy or send to IR for Tehe removal 
8. Allergies:  No Known Allergies 9. Pharmacy Consult for Vancomycin dosing/gentamicin dosing.  
 
 
Titus Begum,

## 2019-01-08 NOTE — PROGRESS NOTES
[facility] Medical Progress Note NAME: Diogenes Vivas :  1955 MRM:  [id number] Date/Time: 2019  8:13 AM 
 
  
Assessment and Plan:  
 
C-spine discitis - Dx 4 weeks ago. MRI last month showed evidence of discitis at C5-C6 and C6-C7, with abnormal enhancement of the vertebral bodies. There was phlegmon, but no drainable abscess. Repeat MRI shows \"Persistent osteomyelitis of C5, C6, and C7 is similar to last month. Ventral epidural phlegmon is decreased. Anterior paraspinal phlegmon is slightly decreased. No drainable abscess. Mild cord indentation at C5-C6 is unchanged. \" Evaluated by ortho and ID. Still needs to complete her 6 weeks of IV Meropenum, was most the way through that. Pain control with prn oxycodone. Weaned off oxycontin. She has been ready for discharge for 72hr, just awaiting SNF bed. 
  
HTN - BP up consistent after she has recovered. Resume atenolol, increase dose to 100, added lisinopril, now up to 40, and resumed chlorthalidone. Pain and anxiety are driving BP 
  
Anemia - POA, chronic, worsening with hydration, recent Fe studies consistent with chronic dz. Monitor prn 
  
DM type 2 w/ hypoglycemia - Diabetic diet and counseling. SSI per protocol. Continue home Lantus. Recent A1C 7.2%.  
  
Urinary retention - Recent Dx weeks ago. Has been with continuous joyce and flomax. Had scheduled urology follow up  for voiding trial, but I tried joyce discontinuation on , but she failed that already by  based on PVR scans. Consulted urology. They want outpatient office follow up for urodynamic studies. 
  
Hypothyroidism - Continue synthroid 
  
Hyperlipidemia - Continue simvastatin R elbow cellulitis / bursitis / acute pain / Weakness - Noted on last admit, s/p joint aspiration by IR on . On meropenem.  Will need to return to rehab.  
  
Acute metabolic encephalopathy - POA, recurrent, unclear etiology, likely due to sepsis, and now resolving and near baseline. On recent admit had unremarkable head CT, MRI brain. Repeat MRI also unremarkable. Recent TSH, B12/folate, ammonia, HSV, EEG, RPR, West Nile, HIV, Lyme IgM negative. Suspect dementia. Continue sertraline, resume duloxetine, ASA. Resumed oxycodone 
  
Sepsis / Fever / Sinus tachycardia - POA, unclear etiology. Imaging unremarkable. DDx fungemia, viral, bacterial.  Continue meropenum. Consulted ID. They added vancomycin, but stopped that today. Negative fungal and bacterial cx. Negative resp viral panel. Urine cx with fungus, most likely colonization, not active infection. Lactate normal and no end organ damage, and so NOT severe sepsis. Fever resolved. 
  
Hypokalemia / Hypernatremia - Cr stable. Hydrate and follow. Replete K as needed. 
  
  
Subjective: Chief Complaint: Back pain, chronic, persistent, but closer to baseline, eating. She has long been ready to discharge, but still awaiting bed and insurance approval for 72hr. ROS: 
(bold if positive, if negative) Tolerating minimal PT Tolerating some Diet Objective:  
 
Last 24hrs VS reviewed since prior progress note. Most recent are: 
 
Visit Vitals BP (!) 184/99 (BP 1 Location: Right arm, BP Patient Position: At rest) Pulse 92 Temp 99 °F (37.2 °C) Resp 18 Ht 5' 1\" (1.549 m) Wt 82.5 kg (181 lb 14.4 oz) SpO2 94% BMI 34.37 kg/m² SpO2 Readings from Last 6 Encounters:  
01/08/19 94% 12/17/18 96% O2 Flow Rate (L/min): 2 l/min Intake/Output Summary (Last 24 hours) at 1/8/2019 1902 Last data filed at 1/8/2019 6190 Gross per 24 hour Intake 1059 ml Output 2225 ml Net -1166 ml Physical Exam: 
 
Gen:  Obese. in no acute distress HEENT:  Pink conjunctivae, PERRL, hearing intact to voice, moist mucous membranes Neck:  Supple, without masses, thyroid non-tender Resp:  No accessory muscle use, distant breath sounds without wheezes rales or rhonchi Card:  No murmurs, regular S1, S2 without thrills, bruits or peripheral edema Abd:  Soft, non-tender, non-distended, reduced bowel sounds are present, no mass Lymph:  No cervical or inguinal adenopathy Musc:  No cyanosis or clubbing Skin:  No rashes or ulcers, skin turgor is good Neuro:  Cranial nerves are grossly intact, general pain and motor weakness, follows commands Psych:  Good insight, oriented to person, place and time Telemetry reviewed:   normal sinus rhythm 
__________________________________________________________________ Medications Reviewed: (see below) Medications:  
 
Current Facility-Administered Medications Medication Dose Route Frequency  lisinopril (PRINIVIL, ZESTRIL) tablet 40 mg  40 mg Oral DAILY  glucose chewable tablet 16 g  4 Tab Oral PRN  
 dextrose (D50W) injection syrg 12.5-25 g  25-50 mL IntraVENous PRN  
 glucagon (GLUCAGEN) injection 1 mg  1 mg IntraMUSCular PRN  
 insulin glargine (LANTUS) injection 10 Units  10 Units SubCUTAneous QHS  insulin lispro (HUMALOG) injection   SubCUTAneous QID WITH MEALS  
 atenolol (TENORMIN) tablet 100 mg  100 mg Oral DAILY  chlorthalidone (HYGROTEN) tablet 25 mg  25 mg Oral DAILY  DULoxetine (CYMBALTA) capsule 60 mg  60 mg Oral DAILY  sertraline (ZOLOFT) tablet 100 mg  100 mg Oral DAILY  oxyCODONE IR (ROXICODONE) tablet 5 mg  5 mg Oral Q4H PRN  
 levothyroxine (SYNTHROID) tablet 125 mcg  125 mcg Oral ACB  heparin (porcine) injection 5,000 Units  5,000 Units SubCUTAneous Q8H  
 labetalol (NORMODYNE;TRANDATE) injection 10 mg  10 mg IntraVENous Q2H PRN  
 meropenem (MERREM) 500 mg in 0.9% sodium chloride (MBP/ADV) 50 mL  0.5 g IntraVENous Q6H  
 haloperidol lactate (HALDOL) injection 2 mg  2 mg IntraVENous Q6H PRN  
 morphine injection 2 mg  2 mg IntraVENous Q3H PRN  
 acetaminophen (TYLENOL) suppository 650 mg  650 mg Rectal Q6H PRN  
 LORazepam (ATIVAN) injection 1 mg  1 mg IntraVENous Q5MIN PRN  
  sodium chloride (NS) flush 5-10 mL  5-10 mL IntraVENous PRN Lab Data Reviewed: (see below) Lab Review:  
 
Recent Labs 01/07/19 
6739 WBC 6.0 HGB 8.7* HCT 27.4*  
 Recent Labs 01/07/19 1953 *  
K 2.9*  
 CO2 32 * BUN 16  
CREA 0.81 CA 8.4* MG 1.7 Lab Results Component Value Date/Time Glucose (POC) 156 (H) 01/08/2019 07:11 AM  
 Glucose (POC) 240 (H) 01/07/2019 09:05 PM  
 Glucose (POC) 202 (H) 01/07/2019 04:08 PM  
 Glucose (POC) 124 (H) 01/07/2019 11:40 AM  
 Glucose (POC) 129 (H) 01/07/2019 07:38 AM  
 
No results for input(s): PH, PCO2, PO2, HCO3, FIO2 in the last 72 hours. No results for input(s): INR in the last 72 hours. No lab exists for component: INREXT, INREXT All Micro Results Procedure Component Value Units Date/Time CULTURE, BLOOD FOR FUNGUS [[id number]] Collected:  12/31/18 2233 Order Status:  Completed Specimen:  Blood Updated:  01/08/19 9986 Special Requests: HOLD 21 DAYS FOR FUNGUS Culture result: NO GROWTH 8 DAYS     
 CULTURE, BLOOD [[id number]] Collected:  12/31/18 2054 Order Status:  Completed Specimen:  Blood Updated:  01/06/19 1240 Special Requests: NO SPECIAL REQUESTS Culture result: NO GROWTH 6 DAYS     
 CULTURE, BLOOD [[id number]] Collected:  12/31/18 2054 Order Status:  Completed Specimen:  Blood Updated:  01/06/19 1910 Special Requests: NO SPECIAL REQUESTS Culture result: NO GROWTH 6 DAYS     
 CULTURE, URINE [[id number]]  (Abnormal) Collected:  12/31/18 2140 Order Status:  Completed Specimen:  Cath Urine Updated:  01/04/19 1038 Special Requests: NO SPECIAL REQUESTS [place] Count --     
  >100,000 COLONIES/mL Culture result: CANDIDA PARAPSILOSIS     
 RESPIRATORY PANEL,PCR,NASOPHARYNGEAL [[id number]] Collected:  01/03/19 1557 Order Status:  Completed Specimen:  Nasopharyngeal Updated:  01/03/19 2139   Adenovirus NOT DETECTED     
 Coronavirus 229E NOT DETECTED Coronavirus HKU1 NOT DETECTED Coronavirus CVNL63 NOT DETECTED Coronavirus OC43 NOT DETECTED Metapneumovirus NOT DETECTED Rhinovirus and Enterovirus NOT DETECTED Influenza A NOT DETECTED Influenza A, subtype H1 NOT DETECTED Influenza A, subtype H3 NOT DETECTED INFLUENZA A H1N1 PCR NOT DETECTED Influenza B NOT DETECTED Parainfluenza 1 NOT DETECTED Parainfluenza 2 NOT DETECTED Parainfluenza 3 NOT DETECTED Parainfluenza virus 4 NOT DETECTED     
  RSV by PCR NOT DETECTED Bordetella pertussis - PCR NOT DETECTED Chlamydophila pneumoniae DNA, QL, PCR NOT DETECTED Mycoplasma pneumoniae DNA, QL, PCR NOT DETECTED     
 RESPIRATORY PANEL,PCR,NASOPHARYNGEAL [[id number]] Collected:  12/31/18 2233 Order Status:  Completed Specimen:  Nasopharyngeal Updated:  01/01/19 1352 Adenovirus NOT DETECTED Coronavirus 229E NOT DETECTED Coronavirus HKU1 NOT DETECTED Coronavirus CVNL63 NOT DETECTED Coronavirus OC43 NOT DETECTED Metapneumovirus NOT DETECTED Rhinovirus and Enterovirus NOT DETECTED Influenza A NOT DETECTED Influenza A, subtype H1 NOT DETECTED Influenza A, subtype H3 NOT DETECTED INFLUENZA A H1N1 PCR NOT DETECTED Influenza B NOT DETECTED Parainfluenza 1 NOT DETECTED Parainfluenza 2 NOT DETECTED Parainfluenza 3 NOT DETECTED Parainfluenza virus 4 NOT DETECTED     
  RSV by PCR NOT DETECTED Bordetella pertussis - PCR NOT DETECTED Chlamydophila pneumoniae DNA, QL, PCR NOT DETECTED Mycoplasma pneumoniae DNA, QL, PCR NOT DETECTED     
 CULTURE, FUNGUS [[id number]] Collected:  12/31/18 2233 Order Status:  Canceled Specimen: Body Fluid URINE CULTURE HOLD SAMPLE [[id number]] Collected:  12/31/18 2140 Order Status:  Completed Specimen:  Urine from Serum Updated:  12/31/18 0023 Urine culture hold URINE ON HOLD IN MICROBIOLOGY DEPT FOR 3 DAYS. IF UNPRESERVED URINE IS SUBMITTED, IT CANNOT BE USED FOR ADDITIONAL TESTING AFTER 24 HRS, RECOLLECTION WILL BE REQUIRED. I have reviewed notes of prior 24hr. Other pertinent lab: none Total time spent with patient: 35 Minutes Care Plan discussed with: Patient, Family, Care Manager, Nursing Staff, Consultant/Specialist and >50% of time spent in counseling and coordination of care Discussed:  Care Plan Prophylaxis:  H2B/PPI Disposition:  SNF/LTC 
        
___________________________________________________ Attending Physician: Kiana Metcalf MD

## 2019-01-08 NOTE — PROGRESS NOTES
CM Note: Auth obtained to go to [facility]. Pt to be d/c'd tomorrow. Snf was notified. Called [facility] and extended her will call until tomorrow.  
JOHNATHAN Charles

## 2019-01-08 NOTE — PROGRESS NOTES
Nutrition Assessment: 
 
RECOMMENDATIONS/INTERVENTION(S):  
1. Continue Consistent CHO 2. No ONS needed at this time 3. Will monitor intake, wt, wound healing and plan of care. ASSESSMENT:  
1/8/19: Follow up. Pt appetite good and improving over last few days. Likely to d/c today. Weight currently 181 lbs. BG elevated with intakes improving. 156, 240, 202, 124 mg/dL. Last BM 1/6/19. Pain improved. Monitor POC and if appetite wanes, add Ensure pudding. Na 146, K+ 2.6. 
 
1/3: Pt screened for ICU length of stay x 2 days. Admitted with sepsis, tonic-clonic seizures. MRI + for C spine discitis, pt is half way through 6 wks of abx treatment. Pt confused with metabolic encephalopathy.  reports decreased PO for a few days prior to this admission but eating well before that. He did not think she had lost wt. Per wt history, wt back to 179 lbs from 1 month ago. Pt was admitted in early December as well with intake <25%. Denied any food allergies or intolerances. Pt stated she did not like the Glucerna at her last admission. Discussed other supplements options such as fortified pudding or ice cream, or a snack with peanut butter. Pt stated \"it was fine. \"  thought she liked the fruit last time she was admitted. Biggest complaint at this time is pain control. BG controlled. Lytes WNL. Trace urinary ketones. Diet Order: CCD 
% Eaten:   
Patient Vitals for the past 72 hrs: 
 % Diet Eaten 01/07/19 1830 100 % 01/07/19 1422 95 % 01/05/19 2207 30 % Pertinent Medications: [x] Reviewed Chemistries: [x] Reviewed Anthropometrics: Height: 5' 1\" (154.9 cm) Weight: 82.5 kg (181 lb 14.4 oz) IBW (%IBW):   ( ) UBW (%UBW):   (  %) BMI: Body mass index is 34.37 kg/m². This BMI is indicative of: 
 [] Underweight    [] Normal    [] Overweight    [x]  Obesity    []  Extreme Obesity (BMI>40) Estimated Nutrition Needs (Based on): 7819 Kcals/day(BMR 1305 x 1.3 AF) , 81 g(-98g (1.0-1.2g/kg actual bw)) Protein Carbohydrate: At Least 130 g/day  Fluids: 1700 ml(1 mL/kcal) Last BM:  1/6       Bowel Sounds: active Skin:    [x] Intact   [] Incision  [] Breakdown   [] DTI   [] Tears/Excoriation/Abrasion  [x]Edema-1+ RUE, BLE. [] Other: Wt Readings from Last 30 Encounters:  
01/06/19 82.5 kg (181 lb 14.4 oz) 12/17/18 88.4 kg (194 lb 14.4 oz) 12/01/18 81.6 kg (179 lb 14.3 oz) 12/01/18 81.6 kg (179 lb 14.3 oz) 12/01/18 81.6 kg (179 lb 14.3 oz) NUTRITION DIAGNOSES:  
Problem:  Increased nutrient needs Etiology: related to increased protein needs for wound healing Signs/Symptoms: as evidenced by sepsis, impaired skin integrity with cellulitis NUTRITION INTERVENTIONS: 
Meals/Snacks: General/healthful diet   Supplements: Commercial supplement(snacks/supplements when diet advances) GOAL:  
Diet to advance past NPO and pt to meet estimated protein needs in the next 3-5 days Cultural, Restorationism, or Ethnic Dietary Needs: None EDUCATION & DISCHARGE NEEDS:  
 [x] None Identified 
 [] Identified and Education Provided/Documented 
 [] Identified and Pt declined/was not appropriate [x] Interdisciplinary Care Plan Reviewed/Documented  
 [x] Discharge Needs: Continue consistent CHO diet. [] No Nutrition Related Discharge Needs NUTRITION RISK:  
Pt Is At Nutrition Risk  [x] No Nutrition Risk Identified  [] PT SEEN FOR:  
 []  MD Consult: []Calorie Count []Diabetic Diet Education []Diet Education []Electrolyte Management []General Nutrition Management and Supplements []Management of Tube Feeding []TPN Recommendations []  RN Referral:  []MST score >=2 
   []Enteral/Parenteral Nutrition PTA []Pregnant: Gestational DM or Multigestation  
              [] Pressure Ulcer 
 
[]  Low BMI      []  Length of Stay       [] Dysphagia Diet         [] Ventilator [x]  Follow-up Previous Recommendations: [x] Implemented          [] Not Implemented          [] Not Applicable Previous Goal: 
 [x] Met              [] Progressing Towards Goal              [] Not Progressing Towards Goal   [] Not Applicable Julito Perez RD Pager [phone] Office [phone]

## 2019-01-08 NOTE — PROGRESS NOTES
Physical Therapy 1322 Chart reviewed,spoke with RN. Pt with elevated BP throughout the day, most recently 196/98, RN reports pt experiencing increased pain also. Will defer PT today and continue to follow Lashae Moreno

## 2019-01-08 NOTE — PROGRESS NOTES
[facility] Progress Note Saloni Edmond DO 
  [phone] Office [phone]  Fax 
 
2019 Assessment & Plan: 1. Febrile illness. Resolved. Viral? W/u unrevealing to date. Continue current abx. [place] Loose 2. Seizure. I suspect this is due to the patient's high fever. Her fever was as high as 104 degrees at the time of admission. Her mental status has normalized. She denies any neck stiffness or headache. Would not pursue lumbar puncture at this time. Neurology is following. 3. Recent right elbow cellulitis complicated by GBS bacteremia and cervical spine diskitis. Continue meropenem through 19 for a total of 8 weeks. May transition to PO levofloxicin at that time. Will start levofloxicin today and check qtc on ekg in am 19 before d/c to rehab Subjective: No complaints today Objective:  
 
Vitals:  
Visit Vitals BP (!) 184/99 (BP 1 Location: Right arm, BP Patient Position: At rest) Pulse 86 Temp 99 °F (37.2 °C) Resp 18 Ht 5' 1\" (1.549 m) Wt 82.5 kg (181 lb 14.4 oz) SpO2 94% BMI 34.37 kg/m² Tmax:  Temp (24hrs), Av.8 °F (37.1 °C), Min:97.6 °F (36.4 °C), Max:100 °F (37.8 °C) Exam:  
Patient is intubated:  no 
 
Physical Examination:  
General:  Alert, cooperative, no distress Head:  Normocephalic, atraumatic. Eyes:  Conjunctivae clear Neck: Supple Lungs:   No distress Chest wall:    
Heart:    
Abdomen:     
Extremities: Moves all. Skin:  No rash Neurologic: CNII-XII intact. Labs:     
 
No lab exists for component: ITNL No results for input(s): CPK, CKMB, TROIQ in the last 72 hours. Recent Labs 19 
9175 *  
K 2.9*  
 CO2 32 BUN 16  
CREA 0.81 * MG 1.7 WBC 6.0 HGB 8.7* HCT 27.4*  
 No results for input(s): INR, PTP, APTT in the last 72 hours.  
 
No lab exists for component: INREXT, INREXT 
 Needs: urine analysis, urine sodium, protein and creatinine Lab Results Component Value Date/Time Sodium,urine random 29 11/30/2018 06:10 PM  
 Creatinine, urine 187.11 11/30/2018 06:10 PM  
 
 
 
Cultures: No results found for: SDES Lab Results Component Value Date/Time Culture result: NO GROWTH 8 DAYS 12/31/2018 10:33 PM  
 Culture result: CANDIDA PARAPSILOSIS (A) 12/31/2018 09:40 PM  
 Culture result: NO GROWTH 6 DAYS 12/31/2018 08:54 PM  
 Culture result: NO GROWTH 6 DAYS 12/31/2018 08:54 PM  
 
 
Radiology:  
 
Medications Current Facility-Administered Medications Medication Dose Route Frequency Last Dose  lisinopril (PRINIVIL, ZESTRIL) tablet 40 mg  40 mg Oral DAILY 40 mg at 01/08/19 0847  levoFLOXacin (LEVAQUIN) tablet 500 mg  500 mg Oral Q24H    
 glucose chewable tablet 16 g  4 Tab Oral PRN    
 dextrose (D50W) injection syrg 12.5-25 g  25-50 mL IntraVENous PRN    
 glucagon (GLUCAGEN) injection 1 mg  1 mg IntraMUSCular PRN    
 insulin glargine (LANTUS) injection 10 Units  10 Units SubCUTAneous QHS 10 Units at 01/07/19 2207  insulin lispro (HUMALOG) injection   SubCUTAneous QID WITH MEALS 2 Units at 01/08/19 0848  atenolol (TENORMIN) tablet 100 mg  100 mg Oral DAILY 100 mg at 01/08/19 0847  chlorthalidone (HYGROTEN) tablet 25 mg  25 mg Oral DAILY 25 mg at 01/08/19 0847  DULoxetine (CYMBALTA) capsule 60 mg  60 mg Oral DAILY 60 mg at 01/08/19 0847  sertraline (ZOLOFT) tablet 100 mg  100 mg Oral DAILY 100 mg at 01/08/19 0847  
 oxyCODONE IR (ROXICODONE) tablet 5 mg  5 mg Oral Q4H PRN 5 mg at 01/08/19 0600  levothyroxine (SYNTHROID) tablet 125 mcg  125 mcg Oral  mcg at 01/08/19 0619  
 heparin (porcine) injection 5,000 Units  5,000 Units SubCUTAneous Q8H 5,000 Units at 01/08/19 0620  
 labetalol (NORMODYNE;TRANDATE) injection 10 mg  10 mg IntraVENous Q2H PRN 10 mg at 01/06/19 1155  meropenem (MERREM) 500 mg in 0.9% sodium chloride (MBP/ADV) 50 mL  0.5 g IntraVENous Q6H 500 mg at 01/08/19 0620  
 haloperidol lactate (HALDOL) injection 2 mg  2 mg IntraVENous Q6H PRN 2 mg at 01/03/19 4111  morphine injection 2 mg  2 mg IntraVENous Q3H PRN 2 mg at 01/08/19 2497  acetaminophen (TYLENOL) suppository 650 mg  650 mg Rectal Q6H  mg at 01/01/19 1441  LORazepam (ATIVAN) injection 1 mg  1 mg IntraVENous Q5MIN PRN    
 sodium chloride (NS) flush 5-10 mL  5-10 mL IntraVENous PRN 10 mL at 01/07/19 3423 Case discussed with: 
 
 
Duncan Hamm DO

## 2019-01-08 NOTE — PROGRESS NOTES
Bedside shift change report given to Renee (oncoming nurse) by Flaco Prieto (offgoing nurse). Report included the following information SBAR, Kardex, Intake/Output, MAR, Accordion and Recent Results.

## 2019-01-09 LAB
ALBUMIN SERPL-MCNC: 1.6 G/DL (ref 3.5–5)
ALBUMIN/GLOB SERPL: 0.4 {RATIO} (ref 1.1–2.2)
ALP SERPL-CCNC: 111 U/L (ref 45–117)
ALT SERPL-CCNC: 28 U/L (ref 12–78)
ANION GAP SERPL CALC-SCNC: 8 MMOL/L (ref 5–15)
AST SERPL-CCNC: 29 U/L (ref 15–37)
ATRIAL RATE: 93 BPM
BASOPHILS # BLD: 0 K/UL (ref 0–0.1)
BASOPHILS NFR BLD: 1 % (ref 0–1)
BILIRUB SERPL-MCNC: 0.4 MG/DL (ref 0.2–1)
BUN SERPL-MCNC: 17 MG/DL (ref 6–20)
BUN/CREAT SERPL: 22 (ref 12–20)
CALCIUM SERPL-MCNC: 8.3 MG/DL (ref 8.5–10.1)
CALCULATED P AXIS, ECG09: 54 DEGREES
CALCULATED R AXIS, ECG10: 34 DEGREES
CALCULATED T AXIS, ECG11: 49 DEGREES
CHLORIDE SERPL-SCNC: 107 MMOL/L (ref 97–108)
CO2 SERPL-SCNC: 30 MMOL/L (ref 21–32)
CREAT SERPL-MCNC: 0.77 MG/DL (ref 0.55–1.02)
DIAGNOSIS, 93000: NORMAL
DIFFERENTIAL METHOD BLD: ABNORMAL
EOSINOPHIL # BLD: 0.6 K/UL (ref 0–0.4)
EOSINOPHIL NFR BLD: 12 % (ref 0–7)
ERYTHROCYTE [DISTWIDTH] IN BLOOD BY AUTOMATED COUNT: 15.9 % (ref 11.5–14.5)
GLOBULIN SER CALC-MCNC: 3.6 G/DL (ref 2–4)
GLUCOSE BLD STRIP.AUTO-MCNC: 120 MG/DL (ref 65–100)
GLUCOSE BLD STRIP.AUTO-MCNC: 136 MG/DL (ref 65–100)
GLUCOSE BLD STRIP.AUTO-MCNC: 165 MG/DL (ref 65–100)
GLUCOSE BLD STRIP.AUTO-MCNC: 177 MG/DL (ref 65–100)
GLUCOSE SERPL-MCNC: 135 MG/DL (ref 65–100)
HCT VFR BLD AUTO: 28.3 % (ref 35–47)
HGB BLD-MCNC: 9.1 G/DL (ref 11.5–16)
IMM GRANULOCYTES # BLD: 0.1 K/UL (ref 0–0.04)
IMM GRANULOCYTES NFR BLD AUTO: 1 % (ref 0–0.5)
LYMPHOCYTES # BLD: 0.8 K/UL (ref 0.8–3.5)
LYMPHOCYTES NFR BLD: 16 % (ref 12–49)
MAGNESIUM SERPL-MCNC: 1.5 MG/DL (ref 1.6–2.4)
MCH RBC QN AUTO: 28 PG (ref 26–34)
MCHC RBC AUTO-ENTMCNC: 32.2 G/DL (ref 30–36.5)
MCV RBC AUTO: 87.1 FL (ref 80–99)
MONOCYTES # BLD: 0.5 K/UL (ref 0–1)
MONOCYTES NFR BLD: 9 % (ref 5–13)
NEUTS SEG # BLD: 2.9 K/UL (ref 1.8–8)
NEUTS SEG NFR BLD: 61 % (ref 32–75)
NRBC # BLD: 0 K/UL (ref 0–0.01)
NRBC BLD-RTO: 0 PER 100 WBC
P-R INTERVAL, ECG05: 206 MS
PHOSPHATE SERPL-MCNC: 3.8 MG/DL (ref 2.6–4.7)
PLATELET # BLD AUTO: 313 K/UL (ref 150–400)
PMV BLD AUTO: 9.6 FL (ref 8.9–12.9)
POTASSIUM SERPL-SCNC: 3.9 MMOL/L (ref 3.5–5.1)
PROT SERPL-MCNC: 5.2 G/DL (ref 6.4–8.2)
Q-T INTERVAL, ECG07: 378 MS
QRS DURATION, ECG06: 88 MS
QTC CALCULATION (BEZET), ECG08: 469 MS
RBC # BLD AUTO: 3.25 M/UL (ref 3.8–5.2)
SERVICE CMNT-IMP: ABNORMAL
SODIUM SERPL-SCNC: 145 MMOL/L (ref 136–145)
VENTRICULAR RATE, ECG03: 93 BPM
WBC # BLD AUTO: 4.8 K/UL (ref 3.6–11)

## 2019-01-09 PROCEDURE — 65660000000 HC RM CCU STEPDOWN

## 2019-01-09 PROCEDURE — 83735 ASSAY OF MAGNESIUM: CPT

## 2019-01-09 PROCEDURE — 74011250637 HC RX REV CODE- 250/637: Performed by: INTERNAL MEDICINE

## 2019-01-09 PROCEDURE — 93005 ELECTROCARDIOGRAM TRACING: CPT

## 2019-01-09 PROCEDURE — 74011636637 HC RX REV CODE- 636/637: Performed by: INTERNAL MEDICINE

## 2019-01-09 PROCEDURE — 80053 COMPREHEN METABOLIC PANEL: CPT

## 2019-01-09 PROCEDURE — 36415 COLL VENOUS BLD VENIPUNCTURE: CPT

## 2019-01-09 PROCEDURE — 94760 N-INVAS EAR/PLS OXIMETRY 1: CPT

## 2019-01-09 PROCEDURE — 85025 COMPLETE CBC W/AUTO DIFF WBC: CPT

## 2019-01-09 PROCEDURE — 74011250636 HC RX REV CODE- 250/636: Performed by: INTERNAL MEDICINE

## 2019-01-09 PROCEDURE — 74011000258 HC RX REV CODE- 258: Performed by: INTERNAL MEDICINE

## 2019-01-09 PROCEDURE — 84100 ASSAY OF PHOSPHORUS: CPT

## 2019-01-09 PROCEDURE — 97110 THERAPEUTIC EXERCISES: CPT

## 2019-01-09 PROCEDURE — 82962 GLUCOSE BLOOD TEST: CPT

## 2019-01-09 PROCEDURE — 77010033678 HC OXYGEN DAILY

## 2019-01-09 RX ORDER — HYDRALAZINE HYDROCHLORIDE 20 MG/ML
10 INJECTION INTRAMUSCULAR; INTRAVENOUS
Status: DISCONTINUED | OUTPATIENT
Start: 2019-01-09 | End: 2019-01-10 | Stop reason: HOSPADM

## 2019-01-09 RX ORDER — MAGNESIUM SULFATE HEPTAHYDRATE 40 MG/ML
2 INJECTION, SOLUTION INTRAVENOUS ONCE
Status: COMPLETED | OUTPATIENT
Start: 2019-01-09 | End: 2019-01-09

## 2019-01-09 RX ORDER — AMLODIPINE BESYLATE 5 MG/1
5 TABLET ORAL DAILY
Status: DISCONTINUED | OUTPATIENT
Start: 2019-01-09 | End: 2019-01-10

## 2019-01-09 RX ADMIN — OXYCODONE HYDROCHLORIDE 5 MG: 5 TABLET ORAL at 17:03

## 2019-01-09 RX ADMIN — HEPARIN SODIUM 5000 UNITS: 5000 INJECTION INTRAVENOUS; SUBCUTANEOUS at 22:41

## 2019-01-09 RX ADMIN — MEROPENEM 500 MG: 500 INJECTION, POWDER, FOR SOLUTION INTRAVENOUS at 00:21

## 2019-01-09 RX ADMIN — INSULIN GLARGINE 10 UNITS: 100 INJECTION, SOLUTION SUBCUTANEOUS at 22:41

## 2019-01-09 RX ADMIN — MORPHINE SULFATE 2 MG: 4 INJECTION, SOLUTION INTRAMUSCULAR; INTRAVENOUS at 04:24

## 2019-01-09 RX ADMIN — Medication 10 ML: at 06:57

## 2019-01-09 RX ADMIN — INSULIN GLARGINE 10 UNITS: 100 INJECTION, SOLUTION SUBCUTANEOUS at 00:22

## 2019-01-09 RX ADMIN — HEPARIN SODIUM 5000 UNITS: 5000 INJECTION INTRAVENOUS; SUBCUTANEOUS at 00:22

## 2019-01-09 RX ADMIN — SERTRALINE HYDROCHLORIDE 100 MG: 50 TABLET ORAL at 09:10

## 2019-01-09 RX ADMIN — AMLODIPINE BESYLATE 5 MG: 5 TABLET ORAL at 09:10

## 2019-01-09 RX ADMIN — MAGNESIUM SULFATE HEPTAHYDRATE 2 G: 40 INJECTION, SOLUTION INTRAVENOUS at 09:11

## 2019-01-09 RX ADMIN — ATENOLOL 100 MG: 50 TABLET ORAL at 09:10

## 2019-01-09 RX ADMIN — LISINOPRIL 40 MG: 20 TABLET ORAL at 09:10

## 2019-01-09 RX ADMIN — DULOXETINE 60 MG: 30 CAPSULE, DELAYED RELEASE ORAL at 09:10

## 2019-01-09 RX ADMIN — LEVOTHYROXINE SODIUM 125 MCG: 25 TABLET ORAL at 06:57

## 2019-01-09 RX ADMIN — CHLORTHALIDONE 25 MG: 25 TABLET ORAL at 09:10

## 2019-01-09 RX ADMIN — INSULIN LISPRO 2 UNITS: 100 INJECTION, SOLUTION INTRAVENOUS; SUBCUTANEOUS at 17:00

## 2019-01-09 RX ADMIN — MEROPENEM 500 MG: 500 INJECTION, POWDER, FOR SOLUTION INTRAVENOUS at 13:30

## 2019-01-09 RX ADMIN — LABETALOL HYDROCHLORIDE 10 MG: 5 INJECTION INTRAVENOUS at 05:43

## 2019-01-09 RX ADMIN — MEROPENEM 500 MG: 500 INJECTION, POWDER, FOR SOLUTION INTRAVENOUS at 18:20

## 2019-01-09 RX ADMIN — MEROPENEM 500 MG: 500 INJECTION, POWDER, FOR SOLUTION INTRAVENOUS at 06:57

## 2019-01-09 RX ADMIN — HEPARIN SODIUM 5000 UNITS: 5000 INJECTION INTRAVENOUS; SUBCUTANEOUS at 06:57

## 2019-01-09 RX ADMIN — HEPARIN SODIUM 5000 UNITS: 5000 INJECTION INTRAVENOUS; SUBCUTANEOUS at 14:08

## 2019-01-09 NOTE — PROGRESS NOTES
Problem: Patient Education: Go to Patient Education Activity Goal: Patient/Family Education 
physical Therapy TREATMENT Patient: Lucila Qureshi (72 y.o. female) Date: 1/9/2019 Diagnosis: Sepsis ([facility] [address] Sepsis ([facility] [address] Precautions: Fall, Seizure(yadira lift transfers) Chart, physical therapy assessment, plan of care and goals were reviewed. ASSESSMENT: 
Pt declined performing bed mobility and sitting on EOB with PT assist after much encouragement. Pt did agree to [employer] with limited participation. Heel slides ankle pumps and hip abd/add to LE with min assist and cues. PT will continue to follow. Progression toward goals: 
[]    Improving appropriately and progressing toward goals 
[]    Improving slowly and progressing toward goals 
[]    Not making progress toward goals and plan of care will be adjusted PLAN: 
Patient continues to benefit from skilled intervention to address the above impairments. Continue treatment per established plan of care. Discharge Recommendations:  Dov Parham Further Equipment Recommendations for Discharge: SUBJECTIVE:  
 
 
OBJECTIVE DATA SUMMARY:  
Critical Behavior: 
Neurologic State: Alert Orientation Level: Disoriented to time, Oriented to person, Oriented to place Cognition: Follows commands Safety/Judgement: Awareness of environment, Decreased awareness of need for assistance, Decreased awareness of need for safety, Decreased insight into deficits, Fall prevention Therapeutic Exercises:  
Heel slides ankle pumps and hip abd/add to LE with min assist and cues. Pain: 
Pain Scale 1: Numeric (0 - 10) Pain Intensity 1: 7 Pain Intervention(s) 1: Medication (see MAR) Activity Tolerance:  
Pt tolerated treatment fair. Please refer to the flowsheet for vital signs taken during this treatment. After treatment:  
[]    Patient left in no apparent distress sitting up in chair 
[x]    Patient left in no apparent distress in bed []    Call bell left within reach 
[]    Nursing notified 
[]    Caregiver present 
[]    Bed alarm activated COMMUNICATION/COLLABORATION:  
The patients plan of care was discussed with: Physical Therapist 
 
Brennen Vazquez PTA Time Calculation: 8 mins

## 2019-01-09 NOTE — PROGRESS NOTES
Bedside shift change report given to Kalina (oncoming nurse) by Vanessa Ladd (offgoing nurse). Report included the following information SBAR, Kardex, Intake/Output, MAR, Accordion and Recent Results.

## 2019-01-09 NOTE — PROGRESS NOTES
[facility] Progress Note Adelaide Nugent DO 
  [phone] Office [phone]  Fax 
 
2019 Assessment & Plan: 1. Febrile illness. Resolved. Suspect viral illness. No further w/u planned 2. Seizure. I suspect this is due to the patient's high fever. Her fever was as high as 104 degrees at the time of admission. Her mental status has normalized. She denies any neck stiffness or headache. Would not pursue lumbar puncture at this time. Neurology is following. 3. Recent right elbow cellulitis complicated by GBS bacteremia and cervical spine diskitis. Continue meropenem through 19 for a total of 8 weeks followed by PO levofloxicin. QTc stable since levofloxicin started. F/u with me in office in February.  given Rx Subjective: No complaints today Objective:  
 
Vitals:  
Visit Vitals /84 (BP 1 Location: Right arm, BP Patient Position: At rest) Pulse 93 Temp 98 °F (36.7 °C) Resp 18 Ht 5' 1\" (1.549 m) Wt 82.5 kg (181 lb 14.4 oz) SpO2 97% BMI 34.37 kg/m² Tmax:  Temp (24hrs), Av.6 °F (37 °C), Min:98 °F (36.7 °C), Max:98.9 °F (37.2 °C) Exam:  
Patient is intubated:  no 
 
Physical Examination:  
General:  Alert, cooperative, no distress Head:  Normocephalic, atraumatic. Eyes:  Conjunctivae clear Neck: Supple Lungs:   No distress Chest wall:    
Heart:    
Abdomen:     
Extremities: Moves all. Skin:  No rash Neurologic: CNII-XII intact. Labs:     
 
No lab exists for component: ITNL No results for input(s): CPK, CKMB, TROIQ in the last 72 hours. Recent Labs 19 
4015 19 
4557  146*  
K 3.9 2.9*  
 108 CO2 30 32 BUN 17 16 CREA 0.77 0.81 * 175* PHOS 3.8  --   
MG 1.5* 1.7 ALB 1.6*  --   
WBC 4.8 6.0 HGB 9.1* 8.7* HCT 28.3* 27.4*  
 311 No results for input(s): INR, PTP, APTT in the last 72 hours. No lab exists for component: INREXT, INREXT Needs: urine analysis, urine sodium, protein and creatinine Lab Results Component Value Date/Time Sodium,urine random 29 11/30/2018 06:10 PM  
 Creatinine, urine 187.11 11/30/2018 06:10 PM  
 
 
 
Cultures: No results found for: SDES Lab Results Component Value Date/Time Culture result: NO GROWTH 9 DAYS 12/31/2018 10:33 PM  
 Culture result: CANDIDA PARAPSILOSIS (A) 12/31/2018 09:40 PM  
 Culture result: NO GROWTH 6 DAYS 12/31/2018 08:54 PM  
 Culture result: NO GROWTH 6 DAYS 12/31/2018 08:54 PM  
 
 
Radiology:  
 
Medications Current Facility-Administered Medications Medication Dose Route Frequency Last Dose  hydrALAZINE (APRESOLINE) 20 mg/mL injection 10 mg  10 mg IntraVENous Q6H PRN    
 amLODIPine (NORVASC) tablet 5 mg  5 mg Oral DAILY 5 mg at 01/09/19 0910  
 lisinopril (PRINIVIL, ZESTRIL) tablet 40 mg  40 mg Oral DAILY 40 mg at 01/09/19 0910  levoFLOXacin (LEVAQUIN) tablet 500 mg  500 mg Oral Q24H 500 mg at 01/08/19 1214  
 glucose chewable tablet 16 g  4 Tab Oral PRN    
 dextrose (D50W) injection syrg 12.5-25 g  25-50 mL IntraVENous PRN    
 glucagon (GLUCAGEN) injection 1 mg  1 mg IntraMUSCular PRN    
 insulin glargine (LANTUS) injection 10 Units  10 Units SubCUTAneous QHS 10 Units at 01/09/19 0022  
 insulin lispro (HUMALOG) injection   SubCUTAneous QID WITH MEALS Stopped at 01/09/19 0800  atenolol (TENORMIN) tablet 100 mg  100 mg Oral DAILY 100 mg at 01/09/19 0910  chlorthalidone (HYGROTEN) tablet 25 mg  25 mg Oral DAILY 25 mg at 01/09/19 0910  DULoxetine (CYMBALTA) capsule 60 mg  60 mg Oral DAILY 60 mg at 01/09/19 0910  sertraline (ZOLOFT) tablet 100 mg  100 mg Oral DAILY 100 mg at 01/09/19 0910  
 oxyCODONE IR (ROXICODONE) tablet 5 mg  5 mg Oral Q4H PRN 5 mg at 01/08/19 1815  levothyroxine (SYNTHROID) tablet 125 mcg  125 mcg Oral  mcg at 01/09/19 6400  heparin (porcine) injection 5,000 Units  5,000 Units SubCUTAneous Q8H 5,000 Units at 01/09/19 4458  labetalol (NORMODYNE;TRANDATE) injection 10 mg  10 mg IntraVENous Q2H PRN 10 mg at 01/09/19 0543  meropenem (MERREM) 500 mg in 0.9% sodium chloride (MBP/ADV) 50 mL  0.5 g IntraVENous Q6H 500 mg at 01/09/19 0657  
 haloperidol lactate (HALDOL) injection 2 mg  2 mg IntraVENous Q6H PRN 2 mg at 01/03/19 7257  morphine injection 2 mg  2 mg IntraVENous Q3H PRN 2 mg at 01/09/19 0424  acetaminophen (TYLENOL) suppository 650 mg  650 mg Rectal Q6H  mg at 01/01/19 1441  LORazepam (ATIVAN) injection 1 mg  1 mg IntraVENous Q5MIN PRN    
 sodium chloride (NS) flush 5-10 mL  5-10 mL IntraVENous PRN 10 mL at 01/09/19 0657 Case discussed with: 
 
 
Emerson Hyman DO

## 2019-01-09 NOTE — PROGRESS NOTES
Problem: Self Care Deficits Care Plan (Adult) Goal: *Acute Goals and Plan of Care (Insert Text) Occupational Therapy Goals Initiated 1/6/2019 1. Patient will perform self-feeding seated supported with supervision/set-up within 7 day(s). 2.  Patient will perform grooming seated supported with minimal assistance/contact guard assist within 7 day(s). 3.  Patient will perform toilet transfers using sit to stand mechanical lift with total assistance within 7 day(s). 4.  Patient will participate in upper extremity therapeutic exercise/activities with minimal assistance/contact guard assist for 10 minutes within 7 day(s). 5.  Patient will utilize energy conservation techniques during functional activities with verbal, visual and tactile cues within 7 day(s). Occupational Therapy TREATMENT Patient: Fauzia Philippe (3 y.o. female) Date: 1/9/2019 Diagnosis: Sepsis ([facility] [address] Sepsis ([facility] [address] Precautions: Fall, Seizure(yadira lift transfers) Chart, occupational therapy assessment, plan of care, and goals were reviewed. ASSESSMENT:  Pt presents with impaired cognition and emotionally labile, decreased strength and ROM in most joints, decreased endurance, mobility, balance and safety following admission for sepsis. Pt recently discharged from [facility] weeks ago to SNF after prolonged hospitalization. She has been using a yadira lift there for all OOB mobility and fluctuating with active participation in ADLs per pt's  who was present during today's treatment. Pt currently requiring total A for all ADLs and functional mobility. She cried when ROM performed to R UE, and very limited for L elbow flexion as well. Crepitus audible in joints. Recommend return to SNF at discharge Progression toward goals: 
[]       Improving appropriately and progressing toward goals [x]       Improving slowly and progressing toward goals 
[]       Not making progress toward goals and plan of care will be adjusted PLAN: 
 Patient continues to benefit from skilled intervention to address the above impairments. Continue treatment per established plan of care. Discharge Recommendations:  SNF Further Equipment Recommendations for Discharge:  None SUBJECTIVE:  
Patient stated I can't.  OBJECTIVE DATA SUMMARY:  
Cognitive/Behavioral Status: 
Neurologic State: Alert Orientation Level: Disoriented to time;Oriented to person;Oriented to place Cognition: Follows commands Functional Mobility and Transfers for ADLs:Bed Mobility: 
 Dep Transfers: 
  
 Dep, yadira lift for transfers. Balance: 
 Impaired ADL Intervention: 
  
Dep for self care tasks. Therapeutic Exercises: ROM performed to R UE however pt cries during shoulder flexion. Pt mildly flexed L elbow and dep to extend L UE. Pain: 
Pain Scale 1: Numeric (0 - 10) Pain Intensity 1: 7 Pain Intervention(s) 1: Medication (see MAR) Activity Tolerance:  
Poor Please refer to the flowsheet for vital signs taken during this treatment. After treatment:  
[] Patient left in no apparent distress sitting up in chair 
[x] Patient left in no apparent distress in bed 
[x] Call bell left within reach 
[] Nursing notified 
[x] Caregiver present 
[] Bed alarm activated COMMUNICATION/COLLABORATION:  
The patients plan of care was discussed with: Physical Therapy Assistant and Occupational Therapist 
 
[address] [place] [address], CARLOS/L Time Calculation: 11 mins

## 2019-01-09 NOTE — PROGRESS NOTES
Bedside shift change report given to Jass Redman (oncoming nurse) by Medina Marino (offgoing nurse). Report included the following information SBAR, Kardex, MAR and Recent Results.

## 2019-01-09 NOTE — PROGRESS NOTES
Patient lying in bed resting and watching television. Much eye contact, friendly, polite. Says she is feeling better today. Hopes to be able to return home soon. Spoke briefly about present thoughts, feelings, and concerns. Described events leading to this hospitalization describing how difficult this has been for her, but remains hopeful. Described good family support. She appeared comforted as a result of this visit and expressed gratitude for this visit. Visited by Rev. Ashley Jay, [facility]  paging service: [phone])

## 2019-01-10 VITALS
RESPIRATION RATE: 18 BRPM | BODY MASS INDEX: 34.34 KG/M2 | HEIGHT: 61 IN | TEMPERATURE: 98.2 F | WEIGHT: 181.9 LBS | OXYGEN SATURATION: 97 % | DIASTOLIC BLOOD PRESSURE: 90 MMHG | HEART RATE: 94 BPM | SYSTOLIC BLOOD PRESSURE: 163 MMHG

## 2019-01-10 LAB
ANION GAP SERPL CALC-SCNC: 9 MMOL/L (ref 5–15)
BUN SERPL-MCNC: 20 MG/DL (ref 6–20)
BUN/CREAT SERPL: 23 (ref 12–20)
CALCIUM SERPL-MCNC: 8.2 MG/DL (ref 8.5–10.1)
CHLORIDE SERPL-SCNC: 106 MMOL/L (ref 97–108)
CO2 SERPL-SCNC: 30 MMOL/L (ref 21–32)
CREAT SERPL-MCNC: 0.87 MG/DL (ref 0.55–1.02)
ERYTHROCYTE [DISTWIDTH] IN BLOOD BY AUTOMATED COUNT: 15.6 % (ref 11.5–14.5)
GLUCOSE BLD STRIP.AUTO-MCNC: 185 MG/DL (ref 65–100)
GLUCOSE SERPL-MCNC: 167 MG/DL (ref 65–100)
HCT VFR BLD AUTO: 29 % (ref 35–47)
HGB BLD-MCNC: 9.3 G/DL (ref 11.5–16)
MAGNESIUM SERPL-MCNC: 1.8 MG/DL (ref 1.6–2.4)
MCH RBC QN AUTO: 28 PG (ref 26–34)
MCHC RBC AUTO-ENTMCNC: 32.1 G/DL (ref 30–36.5)
MCV RBC AUTO: 87.3 FL (ref 80–99)
NRBC # BLD: 0 K/UL (ref 0–0.01)
NRBC BLD-RTO: 0 PER 100 WBC
PLATELET # BLD AUTO: 321 K/UL (ref 150–400)
PMV BLD AUTO: 9.5 FL (ref 8.9–12.9)
POTASSIUM SERPL-SCNC: 3.5 MMOL/L (ref 3.5–5.1)
RBC # BLD AUTO: 3.32 M/UL (ref 3.8–5.2)
SERVICE CMNT-IMP: ABNORMAL
SODIUM SERPL-SCNC: 145 MMOL/L (ref 136–145)
WBC # BLD AUTO: 5.7 K/UL (ref 3.6–11)

## 2019-01-10 PROCEDURE — 74011250636 HC RX REV CODE- 250/636: Performed by: INTERNAL MEDICINE

## 2019-01-10 PROCEDURE — 80048 BASIC METABOLIC PNL TOTAL CA: CPT

## 2019-01-10 PROCEDURE — 74011636637 HC RX REV CODE- 636/637: Performed by: INTERNAL MEDICINE

## 2019-01-10 PROCEDURE — 85027 COMPLETE CBC AUTOMATED: CPT

## 2019-01-10 PROCEDURE — 74011000250 HC RX REV CODE- 250: Performed by: INTERNAL MEDICINE

## 2019-01-10 PROCEDURE — 74011250637 HC RX REV CODE- 250/637: Performed by: INTERNAL MEDICINE

## 2019-01-10 PROCEDURE — 82962 GLUCOSE BLOOD TEST: CPT

## 2019-01-10 PROCEDURE — 74011000258 HC RX REV CODE- 258: Performed by: INTERNAL MEDICINE

## 2019-01-10 PROCEDURE — 36415 COLL VENOUS BLD VENIPUNCTURE: CPT

## 2019-01-10 PROCEDURE — 83735 ASSAY OF MAGNESIUM: CPT

## 2019-01-10 RX ORDER — AMLODIPINE BESYLATE 10 MG/1
10 TABLET ORAL DAILY
Qty: 30 TAB | Refills: 0 | Status: ON HOLD | OUTPATIENT
Start: 2019-01-11 | End: 2019-04-29 | Stop reason: CLARIF

## 2019-01-10 RX ORDER — OXYCODONE HYDROCHLORIDE 5 MG/1
5 TABLET ORAL
Qty: 20 TAB | Refills: 0 | Status: ON HOLD | OUTPATIENT
Start: 2019-01-10 | End: 2019-05-25

## 2019-01-10 RX ORDER — ATORVASTATIN CALCIUM 20 MG/1
20 TABLET, FILM COATED ORAL DAILY
Qty: 30 TAB | Refills: 0 | Status: ON HOLD | OUTPATIENT
Start: 2019-01-10 | End: 2019-04-29 | Stop reason: CLARIF

## 2019-01-10 RX ORDER — AMLODIPINE BESYLATE 5 MG/1
10 TABLET ORAL DAILY
Status: DISCONTINUED | OUTPATIENT
Start: 2019-01-10 | End: 2019-01-10 | Stop reason: HOSPADM

## 2019-01-10 RX ADMIN — LISINOPRIL 40 MG: 20 TABLET ORAL at 08:21

## 2019-01-10 RX ADMIN — DULOXETINE 60 MG: 30 CAPSULE, DELAYED RELEASE ORAL at 08:21

## 2019-01-10 RX ADMIN — AMLODIPINE BESYLATE 10 MG: 5 TABLET ORAL at 06:34

## 2019-01-10 RX ADMIN — SERTRALINE HYDROCHLORIDE 100 MG: 50 TABLET ORAL at 08:21

## 2019-01-10 RX ADMIN — LEVOTHYROXINE SODIUM 125 MCG: 25 TABLET ORAL at 06:34

## 2019-01-10 RX ADMIN — INSULIN LISPRO 2 UNITS: 100 INJECTION, SOLUTION INTRAVENOUS; SUBCUTANEOUS at 14:08

## 2019-01-10 RX ADMIN — CHLORTHALIDONE 25 MG: 25 TABLET ORAL at 10:51

## 2019-01-10 RX ADMIN — ATENOLOL 100 MG: 50 TABLET ORAL at 08:21

## 2019-01-10 RX ADMIN — LABETALOL HYDROCHLORIDE 10 MG: 5 INJECTION INTRAVENOUS at 01:30

## 2019-01-10 RX ADMIN — HEPARIN SODIUM 5000 UNITS: 5000 INJECTION INTRAVENOUS; SUBCUTANEOUS at 06:34

## 2019-01-10 RX ADMIN — MEROPENEM 500 MG: 500 INJECTION, POWDER, FOR SOLUTION INTRAVENOUS at 01:30

## 2019-01-10 RX ADMIN — MEROPENEM 500 MG: 500 INJECTION, POWDER, FOR SOLUTION INTRAVENOUS at 06:35

## 2019-01-10 RX ADMIN — OXYCODONE HYDROCHLORIDE 5 MG: 5 TABLET ORAL at 08:21

## 2019-01-10 NOTE — PROGRESS NOTES
Bedside shift change report given to Amaya (oncoming nurse) by Gene Walden (offgoing nurse). Report included the following information SBAR, Kardex, Intake/Output, MAR, Accordion and Recent Results. DC instructions

## 2019-01-10 NOTE — ROUTINE PROCESS
Bedside and Verbal shift change report given to Bhupendra Carbajal rn (oncoming nurse) by Claribel Corona (offgoing nurse). Report included the following information SBAR, Kardex, Intake/Output, MAR and Recent Results.

## 2019-01-10 NOTE — PROGRESS NOTES
CM Note: 
Pt to be picked up by [facility] today at 2:30 [facility] Nursing to call report to [facility] [place] [id number] at [phone]. AVS sent in [facility].  
JOHNATHAN Charles

## 2019-01-10 NOTE — DISCHARGE SUMMARY
Physician Discharge Summary     Patient ID:  Genie Leyden  [id number]  3 y.o.  1955    Admit date: 12/31/2018    Discharge date: 1/10/2019    Admission Diagnoses: Sepsis [facility])    Principal Discharge Diagnoses:    sepsis    OTHER PROBLEMS ADDRESSEDS  Principal Diagnosis Sepsis ([facility]                                            Principal Problem:    Sepsis ([facility] (12/31/2018)    Active Problems:    HTN (hypertension) ()      Chronic back pain ()      Diabetes mellitus, type 2 (HCC) ()      Fever (12/31/2018)      Sinus tachycardia (12/31/2018)      Acute metabolic encephalopathy (44/89/1448)      Discitis of cervical region (12/31/2018)      Urinary retention (12/31/2018)      Cellulitis of right elbow (12/31/2018)      Anemia (12/31/2018)      Hypokalemia (12/31/2018)      Hypernatremia (12/31/2018)       Patient Active Problem List   Diagnosis Code    Metabolic acidosis U01.7    BAYLEE (acute kidney injury) ([facility] N17.9    HTN (hypertension) I10    Chronic back pain M54.9, G89.29    Diabetes mellitus, type 2 (HCC) E11.9    Leukocytosis D72.829    Right elbow pain M25.521    Hypotension I95.9    Fever R50.9    Sinus tachycardia R00.0    Sepsis (HCC) A41.9    Acute metabolic encephalopathy F99.52    Discitis of cervical region M46.42    Urinary retention R33.9    Cellulitis of right elbow L03. 113    Anemia D64.9    Hypokalemia E87.6    Hypernatremia E87.0         Hospital Course:     C-spine discitis: diagnosed mid December on last admission. MRI last month showed evidence of discitis at C5-C6 and C6-C7, with abnormal enhancement of the vertebral bodies. There was phlegmon, but no drainable abscess. Repeat MRI shows \"Persistent osteomyelitis of C5, C6, and C7 is similar to last month. Ventral epidural phlegmon is decreased. Anterior paraspinal phlegmon is slightly decreased. No drainable abscess. Mild cord indentation at C5-C6 is unchanged. \" Evaluated by ortho and ID. To continue IV abx per ID recs (Meropenem 2gm IV q 8 hours through 2/1/19). If she has recurrent seizures then would consider changing meropenem, as it lowers seizure threshold     HTN: BP was very high yesterday, much better today after addition of Norvasc, further increasing dose today. Continue atenolol at increased dose, lisinopril, chlorthalidone.      Anemia: recent iron panel / ferritin suggests ACD. Monitor CBC     DM type 2 w/ hypoglycemia: continue Lantus. Well controlled overall. Recent A1C 7.2%.      Urinary retention: Recent Dx weeks ago. Has been with continuous joyce and flomax. Had scheduled urology follow up 1/8 for voiding trial, failed voiding trial 1/5.  Consulted urology, who recommends outpatient follow up for urodynamic studies.     Hypothyroidism: Continue synthroid     Hyperlipidemia: Changed simvastatin to atorvastatin due to interaction with Norvasc     R elbow cellulitis / bursitis / acute pain / Weakness - Noted on last admit, s/p joint aspiration by IR on 12/6. On meropenem. Will need to return to rehab. Much better     Acute metabolic encephalopathy:  POA, recurrent, unclear etiology, likely due to sepsis/infection. Now much better and near baseline.  On recent admit had unremarkable head CT, MRI brain.  Repeat MRI also unremarkable. Recent TSH, B12/folate, ammonia, HSV, EEG, RPR, West Nile, HIV, Lyme IgM all reportedly negative. Continue sertraline, duloxetine, ASA. Monitor on opioids/oxycodone. Seizures noted, thought to be 2/2 high fevers, perhaps threshold lowered by meropenem. NO LP per neuro/ID.     Sepsis / Fever / Sinus tachycardia: Resolved. Antimicrobial mgmt per ID      Pt discharged in improved and stable condition.      Procedures performed: see above    Imaging studies: see above        PCP: Dhruv Koehler MD    Consults: Pulmonary/Intensive care, ID, Neurology and Orthopedic Surgery    Discharge Exam:  Patient Vitals for the past 12 hrs:   Temp Pulse Resp BP SpO2   01/10/19 1420 98.2 °F (36.8 °C) 94 18 163/90 97 %   01/10/19 1240 98 °F (36.7 °C) 93 16 167/84 94 %   01/10/19 1049 99.4 °F (37.4 °C) 97 14 160/87 94 %   01/10/19 0805 99.4 °F (37.4 °C) 94 16 (!) 173/92 95 %   01/10/19 0700  88        GEN: NAD  CV: RRR  RESP: CTAB  ABD: NTTP    Disposition: SNF    Patient Instructions:   Current Discharge Medication List      START taking these medications    Details   amLODIPine (NORVASC) 10 mg tablet Take 1 Tab by mouth daily. Qty: 30 Tab, Refills: 0      OTHER,NON-FORMULARY, Meropenem 2gm IV q 8 hours through 2/1/19  Qty: 1 Each, Refills: 0      atorvastatin (LIPITOR) 20 mg tablet Take 1 Tab by mouth daily. Qty: 30 Tab, Refills: 0      lisinopril (PRINIVIL, ZESTRIL) 40 mg tablet Take 1 Tab by mouth daily for 30 days. Qty: 30 Tab, Refills: 0         CONTINUE these medications which have CHANGED    Details   oxyCODONE IR (ROXICODONE) 5 mg immediate release tablet Take 1 Tab by mouth every six (6) hours as needed for Pain. Max Daily Amount: 20 mg. Do not give if somnolent or respiratory depression (rate <12)  Qty: 20 Tab, Refills: 0    Associated Diagnoses: Chronic midline low back pain without sciatica      atenolol (TENORMIN) 100 mg tablet Take 1 Tab by mouth daily for 30 days. Qty: 30 Tab, Refills: 0         CONTINUE these medications which have NOT CHANGED    Details   cyclobenzaprine (FLEXERIL) 10 mg tablet Take 10 mg by mouth nightly. DULoxetine (CYMBALTA) 60 mg capsule Take 60 mg by mouth daily. 0.9% sodium chloride solp 100 mL with meropenem 1 gram solr 2 g 2 g by IntraVENous route every twelve (12) hours. ondansetron hcl (ZOFRAN) 4 mg tablet Take 4 mg by mouth every six (6) hours as needed for Nausea. senna (SENNA) 8.6 mg tablet Take 1 Tab by mouth two (2) times a day. insulin glargine (LANTUS) 100 unit/mL injection 10 Units by SubCUTAneous route daily. Qty: 1 Vial, Refills: 0      chlorthalidone (HYGROTEN) 25 mg tablet Take 1 Tab by mouth daily.   Qty: 30 Tab, Refills: 0      levothyroxine (SYNTHROID) 125 mcg tablet Take 125 mcg by mouth Daily (before breakfast). sertraline (ZOLOFT) 100 mg tablet Take 100 mg by mouth daily. aspirin delayed-release 81 mg tablet Take 81 mg by mouth two (2) times a day. STOP taking these medications       ibuprofen (MOTRIN) 400 mg tablet Comments:   Reason for Stopping:         oxyCODONE ER (XTAMPZA ER) 18 mg ER capsule Comments:   Reason for Stopping:         simvastatin (ZOCOR) 40 mg tablet Comments:   Reason for Stopping:               Activity: See discharge instructions  Diet: See discharge instructions  Wound Care: See discharge instructions    Follow-up Information     Follow up With Specialties Details Why Contact Info    Lian Smith, [address] [address] [id number] [phone]      [address] [address]  [phone]    Anna Marie Jones MD Orthopedic Surgery In 2 weeks  [facility] [address]  [place] [address] [facility]  [phone]      Heidi Magallon MD Urology In 2 weeks  [address] [facility]  [phone]      Moraima Holland MD Neurology   [facility] [address] [facility] [address] [facility] [address]      [facility],  Infectious Diseases   [address] [facility]  [phone]            I spent 35 minutes on this discharge.     Signed:  Orest Brittle, MD  1/10/2019  11:53 AM

## 2019-01-10 NOTE — DISCHARGE INSTRUCTIONS
HOSPITALIST DISCHARGE INSTRUCTIONS  NAME: Perico White   :  1955   MRN:  [id number]     Date/Time:  1/10/2019 11:50 AM    ADMIT DATE: 2018     DISCHARGE DATE: 1/10/2019     PRINCIPAL DISCHARGE DIAGNOSES:  Sepsis   Confusion      MEDICATIONS:  · It is important that you take the medication exactly as they are prescribed. Note the changes and additions to your medications. Be sure you understand these changes before you are discharged today. · Keep your medication in the bottles provided by the pharmacist and keep a list of the medication names, dosages, and times to be taken in your wallet. · Do not take other medications without consulting your doctor. Pain Management: per above medications    What to do at Home    Recommended diet:  Resume previous diet    Recommended activity: PT/OT Eval and Treat    If you experience any of the following symptoms then please call your primary care physician or return to the emergency room if you cannot get hold of your doctor:  Fever, chills, severe abdominal pain, nausea, vomiting, diarrhea, change in mentation, falling, bleeding, severe chest pain, shortness of breath, confusion, or other severe concerning symptoms that brought you to the hospital in the first place    Follow Up:   Follow-up Information     Follow up With Specialties Details Why Contact Info    Katerina Koch, [address] [place] [address] [address]      [address] [facility]   [id number] [place] [address]    Brock Huff MD Orthopedic Surgery In 2 weeks  [address] [facility]  [place] [address] [facility]  [phone]      Van Greco MD Urology In 2 weeks  [address] [facility]  [phone]      Michel Harrison MD Neurology   [place] [address]  [address] [facility]  [address] [facility]  [phone]      Niki Yu DO Infectious Diseases [address] [place] [address]  [phone]              Information obtained by :  I understand that if any problems occur once I am at home I am to contact my physician. I understand and acknowledge receipt of the instructions indicated above.                                                                                                                                            Physician's or R.N.'s Signature                                                                  Date/Time                                                                                                                                              Patient or Representative Signature                                                          Date/Time

## 2019-01-10 NOTE — PROGRESS NOTES
Blanco Mcpherson [facility] [place] [address] 
[address] 
[phone] Medical Progress Note NAME: Liz Perez :  1955 MRM:  [id number] Date/Time: 2019 Assessment / Plan:  
 
C-spine discitis: diagnosed mid December on last admission. MRI last month showed evidence of discitis at C5-C6 and C6-C7, with abnormal enhancement of the vertebral bodies. There was phlegmon, but no drainable abscess. Repeat MRI shows \"Persistent osteomyelitis of C5, C6, and C7 is similar to last month. Ventral epidural phlegmon is decreased. Anterior paraspinal phlegmon is slightly decreased. No drainable abscess. Mild cord indentation at C5-C6 is unchanged. \" Evaluated by ortho and ID. To continue IV abx per ID recs (Meropenem 2gm IV q 8 hours through 19). If she has recurrent seizures then would consider changing meropenem, as it lowers seizure threshold 
  
HTN: BP was very high today, treat pain PRN. BP better but with sharp decline; need to monitor closely. Added Norvasc and IV hydralazine PRN. Continue atenolol at increased dose, lisinopril, chlorthalidone. Anemia: recent iron panel / ferritin suggests ACD. Monitor CBC 
  
DM type 2 w/ hypoglycemia: continue Lantus. Well controlled overall. SSI per protocol. Recent A1C 7.2%.  
  
Urinary retention: Recent Dx weeks ago. Has been with continuous joyce and flomax. Had scheduled urology follow up  for voiding trial, failed voiding trial . Consulted urology, who recommends outpatient follow up for urodynamic studies. 
  
Hypothyroidism: Continue synthroid 
  
Hyperlipidemia: Continue simvastatin 
  
R elbow cellulitis / bursitis / acute pain / Weakness - Noted on last admit, s/p joint aspiration by IR on . On meropenem. Will need to return to rehab.  Appears much better 
  
Acute metabolic encephalopathy:  POA, recurrent, unclear etiology, likely due to sepsis/infection. Now much better and near baseline.  On recent admit had unremarkable head CT, MRI brain. Repeat MRI also unremarkable. Recent TSH, B12/folate, ammonia, HSV, EEG, RPR, West Nile, HIV, Lyme IgM all reportedly negative. Continue sertraline, duloxetine, ASA. Minoitor on opioids/oxycodone. Seizures noted, thought to be 2/2 high fevers, perhaps threshold lowered by meropenem. NO LP per neuro/ID. 
  
Sepsis / Fever / Sinus tachycardia: appears resolved. Antimicrobial mgmt per ID Total time spent: 40 minutes, d/w  at bedside Time spent in the care of this patient including reviewing records, discussing with nursing and/or other providers on the treatment team, obtaining history and examining the patient, and discussing treatment plans. Care Plan discussed with: Patient, Nursing Staff and >50% of time spent in counseling and coordination of care Discussed:  Care Plan and D/C Planning Prophylaxis:  Hep SQ Disposition:  SNF/LTC Subjective: Chief Complaint:  Follow up AMS Chart/notes/labs/studies reviewed, patient examined at bedside. Feels better. Very debilitated. No fever. Mentation better. Objective:  
 
 
Vitals:  
 
  
Last 24hrs VS reviewed since prior progress note. Most recent are: 
 
Visit Vitals /74 (BP 1 Location: Right arm, BP Patient Position: At rest) Pulse 93 Temp 98.9 °F (37.2 °C) Resp 18 Ht 5' 1\" (1.549 m) Wt 82.5 kg (181 lb 14.4 oz) SpO2 94% BMI 34.37 kg/m² SpO2 Readings from Last 6 Encounters:  
01/09/19 94% 12/17/18 96% O2 Flow Rate (L/min): 2 l/min Intake/Output Summary (Last 24 hours) at 1/9/2019 1934 Last data filed at 1/9/2019 1920 Gross per 24 hour Intake  Output 500 ml Net -500 ml Exam:  
 
Physical Exam: 
 
Gen:  Obese. Chronically ill-appearing. NAD HEENT:  Pink conjunctivae, PERRL, hearing intact to voice, moist mucous membranes Neck:  Supple, without masses Resp:  No accessory muscle use, distant breath sounds without wheezes rales or rhonchi 
Card:  No murmurs, regular S1, S2 without thrills, bruits or peripheral edema Abd:  Soft, non-tender, non-distended, reduced bowel sounds are present, no mass Musc:  No cyanosis or clubbing Skin:  No rashes or ulcers, skin turgor is good Neuro:  Cranial nerves are grossly intact, general pain and motor weakness, especially BLEs. Follows commands Psych:  fair insight, oriented to person, place and time 
  
  
Medications Reviewed: (see below) Lab Data Reviewed: (see below) 
 
______________________________________________________________________ Medications:  
 
Current Facility-Administered Medications Medication Dose Route Frequency  hydrALAZINE (APRESOLINE) 20 mg/mL injection 10 mg  10 mg IntraVENous Q6H PRN  
 amLODIPine (NORVASC) tablet 5 mg  5 mg Oral DAILY  lisinopril (PRINIVIL, ZESTRIL) tablet 40 mg  40 mg Oral DAILY  glucose chewable tablet 16 g  4 Tab Oral PRN  
 dextrose (D50W) injection syrg 12.5-25 g  25-50 mL IntraVENous PRN  
 glucagon (GLUCAGEN) injection 1 mg  1 mg IntraMUSCular PRN  
 insulin glargine (LANTUS) injection 10 Units  10 Units SubCUTAneous QHS  insulin lispro (HUMALOG) injection   SubCUTAneous QID WITH MEALS  
 atenolol (TENORMIN) tablet 100 mg  100 mg Oral DAILY  chlorthalidone (HYGROTEN) tablet 25 mg  25 mg Oral DAILY  DULoxetine (CYMBALTA) capsule 60 mg  60 mg Oral DAILY  sertraline (ZOLOFT) tablet 100 mg  100 mg Oral DAILY  oxyCODONE IR (ROXICODONE) tablet 5 mg  5 mg Oral Q4H PRN  
 levothyroxine (SYNTHROID) tablet 125 mcg  125 mcg Oral ACB  heparin (porcine) injection 5,000 Units  5,000 Units SubCUTAneous Q8H  
 labetalol (NORMODYNE;TRANDATE) injection 10 mg  10 mg IntraVENous Q2H PRN  
 meropenem (MERREM) 500 mg in 0.9% sodium chloride (MBP/ADV) 50 mL  0.5 g IntraVENous Q6H  
  haloperidol lactate (HALDOL) injection 2 mg  2 mg IntraVENous Q6H PRN  
 morphine injection 2 mg  2 mg IntraVENous Q3H PRN  
 acetaminophen (TYLENOL) suppository 650 mg  650 mg Rectal Q6H PRN  
 LORazepam (ATIVAN) injection 1 mg  1 mg IntraVENous Q5MIN PRN  
 sodium chloride (NS) flush 5-10 mL  5-10 mL IntraVENous PRN Lab Review:  
 
Recent Labs 01/09/19 
5249 01/07/19 
2612 WBC 4.8 6.0 HGB 9.1* 8.7* HCT 28.3* 27.4*  
 311 Recent Labs 01/09/19 
2391 01/07/19 
8148  146*  
K 3.9 2.9*  
 108 CO2 30 32 * 175* BUN 17 16 CREA 0.77 0.81 CA 8.3* 8.4* MG 1.5* 1.7 PHOS 3.8  --   
ALB 1.6*  --   
SGOT 29  --   
ALT 28  -- No components found for: [place] No results for input(s): PH, PCO2, PO2, HCO3, FIO2 in the last 72 hours. No results for input(s): INR in the last 72 hours. No lab exists for component: INREXT No results found for: SDES Lab Results Component Value Date/Time Culture result: NO GROWTH 9 DAYS 12/31/2018 10:33 PM  
 Culture result: CANDIDA PARAPSILOSIS (A) 12/31/2018 09:40 PM  
 Culture result: NO GROWTH 6 DAYS 12/31/2018 08:54 PM  
 Culture result: NO GROWTH 6 DAYS 12/31/2018 08:54 PM  
 
        
___________________________________________________ Attending Physician: Faby Aquino MD

## 2019-01-10 NOTE — PROGRESS NOTES
Patient and patient's  given discharge instructions. Patient and  verbalize understanding of plan of care to go to [facility] today @ 2:30 by [facility]. Patient packed and prepared for transport. Patient's  verbalizes understanding of discharge instructions and follow up.

## 2019-01-21 LAB
BACTERIA SPEC CULT: NORMAL
SERVICE CMNT-IMP: NORMAL

## 2019-01-22 LAB
ACID FAST STN SPEC: NEGATIVE
MYCOBACTERIUM SPEC QL CULT: NEGATIVE
SPECIMEN PREPARATION: NORMAL
SPECIMEN SOURCE: NORMAL

## 2019-02-13 ENCOUNTER — OFFICE VISIT (OUTPATIENT)
Dept: FAMILY MEDICINE CLINIC | Age: 64
End: 2019-02-13

## 2019-02-13 VITALS
RESPIRATION RATE: 18 BRPM | TEMPERATURE: 97.8 F | BODY MASS INDEX: 31.53 KG/M2 | DIASTOLIC BLOOD PRESSURE: 64 MMHG | SYSTOLIC BLOOD PRESSURE: 106 MMHG | HEART RATE: 89 BPM | WEIGHT: 167 LBS | HEIGHT: 61 IN

## 2019-02-13 DIAGNOSIS — M46.22 INFECTION OF CERVICAL SPINE (HCC): Primary | ICD-10-CM

## 2019-02-13 RX ORDER — LEVOFLOXACIN 500 MG/1
TABLET, FILM COATED ORAL DAILY
COMMUNITY
End: 2019-04-29

## 2019-02-13 NOTE — PROGRESS NOTES
[facility] Disease [facility] Progress Note           Adelaide Nugent DO    [phone] Office  [phone]  Fax    2/13/2019      Assessment & Plan:   1. Recent right elbow cellulitis complicated by GBS bacteremia and cervical spine diskitis.  Completed 8 weeks meropenem 2/1/19 and has been on levofloxicin since meropenem was completed. Stop abx. Patient to see ortho-spine next week. Will defer need for further imaging to the ortho team  2. AMS. Patient has developed hallucinations and confusion. Suspect due to levofloxicin. Stop abx and monitor          Subjective:     C/o back pain    Objective:     Vitals:   Visit Vitals  /64   Pulse 89   Temp 97.8 °F (36.6 °C) (Oral)   Resp 18   Ht 5' 1\" (1.549 m)   Wt 75.8 kg (167 lb)   BMI 31.55 kg/m²        Tmax:  @ZDRB(81)@    Exam:   Patient is intubated:  no    Physical Examination:   General:  Alert, cooperative, no distress   Head:  Normocephalic, atraumatic. Eyes:  Conjunctivae clear   Neck: Supple       Lungs:   No distress. Chest wall:     Heart:     Abdomen:      Extremities: Moves all. Noedema. Skin:  No rash   Neurologic: CNII-XII intact. Normal strength     Labs:        No lab exists for component: ITNL   No results for input(s): CPK, CKMB, TROIQ in the last 72 hours. No results for input(s): NA, K, CL, CO2, BUN, CREA, GLU, PHOS, MG, ALB, WBC, HGB, HCT, PLT, HGBEXT, HCTEXT, PLTEXT in the last 72 hours. No lab exists for component:  CA  No results for input(s): INR, PTP, APTT in the last 72 hours.     No lab exists for component: INREXT  Needs: urine analysis, urine sodium, protein and creatinine  Lab Results   Component Value Date/Time    Sodium,urine random 29 11/30/2018 06:10 PM    Creatinine, urine 187.11 11/30/2018 06:10 PM         Cultures:     No results found for: SHANITA  Lab Results   Component Value Date/Time    Culture result: NO GROWTH 21 DAYS 12/31/2018 10:33 PM    Culture result: CANDIDA PARAPSILOSIS (A) 12/31/2018 09:40 PM Culture result: NO GROWTH 6 DAYS 12/31/2018 08:54 PM    Culture result: NO GROWTH 6 DAYS 12/31/2018 08:54 PM       Radiology:     Medications       Current Outpatient Medications   Medication Sig Dispense    levoFLOXacin (LEVAQUIN) 500 mg tablet Take  by mouth daily.  amLODIPine (NORVASC) 10 mg tablet Take 1 Tab by mouth daily. 30 Tab    atorvastatin (LIPITOR) 20 mg tablet Take 1 Tab by mouth daily. 30 Tab    oxyCODONE IR (ROXICODONE) 5 mg immediate release tablet Take 1 Tab by mouth every six (6) hours as needed for Pain. Max Daily Amount: 20 mg. Do not give if somnolent or respiratory depression (rate <12) 20 Tab    cyclobenzaprine (FLEXERIL) 10 mg tablet Take 10 mg by mouth nightly.  DULoxetine (CYMBALTA) 60 mg capsule Take 60 mg by mouth daily.  ondansetron hcl (ZOFRAN) 4 mg tablet Take 4 mg by mouth every six (6) hours as needed for Nausea.  senna (SENNA) 8.6 mg tablet Take 1 Tab by mouth two (2) times a day.  insulin glargine (LANTUS) 100 unit/mL injection 10 Units by SubCUTAneous route daily. 1 Vial    chlorthalidone (HYGROTEN) 25 mg tablet Take 1 Tab by mouth daily. 30 Tab    levothyroxine (SYNTHROID) 125 mcg tablet Take 125 mcg by mouth Daily (before breakfast).  aspirin delayed-release 81 mg tablet Take 81 mg by mouth two (2) times a day.  OTHER,NON-FORMULARY, Meropenem 2gm IV q 8 hours through 2/1/19 1 Each    sertraline (ZOLOFT) 100 mg tablet Take 100 mg by mouth daily. No current facility-administered medications for this visit.             Case discussed with:      Stefani Samson DO

## 2019-02-13 NOTE — PROGRESS NOTES
Chief Complaint   Patient presents with    Medication Evaluation     Meropenem through 02/01/2019, 8 weeks, to start PO Levo / right elbow cellulitis

## 2019-04-29 ENCOUNTER — HOSPITAL ENCOUNTER (INPATIENT)
Age: 64
LOS: 3 days | Discharge: HOME HEALTH | DRG: 683 | End: 2019-05-02
Attending: EMERGENCY MEDICINE | Admitting: INTERNAL MEDICINE
Payer: COMMERCIAL

## 2019-04-29 ENCOUNTER — APPOINTMENT (OUTPATIENT)
Dept: ULTRASOUND IMAGING | Age: 64
DRG: 683 | End: 2019-04-29
Attending: INTERNAL MEDICINE
Payer: COMMERCIAL

## 2019-04-29 DIAGNOSIS — E86.0 DEHYDRATION: ICD-10-CM

## 2019-04-29 DIAGNOSIS — N17.9 AKI (ACUTE KIDNEY INJURY) (HCC): Primary | ICD-10-CM

## 2019-04-29 LAB
ALBUMIN SERPL-MCNC: 4.1 G/DL (ref 3.5–5)
ALBUMIN/GLOB SERPL: 1.1 {RATIO} (ref 1.1–2.2)
ALP SERPL-CCNC: 79 U/L (ref 45–117)
ALT SERPL-CCNC: 30 U/L (ref 12–78)
ANION GAP SERPL CALC-SCNC: 10 MMOL/L (ref 5–15)
APPEARANCE UR: ABNORMAL
AST SERPL-CCNC: 24 U/L (ref 15–37)
BACTERIA URNS QL MICRO: NEGATIVE /HPF
BASOPHILS # BLD: 0.1 K/UL (ref 0–0.1)
BASOPHILS NFR BLD: 1 % (ref 0–1)
BILIRUB SERPL-MCNC: 0.7 MG/DL (ref 0.2–1)
BILIRUB UR QL CFM: NEGATIVE
BUN SERPL-MCNC: 65 MG/DL (ref 6–20)
BUN/CREAT SERPL: 27 (ref 12–20)
CALCIUM SERPL-MCNC: 10.2 MG/DL (ref 8.5–10.1)
CHLORIDE SERPL-SCNC: 109 MMOL/L (ref 97–108)
CO2 SERPL-SCNC: 21 MMOL/L (ref 21–32)
COLOR UR: ABNORMAL
CREAT SERPL-MCNC: 2.41 MG/DL (ref 0.55–1.02)
DIFFERENTIAL METHOD BLD: ABNORMAL
EOSINOPHIL # BLD: 0.1 K/UL (ref 0–0.4)
EOSINOPHIL NFR BLD: 2 % (ref 0–7)
EPITH CASTS URNS QL MICRO: ABNORMAL /LPF
ERYTHROCYTE [DISTWIDTH] IN BLOOD BY AUTOMATED COUNT: 13.4 % (ref 11.5–14.5)
GLOBULIN SER CALC-MCNC: 3.6 G/DL (ref 2–4)
GLUCOSE BLD STRIP.AUTO-MCNC: 121 MG/DL (ref 65–100)
GLUCOSE SERPL-MCNC: 187 MG/DL (ref 65–100)
GLUCOSE UR STRIP.AUTO-MCNC: NEGATIVE MG/DL
HCT VFR BLD AUTO: 41.8 % (ref 35–47)
HGB BLD-MCNC: 13.5 G/DL (ref 11.5–16)
HGB UR QL STRIP: NEGATIVE
HYALINE CASTS URNS QL MICRO: ABNORMAL /LPF (ref 0–5)
IMM GRANULOCYTES # BLD AUTO: 0 K/UL (ref 0–0.04)
IMM GRANULOCYTES NFR BLD AUTO: 1 % (ref 0–0.5)
KETONES UR QL STRIP.AUTO: NEGATIVE MG/DL
LEUKOCYTE ESTERASE UR QL STRIP.AUTO: ABNORMAL
LYMPHOCYTES # BLD: 2.4 K/UL (ref 0.8–3.5)
LYMPHOCYTES NFR BLD: 29 % (ref 12–49)
MCH RBC QN AUTO: 29.1 PG (ref 26–34)
MCHC RBC AUTO-ENTMCNC: 32.3 G/DL (ref 30–36.5)
MCV RBC AUTO: 90.1 FL (ref 80–99)
MONOCYTES # BLD: 0.6 K/UL (ref 0–1)
MONOCYTES NFR BLD: 8 % (ref 5–13)
NEUTS SEG # BLD: 4.8 K/UL (ref 1.8–8)
NEUTS SEG NFR BLD: 59 % (ref 32–75)
NITRITE UR QL STRIP.AUTO: NEGATIVE
NRBC # BLD: 0 K/UL (ref 0–0.01)
NRBC BLD-RTO: 0 PER 100 WBC
OTHER,OTHU: ABNORMAL
PH UR STRIP: 5 [PH] (ref 5–8)
PLATELET # BLD AUTO: 296 K/UL (ref 150–400)
PMV BLD AUTO: 10.7 FL (ref 8.9–12.9)
POTASSIUM SERPL-SCNC: 4.9 MMOL/L (ref 3.5–5.1)
PROT SERPL-MCNC: 7.7 G/DL (ref 6.4–8.2)
PROT UR STRIP-MCNC: 100 MG/DL
RBC # BLD AUTO: 4.64 M/UL (ref 3.8–5.2)
RBC #/AREA URNS HPF: ABNORMAL /HPF (ref 0–5)
SERVICE CMNT-IMP: ABNORMAL
SODIUM SERPL-SCNC: 140 MMOL/L (ref 136–145)
SP GR UR REFRACTOMETRY: 1.02 (ref 1–1.03)
UR CULT HOLD, URHOLD: NORMAL
UROBILINOGEN UR QL STRIP.AUTO: 0.2 EU/DL (ref 0.2–1)
WBC # BLD AUTO: 8.1 K/UL (ref 3.6–11)
WBC URNS QL MICRO: ABNORMAL /HPF (ref 0–4)

## 2019-04-29 PROCEDURE — 74011250636 HC RX REV CODE- 250/636: Performed by: NURSE PRACTITIONER

## 2019-04-29 PROCEDURE — 36415 COLL VENOUS BLD VENIPUNCTURE: CPT

## 2019-04-29 PROCEDURE — 51701 INSERT BLADDER CATHETER: CPT

## 2019-04-29 PROCEDURE — 74011250637 HC RX REV CODE- 250/637: Performed by: INTERNAL MEDICINE

## 2019-04-29 PROCEDURE — 99283 EMERGENCY DEPT VISIT LOW MDM: CPT

## 2019-04-29 PROCEDURE — 74011250636 HC RX REV CODE- 250/636: Performed by: INTERNAL MEDICINE

## 2019-04-29 PROCEDURE — 80053 COMPREHEN METABOLIC PANEL: CPT

## 2019-04-29 PROCEDURE — 76770 US EXAM ABDO BACK WALL COMP: CPT

## 2019-04-29 PROCEDURE — 85025 COMPLETE CBC W/AUTO DIFF WBC: CPT

## 2019-04-29 PROCEDURE — 65270000029 HC RM PRIVATE

## 2019-04-29 PROCEDURE — 81001 URINALYSIS AUTO W/SCOPE: CPT

## 2019-04-29 PROCEDURE — 77030005563 HC CATH URETH INT MMGH -A

## 2019-04-29 PROCEDURE — 82962 GLUCOSE BLOOD TEST: CPT

## 2019-04-29 RX ORDER — ACETAMINOPHEN 325 MG/1
650 TABLET ORAL
Status: DISCONTINUED | OUTPATIENT
Start: 2019-04-29 | End: 2019-05-02 | Stop reason: HOSPADM

## 2019-04-29 RX ORDER — SODIUM CHLORIDE 0.9 % (FLUSH) 0.9 %
5-40 SYRINGE (ML) INJECTION EVERY 8 HOURS
Status: DISCONTINUED | OUTPATIENT
Start: 2019-04-29 | End: 2019-05-02 | Stop reason: HOSPADM

## 2019-04-29 RX ORDER — ONDANSETRON 2 MG/ML
4 INJECTION INTRAMUSCULAR; INTRAVENOUS
Status: DISCONTINUED | OUTPATIENT
Start: 2019-04-29 | End: 2019-05-02 | Stop reason: HOSPADM

## 2019-04-29 RX ORDER — SODIUM CHLORIDE 9 MG/ML
125 INJECTION, SOLUTION INTRAVENOUS CONTINUOUS
Status: DISCONTINUED | OUTPATIENT
Start: 2019-04-29 | End: 2019-05-02 | Stop reason: HOSPADM

## 2019-04-29 RX ORDER — GLUCOSAMINE/CHONDR SU A SOD 167-133 MG
500 CAPSULE ORAL
Status: ON HOLD | COMMUNITY
End: 2019-04-29 | Stop reason: CLARIF

## 2019-04-29 RX ORDER — SERTRALINE HYDROCHLORIDE 50 MG/1
100 TABLET, FILM COATED ORAL DAILY
Status: DISCONTINUED | OUTPATIENT
Start: 2019-04-30 | End: 2019-05-02 | Stop reason: HOSPADM

## 2019-04-29 RX ORDER — NIACIN 500 MG/1
500 TABLET, EXTENDED RELEASE ORAL DAILY
COMMUNITY
End: 2019-05-29

## 2019-04-29 RX ORDER — AMOXICILLIN 250 MG
1 CAPSULE ORAL 2 TIMES DAILY
Status: DISCONTINUED | OUTPATIENT
Start: 2019-04-29 | End: 2019-05-02 | Stop reason: HOSPADM

## 2019-04-29 RX ORDER — OMEGA-3-ACID ETHYL ESTERS 1 G/1
2 CAPSULE, LIQUID FILLED ORAL 2 TIMES DAILY WITH MEALS
COMMUNITY

## 2019-04-29 RX ORDER — FENOFIBRATE 160 MG/1
160 TABLET ORAL
COMMUNITY

## 2019-04-29 RX ORDER — HEPARIN SODIUM 5000 [USP'U]/ML
5000 INJECTION, SOLUTION INTRAVENOUS; SUBCUTANEOUS EVERY 8 HOURS
Status: DISCONTINUED | OUTPATIENT
Start: 2019-04-29 | End: 2019-05-02 | Stop reason: HOSPADM

## 2019-04-29 RX ORDER — LISINOPRIL 40 MG/1
40 TABLET ORAL
COMMUNITY
Start: 2019-04-12 | End: 2019-05-02

## 2019-04-29 RX ORDER — MAGNESIUM SULFATE 100 %
4 CRYSTALS MISCELLANEOUS AS NEEDED
Status: DISCONTINUED | OUTPATIENT
Start: 2019-04-29 | End: 2019-05-02 | Stop reason: HOSPADM

## 2019-04-29 RX ORDER — ATORVASTATIN CALCIUM 20 MG/1
20 TABLET, FILM COATED ORAL
COMMUNITY

## 2019-04-29 RX ORDER — DICLOFENAC SODIUM 10 MG/G
GEL TOPICAL
Refills: 0 | Status: ON HOLD | COMMUNITY
Start: 2019-04-16 | End: 2019-04-29 | Stop reason: CLARIF

## 2019-04-29 RX ORDER — OXYCODONE HCL 20 MG/1
20 TABLET, FILM COATED, EXTENDED RELEASE ORAL EVERY 12 HOURS
Status: DISCONTINUED | OUTPATIENT
Start: 2019-04-29 | End: 2019-05-02 | Stop reason: HOSPADM

## 2019-04-29 RX ORDER — INSULIN LISPRO 100 [IU]/ML
INJECTION, SOLUTION INTRAVENOUS; SUBCUTANEOUS
Status: DISCONTINUED | OUTPATIENT
Start: 2019-04-29 | End: 2019-05-02 | Stop reason: HOSPADM

## 2019-04-29 RX ORDER — ATENOLOL 100 MG/1
100 TABLET ORAL DAILY
COMMUNITY
End: 2019-05-29

## 2019-04-29 RX ORDER — DEXTROSE 50 % IN WATER (D50W) INTRAVENOUS SYRINGE
25-50 AS NEEDED
Status: DISCONTINUED | OUTPATIENT
Start: 2019-04-29 | End: 2019-05-02 | Stop reason: HOSPADM

## 2019-04-29 RX ORDER — OXYCODONE 18 MG/1
CAPSULE, EXTENDED RELEASE ORAL
Refills: 0 | Status: ON HOLD | COMMUNITY
Start: 2019-04-15 | End: 2019-04-29 | Stop reason: CLARIF

## 2019-04-29 RX ORDER — METFORMIN HYDROCHLORIDE 500 MG/1
500 TABLET ORAL 2 TIMES DAILY WITH MEALS
COMMUNITY
End: 2019-05-29

## 2019-04-29 RX ORDER — SODIUM CHLORIDE 0.9 % (FLUSH) 0.9 %
5-40 SYRINGE (ML) INJECTION AS NEEDED
Status: DISCONTINUED | OUTPATIENT
Start: 2019-04-29 | End: 2019-05-02 | Stop reason: HOSPADM

## 2019-04-29 RX ORDER — DICLOFENAC SODIUM 10 MG/G
2 GEL TOPICAL
Status: ON HOLD | COMMUNITY
End: 2019-05-25

## 2019-04-29 RX ORDER — PEN NEEDLE, DIABETIC 29 G X1/2"
NEEDLE, DISPOSABLE MISCELLANEOUS
Refills: 0 | Status: ON HOLD | COMMUNITY
Start: 2019-04-11 | End: 2019-04-29 | Stop reason: CLARIF

## 2019-04-29 RX ORDER — OXYCODONE HYDROCHLORIDE 5 MG/1
5 TABLET ORAL
Status: DISCONTINUED | OUTPATIENT
Start: 2019-04-29 | End: 2019-05-02 | Stop reason: HOSPADM

## 2019-04-29 RX ORDER — AMLODIPINE BESYLATE 10 MG/1
10 TABLET ORAL
COMMUNITY
End: 2019-05-29

## 2019-04-29 RX ADMIN — Medication 10 ML: at 21:53

## 2019-04-29 RX ADMIN — OXYCODONE HYDROCHLORIDE 20 MG: 20 TABLET, FILM COATED, EXTENDED RELEASE ORAL at 21:53

## 2019-04-29 RX ADMIN — SODIUM CHLORIDE 125 ML/HR: 900 INJECTION, SOLUTION INTRAVENOUS at 17:52

## 2019-04-29 RX ADMIN — SENNOSIDES AND DOCUSATE SODIUM 1 TABLET: 8.6; 5 TABLET ORAL at 18:32

## 2019-04-29 RX ADMIN — HEPARIN SODIUM 5000 UNITS: 5000 INJECTION INTRAVENOUS; SUBCUTANEOUS at 17:52

## 2019-04-29 RX ADMIN — SODIUM CHLORIDE 1000 ML: 900 INJECTION, SOLUTION INTRAVENOUS at 17:18

## 2019-04-29 RX ADMIN — Medication 10 ML: at 17:53

## 2019-04-29 NOTE — ED NOTES
TRANSFER - OUT REPORT: 
 
Verbal report given to Geraldo Dodson RN(name) on Kevon Read  being transferred to Med Surg(unit) for routine progression of care Report consisted of patients Situation, Background, Assessment and  
Recommendations(SBAR). Information from the following report(s) SBAR, ED Summary and MAR was reviewed with the receiving nurse. Lines: PICC Single Lumen 82/16/78 Cephalic; Left (Active) Peripheral IV 04/29/19 Left Antecubital (Active) Site Assessment Clean, dry, & intact 4/29/2019  2:56 PM  
Phlebitis Assessment 0 4/29/2019  2:56 PM  
Infiltration Assessment 4 4/29/2019  2:56 PM  
Dressing Type Transparent 4/29/2019  2:56 PM  
Hub Color/Line Status Pink;Flushed 4/29/2019  2:56 PM  
Action Taken Blood drawn 4/29/2019  2:56 PM  
  
 
Opportunity for questions and clarification was provided. Patient transported with: 
 [employer]

## 2019-04-29 NOTE — ED TRIAGE NOTES
\"She got home a few weeks ago because she's been here a lot for a strep infection that nearly killed her. She has been complaining of nausea for a little over two weeks and it's gotten really bad over the last few days. Today, she started to talk about how her skin is burning. \"

## 2019-04-29 NOTE — H&P
Admission History and Physical 
 
 
NAME:  Tesha Gamble :   1955 MRN:  [id number] PCP:  Nadia Hickey MD  
 
Date/Time:  2019 Assessment/Plan:   
  
BAYLEE (acute kidney injury) ([facility] (2018): Suspected to be from poor po intake and dehydration with IVVD. No hx of NSAID use. No symptoms to suggest hypotension, but BP is on the lower side. Labs last week reportedly WNL. Has hx of urinary retention on last admit. -- check urine studies including urine eos 
-- check renal ultrasound -- continue IVF 
-- further work up if not improved with IVF alone -- hold ACE 
 
HTN (hypertension) (): 
-- stop ACE and chlorthalidone -- hold amlodipine for now Chronic back pain (): 
-- continue oxycodone ER and prn oxycodone -- stool softeners Diabetes mellitus, type 2 ([facility] ():  With recent weight loss, has been able to taper down insulin and last week taken off insulin in favor of metformin. -- stop metformin with BAYLEE 
-- use SSI alone for now and if needed may restart long acting insulin Hypothyroidism: 
-- continue LT4 Sacral erythema: 
-- mobility team 
-- wound care consult Subjective: CHIEF COMPLAINT:  nausea HISTORY OF PRESENT ILLNESS:    
Ms. Bernardo Roche is a 59 y.o.  female who is admitted with BAYLEE (acute kidney injury) ([facility] Ms. Bernardo Roche presented to the Emergency Department today complaining of nausea. Nausea started a couple of months ago, but was mild. Over the past few days has significantly worsened. No vomiting, but close to vomiting. Has chronically poor po intake of solid and liquids which has also worsened. Reports not drinking water as doesn't want to trouble anyone to assist her with cleaning up. No fever, chills. No abd pain. Last BM was yesterday and has been formed and solid. Denies sob, chest pains. No edema. Has sensation of skin burning, but no rashes noted.   Was seen by PCP last week and taken off insulin in favor of metformin. Labs done at that time were called to  as \"normal.\"  Has chronic back pain and takes oxycodone ER. Past Medical History:  
Diagnosis Date  Chronic back pain  Diabetes mellitus, type 2 ([address]  History of vascular access device 12/03/2018 [facility] [place] [facility] - 4 FR single, L cephalic, 44 cm for LTA  HTN (hypertension) No past surgical history on file. Social History Tobacco Use  Smoking status: Never Smoker  Smokeless tobacco: Never Used Substance Use Topics  Alcohol use: No  
  Frequency: Never Family History Problem Relation Age of Onset  Diabetes Father  Heart Disease Father No Known Allergies Prior to Admission medications Medication Sig Start Date End Date Taking? Authorizing Provider  
amLODIPine (NORVASC) 10 mg tablet Take 1 Tab by mouth daily. 1/11/19  Yes Genaro Vargas MD  
atorvastatin (LIPITOR) 20 mg tablet Take 1 Tab by mouth daily. 1/10/19  Yes Genaro Vargas MD  
oxyCODONE IR (ROXICODONE) 5 mg immediate release tablet Take 1 Tab by mouth every six (6) hours as needed for Pain. Max Daily Amount: 20 mg. Do not give if somnolent or respiratory depression (rate <12) 1/10/19  Yes Genaro Vargas MD  
insulin glargine (LANTUS) 100 unit/mL injection 10 Units by SubCUTAneous route daily. 12/17/18  Yes Sylvie Swift MD  
chlorthalidone (HYGROTEN) 25 mg tablet Take 1 Tab by mouth daily. 12/18/18  Yes Sylvie Swift MD  
levothyroxine (SYNTHROID) 125 mcg tablet Take 125 mcg by mouth Daily (before breakfast). Yes Provider, Historical  
sertraline (ZOLOFT) 100 mg tablet Take 100 mg by mouth daily. Yes Provider, Historical  
diclofenac (VOLTAREN) 1 % gel APPLY TO AFFECTED AREA DAILY AS DIRECTED 4/16/19   David, MD Darion  
fenofibrate (LOFIBRA) 160 mg tablet Take 160 mg by mouth.     Other, MD Darion  
lisinopril (PRINIVIL, ZESTRIL) 40 mg tablet  4/12/19   Darion Hernandez MD  
 metFORMIN (GLUCOPHAGE) 500 mg tablet Take 500 mg by mouth. Darion Hernandez MD  
nicotinic acid (NIACIN) 500 mg tablet Take 500 mg by mouth. Darion Hernandez MD  
omega-3 acid ethyl esters (LOVAZA) 1 gram capsule Take 2 g by mouth. Darion Hernanedz MD  
XTAMPZA ER 18 mg ER capsule TAKE ONE CAPSULE BY MOUTH TWICE A DAY 4/15/19   Darion Hernandez MD  
BD ULTRA-FINE ORIG PEN NEEDLE 29 gauge x 1/2\" ndle AS DIRECTED DAILY SUBCUTANEOUSLY 1 MONTH 4/11/19   Darion Hernandez MD  
ondansetron hcl (ZOFRAN) 4 mg tablet Take 4 mg by mouth every six (6) hours as needed for Nausea. Provider, Historical  
 
 
 
Review of Systems: 
(bold if positive, if negative) Gen:  Eyes:  ENT:  CVS:  Pulm:  GI:  nausea :   
MS:  PainSkin:  Psych:  anxietyEndo:   
Hem:  Renal:   
Neuro:    
 
 
  
Objective: VITALS:   
Vital signs reviewed; most recent are: 
 
Visit Vitals /57 (BP 1 Location: Right arm, BP Patient Position: At rest) Pulse 85 Temp 97.9 °F (36.6 °C) Resp 18 Ht 5' 1\" (1.549 m) Wt 66.7 kg (147 lb) SpO2 98% BMI 27.78 kg/m² SpO2 Readings from Last 6 Encounters:  
04/29/19 98% 01/10/19 97% 12/17/18 96% No intake or output data in the 24 hours ending 04/29/19 1727 Exam:  
 
Physical Exam: 
 
Gen:  Well-developed, well-nourished, in no acute distress HEENT:  Pink conjunctivae, PERRL, hearing intact to voice, moist mucous membranes Neck:  Supple Resp:  No accessory muscle use, clear breath sounds without wheezes rales or rhonchi 
Card:  No murmurs, normal S1, S2 without thrills, bruits or peripheral edema, radial pulses 2+ b/l Abd:  Soft, non-tender, non-distended, normoactive bowel sounds are present Lymph:  No cervical adenopathy Musc:  No cyanosis, cap refill < 2 sec Skin:  No rashes. Blanching erythema of sacrum. Neuro:  Cranial nerves 3-12 are grossly intact,  strength is 5/5 bilaterally, dorsi / plantarflexion strength is 5/5 bilaterally, follows commands appropriately Psych:  Alert with good insight. Oriented to person, place, and time Labs: 
 
Recent Labs  
  04/29/19 
1452 WBC 8.1 HGB 13.5 HCT 41.8  Recent Labs  
  04/29/19 
1452   
K 4.9 * CO2 21 * BUN 65* CREA 2.41* CA 10.2* ALB 4.1 TBILI 0.7 SGOT 24 ALT 30 Lab Results Component Value Date/Time Glucose (POC) 185 (H) 01/10/2019 01:12 PM  
 Glucose (POC) 177 (H) 01/09/2019 09:17 PM  
 
No results for input(s): PH, PCO2, PO2, HCO3, FIO2 in the last 72 hours. No results for input(s): INR in the last 72 hours. No lab exists for component: INREXT Medical records reviewed in preparation for this admission: Old medical records. Surrogate decision maker:  spouse Total time spent in care of this patient: 50 Minutes Care Plan discussed with: Patient and Family Discussed:  Care Plan Prophylaxis:  Hep SQ Probable Disposition:   PT, OT, RN 
        
___________________________________________________ Attending Physician: Shanae Liao MD

## 2019-04-29 NOTE — ED PROVIDER NOTES
I have evaluated the patient as the Provider in Triage. I have reviewed Her vital signs and the triage nurse assessment. I have talked with the patient and any available family and advised that I am the provider in triage and have ordered the appropriate study to initiate their work up based on the clinical presentation during my assessment. I have advised that the patient will be accommodated in the Main ED as soon as possible. I have also requested to contact the triage nurse or myself immediately if the patient experiences any changes in their condition during this brief waiting period. Note written by Mulugeta Gomes, as dictated by Romelia Kim MD 2:00 PM 
 
Pt reports nausea for the past 2 weeks. Pt also c/o \"skin burning all over\", which began today. Pt notes accompanying urinary frequency. Pt denies taking blood thinners. Pt denies vomiting or fever. Patient is a 51-year-old female with a past medical history significant for chronic back pain, diabetes and hypertension who is brought to the ED today by her  via wheelchair with complaints of nausea, burning skin and urinary frequency. Patient states she is not drinking as much because she was worried about bothering her  to get her to the bathroom frequently. The patient has a recent history of sepsis in the last year. Patient has been receiving physical therapy for her low back pain. She is ambulatory at times but uses the wheelchair frequently due to her back pain. The  states she's had nausea for the last 2-1/2 weeks. This has worsened her room the last few days.  denies any diarrhea, fevers, chills or vomiting. Patient also notes a burning sensation of the skin. Approximately 2 weeks ago she had urinary frequency but the  states this is total down to very little urine output in the last few days.   denies any hematuria when he helped change her incontinence brief period there are no further complaints at this time. Primary care provider:Sandy Toth MD 
 
 
The history is provided by the patient and the spouse. No  was used. Past Medical History:  
Diagnosis Date  Chronic back pain  Diabetes mellitus, type 2 ([address]  History of vascular access device 12/03/2018 [facility] VAT - 4 FR single, L cephalic, 44 cm for LTA  HTN (hypertension) No past surgical history on file. Family History:  
Problem Relation Age of Onset  Diabetes Father  Heart Disease Father Social History Socioeconomic History  Marital status:  Spouse name: Not on file  Number of children: Not on file  Years of education: Not on file  Highest education level: Not on file Occupational History  Not on file Social Needs  Financial resource strain: Not on file  Food insecurity:  
  Worry: Not on file Inability: Not on file  Transportation needs:  
  Medical: Not on file Non-medical: Not on file Tobacco Use  Smoking status: Never Smoker  Smokeless tobacco: Never Used Substance and Sexual Activity  Alcohol use: No  
  Frequency: Never  Drug use: Not on file  Sexual activity: Not on file Lifestyle  Physical activity:  
  Days per week: Not on file Minutes per session: Not on file  Stress: Not on file Relationships  Social connections:  
  Talks on phone: Not on file Gets together: Not on file Attends [employer]ism service: Not on file Active member of club or organization: Not on file Attends meetings of clubs or organizations: Not on file Relationship status: Not on file  Intimate partner violence:  
  Fear of current or ex partner: Not on file Emotionally abused: Not on file Physically abused: Not on file Forced sexual activity: Not on file Other Topics Concern [address] Service Not Asked  Blood Transfusions Not Asked  Caffeine Concern Not Asked  Occupational Exposure Not Asked Amari Osmar Hazards Not Asked  Sleep Concern Not Asked  Stress Concern Not Asked  Weight Concern Not Asked  Special Diet Not Asked  Back Care Not Asked  Exercise Not Asked  Bike Helmet Not Asked  Seat Belt Not Asked  Self-Exams Not Asked Social History Narrative  Not on file ALLERGIES: Patient has no known allergies. Review of Systems Constitutional: Positive for chills and fatigue. Negative for fever. Respiratory: Negative for shortness of breath. Cardiovascular: Negative for chest pain. Gastrointestinal: Positive for nausea. Negative for anal bleeding and vomiting. Genitourinary: Positive for decreased urine volume and frequency. Negative for vaginal bleeding, vaginal discharge and vaginal pain. Neurological: Negative for headaches. Psychiatric/Behavioral: Negative. Vitals:  
 04/29/19 1358 BP: 114/57 Pulse: 85 Resp: 18 Temp: 97.9 °F (36.6 °C) SpO2: 98% Weight: 66.7 kg (147 lb) Height: 5' 1\" (1.549 m) Physical Exam  
Constitutional: She is oriented to person, place, and time. She appears well-developed and well-nourished. HENT:  
Head: Normocephalic and atraumatic. Nose: Nose normal.  
Mouth/Throat: Uvula is midline. Mucous membranes are pale and dry. No uvula swelling. No tonsillar exudate. Eyes: Pupils are equal, round, and reactive to light. Neck: Normal range of motion. Neck supple. Cardiovascular: Normal rate, regular rhythm, normal heart sounds and intact distal pulses. Pulmonary/Chest: Effort normal and breath sounds normal. No respiratory distress. She has no wheezes. She has no rales. She exhibits no tenderness. Abdominal: Soft. Normal appearance and bowel sounds are normal. There is no tenderness. There is no rigidity, no rebound, no guarding and no CVA tenderness. Musculoskeletal: Normal range of motion. Neurological: She is alert and oriented to person, place, and time. Skin: Skin is warm and dry. Ecchymosis (scattered over lower extremities) noted. No burn, no lesion, no petechiae and no rash noted. No erythema. Psychiatric: She has a normal mood and affect. Her behavior is normal. Judgment and thought content normal.  
Nursing note and vitals reviewed. MDM Procedures Assessment & Plan:  
 
Orders Placed This Encounter  URINE CULTURE HOLD SAMPLE  
 CBC WITH AUTOMATED DIFF  
 COMPREHENSIVE METABOLIC PANEL  
 URINALYSIS W/MICROSCOPIC  
 STRAIGHT CATHETER (NURSING) ONE TIME STAT  diclofenac (VOLTAREN) 1 % gel  fenofibrate (LOFIBRA) 160 mg tablet  lisinopril (PRINIVIL, ZESTRIL) 40 mg tablet  metFORMIN (GLUCOPHAGE) 500 mg tablet  nicotinic acid (NIACIN) 500 mg tablet  omega-3 acid ethyl esters (LOVAZA) 1 gram capsule  XTAMPZA ER 18 mg ER capsule  BD ULTRA-FINE ORIG PEN NEEDLE 29 gauge x 1/2\" ndle Seen in triage by & discussed with Eduardo Reeves., MD,ED Provider Dash Lorenzo NP 
04/29/19 
3:11 PM 
 
BAYLEE on labs. IVF bolus now. Will need admission. 5:03 PM 
Patient is being admitted to the hospital.  The results of their tests and reasons for their admission have been discussed with them and/or available family. They convey agreement and understanding for the need to be admitted and for their admission diagnosis. Consultation has been made with the inpatient physician specialist for hospitalization. LABORATORY TESTS: 
Recent Results (from the past 12 hour(s)) CBC WITH AUTOMATED DIFF Collection Time: 04/29/19  2:52 PM  
Result Value Ref Range WBC 8.1 3.6 - 11.0 K/uL  
 RBC 4.64 3.80 - 5.20 M/uL  
 HGB 13.5 11.5 - 16.0 g/dL HCT 41.8 35.0 - 47.0 % MCV 90.1 80.0 - 99.0 FL  
 MCH 29.1 26.0 - 34.0 PG  
 MCHC 32.3 30.0 - 36.5 g/dL  
 RDW 13.4 11.5 - 14.5 % PLATELET 783 026 - 551 K/uL MPV 10.7 8.9 - 12.9 FL  
 NRBC 0.0 0  WBC ABSOLUTE NRBC 0.00 0.00 - 0.01 K/uL NEUTROPHILS 59 32 - 75 % LYMPHOCYTES 29 12 - 49 % MONOCYTES 8 5 - 13 % EOSINOPHILS 2 0 - 7 % BASOPHILS 1 0 - 1 % IMMATURE GRANULOCYTES 1 (H) 0.0 - 0.5 % ABS. NEUTROPHILS 4.8 1.8 - 8.0 K/UL  
 ABS. LYMPHOCYTES 2.4 0.8 - 3.5 K/UL  
 ABS. MONOCYTES 0.6 0.0 - 1.0 K/UL  
 ABS. EOSINOPHILS 0.1 0.0 - 0.4 K/UL  
 ABS. BASOPHILS 0.1 0.0 - 0.1 K/UL  
 ABS. IMM. GRANS. 0.0 0.00 - 0.04 K/UL  
 DF AUTOMATED METABOLIC PANEL, COMPREHENSIVE Collection Time: 04/29/19  2:52 PM  
Result Value Ref Range Sodium 140 136 - 145 mmol/L Potassium 4.9 3.5 - 5.1 mmol/L Chloride 109 (H) 97 - 108 mmol/L  
 CO2 21 21 - 32 mmol/L Anion gap 10 5 - 15 mmol/L Glucose 187 (H) 65 - 100 mg/dL BUN 65 (H) 6 - 20 MG/DL Creatinine 2.41 (H) 0.55 - 1.02 MG/DL  
 BUN/Creatinine ratio 27 (H) 12 - 20 GFR est AA 25 (L) >60 ml/min/1.73m2 GFR est non-AA 20 (L) >60 ml/min/1.73m2 Calcium 10.2 (H) 8.5 - 10.1 MG/DL Bilirubin, total 0.7 0.2 - 1.0 MG/DL  
 ALT (SGPT) 30 12 - 78 U/L  
 AST (SGOT) 24 15 - 37 U/L Alk. phosphatase 79 45 - 117 U/L Protein, total 7.7 6.4 - 8.2 g/dL Albumin 4.1 3.5 - 5.0 g/dL Globulin 3.6 2.0 - 4.0 g/dL A-G Ratio 1.1 1.1 - 2.2 URINALYSIS W/MICROSCOPIC Collection Time: 04/29/19  4:32 PM  
Result Value Ref Range Color YELLOW/STRAW Appearance CLOUDY (A) CLEAR Specific gravity 1.019 1.003 - 1.030    
 pH (UA) 5.0 5.0 - 8.0 Protein 100 (A) NEG mg/dL Glucose NEGATIVE  NEG mg/dL Ketone NEGATIVE  NEG mg/dL Blood NEGATIVE  NEG Urobilinogen 0.2 0.2 - 1.0 EU/dL Nitrites NEGATIVE  NEG Leukocyte Esterase SMALL (A) NEG    
 WBC PENDING /hpf  
 RBC PENDING /hpf Epithelial cells PENDING /lpf Bacteria PENDING /hpf URINE CULTURE HOLD SAMPLE Collection Time: 04/29/19  4:32 PM  
Result Value Ref Range Urine culture hold URINE ON HOLD IN MICROBIOLOGY DEPT FOR 3 DAYS. IF UNPRESERVED URINE IS SUBMITTED, IT CANNOT BE USED FOR ADDITIONAL TESTING AFTER 24 HRS, RECOLLECTION WILL BE REQUIRED. BILIRUBIN, CONFIRM Collection Time: 04/29/19  4:32 PM  
Result Value Ref Range Bilirubin UA, confirm NEGATIVE  NEG    
 
 
IMAGING RESULTS: 
No orders to display No results found. MEDICATIONS GIVEN: 
Medications  
sodium chloride 0.9 % bolus infusion 1,000 mL (has no administration in time range) IMPRESSION: 
1. BAYLEE (acute kidney injury) ([facility] 2. Dehydration PLAN: 
1.  Admit to hospitalist 
 
 
Nayeli Will NP

## 2019-04-30 LAB
ALBUMIN SERPL-MCNC: 3.1 G/DL (ref 3.5–5)
ANION GAP SERPL CALC-SCNC: 8 MMOL/L (ref 5–15)
BUN SERPL-MCNC: 59 MG/DL (ref 6–20)
BUN/CREAT SERPL: 30 (ref 12–20)
CALCIUM SERPL-MCNC: 9 MG/DL (ref 8.5–10.1)
CHLORIDE SERPL-SCNC: 115 MMOL/L (ref 97–108)
CO2 SERPL-SCNC: 21 MMOL/L (ref 21–32)
CREAT SERPL-MCNC: 1.96 MG/DL (ref 0.55–1.02)
CREAT UR-MCNC: 73.2 MG/DL
EOSINOPHIL #/AREA URNS HPF: NEGATIVE /[HPF]
EST. AVERAGE GLUCOSE BLD GHB EST-MCNC: 146 MG/DL
GLUCOSE BLD STRIP.AUTO-MCNC: 120 MG/DL (ref 65–100)
GLUCOSE BLD STRIP.AUTO-MCNC: 229 MG/DL (ref 65–100)
GLUCOSE BLD STRIP.AUTO-MCNC: 94 MG/DL (ref 65–100)
GLUCOSE SERPL-MCNC: 128 MG/DL (ref 65–100)
HBA1C MFR BLD: 6.7 % (ref 4.2–6.3)
PHOSPHATE SERPL-MCNC: 3.2 MG/DL (ref 2.6–4.7)
POTASSIUM SERPL-SCNC: 4.5 MMOL/L (ref 3.5–5.1)
SERVICE CMNT-IMP: ABNORMAL
SERVICE CMNT-IMP: ABNORMAL
SERVICE CMNT-IMP: NORMAL
SODIUM SERPL-SCNC: 144 MMOL/L (ref 136–145)
SODIUM UR-SCNC: 84 MMOL/L

## 2019-04-30 PROCEDURE — 74011636637 HC RX REV CODE- 636/637: Performed by: INTERNAL MEDICINE

## 2019-04-30 PROCEDURE — 97165 OT EVAL LOW COMPLEX 30 MIN: CPT

## 2019-04-30 PROCEDURE — 74011250637 HC RX REV CODE- 250/637: Performed by: INTERNAL MEDICINE

## 2019-04-30 PROCEDURE — 82962 GLUCOSE BLOOD TEST: CPT

## 2019-04-30 PROCEDURE — 80069 RENAL FUNCTION PANEL: CPT

## 2019-04-30 PROCEDURE — 65270000029 HC RM PRIVATE

## 2019-04-30 PROCEDURE — 97162 PT EVAL MOD COMPLEX 30 MIN: CPT

## 2019-04-30 PROCEDURE — 36415 COLL VENOUS BLD VENIPUNCTURE: CPT

## 2019-04-30 PROCEDURE — 74011250636 HC RX REV CODE- 250/636: Performed by: INTERNAL MEDICINE

## 2019-04-30 PROCEDURE — 83036 HEMOGLOBIN GLYCOSYLATED A1C: CPT

## 2019-04-30 PROCEDURE — 84300 ASSAY OF URINE SODIUM: CPT

## 2019-04-30 PROCEDURE — 87205 SMEAR GRAM STAIN: CPT

## 2019-04-30 PROCEDURE — 82570 ASSAY OF URINE CREATININE: CPT

## 2019-04-30 PROCEDURE — 97530 THERAPEUTIC ACTIVITIES: CPT

## 2019-04-30 PROCEDURE — 97535 SELF CARE MNGMENT TRAINING: CPT

## 2019-04-30 RX ADMIN — Medication 10 ML: at 14:00

## 2019-04-30 RX ADMIN — OXYCODONE HYDROCHLORIDE 20 MG: 20 TABLET, FILM COATED, EXTENDED RELEASE ORAL at 09:02

## 2019-04-30 RX ADMIN — HEPARIN SODIUM 5000 UNITS: 5000 INJECTION INTRAVENOUS; SUBCUTANEOUS at 17:47

## 2019-04-30 RX ADMIN — Medication 10 ML: at 22:54

## 2019-04-30 RX ADMIN — SENNOSIDES AND DOCUSATE SODIUM 1 TABLET: 8.6; 5 TABLET ORAL at 17:47

## 2019-04-30 RX ADMIN — SERTRALINE HYDROCHLORIDE 100 MG: 50 TABLET ORAL at 09:02

## 2019-04-30 RX ADMIN — OXYCODONE HYDROCHLORIDE 20 MG: 20 TABLET, FILM COATED, EXTENDED RELEASE ORAL at 22:53

## 2019-04-30 RX ADMIN — INSULIN LISPRO 3 UNITS: 100 INJECTION, SOLUTION INTRAVENOUS; SUBCUTANEOUS at 17:53

## 2019-04-30 RX ADMIN — HEPARIN SODIUM 5000 UNITS: 5000 INJECTION INTRAVENOUS; SUBCUTANEOUS at 02:13

## 2019-04-30 RX ADMIN — LEVOTHYROXINE SODIUM 125 MCG: 75 TABLET ORAL at 07:24

## 2019-04-30 RX ADMIN — SODIUM CHLORIDE 125 ML/HR: 900 INJECTION, SOLUTION INTRAVENOUS at 15:53

## 2019-04-30 RX ADMIN — HEPARIN SODIUM 5000 UNITS: 5000 INJECTION INTRAVENOUS; SUBCUTANEOUS at 09:04

## 2019-04-30 RX ADMIN — SENNOSIDES AND DOCUSATE SODIUM 1 TABLET: 8.6; 5 TABLET ORAL at 09:02

## 2019-04-30 RX ADMIN — SODIUM CHLORIDE 125 ML/HR: 900 INJECTION, SOLUTION INTRAVENOUS at 02:12

## 2019-04-30 NOTE — PROGRESS NOTES
Blanco Mcpherson [facility] [address] 
[address] [facility], [address] [place] [address] 
[phone] Medical Progress Note NAME: Aicha Jason :  1955 MRM:  [id number] Date/Time: 2019  8:15 AM 
 
  
Assessment and Plan: 1. BAYLEE (acute kidney injury) ([facility] (2018): Suspected to be from poor po intake and dehydration with IVVD. No hx of NSAID use. check urine studies including urine eos. Renal ultrasound without obstruction. Renal function improving with IVF. Continue IVF. continue to hold ACEI 
  
2. HTN (hypertension) (): stopped ACE and chlorthalidone and hold amlodipine for now as BP is borderline.  
  
3. Chronic back pain (): continue oxycodone ER and prn oxycodone 4. Diabetes mellitus, type 2 ([facility] ():  With recent weight loss, has been able to taper down insulin and last week taken off insulin in favor of metformin. Stop metformin with BAYLEE. Use SSI alone for now and if needed may restart long acting insulin 
  
5. Hypothyroidism: continue LT4 
  
6. Sacral erythema: wound care consult 7. Debility. Continue PT/OT Subjective: Chief Complaint:  Follow up of pt who was admitted with ARF. Feels weak ROS: 
(bold if positive, if negative) Tolerating PT  Tolerating Diet Objective:  
 
Last 24hrs VS reviewed since prior progress note. Most recent are: 
 
Visit Vitals /79 (BP 1 Location: Right arm, BP Patient Position: At rest) Pulse 77 Temp 98.2 °F (36.8 °C) Resp 16 Ht 5' 1\" (1.549 m) Wt 66.7 kg (147 lb) SpO2 99% BMI 27.78 kg/m² SpO2 Readings from Last 6 Encounters:  
19 99% 01/10/19 97% 18 96% Intake/Output Summary (Last 24 hours) at 2019 0815 Last data filed at 2019 3027 Gross per 24 hour Intake 1000 ml Output 200 ml Net 800 ml Physical Exam: 
 
Gen:  Well-developed, well-nourished, in no acute distress HEENT:  Pink conjunctivae, PERRL, hearing intact to voice, moist mucous membranes Neck:  Supple, without masses, thyroid non-tender Resp:  No accessory muscle use, clear breath sounds without wheezes rales or rhonchi 
Card:  No murmurs, normal S1, S2 without thrills, bruits or peripheral edema Abd:  Soft, non-tender, non-distended, normoactive bowel sounds are present, no palpable organomegaly and no detectable hernias Lymph:  No cervical or inguinal adenopathy Musc:  No cyanosis or clubbing Skin:  No rashes or ulcers, skin turgor is good Neuro:  Cranial nerves are grossly intact, no focal motor weakness, follows commands appropriately Psych:  Good insight, oriented to person, place and time, alert 
__________________________________________________________________ Medications Reviewed: (see below) Medications:  
 
Current Facility-Administered Medications Medication Dose Route Frequency  sodium chloride (NS) flush 5-40 mL  5-40 mL IntraVENous Q8H  
 sodium chloride (NS) flush 5-40 mL  5-40 mL IntraVENous PRN  
 0.9% sodium chloride infusion  125 mL/hr IntraVENous CONTINUOUS  
 heparin (porcine) injection 5,000 Units  5,000 Units SubCUTAneous Q8H  
 acetaminophen (TYLENOL) tablet 650 mg  650 mg Oral Q4H PRN  
 ondansetron (ZOFRAN) injection 4 mg  4 mg IntraVENous Q4H PRN  
 insulin lispro (HUMALOG) injection   SubCUTAneous AC&HS  
 glucose chewable tablet 16 g  4 Tab Oral PRN  
 dextrose (D50W) injection syrg 12.5-25 g  25-50 mL IntraVENous PRN  
 glucagon (GLUCAGEN) injection 1 mg  1 mg IntraMUSCular PRN  
 levothyroxine (SYNTHROID) tablet 125 mcg  125 mcg Oral ACB  oxyCODONE IR (ROXICODONE) tablet 5 mg  5 mg Oral Q6H PRN  
 sertraline (ZOLOFT) tablet 100 mg  100 mg Oral DAILY  senna-docusate (PERICOLACE) 8.6-50 mg per tablet 1 Tab  1 Tab Oral BID  
 oxyCODONE ER (OxyCONTIN) tablet 20 mg  20 mg Oral Q12H Lab Data Reviewed: (see below) Lab Review:  
 
Recent Labs 04/29/19 
1452 WBC 8.1 HGB 13.5 HCT 41.8  Recent Labs 04/30/19 
0443 04/29/19 
1452  140  
K 4.5 4.9  
* 109* CO2 21 21 * 187* BUN 59* 65* CREA 1.96* 2.41* CA 9.0 10.2* PHOS 3.2  --   
ALB 3.1* 4.1 TBILI  --  0.7 SGOT  --  24 ALT  --  30 Lab Results Component Value Date/Time Glucose (POC) 121 (H) 04/29/2019 10:19 PM  
 Glucose (POC) 185 (H) 01/10/2019 01:12 PM  
 Glucose (POC) 177 (H) 01/09/2019 09:17 PM  
 Glucose (POC) 165 (H) 01/09/2019 03:44 PM  
 Glucose (POC) 136 (H) 01/09/2019 11:22 AM  
 
No results for input(s): PH, PCO2, PO2, HCO3, FIO2 in the last 72 hours. No results for input(s): INR in the last 72 hours. No lab exists for component: INREXT All Micro Results Procedure Component Value Units Date/Time URINE CULTURE HOLD SAMPLE [[id number]] Collected:  04/29/19 6039 Order Status:  Completed Specimen:  Urine from Serum Updated:  04/29/19 1635 Urine culture hold URINE ON HOLD IN MICROBIOLOGY DEPT FOR 3 DAYS. IF UNPRESERVED URINE IS SUBMITTED, IT CANNOT BE USED FOR ADDITIONAL TESTING AFTER 24 HRS, RECOLLECTION WILL BE REQUIRED. I have reviewed notes of prior 24hr. Other pertinent lab: Total time spent with patient: 28 Care Plan discussed with: Patient, Nursing Staff and >50% of time spent in counseling and coordination of care Discussed:  Care Plan Prophylaxis:  Hep SQ Disposition:  Home w/Family 
        
___________________________________________________ Attending Physician: Juni Morataya MD

## 2019-04-30 NOTE — PROGRESS NOTES
4/30/2019 
4:38 PM 
Reason for Admission:   Acute kidney injury RRAT Score:       10 Plan for utilizing home health:     PTA, pt was receiving HH, and will require a resumption of care order. Current Advanced Directive/Advance Care Plan: Not on file. Pt's  is surrogate decision maker - [address], [address]  [phone] Likelihood of Readmission:  Obed Hurtado Transition of Care Plan:                   
 
CM met with pt for assessment. Demographics and PCP were confirmed. Pt is a 59year old,  female who lives in a private residence with her  (exterior ramp, 0 interior steps). PTA, pt was able to complete ADLs with the use of RW and wheelchair. Pt is unemployed but insured through [employer] (under ) ([address]). Pt has prior SNF at [address] and [facility] currently. A resumption of care order will be needed. Awaiting finalized DC recommendations. Nona Chapman MA Care Management Interventions PCP Verified by CM: Yes([place]. No NN to notify. ) Palliative Care Criteria Met (RRAT>21 & CHF Dx)?: No 
Mode of Transport at Discharge: Other (see comment)(Family) MyChart Signup: No 
Discharge Durable Medical Equipment: No 
Physical Therapy Consult: Yes Occupational Therapy Consult: Yes Speech Therapy Consult: No 
Current Support Network: Lives with Spouse Confirm Follow Up Transport: Family Plan discussed with Pt/Family/Caregiver: Yes Discharge Location Discharge Placement: Unable to determine at this time

## 2019-04-30 NOTE — PROGRESS NOTES
[facility]: MED RECONCILIATION Comments/Recommendations:  
Patient ok with using oxycontin ER (per Juan Jews) -instead of bringing own [place]. Medications added: · Atenolol 100 mg po daily Medications removed: 
 
· Lantus · Zofran Medications adjusted: · Diclofenac changed to prn Information obtained from:  Juan Marie Significant PMH/Disease States:  
Past Medical History:  
Diagnosis Date  Chronic back pain  Diabetes mellitus, type 2 ([address]  History of vascular access device 12/03/2018 [facility] VAT - 4 FR single, L cephalic, 44 cm for LTA  HTN (hypertension) Chief Complaint for this Admission: Chief Complaint Patient presents with  Nausea  Skin Problem  Polyuria Allergies: Patient has no known allergies. Prior to Admission Medications:  
 
Medication Documentation Review Audit Reviewed by Michelle Rasmussen (Pharmacist) on 04/29/19 at 2025 Medication Sig Documenting Provider Last Dose Status Taking? amLODIPine (NORVASC) 10 mg tablet Take 10 mg by mouth nightly. Provider, Historical 4/28/2019 Unknown time Active Yes  
atenolol (TENORMIN) 100 mg tablet Take 100 mg by mouth daily. Provider, Historical 4/29/2019 Unknown time Active Yes  
atorvastatin (LIPITOR) 20 mg tablet Take 20 mg by mouth nightly. Provider, Historical 4/28/2019 Unknown time Active Yes  
chlorthalidone (HYGROTEN) 25 mg tablet Take 1 Tab by mouth daily. Carl Amador MD 4/29/2019 Unknown time Active Yes  
diclofenac (VOLTAREN) 1 % gel Apply 2 g to affected area daily as needed (back pain). Provider, Historical 4/22/2019 Unknown time Active Yes  
fenofibrate (LOFIBRA) 160 mg tablet Take 160 mg by mouth daily. Darion Hernandez MD 4/29/2019 Unknown time Active Yes  
levothyroxine (SYNTHROID) 125 mcg tablet Take 125 mcg by mouth Daily (before breakfast). Provider, Historical 4/29/2019 Unknown time Active Yes  
lisinopril (PRINIVIL, ZESTRIL) 40 mg tablet Take 40 mg by mouth nightly. Darion Hernandez MD 4/28/2019 Unknown time Active Yes  
metFORMIN (GLUCOPHAGE) 500 mg tablet Take 500 mg by mouth two (2) times daily (with meals). Darion Hernandez MD 4/29/2019 Unknown time Active Yes  
niacin ER (NIASPAN) 500 mg tablet Take 500 mg by mouth daily. Provider, Historical 4/29/2019 Unknown time Active Yes  
omega-3 acid ethyl esters (LOVAZA) 1 gram capsule Take 2 g by mouth two (2) times daily (with meals). Darion Hernandez MD new RX Active Yes Med Note (ALONDRA BERUMEN   Mon Apr 29, 2019  8:21 PM) New Rx- has not started yet  
oxyCODONE ER (XTAMPZA ER) 18 mg ER capsule Take 18 mg by mouth every twelve (12) hours. Provider, Historical 4/29/2019 Unknown time Active Yes  
oxyCODONE IR (ROXICODONE) 5 mg immediate release tablet Take 1 Tab by mouth every six (6) hours as needed for Pain. Max Daily Amount: 20 mg. Do not give if somnolent or respiratory depression (rate <12) Nasim Moreno MD 4/22/2019 Unknown time Active Yes  
sertraline (ZOLOFT) 100 mg tablet Take 100 mg by mouth daily. Provider, Historical 4/29/2019 Unknown time Active Yes Sandro Roque   Contact: [phone]

## 2019-04-30 NOTE — PROGRESS NOTES
Nutrition Assessment: 
 
RECOMMENDATIONS/INTERVENTION(S):  
Advance diet to Full liquids, no Concentrated sweets, and then CCD as able Monitor PO intakes, weight, hydration Add Mighty shakes when diet advances to Fulls, Watch BG 
 
ASSESSMENT:  
4/30: 59 yr old female admitted for nausea, poor PO. PMHx: DM, HTN, VAD. MST received for weight loss. Pt spouse in room provided most of hx. Pt seen in Dec 2018. Pt weighed 194 lbs in December. Down to 147 lbs currently. Spouse states pt lost most of the weight while she was hospitalized in Dec. In Feb, pt was 167 lbs. Pt states she was 147 lbs about a month ago at [facility] where they weighed her in a yadira. Weight loss had been significant but per chart pt not losing weight any more, over the last month. Intakes are still poor. Pt drank ~50% of clear liquid diet. This likely  Meets <50% of EEN. Pt meets malnutrition criteria. Encourage PO intakes. Watch BG while on Clears 2/2 high sugar content in juices. Pt likes Mighty shakes- add when diet advances. Nausea improving. Hx of swallowing difficulty. Denies issues currently. SLP followed on previous admission. Labs: , 187 mg/dL. Cr 1.96 trending down. A1C 6.7 Meets Criteria for Severe Acute Malnutrition as evidenced by: 
 [] Moderate muscle wasting, loss of subcutaneous fat 
 [x] Nutritional intake of <50% of recommended intake for >5 days [x] Weight loss of >1-2% in 1 week, >5% in 1 month, >7.5% in 3 months, or >10% in 6 months 
 [] Moderate-severe edema SUBJECTIVE/OBJECTIVE:  
Diet Order: Clear liquids 
% Eaten:  No data found. Pertinent Medications: [x] Reviewed Labs reviewed:  [x] Anthropometrics: Height: 5' 1\" (154.9 cm) Weight: 67.1 kg (148 lb) IBW (%IBW):   ( ) UBW (%UBW):   (  %) BMI: Body mass index is 27.96 kg/m². This BMI is indicative of: 
 
 [] Underweight    [] Normal    [x] Overweight    []  Obesity    []  Extreme Obesity (BMI>40) Estimated Nutrition Needs (Based on): 4191 Kcals/day(BMR(1161x1.3)) , 54 g(-67g/day(0.8-1.0g/kg)) Protein Carbohydrate: At Least 130 g/day  Fluids: 1500 mL/day (1mL/kg rounded to 50 mL) Last BM: 4/30   [x]Active     []Hyperactive  []Hypoactive       [] Absent   BS Skin:    [x] Intact   [] Incision  [] Breakdown   [] DTI   [] Tears/Excoriation/Abrasion  []Edema [] Other: Wt Readings from Last 30 Encounters:  
04/30/19 67.1 kg (148 lb) 02/13/19 75.8 kg (167 lb) 01/06/19 82.5 kg (181 lb 14.4 oz) 12/17/18 88.4 kg (194 lb 14.4 oz) 12/01/18 81.6 kg (179 lb 14.3 oz) 12/01/18 81.6 kg (179 lb 14.3 oz) 12/01/18 81.6 kg (179 lb 14.3 oz) NUTRITION DIAGNOSES:  
Problem:  Unintended weight loss Etiology: related to decreased abiliyt to consume sufficient energy Signs/Symptoms: as evidenced by poor PO x 3+ months. weight loss, appetite low NUTRITION INTERVENTIONS: 
Meals/Snacks: General/healthful diet   Supplements: Commercial supplement GOAL:  
Pt will consume >25% of meals and ONS w/i 3-5 days Cultural, Hinduism, or Ethnic Dietary Needs: None LEARNING NEEDS (Diet, Food/Nutrient-Drug Interaction):  
 [x] None Identified 
 [] Identified and Education Provided/Documented 
 [] Identified and Pt declined/was not appropriate [x] Interdisciplinary Care Plan Reviewed/Documented  
 [x] Discharge Needs: high kcal diet 
 [] No Nutrition Related Discharge Needs NUTRITION RISK:  
Pt Is At Nutrition Risk  [x] No Nutrition Risk Identified  [] PT SEEN FOR:  
 []  MD Consult: []Calorie Count []Diabetic Diet Education []Diet Education []Electrolyte Management []General Nutrition Management and Supplements []Management of Tube Feeding []TPN Recommendations [x]  RN Referral:  [x]MST score >=2 
   []Enteral/Parenteral Nutrition PTA []Pregnant: Gestational DM or Multigestation  
              [] Pressure Ulcer []  Low BMI      []  Length of Stay       [] Dysphagia Diet     [] Ventilator      [] Follow-Up Previous Recommendations: 
 [] Implemented          [] Not Implemented          [x] Not Applicable Previous Goal: 
 [] Met              [] Progressing Towards Goal              [] Not Progressing Towards Goal   [x] Not Applicable Silviano Dorantes RD Pager: [phone] Office: [phone]

## 2019-04-30 NOTE — PROGRESS NOTES
Problem: Self Care Deficits Care Plan (Adult) Goal: *Acute Goals and Plan of Care (Insert Text) Description Occupational Therapy Goals Initiated 4/30/2019 1. Patient will perform upper body dressing and bathing with modified independence within 7 days. 2.  Patient will perform toilet transfer with minimal assistance/contact guard assist using most appropriate DME within 7 days. 3.  Patient will perform all aspects of toileting at moderate assistance  within 7 days. 4.  Patient will perform grooming, in sitting, with supervision/set-up within 7 days. 5.  Patient will verbalize/demonstrate 3/3 back precautions during ADL tasks without cues within 7 days. Outcome: Progressing Towards Goal 
  
OCCUPATIONAL THERAPY EVALUATION Patient: Madhu Koenig (53 y.o. female) Date: 4/30/2019 Primary Diagnosis: BAYLEE (acute kidney injury) ([facility] [address] [N17.9] Precautions: Standard ASSESSMENT : 
Based on the objective data described below, the patient presents with increased LBP, impaired mobility, activity tolerance, strength, and UE ROM impacting performance during admission for BAYLEE. Patient is received in bed following meeting with MD, is tearful and reports feeling overwhelmed with increased pain (chronic in nature) in lower back. She is agreeable to participate, however is anxious for movement. She performs log roll with mod assist and requires max assist to come to sitting at EOB. She currently requires total assist for LB ADLs, min A for UB ADLs, and total assist for toileting. Patient's UE ROM grossly limited at bilateral shoulders and noted muscle atrophy in bilateral hands, resulting in weakness. She refuses to perform sit to stand this session. At home, she reports she spends the majority of her day in bed, however is able to perform stand pivot transfer to wheelchair and toilet with use of rolling walker. Patient received [facility] therapy services prior to hospital admission. Her  is her primary caregiver and provides assistance for all ADLs. Patient is currently below her baseline due to limited OOB mobility limited by anxiety and pain. Will continue to follow to address functional deficits mentioned above. Recommend SNF at discharge pending progress with acute therapy services. Patient will benefit from skilled intervention to address the above impairments. Patients rehabilitation potential is considered to be Good Factors which may influence rehabilitation potential include: ? None noted ? Mental ability/status ? Medical condition ? Home/family situation and support systems ? Safety awareness ? Pain tolerance/management ? Other: PLAN : 
Recommendations and Planned Interventions: 
?               Self Care Training                  ? Therapeutic Activities ? Functional Mobility Training    ? Cognitive Retraining 
? Therapeutic Exercises           ? Endurance Activities ? Balance Training                   ? Neuromuscular Re-Education ? Visual/Perceptual Training     ? Home Safety Training 
? Patient Education                 ? Family Training/Education ? Other (comment): Frequency/Duration: Patient will be followed by occupational therapy 5 times a week to address goals. Discharge Recommendations: [facility] Further Equipment Recommendations for Discharge: TBD SUBJECTIVE:  
Patient stated I just don't want to use that darn walker.  OBJECTIVE DATA SUMMARY:  
HISTORY:  
Past Medical History:  
Diagnosis Date  Chronic back pain  Diabetes mellitus, type 2 ([place] [address]  History of vascular access device 12/03/2018 [facility] VAT - 4 FR single, L cephalic, 44 cm for LTA  HTN (hypertension) No past surgical history on file. Prior Level of Function/Environment/Context: Patient requires assistance for all ADLs.  is retired and is her primary caregiver. Reports she spends most of her time in the bed. Will transfer to w/c in order to access bathroom. Performs stand pivot transfer with rolling walker to toilet. Able to manage UB dressing, however requires max to total assistance for LB dressing and bathing. Has been receiving HH therapy. Expanded or extensive additional review of patient history:  
 
Home Situation Home Environment: Private residence Wheelchair Ramp: Yes One/Two Story Residence: Two story, live on 1st floor Living Alone: No 
Support Systems: Family member(s)( and two sons) Patient Expects to be Discharged to[de-identified] Private residence Current DME Used/Available at Home: Wheelchair, Walker, rolling, Tub transfer bench, Grab bars Tub or Shower Type: Tub/Shower combination Hand dominance: Right EXAMINATION OF PERFORMANCE DEFICITS: 
Cognitive/Behavioral Status: 
Neurologic State: Alert Orientation Level: Oriented to person;Disoriented to time;Oriented to place;Oriented to situation(stated the year as 2080) Cognition: Appropriate for age attention/concentration; Follows commands Perception: Appears intact Perseveration: No perseveration noted Safety/Judgement: Awareness of environment Skin: observed skin intact, however dry. Edema: no abnormal swelling noted. Hearing: Auditory Auditory Impairment: None Range of Motion: 
AROM: Grossly decreased, non-functional(in bilateral shoulders by >50%; unable to acheive full elbow extension) Strength: 
Strength: Grossly decreased, non-functional(in B UEs) Coordination: 
Coordination: Generally decreased, functional(in B UEs) Fine Motor Skills-Upper: Left Intact; Right Intact Gross Motor Skills-Upper: Left Impaired;Right Impaired Balance: 
Sitting: Intact; High guard Standing: (Not tested; Patient refused to perform sit to stand) Functional Mobility and Transfers for ADLs: 
Bed Mobility: 
Rolling: Minimum assistance;Assist x2(verbal cues for sequencing) Supine to Sit: Maximum assistance;Assist x2(for trunk and LE management) Sit to Supine: Moderate assistance;Assist x2(patient able to initiate lean to L onto elbow; assist for LE) Scooting: Moderate assistance;Maximum assistance;Assist x2(assist to help plant foot; assist for lateral movement of hi) Transfers: 
Patient refusing to perform at this time due to pain and anxiety. ADL Assessment: 
Feeding: Minimum assistance(due to weakness in B hands) Oral Facial Hygiene/Grooming: Minimum assistance Bathing: Maximum assistance(due to inability to reach B LEs and limited UE ROM) Upper Body Dressing: Minimum assistance(due to pain and limited ROM in UEs) Lower Body Dressing: Total assistance Toileting: Total assistance ADL Intervention and task modifications: 
 
Lower Body Dressing Assistance Dressing Assistance: Total assistance(dependent) Socks: Total assistance (dependent) Position Performed: Seated edge of bed Cues: Physical assistance Cognitive Retraining Safety/Judgement: Awareness of environment Patient instructed on the benefits of maintaining activity tolerance, functional mobility, and performance of self care tasks during acute stay to ensure safe return home and to baseline. Patient instruction and indicated understanding on body mechanics, ergonomics and gravitational force on the spine during different body positions to plan activities in prep for return home to complete basic ADLs with assistance. Functional Measure: 
Barthel Index: 
 
Bathin Bladder: 5 Bowels: 5 Groomin Dressin Feedin Mobility: 0 Stairs: 0 Toilet Use: 0 Transfer (Bed to Chair and Back): 0 Total: 20/100 Percentage of impairment  
0% 1-19% 20-39% 40-59% 60-79% 80-99% 100% Barthel Score 0-100 100 99-80 79-60 59-40 20-39 1-19 
 0 The Barthel ADL Index: Guidelines 1. The index should be used as a record of what a patient does, not as a record of what a patient could do. 2. The main aim is to establish degree of independence from any help, physical or verbal, however minor and for whatever reason. 3. The need for supervision renders the patient not independent. 4. A patient's performance should be established using the best available evidence. Asking the patient, friends/relatives and nurses are the usual sources, but direct observation and common sense are also important. However direct testing is not needed. 5. Usually the patient's performance over the preceding 24-48 hours is important, but occasionally longer periods will be relevant. 6. Middle categories imply that the patient supplies over 50 per cent of the effort. 7. Use of aids to be independent is allowed. Juan F Wheeler., Barthel, D.W. ([id number]). Functional evaluation: the Barthel Index. [facility] (14)2. Anson Marin elissa MATHEW Pearce, Zechariah Montiel., Mikayla Hale., [place], [address] (1999). Measuring the change indisability after inpatient rehabilitation; comparison of the responsiveness of the Barthel Index and Functional [place] Measure. Journal of Neurology, Neurosurgery, and Psychiatry, 66(4), 525-726. Neha Becker, N.J.A, ROXANA Alegria, & Gertrude Felix MSUSAN. (2004.) Assessment of post-stroke quality of life in cost-effectiveness studies: The usefulness of the Barthel Index and the EuroQoL-5D. [facility], 13, 649-02 Occupational Therapy Evaluation Charge Determination History Examination Decision-Making LOW Complexity : Brief history review  HIGH Complexity : 5 or more performance deficits relating to physical, cognitive , or psychosocial skils that result in activity limitations and / or participation restrictions MEDIUM Complexity : Patient may present with comorbidities that affect occupational performnce.  Miniml to moderate modification of tasks or assistance (eg, physical or verbal ) with assesment(s) is necessary to enable patient to complete evaluation Based on the above components, the patient evaluation is determined to be of the following complexity level: LOW Pain: 
Patient reported 6/10 pain upon arrival, with noted increase in pain during movement (facial expression and vocalizing pain). At end of session, patient reporting a 7/10 pain. Repositioned in bed to patient comfort. RN present to manage medications as appropriate. Activity Tolerance:  
Patient limited by LBP at this time. Please refer to the flowsheet for vital signs taken during this treatment. After treatment: RN with patient at end of session ? Patient left in no apparent distress sitting up in chair X Patient left in no apparent distress in bed X Call bell left within reach X Nursing notified ? Caregiver present X Bed alarm activated COMMUNICATION/EDUCATION:  
The patients plan of care was discussed with: Physical Therapist, Registered Nurse and patient . ? Home safety education was provided and the patient/caregiver indicated understanding. ? Patient/family have participated as able in goal setting and plan of care. ? Patient/family agree to work toward stated goals and plan of care. ? Patient understands intent and goals of therapy, but is neutral about his/her participation. ? Patient is unable to participate in goal setting and plan of care. This patients plan of care is appropriate for delegation to \[facility] [place] [facility]\"". Thank you for this referral. 
Viviane Miller OT Time Calculation: 48 mins

## 2019-04-30 NOTE — WOUND CARE
Wound care consult: 
Initial visit for skin assessment, alert, no distress Assessment discussed with staff nurse. Assessment All skin folds and bony prominences assessed, turned with staff assistance. Large area of sacral redness - intact and blanching, non tender Limited mobility prior to admission Heels and elbows intact, no redness Treatment Repositioned in bed Heels floated Recommendations/Plan Low air loss surface obtained and PMT to assist with transfer Turn, reposition every 2 hours as tolerated, float heels, place in Incontinent care-  Apply Zinc to all open areas, moisture barrier as needed. Will follow, reconsult as needed.  
Darren Franco

## 2019-04-30 NOTE — PROGRESS NOTES
Primary Nurse Brandt Cid, RN and Heena Rivera RN, RN performed a dual skin assessment on this patient No impairment noted Nikolay score is 14

## 2019-04-30 NOTE — PROGRESS NOTES
Patient seems to be somewhat less tearful and calm than this morning. Placed on specialty bed per order. Will continue to monitor patient backside. Education provided on why turning is important.

## 2019-04-30 NOTE — PROGRESS NOTES
..Bedside shift change report given to Kelli Rod RN (oncoming nurse) by Sushil Posadas RN (offgoing nurse). Report included the following information SBAR, Kardex, ED Summary, Intake/Output, MAR, Accordion, Procedure Verification and Quality Measures.

## 2019-04-30 NOTE — PROGRESS NOTES
Problem: Mobility Impaired (Adult and Pediatric) Goal: *Acute Goals and Plan of Care (Insert Text) Description Physical Therapy Goals Initiated 4/30/2019 1. Patient will move from supine to sit and sit to supine  in bed with minimal assistance/contact guard assist within 7 day(s). 2.  Patient will transfer from bed to chair and chair to bed with minimal assistance/contact guard assist using the least restrictive device within 7 day(s). 3.  Patient will perform sit to stand with minimal assistance/contact guard assist within 7 day(s). 4.  Patient will ambulate with moderate assistance  for 3 feet with the least restrictive device within 7 day(s). 4/30/2019 1257 by Joleen Lima PT Outcome: Progressing Towards Goal 
 PHYSICAL THERAPY EVALUATION Patient: Raghu Lynne (1 y.o. female) Date: 4/30/2019 Primary Diagnosis: BAYLEE (acute kidney injury) ([facility] [address] [N17.9] Precautions: Anxiety ASSESSMENT : 
Based on the objective data described below, the patient presents with admission due to BAYLEE with nausea and urinary frequency. Pt with hx of chronic back pain, DM, R elbow cellulitis, anxiety, L ankle inversion/supination instability and sepsis. Pt lives with her  who is her primary caretaker and is receiving HHPT. She states that her  transfers her to from her bed to w/c daily and she does not use a RW. Her personal goals are to continue with transfers in acute setting and to return home with same care. She is non-ambulatory and has no desire to focus on this task stating that her ankle is not stable. Pt supine to sit with Mod A to Max Ax2. Good sitting balance on EOB. Sit to stand via HHA on R with Mod Ax2 with hand held stablity at L ankle. Pt could benefit from [facility],  yet likely not to allow since rarely wears shoes. Recommend continued care at home via spouse, HHPT and home care staff.    
 
Patient will benefit from skilled intervention to address the above impairments. Patient?s rehabilitation potential is considered to be Good Factors which may influence rehabilitation potential include:  
? None noted ? Mental ability/status ? Medical condition ? Home/family situation and support systems ? Safety awareness 
? Pain tolerance/management 
? Other: PLAN : 
Recommendations and Planned Interventions: 
?           Bed Mobility Training             ? Neuromuscular Re-Education ? Transfer Training                   ? Orthotic/Prosthetic Training 
? Gait Training                         ? Modalities ? Therapeutic Exercises           ? Edema Management/Control ? Therapeutic Activities            ? Patient and Family Training/Education ? Other (comment): Frequency/Duration: Patient will be followed by physical therapy  5 times a week to address goals. Discharge Recommendations: Rehab at [address] with 24hr care Further Equipment Recommendations for Discharge: pt has all DME SUBJECTIVE:  
Patient stated ? I want to go home. ? OBJECTIVE DATA SUMMARY:  
HISTORY:   
Past Medical History:  
Diagnosis Date Chronic back pain Diabetes mellitus, type 2 ([facility] [place] [address] History of vascular access device 12/03/2018 [facility] VAT - 4 FR single, L cephalic, 44 cm for LTA  
 HTN (hypertension) No past surgical history on file. Prior Level of Function/Home Situation: see above Personal factors and/or comorbidities impacting plan of care: see above and below Home Situation Home Environment: Private residence Wheelchair Ramp: Yes One/Two Story Residence: Two story, live on 1st floor Living Alone: No 
Support Systems: Family member(s)( and two sons) Patient Expects to be Discharged to[de-identified] Private residence Current DME Used/Available at Home: Wheelchair, Walker, rolling, Tub transfer bench, Grab bars Tub or Shower Type: Tub/Shower combination EXAMINATION/PRESENTATION/DECISION MAKING:  
Critical Behavior: 
Neurologic State: Alert Orientation Level: Oriented to situation, Oriented to place, Oriented to person, Disoriented to time Cognition: Appropriate for age attention/concentration Safety/Judgement: Awareness of environment Hearing: Auditory Auditory Impairment: None Skin:  IV 
Edema: WNL Range Of Motion: 
AROM: Generally decreased, functional 
  
  
  
  
  
  
  
Strength:   
Strength: Generally decreased, functional(L ankle everting) Tone & Sensation:  
Tone: Abnormal(low) Sensation: Intact Coordination: 
Coordination: Generally decreased, functional 
Vision:  
  
Functional Mobility: 
Bed Mobility: 
Rolling: Minimum assistance;Assist x2(verbal cues for sequencing) Supine to Sit: Maximum assistance;Assist x2(for trunk and LE management) Sit to Supine: Total assistance;Assist x2 Scooting: Moderate assistance;Maximum assistance;Assist x2(assist to help plant foot; assist for lateral movement of hi) Transfers: 
Sit to Stand: Moderate assistance;Assist x2 Stand to Sit: Moderate assistance;Assist x2 Balance:  
Sitting: Intact; High guard Standing: Impaired; With support Standing - Static: Constant support; Fair 
Standing - Dynamic : Constant support;Poor Ambulation/Gait Training: 
Distance (ft): 0 Feet (ft) Assistive Device: Other (comment)(HHA X2) Ambulation - Level of Assistance: Maximum assistance; Moderate assistance;Assist x2 Gait Abnormalities: Antalgic Base of Support: Narrowed Speed/Enriqueta: Slow Functional Measure: 
Barthel Index: 
Bathin Bladder: 5 Bowels: 5 Groomin Dressin Feedin Mobility: 0 Stairs: 0 Toilet Use: 0 Transfer (Bed to Chair and Back): 0 Total: 20/100 Percentage of impairment  
0% 1-19% 20-39% 40-59% 60-79% 80-99% 100% Barthel Score 0-100 100 99-80 79-60 59-40 20-39 1-19 
 0 The Barthel ADL Index: Guidelines 1. The index should be used as a record of what a patient does, not as a record of what a patient could do. 2. The main aim is to establish degree of independence from any help, physical or verbal, however minor and for whatever reason. 3. The need for supervision renders the patient not independent. 4. A patient's performance should be established using the best available evidence. Asking the patient, friends/relatives and nurses are the usual sources, but direct observation and common sense are also important. However direct testing is not needed. 5. Usually the patient's performance over the preceding 24-48 hours is important, but occasionally longer periods will be relevant. 6. Middle categories imply that the patient supplies over 50 per cent of the effort. 7. Use of aids to be independent is allowed. Nela Rodgers., Barthel, D.W. ([id number]). Functional evaluation: the Barthel Index. [address] [facility] (14)2. MATHEW Navarrete, Momo Germain., Miguel Angel Mackey., [place], [address] [place] [address] (1999). Measuring the change indisability after inpatient rehabilitation; comparison of the responsiveness of the Barthel Index and Functional [place] Measure. Journal of Neurology, Neurosurgery, and Psychiatry, 66(4), 481-425. BO Mcnamara, ROXANA Alegria, & Rudy Tong MAngelicaA. (2004.) Assessment of post-stroke quality of life in cost-effectiveness studies: The usefulness of the Barthel Index and the EuroQoL-5D. [facility], 13, 083-54 Physical Therapy Evaluation Charge Determination History Examination Presentation Decision-Making HIGH Complexity :3+ comorbidities / personal factors will impact the outcome/ POC  MEDIUM Complexity : 3 Standardized tests and measures addressing body structure, function, activity limitation and / or participation in recreation  MEDIUM Complexity : Evolving with changing characteristics  Other outcome measures barthel  HIGH Based on the above components, the patient evaluation is determined to be of the following complexity level: MEDIUM Pain: 
 Back and R elbow Activity Tolerance:  
Fair to poor Please refer to the flowsheet for vital signs taken during this treatment. After treatment:  
?         Patient left in no apparent distress sitting up in chair ? Patient left in no apparent distress in bed 
? Call bell left within reach ? Nursing notified ? Caregiver present ? Bed alarm activated COMMUNICATION/EDUCATION:  
The patient?s plan of care was discussed with: Occupational Therapist and Registered Nurse. ?         Fall prevention education was provided and the patient/caregiver indicated understanding. ? Patient/family have participated as able in goal setting and plan of care. ?         Patient/family agree to work toward stated goals and plan of care. ?         Patient understands intent and goals of therapy, but is neutral about his/her participation. ? Patient is unable to participate in goal setting and plan of care. Thank you for this referral. 
Kamran Rivera, PT Time Calculation: 30 mins

## 2019-05-01 LAB
ANION GAP SERPL CALC-SCNC: 11 MMOL/L (ref 5–15)
BASOPHILS # BLD: 0 K/UL (ref 0–0.1)
BASOPHILS NFR BLD: 1 % (ref 0–1)
BUN SERPL-MCNC: 39 MG/DL (ref 6–20)
BUN/CREAT SERPL: 28 (ref 12–20)
CALCIUM SERPL-MCNC: 8.9 MG/DL (ref 8.5–10.1)
CHLORIDE SERPL-SCNC: 115 MMOL/L (ref 97–108)
CO2 SERPL-SCNC: 20 MMOL/L (ref 21–32)
CREAT SERPL-MCNC: 1.41 MG/DL (ref 0.55–1.02)
DIFFERENTIAL METHOD BLD: ABNORMAL
EOSINOPHIL # BLD: 0.1 K/UL (ref 0–0.4)
EOSINOPHIL NFR BLD: 2 % (ref 0–7)
ERYTHROCYTE [DISTWIDTH] IN BLOOD BY AUTOMATED COUNT: 13.5 % (ref 11.5–14.5)
GLUCOSE BLD STRIP.AUTO-MCNC: 128 MG/DL (ref 65–100)
GLUCOSE BLD STRIP.AUTO-MCNC: 161 MG/DL (ref 65–100)
GLUCOSE BLD STRIP.AUTO-MCNC: 162 MG/DL (ref 65–100)
GLUCOSE BLD STRIP.AUTO-MCNC: 257 MG/DL (ref 65–100)
GLUCOSE SERPL-MCNC: 118 MG/DL (ref 65–100)
HCT VFR BLD AUTO: 34.8 % (ref 35–47)
HGB BLD-MCNC: 11.1 G/DL (ref 11.5–16)
IMM GRANULOCYTES # BLD AUTO: 0 K/UL (ref 0–0.04)
IMM GRANULOCYTES NFR BLD AUTO: 1 % (ref 0–0.5)
LYMPHOCYTES # BLD: 1.2 K/UL (ref 0.8–3.5)
LYMPHOCYTES NFR BLD: 29 % (ref 12–49)
MCH RBC QN AUTO: 29.3 PG (ref 26–34)
MCHC RBC AUTO-ENTMCNC: 31.9 G/DL (ref 30–36.5)
MCV RBC AUTO: 91.8 FL (ref 80–99)
MONOCYTES # BLD: 0.4 K/UL (ref 0–1)
MONOCYTES NFR BLD: 9 % (ref 5–13)
NEUTS SEG # BLD: 2.4 K/UL (ref 1.8–8)
NEUTS SEG NFR BLD: 58 % (ref 32–75)
NRBC # BLD: 0 K/UL (ref 0–0.01)
NRBC BLD-RTO: 0 PER 100 WBC
PLATELET # BLD AUTO: 174 K/UL (ref 150–400)
PMV BLD AUTO: 11 FL (ref 8.9–12.9)
POTASSIUM SERPL-SCNC: 4 MMOL/L (ref 3.5–5.1)
RBC # BLD AUTO: 3.79 M/UL (ref 3.8–5.2)
SERVICE CMNT-IMP: ABNORMAL
SODIUM SERPL-SCNC: 146 MMOL/L (ref 136–145)
WBC # BLD AUTO: 4.1 K/UL (ref 3.6–11)

## 2019-05-01 PROCEDURE — 74011250637 HC RX REV CODE- 250/637: Performed by: INTERNAL MEDICINE

## 2019-05-01 PROCEDURE — 82962 GLUCOSE BLOOD TEST: CPT

## 2019-05-01 PROCEDURE — 36415 COLL VENOUS BLD VENIPUNCTURE: CPT

## 2019-05-01 PROCEDURE — 97530 THERAPEUTIC ACTIVITIES: CPT

## 2019-05-01 PROCEDURE — 97116 GAIT TRAINING THERAPY: CPT

## 2019-05-01 PROCEDURE — 74011636637 HC RX REV CODE- 636/637: Performed by: INTERNAL MEDICINE

## 2019-05-01 PROCEDURE — 74011250636 HC RX REV CODE- 250/636: Performed by: INTERNAL MEDICINE

## 2019-05-01 PROCEDURE — 80048 BASIC METABOLIC PNL TOTAL CA: CPT

## 2019-05-01 PROCEDURE — 85025 COMPLETE CBC W/AUTO DIFF WBC: CPT

## 2019-05-01 PROCEDURE — 65270000029 HC RM PRIVATE

## 2019-05-01 RX ORDER — ATENOLOL 50 MG/1
50 TABLET ORAL DAILY
Status: DISCONTINUED | OUTPATIENT
Start: 2019-05-01 | End: 2019-05-02 | Stop reason: HOSPADM

## 2019-05-01 RX ADMIN — SERTRALINE HYDROCHLORIDE 100 MG: 50 TABLET ORAL at 09:07

## 2019-05-01 RX ADMIN — INSULIN LISPRO 5 UNITS: 100 INJECTION, SOLUTION INTRAVENOUS; SUBCUTANEOUS at 11:03

## 2019-05-01 RX ADMIN — INSULIN LISPRO 2 UNITS: 100 INJECTION, SOLUTION INTRAVENOUS; SUBCUTANEOUS at 17:51

## 2019-05-01 RX ADMIN — SENNOSIDES AND DOCUSATE SODIUM 1 TABLET: 8.6; 5 TABLET ORAL at 17:51

## 2019-05-01 RX ADMIN — SENNOSIDES AND DOCUSATE SODIUM 1 TABLET: 8.6; 5 TABLET ORAL at 09:07

## 2019-05-01 RX ADMIN — OXYCODONE HYDROCHLORIDE 20 MG: 20 TABLET, FILM COATED, EXTENDED RELEASE ORAL at 09:07

## 2019-05-01 RX ADMIN — SODIUM CHLORIDE 125 ML/HR: 900 INJECTION, SOLUTION INTRAVENOUS at 10:46

## 2019-05-01 RX ADMIN — SODIUM CHLORIDE 125 ML/HR: 900 INJECTION, SOLUTION INTRAVENOUS at 02:00

## 2019-05-01 RX ADMIN — HEPARIN SODIUM 5000 UNITS: 5000 INJECTION INTRAVENOUS; SUBCUTANEOUS at 10:04

## 2019-05-01 RX ADMIN — HEPARIN SODIUM 5000 UNITS: 5000 INJECTION INTRAVENOUS; SUBCUTANEOUS at 01:50

## 2019-05-01 RX ADMIN — OXYCODONE HYDROCHLORIDE 20 MG: 20 TABLET, FILM COATED, EXTENDED RELEASE ORAL at 21:51

## 2019-05-01 RX ADMIN — SODIUM CHLORIDE 125 ML/HR: 900 INJECTION, SOLUTION INTRAVENOUS at 18:51

## 2019-05-01 RX ADMIN — HEPARIN SODIUM 5000 UNITS: 5000 INJECTION INTRAVENOUS; SUBCUTANEOUS at 17:51

## 2019-05-01 RX ADMIN — LEVOTHYROXINE SODIUM 125 MCG: 75 TABLET ORAL at 09:07

## 2019-05-01 RX ADMIN — ATENOLOL 50 MG: 50 TABLET ORAL at 10:03

## 2019-05-01 NOTE — CDMP QUERY
Patient admitted with BAYLEE and is noted to meet ASPEN criteria for severe acute malnutrition. . If possible, please document in progress notes and d/c summary if you are evaluating and/or treating any of the following: 
 
 
=> Acute Severe Protein-Calorie Malnutrition 
=> Acute Moderate Protein-Calorie Malnutrition 
=> Other Explanation of clinical findings 
=> Clinically Undetermined (no explanation for clinical findings) The medical record reflects the following: 
   Risk Factors: BAYLEE, dehydration, debility, admitted for nausea and poor po intake. Clinical Indicators: dietician note of 4/30,  
\" meets, criteria for severe acute malnutrition as evidenced by intake <50% for > 5 days, and weight loss of > 10% in 6 months. Treatment: recommendations for general healthful diet and commercial supplements. Please clarify and document your clinical opinion in the progress notes and discharge summary including the definitive and/or presumptive diagnosis, (suspected or probable), related to the above clinical findings. Please include clinical findings supporting your diagnosis. Thank you. Thania Callejas RN, BSN 
[phone]

## 2019-05-01 NOTE — PROGRESS NOTES
Problem: Falls - Risk of 
Goal: *Absence of Falls Description Document Thuy Jay Fall Risk and appropriate interventions in the flowsheet. Outcome: Progressing Towards Goal 
  
Problem: Patient Education: Go to Patient Education Activity Goal: Patient/Family Education Outcome: Progressing Towards Goal 
  
Problem: Pressure Injury - Risk of 
Goal: *Prevention of pressure injury Description Document Nikolay Scale and appropriate interventions in the flowsheet. Outcome: Progressing Towards Goal 
  
Problem: Patient Education: Go to Patient Education Activity Goal: Patient/Family Education Outcome: Progressing Towards Goal 
  
Problem: Diabetes Self-Management Goal: *Disease process and treatment process Description Define diabetes and identify own type of diabetes; list 3 options for treating diabetes. Outcome: Progressing Towards Goal 
Goal: *Incorporating nutritional management into lifestyle Description Describe effect of type, amount and timing of food on blood glucose; list 3 methods for planning meals. Outcome: Progressing Towards Goal 
Goal: *Incorporating physical activity into lifestyle Description State effect of exercise on blood glucose levels. Outcome: Progressing Towards Goal 
Goal: *Developing strategies to promote health/change behavior Description Define the ABC's of diabetes; identify appropriate screenings, schedule and personal plan for screenings. Outcome: Progressing Towards Goal 
Goal: *Using medications safely Description State effect of diabetes medications on diabetes; name diabetes medication taking, action and side effects. Outcome: Progressing Towards Goal 
Goal: *Monitoring blood glucose, interpreting and using results Description Identify recommended blood glucose targets  and personal targets. Outcome: Progressing Towards Goal 
Goal: *Prevention, detection, treatment of acute complications Description List symptoms of hyper- and hypoglycemia; describe how to treat low blood sugar and actions for lowering  high blood glucose level. Outcome: Progressing Towards Goal 
Goal: *Prevention, detection and treatment of chronic complications Description Define the natural course of diabetes and describe the relationship of blood glucose levels to long term complications of diabetes. Outcome: Progressing Towards Goal 
Goal: *Developing strategies to address psychosocial issues Description Describe feelings about living with diabetes; identify support needed and support network Outcome: Progressing Towards Goal 
Goal: *Insulin pump training Outcome: Progressing Towards Goal 
Goal: *Sick day guidelines Outcome: Progressing Towards Goal 
Goal: *Patient Specific Goal (EDIT GOAL, INSERT TEXT) Outcome: Progressing Towards Goal 
  
Problem: Patient Education: Go to Patient Education Activity Goal: Patient/Family Education Outcome: Progressing Towards Goal 
  
Problem: Patient Education: Go to Patient Education Activity Goal: Patient/Family Education Outcome: Progressing Towards Goal 
  
Problem: Patient Education: Go to Patient Education Activity Goal: Patient/Family Education Outcome: Progressing Towards Goal 
  
Problem: Patient Education: Go to Patient Education Activity Goal: Patient/Family Education Outcome: Progressing Towards Goal

## 2019-05-01 NOTE — PROGRESS NOTES
Problem: Self Care Deficits Care Plan (Adult) Goal: *Acute Goals and Plan of Care (Insert Text) Description Occupational Therapy Goals Initiated 4/30/2019 1. Patient will perform upper body dressing and bathing with modified independence within 7 days. 2.  Patient will perform toilet transfer with minimal assistance/contact guard assist using most appropriate DME within 7 days. 3.  Patient will perform all aspects of toileting at moderate assistance  within 7 days. 4.  Patient will perform grooming, in sitting, with supervision/set-up within 7 days. 5.  Patient will verbalize/demonstrate 3/3 back precautions during ADL tasks without cues within 7 days. Outcome: Progressing Towards Goal 
 OCCUPATIONAL THERAPY TREATMENT Patient: Jules Vinson (50 y.o. female) Date: 5/1/2019 Diagnosis: BAYLEE (acute kidney injury) ([facility] [address] [N17.9] BAYLEE (acute kidney injury) ([facility] [address] Precautions:   
Chart, occupational therapy assessment, plan of care, and goals were reviewed. ASSESSMENT: 
Pt received supine in bed.  present. Received clearance from nursing to work with pt. Pt very anxious about getting OOB and transferring to chair. Bed mobility: performed log roll using bed rails at min assist, however, mod assist from sidelying to sitting position. Good sit balance seated on EOB. Pt able to scoot forward to place both feet on floor. Pt does not like using a RW and always uses her  to assist with transfers. Sit to stand at min assist x 2 and took small shuffling steps to chair. Pt very nervous seated in chair.  was going to leave to go home, but remained in room to stay with his wife. Pt encouraged to sit for 1 hour, pt tearful and worried if she could tolerate that long. Will work on progressing pt with BSC transfer 2/2 to pt using bedpan. Will con't to follow and address listed goals. Progression toward goals: 
?       Improving appropriately and progressing toward goals ?       Improving slowly and progressing toward goals ? Not making progress toward goals and plan of care will be adjusted PLAN: 
Patient continues to benefit from skilled intervention to address the above impairments. Continue treatment per established plan of care. Discharge Recommendations:  Home Health Further Equipment Recommendations for Discharge:  TBD SUBJECTIVE:  
Patient agreeable to with therapy OBJECTIVE DATA SUMMARY:  
Cognitive/Behavioral Status: 
Neurologic State: Alert Orientation Level: Oriented to person;Oriented to place;Oriented to situation Cognition: Appropriate decision making; Appropriate for age attention/concentration; Appropriate safety awareness Functional Mobility and Transfers for ADLs: 
Bed Mobility: 
Rolling: Minimum assistance Supine to Sit: Moderate assistance;Maximum assistance Scooting: Minimum assistance Transfers: 
Sit to Stand: Moderate assistance;Assist x2 Balance: 
Sitting: Intact; High guard Sitting - Static: Good (unsupported) Sitting - Dynamic: Fair (occasional) Standing: Impaired Standing - Static: Constant support; Fair 
Standing - Dynamic : Constant support;Poor ADL Intervention: 
  
 
  
 
  
 
  
  
Therapeutic Exercises:  
 
Pain: 
Pain Scale 1: Numeric (0 - 10) Pain Intensity 1: 6 Pain Location 1: Back Activity Tolerance:  
Fair Please refer to the flowsheet for vital signs taken during this treatment. After treatment:  
? Patient left in no apparent distress sitting up in chair ? Patient left in no apparent distress in bed 
? Call bell left within reach ? Nursing notified ? Caregiver present ? Bed alarm activated COMMUNICATION/COLLABORATION:  
The patient?s plan of care was discussed with: Physical Therapist and Registered Nurse Latoya Fernández OTR/L Time Calculation: 12 mins

## 2019-05-01 NOTE — PROGRESS NOTES
Problem: Mobility Impaired (Adult and Pediatric) Goal: *Acute Goals and Plan of Care (Insert Text) Description Physical Therapy Goals Initiated 4/30/2019 1. Patient will move from supine to sit and sit to supine  in bed with minimal assistance/contact guard assist within 7 day(s). 2.  Patient will transfer from bed to chair and chair to bed with minimal assistance/contact guard assist using the least restrictive device within 7 day(s). 3.  Patient will perform sit to stand with minimal assistance/contact guard assist within 7 day(s). 4.  Patient will ambulate with moderate assistance  for 3 feet with the least restrictive device within 7 day(s). Outcome: Progressing Towards Goal 
 PHYSICAL THERAPY TREATMENT Patient: Raghu Lynne (20 y.o. female) Date: 5/1/2019 Diagnosis: BAYLEE (acute kidney injury) ([facility] [address] [N17.9] BAYLEE (acute kidney injury) ([facility] [address] Precautions:   
Chart, physical therapy assessment, plan of care and goals were reviewed. ASSESSMENT: 
Pt received supine in bed. Willing to work with PT today. Improved anxiety.  present. Rolled with Min A. Supine to sit with Mod to Max A x1 with HOB elevated. Sit to stand with HHA x2. Pt refusing to use RW.  transfers her without at home, yet HHPT trying to get pt to use. Pt with significant anxiety and becomes tearful if pushed too hard. Pt able to take steps 3' to chair. Placed in recliner, but unable to tolerate BLE's extended due to chronic back pain. Pt left in NAD with proper pillows for positioning. Agreeable to sit for 1hr.  RN informed to assist pt back to bed with no longer then 1hr OOB.  present to facilitate and noted to help pt with her anxiety. Both requesting HHPT upon discharge. Progression toward goals: 
?    Improving appropriately and progressing toward goals ? Improving slowly and progressing toward goals ? Not making progress toward goals and plan of care will be adjusted PLAN: 
 Patient continues to benefit from skilled intervention to address the above impairments. Continue treatment per established plan of care. Discharge Recommendations:  Home Health Further Equipment Recommendations for Discharge:  has all DME SUBJECTIVE:  
Patient stated ? I wont use a RW.? OBJECTIVE DATA SUMMARY:  
Critical Behavior: 
Neurologic State: Alert Orientation Level: Oriented to person, Oriented to place, Oriented to situation Cognition: Appropriate decision making, Appropriate for age attention/concentration, Appropriate safety awareness Safety/Judgement: Awareness of environment Functional Mobility Training: 
Bed Mobility: 
Rolling: Minimum assistance Supine to Sit: Moderate assistance;Maximum assistance Scooting: Minimum assistance Transfers: 
Sit to Stand: Moderate assistance;Assist x2 Stand to Sit: Minimum assistance;Assist x2 Balance: 
Sitting: Intact; High guard Sitting - Static: Good (unsupported) Sitting - Dynamic: Fair (occasional) Standing: Impaired Standing - Static: Constant support; Fair 
Standing - Dynamic : Constant support;Poor Ambulation/Gait Training: 
Distance (ft): 3 Feet (ft) Assistive Device: Gait belt; Other (comment)(HHAx2) Ambulation - Level of Assistance: Moderate assistance;Assist x2 Gait Abnormalities: Antalgic;Decreased step clearance Base of Support: Narrowed Speed/Enriqueta: Slow Pain: 
Pain Scale 1: Numeric (0 - 10) Pain Intensity 1: 6 Pain Location 1: Back Activity Tolerance:  
fair Please refer to the flowsheet for vital signs taken during this treatment. After treatment:  
?    Patient left in no apparent distress sitting up in chair ? Patient left in no apparent distress in bed 
? Call bell left within reach ? Nursing notified ? Caregiver present 
? alarm activated COMMUNICATION/COLLABORATION:  
 The patient?s plan of care was discussed with: Occupational Therapist and Registered Nurse Dominique Williamson, PT Time Calculation: 23 mins

## 2019-05-01 NOTE — PROGRESS NOTES
Blanco Mcpherson [facility] 
[address] [facility], [place], [id number] [place] [address] 
[phone] Medical Progress Note NAME: Suzanne Greco :  1955 MRM:  [id number] Date/Time: 2019  8:15 AM 
 
  
Assessment and Plan: 1. BAYLEE (acute kidney injury) ([facility] [address] (2018): Suspected to be from poor po intake and dehydration with IVVD. No hx of NSAID use. check urine studies including urine eos. Renal ultrasound without obstruction. Renal function improving with IVF. Continue IVF. continue to hold ACEI 
  
2. HTN (hypertension) (): stopped ACE and chlorthalidone and hold amlodipine for now as BP is borderline. Resume atenolol half dose.  
  
3. Chronic back pain (): continue oxycodone ER and prn oxycodone 4. Diabetes mellitus, type 2 ([facility] [address] ():  With recent weight loss, has been able to taper down insulin and last week taken off insulin in favor of metformin. Stop metformin with BAYLEE. Use SSI alone for now and if needed may restart long acting insulin 
  
5. Hypothyroidism: continue LT4 
  
6. Sacral erythema: wound care consult 7. Debility. Continue PT/OT. May need SNF vs home health PT/OT 8. Acute moderate protein-calorie malnutrition. Will consult nutrition Subjective: Chief Complaint:  Follow up of pt who was admitted with ARF. Feels weak ROS: 
(bold if positive, if negative) Tolerating PT  Tolerating Diet Objective:  
 
Last 24hrs VS reviewed since prior progress note. Most recent are: 
 
Visit Vitals /73 (BP 1 Location: Right arm, BP Patient Position: Supine) Pulse 77 Temp 98.1 °F (36.7 °C) Resp 14 Ht 5' 1\" (1.549 m) Wt 67.1 kg (148 lb) SpO2 97% BMI 27.96 kg/m² SpO2 Readings from Last 6 Encounters:  
19 97% 01/10/19 97% 18 96% Intake/Output Summary (Last 24 hours) at 2019 1851 Last data filed at 2019 1134 Gross per 24 hour Intake  Output 700 ml Net -700 ml Physical Exam: 
 
Gen:  Well-developed, well-nourished, in no acute distress HEENT:  Pink conjunctivae, PERRL, hearing intact to voice, moist mucous membranes Neck:  Supple, without masses, thyroid non-tender Resp:  No accessory muscle use, clear breath sounds without wheezes rales or rhonchi 
Card:  No murmurs, normal S1, S2 without thrills, bruits or peripheral edema Abd:  Soft, non-tender, non-distended, normoactive bowel sounds are present, no palpable organomegaly and no detectable hernias Lymph:  No cervical or inguinal adenopathy Musc:  No cyanosis or clubbing Skin:  No rashes or ulcers, skin turgor is good Neuro:  Cranial nerves are grossly intact, no focal motor weakness, follows commands appropriately Psych:  Good insight, oriented to person, place and time, alert 
__________________________________________________________________ Medications Reviewed: (see below) Medications:  
 
Current Facility-Administered Medications Medication Dose Route Frequency  sodium chloride (NS) flush 5-40 mL  5-40 mL IntraVENous Q8H  
 sodium chloride (NS) flush 5-40 mL  5-40 mL IntraVENous PRN  
 0.9% sodium chloride infusion  125 mL/hr IntraVENous CONTINUOUS  
 heparin (porcine) injection 5,000 Units  5,000 Units SubCUTAneous Q8H  
 acetaminophen (TYLENOL) tablet 650 mg  650 mg Oral Q4H PRN  
 ondansetron (ZOFRAN) injection 4 mg  4 mg IntraVENous Q4H PRN  
 insulin lispro (HUMALOG) injection   SubCUTAneous AC&HS  
 glucose chewable tablet 16 g  4 Tab Oral PRN  
 dextrose (D50W) injection syrg 12.5-25 g  25-50 mL IntraVENous PRN  
 glucagon (GLUCAGEN) injection 1 mg  1 mg IntraMUSCular PRN  
 levothyroxine (SYNTHROID) tablet 125 mcg  125 mcg Oral ACB  oxyCODONE IR (ROXICODONE) tablet 5 mg  5 mg Oral Q6H PRN  
 sertraline (ZOLOFT) tablet 100 mg  100 mg Oral DAILY  senna-docusate (PERICOLACE) 8.6-50 mg per tablet 1 Tab  1 Tab Oral BID  
 oxyCODONE ER (OxyCONTIN) tablet 20 mg  20 mg Oral Q12H Lab Data Reviewed: (see below) Lab Review:  
 
Recent Labs 05/01/19 
0438 04/29/19 
1452 WBC 4.1 8.1 HGB 11.1* 13.5 HCT 34.8* 41.8  296 Recent Labs 05/01/19 
1381 04/30/19 
0443 04/29/19 
1452 * 144 140  
K 4.0 4.5 4.9  
* 115* 109* CO2 20* 21 21 * 128* 187* BUN 39* 59* 65* CREA 1.41* 1.96* 2.41* CA 8.9 9.0 10.2* PHOS  --  3.2  --   
ALB  --  3.1* 4.1 TBILI  --   --  0.7 SGOT  --   --  24 ALT  --   --  30 Lab Results Component Value Date/Time Glucose (POC) 128 (H) 05/01/2019 06:56 AM  
 Glucose (POC) 94 04/30/2019 10:03 PM  
 Glucose (POC) 229 (H) 04/30/2019 04:32 PM  
 Glucose (POC) 120 (H) 04/30/2019 11:26 AM  
 Glucose (POC) 121 (H) 04/29/2019 10:19 PM  
 
No results for input(s): PH, PCO2, PO2, HCO3, FIO2 in the last 72 hours. No results for input(s): INR in the last 72 hours. No lab exists for component: INREXT, INREXT All Micro Results Procedure Component Value Units Date/Time URINE CULTURE HOLD SAMPLE [[id number]] Collected:  04/29/19 7138 Order Status:  Completed Specimen:  Urine from Serum Updated:  04/29/19 1635 Urine culture hold URINE ON HOLD IN MICROBIOLOGY DEPT FOR 3 DAYS. IF UNPRESERVED URINE IS SUBMITTED, IT CANNOT BE USED FOR ADDITIONAL TESTING AFTER 24 HRS, RECOLLECTION WILL BE REQUIRED. I have reviewed notes of prior 24hr. Other pertinent lab: Total time spent with patient: 28 Care Plan discussed with: Patient, Nursing Staff and >50% of time spent in counseling and coordination of care Discussed:  Care Plan Prophylaxis:  Hep SQ Disposition:  Home w/Family 
        
___________________________________________________ Attending Physician: Opal Jhaveri MD

## 2019-05-02 VITALS
SYSTOLIC BLOOD PRESSURE: 151 MMHG | BODY MASS INDEX: 27.94 KG/M2 | HEIGHT: 61 IN | HEART RATE: 78 BPM | RESPIRATION RATE: 18 BRPM | WEIGHT: 148 LBS | OXYGEN SATURATION: 99 % | TEMPERATURE: 97.6 F | DIASTOLIC BLOOD PRESSURE: 71 MMHG

## 2019-05-02 LAB
ANION GAP SERPL CALC-SCNC: 8 MMOL/L (ref 5–15)
BUN SERPL-MCNC: 20 MG/DL (ref 6–20)
BUN/CREAT SERPL: 19 (ref 12–20)
CALCIUM SERPL-MCNC: 8.6 MG/DL (ref 8.5–10.1)
CHLORIDE SERPL-SCNC: 115 MMOL/L (ref 97–108)
CO2 SERPL-SCNC: 20 MMOL/L (ref 21–32)
CREAT SERPL-MCNC: 1.05 MG/DL (ref 0.55–1.02)
GLUCOSE BLD STRIP.AUTO-MCNC: 127 MG/DL (ref 65–100)
GLUCOSE BLD STRIP.AUTO-MCNC: 180 MG/DL (ref 65–100)
GLUCOSE SERPL-MCNC: 129 MG/DL (ref 65–100)
POTASSIUM SERPL-SCNC: 3.4 MMOL/L (ref 3.5–5.1)
SERVICE CMNT-IMP: ABNORMAL
SERVICE CMNT-IMP: ABNORMAL
SODIUM SERPL-SCNC: 143 MMOL/L (ref 136–145)

## 2019-05-02 PROCEDURE — 74011250637 HC RX REV CODE- 250/637: Performed by: INTERNAL MEDICINE

## 2019-05-02 PROCEDURE — 36415 COLL VENOUS BLD VENIPUNCTURE: CPT

## 2019-05-02 PROCEDURE — 74011636637 HC RX REV CODE- 636/637: Performed by: INTERNAL MEDICINE

## 2019-05-02 PROCEDURE — 82962 GLUCOSE BLOOD TEST: CPT

## 2019-05-02 PROCEDURE — 80048 BASIC METABOLIC PNL TOTAL CA: CPT

## 2019-05-02 PROCEDURE — 74011250636 HC RX REV CODE- 250/636: Performed by: INTERNAL MEDICINE

## 2019-05-02 RX ORDER — POTASSIUM CHLORIDE 750 MG/1
40 TABLET, FILM COATED, EXTENDED RELEASE ORAL
Status: COMPLETED | OUTPATIENT
Start: 2019-05-02 | End: 2019-05-02

## 2019-05-02 RX ADMIN — POTASSIUM CHLORIDE 40 MEQ: 750 TABLET, EXTENDED RELEASE ORAL at 11:39

## 2019-05-02 RX ADMIN — Medication 10 ML: at 05:19

## 2019-05-02 RX ADMIN — HEPARIN SODIUM 5000 UNITS: 5000 INJECTION INTRAVENOUS; SUBCUTANEOUS at 02:33

## 2019-05-02 RX ADMIN — OXYCODONE HYDROCHLORIDE 5 MG: 5 TABLET ORAL at 13:35

## 2019-05-02 RX ADMIN — OXYCODONE HYDROCHLORIDE 20 MG: 20 TABLET, FILM COATED, EXTENDED RELEASE ORAL at 09:22

## 2019-05-02 RX ADMIN — SODIUM CHLORIDE 125 ML/HR: 900 INJECTION, SOLUTION INTRAVENOUS at 02:40

## 2019-05-02 RX ADMIN — HEPARIN SODIUM 5000 UNITS: 5000 INJECTION INTRAVENOUS; SUBCUTANEOUS at 09:22

## 2019-05-02 RX ADMIN — INSULIN LISPRO 2 UNITS: 100 INJECTION, SOLUTION INTRAVENOUS; SUBCUTANEOUS at 11:39

## 2019-05-02 RX ADMIN — ATENOLOL 50 MG: 50 TABLET ORAL at 09:25

## 2019-05-02 RX ADMIN — LEVOTHYROXINE SODIUM 125 MCG: 75 TABLET ORAL at 07:13

## 2019-05-02 RX ADMIN — OXYCODONE HYDROCHLORIDE 5 MG: 5 TABLET ORAL at 05:19

## 2019-05-02 RX ADMIN — SODIUM CHLORIDE 125 ML/HR: 900 INJECTION, SOLUTION INTRAVENOUS at 10:45

## 2019-05-02 RX ADMIN — SERTRALINE HYDROCHLORIDE 100 MG: 50 TABLET ORAL at 09:22

## 2019-05-02 RX ADMIN — SENNOSIDES AND DOCUSATE SODIUM 1 TABLET: 8.6; 5 TABLET ORAL at 09:23

## 2019-05-02 NOTE — PROGRESS NOTES
Blanco Mcpherson [facility] 
[address] [facility], [place], [id number] [place] [address] 
[phone] Medical Progress Note NAME: Soraya Jay :  1955 MRM:  [id number] Date/Time: 2019  8:15 AM 
 
  
Assessment and Plan: 1. BAYLEE (acute kidney injury) ([facility] [address] (2018): Suspected to be from poor po intake and dehydration with IVVD. No hx of NSAID use. check urine studies including urine eos. Renal ultrasound without obstruction. Renal function improving with IVF. Continue to hold ACEI 
  
2. HTN (hypertension) (): stopped ACE and chlorthalidone and hold amlodipine for now as BP is borderline. Resume atenolol half dose.  
  
3. Chronic back pain (): continue oxycodone ER and prn oxycodone. Pt used to see Dr Marlin Talbert and pain doctor. 4.  Diabetes mellitus, type 2 (HCC) ():  With recent weight loss, has been able to taper down insulin and last week taken off insulin in favor of metformin. Stop metformin with BAYLEE. Use SSI alone for now and if needed may restart long acting insulin 
  
5. Hypothyroidism: continue LT4 
  
6. Sacral erythema: wound care consult 7. Debility. Continue PT/OT. May need SNF vs home health PT/OT 8. Acute moderate protein-calorie malnutrition. Will consult nutrition Subjective: Chief Complaint:  Follow up of pt who was admitted with ARF. Feels weak ROS: 
(bold if positive, if negative) Tolerating PT  Tolerating Diet Objective:  
 
Last 24hrs VS reviewed since prior progress note. Most recent are: 
 
Visit Vitals /71 Pulse 78 Temp 97.6 °F (36.4 °C) Resp 18 Ht 5' 1\" (1.549 m) Wt 67.1 kg (148 lb) SpO2 99% BMI 27.96 kg/m² SpO2 Readings from Last 6 Encounters:  
19 99% 01/10/19 97% 18 96% Intake/Output Summary (Last 24 hours) at 2019 1059 Last data filed at 2019 0130 Gross per 24 hour Intake  Output 400 ml Net -400 ml Physical Exam: Gen:  Well-developed, well-nourished, in no acute distress HEENT:  Pink conjunctivae, PERRL, hearing intact to voice, moist mucous membranes Neck:  Supple, without masses, thyroid non-tender Resp:  No accessory muscle use, clear breath sounds without wheezes rales or rhonchi 
Card:  No murmurs, normal S1, S2 without thrills, bruits or peripheral edema Abd:  Soft, non-tender, non-distended, normoactive bowel sounds are present, no palpable organomegaly and no detectable hernias Lymph:  No cervical or inguinal adenopathy Musc:  No cyanosis or clubbing Skin:  No rashes or ulcers, skin turgor is good Neuro:  Cranial nerves are grossly intact, no focal motor weakness, follows commands appropriately Psych:  Good insight, oriented to person, place and time, alert 
__________________________________________________________________ Medications Reviewed: (see below) Medications:  
 
Current Facility-Administered Medications Medication Dose Route Frequency  potassium chloride SR (KLOR-CON 10) tablet 40 mEq  40 mEq Oral NOW  atenolol (TENORMIN) tablet 50 mg  50 mg Oral DAILY  sodium chloride (NS) flush 5-40 mL  5-40 mL IntraVENous Q8H  
 sodium chloride (NS) flush 5-40 mL  5-40 mL IntraVENous PRN  
 0.9% sodium chloride infusion  125 mL/hr IntraVENous CONTINUOUS  
 heparin (porcine) injection 5,000 Units  5,000 Units SubCUTAneous Q8H  
 acetaminophen (TYLENOL) tablet 650 mg  650 mg Oral Q4H PRN  
 ondansetron (ZOFRAN) injection 4 mg  4 mg IntraVENous Q4H PRN  
 insulin lispro (HUMALOG) injection   SubCUTAneous AC&HS  
 glucose chewable tablet 16 g  4 Tab Oral PRN  
 dextrose (D50W) injection syrg 12.5-25 g  25-50 mL IntraVENous PRN  
 glucagon (GLUCAGEN) injection 1 mg  1 mg IntraMUSCular PRN  
 levothyroxine (SYNTHROID) tablet 125 mcg  125 mcg Oral ACB  oxyCODONE IR (ROXICODONE) tablet 5 mg  5 mg Oral Q6H PRN  
 sertraline (ZOLOFT) tablet 100 mg  100 mg Oral DAILY  senna-docusate (PERICOLACE) 8.6-50 mg per tablet 1 Tab  1 Tab Oral BID  
 oxyCODONE ER (OxyCONTIN) tablet 20 mg  20 mg Oral Q12H Lab Data Reviewed: (see below) Lab Review:  
 
Recent Labs 05/01/19 
0438 04/29/19 
1452 WBC 4.1 8.1 HGB 11.1* 13.5 HCT 34.8* 41.8  296 Recent Labs 05/02/19 
0505 05/01/19 
8669 04/30/19 
0443 04/29/19 
1452  146* 144 140  
K 3.4* 4.0 4.5 4.9  
* 115* 115* 109* CO2 20* 20* 21 21 * 118* 128* 187* BUN 20 39* 59* 65* CREA 1.05* 1.41* 1.96* 2.41* CA 8.6 8.9 9.0 10.2* PHOS  --   --  3.2  --   
ALB  --   --  3.1* 4.1 TBILI  --   --   --  0.7 SGOT  --   --   --  24 ALT  --   --   --  30 Lab Results Component Value Date/Time Glucose (POC) 127 (H) 05/02/2019 06:54 AM  
 Glucose (POC) 161 (H) 05/01/2019 09:04 PM  
 Glucose (POC) 162 (H) 05/01/2019 05:18 PM  
 Glucose (POC) 257 (H) 05/01/2019 10:51 AM  
 Glucose (POC) 128 (H) 05/01/2019 06:56 AM  
 
No results for input(s): PH, PCO2, PO2, HCO3, FIO2 in the last 72 hours. No results for input(s): INR in the last 72 hours. No lab exists for component: INREXT, INREXT All Micro Results Procedure Component Value Units Date/Time URINE CULTURE HOLD SAMPLE [[id number]] Collected:  04/29/19 5273 Order Status:  Completed Specimen:  Urine from Serum Updated:  04/29/19 3871 Urine culture hold URINE ON HOLD IN MICROBIOLOGY DEPT FOR 3 DAYS. IF UNPRESERVED URINE IS SUBMITTED, IT CANNOT BE USED FOR ADDITIONAL TESTING AFTER 24 HRS, RECOLLECTION WILL BE REQUIRED. I have reviewed notes of prior 24hr. Other pertinent lab: Total time spent with patient: 28 Care Plan discussed with: Patient, Nursing Staff and >50% of time spent in counseling and coordination of care Discussed:  Care Plan Prophylaxis:  Hep SQ Disposition:  Home w/Family 
        
___________________________________________________ Attending Physician: Jose Montelongo MD

## 2019-05-02 NOTE — PROGRESS NOTES
PCP NEV[facility] appt scheduled with Dr. Hugo Booker on 5/9/2019 at 1:30pm. Appt added to AVS. Cindy Tan CM Specialist

## 2019-05-02 NOTE — PROGRESS NOTES
5/2/2019 
2:07 PM 
Pt DC noted. Pt will be followed by Dewayne Hawk upon discharge (see chart). Pt's  will transport. No additional DC service needs indicated.

## 2019-05-02 NOTE — PROGRESS NOTES
Patient refused vital sign this shift, states he will allow one time VS in the morning. Patient also refused scheduled blood glucose check.

## 2019-05-02 NOTE — PROGRESS NOTES
Home Care Face to Face Encounter Patients Name: Flaco Arroyo    YOB: 1955 Primary Diagnosis: BAYLEE Date of Face to Face:   05/02/19 Face to Face Encounter findings are related to primary reason for home care:   yes. 1. I certify that the patient needs intermittent care as follows: physical therapy: strengthening, stretching/ROM, transfer training and gait/stair training 
occupational therapy:  ADL safety (ie. cooking, bathing, dressing) and ROM 2. I certify that this patient is homebound, that is: 1) patient requires the use of a walker device, special transportation, or assistance of another to leave the home; or 2) patient's condition makes leaving the home medically contraindicated; and 3) patient has a normal inability to leave the home and leaving the home requires considerable and taxing effort. Patient may leave the home for infrequent and short duration for medical reasons, and occasional absences for non-medical reasons. Homebound status is due to the following functional limitations: Patient's ambulation limited secondary to severe pain and requires the use of an assistive device and the assistance of a caregiver for safe completion. Patient with strength and ROM deficits limiting ambulation endurance requiring the use of an assistive device and the assistance of a caregiver. Patient deemed temporarily homebound secondary to increased risk for infection when leaving home and going out into the community. 3. I certify that this patient is under my care and that I, or a nurse practitioner or [facility], or clinical nurse specialist, or certified nurse midwife, working with me, had a Face-to-Face Encounter that meets the physician Face-to-Face Encounter requirements. The following are the clinical findings from the [address] [place] [address] encounter that support the need for skilled services and is a summary of the encounter: See discharge summary Jose Montelongo MD 
5/2/2019

## 2019-05-02 NOTE — PROGRESS NOTES
Problem: Falls - Risk of 
Goal: *Absence of Falls Description Document Carol Bowden Fall Risk and appropriate interventions in the flowsheet. Outcome: Resolved/Met Problem: Patient Education: Go to Patient Education Activity Goal: Patient/Family Education Outcome: Resolved/Met Problem: Pressure Injury - Risk of 
Goal: *Prevention of pressure injury Description Document Nikolay Scale and appropriate interventions in the flowsheet. Outcome: Resolved/Met Problem: Patient Education: Go to Patient Education Activity Goal: Patient/Family Education Outcome: Resolved/Met Problem: Diabetes Self-Management Goal: *Disease process and treatment process Description Define diabetes and identify own type of diabetes; list 3 options for treating diabetes. Outcome: Resolved/Met Goal: *Incorporating nutritional management into lifestyle Description Describe effect of type, amount and timing of food on blood glucose; list 3 methods for planning meals. Outcome: Resolved/Met Goal: *Incorporating physical activity into lifestyle Description State effect of exercise on blood glucose levels. Outcome: Resolved/Met Goal: *Developing strategies to promote health/change behavior Description Define the ABC's of diabetes; identify appropriate screenings, schedule and personal plan for screenings. Outcome: Resolved/Met Goal: *Using medications safely Description State effect of diabetes medications on diabetes; name diabetes medication taking, action and side effects. Outcome: Resolved/Met Goal: *Monitoring blood glucose, interpreting and using results Description Identify recommended blood glucose targets  and personal targets. Outcome: Resolved/Met Goal: *Prevention, detection, treatment of acute complications Description List symptoms of hyper- and hypoglycemia; describe how to treat low blood sugar and actions for lowering  high blood glucose level. Outcome: Resolved/Met Goal: *Prevention, detection and treatment of chronic complications Description Define the natural course of diabetes and describe the relationship of blood glucose levels to long term complications of diabetes. Outcome: Resolved/Met Goal: *Developing strategies to address psychosocial issues Description Describe feelings about living with diabetes; identify support needed and support network Outcome: Resolved/Met Goal: *Insulin pump training Outcome: Resolved/Met Goal: *Sick day guidelines Outcome: Resolved/Met Goal: *Patient Specific Goal (EDIT GOAL, INSERT TEXT) Outcome: Resolved/Met Problem: Patient Education: Go to Patient Education Activity Goal: Patient/Family Education Outcome: Resolved/Met Problem: Patient Education: Go to Patient Education Activity Goal: Patient/Family Education Outcome: Resolved/Met Problem: Patient Education: Go to Patient Education Activity Goal: Patient/Family Education Outcome: Resolved/Met Problem: Patient Education: Go to Patient Education Activity Goal: Patient/Family Education Outcome: Resolved/Met

## 2019-05-02 NOTE — DISCHARGE INSTRUCTIONS
ACUTE DIAGNOSES:  BAYLEE (acute kidney injury) ([place] [address].) [N17.9]    CHRONIC MEDICAL DIAGNOSES:  Problem List as of 5/2/2019 Date Reviewed: 4/29/2019          Codes Class Noted - Resolved    Fever ICD-10-CM: R50.9  ICD-9-CM: 780.60  12/31/2018 - Present        Sinus tachycardia ICD-10-CM: R00.0  ICD-9-CM: 427.89  12/31/2018 - Present        Sepsis ([facility] ICD-10-CM: A41.9  ICD-9-CM: 038.9, 995.91  12/31/2018 - Present        Acute metabolic encephalopathy KDW-71-WK: G93.41  ICD-9-CM: 348.31  12/31/2018 - Present        Discitis of cervical region ICD-10-CM: M46.42  ICD-9-CM: 722.91  12/31/2018 - Present        Urinary retention ICD-10-CM: R33.9  ICD-9-CM: 788.20  12/31/2018 - Present        Cellulitis of right elbow ICD-10-CM: H86.984  ICD-9-CM: 682.3  12/31/2018 - Present        Anemia ICD-10-CM: D64.9  ICD-9-CM: 285.9  12/31/2018 - Present        Hypokalemia ICD-10-CM: E87.6  ICD-9-CM: 276.8  12/31/2018 - Present        Hypernatremia ICD-10-CM: E87.0  ICD-9-CM: 276.0  12/31/2018 - Present        Metabolic acidosis SEBAS-77-LO: E87.2  ICD-9-CM: 276.2  11/30/2018 - Present        * (Principal) BAYLEE (acute kidney injury) ([facility] ICD-10-CM: N17.9  ICD-9-CM: 584.9  11/30/2018 - Present        HTN (hypertension) ICD-10-CM: I10  ICD-9-CM: 401.9  Unknown - Present        Chronic back pain ICD-10-CM: M54.9, G89.29  ICD-9-CM: 724.5, 338.29  Unknown - Present        Diabetes mellitus, type 2 ([facility] ICD-10-CM: E11.9  ICD-9-CM: 250.00  Unknown - Present        Leukocytosis ICD-10-CM: D72.829  ICD-9-CM: 288.60  11/30/2018 - Present        Right elbow pain ICD-10-CM: M25.521  ICD-9-CM: 719.42  11/30/2018 - Present        Hypotension ICD-10-CM: I95.9  ICD-9-CM: 458.9  11/30/2018 - Present              DISCHARGE MEDICATIONS:          · It is important that you take the medication exactly as they are prescribed.    · Keep your medication in the bottles provided by the pharmacist and keep a list of the medication names, dosages, and times to be taken in your wallet. · Do not take other medications without consulting your doctor. DIET:  Diabetic Diet    ACTIVITY: Activity as tolerated    ADDITIONAL INFORMATION: If you experience any of the following symptoms then please call your primary care physician or return to the emergency room if you cannot get hold of your doctor: Fever, chills, nausea, vomiting, diarrhea, change in mentation, falling, bleeding, shortness of breath. FOLLOW UP CARE:  Dr. Elizabeth Péerz MD  you are to call and set up an appointment to see them in 2 weeks. Follow-up with ortho      Information obtained by :  I understand that if any problems occur once I am at home I am to contact my physician. I understand and acknowledge receipt of the instructions indicated above.                                                                                                                                            Physician's or R.N.'s Signature                                                                  Date/Time                                                                                                                                              Patient or Representative Signature                                                          Date/Time

## 2019-05-02 NOTE — DISCHARGE SUMMARY
Hospitalist Discharge Summary     Patient ID:    Patrick Duvall  [id number]  59 y.o.  1955    Admit date: 4/29/2019    Discharge date and time: 5/2/2019    Admission Diagnoses: BAYLEE (acute kidney injury) ([facility] [N17.9]    Chronic Diagnoses:    Problem List as of 5/2/2019 Date Reviewed: 4/29/2019          Codes Class Noted - Resolved    Fever ICD-10-CM: R50.9  ICD-9-CM: 780.60  12/31/2018 - Present        Sinus tachycardia ICD-10-CM: R00.0  ICD-9-CM: 427.89  12/31/2018 - Present        Sepsis ([facility] ICD-10-CM: A41.9  ICD-9-CM: 038.9, 995.91  12/31/2018 - Present        Acute metabolic encephalopathy [facility]-PB: G93.41  ICD-9-CM: 348.31  12/31/2018 - Present        Discitis of cervical region ICD-10-CM: M46.42  ICD-9-CM: 722.91  12/31/2018 - Present        Urinary retention ICD-10-CM: R33.9  ICD-9-CM: 788.20  12/31/2018 - Present        Cellulitis of right elbow ICD-10-CM: Q14.458  ICD-9-CM: 682.3  12/31/2018 - Present        Anemia ICD-10-CM: D64.9  ICD-9-CM: 285.9  12/31/2018 - Present        Hypokalemia ICD-10-CM: E87.6  ICD-9-CM: 276.8  12/31/2018 - Present        Hypernatremia ICD-10-CM: E87.0  ICD-9-CM: 276.0  12/31/2018 - Present        Metabolic acidosis DSO-73-UR: E87.2  ICD-9-CM: 276.2  11/30/2018 - Present        * (Principal) BAYLEE (acute kidney injury) ([facility] ICD-10-CM: N17.9  ICD-9-CM: 584.9  11/30/2018 - Present        HTN (hypertension) ICD-10-CM: I10  ICD-9-CM: 401.9  Unknown - Present        Chronic back pain ICD-10-CM: M54.9, G89.29  ICD-9-CM: 724.5, 338.29  Unknown - Present        Diabetes mellitus, type 2 ([facility] ICD-10-CM: E11.9  ICD-9-CM: 250.00  Unknown - Present        Leukocytosis ICD-10-CM: D72.829  ICD-9-CM: 288.60  11/30/2018 - Present        Right elbow pain ICD-10-CM: M25.521  ICD-9-CM: 719.42  11/30/2018 - Present        Hypotension ICD-10-CM: I95.9  ICD-9-CM: 458.9  11/30/2018 - Present              Discharge Medications:   Current Discharge Medication List      CONTINUE these medications which have NOT CHANGED    Details   fenofibrate (LOFIBRA) 160 mg tablet Take 160 mg by mouth daily. metFORMIN (GLUCOPHAGE) 500 mg tablet Take 500 mg by mouth two (2) times daily (with meals). omega-3 acid ethyl esters (LOVAZA) 1 gram capsule Take 2 g by mouth two (2) times daily (with meals). atenolol (TENORMIN) 100 mg tablet Take 100 mg by mouth daily. oxyCODONE ER (XTAMPZA ER) 18 mg ER capsule Take 18 mg by mouth every twelve (12) hours. amLODIPine (NORVASC) 10 mg tablet Take 10 mg by mouth nightly. atorvastatin (LIPITOR) 20 mg tablet Take 20 mg by mouth nightly. niacin ER (NIASPAN) 500 mg tablet Take 500 mg by mouth daily. diclofenac (VOLTAREN) 1 % gel Apply 2 g to affected area daily as needed (back pain). oxyCODONE IR (ROXICODONE) 5 mg immediate release tablet Take 1 Tab by mouth every six (6) hours as needed for Pain. Max Daily Amount: 20 mg. Do not give if somnolent or respiratory depression (rate <12)  Qty: 20 Tab, Refills: 0    Associated Diagnoses: Chronic midline low back pain without sciatica      levothyroxine (SYNTHROID) 125 mcg tablet Take 125 mcg by mouth Daily (before breakfast). sertraline (ZOLOFT) 100 mg tablet Take 100 mg by mouth daily. STOP taking these medications       lisinopril (PRINIVIL, ZESTRIL) 40 mg tablet Comments:   Reason for Stopping:         chlorthalidone (HYGROTEN) 25 mg tablet Comments:   Reason for Stopping: Follow up Care:    1. Walter Strickland MD in 1-2 weeks  2. ortho    Diet:  Diabetic Diet    Disposition:  Home with Home Health. Advanced Directive:    Discharge Exam:  See today's note.     CONSULTATIONS: None    Significant Diagnostic Studies:   Recent Labs     05/01/19  0438 04/29/19  1452   WBC 4.1 8.1   HGB 11.1* 13.5   HCT 34.8* 41.8    296     Recent Labs     05/02/19  0505 05/01/19  0438 04/30/19  0443    146* 144   K 3.4* 4.0 4.5   * 115* 115*   CO2 20* 20* 21   BUN 20 39* 59*   CREA 1.05* 1.41* 1.96*   * 118* 128*   CA 8.6 8.9 9.0   PHOS  --   --  3.2     Recent Labs     04/30/19  0443 04/29/19  1452   SGOT  --  24   ALT  --  30   AP  --  79   TBILI  --  0.7   TP  --  7.7   ALB 3.1* 4.1   GLOB  --  3.6     No results for input(s): INR, PTP, APTT in the last 72 hours. No lab exists for component: INREXT   No results for input(s): FE, TIBC, PSAT, FERR in the last 72 hours. No results for input(s): PH, PCO2, PO2 in the last 72 hours. No results for input(s): CPK, CKMB in the last 72 hours. No lab exists for component: TROPONINI  Lab Results   Component Value Date/Time    Glucose (POC) 127 (H) 05/02/2019 06:54 AM    Glucose (POC) 161 (H) 05/01/2019 09:04 PM    Glucose (POC) 162 (H) 05/01/2019 05:18 PM    Glucose (POC) 257 (H) 05/01/2019 10:51 AM    Glucose (POC) 128 (H) 05/01/2019 06:56 AM             HOSPITAL COURSE & TREATMENT RENDERED:   1. BAYLEE (acute kidney injury) ([facility] (11/30/2018):  Suspected to be from poor po intake and dehydration with IVVD. No hx of NSAID use.     check urine studies including urine eos. Renal ultrasound without obstruction. Renal function improving with IVF. Continue to hold ACEI     2. HTN (hypertension) (): stopped ACE and chlorthalidone and hold amlodipine for now as BP is borderline. Resume atenolol half dose.      3. Chronic back pain (): continue oxycodone ER and prn oxycodone. Pt used to see Dr José Dumas and pain doctor. 4.  Diabetes mellitus, type 2 ([facility] ():  With recent weight loss, has been able to taper down insulin and last week taken off insulin in favor of metformin. Now renal function is better , will resume metformin      5. Hypothyroidism: continue LT4     6. Sacral erythema: wound care consult     7. Debility. Continue PT/OT. May need SNF vs home health PT/OT     8.  Acute moderate protein-calorie malnutrition.  Will consult nutrition         Discharged in improved condition         Signed:  Kevin Patricia MD  5/2/2019  11:26 AM

## 2019-05-24 PROCEDURE — 99284 EMERGENCY DEPT VISIT MOD MDM: CPT

## 2019-05-25 ENCOUNTER — APPOINTMENT (OUTPATIENT)
Dept: GENERAL RADIOLOGY | Age: 64
DRG: 682 | End: 2019-05-25
Attending: STUDENT IN AN ORGANIZED HEALTH CARE EDUCATION/TRAINING PROGRAM
Payer: COMMERCIAL

## 2019-05-25 ENCOUNTER — APPOINTMENT (OUTPATIENT)
Dept: MRI IMAGING | Age: 64
DRG: 682 | End: 2019-05-25
Attending: INTERNAL MEDICINE
Payer: COMMERCIAL

## 2019-05-25 ENCOUNTER — APPOINTMENT (OUTPATIENT)
Dept: VASCULAR SURGERY | Age: 64
DRG: 682 | End: 2019-05-25
Attending: STUDENT IN AN ORGANIZED HEALTH CARE EDUCATION/TRAINING PROGRAM
Payer: COMMERCIAL

## 2019-05-25 ENCOUNTER — HOSPITAL ENCOUNTER (INPATIENT)
Age: 64
LOS: 4 days | Discharge: HOME | DRG: 682 | End: 2019-05-29
Attending: EMERGENCY MEDICINE | Admitting: INTERNAL MEDICINE
Payer: COMMERCIAL

## 2019-05-25 ENCOUNTER — APPOINTMENT (OUTPATIENT)
Dept: CT IMAGING | Age: 64
DRG: 682 | End: 2019-05-25
Attending: STUDENT IN AN ORGANIZED HEALTH CARE EDUCATION/TRAINING PROGRAM
Payer: COMMERCIAL

## 2019-05-25 DIAGNOSIS — R11.2 INTRACTABLE VOMITING WITH NAUSEA, UNSPECIFIED VOMITING TYPE: Primary | ICD-10-CM

## 2019-05-25 DIAGNOSIS — N17.9 AKI (ACUTE KIDNEY INJURY) (HCC): ICD-10-CM

## 2019-05-25 PROBLEM — R63.0 ANOREXIA: Status: ACTIVE | Noted: 2019-05-25

## 2019-05-25 PROBLEM — K21.9 GASTROESOPHAGEAL REFLUX DISEASE WITHOUT ESOPHAGITIS: Status: ACTIVE | Noted: 2019-05-25

## 2019-05-25 PROBLEM — E87.5 HYPERKALEMIA: Status: ACTIVE | Noted: 2019-05-25

## 2019-05-25 PROBLEM — N18.9 ACUTE ON CHRONIC RENAL FAILURE (HCC): Status: ACTIVE | Noted: 2019-05-25

## 2019-05-25 PROBLEM — E86.0 DEHYDRATION: Status: ACTIVE | Noted: 2019-05-25

## 2019-05-25 LAB
ALBUMIN SERPL-MCNC: 3.2 G/DL (ref 3.5–5)
ALBUMIN/GLOB SERPL: 0.9 {RATIO} (ref 1.1–2.2)
ALP SERPL-CCNC: 52 U/L (ref 45–117)
ALT SERPL-CCNC: 20 U/L (ref 12–78)
AMMONIA PLAS-SCNC: 25 UMOL/L
ANION GAP SERPL CALC-SCNC: 8 MMOL/L (ref 5–15)
APTT PPP: 23.8 SEC (ref 22.1–32)
AST SERPL-CCNC: 21 U/L (ref 15–37)
BASOPHILS # BLD: 0 K/UL (ref 0–0.1)
BASOPHILS # BLD: 0 K/UL (ref 0–0.1)
BASOPHILS NFR BLD: 0 % (ref 0–1)
BASOPHILS NFR BLD: 1 % (ref 0–1)
BILIRUB SERPL-MCNC: 0.4 MG/DL (ref 0.2–1)
BUN SERPL-MCNC: 71 MG/DL (ref 6–20)
BUN/CREAT SERPL: 22 (ref 12–20)
CALCIUM SERPL-MCNC: 9.4 MG/DL (ref 8.5–10.1)
CHLORIDE SERPL-SCNC: 108 MMOL/L (ref 97–108)
CO2 SERPL-SCNC: 20 MMOL/L (ref 21–32)
COMMENT, HOLDF: NORMAL
CREAT SERPL-MCNC: 3.16 MG/DL (ref 0.55–1.02)
CRP SERPL HS-MCNC: 1.4 MG/L
DIFFERENTIAL METHOD BLD: ABNORMAL
DIFFERENTIAL METHOD BLD: ABNORMAL
EOSINOPHIL # BLD: 0.1 K/UL (ref 0–0.4)
EOSINOPHIL # BLD: 0.1 K/UL (ref 0–0.4)
EOSINOPHIL NFR BLD: 1 % (ref 0–7)
EOSINOPHIL NFR BLD: 2 % (ref 0–7)
ERYTHROCYTE [DISTWIDTH] IN BLOOD BY AUTOMATED COUNT: 14.6 % (ref 11.5–14.5)
ERYTHROCYTE [DISTWIDTH] IN BLOOD BY AUTOMATED COUNT: 14.6 % (ref 11.5–14.5)
GLOBULIN SER CALC-MCNC: 3.4 G/DL (ref 2–4)
GLUCOSE BLD STRIP.AUTO-MCNC: 106 MG/DL (ref 65–100)
GLUCOSE BLD STRIP.AUTO-MCNC: 112 MG/DL (ref 65–100)
GLUCOSE BLD STRIP.AUTO-MCNC: 113 MG/DL (ref 65–100)
GLUCOSE BLD STRIP.AUTO-MCNC: 118 MG/DL (ref 65–100)
GLUCOSE SERPL-MCNC: 153 MG/DL (ref 65–100)
HCT VFR BLD AUTO: 34.8 % (ref 35–47)
HCT VFR BLD AUTO: 37.7 % (ref 35–47)
HGB BLD-MCNC: 10.9 G/DL (ref 11.5–16)
HGB BLD-MCNC: 11.8 G/DL (ref 11.5–16)
IMM GRANULOCYTES # BLD AUTO: 0 K/UL (ref 0–0.04)
IMM GRANULOCYTES # BLD AUTO: 0.1 K/UL (ref 0–0.04)
IMM GRANULOCYTES NFR BLD AUTO: 1 % (ref 0–0.5)
IMM GRANULOCYTES NFR BLD AUTO: 1 % (ref 0–0.5)
LACTATE SERPL-SCNC: 1.9 MMOL/L (ref 0.4–2)
LIPASE SERPL-CCNC: 168 U/L (ref 73–393)
LYMPHOCYTES # BLD: 1.4 K/UL (ref 0.8–3.5)
LYMPHOCYTES # BLD: 1.6 K/UL (ref 0.8–3.5)
LYMPHOCYTES NFR BLD: 21 % (ref 12–49)
LYMPHOCYTES NFR BLD: 25 % (ref 12–49)
MCH RBC QN AUTO: 29.6 PG (ref 26–34)
MCH RBC QN AUTO: 29.8 PG (ref 26–34)
MCHC RBC AUTO-ENTMCNC: 31.3 G/DL (ref 30–36.5)
MCHC RBC AUTO-ENTMCNC: 31.3 G/DL (ref 30–36.5)
MCV RBC AUTO: 94.7 FL (ref 80–99)
MCV RBC AUTO: 95.1 FL (ref 80–99)
MONOCYTES # BLD: 0.6 K/UL (ref 0–1)
MONOCYTES # BLD: 0.6 K/UL (ref 0–1)
MONOCYTES NFR BLD: 8 % (ref 5–13)
MONOCYTES NFR BLD: 9 % (ref 5–13)
NEUTS SEG # BLD: 4.2 K/UL (ref 1.8–8)
NEUTS SEG # BLD: 4.5 K/UL (ref 1.8–8)
NEUTS SEG NFR BLD: 64 % (ref 32–75)
NEUTS SEG NFR BLD: 67 % (ref 32–75)
NRBC # BLD: 0 K/UL (ref 0–0.01)
NRBC # BLD: 0 K/UL (ref 0–0.01)
NRBC BLD-RTO: 0 PER 100 WBC
NRBC BLD-RTO: 0 PER 100 WBC
PLATELET # BLD AUTO: 192 K/UL (ref 150–400)
PLATELET # BLD AUTO: 210 K/UL (ref 150–400)
PMV BLD AUTO: 10.2 FL (ref 8.9–12.9)
PMV BLD AUTO: 9.7 FL (ref 8.9–12.9)
POTASSIUM SERPL-SCNC: 6.1 MMOL/L (ref 3.5–5.1)
PROT SERPL-MCNC: 6.6 G/DL (ref 6.4–8.2)
RBC # BLD AUTO: 3.66 M/UL (ref 3.8–5.2)
RBC # BLD AUTO: 3.98 M/UL (ref 3.8–5.2)
SAMPLES BEING HELD,HOLD: NORMAL
SERVICE CMNT-IMP: ABNORMAL
SODIUM SERPL-SCNC: 136 MMOL/L (ref 136–145)
THERAPEUTIC RANGE,PTTT: NORMAL SECS (ref 58–77)
TROPONIN I SERPL-MCNC: <0.05 NG/ML
TSH SERPL DL<=0.05 MIU/L-ACNC: 0.27 UIU/ML (ref 0.36–3.74)
WBC # BLD AUTO: 6.5 K/UL (ref 3.6–11)
WBC # BLD AUTO: 6.5 K/UL (ref 3.6–11)

## 2019-05-25 PROCEDURE — 84484 ASSAY OF TROPONIN QUANT: CPT

## 2019-05-25 PROCEDURE — 85730 THROMBOPLASTIN TIME PARTIAL: CPT

## 2019-05-25 PROCEDURE — 83690 ASSAY OF LIPASE: CPT

## 2019-05-25 PROCEDURE — 93970 EXTREMITY STUDY: CPT

## 2019-05-25 PROCEDURE — 65270000029 HC RM PRIVATE

## 2019-05-25 PROCEDURE — 74011250637 HC RX REV CODE- 250/637: Performed by: INTERNAL MEDICINE

## 2019-05-25 PROCEDURE — 83605 ASSAY OF LACTIC ACID: CPT

## 2019-05-25 PROCEDURE — 74011250636 HC RX REV CODE- 250/636: Performed by: INTERNAL MEDICINE

## 2019-05-25 PROCEDURE — 93005 ELECTROCARDIOGRAM TRACING: CPT

## 2019-05-25 PROCEDURE — 74176 CT ABD & PELVIS W/O CONTRAST: CPT

## 2019-05-25 PROCEDURE — 51798 US URINE CAPACITY MEASURE: CPT

## 2019-05-25 PROCEDURE — 36415 COLL VENOUS BLD VENIPUNCTURE: CPT

## 2019-05-25 PROCEDURE — 74011250636 HC RX REV CODE- 250/636: Performed by: STUDENT IN AN ORGANIZED HEALTH CARE EDUCATION/TRAINING PROGRAM

## 2019-05-25 PROCEDURE — 82140 ASSAY OF AMMONIA: CPT

## 2019-05-25 PROCEDURE — 77030020186 HC BOOT HL PROTCT SAGE -B

## 2019-05-25 PROCEDURE — 86141 C-REACTIVE PROTEIN HS: CPT

## 2019-05-25 PROCEDURE — 86038 ANTINUCLEAR ANTIBODIES: CPT

## 2019-05-25 PROCEDURE — 84443 ASSAY THYROID STIM HORMONE: CPT

## 2019-05-25 PROCEDURE — 72141 MRI NECK SPINE W/O DYE: CPT

## 2019-05-25 PROCEDURE — 80053 COMPREHEN METABOLIC PANEL: CPT

## 2019-05-25 PROCEDURE — 85025 COMPLETE CBC W/AUTO DIFF WBC: CPT

## 2019-05-25 PROCEDURE — 82962 GLUCOSE BLOOD TEST: CPT

## 2019-05-25 PROCEDURE — 96374 THER/PROPH/DIAG INJ IV PUSH: CPT

## 2019-05-25 RX ORDER — INSULIN LISPRO 100 [IU]/ML
INJECTION, SOLUTION INTRAVENOUS; SUBCUTANEOUS
Status: DISCONTINUED | OUTPATIENT
Start: 2019-05-25 | End: 2019-05-29 | Stop reason: HOSPADM

## 2019-05-25 RX ORDER — DEXTROSE MONOHYDRATE 25 G/50ML
12.5-25 INJECTION, SOLUTION INTRAVENOUS AS NEEDED
Status: DISCONTINUED | OUTPATIENT
Start: 2019-05-25 | End: 2019-05-29 | Stop reason: HOSPADM

## 2019-05-25 RX ORDER — OXYCODONE HYDROCHLORIDE 5 MG/1
5 TABLET ORAL
Status: DISCONTINUED | OUTPATIENT
Start: 2019-05-25 | End: 2019-05-29 | Stop reason: HOSPADM

## 2019-05-25 RX ORDER — SODIUM CHLORIDE 0.9 % (FLUSH) 0.9 %
5-40 SYRINGE (ML) INJECTION AS NEEDED
Status: DISCONTINUED | OUTPATIENT
Start: 2019-05-25 | End: 2019-05-29 | Stop reason: HOSPADM

## 2019-05-25 RX ORDER — ONDANSETRON 4 MG/1
4 TABLET, FILM COATED ORAL
COMMUNITY

## 2019-05-25 RX ORDER — ACETAMINOPHEN 325 MG/1
650 TABLET ORAL
Status: DISCONTINUED | OUTPATIENT
Start: 2019-05-25 | End: 2019-05-29 | Stop reason: HOSPADM

## 2019-05-25 RX ORDER — PANTOPRAZOLE SODIUM 40 MG/1
40 TABLET, DELAYED RELEASE ORAL
Status: DISCONTINUED | OUTPATIENT
Start: 2019-05-25 | End: 2019-05-29 | Stop reason: HOSPADM

## 2019-05-25 RX ORDER — BISMUTH SUBSALICYLATE 262 MG
1 TABLET,CHEWABLE ORAL DAILY
COMMUNITY

## 2019-05-25 RX ORDER — MAGNESIUM SULFATE 100 %
4 CRYSTALS MISCELLANEOUS AS NEEDED
Status: DISCONTINUED | OUTPATIENT
Start: 2019-05-25 | End: 2019-05-29 | Stop reason: HOSPADM

## 2019-05-25 RX ORDER — ONDANSETRON 2 MG/ML
4 INJECTION INTRAMUSCULAR; INTRAVENOUS
Status: DISCONTINUED | OUTPATIENT
Start: 2019-05-25 | End: 2019-05-29 | Stop reason: HOSPADM

## 2019-05-25 RX ORDER — SODIUM CHLORIDE 9 MG/ML
100 INJECTION, SOLUTION INTRAVENOUS CONTINUOUS
Status: DISCONTINUED | OUTPATIENT
Start: 2019-05-25 | End: 2019-05-26

## 2019-05-25 RX ORDER — AMLODIPINE BESYLATE 5 MG/1
10 TABLET ORAL
Status: DISCONTINUED | OUTPATIENT
Start: 2019-05-25 | End: 2019-05-26

## 2019-05-25 RX ORDER — PROCHLORPERAZINE EDISYLATE 5 MG/ML
2.5 INJECTION INTRAMUSCULAR; INTRAVENOUS
Status: COMPLETED | OUTPATIENT
Start: 2019-05-25 | End: 2019-05-25

## 2019-05-25 RX ORDER — PETROLATUM,WHITE/LANOLIN
500 OINTMENT (GRAM) TOPICAL 2 TIMES DAILY
COMMUNITY

## 2019-05-25 RX ORDER — OXYCODONE HYDROCHLORIDE 10 MG/1
10 TABLET ORAL
COMMUNITY

## 2019-05-25 RX ORDER — LEVOTHYROXINE SODIUM 125 UG/1
125 TABLET ORAL
Status: DISCONTINUED | OUTPATIENT
Start: 2019-05-25 | End: 2019-05-29 | Stop reason: HOSPADM

## 2019-05-25 RX ORDER — HEPARIN SODIUM 5000 [USP'U]/ML
80 INJECTION, SOLUTION INTRAVENOUS; SUBCUTANEOUS
Status: DISCONTINUED | OUTPATIENT
Start: 2019-05-25 | End: 2019-05-25

## 2019-05-25 RX ORDER — HEPARIN SODIUM 10000 [USP'U]/100ML
18-36 INJECTION, SOLUTION INTRAVENOUS
Status: DISCONTINUED | OUTPATIENT
Start: 2019-05-25 | End: 2019-05-25

## 2019-05-25 RX ORDER — SERTRALINE HYDROCHLORIDE 50 MG/1
100 TABLET, FILM COATED ORAL DAILY
Status: DISCONTINUED | OUTPATIENT
Start: 2019-05-25 | End: 2019-05-26

## 2019-05-25 RX ORDER — HEPARIN SODIUM 5000 [USP'U]/ML
5000 INJECTION, SOLUTION INTRAVENOUS; SUBCUTANEOUS EVERY 8 HOURS
Status: DISCONTINUED | OUTPATIENT
Start: 2019-05-25 | End: 2019-05-29 | Stop reason: HOSPADM

## 2019-05-25 RX ORDER — HEPARIN SODIUM 5000 [USP'U]/ML
5000 INJECTION, SOLUTION INTRAVENOUS; SUBCUTANEOUS EVERY 8 HOURS
Status: CANCELLED | OUTPATIENT
Start: 2019-05-25

## 2019-05-25 RX ORDER — SODIUM CHLORIDE 0.9 % (FLUSH) 0.9 %
5-40 SYRINGE (ML) INJECTION EVERY 8 HOURS
Status: DISCONTINUED | OUTPATIENT
Start: 2019-05-25 | End: 2019-05-29 | Stop reason: HOSPADM

## 2019-05-25 RX ORDER — SUCRALFATE 1 G/10ML
1 SUSPENSION ORAL
Status: DISCONTINUED | OUTPATIENT
Start: 2019-05-25 | End: 2019-05-29 | Stop reason: HOSPADM

## 2019-05-25 RX ORDER — OXYCODONE HCL 10 MG/1
10 TABLET, FILM COATED, EXTENDED RELEASE ORAL EVERY 12 HOURS
Status: DISCONTINUED | OUTPATIENT
Start: 2019-05-25 | End: 2019-05-29 | Stop reason: HOSPADM

## 2019-05-25 RX ADMIN — SERTRALINE HYDROCHLORIDE 100 MG: 50 TABLET ORAL at 09:11

## 2019-05-25 RX ADMIN — Medication 10 ML: at 06:07

## 2019-05-25 RX ADMIN — Medication 10 ML: at 14:20

## 2019-05-25 RX ADMIN — PROCHLORPERAZINE EDISYLATE 2.5 MG: 5 INJECTION INTRAMUSCULAR; INTRAVENOUS at 02:45

## 2019-05-25 RX ADMIN — SUCRALFATE 1 G: 1 SUSPENSION ORAL at 15:56

## 2019-05-25 RX ADMIN — HEPARIN SODIUM 5000 UNITS: 5000 INJECTION INTRAVENOUS; SUBCUTANEOUS at 08:39

## 2019-05-25 RX ADMIN — HEPARIN SODIUM 5000 UNITS: 5000 INJECTION INTRAVENOUS; SUBCUTANEOUS at 15:56

## 2019-05-25 RX ADMIN — PATIROMER 8.4 G: 8.4 POWDER, FOR SUSPENSION ORAL at 06:44

## 2019-05-25 RX ADMIN — OXYCODONE HYDROCHLORIDE 10 MG: 10 TABLET, FILM COATED, EXTENDED RELEASE ORAL at 21:33

## 2019-05-25 RX ADMIN — Medication 10 ML: at 21:34

## 2019-05-25 RX ADMIN — SODIUM CHLORIDE 100 ML/HR: 900 INJECTION, SOLUTION INTRAVENOUS at 06:07

## 2019-05-25 RX ADMIN — AMLODIPINE BESYLATE 10 MG: 5 TABLET ORAL at 21:33

## 2019-05-25 RX ADMIN — LEVOTHYROXINE SODIUM 125 MCG: 125 TABLET ORAL at 09:11

## 2019-05-25 RX ADMIN — ONDANSETRON 4 MG: 2 INJECTION INTRAMUSCULAR; INTRAVENOUS at 11:18

## 2019-05-25 RX ADMIN — OXYCODONE HYDROCHLORIDE 5 MG: 5 TABLET ORAL at 11:18

## 2019-05-25 RX ADMIN — SODIUM CHLORIDE 1000 ML: 900 INJECTION, SOLUTION INTRAVENOUS at 02:58

## 2019-05-25 RX ADMIN — HEPARIN SODIUM 5000 UNITS: 5000 INJECTION INTRAVENOUS; SUBCUTANEOUS at 23:58

## 2019-05-25 RX ADMIN — OXYCODONE HYDROCHLORIDE 10 MG: 10 TABLET, FILM COATED, EXTENDED RELEASE ORAL at 09:11

## 2019-05-25 RX ADMIN — SODIUM CHLORIDE 100 ML/HR: 900 INJECTION, SOLUTION INTRAVENOUS at 16:50

## 2019-05-25 RX ADMIN — PANTOPRAZOLE SODIUM 40 MG: 40 TABLET, DELAYED RELEASE ORAL at 14:26

## 2019-05-25 NOTE — ROUTINE PROCESS
TRANSFER - OUT REPORT: 
 
Verbal report given to Joellen Lozada RN (name) on Karin Carson  being transferred to 5th floor (unit) for routine progression of care Report consisted of patients Situation, Background, Assessment and  
Recommendations(SBAR). Information from the following report(s) SBAR, ED Summary, Intake/Output and Recent Results was reviewed with the receiving nurse. Lines:  
Peripheral IV 05/25/19 Right Forearm (Active) Site Assessment Clean, dry, & intact 5/25/2019  2:22 AM  
Phlebitis Assessment 0 5/25/2019  2:22 AM  
Infiltration Assessment 0 5/25/2019  2:22 AM  
Dressing Status New 5/25/2019  2:22 AM  
Hub Color/Line Status Pink 5/25/2019  2:22 AM  
  
 
Opportunity for questions and clarification was provided. Patient transported with: 
 [employer]

## 2019-05-25 NOTE — ED PROVIDER NOTES
Ms Deni Hong is a 58 yo w/ hx of HTN, T2DM with neuropathy, anemia, chronic back pain that presents with nausea. This has been bothering her for about 3 weeks without any episodes of emesis until while in the waiting room this evening. It was clear with the yogurt she had previously eaten. She has been dry heaving multiple times throughout the day. Around when this started, she switched from taking insulin in the morning and metformin at night to taking metformin twice daily. She reports that she is always a little nauseous at baseline, but these last 3 weeks have been a lot worse. She has not been eating because of it. She will consume maybe a fruit and a yogurt daily. She reports she can't even watch cooking shows without becoming nauseated. She is on oxycodone for chronic back pain, so she only has about 2 to 3 BMs per week. Her last one was this morning and was a small amount. Of note, she also complains of tailbone pain that has been bothering her for the past 2 weeks. It started after she was transferring from the bed to her wheelchair and sat down quickly on the arm rest. It has not improved much over time. Past Medical History:   Diagnosis Date    Chronic back pain     Diabetes mellitus, type 2 ([facility]     History of vascular access device 12/03/2018    [facility] VAT - 4 FR single, L cephalic, 44 cm for LTA    HTN (hypertension)        No past surgical history on file.       Family History:   Problem Relation Age of Onset    Diabetes Father     Heart Disease Father        Social History     Socioeconomic History    Marital status:      Spouse name: Not on file    Number of children: Not on file    Years of education: Not on file    Highest education level: Not on file   Occupational History    Not on file   Social Needs    Financial resource strain: Not on file    Food insecurity:     Worry: Not on file     Inability: Not on file    Transportation needs:     Medical: Not on file Non-medical: Not on file   Tobacco Use    Smoking status: Never Smoker    Smokeless tobacco: Never Used   Substance and Sexual Activity    Alcohol use: No     Frequency: Never    Drug use: Not on file    Sexual activity: Not on file   Lifestyle    Physical activity:     Days per week: Not on file     Minutes per session: Not on file    Stress: Not on file   Relationships    Social connections:     Talks on phone: Not on file     Gets together: Not on file     Attends Evangelical service: Not on file     Active member of club or organization: Not on file     Attends meetings of clubs or organizations: Not on file     Relationship status: Not on file    Intimate partner violence:     Fear of current or ex partner: Not on file     Emotionally abused: Not on file     Physically abused: Not on file     Forced sexual activity: Not on file   Other Topics Concern     Service Not Asked    Blood Transfusions Not Asked    Caffeine Concern Not Asked    Occupational Exposure Not Asked   Radha Kipper Hazards Not Asked    Sleep Concern Not Asked    Stress Concern Not Asked    Weight Concern Not Asked    Special Diet Not Asked    Back Care Not Asked    Exercise Not Asked    Bike Helmet Not Asked    Seat Belt Not Asked    Self-Exams Not Asked   Social History Narrative    Not on file         ALLERGIES: Patient has no known allergies. Review of Systems   Constitutional: Negative for chills and fever. HENT: Negative for congestion. Respiratory: Negative for cough, shortness of breath and wheezing. Cardiovascular: Negative for chest pain, palpitations and leg swelling. Gastrointestinal: Positive for constipation, nausea and vomiting. Negative for abdominal distention, abdominal pain, blood in stool and diarrhea. Genitourinary: Negative for difficulty urinating, dysuria, frequency, hematuria and urgency. Musculoskeletal: Positive for back pain.    Neurological: Negative for dizziness, light-headedness and headaches. Vitals:    05/24/19 2356   BP: 111/67   Pulse: 87   Resp: 18   Temp: 98.3 °F (36.8 °C)   SpO2: 97%   Height: 5' 1\" (1.549 m)            Physical Exam   Constitutional: She is oriented to person, place, and time. She appears well-developed and well-nourished. No distress. HENT:   Head: Normocephalic and atraumatic. Eyes: Pupils are equal, round, and reactive to light. EOM are normal.   Neck: Normal range of motion. Neck supple. Cardiovascular: Normal rate, regular rhythm, normal heart sounds and intact distal pulses. No murmur heard. Pulmonary/Chest: Effort normal and breath sounds normal. No respiratory distress. She has no wheezes. She has no rales. Abdominal: Soft. Bowel sounds are normal. She exhibits no distension. There is no tenderness. There is no rebound and no guarding. Musculoskeletal: She exhibits tenderness. She exhibits no deformity. Point tenderness over coccyx. Unequal size of L and R calf. L calf larger than right. Lymphadenopathy:     She has no cervical adenopathy. Neurological: She is alert and oriented to person, place, and time. No cranial nerve deficit or sensory deficit. She exhibits normal muscle tone. Skin: Skin is warm and dry. Capillary refill takes less than 2 seconds. No rash noted. She is not diaphoretic. MDM  Number of Diagnoses or Management Options  Diagnosis management comments: 1. Nausea - DDx includes metformin induced GI upset, gastoparesis, constipation. Will also rule out cardiac etiology as this can present with nausea in elderly women.  - CBC, CMP, lipase  - EKG, troponin  - KUB    2. Unequal size of L calf - will rule out DVT  - Duplex venous BLE    3. Coccyx pain - given not much improvement after 2 weeks following injury, will get imaging  - Sacral/coccyx Xray    ED EKG interpretation: 2:36 AM  Rhythm: normal sinus rhythm; and regular .  Rate (approx.): 84; Axis: normal; P wave: normal; QRS interval: normal ; ST/T wave: normal; Other findings: normal, unchanged from previous EKG. This EKG was interpreted by Efrain Milian MD,ED Provider. --Reassessment 3:36 AM  Pt still nauseous. Now tender on RUQ exam. Creatinine noted to be 3.16. Potassium 6.1. Given pt won't eat due to the nausea, she will need to be admitted to hospitalists.  Will add on CT abd/pelv Frankie Posada MD  Family Medicine Resident         Procedures

## 2019-05-25 NOTE — CONSULTS
Remeron 15mg po qhs to help with depression and appetite and increase Zoloft to 150mg po daily if ok with primary team.    Full consult dictated. Thank you for this consult.

## 2019-05-25 NOTE — PROGRESS NOTES
Occupational therapy note:  Orders received, chart reviewed. Patient noted positive for bilateral LEs DVTs. Patient to received heparin at 0800. Will hold OT evaluation and follow up with patient when medically stable. Staci Bush MS OTR/L

## 2019-05-25 NOTE — ED NOTES
Pt Throughput: Charge Nurse on 5th floor  made aware of patient's bed assignment, room 526. Gasper Jett, RN  Emergency Dept Charge RN.

## 2019-05-25 NOTE — PROGRESS NOTES
Called the floor, asked if RN can complete the MRI checklist then call dept when done, thanks!!    Mri  #[id number]

## 2019-05-25 NOTE — CONSULTS
[facility]. Adria Flower M.D.  [phone]                    GASTROENTEROLOGY CONSULTATION NOTE              NAME:  Soraya Jay   :   1955   MRN:   [id number]       Referring Physician:    Dr. Drew Maldonado Date:   2019 1:05 PM    Chief Complaint:    Anorexia      History of Present Illness:    Ms. Odell Ying is a 59 y.o.  female who is admitted with dehydartion. she has a h/o debility, malnutrition presented to ER c/o poor appetite, not eating or drinking well. States she has a poor oral intake. Anything she eats or drinks she has nausea and throws up. Has been loosing weight. States has acid reflux and takes TUMS at least twice daily. Denies abdominal pain, but has chronic back pain. Has been having nausea consistently    She is a poor historian     GI has been consulted for evaluation and management of her nausea and anorexia          PMH:  Past Medical History:   Diagnosis Date    Chronic back pain     Diabetes mellitus, type 2 ([facility] [address]     History of vascular access device 2018    [facility] VAT - 4 FR single, L cephalic, 44 cm for LTA    HTN (hypertension)        PSH:  No past surgical history on file. Allergies:  No Known Allergies    Home Medications:  Prior to Admission Medications   Prescriptions Last Dose Informant Patient Reported? Taking? amLODIPine (NORVASC) 10 mg tablet 2019 at Unknown time Significant Other Yes Yes   Sig: Take 10 mg by mouth nightly. atenolol (TENORMIN) 100 mg tablet 2019 at Unknown time Significant Other Yes Yes   Sig: Take 100 mg by mouth daily. atorvastatin (LIPITOR) 20 mg tablet 2019 at Unknown time Significant Other Yes Yes   Sig: Take 20 mg by mouth nightly. fenofibrate (LOFIBRA) 160 mg tablet 2019 at Unknown time Significant Other Yes Yes   Sig: Take 160 mg by mouth nightly.    glucosamine sulfate 500 mg capsule 2019 at Unknown time Significant Other Yes Yes   Sig: Take 500 mg by mouth two (2) times a day. levothyroxine (SYNTHROID) 125 mcg tablet 2019 at Unknown time Significant Other Yes Yes   Sig: Take 125 mcg by mouth Daily (before breakfast). metFORMIN (GLUCOPHAGE) 500 mg tablet 2019 at Unknown time Significant Other Yes Yes   Sig: Take 500 mg by mouth two (2) times daily (with meals). multivitamin (ONE A DAY) tablet 2019 at Unknown time Significant Other Yes Yes   Sig: Take 1 Tab by mouth daily. naloxone (NARCAN) 2 mg/actuation spry  Significant Other Yes Yes   Si Spray by Nasal route once as needed. Use 1 spray intranasally, then discard. Repeat with new spray every 2 min as needed for opioid overdose symptoms, alternating nostrils. niacin ER (NIASPAN) 500 mg tablet 2019 at AM Significant Other Yes Yes   Sig: Take 500 mg by mouth daily. omega-3 acid ethyl esters (LOVAZA) 1 gram capsule 2019 at Unknown time Significant Other Yes Yes   Sig: Take 2 g by mouth two (2) times daily (with meals). ondansetron hcl (ZOFRAN) 4 mg tablet Unknown at Unknown time Significant Other Yes No   Sig: Take 4 mg by mouth every six (6) hours as needed for Nausea. oxyCODONE ER (XTAMPZA ER) 27 mg capsule 2019 at PM Significant Other Yes Yes   Sig: Take 27 mg by mouth every twelve (12) hours. oxyCODONE IR (ROXICODONE) 10 mg tab immediate release tablet  Significant Other Yes Yes   Sig: Take 10 mg by mouth two (2) times daily as needed (breakthrough pain). sertraline (ZOLOFT) 100 mg tablet 2019 at Unknown time Significant Other Yes Yes   Sig: Take 100 mg by mouth daily. ubidecarenone/vitamin E mixed (COQ10  PO) 2019 at Unknown time Significant Other Yes Yes   Sig: Take 10 mg by mouth daily.       Facility-Administered Medications: None       Hospital Medications:  Current Facility-Administered Medications   Medication Dose Route Frequency    sodium chloride (NS) flush 5-40 mL  5-40 mL IntraVENous Q8H    sodium chloride (NS) flush 5-40 mL 5-40 mL IntraVENous PRN    0.9% sodium chloride infusion  100 mL/hr IntraVENous CONTINUOUS    insulin lispro (HUMALOG) injection   SubCUTAneous AC&HS    glucose chewable tablet 16 g  4 Tab Oral PRN    dextrose (D50) infusion 12.5-25 g  12.5-25 g IntraVENous PRN    glucagon (GLUCAGEN) injection 1 mg  1 mg IntraMUSCular PRN    heparin (porcine) injection 5,000 Units  5,000 Units SubCUTAneous Q8H    amLODIPine (NORVASC) tablet 10 mg  10 mg Oral QHS    levothyroxine (SYNTHROID) tablet 125 mcg  125 mcg Oral ACB    oxyCODONE IR (ROXICODONE) tablet 5 mg  5 mg Oral Q6H PRN    sertraline (ZOLOFT) tablet 100 mg  100 mg Oral DAILY    oxyCODONE ER (OxyCONTIN) tablet 10 mg  10 mg Oral Q12H    ondansetron (ZOFRAN) injection 4 mg  4 mg IntraVENous Q6H PRN    acetaminophen (TYLENOL) tablet 650 mg  650 mg Oral Q6H PRN       Social History:  Social History     Tobacco Use    Smoking status: Never Smoker    Smokeless tobacco: Never Used   Substance Use Topics    Alcohol use: No     Frequency: Never       Family History:  Family History   Problem Relation Age of Onset    Diabetes Father     Heart Disease Father        Review of Systems:  A complete 12-point ROS was obtained and is as per the above HPI otherwise negative      Objective:     Patient Vitals for the past 8 hrs:   BP Temp Pulse Resp SpO2 Weight   05/25/19 1202 107/65 98.8 °F (37.1 °C) 87 16 95 %    05/25/19 0825 110/70 98 °F (36.7 °C) 89 18 100 %    05/25/19 0654      59.6 kg (131 lb 6.3 oz)   05/25/19 0541      67.1 kg (147 lb 14.9 oz)     05/25 0701 - 05/25 1900  In: 180 [P.O.:180]  Out: -   No intake/output data recorded. EXAM:     NEURO-alert, oriented x3, affect appropriate, no focal neurological deficits, moves all extremities well, no involuntary movements, reflexes at knee and ankle intact   HEENT-Head: Normocephalic, no lesions, without obvious abnormality.    LUNGS-clear to auscultation bilaterally    COR-regular rate and rhythym ABD- soft, non-tender. Bowel sounds normal. No masses,  no organomegaly     EXT-no edema    Skin - No rash     Data Review     Recent Labs     05/25/19  0949 05/25/19 0232   WBC 6.5 6.5   HGB 10.9* 11.8   HCT 34.8* 37.7    210     Recent Labs     05/25/19 0232      K 6.1*      CO2 20*   BUN 71*   CREA 3.16*   *   CA 9.4     Recent Labs     05/25/19 0232   SGOT 21   AP 52   TP 6.6   ALB 3.2*   GLOB 3.4   LPSE 168     Recent Labs     05/25/19 0233   APTT 23.8       Patient Active Problem List   Diagnosis Code    Metabolic acidosis G42.5    BAYLEE (acute kidney injury) ([facility] [address] N17.9    HTN (hypertension) I10    Chronic back pain M54.9, G89.29    Diabetes mellitus, type 2 (HCC) E11.9    Leukocytosis D72.829    Right elbow pain M25.521    Hypotension I95.9    Fever R50.9    Sinus tachycardia R00.0    Sepsis (HCC) A41.9    Acute metabolic encephalopathy A18.10    Discitis of cervical region M46.42    Urinary retention R33.9    Cellulitis of right elbow L03. 113    Anemia D64.9    Hypokalemia E87.6    Hypernatremia E87.0    Acute on chronic renal failure (HCC) N17.9, N18.9    Hyperkalemia E87.5    Dehydration E86.0    Anorexia R63.0    Nausea & vomiting R11.2    Gastroesophageal reflux disease without esophagitis K21.9       Assessment and Plan:  -I recommend that she be started on PPI once daily  -in addition will also start her on carafate TID and monitor her response  -Do calorie counts  -Plan to proceed with an EGD on Tuesday      Thanks for allowing me to participate in the care of this patient.   Signed By: Lord Marin MD     5/25/2019  1:05 PM

## 2019-05-25 NOTE — PROGRESS NOTES
Bedside and Verbal shift change report given to JOHNATHAN Jernigan (oncoming nurse) by Khadra Herrrea (offgoing nurse). Report included the following information SBAR and Kardex.

## 2019-05-25 NOTE — PROGRESS NOTES
TRANSFER - IN REPORT:    Verbal report received from JOHNTAHAN Busch,RN(name) on Calla Holler  being received from ED(unit) for routine progression of care      Report consisted of patients Situation, Background, Assessment and   Recommendations(SBAR). Information from the following report(s) SBAR, Kardex and Recent Results was reviewed with the receiving nurse. Opportunity for questions and clarification was provided. Assessment completed upon patients arrival to unit and care assumed.

## 2019-05-25 NOTE — CONSULTS
[address]     Name:  Bianca Recio  MR#:  [id number]  :  1955  ACCOUNT #:  [de-identified]  DATE OF SERVICE:  2019      CONSULTING PHYSICIAN:  Dr. Lakia Gonzáles:  Persistent neck pain. HISTORY OF PRESENT ILLNESS:  This is a 19-year-old female with a history of C-spine diskitis and osteomyelitis known since early 2019 and has had multiple MRIs of her C-spine. She was admitted for failure to thrive with poor p.o. intake and nausea and vomiting. The patient reports to me at bedside she has had no increase in her neck pain from baseline which is daily. She reports no fevers at home. She reports no new numbness, tingling, or weakness in her extremities. She is a diabetic and has had a long history of paresthesias in her lower extremities and also in her fingers from diabetes. There is no new imaging of her C-spine for this consultation. She has been treated with IV antibiotics using meropenem per Dr. Alysha Louis recommendations with Infectious Disease heading back to 2019. PAST MEDICAL HISTORY:  1. Known C-spine osteomyelitis and diskitis. 2.  Chronic back pain. 3.  Type 2 diabetes. 4.  Hypertension. PAST SURGICAL HISTORY:  Reported as none. SOCIAL HISTORY:  Nonsmoker. Does not use alcohol. FAMILY HISTORY:  Diabetes and heart disease. ALLERGIES:  NO KNOWN DRUG ALLERGIES. HOME MEDICATIONS:  1.  CoQ10.  2.  Glucosamine. 3.  Fenofibrate. 4.  Glucophage. 5.  Omega-3 acid. 6.  Atenolol. 7.  Norvasc. 8.  Lipitor. 9.  Niacin. 10.  Synthroid. 11.  Zoloft. 12.  Zofran. 13.  Voltaren gel. 14.  Oxycodone. REVIEW OF SYSTEMS:  Currently, she has chronic neck pain, chronic paresthesias in her lower extremities and in her fingers, and also nausea and vomiting at home. Otherwise, her 10-point review of systems is negative.     PHYSICAL EXAMINATION:  VITAL SIGNS:  Temperature is 98.8, pulse 87, blood pressure 107/65, respiratory rate 16, oxygen saturation is 95% on room air. GENERAL:  This is an ill-appearing  female, in no acute distress. She is alert and cooperative on examination. Her breathing is nonlabored. HEENT:  Her head is normocephalic, atraumatic. Her sclerae is nonicteric. NECK:  She is able to flex and extend her neck, rotate her neck with what she reports is mild soreness but no change from her current usual state of being with chronic soreness in her neck. ABDOMEN:  Soft and nontender. EXTREMITIES:  There is no pain, crepitation, tenderness to palpation, or deformities noted about the bilateral clavicle, shoulders, arms, elbows, forearms, wrists, hands, or fingers. Overall, she is weak from debility and has 4/5 strength in shoulder abduction, triceps, biceps, wrist extension and flexion. There is a negative Foster sign. Her sensation is intact grossly to light touch in the axillary, median, radial, and ulnar nerve distributions with brisk capillary refill in the fingers. She has a negative Wartenberg sign. Bilateral lower extremity evaluations:  Her thigh and calf compartments are atrophic. She has 4/5 hip flexion and extension, 5/5 knee flexion and extension, 5/5 hip abduction and adduction, 5/5 ankle dorsiflexion and plantarflexion. Sensation is diminished at baseline but intact to touch and she has brisk capillary refill in the toes. LABORATORY EVALUATION:  CBC:  White blood cell count 6.5, hemoglobin 11.8, hematocrit 37.7, platelets are 285. Metabolic panel:  Sodium 528, potassium 6.1, chloride 108, CO2 of 20, BUN 71, creatinine 3.16, glucose 153. Lipase is 168. Lactic acid is normal at 1.9. CRP is 1.4. There are no radiographic studies that have been completed at this time but an MRI of the C-spine with and without contrast is pending. ASSESSMENT AND PLAN:  This is a 28-year-old female with known cervical spine osteomyelitis and diskitis.   The most recent MRI to compare was on 01/02/2019. This showed osteomyelitis in C5, C6, and C7, and per the radiologist's report, there was a ventral epidural phlegmon that was decreased in size compared to her prior cervical spine MRI from 12/15/2018. I recommend that Infectious Disease be reconsulted after her cervical spine MRI has been completed to determine if there is a need for further course of IV antibiotics. Currently, there is no plan for surgical intervention from an orthopedic standpoint and she has no gross neurologic deficit. She has been afebrile at home and did not have a white count here in the hospital.  I will follow up her MRI once it has been completed.       Migdalia Marie MD      [id number]/  D:  05/25/2019 13:04  T:  05/25/2019 17:42  JOB #:  [id number]

## 2019-05-25 NOTE — CONSULTS
Ortho Note    Full c/s dictated: [id number]    C-spine MRI pending - will f/u. Will need ID consult after MRI to determine need for further IV abx treatment. No surgical intervention planned currently.

## 2019-05-25 NOTE — H&P
[facility]  [address], [facility] [address]  [phone]    Admission History and Physical      NAME:  Elaine Vega   :   1955   MRN:  [id number]     PCP:  Cruz Ho MD     Date/Time:  2019         Subjective:     CHIEF COMPLAINT: poor PO intake, nausea and vomiting      HISTORY OF PRESENT ILLNESS:     Ms. Shelia Shin is a 59 y.o.  female who is admitted with dehydartion. Ms. Shelia Shin with PMH of DM, HTN, hypothyroidism, debility, malnutrition presented to ER c/o poor appetite, not eating or drinking well, lower back pain. Pt was discharged from [facility] where she was admitted for ARF due to dehydration. After discharge, pt's PO intake gradually worsened. For the last week, she was eating very minimal. Denies abdominal pain, but has chronic back pain. Has been having nausea and today she started to vomit. Past Medical History:   Diagnosis Date    Chronic back pain     Diabetes mellitus, type 2 ([address]     History of vascular access device 2018    [facility] VAT - 4 FR single, L cephalic, 44 cm for LTA    HTN (hypertension)         No past surgical history on file. Social History     Tobacco Use    Smoking status: Never Smoker    Smokeless tobacco: Never Used   Substance Use Topics    Alcohol use: No     Frequency: Never        Family History   Problem Relation Age of Onset    Diabetes Father     Heart Disease Father         No Known Allergies     Prior to Admission medications    Medication Sig Start Date End Date Taking? Authorizing Provider   ubidecarenone/vitamin E mixed (COQ10  PO) Take 10 mg by mouth daily. Yes David, MD Darion   glucosamine sulfate 500 mg capsule 500 mg two (2) times a day. Yes Darion Hernandez MD   multivitamin (ONE A DAY) tablet Take  by mouth. Yes Darion Hernandez MD   fenofibrate (LOFIBRA) 160 mg tablet Take 160 mg by mouth daily.    Yes Darion Hernandez MD   metFORMIN (GLUCOPHAGE) 500 mg tablet Take 500 mg by mouth two (2) times daily (with meals). Yes David, MD Darion   omega-3 acid ethyl esters (LOVAZA) 1 gram capsule Take 2 g by mouth two (2) times daily (with meals). Yes David, MD Darion   atenolol (TENORMIN) 100 mg tablet Take 100 mg by mouth daily. Yes Provider, Historical   oxyCODONE ER (XTAMPZA ER) 18 mg ER capsule Take 18 mg by mouth every twelve (12) hours. Yes Provider, Historical   amLODIPine (NORVASC) 10 mg tablet Take 10 mg by mouth nightly. Yes Provider, Historical   atorvastatin (LIPITOR) 20 mg tablet Take 20 mg by mouth nightly. Yes Provider, Historical   niacin ER (NIASPAN) 500 mg tablet Take 500 mg by mouth daily. Yes Provider, Historical   levothyroxine (SYNTHROID) 125 mcg tablet Take 125 mcg by mouth Daily (before breakfast). Yes Provider, Historical   sertraline (ZOLOFT) 100 mg tablet Take 100 mg by mouth daily. Yes Provider, Historical   ondansetron hcl (ZOFRAN) 4 mg tablet Take 4 mg by mouth every six (6) hours as needed for Nausea. Other, MD Darion   diclofenac (VOLTAREN) 1 % gel Apply 2 g to affected area daily as needed (back pain). Provider, Historical   oxyCODONE IR (ROXICODONE) 5 mg immediate release tablet Take 1 Tab by mouth every six (6) hours as needed for Pain. Max Daily Amount: 20 mg.  Do not give if somnolent or respiratory depression (rate <12) 1/10/19   Maria Isabel Zuluaga MD         Review of Systems:  (bold if positive, if negative)    Gen:  Eyes:  ENT:  CVS:  Pulm:  GI:  , nausea,  :    MS:  Skin:  Psych:  Endo:    Hem:  Renal:    Neuro:            Objective:      VITALS:    Vital signs reviewed; most recent are:    Visit Vitals  /67 (BP 1 Location: Right arm, BP Patient Position: Sitting)   Pulse 87   Temp 98.3 °F (36.8 °C)   Resp 18   Ht 5' 1\" (1.549 m)   SpO2 97%   BMI 27.96 kg/m²     SpO2 Readings from Last 6 Encounters:   05/24/19 97%   05/02/19 99%   01/10/19 97%   12/17/18 96%        No intake or output data in the 24 hours ending 05/25/19 3735 Exam:     Physical Exam:    Gen:  Well-developed, well-nourished, in no acute distress  HEENT:  Pink conjunctivae, PERRL, hearing intact to voice, dry mucous membranes  Neck:  Supple, without masses, thyroid non-tender  Resp:  No accessory muscle use, clear breath sounds without wheezes rales or rhonchi  Card:  No murmurs, normal S1, S2 without thrills, bruits or peripheral edema  Abd:  Soft, non-tender, non-distended, normoactive bowel sounds are present, no palpable organomegaly and no detectable hernias  Lymph:  No cervical or inguinal adenopathy  Musc:  No cyanosis or clubbing  Skin:  No rashes or ulcers, skin turgor is good  Neuro:  Cranial nerves are grossly intact, no focal motor weakness, follows commands appropriately  Psych:  Good insight, oriented to person, place and time, alert       Labs:    Recent Labs     05/25/19  0232   WBC 6.5   HGB 11.8   HCT 37.7        Recent Labs     05/25/19  0232      K 6.1*      CO2 20*   *   BUN 71*   CREA 3.16*   CA 9.4   ALB 3.2*   TBILI 0.4   SGOT 21   ALT 20     Lab Results   Component Value Date/Time    Glucose (POC) 180 (H) 05/02/2019 11:29 AM    Glucose (POC) 127 (H) 05/02/2019 06:54 AM     No results for input(s): PH, PCO2, PO2, HCO3, FIO2 in the last 72 hours. No results for input(s): INR in the last 72 hours. No lab exists for component: INREXT    Telemetry reviewed:          Assessment/Plan:    1.  BAYLEE (acute kidney injury) ([facility] [address] (11/30/2018)/ CKD/ dehydration:  this is likely secondary to poor po intake, vomiting and dehydration with IVVD. No hx of NSAID use. Similar picture on previous admission. Start IVF and monitor clinically. 2.   Hyperkalemia (5/25/2019). Continue hydration. Will also give veltassa.      3.   HTN (hypertension) (): stopped ACE and chlorthalidone on previous admission. Now on amlodipine. 4.  Metabolic acidosis. Likely due to #1.  Continue IVF and monitor        5.   Chronic back pain (): continue oxycodone ER and prn oxycodone. Pt used to see Dr Ginger Feliz and pain doctor.      6.  Diabetes mellitus, type 2 ([address] ():  With recent weight loss, has been able to taper down insulin and was taken off insulin in favor of metformin. Will hold metformin and cover with SSI     7.  Hypothyroidism: continue LT4     8.  Debility. PT/OT.       8.  Moderate protein-calorie malnutrition. Will consult nutrition      Addendum: 05:45  DVT positive noted. Will start heparin drip.       Previous medical records reviewed     Risk of deterioration: high      Total time spent with patient: 79 [phone] [address] discussed with: Patient, Family, Nursing Staff and >50% of time spent in counseling and coordination of care    Discussed:  Care Plan    Prophylaxis:  Hep SQ    Probable Disposition:  Home w/Family           ___________________________________________________    Attending Physician: Mary Beth Francis MD

## 2019-05-25 NOTE — ED TRIAGE NOTES
Patient arrives for complaints of nausea for 3 weeks. Patient was seen in ED and PCP but has had no relief.  Patient last took zofran at 3 pm

## 2019-05-25 NOTE — PROGRESS NOTES
Physical Therapy  5/25/2019 0730    Orders received and acknowledged. Chart reviewed and noted pt positive for bilateral LE DVTs. First dose of Heparin due at 0800. Per guidelines will hold therapy until properly anticoagulated. Current aPTT 23. 8(goal 58-92). Will complete evaluation when appropriate.     Thank Iwona Flores, PT, DPT

## 2019-05-25 NOTE — PROGRESS NOTES
[facility]: MED RECONCILIATION    Comments/Recommendations:   Med rec performed via interview with patient's  who was a good historian.  confirmed patient stopped taking her lisinopril and chlorthalidone which were dcd at last [facility] discharge.  confirmed patient does not take any insulin. Regarding Xtampza (oxycodone ER)--notified  we do not carry and he is okay with [facility] subing to an equivalent oxycontin dose. Medications added:     Narcan nasal spray    Medications removed:    Oxycodone 5 mg  Voltaren gel   Medications adjusted:    Changed Xtampza (oxycodone ER) to 27 mg as per refill history and      Allergies: Patient has no known allergies. Prior to Admission Medications:     Medication Documentation Review Audit       Reviewed by Olive Bright, PHARMD (Pharmacist) on 05/25/19 at 1004      Medication Sig Documenting Provider Last Dose Status Taking? amLODIPine (NORVASC) 10 mg tablet Take 10 mg by mouth nightly. Provider, Historical 5/24/2019 Unknown time Active Yes   atenolol (TENORMIN) 100 mg tablet Take 100 mg by mouth daily. Provider, Historical 5/24/2019 Unknown time Active Yes   atorvastatin (LIPITOR) 20 mg tablet Take 20 mg by mouth nightly. Provider, Historical 5/24/2019 Unknown time Active Yes   fenofibrate (LOFIBRA) 160 mg tablet Take 160 mg by mouth nightly. Darion Hernandez MD 5/24/2019 Unknown time Active Yes   glucosamine sulfate 500 mg capsule Take 500 mg by mouth two (2) times a day. Darion Hernandez MD 5/24/2019 Unknown time Active Yes   levothyroxine (SYNTHROID) 125 mcg tablet Take 125 mcg by mouth Daily (before breakfast). Provider, Historical 5/24/2019 Unknown time Active Yes   metFORMIN (GLUCOPHAGE) 500 mg tablet Take 500 mg by mouth two (2) times daily (with meals). Darion Hernandez MD 5/24/2019 Unknown time Active Yes   multivitamin (ONE A DAY) tablet Take 1 Tab by mouth daily.  Darion Hernandez MD 5/24/2019 Unknown time Active Yes   naloxone [facility]) 2 mg/actuation spry 1 Spray by Nasal route once as needed. Use 1 spray intranasally, then discard. Repeat with new spray every 2 min as needed for opioid overdose symptoms, alternating nostrils. Provider, Historical  Active Yes   niacin ER (NIASPAN) 500 mg tablet Take 500 mg by mouth daily. Provider, Historical 5/24/2019 AM Active Yes   omega-3 acid ethyl esters (LOVAZA) 1 gram capsule Take 2 g by mouth two (2) times daily (with meals). Darion Hernandez MD 5/24/2019 Unknown time Active Yes           Med SRIRAM Sosa   Sat May 25, 2019 10:02 AM)     ondansetron hcl (ZOFRAN) 4 mg tablet Take 4 mg by mouth every six (6) hours as needed for Nausea. Darion Hernandez MD Unknown Unknown time Active No   oxyCODONE ER (XTAMPZA ER) 27 mg capsule Take 27 mg by mouth every twelve (12) hours. Provider, Historical 5/24/2019 PM Active Yes   oxyCODONE IR (ROXICODONE) 10 mg tab immediate release tablet Take 10 mg by mouth two (2) times daily as needed (breakthrough pain). Provider, Historical  Active Yes   sertraline (ZOLOFT) 100 mg tablet Take 100 mg by mouth daily. Provider, Historical 5/24/2019 Unknown time Active Yes   ubidecarenone/vitamin E mixed (COQ10  PO) Take 10 mg by mouth daily.  Darion Hernandez MD 5/24/2019 Unknown time Active Yes                        Lo Bennett, [facility] [[email or web address]]tamera Patrick   Contact: [phone]

## 2019-05-25 NOTE — PROGRESS NOTES
Ortho Note    Pt has LEFT leg DVT - will defer mgt to Medicine. F/u of C-spine MRI:     Radiology Impression:    Unchanged significant kyphosis with stable subluxations as described   No evidence for cord edema. Stable C5 and C6 compression deformities with evolution of marrow edema. Marrow  edema persist at the C7 level. Borderline central canal diameter at C3-4   Moderate severe bilateral neural foraminal stenosis C3-4    No planned ortho intervention. Symptom control prn for chronic neck pain. Will sign off.

## 2019-05-25 NOTE — PROGRESS NOTES
Md notified of bladder scan results. Nursing to notify Md if bladder scan results greater than 500 ml.

## 2019-05-25 NOTE — PROGRESS NOTES
[facility]    Medical Progress Note      NAME: Madhu Koenig   :  1955  MRM:  [id number]    Date/Time: 2019  8:33 AM     Total time spent with patient: 35 Minutes, from 8:15 to 8:50 AM I reviewed chart, notes, data and current medications in the medical record. I have examined and treated the patient at bedside during this period. There were additional Dx, Rx, consults and discussion, beyond that of my partner Dr Yolette Townsend earlier today. Assessment and Plan:     BAYLEE (acute kidney injury) / Hyperkalemia / Metabolic acidosis / CKD stage 3 / dehydration - POA, due to poor po intake, vomiting and dehydration with IVVD. No hx of NSAID use. Similar picture on previous admission. Start IVF and monitor clinically. Consult renal only if n ot improved with IVF     Anorexia / Nausea / Vomiting / Moderate protein-calorie malnutrition - POA, multifactorial, psychogenic, narcotics, other? GI/psych consult. Prn zofran. Wean narcs. Nutrition consult, supplements. Anxiety and depression / Acute metabolic encephalopathy - ASHANTI POA, recurrent, due to ARF. However, underlying this she has severely worsening depression, flat affect, anxiety. These are likely a major factor in anorexia and nausea. Consult psych. Of note, last year she had unremarkable head CT, MRI brain, TSH, B12/folate, ammonia, HSV, EEG, RPR, West Nile, HIV, Lyme. Suspect dementia too. Continue sertraline, resume duloxetine, ASA.      Chronic back pain / Hx C-spine discitis / Debilitated patient - Discitis Dx 5 months ago, completed IV Abx. Wean oxycodone ER and prn oxycodone. Consult Dr Yamileth Lozano regarding persistence of pain. Fall precautions, PT/OT eval     HTN (hypertension) - BP low, stable. We stopped ACE and chlorthalidone on previous admission. Now on amlodipine. Resume labetalol if tachycardic.     Diabetes mellitus, type 2 with renal complications - Diabetic diet and counseling. SSI per protocol.   Continue home metformin after ARF resolves, no longer on Lantus. A1c useless in setting of anemia.     Hypothyroidism - Continue synthroid, and check TSH     Deep venous thrombosis - A very small single DVT noted in a distal vein. Asymptomatic. Given her tenuous status, with ARF and debility leading to falls, I think it is safer to treat DVT with activity and warm compress, and prophylactic heparin, I think it would be unsafe to attempt full anticoagulation at this time. Anemia - POA, chronic, and will show up after hydration. Fecent Fe studies consistent with chronic dz. Monitor prn     Urinary retention - Recent 5 months ago.  Has required joyce and flomax. Check PVR. Could be contributing to nausea etc.     Hyperlipidemia - Lacking CAD or CVA, she has no need for simvastatin       Subjective:     Chief Complaint:  Flat affect, nausea, not eating, weak, pain    ROS:  (bold if positive, if negative)    Nausea/VomitNot Tolerating PT  Not Tolerating Diet        Objective:     Last 24hrs VS reviewed since prior progress note.  Most recent are:    Visit Vitals  /70 (BP 1 Location: Right arm, BP Patient Position: At rest)   Pulse 89   Temp 98 °F (36.7 °C)   Resp 18   Ht 5' 1\" (1.549 m)   Wt 59.6 kg (131 lb 6.3 oz)   SpO2 100%   BMI 24.83 kg/m²     SpO2 Readings from Last 6 Encounters:   05/25/19 100%   05/02/19 99%   01/10/19 97%   12/17/18 96%        No intake or output data in the 24 hours ending 05/25/19 9344     Physical Exam:    Gen:  Frail, in no acute distress  HEENT:  Pink conjunctivae, PERRL, hearing intact to voice, moist mucous membranes  Neck:  Supple, without masses, thyroid non-tender  Resp:  No accessory muscle use, clear breath sounds without wheezes rales or rhonchi  Card:  No murmurs, normal S1, S2 without thrills, bruits or peripheral edema  Abd:  Soft, non-tender, non-distended, normoactive bowel sounds are present, no mass  Lymph:  No cervical or inguinal adenopathy  Musc:  No cyanosis or clubbing  Skin:  No rashes or ulcers, skin turgor is good  Neuro:  Cranial nerves are grossly intact, general motor weakness, follows commands vaguely  Psych:  Poor insight, oriented to person, place, flat, depressed    Telemetry reviewed:   normal sinus rhythm  __________________________________________________________________  Medications Reviewed: (see below)  Medications:     Current Facility-Administered Medications   Medication Dose Route Frequency    sodium chloride (NS) flush 5-40 mL  5-40 mL IntraVENous Q8H    sodium chloride (NS) flush 5-40 mL  5-40 mL IntraVENous PRN    0.9% sodium chloride infusion  100 mL/hr IntraVENous CONTINUOUS    insulin lispro (HUMALOG) injection   SubCUTAneous AC&HS    glucose chewable tablet 16 g  4 Tab Oral PRN    dextrose (D50) infusion 12.5-25 g  12.5-25 g IntraVENous PRN    glucagon (GLUCAGEN) injection 1 mg  1 mg IntraMUSCular PRN    heparin (porcine) injection 5,000 Units  5,000 Units SubCUTAneous Q8H        Lab Data Reviewed: (see below)  Lab Review:     Recent Labs     05/25/19  0232   WBC 6.5   HGB 11.8   HCT 37.7        Recent Labs     05/25/19  0232      K 6.1*      CO2 20*   *   BUN 71*   CREA 3.16*   CA 9.4   ALB 3.2*   TBILI 0.4   SGOT 21   ALT 20     Lab Results   Component Value Date/Time    Glucose (POC) 112 (H) 05/25/2019 06:56 AM    Glucose (POC) 180 (H) 05/02/2019 11:29 AM    Glucose (POC) 127 (H) 05/02/2019 06:54 AM    Glucose (POC) 161 (H) 05/01/2019 09:04 PM    Glucose (POC) 162 (H) 05/01/2019 05:18 PM     No results for input(s): PH, PCO2, PO2, HCO3, FIO2 in the last 72 hours. No results for input(s): INR in the last 72 hours. No lab exists for component: INREXT  All Micro Results     None          Other pertinent lab: none    Total time spent with patient: 35 Minutes I reviewed chart, notes, data and current medications in the medical record. I have examined and treated the patient at bedside during this period. There were additional DX, Rx, consulted and discussion, beyond that of my partner Dr Anisha Romeo earlier today.                  Care Plan discussed with: Patient, Care Manager, Nursing Staff and >50% of time spent in counseling and coordination of care    Discussed:  Care Plan    Prophylaxis:  H2B/PPI    Disposition:   PT, OT, RN           ___________________________________________________    Attending Physician: Keturah Garrett MD

## 2019-05-25 NOTE — PROGRESS NOTES
Problem: Pressure Injury - Risk of  Goal: *Prevention of pressure injury  Description  Document Nikolay Scale and appropriate interventions in the flowsheet. Outcome: Progressing Towards Goal  Note:   Pressure Injury Interventions:  Sensory Interventions: Float heels, Keep linens dry and wrinkle-free, Minimize linen layers, Turn and reposition approx. every two hours (pillows and wedges if needed)    Moisture Interventions: Minimize layers, Internal/External urinary devices, Check for incontinence Q2 hours and as needed, Apply protective barrier, creams and emollients    Activity Interventions: Pressure redistribution bed/mattress(bed type), Assess need for specialty bed    Mobility Interventions: Float heels, HOB 30 degrees or less, Pressure redistribution bed/mattress (bed type), Turn and reposition approx.  every two hours(pillow and wedges), PT/OT evaluation    Nutrition Interventions: Document food/fluid/supplement intake, Offer support with meals,snacks and hydration    Friction and Shear Interventions: HOB 30 degrees or less, Lift sheet, Minimize layers, Lift team/patient mobility team

## 2019-05-26 ENCOUNTER — APPOINTMENT (OUTPATIENT)
Dept: VASCULAR SURGERY | Age: 64
DRG: 682 | End: 2019-05-26
Attending: INTERNAL MEDICINE
Payer: COMMERCIAL

## 2019-05-26 PROBLEM — R33.9 URINARY RETENTION: Status: RESOLVED | Noted: 2018-12-31 | Resolved: 2019-05-26

## 2019-05-26 PROBLEM — R50.9 FEVER: Status: RESOLVED | Noted: 2018-12-31 | Resolved: 2019-05-26

## 2019-05-26 PROBLEM — E87.0 HYPERNATREMIA: Status: RESOLVED | Noted: 2018-12-31 | Resolved: 2019-05-26

## 2019-05-26 PROBLEM — M25.521 RIGHT ELBOW PAIN: Status: RESOLVED | Noted: 2018-11-30 | Resolved: 2019-05-26

## 2019-05-26 PROBLEM — E87.6 HYPOKALEMIA: Status: RESOLVED | Noted: 2018-12-31 | Resolved: 2019-05-26

## 2019-05-26 PROBLEM — A41.9 SEPSIS (HCC): Status: RESOLVED | Noted: 2018-12-31 | Resolved: 2019-05-26

## 2019-05-26 PROBLEM — D72.829 LEUKOCYTOSIS: Status: RESOLVED | Noted: 2018-11-30 | Resolved: 2019-05-26

## 2019-05-26 PROBLEM — G93.41 ACUTE METABOLIC ENCEPHALOPATHY: Status: RESOLVED | Noted: 2018-12-31 | Resolved: 2019-05-26

## 2019-05-26 PROBLEM — M46.42 DISCITIS OF CERVICAL REGION: Status: RESOLVED | Noted: 2018-12-31 | Resolved: 2019-05-26

## 2019-05-26 PROBLEM — L03.113 CELLULITIS OF RIGHT ELBOW: Status: RESOLVED | Noted: 2018-12-31 | Resolved: 2019-05-26

## 2019-05-26 LAB
ANION GAP SERPL CALC-SCNC: 5 MMOL/L (ref 5–15)
BUN SERPL-MCNC: 53 MG/DL (ref 6–20)
BUN/CREAT SERPL: 25 (ref 12–20)
CALCIUM SERPL-MCNC: 8.1 MG/DL (ref 8.5–10.1)
CHLORIDE SERPL-SCNC: 117 MMOL/L (ref 97–108)
CO2 SERPL-SCNC: 18 MMOL/L (ref 21–32)
CREAT SERPL-MCNC: 2.12 MG/DL (ref 0.55–1.02)
ERYTHROCYTE [DISTWIDTH] IN BLOOD BY AUTOMATED COUNT: 14.5 % (ref 11.5–14.5)
GLUCOSE BLD STRIP.AUTO-MCNC: 122 MG/DL (ref 65–100)
GLUCOSE BLD STRIP.AUTO-MCNC: 136 MG/DL (ref 65–100)
GLUCOSE BLD STRIP.AUTO-MCNC: 145 MG/DL (ref 65–100)
GLUCOSE BLD STRIP.AUTO-MCNC: 96 MG/DL (ref 65–100)
GLUCOSE SERPL-MCNC: 93 MG/DL (ref 65–100)
HCT VFR BLD AUTO: 30.6 % (ref 35–47)
HGB BLD-MCNC: 9.5 G/DL (ref 11.5–16)
MCH RBC QN AUTO: 29.6 PG (ref 26–34)
MCHC RBC AUTO-ENTMCNC: 31 G/DL (ref 30–36.5)
MCV RBC AUTO: 95.3 FL (ref 80–99)
NRBC # BLD: 0 K/UL (ref 0–0.01)
NRBC BLD-RTO: 0 PER 100 WBC
PLATELET # BLD AUTO: 146 K/UL (ref 150–400)
PMV BLD AUTO: 9.6 FL (ref 8.9–12.9)
POTASSIUM SERPL-SCNC: 5.4 MMOL/L (ref 3.5–5.1)
RBC # BLD AUTO: 3.21 M/UL (ref 3.8–5.2)
SERVICE CMNT-IMP: ABNORMAL
SERVICE CMNT-IMP: NORMAL
SODIUM SERPL-SCNC: 140 MMOL/L (ref 136–145)
WBC # BLD AUTO: 4.1 K/UL (ref 3.6–11)

## 2019-05-26 PROCEDURE — 74011250637 HC RX REV CODE- 250/637: Performed by: INTERNAL MEDICINE

## 2019-05-26 PROCEDURE — 74011636637 HC RX REV CODE- 636/637: Performed by: INTERNAL MEDICINE

## 2019-05-26 PROCEDURE — 80048 BASIC METABOLIC PNL TOTAL CA: CPT

## 2019-05-26 PROCEDURE — 97530 THERAPEUTIC ACTIVITIES: CPT

## 2019-05-26 PROCEDURE — 82962 GLUCOSE BLOOD TEST: CPT

## 2019-05-26 PROCEDURE — 36415 COLL VENOUS BLD VENIPUNCTURE: CPT

## 2019-05-26 PROCEDURE — 74011250636 HC RX REV CODE- 250/636: Performed by: INTERNAL MEDICINE

## 2019-05-26 PROCEDURE — 97161 PT EVAL LOW COMPLEX 20 MIN: CPT

## 2019-05-26 PROCEDURE — 77030018846 HC SOL IRR STRL H20 ICUM -A

## 2019-05-26 PROCEDURE — 85027 COMPLETE CBC AUTOMATED: CPT

## 2019-05-26 PROCEDURE — 77030038269 HC DRN EXT URIN PURWCK BARD -A

## 2019-05-26 PROCEDURE — 97165 OT EVAL LOW COMPLEX 30 MIN: CPT

## 2019-05-26 PROCEDURE — 74011000258 HC RX REV CODE- 258: Performed by: INTERNAL MEDICINE

## 2019-05-26 PROCEDURE — 93970 EXTREMITY STUDY: CPT

## 2019-05-26 PROCEDURE — 65270000029 HC RM PRIVATE

## 2019-05-26 RX ORDER — LABETALOL 100 MG/1
100 TABLET, FILM COATED ORAL EVERY 12 HOURS
Status: DISCONTINUED | OUTPATIENT
Start: 2019-05-26 | End: 2019-05-29 | Stop reason: HOSPADM

## 2019-05-26 RX ORDER — MIRTAZAPINE 15 MG/1
15 TABLET, FILM COATED ORAL
Status: DISCONTINUED | OUTPATIENT
Start: 2019-05-26 | End: 2019-05-29 | Stop reason: HOSPADM

## 2019-05-26 RX ORDER — DEXTROSE MONOHYDRATE AND SODIUM CHLORIDE 5; .45 G/100ML; G/100ML
75 INJECTION, SOLUTION INTRAVENOUS CONTINUOUS
Status: DISCONTINUED | OUTPATIENT
Start: 2019-05-26 | End: 2019-05-29 | Stop reason: HOSPADM

## 2019-05-26 RX ORDER — SERTRALINE HYDROCHLORIDE 50 MG/1
150 TABLET, FILM COATED ORAL DAILY
Status: DISCONTINUED | OUTPATIENT
Start: 2019-05-27 | End: 2019-05-29 | Stop reason: HOSPADM

## 2019-05-26 RX ADMIN — OXYCODONE HYDROCHLORIDE 5 MG: 5 TABLET ORAL at 23:23

## 2019-05-26 RX ADMIN — DEXTROSE MONOHYDRATE AND SODIUM CHLORIDE 75 ML/HR: 5; .45 INJECTION, SOLUTION INTRAVENOUS at 09:00

## 2019-05-26 RX ADMIN — SERTRALINE HYDROCHLORIDE 100 MG: 50 TABLET ORAL at 08:52

## 2019-05-26 RX ADMIN — ONDANSETRON 4 MG: 2 INJECTION INTRAMUSCULAR; INTRAVENOUS at 20:40

## 2019-05-26 RX ADMIN — MIRTAZAPINE 15 MG: 15 TABLET, FILM COATED ORAL at 22:15

## 2019-05-26 RX ADMIN — INSULIN LISPRO 2 UNITS: 100 INJECTION, SOLUTION INTRAVENOUS; SUBCUTANEOUS at 11:42

## 2019-05-26 RX ADMIN — LABETALOL HYDROCHLORIDE 100 MG: 100 TABLET, FILM COATED ORAL at 20:40

## 2019-05-26 RX ADMIN — OXYCODONE HYDROCHLORIDE 10 MG: 10 TABLET, FILM COATED, EXTENDED RELEASE ORAL at 08:52

## 2019-05-26 RX ADMIN — Medication 10 ML: at 06:41

## 2019-05-26 RX ADMIN — SUCRALFATE 1 G: 1 SUSPENSION ORAL at 11:42

## 2019-05-26 RX ADMIN — SODIUM CHLORIDE 100 ML/HR: 900 INJECTION, SOLUTION INTRAVENOUS at 03:10

## 2019-05-26 RX ADMIN — SUCRALFATE 1 G: 1 SUSPENSION ORAL at 17:01

## 2019-05-26 RX ADMIN — PANTOPRAZOLE SODIUM 40 MG: 40 TABLET, DELAYED RELEASE ORAL at 06:41

## 2019-05-26 RX ADMIN — LEVOTHYROXINE SODIUM 125 MCG: 125 TABLET ORAL at 06:41

## 2019-05-26 RX ADMIN — HEPARIN SODIUM 5000 UNITS: 5000 INJECTION INTRAVENOUS; SUBCUTANEOUS at 23:23

## 2019-05-26 RX ADMIN — HEPARIN SODIUM 5000 UNITS: 5000 INJECTION INTRAVENOUS; SUBCUTANEOUS at 17:02

## 2019-05-26 RX ADMIN — OXYCODONE HYDROCHLORIDE 10 MG: 10 TABLET, FILM COATED, EXTENDED RELEASE ORAL at 20:40

## 2019-05-26 RX ADMIN — SUCRALFATE 1 G: 1 SUSPENSION ORAL at 06:42

## 2019-05-26 RX ADMIN — Medication 10 ML: at 22:16

## 2019-05-26 RX ADMIN — LABETALOL HYDROCHLORIDE 100 MG: 100 TABLET, FILM COATED ORAL at 10:38

## 2019-05-26 RX ADMIN — HEPARIN SODIUM 5000 UNITS: 5000 INJECTION INTRAVENOUS; SUBCUTANEOUS at 08:52

## 2019-05-26 NOTE — PROGRESS NOTES
11:40 Pt called out tearfully to be helped back to bed. Pt stated no one has come to help her for 10 minutes. Pt comforted by RN and respiratory therapist and assisted x2 with a walked back to bed. Pt continues to be tearful stating that no one came to help her. Patient made comfortable in bed with RN and RN leader. Call bell within reach. Pt more calm now.

## 2019-05-26 NOTE — PROGRESS NOTES
Bedside shift change report given to Jase Sood (oncoming nurse) by Agnes Sheldon (offgoing nurse). Report included the following information SBAR, Kardex, MAR and Recent Results.

## 2019-05-26 NOTE — PROGRESS NOTES
[facility]    Medical Progress Note      NAME: Aicha Jason   :  1955  MRM:  [id number]    Date/Time: 2019  9:07 AM          Assessment and Plan:     BAYLEE (acute kidney injury) / Hyperkalemia / Metabolic acidosis / CKD stage 3 / dehydration - POA, due to poor po intake, vomiting and dehydration with IVVD. No hx of NSAID use. Similar picture on previous admission. Improved so far with IVF. Monitor clinically. Consult renal only if not continuing to improve with IVF      Anorexia / Nausea / Vomiting / Moderate protein-calorie malnutrition - POA, multifactorial, psychogenic, narcotics, other?  GI consult added sucralfate, PPI and plans EGD in 2 days. Appreciate psych consult who added Remeron and increased Zoloft. Prn zofran. Weaning down narcs. Nutrition consult, supplements.     Anxiety and depression / Acute metabolic encephalopathy - ASHANTI POA, recurrent, due to ARF. However, underlying this she has severely worsening depression, flat affect, anxiety. These are likely a major factor in anorexia and nausea. Consulted psych, withi remeron and zoloft as above. Of note, last year she had unremarkable head CT, MRI brain, TSH, B12/folate, ammonia, HSV, EEG, RPR, West Nile, HIV, Lyme. Suspect dementia too. Continue duloxetine, ASA.       Chronic back pain / Hx C-spine discitis / Debilitated patient - Discitis Dx 5 months ago, completed full course IV Abx. Wean oxycodone ER and prn oxycodone. Consulted Dr Margaret Arreguin regarding persistence of pain. MRI  shows severe foraminal C stenosis, and evolving marrow edema. Fall precautions, PT/OT eval     Hypotension / HTN (hypertension) - BP low, stable. We stopped ACE and chlorthalidone on previous admission. Now stop amlodipine. Resume low dose labetalol since she is tachycardic.     Diabetes mellitus, type 2 with renal complications - Diabetic diet and counseling. SSI per protocol.   Continue home metformin after ARF resolves, no longer on Lantus. A1c useless in setting of anemia.     Deep venous thrombosis - Small DVTs noted in a distal veins. Asymptomatic. Given her tenuous status, with anemia, ARF, and debility leading to falls, I think it is safer to treat DVT with activity and warm compress, and continue only prophylactic heparin, I think it would be unsafe to attempt full anticoagulation at this time.     Anemia - POA, chronic, and as expected it showed up after hydration. Fecent Fe studies consistent with chronic dz. Monitor prn     Urinary retention - Recent 5 months ago.  Has required joyce and flomax. Check PVR, so far keeping below 500cc. Could be contributing to nausea etc.  We do not want to have chronic joyce if not required. Resume Flomax     Hyperlipidemia - Lacking CAD or CVA, she has no need for simvastatin    Hypothyroidism - Continue synthroid, and check TSH        Subjective:     Chief Complaint:  Remains flat, depressed, weak    ROS:  (bold if positive, if negative)    Tolerating minimal PT Tolerating minimal Diet        Objective:     Last 24hrs VS reviewed since prior progress note.  Most recent are:    Visit Vitals  /75 (BP 1 Location: Right arm, BP Patient Position: At rest)   Pulse (!) 103   Temp 98.2 °F (36.8 °C)   Resp 17   Ht 5' 1\" (1.549 m)   Wt 65 kg (143 lb 3.2 oz)   SpO2 96%   BMI 27.06 kg/m²     SpO2 Readings from Last 6 Encounters:   05/26/19 96%   05/02/19 99%   01/10/19 97%   12/17/18 96%            Intake/Output Summary (Last 24 hours) at 5/26/2019 0814  Last data filed at 5/25/2019 1807  Gross per 24 hour   Intake 1320 ml   Output 450 ml   Net 870 ml        Physical Exam:    Gen:  Frail, in no acute distress  HEENT:  Pink conjunctivae, PERRL, hearing intact to voice, moist mucous membranes  Neck:  Supple, without masses, thyroid non-tender  Resp:  No accessory muscle use, clear breath sounds without wheezes rales or rhonchi  Card:  No murmurs, normal S1, S2 without thrills, bruits or peripheral edema  Abd:  Soft, non-tender, non-distended, normoactive bowel sounds are present, no mas  Lymph:  No cervical or inguinal adenopathy  Musc:  No cyanosis or clubbing  Skin:  No rashes or ulcers, skin turgor is good  Neuro:  Cranial nerves are grossly intact, general motor weakness, follows commands vaguely  Psych:  Poor insight, oriented to person, place, flat    Telemetry reviewed:   normal sinus rhythm  __________________________________________________________________  Medications Reviewed: (see below)  Medications:     Current Facility-Administered Medications   Medication Dose Route Frequency    dextrose 5 % - 0.45% NaCl infusion  75 mL/hr IntraVENous CONTINUOUS    [START ON 5/27/2019] sertraline (ZOLOFT) tablet 150 mg  150 mg Oral DAILY    mirtazapine (REMERON) tablet 15 mg  15 mg Oral QHS    sodium chloride (NS) flush 5-40 mL  5-40 mL IntraVENous Q8H    sodium chloride (NS) flush 5-40 mL  5-40 mL IntraVENous PRN    insulin lispro (HUMALOG) injection   SubCUTAneous AC&HS    glucose chewable tablet 16 g  4 Tab Oral PRN    dextrose (D50) infusion 12.5-25 g  12.5-25 g IntraVENous PRN    glucagon (GLUCAGEN) injection 1 mg  1 mg IntraMUSCular PRN    heparin (porcine) injection 5,000 Units  5,000 Units SubCUTAneous Q8H    amLODIPine (NORVASC) tablet 10 mg  10 mg Oral QHS    levothyroxine (SYNTHROID) tablet 125 mcg  125 mcg Oral ACB    oxyCODONE IR (ROXICODONE) tablet 5 mg  5 mg Oral Q6H PRN    oxyCODONE ER (OxyCONTIN) tablet 10 mg  10 mg Oral Q12H    ondansetron (ZOFRAN) injection 4 mg  4 mg IntraVENous Q6H PRN    acetaminophen (TYLENOL) tablet 650 mg  650 mg Oral Q6H PRN    pantoprazole (PROTONIX) tablet 40 mg  40 mg Oral ACB    sucralfate (CARAFATE) 100 mg/mL oral suspension 1 g  1 g Oral TIDAC        Lab Data Reviewed: (see below)  Lab Review:     Recent Labs     05/26/19  0305 05/25/19  0949 05/25/19  0232   WBC 4.1 6.5 6.5   HGB 9.5* 10.9* 11.8   HCT 30.6* 34.8* 37.7   * 192 210 Recent Labs     05/26/19  0305 05/25/19  0232    136   K 5.4* 6.1*   * 108   CO2 18* 20*   GLU 93 153*   BUN 53* 71*   CREA 2.12* 3.16*   CA 8.1* 9.4   ALB  --  3.2*   TBILI  --  0.4   SGOT  --  21   ALT  --  20     Lab Results   Component Value Date/Time    Glucose (POC) 96 05/26/2019 06:53 AM    Glucose (POC) 106 (H) 05/25/2019 09:42 PM    Glucose (POC) 113 (H) 05/25/2019 04:18 PM    Glucose (POC) 118 (H) 05/25/2019 11:33 AM    Glucose (POC) 112 (H) 05/25/2019 06:56 AM     No results for input(s): PH, PCO2, PO2, HCO3, FIO2 in the last 72 hours. No results for input(s): INR in the last 72 hours. No lab exists for component: INREXT  All Micro Results     None          Other pertinent lab: none    Total time spent with patient: 39 Minutes I reviewed chart, notes, data and current medications in the medical record. I have examined and treated the patient at bedside during this period.                  Care Plan discussed with: Patient, Nursing Staff, Consultant/Specialist and >50% of time spent in counseling and coordination of care    Discussed:  Care Plan and D/C Planning    Prophylaxis:  Hep SQ and H2B/PPI    Disposition:  SNF/LTC and HH PT, OT, RN           ___________________________________________________    Attending Physician: Caprice Lopez MD

## 2019-05-26 NOTE — PROGRESS NOTES
Bedside and Verbal shift change report given to Jazmin Velazquez RN (oncoming nurse) by Roxanna Marie RN (offgoing nurse). Report included the following information SBAR, Kardex, Intake/Output, MAR and Recent Results.

## 2019-05-26 NOTE — PROGRESS NOTES
Problem: Self Care Deficits Care Plan (Adult)  Goal: *Acute Goals and Plan of Care (Insert Text)  Description  Occupational Therapy Goals  Initiated 5/26/2019  1. Patient will perform grooming with modified independence within 7 day(s). 2.  Patient will perform upper body dressing and bathing with modified independence within 7 day(s). 3.  Patient will perform lower body dressing and bathing with minimal assistance using AE PRN within 7 day(s). 4.  Patient will perform toilet transfers to [facility] with minimal assistance x1 using rolling walker within 7 day(s). 5.  Patient will perform all aspects of toileting with moderate assistance  within 7 day(s). 6.  Patient will participate in upper extremity therapeutic exercise/activities with supervision/set-up for 10 minutes within 7 day(s). 7.  Patient will utilize energy conservation techniques during functional activities with verbal cues within 7 day(s). Outcome: Progressing Towards Goal   OCCUPATIONAL THERAPY EVALUATION  Patient: Genesis Ha (69 y.o. female)  Date: 5/26/2019  Primary Diagnosis: Acute on chronic renal failure (HCC) [N17.9, N18.9]  Dehydration [E86.0]        Precautions: fall       ASSESSMENT :  Based on the objective data described below, the patient presents with decreased activity tolerance following admission on 5/24 for failure to thrive + nausea x3 weeks. Patient found to have B LE DVTs; She received Heparin but RN reports no further order to draw aPTT at this time. Per MD note from today, \"think it is safer to treat DVT with activity and warm compress, and continue only prophylactic heparin, I think it would be unsafe to attempt full anticoagulation at this time. \"  Patient has been medically cleared and requested to proceed with OT evaluation. Patient requires max encouragement; She is poorly motivated and withdrawn throughout tx. Her primary complaint is back pain which is chronic;  She was premedicated prior to OT and agreeable to proceed following education on the benefits of engagement in activity. Patient required mod A to come to sitting EOB and x2 (mod A) for stand-pivot transfer to the chair using rolling walker. Patient currently requires supervision/setup to min A for UB ADLs and max to total A for LB ADLs. Patient was educated on safe transfer techniques, benefits of exercise, and encouraged to be OOB to chair frequently throughout the day. She became tearful when discussing a goal to remain OOB to chair for 2 hours; Discussed the importance of setting goals and doing the best she could to reach the goal daily. Patient would benefit from continued skilled OT to progress towards goals and improve overall independence. Patient will benefit from skilled intervention to address the above impairments. Patient?s rehabilitation potential is considered to be Fair  Factors which may influence rehabilitation potential include:   ? None noted  ? Mental ability/status  ? Medical condition  ? Home/family situation and support systems  ? Safety awareness  ? Pain tolerance/management  ? Other: motivation       PLAN :  Recommendations and Planned Interventions:  ?               Self Care Training                  ? Therapeutic Activities  ? Functional Mobility Training    ? Cognitive Retraining  ? Therapeutic Exercises           ? Endurance Activities  ? Balance Training                   ? Neuromuscular Re-Education  ? Visual/Perceptual Training     ? Home Safety Training  ? Patient Education                 ? Family Training/Education  ? Other (comment):    Frequency/Duration: Patient will be followed by occupational therapy 5 times a week to address goals.   Discharge Recommendations: SNF vs. Home health pending progress and family availability to assist at home    Further Equipment Recommendations for Discharge: none at this time       SUBJECTIVE:   Patient stated ? I have been dealing with this for years. ? re: chronic back pain    OBJECTIVE DATA SUMMARY:   HISTORY:   Past Medical History:   Diagnosis Date    Chronic back pain     Diabetes mellitus, type 2 ([address]     History of vascular access device 12/03/2018    [facility] VAT - 4 FR single, L cephalic, 44 cm for LTA    HTN (hypertension)    No past surgical history on file. Prior Level of Function/Environment/Context: Patient lives with her  and has assistance from her children as well. Patient reports she receives assistance for some ADLs but unable to clarify how much assist and for which ADLs. Patient uses a RW PTA. She states someone has to help when getting up. She initially stated she was receiving HHOT/PT prior to admission, 4days/week (x2 each discipline) but then later said they were done and she was no longer getting home health therapy. Home Situation  Home Environment: Private residence  Wheelchair Ramp: Yes  One/Two Story Residence: Two story, live on 1st floor  Living Alone: No  Support Systems: Spouse/Significant Other/Partner  Patient Expects to be Discharged to[de-identified] Unknown  Current DME Used/Available at Home: Wheelchair, Transfer bench, Grab bars, Walker, rolling  Tub or Shower Type: Tub/Shower combination    Hand dominance: Right    EXAMINATION OF PERFORMANCE DEFICITS:  Cognitive/Behavioral Status:  Neurologic State: Alert  Orientation Level: Oriented X4  Cognition: Appropriate decision making; Appropriate for age attention/concentration; Appropriate safety awareness  Perception: Appears intact  Perseveration: No perseveration noted  Safety/Judgement: Awareness of environment;Good awareness of safety precautions    Skin: Intact in the uppers    Edema: None noted in the uppers    Hearing:   Auditory  Auditory Impairment: None    Vision/Perceptual: Tracking: Able to track stimulus in all quadrants w/o difficulty    Diplopia: No    Acuity: Within Defined Limits       Range of Motion:  WDL in the uppers    Strength:  Decreased but functional in the uppers    Coordination:  Fine Motor Skills-Upper: Left Intact; Right Intact    Gross Motor Skills-Upper: Left Intact; Right Intact    Tone & Sensation:  Tone: Normal  Sensation: Intact    Balance:  Sitting: Intact; With support  Standing: Impaired;Pull to stand; With support  Standing - Static: Occasional  Standing - Dynamic : Poor    Functional Mobility and Transfers for ADLs:  Bed Mobility:  Rolling: Moderate assistance  Sit to Supine: Moderate assistance  Scooting: Moderate assistance    Transfers:  Sit to Stand: Moderate assistance;Assist x2  Stand to Sit: Moderate assistance  Bed to Chair: Moderate assistance;Assist x2    ADL Assessment:  Feeding: Supervision;Setup    Oral Facial Hygiene/Grooming: Supervision;Setup    Bathing: Maximum assistance    Upper Body Dressing: Minimum assistance    Lower Body Dressing: Total assistance    Toileting: Maximum assistance    Cognitive Retraining  Safety/Judgement: Awareness of environment;Good awareness of safety precautions    Functional Measure:  Barthel Index:    Bathin  Bladder: 5  Bowels: 5  Groomin  Dressin  Feedin  Mobility: 5  Stairs: 0  Toilet Use: 0  Transfer (Bed to Chair and Back): 5  Total: 25/100        Percentage of impairment   0%   1-19%   20-39%   40-59%   60-79%   80-99%   100%   Barthel Score 0-100 100 99-80 79-60 59-40 20-39 1-19   0     The Barthel ADL Index: Guidelines  1. The index should be used as a record of what a patient does, not as a record of what a patient could do. 2. The main aim is to establish degree of independence from any help, physical or verbal, however minor and for whatever reason. 3. The need for supervision renders the patient not independent.   4. A patient's performance should be established using the best available evidence. Asking the patient, friends/relatives and nurses are the usual sources, but direct observation and common sense are also important. However direct testing is not needed. 5. Usually the patient's performance over the preceding 24-48 hours is important, but occasionally longer periods will be relevant. 6. Middle categories imply that the patient supplies over 50 per cent of the effort. 7. Use of aids to be independent is allowed. David Lomas., Barthel, D.W. ([id number]). Functional evaluation: the Barthel Index. [address] [facility] (14)2. Sandria Cogan elissa MATHEW Pearce, Lashawn Davis, Leda Drake., [place], [address] (1999). Measuring the change indisability after inpatient rehabilitation; comparison of the responsiveness of the Barthel Index and Functional [place] Measure. Journal of Neurology, Neurosurgery, and Psychiatry, 66(4), 474-405. Eliane Solomon, NAngelicaJ.A, ROXANA Alegria, & Danyelle Benavides MSUSAN. (2004.) Assessment of post-stroke quality of life in cost-effectiveness studies: The usefulness of the Barthel Index and the EuroQoL-5D. Quality of Life Research, 15, 470-41        Occupational Therapy Evaluation Charge Determination   History Examination Decision-Making   LOW Complexity : Brief history review  LOW Complexity : 1-3 performance deficits relating to physical, cognitive , or psychosocial skils that result in activity limitations and / or participation restrictions  LOW Complexity : No comorbidities that affect functional and no verbal or physical assistance needed to complete eval tasks       Based on the above components, the patient evaluation is determined to be of the following complexity level: LOW   Pain:  Pain Scale 1: Numeric (0 - 10)  Pain Intensity 1: 0              Activity Tolerance:   Patient tolerated eval well. Please refer to the flowsheet for vital signs taken during this treatment. After treatment:   ? Patient left in no apparent distress sitting up in chair  ?  Patient left in no apparent distress in bed  ? Call bell left within reach  ? Nursing notified  ? Caregiver present  ? Bed alarm activated    COMMUNICATION/EDUCATION:   The patient?s plan of care was discussed with: Physical Therapist, Registered Nurse and patient . ? Home safety education was provided and the patient/caregiver indicated understanding. ? Patient/family have participated as able in goal setting and plan of care. ? Patient/family agree to work toward stated goals and plan of care. ? Patient understands intent and goals of therapy, but is neutral about his/her participation. ? Patient is unable to participate in goal setting and plan of care. This patient?s plan of care is appropriate for delegation to [facility].     Thank you for this referral.  Derek Knox, OTR/L  Time Calculation: 23 mins

## 2019-05-26 NOTE — CONSULTS
[address]     Name:  Beatriz Vasquez  MR#:  [id number]  :  1955  ACCOUNT #:  [de-identified]  DATE OF SERVICE:  2019      BEHAVIORAL HEALTH CONSULTATION    CHIEF COMPLAINT:  Severe depression, failure to thrive. HISTORY OF PRESENT ILLNESS:  The patient is a 66-year-old female who is being seen in the medical unit for psychiatric consultation for complaints mentioned above. Her son, Moraima Chavez, is at bedside and was able to provide a meaningful history as well. The patient's admission to the medical unit is well documented in the chart. She presented to the emergency room due to poor appetite. She is not eating and drinking, has lower back pain. Her past medical history is significant for diabetes, hypertension, hypothyroidism, debility, and malnutrition. She was recently discharged for acute renal failure due to dehydration but then after she left the hospital, her p.o. intake has been progressively getting worse. According to the nurse today, she typically eats only two cups of fruit a day. The patient has been struggling with depression, off and on, for the past several years. She states that it was pretty much manageable but then since her back pain it was slowly getting worse, she started feeling more depressed. She also has some anxiety. Last year, she was admitted in a hospital for sepsis and her son noticed that the patient had a lot of trouble thinking. She had difficulty processing things and since then, she has been declining. She continues to eat poorly. She states that she prefers not to eat or drink, so she would not be a burden to anybody. She did not want anybody to clean her up or take her to the bathroom. She also prevents from drinking so that she will not urinate as much. She is very depressed, hopeless, and helpless since. She thinks that people are better off without her. However, she states that she would like to stay alive. Her affect is very flat. She clearly denies suicidal ideation, homicidal ideation, auditory or visual hallucination. PAST MEDICAL HISTORY:  See H and P. PAST PSYCHIATRIC HOSPITALIZATION:  She has never been admitted in any inpatient psychiatric facility. She saw a visiting psychiatrist when she was at [facility]. She is currently taking sertraline. PSYCHOSOCIAL HISTORY:  She has been  for almost 40 years. She has two children. She lives with her  and her two children. MENTAL STATUS EXAM:  The patient is currently lying in bed, dressed in hospital apparel. No eye contact. Mood is very depressed. Affect is flat. Speech soft and slow. Thought process slow but logical.  She denies suicidal ideation, homicidal ideation, auditory or visual hallucinations. Memory seems intact. Intelligence seems average. Insight is poor. Judgment is poor. ASSESSMENT AND PLAN:  The patient meets the criteria for major depressive disorder, recurrent, severe without psychotic features. I have discussed with them that we can add Remeron 15 mg at bedtime to help with sleep. They are in agreement with this. There is no huge medication interaction with Remeron and with her current medication. Her family agreed to take the medication. This would help with depression and appetite. The patient appears severely depressed. If okay with the Primary Team, we can also increase her Zoloft to 150 mg in the morning. She tells me that she would like to stay alive for her family. She has no suicidal ideation but she continues to have the negative thoughts that she is a burden to everybody. She was tearful during the interview. I had a lengthy discussion with her and her son and I am hopeful that the Remeron will at least help her with appetite. Thank you for this consult. Please call with questions.         BLACK SPEARS NP [email or web address]/BS_EDIT  D:  05/25/2019 19:30  T:  05/25/2019 23:27  JOB #:  [id number]

## 2019-05-26 NOTE — PROGRESS NOTES
Problem: Mobility Impaired (Adult and Pediatric)  Goal: *Therapy Goal (Edit Goal, Insert Text)  Outcome: Progressing Towards Goal  Note:   Physical Therapy Goals  Initiated 5/26/2019  1. Patient will move from supine to sit and sit to supine  in bed with minimal assistance/contact guard assist within 7 day(s). 2.  Patient will transfer from bed to chair and chair to bed with minimal assistance/contact guard assist using the least restrictive device within 7 day(s). 3.  Patient will perform sit to stand with minimal assistance/contact guard assist within 7 day(s). 4.  Patient will ambulate with minimal assistance/contact guard assist for 25 feet with the least restrictive device within 7 day(s). PHYSICAL THERAPY EVALUATION  Patient: Aicha Jason (37 y.o. female)  Date: 5/26/2019  Primary Diagnosis: Acute on chronic renal failure (HCC) [N17.9, N18.9]  Dehydration [E86.0]        Precautions: Fall       ASSESSMENT :  Based on the objective data described below, the patient presents with severe back pain which limits safety, active bed mobility, transfers and ambulation. Pt reports having HHPT previously and the therapist were helping her get out of bed and her family helped put her back to bed. Pt lives with  and sons lyle two story home with her bed & bath on the 1st level. She has a w/c ramp to enter the home. Pt states she uses the w/c mainly for long distance travel. Pt is currently limited in performing safe bed mobility and transfers which increases the risk for falls. Pt will require frequent/constant therapy to address her mobility and safety limitations; recommend frequent HHPT or SNF rehab at discharge. Will follow pt daily while on this unit for therapy. Thank you for this referral.    Patient will benefit from skilled intervention to address the above impairments.   Patient?s rehabilitation potential is considered to be Good  Factors which may influence rehabilitation potential include: ?         None noted  ? Mental ability/status  ? Medical condition  ? Home/family situation and support systems  ? Safety awareness  ? Pain tolerance/management  ? Other:      PLAN :  Recommendations and Planned Interventions:  ?           Bed Mobility Training             ? Neuromuscular Re-Education  ? Transfer Training                   ? Orthotic/Prosthetic Training  ? Gait Training                         ? Modalities  ? Therapeutic Exercises           ? Edema Management/Control  ? Therapeutic Activities            ? Patient and Family Training/Education  ? Other (comment):    Frequency/Duration: Patient will be followed by physical therapy  daily to address goals. Discharge Recommendations: [address]  Further Equipment Recommendations for Discharge: TBD      SUBJECTIVE:   Patient stated ? I can't do anything, my back hurts too much. ?    OBJECTIVE DATA SUMMARY:   HISTORY:    Past Medical History:   Diagnosis Date    Chronic back pain     Diabetes mellitus, type 2 ([facility] [place] [address]     History of vascular access device 12/03/2018    [facility] VAT - 4 FR single, L cephalic, 44 cm for LTA    HTN (hypertension)    No past surgical history on file. Prior Level of Function/Home Situation: required assistance from family and HHPT.   Personal factors and/or comorbidities impacting plan of care: severe back pain and depression    Home Situation  Home Environment: Private residence  Wheelchair Ramp: Yes  One/Two Story Residence: Two story, live on 1st floor  Living Alone: No  Support Systems: Spouse/Significant Other/Partner  Patient Expects to be Discharged to[de-identified] Unknown  Current DME Used/Available at Home: Wheelchair, Transfer bench, Grab bars, Walker, rolling  Tub or Shower Type: Tub/Shower combination    EXAMINATION/PRESENTATION/DECISION MAKING:   Critical Behavior:  Neurologic State: Alert  Orientation Level: Oriented X4  Cognition: Appropriate decision making, Appropriate for age attention/concentration, Appropriate safety awareness  Safety/Judgement: Awareness of environment, Good awareness of safety precautions  Hearing: Auditory  Auditory Impairment: None  Skin:  dry, flaky  Edema: no edema noted  Range Of Motion:  AROM: Generally decreased, functional           PROM: Generally decreased, functional           Strength:    Strength: Generally decreased, functional                    Tone & Sensation:   Tone: Normal              Sensation: Intact               Coordination:  Coordination: Generally decreased, functional  Vision:   Tracking: Able to track stimulus in all quadrants w/o difficulty  Diplopia: No  Acuity: Within Defined Limits  Functional Mobility:  Bed Mobility:  Rolling: Moderate assistance     Sit to Supine: Moderate assistance  Scooting: Moderate assistance  Transfers:  Sit to Stand: Moderate assistance;Assist x2  Stand to Sit: Moderate assistance  Stand Pivot Transfers: Moderate assistance;Assist x2  Stand Pivot Transfers: Assist x2  Bed to Chair: Moderate assistance;Assist x2              Balance:   Sitting: Intact; With support  Standing: Impaired;Pull to stand; With support  Standing - Static: Occasional  Standing - Dynamic : Poor  Ambulation/Gait Training:  Distance (ft): 2 Feet (ft)  Assistive Device: Walker, rolling;Gait belt  Ambulation - Level of Assistance: Moderate assistance;Assist x2     Gait Description (WDL): Exceptions to WDL  Gait Abnormalities: Decreased step clearance        Base of Support: Narrowed     Speed/Enriqueta: Slow;Shuffled  Step Length: Left shortened;Right shortened                     Stairs:      Not assessed        Therapeutic Exercises:   Not done during this Rx session.     Functional Measure:  Barthel Index:    Bathin  Bladder: 5  Bowels: 5  Groomin  Dressin  Feedin  Mobility: 5  Stairs: 0  Toilet Use: 0  Transfer (Bed to Chair and Back): 5  Total: 25/100       Percentage of impairment   0%   1-19%   20-39%   40-59%   60-79%   80-99%   100%   Barthel Score 0-100 100 99-80 79-60 59-40 20-39 1-19   0     The Barthel ADL Index: Guidelines  1. The index should be used as a record of what a patient does, not as a record of what a patient could do. 2. The main aim is to establish degree of independence from any help, physical or verbal, however minor and for whatever reason. 3. The need for supervision renders the patient not independent. 4. A patient's performance should be established using the best available evidence. Asking the patient, friends/relatives and nurses are the usual sources, but direct observation and common sense are also important. However direct testing is not needed. 5. Usually the patient's performance over the preceding 24-48 hours is important, but occasionally longer periods will be relevant. 6. Middle categories imply that the patient supplies over 50 per cent of the effort. 7. Use of aids to be independent is allowed. Magan Helms., Barthel, LISHA. ([id number]). Functional evaluation: the Barthel Index. [address] [facility] (14)2. MATHEW Meredith, Brittnee Morataya., Mars Moat., [place], [address] [place] [address] Ave (1999). Measuring the change indisability after inpatient rehabilitation; comparison of the responsiveness of the Barthel Index and Functional [place] Measure. Journal of Neurology, Neurosurgery, and Psychiatry, 66(4), 515-316. Daren Pedroza N.J.SIDNEY, EDWARDO AlegriaJ.JAEL, & Jonn Martinez MSUSAN. (2004.) Assessment of post-stroke quality of life in cost-effectiveness studies: The usefulness of the Barthel Index and the EuroQoL-5D.  Quality of Life Research, 15, 109-40           Physical Therapy Evaluation Charge Determination   History Examination Presentation Decision-Making   LOW Complexity : Zero comorbidities / personal factors that will impact the outcome / POC LOW Complexity : 1-2 Standardized tests and measures addressing body structure, function, activity limitation and / or participation in recreation  LOW Complexity : Stable, uncomplicated  LOW Complexity : FOTO score of       Based on the above components, the patient evaluation is determined to be of the following complexity level: LOW     Pain:  Pain Scale 1: Numeric (0 - 10)  Pain Intensity 1: 0              Activity Tolerance:   Poor activity tolerance 2 + 2 c/o severe back pain. Please refer to the flowsheet for vital signs taken during this treatment. After treatment:   ?         Patient left in no apparent distress sitting up in chair  ? Patient left in no apparent distress in bed  ? Call bell left within reach  ? Nursing notified  ? Caregiver present  ? Bed alarm activated    COMMUNICATION/EDUCATION:   The patient?s plan of care was discussed with: Occupational Therapist and Registered Nurse. ?         Fall prevention education was provided and the patient/caregiver indicated understanding. ? Patient/family have participated as able in goal setting and plan of care. ?         Patient/family agree to work toward stated goals and plan of care. ?         Patient understands intent and goals of therapy, but is neutral about his/her participation. ? Patient is unable to participate in goal setting and plan of care.     Thank you for this referral.  Tamara Up, PT   Time Calculation: 22 mins

## 2019-05-26 NOTE — PROGRESS NOTES
Bedside and Verbal shift change report given to Topher Green RN (oncoming nurse) by Joanna Clements RN (offgoing nurse). Report included the following information SBAR, Kardex, MAR and Accordion.

## 2019-05-27 PROBLEM — R00.0 SINUS TACHYCARDIA: Status: RESOLVED | Noted: 2018-12-31 | Resolved: 2019-05-27

## 2019-05-27 PROBLEM — E86.0 DEHYDRATION: Status: RESOLVED | Noted: 2019-05-25 | Resolved: 2019-05-27

## 2019-05-27 PROBLEM — E87.20 METABOLIC ACIDOSIS: Status: RESOLVED | Noted: 2018-11-30 | Resolved: 2019-05-27

## 2019-05-27 PROBLEM — D64.9 ANEMIA: Chronic | Status: ACTIVE | Noted: 2018-12-31

## 2019-05-27 PROBLEM — K21.9 GASTROESOPHAGEAL REFLUX DISEASE WITHOUT ESOPHAGITIS: Chronic | Status: ACTIVE | Noted: 2019-05-25

## 2019-05-27 PROBLEM — E87.5 HYPERKALEMIA: Status: RESOLVED | Noted: 2019-05-25 | Resolved: 2019-05-27

## 2019-05-27 PROBLEM — R63.0 ANOREXIA: Chronic | Status: ACTIVE | Noted: 2019-05-25

## 2019-05-27 PROBLEM — I95.9 HYPOTENSION: Status: RESOLVED | Noted: 2018-11-30 | Resolved: 2019-05-27

## 2019-05-27 PROBLEM — R11.2 NAUSEA & VOMITING: Status: RESOLVED | Noted: 2019-05-25 | Resolved: 2019-05-27

## 2019-05-27 LAB
ANION GAP SERPL CALC-SCNC: 8 MMOL/L (ref 5–15)
ATRIAL RATE: 84 BPM
BUN SERPL-MCNC: 38 MG/DL (ref 6–20)
BUN/CREAT SERPL: 21 (ref 12–20)
CALCIUM SERPL-MCNC: 8 MG/DL (ref 8.5–10.1)
CALCULATED P AXIS, ECG09: 52 DEGREES
CALCULATED R AXIS, ECG10: 4 DEGREES
CALCULATED T AXIS, ECG11: 49 DEGREES
CHLORIDE SERPL-SCNC: 116 MMOL/L (ref 97–108)
CO2 SERPL-SCNC: 19 MMOL/L (ref 21–32)
CREAT SERPL-MCNC: 1.79 MG/DL (ref 0.55–1.02)
DIAGNOSIS, 93000: NORMAL
ERYTHROCYTE [DISTWIDTH] IN BLOOD BY AUTOMATED COUNT: 15 % (ref 11.5–14.5)
GLUCOSE BLD STRIP.AUTO-MCNC: 131 MG/DL (ref 65–100)
GLUCOSE BLD STRIP.AUTO-MCNC: 131 MG/DL (ref 65–100)
GLUCOSE BLD STRIP.AUTO-MCNC: 141 MG/DL (ref 65–100)
GLUCOSE BLD STRIP.AUTO-MCNC: 197 MG/DL (ref 65–100)
GLUCOSE SERPL-MCNC: 141 MG/DL (ref 65–100)
HCT VFR BLD AUTO: 28.2 % (ref 35–47)
HGB BLD-MCNC: 8.6 G/DL (ref 11.5–16)
MAGNESIUM SERPL-MCNC: 1.7 MG/DL (ref 1.6–2.4)
MCH RBC QN AUTO: 29.3 PG (ref 26–34)
MCHC RBC AUTO-ENTMCNC: 30.5 G/DL (ref 30–36.5)
MCV RBC AUTO: 95.9 FL (ref 80–99)
NRBC # BLD: 0 K/UL (ref 0–0.01)
NRBC BLD-RTO: 0 PER 100 WBC
P-R INTERVAL, ECG05: 180 MS
PLATELET # BLD AUTO: 136 K/UL (ref 150–400)
PMV BLD AUTO: 9.9 FL (ref 8.9–12.9)
POTASSIUM SERPL-SCNC: 4.9 MMOL/L (ref 3.5–5.1)
Q-T INTERVAL, ECG07: 350 MS
QRS DURATION, ECG06: 80 MS
QTC CALCULATION (BEZET), ECG08: 413 MS
RBC # BLD AUTO: 2.94 M/UL (ref 3.8–5.2)
SERVICE CMNT-IMP: ABNORMAL
SODIUM SERPL-SCNC: 143 MMOL/L (ref 136–145)
VENTRICULAR RATE, ECG03: 84 BPM
WBC # BLD AUTO: 3.7 K/UL (ref 3.6–11)

## 2019-05-27 PROCEDURE — 80048 BASIC METABOLIC PNL TOTAL CA: CPT

## 2019-05-27 PROCEDURE — 36415 COLL VENOUS BLD VENIPUNCTURE: CPT

## 2019-05-27 PROCEDURE — 74011250637 HC RX REV CODE- 250/637: Performed by: INTERNAL MEDICINE

## 2019-05-27 PROCEDURE — 83735 ASSAY OF MAGNESIUM: CPT

## 2019-05-27 PROCEDURE — 74011250636 HC RX REV CODE- 250/636: Performed by: INTERNAL MEDICINE

## 2019-05-27 PROCEDURE — 85027 COMPLETE CBC AUTOMATED: CPT

## 2019-05-27 PROCEDURE — 65270000029 HC RM PRIVATE

## 2019-05-27 PROCEDURE — 97530 THERAPEUTIC ACTIVITIES: CPT

## 2019-05-27 PROCEDURE — 74011000258 HC RX REV CODE- 258: Performed by: INTERNAL MEDICINE

## 2019-05-27 PROCEDURE — 97116 GAIT TRAINING THERAPY: CPT

## 2019-05-27 PROCEDURE — 74011636637 HC RX REV CODE- 636/637: Performed by: INTERNAL MEDICINE

## 2019-05-27 PROCEDURE — 82962 GLUCOSE BLOOD TEST: CPT

## 2019-05-27 RX ORDER — MIDAZOLAM HYDROCHLORIDE 1 MG/ML
.25-5 INJECTION, SOLUTION INTRAMUSCULAR; INTRAVENOUS
Status: DISCONTINUED | OUTPATIENT
Start: 2019-05-27 | End: 2019-05-28 | Stop reason: HOSPADM

## 2019-05-27 RX ORDER — NALOXONE HYDROCHLORIDE 0.4 MG/ML
0.4 INJECTION, SOLUTION INTRAMUSCULAR; INTRAVENOUS; SUBCUTANEOUS
Status: ACTIVE | OUTPATIENT
Start: 2019-05-27 | End: 2019-05-27

## 2019-05-27 RX ORDER — EPINEPHRINE 0.1 MG/ML
1 INJECTION INTRACARDIAC; INTRAVENOUS
Status: DISCONTINUED | OUTPATIENT
Start: 2019-05-27 | End: 2019-05-28 | Stop reason: HOSPADM

## 2019-05-27 RX ORDER — SODIUM CHLORIDE 9 MG/ML
50 INJECTION, SOLUTION INTRAVENOUS CONTINUOUS
Status: DISPENSED | OUTPATIENT
Start: 2019-05-27 | End: 2019-05-27

## 2019-05-27 RX ORDER — DEXTROMETHORPHAN/PSEUDOEPHED 2.5-7.5/.8
1.2 DROPS ORAL
Status: DISCONTINUED | OUTPATIENT
Start: 2019-05-27 | End: 2019-05-28 | Stop reason: HOSPADM

## 2019-05-27 RX ORDER — FLUMAZENIL 0.1 MG/ML
0.2 INJECTION INTRAVENOUS
Status: ACTIVE | OUTPATIENT
Start: 2019-05-27 | End: 2019-05-27

## 2019-05-27 RX ORDER — ATROPINE SULFATE 0.1 MG/ML
0.4 INJECTION INTRAVENOUS
Status: DISCONTINUED | OUTPATIENT
Start: 2019-05-27 | End: 2019-05-28 | Stop reason: HOSPADM

## 2019-05-27 RX ADMIN — SUCRALFATE 1 G: 1 SUSPENSION ORAL at 06:56

## 2019-05-27 RX ADMIN — Medication 10 ML: at 06:56

## 2019-05-27 RX ADMIN — Medication 10 ML: at 16:52

## 2019-05-27 RX ADMIN — OXYCODONE HYDROCHLORIDE 10 MG: 10 TABLET, FILM COATED, EXTENDED RELEASE ORAL at 21:08

## 2019-05-27 RX ADMIN — MIRTAZAPINE 15 MG: 15 TABLET, FILM COATED ORAL at 21:08

## 2019-05-27 RX ADMIN — HEPARIN SODIUM 5000 UNITS: 5000 INJECTION INTRAVENOUS; SUBCUTANEOUS at 08:19

## 2019-05-27 RX ADMIN — HEPARIN SODIUM 5000 UNITS: 5000 INJECTION INTRAVENOUS; SUBCUTANEOUS at 16:50

## 2019-05-27 RX ADMIN — LABETALOL HYDROCHLORIDE 100 MG: 100 TABLET, FILM COATED ORAL at 21:08

## 2019-05-27 RX ADMIN — PANTOPRAZOLE SODIUM 40 MG: 40 TABLET, DELAYED RELEASE ORAL at 06:56

## 2019-05-27 RX ADMIN — Medication 10 ML: at 21:08

## 2019-05-27 RX ADMIN — INSULIN LISPRO 2 UNITS: 100 INJECTION, SOLUTION INTRAVENOUS; SUBCUTANEOUS at 11:41

## 2019-05-27 RX ADMIN — SERTRALINE HYDROCHLORIDE 150 MG: 50 TABLET ORAL at 08:18

## 2019-05-27 RX ADMIN — INSULIN LISPRO 2 UNITS: 100 INJECTION, SOLUTION INTRAVENOUS; SUBCUTANEOUS at 16:30

## 2019-05-27 RX ADMIN — SUCRALFATE 1 G: 1 SUSPENSION ORAL at 11:41

## 2019-05-27 RX ADMIN — HEPARIN SODIUM 5000 UNITS: 5000 INJECTION INTRAVENOUS; SUBCUTANEOUS at 23:14

## 2019-05-27 RX ADMIN — LEVOTHYROXINE SODIUM 125 MCG: 125 TABLET ORAL at 06:56

## 2019-05-27 RX ADMIN — LABETALOL HYDROCHLORIDE 100 MG: 100 TABLET, FILM COATED ORAL at 08:18

## 2019-05-27 RX ADMIN — DEXTROSE MONOHYDRATE AND SODIUM CHLORIDE 75 ML/HR: 5; .45 INJECTION, SOLUTION INTRAVENOUS at 00:17

## 2019-05-27 RX ADMIN — SUCRALFATE 1 G: 1 SUSPENSION ORAL at 17:12

## 2019-05-27 RX ADMIN — OXYCODONE HYDROCHLORIDE 10 MG: 10 TABLET, FILM COATED, EXTENDED RELEASE ORAL at 08:18

## 2019-05-27 NOTE — PROGRESS NOTES
John Zuniga M.D.  [phone]           GI PROGRESS NOTE        NAME: Carmenza Verma   :  1955   MRN:  [id number]       Subjective: Tolerating oral intake well  Nausea is better  No vomiting noted      Objective:       VITALS:   Last 24hrs VS reviewed since prior progress note. Most recent are:  Visit Vitals  /62 (BP 1 Location: Right arm, BP Patient Position: At rest)   Pulse 94   Temp 97.9 °F (36.6 °C)   Resp 20   Ht 5' 1\" (1.549 m)   Wt 63.5 kg (140 lb)   SpO2 91%   BMI 26.45 kg/m²     No intake or output data in the 24 hours ending 19 0959    PHYSICAL EXAM:  General: Alert, in no acute distress    HEENT: Anicteric sclerae. Lungs:            CTA Bilaterally. Heart:  Regular  rhythm,    Abdomen: Soft, Non distended, Non tender.  (+)Bowel sounds, no HSM  Extremities: No c/c/e  Neurologic:  CN 2-12 gi, Alert and oriented X 3.   No acute neurological distress   Psych:   Flat affect    Lab Data Reviewed:   Recent Labs     19  0324 19  0305   WBC 3.7 4.1   HGB 8.6* 9.5*   HCT 28.2* 30.6*   * 146*     Recent Labs     19  0324 19  0305    140   K 4.9 5.4*   * 117*   CO2 19* 18*   BUN 38* 53*   CREA 1.79* 2.12*   * 93   CA 8.0* 8.1*     Recent Labs     19  0232   SGOT 21   AP 52   TP 6.6   ALB 3.2*   GLOB 3.4   LPSE 168       ________________________________________________________________________  Patient Active Problem List   Diagnosis Code    Metabolic acidosis U09.3    BAYLEE (acute kidney injury) ([facility] [address] N17.9    HTN (hypertension) I10    Chronic back pain M54.9, G89.29    Diabetes mellitus, type 2 (HCC) E11.9    Hypotension I95.9    Sinus tachycardia R00.0    Anemia D64.9    Acute on chronic renal failure (HCC) N17.9, N18.9    Hyperkalemia E87.5    Dehydration E86.0    Anorexia R63.0    Nausea & vomiting R11.2    Gastroesophageal reflux disease without esophagitis K21.9         Assessment and Plan:  -continue PPI once daily and carafate QID  -Plan to proceed with an EGD on Tuesday barring scheduling conflicts with endoscopy room availability  -Please keep her NPO after midnight              Signed By: Jaimie Monroe MD     5/27/2019  9:59 AM

## 2019-05-27 NOTE — PROGRESS NOTES
Blanco Mcpherson [facility]  [address]  [phone]      Medical Progress Note      NAME: Amaya Feliz   :  1955  MRM:  [id number]    Date/Time: 2019  12:50 PM         Subjective:     Chief Complaint:  Eating better today    ROS:  (bold if positive, if negative)                        Tolerating PT  Tolerating Diet          Objective:       Vitals:          Last 24hrs VS reviewed since prior progress note.  Most recent are:    Visit Vitals  /58 (BP 1 Location: Right arm, BP Patient Position: At rest)   Pulse (!) 101   Temp 98.1 °F (36.7 °C)   Resp 18   Ht 5' 1\" (1.549 m)   Wt 63.5 kg (140 lb)   SpO2 94%   BMI 26.45 kg/m²     SpO2 Readings from Last 6 Encounters:   19 94%   19 99%   01/10/19 97%   18 96%        No intake or output data in the 24 hours ending 19 1250       Exam:     Physical Exam:    Gen:  Well-developed, well-nourished, in no acute distress  HEENT:  Pink conjunctivae, PERRL, hearing intact to voice, moist mucous membranes  Neck:  Supple, without masses, thyroid non-tender  Resp:  No accessory muscle use, clear breath sounds without wheezes rales or rhonchi  Card:  No murmurs, normal S1, S2 without thrills, bruits or peripheral edema  Abd:  Soft, non-tender, non-distended, normoactive bowel sounds are present, no palpable organomegaly and no detectable hernias  Lymph:  No cervical or inguinal adenopathy  Musc:  No cyanosis or clubbing  Skin:  No rashes or ulcers, skin turgor is good  Neuro:  Cranial nerves are grossly intact, no focal motor weakness, follows commands appropriately  Psych:  Fair insight, oriented to person, place and time, alert         Medications Reviewed: (see below)    Lab Data Reviewed: (see below)    ______________________________________________________________________    Medications:     Current Facility-Administered Medications   Medication Dose Route Frequency    dextrose 5 % - 0.45% NaCl infusion  75 mL/hr IntraVENous CONTINUOUS    sertraline (ZOLOFT) tablet 150 mg  150 mg Oral DAILY    mirtazapine (REMERON) tablet 15 mg  15 mg Oral QHS    labetalol (NORMODYNE) tablet 100 mg  100 mg Oral Q12H    sodium chloride (NS) flush 5-40 mL  5-40 mL IntraVENous Q8H    sodium chloride (NS) flush 5-40 mL  5-40 mL IntraVENous PRN    insulin lispro (HUMALOG) injection   SubCUTAneous AC&HS    glucose chewable tablet 16 g  4 Tab Oral PRN    dextrose (D50) infusion 12.5-25 g  12.5-25 g IntraVENous PRN    glucagon (GLUCAGEN) injection 1 mg  1 mg IntraMUSCular PRN    heparin (porcine) injection 5,000 Units  5,000 Units SubCUTAneous Q8H    levothyroxine (SYNTHROID) tablet 125 mcg  125 mcg Oral ACB    oxyCODONE IR (ROXICODONE) tablet 5 mg  5 mg Oral Q6H PRN    oxyCODONE ER (OxyCONTIN) tablet 10 mg  10 mg Oral Q12H    ondansetron (ZOFRAN) injection 4 mg  4 mg IntraVENous Q6H PRN    acetaminophen (TYLENOL) tablet 650 mg  650 mg Oral Q6H PRN    pantoprazole (PROTONIX) tablet 40 mg  40 mg Oral ACB    sucralfate (CARAFATE) 100 mg/mL oral suspension 1 g  1 g Oral TIDAC            Lab Review:     Recent Labs     05/27/19  0324 05/26/19  0305 05/25/19  0949   WBC 3.7 4.1 6.5   HGB 8.6* 9.5* 10.9*   HCT 28.2* 30.6* 34.8*   * 146* 192     Recent Labs     05/27/19  0324 05/26/19  0305 05/25/19  0232    140 136   K 4.9 5.4* 6.1*   * 117* 108   CO2 19* 18* 20*   * 93 153*   BUN 38* 53* 71*   CREA 1.79* 2.12* 3.16*   CA 8.0* 8.1* 9.4   MG 1.7  --   --    ALB  --   --  3.2*   TBILI  --   --  0.4   SGOT  --   --  21   ALT  --   --  20     Lab Results   Component Value Date/Time    Glucose (POC) 197 (H) 05/27/2019 11:33 AM    Glucose (POC) 131 (H) 05/27/2019 07:59 AM    Glucose (POC) 136 (H) 05/26/2019 09:23 PM    Glucose (POC) 122 (H) 05/26/2019 04:52 PM    Glucose (POC) 145 (H) 05/26/2019 11:10 AM     No results for input(s): PH, PCO2, PO2, HCO3, FIO2 in the last 72 hours.   No results for input(s): INR in the last 72 hours.     No lab exists for component: INREXT  No results found for: SDES  Lab Results   Component Value Date/Time    Culture result: NO GROWTH 21 DAYS 12/31/2018 10:33 PM    Culture result: CANDIDA PARAPSILOSIS (A) 12/31/2018 09:40 PM    Culture result: NO GROWTH 6 DAYS 12/31/2018 08:54 PM    Culture result: NO GROWTH 6 DAYS 12/31/2018 08:54 PM            Assessment:     Principal Problem:    Acute on chronic renal failure ([facility] (5/25/2019)    Active Problems:    HTN (hypertension) ()      Chronic back pain ()      Diabetes mellitus, type 2 (HCC) ()      Anemia (12/31/2018)      Anorexia (5/25/2019)      Gastroesophageal reflux disease without esophagitis (5/25/2019)           Plan:     Principal Problem:    Acute on chronic renal failure ([facility] (5/25/2019)   - creatinine improving but remains above baseline    Active Problems:    HTN (hypertension) ()   - holding meds      Chronic back pain ()   - continue pain meds      Diabetes mellitus, type 2 (HCC) ()   - BS okay on meds as above      Anemia (12/31/2018)   - Hgb down with hydration, prior labs show chronic disease      Anorexia (5/25/2019)/Severe acute malnutrition   - unclear if this is physical or psychological or some component of both   - for EGD tomorrow      Gastroesophageal reflux disease without esophagitis (5/25/2019)   - continue acid suppression      Total time spent in patient care: 25 minutes                  Care Plan discussed with: Patient, Care Manager and Nursing Staff    Discussed:  Code Status, Care Plan and D/C Planning    Prophylaxis:  Hep SQ    Disposition:   PT, OT, RN           ___________________________________________________    Attending Physician: Kaylee Biswas MD

## 2019-05-27 NOTE — PROGRESS NOTES
PHYSICAL THERAPY TREATMENT  Patient: Ai Murrell (22 y.o. female)  Date: 5/27/2019  Diagnosis: Acute on chronic renal failure (HCC) [N17.9, N18.9]  Dehydration [E86.0] Acute on chronic renal failure (HCC)  Procedure(s) (LRB):  ESOPHAGOGASTRODUODENOSCOPY (EGD) (N/A)    Precautions:    Chart, physical therapy assessment, plan of care and goals were reviewed. ASSESSMENT:   Pt presents supine, required mild encouragement to participate due to sitting up for 2 hours yesterday. Provided increase time to complete all transitions to avoid increase back pain as pt reports this is chronic problem and limits her mobility. Pt instructed to complete log roll during supine to sit,MOD A x 2 stood at RW MOD A x 2 and ambulated 3 feet to the recliner MAX A - MOD x 2.  Pt very unsteady, with difficulty weight shifting, requiring constant cues for sequencing. Pt positioned to comfort, chair alarm activated and pt instructed sit up to her tolerance. Alerted RN about pt's concern about sitting up too long who verbalized agreement and will monitor pt. Progression toward goals:  ?    Improving appropriately and progressing toward goals  ? Improving slowly and progressing toward goals  ? Not making progress toward goals and plan of care will be adjusted     PLAN:  Patient continues to benefit from skilled intervention to address the above impairments. Continue treatment per established plan of care. Discharge Recommendations:  Dov Parham  Further Equipment Recommendations for Discharge:  TBD      SUBJECTIVE:   Patient stated ? It's hard for me to get comfortable. ?    OBJECTIVE DATA SUMMARY:   Critical Behavior:  Neurologic State: Alert  Orientation Level: Oriented X4  Cognition: Appropriate decision making, Appropriate for age attention/concentration, Follows commands  Safety/Judgement: Awareness of environment, Good awareness of safety precautions  Functional Mobility Training:  Bed Mobility:     Supine to Sit: Moderate assistance;Assist x2              Transfers:  Sit to Stand: Moderate assistance;Assist x2  Stand to Sit: Assist x2;Minimum assistance                             Balance:  Sitting: With support; Impaired  Standing: Impaired; With support  Standing - Static: Fair  Standing - Dynamic : Poor  Ambulation/Gait Training:  Distance (ft): 3 Feet (ft)  Assistive Device: Gait belt;Walker, rolling  Ambulation - Level of Assistance: Maximum assistance; Moderate assistance;Assist x2        Gait Abnormalities: Decreased step clearance              Speed/Enriqueta: Shuffled; Slow  Step Length: Left shortened;Right shortened                   Pain:  Pain Scale 1: Numeric (0 - 10)  Pain Intensity 1: 5  Pain Location 1: Back  Pain Orientation 1: Lower  Pain Description 1: Aching  Pain Intervention(s) 1: Medication (see MAR)  Activity Tolerance:   Fair. Please refer to the flowsheet for vital signs taken during this treatment. After treatment:   ?    Patient left in no apparent distress sitting up in chair  ? Patient left in no apparent distress in bed  ? Call bell left within reach  ? Nursing notified  ? Caregiver present  ?     chair alarm activated    COMMUNICATION/COLLABORATION:   The patient?s plan of care was discussed with: Registered Nurse    Ifeanyi Zamarripa PTA   Time Calculation: 23 mins

## 2019-05-27 NOTE — PROGRESS NOTES
Nutrition Assessment:    RECOMMENDATIONS/INTERVENTION(S):   Continue CCD diet  Monitor PO intakes, weight, BG, n/v  Changed ONS to might shakes as pt doesn't like other ONS. D5@ 75 mL/h4r = 306 kcal from dextrose  ASSESSMENT:   5/27:  59 yr old female admitted for nausea, poor PO. PMHx: DM, HTN, VAD. Consult received. Pt familiar to author from multiple previous admission. Pt seen in Dec 2018, Jan, April 2019. Pt weighed 194 lbs in December. Pt states she was 147 lbs about 2 months ago at [facility] where they weighed her in a yadira. Weight loss is significant but per chart. Current weight 140 lbs per bedscale. Down more than 40 lbs (22%) over last 6 months. Intakes are currently slightly improving. Pt meeting <50% of EEN. Pt meets malnutrition criteria. Encourage PO intakes. Watch BG. Pt likes Mighty shakes but not other Ensure products. Nausea improving. Hx of swallowing difficulty. Denies issues currently. SLP followed on previous admission. Labs: , 93 mg/dL. Cr 1.79 trending down. A1C 6.7     Meets Criteria for Severe Acute Malnutrition as evidenced by:              [] Moderate muscle wasting, loss of subcutaneous fat              [x] Nutritional intake of <50% of recommended intake for >5 days              [x] Weight loss of >1-2% in 1 week, >5% in 1 month, >7.5% in 3 months, or >10% in 6 months              [] Moderate-severe edema              SUBJECTIVE/OBJECTIVE:   Diet Order: Consistent carb  % Eaten:    Patient Vitals for the past 168 hrs:   % Diet Eaten   05/25/19 0845 5 %       Pertinent Medications: [x] Reviewed    Labs reviewed:  [x]     Anthropometrics: Height: 5' 1\" (154.9 cm) Weight: 63.5 kg (140 lb)    IBW (%IBW):   ( ) UBW (%UBW):   (  %)    BMI: Body mass index is 26.45 kg/m².     This BMI is indicative of:     [] Underweight    [] Normal    [x] Overweight    []  Obesity    []  Extreme Obesity (BMI>40)    Estimated Nutrition Needs (Based on): 1280 Kcals/day(BMR(1122x1.3)) , 64 g(-76g/day(1.0-1.2g/kg)) Protein  Carbohydrate: At Least 130 g/day  Fluids: 1450 mL/day (1mL/kg rounded to 50 mL)    Last BM: 5/23   [x]Active     []Hyperactive  []Hypoactive       [] Absent   BS  Skin:    [x] Intact   [] Incision  [] Breakdown   [] DTI   [] Tears/Excoriation/Abrasion  []Edema [] Other:    Wt Readings from Last 30 Encounters:   05/27/19 63.5 kg (140 lb)   04/30/19 67.1 kg (148 lb)   02/13/19 75.8 kg (167 lb)   01/06/19 82.5 kg (181 lb 14.4 oz)   12/17/18 88.4 kg (194 lb 14.4 oz)   12/01/18 81.6 kg (179 lb 14.3 oz)   12/01/18 81.6 kg (179 lb 14.3 oz)   12/01/18 81.6 kg (179 lb 14.3 oz)      NUTRITION DIAGNOSES:   Problem:  Inadequate energy intake     Etiology: related to decreased ability to consume sufficient energy     Signs/Symptoms: as evidenced by poor PO, weight loss, n/v      NUTRITION INTERVENTIONS:  Meals/Snacks: General/healthful diet   Supplements: Commercial supplement              GOAL:   Pt will consume >50% of meals and ONS w/i 3-5 days    Cultural, Hinduism, or Ethnic Dietary Needs: None     LEARNING NEEDS (Diet, Food/Nutrient-Drug Interaction):    [x] None Identified   [] Identified and Education Provided/Documented   [] Identified and Pt declined/was not appropriate      [x] Interdisciplinary Care Plan Reviewed/Documented    [x] Discharge Needs: DM diet   [] No Nutrition Related Discharge Needs    NUTRITION RISK:   Pt Is At Nutrition Risk  [x]     No Nutrition Risk Identified  []       PT SEEN FOR:    [x]  MD Consult: []Calorie Count      []Diabetic Diet Education        []Diet Education     []Electrolyte Management     [x]General Nutrition Management and Supplements     []Management of Tube Feeding     []TPN Recommendations    []  RN Referral:  []MST score >=2     []Enteral/Parenteral Nutrition PTA     []Pregnant: Gestational DM or Multigestation                 [] Pressure Ulcer      []  Low BMI      []  Length of Stay       [] Dysphagia Diet     [] Ventilator      [] Follow-Up Previous Recommendations:   [] Implemented          [] Not Implemented          [x] Not Applicable    Previous Goal:   [] Met              [] Progressing Towards Goal              [] Not Progressing Towards Goal   [x] Not Applicable              Claudell Lessen, [place]  Pager: [phone]  Office: [phone]

## 2019-05-27 NOTE — PROGRESS NOTES
Bedside and Verbal shift change report given to Deena Power RN (oncoming nurse) by Blanca Donnelly RN (offgoing nurse). Report included the following information SBAR, Aron, MAR and Accordion.

## 2019-05-27 NOTE — PROGRESS NOTES
Bedside shift change report given to Yinka Read RN (oncoming nurse) by Donnie Haines RN (offgoing nurse). Report included the following information SBAR, MAR, Accordion, Recent Results and Med Rec Status.

## 2019-05-28 ENCOUNTER — ANESTHESIA (OUTPATIENT)
Dept: ENDOSCOPY | Age: 64
DRG: 682 | End: 2019-05-28
Payer: COMMERCIAL

## 2019-05-28 ENCOUNTER — ANESTHESIA EVENT (OUTPATIENT)
Dept: ENDOSCOPY | Age: 64
DRG: 682 | End: 2019-05-28
Payer: COMMERCIAL

## 2019-05-28 LAB
ANA SER QL: NEGATIVE
GLUCOSE BLD STRIP.AUTO-MCNC: 110 MG/DL (ref 65–100)
GLUCOSE BLD STRIP.AUTO-MCNC: 117 MG/DL (ref 65–100)
GLUCOSE BLD STRIP.AUTO-MCNC: 131 MG/DL (ref 65–100)
GLUCOSE BLD STRIP.AUTO-MCNC: 180 MG/DL (ref 65–100)
GLUCOSE BLD STRIP.AUTO-MCNC: 192 MG/DL (ref 65–100)
SERVICE CMNT-IMP: ABNORMAL

## 2019-05-28 PROCEDURE — 88305 TISSUE EXAM BY PATHOLOGIST: CPT

## 2019-05-28 PROCEDURE — 74011636637 HC RX REV CODE- 636/637: Performed by: INTERNAL MEDICINE

## 2019-05-28 PROCEDURE — 74011250636 HC RX REV CODE- 250/636: Performed by: INTERNAL MEDICINE

## 2019-05-28 PROCEDURE — 76060000031 HC ANESTHESIA FIRST 0.5 HR: Performed by: INTERNAL MEDICINE

## 2019-05-28 PROCEDURE — 94760 N-INVAS EAR/PLS OXIMETRY 1: CPT

## 2019-05-28 PROCEDURE — 76040000019: Performed by: INTERNAL MEDICINE

## 2019-05-28 PROCEDURE — 77030021593 HC FCPS BIOP ENDOSC BSC -A: Performed by: INTERNAL MEDICINE

## 2019-05-28 PROCEDURE — 82962 GLUCOSE BLOOD TEST: CPT

## 2019-05-28 PROCEDURE — 97530 THERAPEUTIC ACTIVITIES: CPT

## 2019-05-28 PROCEDURE — 94762 N-INVAS EAR/PLS OXIMTRY CONT: CPT

## 2019-05-28 PROCEDURE — 74011000258 HC RX REV CODE- 258: Performed by: INTERNAL MEDICINE

## 2019-05-28 PROCEDURE — 74011250637 HC RX REV CODE- 250/637: Performed by: INTERNAL MEDICINE

## 2019-05-28 PROCEDURE — 74011250636 HC RX REV CODE- 250/636

## 2019-05-28 PROCEDURE — 65270000029 HC RM PRIVATE

## 2019-05-28 RX ORDER — PROPOFOL 10 MG/ML
INJECTION, EMULSION INTRAVENOUS
Status: DISCONTINUED | OUTPATIENT
Start: 2019-05-28 | End: 2019-05-28 | Stop reason: HOSPADM

## 2019-05-28 RX ORDER — FLUMAZENIL 0.1 MG/ML
0.2 INJECTION INTRAVENOUS
Status: DISCONTINUED | OUTPATIENT
Start: 2019-05-28 | End: 2019-05-28 | Stop reason: HOSPADM

## 2019-05-28 RX ORDER — SODIUM CHLORIDE 9 MG/ML
INJECTION, SOLUTION INTRAVENOUS
Status: DISCONTINUED | OUTPATIENT
Start: 2019-05-28 | End: 2019-05-28 | Stop reason: HOSPADM

## 2019-05-28 RX ORDER — EPINEPHRINE 0.1 MG/ML
1 INJECTION INTRACARDIAC; INTRAVENOUS
Status: DISCONTINUED | OUTPATIENT
Start: 2019-05-28 | End: 2019-05-28 | Stop reason: HOSPADM

## 2019-05-28 RX ORDER — NALOXONE HYDROCHLORIDE 0.4 MG/ML
0.4 INJECTION, SOLUTION INTRAMUSCULAR; INTRAVENOUS; SUBCUTANEOUS
Status: DISCONTINUED | OUTPATIENT
Start: 2019-05-28 | End: 2019-05-28 | Stop reason: HOSPADM

## 2019-05-28 RX ORDER — DEXTROMETHORPHAN/PSEUDOEPHED 2.5-7.5/.8
1.2 DROPS ORAL
Status: DISCONTINUED | OUTPATIENT
Start: 2019-05-28 | End: 2019-05-28 | Stop reason: HOSPADM

## 2019-05-28 RX ORDER — LIDOCAINE HYDROCHLORIDE 20 MG/ML
INJECTION, SOLUTION EPIDURAL; INFILTRATION; INTRACAUDAL; PERINEURAL AS NEEDED
Status: DISCONTINUED | OUTPATIENT
Start: 2019-05-28 | End: 2019-05-28 | Stop reason: HOSPADM

## 2019-05-28 RX ORDER — SODIUM CHLORIDE 9 MG/ML
50 INJECTION, SOLUTION INTRAVENOUS CONTINUOUS
Status: DISPENSED | OUTPATIENT
Start: 2019-05-28 | End: 2019-05-28

## 2019-05-28 RX ORDER — MIDAZOLAM HYDROCHLORIDE 1 MG/ML
.25-5 INJECTION, SOLUTION INTRAMUSCULAR; INTRAVENOUS
Status: DISCONTINUED | OUTPATIENT
Start: 2019-05-28 | End: 2019-05-28 | Stop reason: HOSPADM

## 2019-05-28 RX ORDER — ATROPINE SULFATE 0.1 MG/ML
0.4 INJECTION INTRAVENOUS
Status: DISCONTINUED | OUTPATIENT
Start: 2019-05-28 | End: 2019-05-28 | Stop reason: HOSPADM

## 2019-05-28 RX ORDER — PROPOFOL 10 MG/ML
INJECTION, EMULSION INTRAVENOUS AS NEEDED
Status: DISCONTINUED | OUTPATIENT
Start: 2019-05-28 | End: 2019-05-28 | Stop reason: HOSPADM

## 2019-05-28 RX ADMIN — OXYCODONE HYDROCHLORIDE 10 MG: 10 TABLET, FILM COATED, EXTENDED RELEASE ORAL at 22:10

## 2019-05-28 RX ADMIN — SUCRALFATE 1 G: 1 SUSPENSION ORAL at 12:22

## 2019-05-28 RX ADMIN — LABETALOL HYDROCHLORIDE 100 MG: 100 TABLET, FILM COATED ORAL at 22:11

## 2019-05-28 RX ADMIN — OXYCODONE HYDROCHLORIDE 5 MG: 5 TABLET ORAL at 12:21

## 2019-05-28 RX ADMIN — SUCRALFATE 1 G: 1 SUSPENSION ORAL at 16:55

## 2019-05-28 RX ADMIN — Medication 10 ML: at 22:00

## 2019-05-28 RX ADMIN — Medication 10 ML: at 16:56

## 2019-05-28 RX ADMIN — LIDOCAINE HYDROCHLORIDE 60 MG: 20 INJECTION, SOLUTION EPIDURAL; INFILTRATION; INTRACAUDAL; PERINEURAL at 11:00

## 2019-05-28 RX ADMIN — Medication 10 ML: at 05:00

## 2019-05-28 RX ADMIN — SODIUM CHLORIDE: 9 INJECTION, SOLUTION INTRAVENOUS at 10:52

## 2019-05-28 RX ADMIN — SODIUM CHLORIDE 50 ML/HR: 900 INJECTION, SOLUTION INTRAVENOUS at 10:50

## 2019-05-28 RX ADMIN — LABETALOL HYDROCHLORIDE 100 MG: 100 TABLET, FILM COATED ORAL at 12:21

## 2019-05-28 RX ADMIN — HEPARIN SODIUM 5000 UNITS: 5000 INJECTION INTRAVENOUS; SUBCUTANEOUS at 16:55

## 2019-05-28 RX ADMIN — SERTRALINE HYDROCHLORIDE 150 MG: 50 TABLET ORAL at 09:32

## 2019-05-28 RX ADMIN — PROPOFOL 120 MCG/KG/MIN: 10 INJECTION, EMULSION INTRAVENOUS at 10:56

## 2019-05-28 RX ADMIN — PROPOFOL 50 MG: 10 INJECTION, EMULSION INTRAVENOUS at 11:00

## 2019-05-28 RX ADMIN — PROPOFOL 20 MG: 10 INJECTION, EMULSION INTRAVENOUS at 11:03

## 2019-05-28 RX ADMIN — MIRTAZAPINE 15 MG: 15 TABLET, FILM COATED ORAL at 22:11

## 2019-05-28 RX ADMIN — DEXTROSE MONOHYDRATE AND SODIUM CHLORIDE 75 ML/HR: 5; .45 INJECTION, SOLUTION INTRAVENOUS at 05:05

## 2019-05-28 RX ADMIN — OXYCODONE HYDROCHLORIDE 10 MG: 10 TABLET, FILM COATED, EXTENDED RELEASE ORAL at 09:32

## 2019-05-28 RX ADMIN — INSULIN LISPRO 2 UNITS: 100 INJECTION, SOLUTION INTRAVENOUS; SUBCUTANEOUS at 16:54

## 2019-05-28 NOTE — PROGRESS NOTES
Problem: Self Care Deficits Care Plan (Adult)  Goal: *Acute Goals and Plan of Care (Insert Text)  Description  Occupational Therapy Goals  Initiated 5/26/2019  1. Patient will perform grooming with modified independence within 7 day(s). 2.  Patient will perform upper body dressing and bathing with modified independence within 7 day(s). 3.  Patient will perform lower body dressing and bathing with minimal assistance using AE PRN within 7 day(s). 4.  Patient will perform toilet transfers to [facility] with minimal assistance x1 using rolling walker within 7 day(s). 5.  Patient will perform all aspects of toileting with moderate assistance  within 7 day(s). 6.  Patient will participate in upper extremity therapeutic exercise/activities with supervision/set-up for 10 minutes within 7 day(s). 7.  Patient will utilize energy conservation techniques during functional activities with verbal cues within 7 day(s). Outcome: Progressing Towards Goal   OCCUPATIONAL THERAPY TREATMENT  Patient: Bridger Ramires (89 y.o. female)  Date: 5/28/2019  Diagnosis: Acute on chronic renal failure (HCC) [N17.9, N18.9]  Dehydration [E86.0] Acute on chronic renal failure (HCC)  Procedure(s) (LRB):  ESOPHAGOGASTRODUODENOSCOPY (EGD) (N/A)  ESOPHAGOGASTRODUODENAL (EGD) BIOPSY (N/A) Day of Surgery  Precautions:    Chart, occupational therapy assessment, plan of care, and goals were reviewed. ASSESSMENT:  Patient received supine in bed, spouse present and encouraging patient. Patient moves to EOB with mod assistance x 2. Patient with decreased balance in sitting but able to maintain briefly. Patient complains of poor  to floor using  socks but has no other available shoes. She requires mod assist x 2 for sit to stand and noted with heavy posterior lean. With attempts for verbal cues patient becomes defensive and tearful regarding reasons she cannot correct posture. Tactile cues provided and patient with no improvement.   Unable to ambulate at this time due to heavy posterior lean. Spouse present and reports patient with similar presentation at home but he is able to [facility] her through\" and get her to lean forward. At this time recommendation is for SNF due to current demonstration of activity level and need for assist.    Progression toward goals:  ?       Improving appropriately and progressing toward goals  ? Improving slowly and progressing toward goals  ? Not making progress toward goals and plan of care will be adjusted     PLAN:  Patient continues to benefit from skilled intervention to address the above impairments. Continue treatment per established plan of care. Discharge Recommendations:  Dov Parham  Further Equipment Recommendations for Discharge:  TBD      SUBJECTIVE:   Patient stated ? I cant just do it, I need time to prepare. ?    OBJECTIVE DATA SUMMARY:   Cognitive/Behavioral Status:  Neurologic State: Alert  Orientation Level: Oriented X4  Cognition: Appropriate decision making; Appropriate for age attention/concentration; Follows commands  Perception: Appears intact  Perseveration: No perseveration noted  Safety/Judgement: Awareness of environment    Functional Mobility and Transfers for ADLs:  Bed Mobility:  Rolling: Minimum assistance  Supine to Sit: Moderate assistance;Assist x2  Sit to Supine: Moderate assistance;Assist x2    Transfers:  Sit to Stand: Moderate assistance          Balance:  Sitting: Impaired  Sitting - Static: Fair (occasional)  Standing: Impaired; With support(posterior lean)  Standing - Static: Constant support  Standing - Dynamic : Poor    ADL Intervention:                                     Cognitive Retraining  Safety/Judgement: Awareness of environment    Neuro Re-Education:           Therapeutic Exercises:     Pain:  Pain Scale 1: Numeric (0 - 10)  Pain Intensity 1: 0  Pain Location 1: Back  Pain Orientation 1: Lower  Pain Description 1: Aching     Activity Tolerance: VSS  Please refer to the flowsheet for vital signs taken during this treatment. After treatment:   ? Patient left in no apparent distress sitting up in chair  ? Patient left in no apparent distress in bed  ? Call bell left within reach  ? Nursing notified  ? Caregiver present  ?  Bed alarm activated    COMMUNICATION/COLLABORATION:   The patient?s plan of care was discussed with: Physical Therapy Assistant and Registered Nurse    DAIN Granado/L  Time Calculation: 17 mins

## 2019-05-28 NOTE — PROCEDURES
Cortez Howell M.D.  [phone]           2019                EGD Operative Report  Karin Carson  :  1955  [facility] Medical Record Number:  [id number]      Indication: anorexia, N/V     : Hector Martinez MD    Assistant -- None  Implants -- None    Referring Provider:  Luna Correa MD      Anesthesia/Sedation:  MAC anesthesia Propofol    Airway assessment: No airway problems anticipated    Pre-Procedural Exam:      Airway: clear, no airway problems anticipated  Heart: RRR, without gallops or rubs  Lungs: clear bilaterally without wheezes, crackles, or rhonchi  Abdomen: soft, nontender, nondistended, bowel sounds present  Mental Status: awake, alert and oriented to person, place and time       Procedure Details     After infomed consent was obtained for the procedure, with all risks and benefits of procedure explained the patient was taken to the endoscopy suite and placed in the left lateral decubitus position. Following sequential administration of sedation as per above, the endoscope was inserted into the mouth and advanced under direct vision to second portion of the duodenum. A careful inspection was made as the gastroscope was withdrawn, including a retroflexed view of the proximal stomach; findings and interventions are described below. Findings:   Esophagus:normal  Stomach: normal   Duodenum/jejunum: normal    Therapies:  biopsy of stomach - rule out H.pylori infection    Specimens: as above           Complications:   None; patient tolerated the procedure well. EBL:  None. Impression:    Normal egd      Recommendations:    -Continue acid suppression.  -Await pathology results  -Continue supportive care as planned  -She is symptomatically better. Will see her again as needed from this point on. Please call with any questions. Thank you for allowing us to participate in the care of your patient.  If you have any questions please don't hesitate to contact us.        Nighat Delgado M.D.  Marylene Limbo, MD

## 2019-05-28 NOTE — PERIOP NOTES
Jose [facility]  1955  [id number]    Situation:    Scheduled Procedure: Procedure(s):  ESOPHAGOGASTRODUODENOSCOPY (EGD)  Verbal report received from: Katelin Cleveland RN  Preoperative diagnosis: nausea/vomiting    Background:    Procedure: Procedure(s):  ESOPHAGOGASTRODUODENOSCOPY (EGD)  Physician performing procedure; Dr. Wil Warner RN    NPO Status/Last PO Intake: midnight    Pregnancy Test:No If yes, result: none    Is the patient taking Blood Thinners: YES If yes, list: heparin and last taken 5/27  Is the patient diabetic:yes       If yes, what was the last BS:  131  Time taken? am  Anything given? no           Does the patient have a Pacemaker/Defibrillator in place?: no   Does the patient need antibiotics before/during/after procedure: no   If the patient is having a colon, How much prep was drank? n/a   What were the Colon prep results? n/a   Does the patient have SCD in place:no   Is patient on CONTACT precautions:no        If yes, what kind of CONTACT precautions: n/a    Assessment:  Are the vital signs stable prior to patient coming to ENDO?  yes  Is the patient alert/oriented and able to sign consent for the procedures:yes  How does the patient's abdomen feel prior to coming to ENDO? Will assess upon patients arrival   Does the patient have a patient IV in place?  yes     Recommendation:  Family or Friend present no     Permission to share finding with Family or Friend n/a

## 2019-05-28 NOTE — ANESTHESIA POSTPROCEDURE EVALUATION
Procedure(s):  ESOPHAGOGASTRODUODENOSCOPY (EGD)  ESOPHAGOGASTRODUODENAL (EGD) BIOPSY. MAC    Anesthesia Post Evaluation      Multimodal analgesia: multimodal analgesia not used between 6 hours prior to anesthesia start to PACU discharge  Patient location during evaluation: PACU  Patient participation: complete - patient participated  Level of consciousness: awake and alert  Pain score: 0  Airway patency: patent  Anesthetic complications: no  Cardiovascular status: acceptable  Respiratory status: acceptable  Hydration status: acceptable  Post anesthesia nausea and vomiting:  none      Vitals Value Taken Time   BP     Temp     Pulse 97 5/28/2019 11:12 AM   Resp 24 5/28/2019 11:12 AM   SpO2 97 % 5/28/2019 11:12 AM   Vitals shown include unvalidated device data.

## 2019-05-28 NOTE — PROGRESS NOTES
Blanco Mcpherson queenie [place] [address]  [address]  [phone]      Medical Progress Note      NAME: Suzanne Greco   :  1955  MRM:  [id number]    Date/Time: 2019  12:25 PM          Subjective:     Chief Complaint:  N/V: no further, appetite better, ate well yesterday    ROS:  (bold if positive, if negative)                        Tolerating PT  Tolerating Diet          Objective:       Vitals:          Last 24hrs VS reviewed since prior progress note.  Most recent are:    Visit Vitals  /87 (BP 1 Location: Left arm, BP Patient Position: At rest)   Pulse 93   Temp 98 °F (36.7 °C)   Resp 20   Ht 5' 1\" (1.549 m)   Wt 63.5 kg (140 lb)   SpO2 95%   BMI 26.45 kg/m²     SpO2 Readings from Last 6 Encounters:   19 95%   19 99%   01/10/19 97%   18 96%    O2 Flow Rate (L/min): 2 l/min       Intake/Output Summary (Last 24 hours) at 2019 1225  Last data filed at 2019 1120  Gross per 24 hour   Intake 350 ml   Output 1050 ml   Net -700 ml          Exam:     Physical Exam:    Gen:  Well-developed, well-nourished, in no acute distress  HEENT:  Pink conjunctivae, PERRL, hearing intact to voice, moist mucous membranes  Neck:  Supple, without masses, thyroid non-tender  Resp:  No accessory muscle use, clear breath sounds without wheezes rales or rhonchi  Card:  No murmurs, normal S1, S2 without thrills, bruits or peripheral edema  Abd:  Soft, non-tender, non-distended, normoactive bowel sounds are present, no palpable organomegaly and no detectable hernias  Lymph:  No cervical or inguinal adenopathy  Musc:  No cyanosis or clubbing  Skin:  No rashes or ulcers, skin turgor is good  Neuro:  Cranial nerves are grossly intact, no focal motor weakness, follows commands appropriately  Psych:  Fair insight, oriented to person, place and time, alert         Medications Reviewed: (see below)    Lab Data Reviewed: (see below)    ______________________________________________________________________    Medications:     Current Facility-Administered Medications   Medication Dose Route Frequency    0.9% sodium chloride infusion  50 mL/hr IntraVENous CONTINUOUS    dextrose 5 % - 0.45% NaCl infusion  75 mL/hr IntraVENous CONTINUOUS    sertraline (ZOLOFT) tablet 150 mg  150 mg Oral DAILY    mirtazapine (REMERON) tablet 15 mg  15 mg Oral QHS    labetalol (NORMODYNE) tablet 100 mg  100 mg Oral Q12H    sodium chloride (NS) flush 5-40 mL  5-40 mL IntraVENous Q8H    sodium chloride (NS) flush 5-40 mL  5-40 mL IntraVENous PRN    insulin lispro (HUMALOG) injection   SubCUTAneous AC&HS    glucose chewable tablet 16 g  4 Tab Oral PRN    dextrose (D50) infusion 12.5-25 g  12.5-25 g IntraVENous PRN    glucagon (GLUCAGEN) injection 1 mg  1 mg IntraMUSCular PRN    heparin (porcine) injection 5,000 Units  5,000 Units SubCUTAneous Q8H    levothyroxine (SYNTHROID) tablet 125 mcg  125 mcg Oral ACB    oxyCODONE IR (ROXICODONE) tablet 5 mg  5 mg Oral Q6H PRN    oxyCODONE ER (OxyCONTIN) tablet 10 mg  10 mg Oral Q12H    ondansetron (ZOFRAN) injection 4 mg  4 mg IntraVENous Q6H PRN    acetaminophen (TYLENOL) tablet 650 mg  650 mg Oral Q6H PRN    pantoprazole (PROTONIX) tablet 40 mg  40 mg Oral ACB    sucralfate (CARAFATE) 100 mg/mL oral suspension 1 g  1 g Oral TIDAC            Lab Review:     Recent Labs     05/27/19  0324 05/26/19  0305   WBC 3.7 4.1   HGB 8.6* 9.5*   HCT 28.2* 30.6*   * 146*     Recent Labs     05/27/19  0324 05/26/19  0305    140   K 4.9 5.4*   * 117*   CO2 19* 18*   * 93   BUN 38* 53*   CREA 1.79* 2.12*   CA 8.0* 8.1*   MG 1.7  --      Lab Results   Component Value Date/Time    Glucose (POC) 117 (H) 05/28/2019 11:54 AM    Glucose (POC) 110 (H) 05/28/2019 10:47 AM    Glucose (POC) 131 (H) 05/28/2019 06:52 AM    Glucose (POC) 131 (H) 05/27/2019 08:55 PM    Glucose (POC) 141 (H) 05/27/2019 04:25 PM     No results for input(s): PH, PCO2, PO2, HCO3, FIO2 in the last 72 hours. No results for input(s): INR in the last 72 hours.     No lab exists for component: INREXT, INREXT  No results found for: SDES  Lab Results   Component Value Date/Time    Culture result: NO GROWTH 21 DAYS 12/31/2018 10:33 PM    Culture result: CANDIDA PARAPSILOSIS (A) 12/31/2018 09:40 PM    Culture result: NO GROWTH 6 DAYS 12/31/2018 08:54 PM    Culture result: NO GROWTH 6 DAYS 12/31/2018 08:54 PM            Assessment:     Principal Problem:    Acute on chronic renal failure ([facility] (5/25/2019)    Active Problems:    HTN (hypertension) ()      Chronic back pain ()      Diabetes mellitus, type 2 (HCC) ()      Anemia (12/31/2018)      Anorexia (5/25/2019)      Gastroesophageal reflux disease without esophagitis (5/25/2019)           Plan:     Principal Problem:    [facility] (5/25/2019)   - creatinine improving but remains above baseline, recheck in AM    Active Problems:    HTN (hypertension) ()   - holding meds      Chronic back pain ()   - continue pain meds      Diabetes mellitus, type 2 (HCC) ()   - BS okay on meds as above      Anemia (12/31/2018)   - Hgb down with hydration, prior labs show chronic disease      Anorexia (5/25/2019)/Severe acute malnutrition   - EGD is normal, suspect this is mainly psychogenic, hopefully treatment per Psychiatry will be helpful      Gastroesophageal reflux disease without esophagitis (5/25/2019)   - continue acid suppression      Total time spent in patient care: 25 minutes                  Care Plan discussed with: Patient, Care Manager and Nursing Staff    Discussed:  Code Status, Care Plan and D/C Planning    Prophylaxis:  Hep SQ    Disposition:   PT, OT, RN           ___________________________________________________    Attending Physician: Marylee Alberts, MD

## 2019-05-28 NOTE — PROGRESS NOTES
TRANSFER - OUT REPORT:    Verbal report given to [place] on Lizeth Tovar  being transferred to [facility]. Report consisted of patients Situation, Background, Assessment and   Recommendations(SBAR). Information from the following report(s) Procedure Summary and Recent Results was reviewed with the receiving nurse. Lines:   Peripheral IV 05/25/19 Right Forearm (Active)   Site Assessment Clean, dry, & intact 5/28/2019 10:05 AM   Phlebitis Assessment 0 5/28/2019 10:05 AM   Infiltration Assessment 0 5/28/2019 10:05 AM   Dressing Status Clean, dry, & intact 5/28/2019 10:05 AM   Dressing Type Transparent 5/28/2019 10:05 AM   Hub Color/Line Status Pink; Infusing;Flushed 5/28/2019 10:05 AM   Action Taken Open ports on tubing capped 5/28/2019 10:05 AM   Alcohol Cap Used Yes 5/28/2019 10:05 AM       Peripheral IV 05/25/19 Right;Posterior Forearm (Active)   Site Assessment Clean, dry, & intact 5/28/2019 11:20 AM   Phlebitis Assessment 0 5/28/2019 11:20 AM   Infiltration Assessment 0 5/28/2019 11:20 AM   Dressing Status Clean, dry, & intact 5/28/2019 11:20 AM   Dressing Type Tape;Transparent 5/28/2019 11:20 AM   Hub Color/Line Status Pink;Capped 5/28/2019 11:20 AM   Action Taken Open ports on tubing capped 5/28/2019 11:20 AM   Alcohol Cap Used Yes 5/28/2019 11:20 AM        Opportunity for questions and clarification was provided. Patient transported with:   transportation via stretcher.

## 2019-05-28 NOTE — PROGRESS NOTES
Interdisciplinary team rounds were held 5/28/2019 with the following team members:Care Management, Nursing, Nutrition and Physical Therapy. Plan of care discussed. See clinical pathway and/or care plan for interventions and desired outcomes.   Plan DC 5/29

## 2019-05-28 NOTE — ROUTINE PROCESS
Davis Cantu 1955 
[id number] Situation: 
Verbal report received from: Johana Brush RN Procedure: Procedure(s): ESOPHAGOGASTRODUODENOSCOPY (EGD) ESOPHAGOGASTRODUODENAL (EGD) BIOPSY Background: 
 
Preoperative diagnosis: nausea/vomiting Postoperative diagnosis: .normal egd :  Dr. Nasreen Brito Assistant(s): Endoscopy Technician-1: Sonny Hurt 
Endoscopy RN-1: Melissa Nelson Specimens:  
ID Type Source Tests Collected by Time Destination 1 : biopsy gastric Preservative   Mar Sevilla MD 5/28/2019 1103 Pathology H. Pylori  no Assessment: 
Intra-procedure medications Anesthesia gave intra-procedure sedation and medications, see anesthesia flow sheet yes Intravenous fluids: NS@ Ellis Eddie Vital signs stable Abdominal assessment: round and soft Recommendation: 
Discharge patient per MD order. Return to floor Permission to share finding with family or friend n/a

## 2019-05-28 NOTE — PROGRESS NOTES
TRANSFER - IN REPORT:    Verbal report received from [place] on Savannah Squires  being received from  3. Report consisted of patients Situation, Background, Assessment and   Recommendations(SBAR). Information from the following report(s) MAR, Recent Results, Pre Procedure Checklist and Procedure Verification was reviewed with the receiving nurse. Opportunity for questions and clarification was provided. Assessment completed upon patients arrival to unit and care assumed.

## 2019-05-28 NOTE — PROGRESS NOTES
Reason for Readmission:     Dehydration, acute on chronic renal failure          RRAT Score and Risk Level:   16 moderate       Level of Readmission:          Care Conference scheduled:   None at this time       Resources/supports as identified by patient/family:   Son and        Top Challenges facing patient (as identified by patient/family and CM): Finances/Medication cost?     No issues reported at this time  Transportation      No issues reported at this time  Support system or lack thereof? No issues reported at this time. Living arrangements? No issues reported at this time      Self-care/ADLs/Cognition? Current Advanced Directive/Advance Care Plan:  Not on file, but Pt is interested. Plan for utilizing home health:  SNF is being recommended. Pt has had HH with [facility], however Pt was discharged recently from services. Likelihood of additional readmission:   Moderate to high. Transition of Care Plan:    Based on readmission, the patient's previous Plan of Care   has been evaluated and/or modified. The current Transition of Care Plan is:             Pt is a 58 yo female readmission on 5/25/19 for dehydration and Acute on Chronic renal failure. Pt was initially admitted on  4/29/19 for  Acute kidney injury. Pt was discharged with [facility]. Pt was accompanied by her  [address] [place] [address] who was sitting at beside. Pt lives in a 2 story home with ramp attached. Pt bedroom in on the ground floor of home with close access to bathroom. Pt's son and  helps Pt with ADLs . Pt has wheel chair, walker, shower chair and belt in the home. CM spoke with Pt about SNF recommendation, Pt  stated  that they could handle Pt at home, stating that they have all the necessary equipment that they need. CM educated Pt and  on recommendation and the goal for safe discharge. CM left SNF listing by bedside in the case the Pt decided otherwise. CM explored the option of HH if Pt returns home, Pt declined [de-identified]  stated that Pt was recently discharge and he and his son  would be reviewing exercises from home therapy with Pt.      Care Management Interventions  PCP Verified by CM: Yes(Melvi, No NN)  Transition of Care Consult (CM Consult): Discharge Planning  Physical Therapy Consult: Yes  Occupational Therapy Consult: Yes  Current Support Network: Lives with Spouse( and son )  Confirm Follow Up Transport: Family  Plan discussed with Pt/Family/Caregiver: Yes  Discharge Location  Discharge Placement: Home with family assistance    [facility]ique [facility]dz[facility], [address] [place] [address]

## 2019-05-28 NOTE — PROGRESS NOTES
Problem: Mobility Impaired (Adult and Pediatric)  Goal: *Therapy Goal (Edit Goal, Insert Text)  Note:   PHYSICAL THERAPY TREATMENT  Patient: Sarah Ortega (76 y.o. female)  Date: 5/28/2019  Diagnosis: Acute on chronic renal failure (HCC) [N17.9, N18.9]  Dehydration [E86.0] Acute on chronic renal failure (HCC)  Procedure(s) (LRB):  ESOPHAGOGASTRODUODENOSCOPY (EGD) (N/A)  ESOPHAGOGASTRODUODENAL (EGD) BIOPSY (N/A) Day of Surgery  Precautions:    Chart, physical therapy assessment, plan of care and goals were reviewed. ASSESSMENT:  Pt with complints of back pain prior to mobilizing out of bed. Pt to sit with min to mod assist of 2. Pt min assist sitting on EOB leaning to posterior. Pt to stand from bed with mod assist.Pt ambulated only 3 ft with RW mod to max of 2 leaning heavily to posterior. Pt is very unsteady at high risk for falls and need 2 persons to assist with short ambulation. Pt back to bed with mod to max of 2. Progression toward goals:  ?    Improving appropriately and progressing toward goals  ? Improving slowly and progressing toward goals  ? Not making progress toward goals and plan of care will be adjusted     PLAN:  Patient continues to benefit from skilled intervention to address the above impairments. Continue treatment per established plan of care. Discharge Recommendations:  Dov Parham  Further Equipment Recommendations for Discharge:        SUBJECTIVE:       OBJECTIVE DATA SUMMARY:   Critical Behavior:  Neurologic State: Alert  Orientation Level: Oriented X4  Cognition: Appropriate decision making, Appropriate for age attention/concentration, Follows commands  Safety/Judgement: Awareness of environment  Functional Mobility Training:  Bed Mobility:     Supine to Sit: Minimum assistance; Moderate assistance;Assist x2  Sit to Supine: Mod to max of 2           Transfers:  Sit to Stand:  Moderate assistance  Stand to Sit: Moderate assistance;Minimum assistance Balance:  Sitting: (P) Impaired  Sitting - Static: Fair (occasional)  Standing - Static: Constant support;Poor  Ambulation/Gait Training:  Distance (ft): 3 Feet (ft)  Assistive Device: Walker, rolling;Gait belt  Ambulation - Level of Assistance: Moderate assistance;Maximum assistance;Assist x2        Gait Abnormalities: Path deviations;Decreased step clearance        Base of Support: Narrowed     Speed/Enriqueta: Pace decreased (<100 feet/min)                Pain:  Pain Scale 1: Numeric (0 - 10)  Pain Intensity 1: 0  Pain Location 1: Back  Pain Orientation 1: Lower  Pain Description 1: Aching     Activity Tolerance:   Pt tolerated treatment fair  Please refer to the flowsheet for vital signs taken during this treatment. After treatment:   ?    Patient left in no apparent distress sitting up in chair  ? Patient left in no apparent distress in bed  ? Call bell left within reach  ? Nursing notified  ? Caregiver present  ?     Bed alarm activated    COMMUNICATION/COLLABORATION:   The patient?s plan of care was discussed with: Physical Therapist    Severiano Ip, PTA   Time Calculation: 23 mins

## 2019-05-28 NOTE — PROGRESS NOTES
Occupational therapy note:  Chart reviewed. Attempted to see patient for OT evaluation. Per RN, patient off the floor to endoscopy. Will follow up at later time for continued OT services. Staci Bush MS OTR/L

## 2019-05-28 NOTE — PROGRESS NOTES
Spiritual Care Assessment/Progress Note  [address]      NAME: Gustavo Meyers      MRN: [id number]  AGE: 59 y.o. SEX: female  Yarsanism Affiliation: No Yazidi   Language: English     5/28/2019     Total Time (in minutes): 5     Spiritual Assessment begun in [facility] ENDOSCOPY through conversation with:         []Patient        [] Family    [] Friend(s)        Reason for Consult: Initial/Spiritual assessment, patient floor     Spiritual beliefs: (Please include comment if needed)     [] Identifies with a keerthi tradition:         [] Supported by a keerthi community:            [] Claims no spiritual orientation:           [] Seeking spiritual identity:                [] Adheres to an individual form of spirituality:           [x] Not able to assess:                           Identified resources for coping:      [] Prayer                               [] Music                  [] Guided Imagery     [] Family/friends                 [] Pet visits     [] Devotional reading                         [] Unknown     [] Other:                                               Interventions offered during this visit: (See comments for more details)                Plan of Care:     [] Support spiritual and/or cultural needs    [] Support AMD and/or advance care planning process      [] Support grieving process   [] Coordinate Rites and/or Rituals    [] Coordination with community clergy   [] No spiritual needs identified at this time   [] Detailed Plan of Care below (See Comments)  [] Make referral to Music Therapy  [] Make referral to Pet Therapy     [] Make referral to Addiction services  [] Make referral to [facility]  [] Make referral to Spiritual Care Partner  [] No future visits requested        [x] Follow up visits as needed     Comments:  visit for initial spiritual assessment. Patient not in room, having procedure performed in Endoscopy.   Will continue to follow up as needed and upon request as able.    Visited by Rev. Phillip Stern, [address] [place] [address] Road paging service: [phone])

## 2019-05-28 NOTE — PERIOP NOTES
EGD Procedure being performed under MAC; STEVE Grant at bedside monitoring patient. See anesthesia notes. Endoscope was pre-cleaned at bedside immediately following procedure by Jason. Care of patient assumed from the anesthesia provider. Patient tolerated procedure well. Abdomen remains soft and non tender post procedure, no complaints or indication of discomfort noted at this time. See anesthesia note. Patient transferred to Endoscopy Recovery and report given to recovery nurse Alee.

## 2019-05-28 NOTE — ANESTHESIA PREPROCEDURE EVALUATION
Relevant Problems   No relevant active problems       Anesthetic History   No history of anesthetic complications            Review of Systems / Medical History  Patient summary reviewed and nursing notes reviewed    Pulmonary  Within defined limits                 Neuro/Psych   Within defined limits           Cardiovascular    Hypertension: well controlled                   GI/Hepatic/Renal                Endo/Other    Diabetes: well controlled, type 2    Arthritis     Other Findings   Comments: Severe arthritis with back pain, and pain in hips and knees. Mobile only with wheel chair.            Physical Exam    Airway  Mallampati: II    Neck ROM: normal range of motion   Mouth opening: Normal     Cardiovascular    Rhythm: regular  Rate: normal         Dental         Pulmonary                 Abdominal  GI exam deferred       Other Findings            Anesthetic Plan    ASA: 3  Anesthesia type: MAC          Induction: Intravenous  Anesthetic plan and risks discussed with: Patient

## 2019-05-28 NOTE — CDMP QUERY
Good Afternoon~ A Doppler study of Jan. LE  was completed on 05/25 Dopplers results noted:  
 
Diagnostic  results are note coded, unless the provider documents and supports the clinical significance. If possible, please document in progress notes and d/c summary if you agree with vascular dopplers findings & evaluating and/or treating any of the following, please included the POA status as well. 
 
 
=>  Yes, Acute Right and Left DVT (POA) 
=>  Yes, Acute Recurrent Right and Left DVT (POA) 
=> No negative for DVTs and not treating 
=> Yes, Chronic Right and Left DVT (POA) 
=> Other Explanation of clinical findings 
=> Clinically Undetermined (no explanation for clinical findings) The medical record reflects the following: 
   Risk Factors: Chronic back pain, Debilitated hx. Clinical Indicators: Jan. LE dopplers  05/25---noted + Acute Occlusive  thrombus in the right peroneal vein. Acute occlusive thrombus in the left popliteal vein. Acute occlusive  Thrombus in the left posterior tibial vein. Acute occlusive thrombus in the left peroneal vein. , Pt is Debilitated, difficulty with even turning in bed and Mod assist for transfers due to  chronic back pain, difficult with turning in bed and with transfers Treatment: Heparin SQ, Dopplers, PT consulted Thank you JUANCARLOS Pickard, RN, [address] [facility] 
[phone]

## 2019-05-29 VITALS
RESPIRATION RATE: 17 BRPM | DIASTOLIC BLOOD PRESSURE: 64 MMHG | TEMPERATURE: 98.8 F | SYSTOLIC BLOOD PRESSURE: 121 MMHG | OXYGEN SATURATION: 97 % | BODY MASS INDEX: 27 KG/M2 | HEIGHT: 61 IN | HEART RATE: 111 BPM | WEIGHT: 143 LBS

## 2019-05-29 LAB
ANION GAP SERPL CALC-SCNC: 4 MMOL/L (ref 5–15)
BUN SERPL-MCNC: 23 MG/DL (ref 6–20)
BUN/CREAT SERPL: 15 (ref 12–20)
CALCIUM SERPL-MCNC: 8.1 MG/DL (ref 8.5–10.1)
CHLORIDE SERPL-SCNC: 121 MMOL/L (ref 97–108)
CO2 SERPL-SCNC: 20 MMOL/L (ref 21–32)
CREAT SERPL-MCNC: 1.51 MG/DL (ref 0.55–1.02)
GLUCOSE BLD STRIP.AUTO-MCNC: 116 MG/DL (ref 65–100)
GLUCOSE BLD STRIP.AUTO-MCNC: 198 MG/DL (ref 65–100)
GLUCOSE SERPL-MCNC: 122 MG/DL (ref 65–100)
POTASSIUM SERPL-SCNC: 4.4 MMOL/L (ref 3.5–5.1)
SERVICE CMNT-IMP: ABNORMAL
SERVICE CMNT-IMP: ABNORMAL
SODIUM SERPL-SCNC: 145 MMOL/L (ref 136–145)

## 2019-05-29 PROCEDURE — 74011000258 HC RX REV CODE- 258: Performed by: INTERNAL MEDICINE

## 2019-05-29 PROCEDURE — 36415 COLL VENOUS BLD VENIPUNCTURE: CPT

## 2019-05-29 PROCEDURE — 74011636637 HC RX REV CODE- 636/637: Performed by: INTERNAL MEDICINE

## 2019-05-29 PROCEDURE — 74011250637 HC RX REV CODE- 250/637: Performed by: INTERNAL MEDICINE

## 2019-05-29 PROCEDURE — 82962 GLUCOSE BLOOD TEST: CPT

## 2019-05-29 PROCEDURE — 74011250636 HC RX REV CODE- 250/636: Performed by: INTERNAL MEDICINE

## 2019-05-29 PROCEDURE — 80048 BASIC METABOLIC PNL TOTAL CA: CPT

## 2019-05-29 RX ORDER — SERTRALINE HYDROCHLORIDE 50 MG/1
150 TABLET, FILM COATED ORAL DAILY
Qty: 90 TAB | Refills: 0 | Status: SHIPPED | OUTPATIENT
Start: 2019-05-29

## 2019-05-29 RX ORDER — MIRTAZAPINE 15 MG/1
15 TABLET, FILM COATED ORAL
Qty: 30 TAB | Refills: 0 | Status: SHIPPED | OUTPATIENT
Start: 2019-05-29

## 2019-05-29 RX ORDER — LABETALOL 100 MG/1
100 TABLET, FILM COATED ORAL EVERY 12 HOURS
Qty: 60 TAB | Refills: 0 | Status: SHIPPED | OUTPATIENT
Start: 2019-05-29

## 2019-05-29 RX ORDER — PANTOPRAZOLE SODIUM 40 MG/1
40 TABLET, DELAYED RELEASE ORAL
Qty: 30 TAB | Refills: 0 | Status: SHIPPED | OUTPATIENT
Start: 2019-05-30

## 2019-05-29 RX ADMIN — DEXTROSE MONOHYDRATE AND SODIUM CHLORIDE 75 ML/HR: 5; .45 INJECTION, SOLUTION INTRAVENOUS at 11:42

## 2019-05-29 RX ADMIN — PANTOPRAZOLE SODIUM 40 MG: 40 TABLET, DELAYED RELEASE ORAL at 07:59

## 2019-05-29 RX ADMIN — Medication 10 ML: at 06:00

## 2019-05-29 RX ADMIN — INSULIN LISPRO 2 UNITS: 100 INJECTION, SOLUTION INTRAVENOUS; SUBCUTANEOUS at 11:41

## 2019-05-29 RX ADMIN — OXYCODONE HYDROCHLORIDE 10 MG: 10 TABLET, FILM COATED, EXTENDED RELEASE ORAL at 08:45

## 2019-05-29 RX ADMIN — HEPARIN SODIUM 5000 UNITS: 5000 INJECTION INTRAVENOUS; SUBCUTANEOUS at 00:37

## 2019-05-29 RX ADMIN — LEVOTHYROXINE SODIUM 125 MCG: 125 TABLET ORAL at 07:59

## 2019-05-29 RX ADMIN — SUCRALFATE 1 G: 1 SUSPENSION ORAL at 11:41

## 2019-05-29 RX ADMIN — LABETALOL HYDROCHLORIDE 100 MG: 100 TABLET, FILM COATED ORAL at 08:45

## 2019-05-29 RX ADMIN — SERTRALINE HYDROCHLORIDE 150 MG: 50 TABLET ORAL at 08:45

## 2019-05-29 RX ADMIN — SUCRALFATE 1 G: 1 SUSPENSION ORAL at 07:59

## 2019-05-29 RX ADMIN — HEPARIN SODIUM 5000 UNITS: 5000 INJECTION INTRAVENOUS; SUBCUTANEOUS at 08:45

## 2019-05-29 NOTE — PROGRESS NOTES
Blanco Mcpherson queenie Brownell 79  380 [facility], [id number] [place] [address]  [phone]      Medical Progress Note      NAME: Aicha Jason   :  1955  MRM:  [id number]    Date/Time: 2019  11:43 AM          Subjective:     Chief Complaint:  N/V: no further, appetite better, ate well yesterday    ROS:  (bold if positive, if negative)                        Tolerating PT  Tolerating Diet          Objective:       Vitals:          Last 24hrs VS reviewed since prior progress note.  Most recent are:    Visit Vitals  /64 (BP 1 Location: Right arm, BP Patient Position: At rest)   Pulse (!) 111   Temp 98.8 °F (37.1 °C)   Resp 17   Ht 5' 1\" (1.549 m)   Wt 64.9 kg (143 lb)   SpO2 97%   BMI 27.02 kg/m²     SpO2 Readings from Last 6 Encounters:   19 97%   19 99%   01/10/19 97%   18 96%    O2 Flow Rate (L/min): 2 l/min       Intake/Output Summary (Last 24 hours) at 2019 1143  Last data filed at 2019 2320  Gross per 24 hour   Intake    Output 750 ml   Net -750 ml          Exam:     Physical Exam:    Gen:  Well-developed, well-nourished, in no acute distress  HEENT:  Pink conjunctivae, PERRL, hearing intact to voice, moist mucous membranes  Neck:  Supple, without masses, thyroid non-tender  Resp:  No accessory muscle use, clear breath sounds without wheezes rales or rhonchi  Card:  No murmurs, normal S1, S2 without thrills, bruits or peripheral edema  Abd:  Soft, non-tender, non-distended, normoactive bowel sounds are present, no palpable organomegaly and no detectable hernias  Lymph:  No cervical or inguinal adenopathy  Musc:  No cyanosis or clubbing  Skin:  No rashes or ulcers, skin turgor is good  Neuro:  Cranial nerves are grossly intact, no focal motor weakness, follows commands appropriately  Psych:  Fair insight, oriented to person, place and time, alert         Medications Reviewed: (see below)    Lab Data Reviewed: (see below)    ______________________________________________________________________    Medications:     Current Facility-Administered Medications   Medication Dose Route Frequency    dextrose 5 % - 0.45% NaCl infusion  75 mL/hr IntraVENous CONTINUOUS    sertraline (ZOLOFT) tablet 150 mg  150 mg Oral DAILY    mirtazapine (REMERON) tablet 15 mg  15 mg Oral QHS    labetalol (NORMODYNE) tablet 100 mg  100 mg Oral Q12H    sodium chloride (NS) flush 5-40 mL  5-40 mL IntraVENous Q8H    sodium chloride (NS) flush 5-40 mL  5-40 mL IntraVENous PRN    insulin lispro (HUMALOG) injection   SubCUTAneous AC&HS    glucose chewable tablet 16 g  4 Tab Oral PRN    dextrose (D50) infusion 12.5-25 g  12.5-25 g IntraVENous PRN    glucagon (GLUCAGEN) injection 1 mg  1 mg IntraMUSCular PRN    heparin (porcine) injection 5,000 Units  5,000 Units SubCUTAneous Q8H    levothyroxine (SYNTHROID) tablet 125 mcg  125 mcg Oral ACB    oxyCODONE IR (ROXICODONE) tablet 5 mg  5 mg Oral Q6H PRN    oxyCODONE ER (OxyCONTIN) tablet 10 mg  10 mg Oral Q12H    ondansetron (ZOFRAN) injection 4 mg  4 mg IntraVENous Q6H PRN    acetaminophen (TYLENOL) tablet 650 mg  650 mg Oral Q6H PRN    pantoprazole (PROTONIX) tablet 40 mg  40 mg Oral ACB    sucralfate (CARAFATE) 100 mg/mL oral suspension 1 g  1 g Oral TIDAC            Lab Review:     Recent Labs     05/27/19  0324   WBC 3.7   HGB 8.6*   HCT 28.2*   *     Recent Labs     05/29/19  0313 05/27/19  0324    143   K 4.4 4.9   * 116*   CO2 20* 19*   * 141*   BUN 23* 38*   CREA 1.51* 1.79*   CA 8.1* 8.0*   MG  --  1.7     Lab Results   Component Value Date/Time    Glucose (POC) 198 (H) 05/29/2019 11:31 AM    Glucose (POC) 116 (H) 05/29/2019 07:02 AM    Glucose (POC) 180 (H) 05/28/2019 09:06 PM    Glucose (POC) 192 (H) 05/28/2019 04:28 PM    Glucose (POC) 117 (H) 05/28/2019 11:54 AM     No results for input(s): PH, PCO2, PO2, HCO3, FIO2 in the last 72 hours.   No results for input(s): INR in the last 72 hours.     No lab exists for component: INREXT, INREXT  No results found for: SDES  Lab Results   Component Value Date/Time    Culture result: NO GROWTH 21 DAYS 12/31/2018 10:33 PM    Culture result: CANDIDA PARAPSILOSIS (A) 12/31/2018 09:40 PM    Culture result: NO GROWTH 6 DAYS 12/31/2018 08:54 PM    Culture result: NO GROWTH 6 DAYS 12/31/2018 08:54 PM            Assessment:     Principal Problem:    Acute on chronic renal failure ([facility] (5/25/2019)    Active Problems:    HTN (hypertension) ()      Chronic back pain ()      Diabetes mellitus, type 2 (HCC) ()      Anemia (12/31/2018)      Anorexia (5/25/2019)      Gastroesophageal reflux disease without esophagitis (5/25/2019)           Plan:     Principal Problem:    Acute on chronic renal failure ([facility] (5/25/2019)   - creatinine much improved, just shy of baseline   - tolerating PO, home on orals    Active Problems:    HTN (hypertension) ()   - holding meds      Chronic back pain ()   - continue pain meds      Diabetes mellitus, type 2 (HCC) ()   - BS okay on meds as above      Anemia (12/31/2018)   - Hgb down with hydration, prior labs show chronic disease      Anorexia (5/25/2019)/Severe acute malnutrition   - EGD is normal, suspect this is mainly psychogenic and patient actually agrees, hopefully treatment per Psychiatry will be helpful      Gastroesophageal reflux disease without esophagitis (5/25/2019)   - continue acid suppression      Debility   - PT/OT input noted and I fully agree however patient refuses any form of rehab including home health   - explained to her that she is at risk for falls and injury and it will take her much longer to recover if even can at home with just her family but she is insistent      Total time spent in patient care: 35 minutes                  Care Plan discussed with: Patient, Care Manager and Nursing Staff    Discussed:  Code Status, Care Plan and D/C Planning    Prophylaxis:  Hep SQ    Disposition:  HH PT, OT, RN           ___________________________________________________    Attending Physician: Talisha Carrillo MD

## 2019-05-29 NOTE — PROGRESS NOTES
Discharge order noted. Patient's , Sarah Levy, called and made aware of discharge order. Mr. Duncan Corona states he will be heading to hospital soon, should arrive in 45 minutes. Discharge paperwork prepared. Patient education given on labetalol, Remeron, Protonix and Zoloft. 1345: Patient given discharge instructions. Patient dressed self. Patient and  verbalize understanding of discharge instructions and follow up. Patient's  signed discharge instructions. Signed paperwork on chart. Patient out of unit in wheelchair,  driving patient to home.

## 2019-05-29 NOTE — DISCHARGE SUMMARY
Physician Discharge Summary     Patient ID:  Ai Murrell  [id number]  59 y.o.  1955    Admit date: 5/25/2019    Discharge date: 5/29/2019    Admission Diagnoses: Acute on chronic renal failure ([facility] [N17.9, N18.9]  Dehydration [E86.0]    Discharge Diagnoses:  Principal Diagnosis Acute on chronic renal failure [facility])                                            Principal Problem:    Acute on chronic renal failure ([facility] (5/25/2019)    Active Problems:    HTN (hypertension) ()      Chronic back pain ()      Diabetes mellitus, type 2 (HCC) ()      Anemia (12/31/2018)      Anorexia (5/25/2019)      Gastroesophageal reflux disease without esophagitis (5/25/2019)         Resolved Problems:  Problem List as of 5/29/2019 Date Reviewed: 5/28/2019          Codes Class Noted - Resolved    * (Principal) Acute on chronic renal failure ([facility] ICD-10-CM: N17.9, N18.9  ICD-9-CM: 584.9, 585.9  5/25/2019 - Present        Anorexia (Chronic) ICD-10-CM: R63.0  ICD-9-CM: 783.0  5/25/2019 - Present        Gastroesophageal reflux disease without esophagitis (Chronic) ICD-10-CM: K21.9  ICD-9-CM: 530.81  5/25/2019 - Present        Anemia (Chronic) ICD-10-CM: D64.9  ICD-9-CM: 285.9  12/31/2018 - Present        HTN (hypertension) (Chronic) ICD-10-CM: I10  ICD-9-CM: 401.9  Unknown - Present        Chronic back pain (Chronic) ICD-10-CM: M54.9, G89.29  ICD-9-CM: 724.5, 338.29  Unknown - Present        Diabetes mellitus, type 2 (HCC) (Chronic) ICD-10-CM: E11.9  ICD-9-CM: 250.00  Unknown - Present        RESOLVED: Hyperkalemia ICD-10-CM: E87.5  ICD-9-CM: 276.7  5/25/2019 - 5/27/2019        RESOLVED: Dehydration ICD-10-CM: E86.0  ICD-9-CM: 276.51  5/25/2019 - 5/27/2019        RESOLVED: Nausea & vomiting ICD-10-CM: R11.2  ICD-9-CM: 787.01  5/25/2019 - 5/27/2019        RESOLVED: Fever ICD-10-CM: R50.9  ICD-9-CM: 780.60  12/31/2018 - 5/26/2019        RESOLVED: Sinus tachycardia ICD-10-CM: R00.0  ICD-9-CM: 427.89  12/31/2018 - 5/27/2019 RESOLVED: Sepsis ([address] ICD-10-CM: A41.9  ICD-9-CM: 038.9, 995.91  12/31/2018 - 5/26/2019        RESOLVED: Acute metabolic encephalopathy EGZ-06-WZ: G93.41  ICD-9-CM: 348.31  12/31/2018 - 5/26/2019        RESOLVED: Discitis of cervical region ICD-10-CM: M46.42  ICD-9-CM: 722.91  12/31/2018 - 5/26/2019        RESOLVED: Urinary retention ICD-10-CM: R33.9  ICD-9-CM: 788.20  12/31/2018 - 5/26/2019        RESOLVED: Cellulitis of right elbow ICD-10-CM: B04.467  ICD-9-CM: 682.3  12/31/2018 - 5/26/2019        RESOLVED: Hypokalemia ICD-10-CM: E87.6  ICD-9-CM: 276.8  12/31/2018 - 5/26/2019        RESOLVED: Hypernatremia ICD-10-CM: E87.0  ICD-9-CM: 276.0  12/31/2018 - 5/26/2019        RESOLVED: Metabolic acidosis [facility]: E87.2  ICD-9-CM: 276.2  11/30/2018 - 5/27/2019        RESOLVED: Leukocytosis ICD-10-CM: D52.022  ICD-9-CM: 288.60  11/30/2018 - 5/26/2019        RESOLVED: Right elbow pain ICD-10-CM: M25.521  ICD-9-CM: 719.42  11/30/2018 - 5/26/2019        RESOLVED: Hypotension ICD-10-CM: I95.9  ICD-9-CM: 458.9  11/30/2018 - 5/27/2019                Hospital Course:   Ms. Shelia Shin was admitted to the Hospitalist Service on the 5th floor for treatment of ARF. She was hydrated and her creatinine progressively improved. Her BP meds were adjusted. She was evaluated by Psychiatry for depression and her meds were adjusted. She was evaluated by GI for anorexia and chronic nausea and vomiting. EGD was done and was entirely normal.  Patient agreed that her GI symptoms were likely due to her depression and other psychiatric issues. LE dopplers showed acute occlusive thrombus present in the right peroneal vein, left popliteal vein,left posterior tibial vein, left peroneal vein. Because these were below the knee and because she was considered high risk for fall, we elected to not place her on anticoagulation and use stockings, etc to prevent further clot formation.    She was mobilized with PT/OT and remained quite weak but refused placement for rehab and refused [facility] for rehab. She was discharged home on 5/29/2019 in improved condition. PCP: Brittney Kim MD    Consults: GI, Psychiatry and Orthopedic Surgery    Discharge Exam:  See my Progress Note from today. Disposition: home    Patient Instructions:   Current Discharge Medication List      START taking these medications    Details   labetalol (NORMODYNE) 100 mg tablet Take 1 Tab by mouth every twelve (12) hours. Qty: 60 Tab, Refills: 0      mirtazapine (REMERON) 15 mg tablet Take 1 Tab by mouth nightly. Qty: 30 Tab, Refills: 0      pantoprazole (PROTONIX) 40 mg tablet Take 1 Tab by mouth Daily (before breakfast). Qty: 30 Tab, Refills: 0         CONTINUE these medications which have CHANGED    Details   sertraline (ZOLOFT) 50 mg tablet Take 3 Tabs by mouth daily. Qty: 90 Tab, Refills: 0         CONTINUE these medications which have NOT CHANGED    Details   ubidecarenone/vitamin E mixed (COQ10  PO) Take 10 mg by mouth daily. glucosamine sulfate 500 mg capsule Take 500 mg by mouth two (2) times a day. multivitamin (ONE A DAY) tablet Take 1 Tab by mouth daily. oxyCODONE ER (XTAMPZA ER) 27 mg capsule Take 27 mg by mouth every twelve (12) hours. oxyCODONE IR (ROXICODONE) 10 mg tab immediate release tablet Take 10 mg by mouth two (2) times daily as needed (breakthrough pain). naloxone (NARCAN) 2 mg/actuation spry 1 Spray by Nasal route once as needed. Use 1 spray intranasally, then discard. Repeat with new spray every 2 min as needed for opioid overdose symptoms, alternating nostrils. fenofibrate (LOFIBRA) 160 mg tablet Take 160 mg by mouth nightly. omega-3 acid ethyl esters (LOVAZA) 1 gram capsule Take 2 g by mouth two (2) times daily (with meals). atorvastatin (LIPITOR) 20 mg tablet Take 20 mg by mouth nightly. levothyroxine (SYNTHROID) 125 mcg tablet Take 125 mcg by mouth Daily (before breakfast).       ondansetron hcl (ZOFRAN) 4 mg tablet Take 4 mg by mouth every six (6) hours as needed for Nausea. STOP taking these medications       metFORMIN (GLUCOPHAGE) 500 mg tablet Comments:   Reason for Stopping:         atenolol (TENORMIN) 100 mg tablet Comments:   Reason for Stopping:         amLODIPine (NORVASC) 10 mg tablet Comments:   Reason for Stopping:         niacin ER (NIASPAN) 500 mg tablet Comments:   Reason for Stopping:              Activity: Activity as tolerated  Diet: Diabetic Diet  Wound Care: None needed    Follow-up Information     Follow up With Specialties Details Why Contact Info    Rafael Del Real MD Family Practice In 2 weeks  [address] [place] [address] [phone]            35 minutes were spent on this discharge.     Signed:  Kathi Oneill MD  5/29/2019  1:04 PM

## 2019-05-29 NOTE — ROUTINE PROCESS
Bedside and Verbal shift change report given to Alonzo Kwan (oncoming nurse) by Micha Walsh RN (offgoing nurse). Report included the following information SBAR, Kardex and Quality Measures.

## 2019-05-29 NOTE — DISCHARGE INSTRUCTIONS
HOSPITALIST DISCHARGE INSTRUCTIONS  NAME: Jyotsna Lau   :  1955   MRN:  [id number]     Date/Time:  2019 11:00 AM    ADMIT DATE: 2019     DISCHARGE DATE: 2019     DISCHARGE DIAGNOSIS:  Anorexia    MEDICATIONS:  · It is important that you take the medication exactly as they are prescribed. · Keep your medication in the bottles provided by the pharmacist and keep a list of the medication names, dosages, and times to be taken in your wallet. · Do not take other medications without consulting your doctor. Pain Management: per above medications    What to do at Home    Recommended diet:  Diabetic Diet    Recommended activity: Activity as tolerated    If you experience any of the following symptoms then please call your primary care physician or return to the emergency room if you cannot get hold of your doctor:  Fever, chills, nausea, vomiting, diarrhea, change in mentation, falling, bleeding, shortness of breath    Follow Up: Follow-up Information     Follow up With Specialties Details Why Contact Info    Lenore Solo MD [facility] In 2 weeks  [address] [id number] [phone]              Information obtained by :  I understand that if any problems occur once I am at home I am to contact my physician. I understand and acknowledge receipt of the instructions indicated above.                                                                                                                                            Physician's or R.N.'s Signature                                                                  Date/Time                                                                                                                                              Patient or Representative Signature                                                          Date/Time

## 2019-05-29 NOTE — PROGRESS NOTES
Bedside shift change report given to [facility], RN (oncoming nurse) by Marc Olmstead (offgoing nurse). Report included the following information SBAR, MAR, Accordion, Recent Results and Med Rec Status.

## 2021-07-12 NOTE — PROGRESS NOTES
1900 
Bedside and Verbal shift change report given to Byrd Merlin RN (oncoming nurse) by April RN (offgoing nurse). Report included the following information SBAR, Kardex, Procedure Summary, Intake/Output, MAR, Accordion and Recent Results. [address] Patient went down for MRI. 2030 Patient back from MRI. Initial assessment done. Visit Vitals /65 (BP 1 Location: Left arm, BP Patient Position: Supine) Pulse 96 Temp 97.5 °F (36.4 °C) Resp 18 Ht 5' 1\" (1.549 m) Wt 88 kg (194 lb 0.1 oz) SpO2 94% BMI 36.66 kg/m²  
 
0700 Bedside and Verbal shift change report given to [address] Pruitt (oncoming nurse) by Byrd Merlin RN (offgoing nurse). Report included the following information SBAR, Kardex, ED Summary, Procedure Summary, Intake/Output, MAR, Accordion and Recent Results. Discussed toric contact lenses vs PRK.

## 2022-03-01 NOTE — PROGRESS NOTES
Nutrition Assessment: 
 
RECOMMENDATIONS/INTERVENTION(S):  
1. Recommend placing NGT for EN due to lack of PO intakes since admission (x 8 days) and now intubation. EN rec's: When appropriate, start Vital High Protein at 20ml/hr to  assess tolerance. Flush with 30ml h20 q4h. Will re-assess needs based on new vent settings and make goal  rec's at later time. 2. Will monitor plan of care for nutrition, BG, GI. ASSESSMENT:  
12/7: Pt required emergent intubation today, was transferred to ICU. Unable to change EEN's using [employer] equation due to vent measurements not charted yet. Propofol ordered for sedation, currently at 26.4ml/hr (696kcals). Other meds: Dulcolax, Humalog. D5 running at 75ml/hr. Still no BM noted since 11/27, BS's noted as hypoactive. Labs: Na+ 150, BG 63, HgbA1c 7.2, .  
 
12/5: 61yo female admitted for metabolic acidosis. RD assessment for LOS. PMHx: DM, HTN. Class II Obese per BMI. No weight hx from PTA to assess. Pt not responding to any questions and no family in room at time of visit today x 2.  Spoke with RN who reports pt has not had anything to eat today and has been mostly NPO since admission. Attempted to feed pt but food was held in mouth. RN states pt refusing to swallow pills today too. Pureed diet ordered. SLP not following pt.  informed RN this started about 3 weeks PTA, before that she was ambulatory and verbal, eating. BS's are noted as hypoactive and no BM since 11/27. Labs: K+ 3.2, , POC -[id number], HgbA1c 7.2. Meds: Lantus, Humalog, Kcl.  1/2NaCl with 20mEqKCl running at 100ml/hr. Diet Order: NPO 
% Eaten:  No data found. Pertinent Medications: [x] Reviewed Labs: [x] Reviewed Anthropometrics: Height: 5' 1\" (154.9 cm) Weight: 88 kg (194 lb 0.1 oz) IBW (%IBW):   ( ) UBW (%UBW):   (  %) BMI: Body mass index is 36.66 kg/m². This BMI is indicative of: [] Underweight    [] Normal    [] Overweight    [x] Class II Obesity    []  Extreme Obesity (BMI>40) Estimated Nutrition Needs (Based on): 5023 Kcals/day(BMR 1360 x AF 1.3) , 69 g(ABW x 0.8-1gm/day (69-87gm)) Protein Carbohydrate: At Least 130 g/day  Fluids: 1768mL/day (1 ml/kcal) Last BM: 11/27  []Active     []Hyperactive  [x]Hypoactive       [] Absent   BS Skin:    [] Intact   [] Incision  [] Breakdown   [] DTI   [] Tears/Excoriation/Abrasion  [x]Edema - 1+generalized [] Other: Wt Readings from Last 30 Encounters:  
12/07/18 88 kg (194 lb 0.1 oz) 12/01/18 81.6 kg (179 lb 14.3 oz) 12/01/18 81.6 kg (179 lb 14.3 oz) 12/01/18 81.6 kg (179 lb 14.3 oz) NUTRITION DIAGNOSES:  
Problem:  Inadequate energy intake Etiology: related to inability to consume sufficient energy secondary to mechanical ventilation Signs/Symptoms: as evidenced by NPO x 8 days meeting < EEN's NUTRITION INTERVENTIONS: 
Meals/Snacks: Modify diet/texture/consistency/nutrients Enteral/Parenteral Nutrition: Initiate enteral nutrition GOAL:  
Meet EEN's with EN in next 1-3 days Cultural, Restorationism, or Ethnic Dietary Needs: None EDUCATION & DISCHARGE NEEDS:  
 [x] None Identified 
 [] Identified and Education Provided/Documented 
 [] Identified and Pt declined/was not appropriate [x] Interdisciplinary Care Plan Reviewed/Documented  
 [x] Discharge Needs: Will need to determine if pt needs supplemental nutrition after discharge/EN [] No Nutrition Related Discharge Needs NUTRITION RISK:  
Pt Is At Nutrition Risk  [x] No Nutrition Risk Identified  [] PT SEEN FOR:  
 []  MD Consult: []Calorie Count []Diabetic Diet Education []Diet Education []Electrolyte Management []General Nutrition Management and Supplements []Management of Tube Feeding []TPN Recommendations []  RN Referral:  []MST score >=2 
   []Enteral/Parenteral Nutrition PTA []Pregnant: Gestational DM or Multigestation  
              [] Pressure Ulcer 
 
[]  Low BMI      []  Length of Stay       [] Dysphagia Diet         [] Ventilator [x]  Follow-up Previous Recommendations: 
 [] Implemented          [x] Not Implemented          [] Not Applicable Previous Goal: 
 [] Met              [] Progressing Towards Goal              [x] Not Progressing Towards Goal   [] Not Applicable Horace [address] RD Pager [phone] Phone [phone] Area M Indication Text: Tumors in this location are included in Area M (cheek, forehead, scalp, neck, jawline and pretibial skin).  Mohs surgery is indicated for tumors in these anatomic locations.

## (undated) DEVICE — TRAP SUC MUCOUS 70ML -- MEDICHOICE MEDLINE

## (undated) DEVICE — ADULT SPO2 SENSOR: Brand: NELLCOR

## (undated) DEVICE — SET GRAV CK VLV NEEDLESS ST 3 GANGED 4WAY STPCOCK HI FLO 10

## (undated) DEVICE — KIT COLON W/ 1.1OZ LUB AND 2 END

## (undated) DEVICE — SYRINGE 50ML E/T

## (undated) DEVICE — 1200 GUARD II KIT W/5MM TUBE W/O VAC TUBE: Brand: GUARDIAN

## (undated) DEVICE — BRUSH CYTO BRONCHSCP 1.5/140MM -- CELLEBRITY

## (undated) DEVICE — CONTAINER SPEC 20 ML LID NEUT BUFF FORMALIN 10 % POLYPR STS

## (undated) DEVICE — KENDALL RADIOLUCENT FOAM MONITORING ELECTRODE -RECTANGULAR SHAPE: Brand: KENDALL

## (undated) DEVICE — BAG SPEC BIOHZRD 10 X 10 IN --

## (undated) DEVICE — FORCEPS BX L240CM JAW DIA2.8MM L CAP W/ NDL MIC MESH TOOTH

## (undated) DEVICE — CANN NASAL O2 CAPNOGRAPHY AD -- FILTERLINE

## (undated) DEVICE — AIRLIFE™ CORRUGATED FLEXIBLE EVA TUBING FOR AEROSOL AND IPPB USE, SEGMENTED, 6 FEET (1.8 M) LENGTH, 22 MM I.D.: Brand: AIRLIFE™

## (undated) DEVICE — SIMPLICITY FLUFF UNDERPAD 23X36, MODERATE: Brand: SIMPLICITY

## (undated) DEVICE — BAG BELONG PT PERS CLEAR HANDL

## (undated) DEVICE — 3M™ CUROS™ DISINFECTING CAP FOR NEEDLELESS CONNECTORS 270/CARTON 20 CARTONS/CASE CFF1-270: Brand: CUROS™

## (undated) DEVICE — Device

## (undated) DEVICE — BASIN EMSIS 16OZ GRAPHITE PLAS KID SHP MOLD GRAD FOR ORAL

## (undated) DEVICE — BITEBLOCK ENDOSCP 60FR MAXI WHT POLYETH STURDY W/ VELC WVN

## (undated) DEVICE — SOLIDIFIER MEDC 1200ML -- CONVERT TO 356117

## (undated) DEVICE — SYR 5ML 1/5 GRAD LL NSAF LF --

## (undated) DEVICE — 1860S HEALTH CARE RESPIRATOR N95 120EA/C: Brand: 3M™